# Patient Record
Sex: FEMALE | Race: BLACK OR AFRICAN AMERICAN | NOT HISPANIC OR LATINO | Employment: OTHER | URBAN - METROPOLITAN AREA
[De-identification: names, ages, dates, MRNs, and addresses within clinical notes are randomized per-mention and may not be internally consistent; named-entity substitution may affect disease eponyms.]

---

## 2019-05-30 ENCOUNTER — OFFICE VISIT (OUTPATIENT)
Dept: URGENT CARE | Facility: CLINIC | Age: 84
End: 2019-05-30
Payer: COMMERCIAL

## 2019-05-30 VITALS
WEIGHT: 145 LBS | DIASTOLIC BLOOD PRESSURE: 72 MMHG | BODY MASS INDEX: 28.47 KG/M2 | RESPIRATION RATE: 20 BRPM | SYSTOLIC BLOOD PRESSURE: 142 MMHG | TEMPERATURE: 98.4 F | HEART RATE: 99 BPM | HEIGHT: 60 IN | OXYGEN SATURATION: 96 %

## 2019-05-30 DIAGNOSIS — N39.0 URINARY TRACT INFECTION WITHOUT HEMATURIA, SITE UNSPECIFIED: Primary | ICD-10-CM

## 2019-05-30 LAB
SL AMB  POCT GLUCOSE, UA: NEGATIVE
SL AMB LEUKOCYTE ESTERASE,UA: ABNORMAL
SL AMB POCT BILIRUBIN,UA: NEGATIVE
SL AMB POCT BLOOD,UA: ABNORMAL
SL AMB POCT CLARITY,UA: ABNORMAL
SL AMB POCT COLOR,UA: YELLOW
SL AMB POCT KETONES,UA: ABNORMAL
SL AMB POCT NITRITE,UA: POSITIVE
SL AMB POCT PH,UA: 6.5
SL AMB POCT SPECIFIC GRAVITY,UA: 1.01
SL AMB POCT URINE PROTEIN: ABNORMAL
SL AMB POCT UROBILINOGEN: 0.2

## 2019-05-30 PROCEDURE — 87077 CULTURE AEROBIC IDENTIFY: CPT | Performed by: PHYSICIAN ASSISTANT

## 2019-05-30 PROCEDURE — 87086 URINE CULTURE/COLONY COUNT: CPT | Performed by: PHYSICIAN ASSISTANT

## 2019-05-30 PROCEDURE — 99213 OFFICE O/P EST LOW 20 MIN: CPT | Performed by: PHYSICIAN ASSISTANT

## 2019-05-30 PROCEDURE — 81002 URINALYSIS NONAUTO W/O SCOPE: CPT | Performed by: PHYSICIAN ASSISTANT

## 2019-05-30 PROCEDURE — G0463 HOSPITAL OUTPT CLINIC VISIT: HCPCS | Performed by: PHYSICIAN ASSISTANT

## 2019-05-30 PROCEDURE — 87186 SC STD MICRODIL/AGAR DIL: CPT | Performed by: PHYSICIAN ASSISTANT

## 2019-05-30 RX ORDER — MIRTAZAPINE 15 MG/1
15 TABLET, FILM COATED ORAL
COMMUNITY
End: 2019-09-12 | Stop reason: SDUPTHER

## 2019-05-30 RX ORDER — PRAMIPEXOLE DIHYDROCHLORIDE 1 MG/1
0.5 TABLET ORAL 2 TIMES DAILY
COMMUNITY
End: 2019-09-12 | Stop reason: SDUPTHER

## 2019-05-30 RX ORDER — NITROFURANTOIN 25; 75 MG/1; MG/1
100 CAPSULE ORAL 2 TIMES DAILY
Qty: 10 CAPSULE | Refills: 0 | Status: SHIPPED | OUTPATIENT
Start: 2019-05-30 | End: 2019-06-04

## 2019-06-01 LAB — BACTERIA UR CULT: ABNORMAL

## 2019-06-18 ENCOUNTER — APPOINTMENT (OUTPATIENT)
Dept: LAB | Facility: CLINIC | Age: 84
End: 2019-06-18
Payer: COMMERCIAL

## 2019-06-18 ENCOUNTER — OFFICE VISIT (OUTPATIENT)
Dept: FAMILY MEDICINE CLINIC | Facility: CLINIC | Age: 84
End: 2019-06-18
Payer: COMMERCIAL

## 2019-06-18 VITALS
TEMPERATURE: 97.5 F | DIASTOLIC BLOOD PRESSURE: 74 MMHG | HEIGHT: 60 IN | WEIGHT: 150 LBS | RESPIRATION RATE: 16 BRPM | OXYGEN SATURATION: 98 % | BODY MASS INDEX: 29.45 KG/M2 | HEART RATE: 92 BPM | SYSTOLIC BLOOD PRESSURE: 150 MMHG

## 2019-06-18 DIAGNOSIS — F01.50 VASCULAR DEMENTIA WITHOUT BEHAVIORAL DISTURBANCE (HCC): ICD-10-CM

## 2019-06-18 DIAGNOSIS — R41.0 CONFUSION AND DISORIENTATION: Primary | ICD-10-CM

## 2019-06-18 DIAGNOSIS — B35.4 TINEA CORPORIS: ICD-10-CM

## 2019-06-18 DIAGNOSIS — M51.36 DEGENERATION OF LUMBAR INTERVERTEBRAL DISC: ICD-10-CM

## 2019-06-18 DIAGNOSIS — R44.1 VISUAL HALLUCINATIONS: ICD-10-CM

## 2019-06-18 DIAGNOSIS — G25.81 RESTLESS LEGS SYNDROME: ICD-10-CM

## 2019-06-18 DIAGNOSIS — R41.0 CONFUSION AND DISORIENTATION: ICD-10-CM

## 2019-06-18 PROBLEM — M47.816 ARTHROPATHY OF LUMBAR FACET JOINT: Status: ACTIVE | Noted: 2019-06-18

## 2019-06-18 PROBLEM — M51.369 DEGENERATION OF LUMBAR INTERVERTEBRAL DISC: Status: ACTIVE | Noted: 2019-06-18

## 2019-06-18 PROBLEM — M54.50 LOW BACK PAIN: Status: RESOLVED | Noted: 2019-06-18 | Resolved: 2019-06-18

## 2019-06-18 PROBLEM — M54.50 LOW BACK PAIN: Status: ACTIVE | Noted: 2019-06-18

## 2019-06-18 PROBLEM — M54.16 LUMBAR RADICULOPATHY: Status: ACTIVE | Noted: 2019-06-18

## 2019-06-18 LAB
ALBUMIN SERPL BCP-MCNC: 3.7 G/DL (ref 3.5–5)
ALP SERPL-CCNC: 114 U/L (ref 46–116)
ALT SERPL W P-5'-P-CCNC: 13 U/L (ref 12–78)
ANION GAP SERPL CALCULATED.3IONS-SCNC: 3 MMOL/L (ref 4–13)
AST SERPL W P-5'-P-CCNC: 15 U/L (ref 5–45)
BASOPHILS # BLD AUTO: 0.03 THOUSANDS/ΜL (ref 0–0.1)
BASOPHILS NFR BLD AUTO: 1 % (ref 0–1)
BILIRUB SERPL-MCNC: 0.36 MG/DL (ref 0.2–1)
BUN SERPL-MCNC: 22 MG/DL (ref 5–25)
CALCIUM SERPL-MCNC: 9 MG/DL (ref 8.3–10.1)
CHLORIDE SERPL-SCNC: 104 MMOL/L (ref 100–108)
CO2 SERPL-SCNC: 30 MMOL/L (ref 21–32)
CREAT SERPL-MCNC: 1.07 MG/DL (ref 0.6–1.3)
EOSINOPHIL # BLD AUTO: 0.11 THOUSAND/ΜL (ref 0–0.61)
EOSINOPHIL NFR BLD AUTO: 2 % (ref 0–6)
ERYTHROCYTE [DISTWIDTH] IN BLOOD BY AUTOMATED COUNT: 13.9 % (ref 11.6–15.1)
GFR SERPL CREATININE-BSD FRML MDRD: 45 ML/MIN/1.73SQ M
GLUCOSE P FAST SERPL-MCNC: 171 MG/DL (ref 65–99)
HCT VFR BLD AUTO: 43.7 % (ref 34.8–46.1)
HGB BLD-MCNC: 13.4 G/DL (ref 11.5–15.4)
IMM GRANULOCYTES # BLD AUTO: 0.02 THOUSAND/UL (ref 0–0.2)
IMM GRANULOCYTES NFR BLD AUTO: 0 % (ref 0–2)
LYMPHOCYTES # BLD AUTO: 0.9 THOUSANDS/ΜL (ref 0.6–4.47)
LYMPHOCYTES NFR BLD AUTO: 14 % (ref 14–44)
MCH RBC QN AUTO: 30.2 PG (ref 26.8–34.3)
MCHC RBC AUTO-ENTMCNC: 30.7 G/DL (ref 31.4–37.4)
MCV RBC AUTO: 98 FL (ref 82–98)
MONOCYTES # BLD AUTO: 0.55 THOUSAND/ΜL (ref 0.17–1.22)
MONOCYTES NFR BLD AUTO: 8 % (ref 4–12)
NEUTROPHILS # BLD AUTO: 4.93 THOUSANDS/ΜL (ref 1.85–7.62)
NEUTS SEG NFR BLD AUTO: 75 % (ref 43–75)
NRBC BLD AUTO-RTO: 0 /100 WBCS
PLATELET # BLD AUTO: 262 THOUSANDS/UL (ref 149–390)
PMV BLD AUTO: 11.3 FL (ref 8.9–12.7)
POTASSIUM SERPL-SCNC: 4 MMOL/L (ref 3.5–5.3)
PROT SERPL-MCNC: 7.3 G/DL (ref 6.4–8.2)
RBC # BLD AUTO: 4.44 MILLION/UL (ref 3.81–5.12)
SL AMB  POCT GLUCOSE, UA: NORMAL
SL AMB LEUKOCYTE ESTERASE,UA: NORMAL
SL AMB POCT BILIRUBIN,UA: NORMAL
SL AMB POCT BLOOD,UA: NORMAL
SL AMB POCT KETONES,UA: NORMAL
SL AMB POCT NITRITE,UA: NORMAL
SL AMB POCT PH,UA: 5
SL AMB POCT SPECIFIC GRAVITY,UA: 1.03
SL AMB POCT URINE PROTEIN: NORMAL
SL AMB POCT UROBILINOGEN: 0.2
SODIUM SERPL-SCNC: 137 MMOL/L (ref 136–145)
TSH SERPL DL<=0.05 MIU/L-ACNC: 1.96 UIU/ML (ref 0.36–3.74)
WBC # BLD AUTO: 6.54 THOUSAND/UL (ref 4.31–10.16)

## 2019-06-18 PROCEDURE — 87086 URINE CULTURE/COLONY COUNT: CPT | Performed by: NURSE PRACTITIONER

## 2019-06-18 PROCEDURE — 80053 COMPREHEN METABOLIC PANEL: CPT

## 2019-06-18 PROCEDURE — 87147 CULTURE TYPE IMMUNOLOGIC: CPT | Performed by: NURSE PRACTITIONER

## 2019-06-18 PROCEDURE — 3725F SCREEN DEPRESSION PERFORMED: CPT | Performed by: NURSE PRACTITIONER

## 2019-06-18 PROCEDURE — 99204 OFFICE O/P NEW MOD 45 MIN: CPT | Performed by: NURSE PRACTITIONER

## 2019-06-18 PROCEDURE — 84443 ASSAY THYROID STIM HORMONE: CPT

## 2019-06-18 PROCEDURE — 36415 COLL VENOUS BLD VENIPUNCTURE: CPT

## 2019-06-18 PROCEDURE — 85025 COMPLETE CBC W/AUTO DIFF WBC: CPT

## 2019-06-18 PROCEDURE — 81003 URINALYSIS AUTO W/O SCOPE: CPT | Performed by: NURSE PRACTITIONER

## 2019-06-18 RX ORDER — CLOTRIMAZOLE AND BETAMETHASONE DIPROPIONATE 10; .64 MG/G; MG/G
CREAM TOPICAL 2 TIMES DAILY
Qty: 30 G | Refills: 0 | Status: SHIPPED | OUTPATIENT
Start: 2019-06-18 | End: 2020-06-18

## 2019-06-19 LAB
BACTERIA UR CULT: ABNORMAL
BACTERIA UR CULT: ABNORMAL

## 2019-06-20 DIAGNOSIS — N30.00 ACUTE CYSTITIS WITHOUT HEMATURIA: Primary | ICD-10-CM

## 2019-06-20 RX ORDER — NITROFURANTOIN 25; 75 MG/1; MG/1
100 CAPSULE ORAL 2 TIMES DAILY
Qty: 10 CAPSULE | Refills: 0 | Status: SHIPPED | OUTPATIENT
Start: 2019-06-20 | End: 2019-06-25

## 2019-07-02 ENCOUNTER — OFFICE VISIT (OUTPATIENT)
Dept: FAMILY MEDICINE CLINIC | Facility: CLINIC | Age: 84
End: 2019-07-02
Payer: COMMERCIAL

## 2019-07-02 VITALS
HEART RATE: 84 BPM | TEMPERATURE: 97.2 F | BODY MASS INDEX: 28.82 KG/M2 | RESPIRATION RATE: 18 BRPM | HEIGHT: 60 IN | WEIGHT: 146.8 LBS | DIASTOLIC BLOOD PRESSURE: 72 MMHG | OXYGEN SATURATION: 98 % | SYSTOLIC BLOOD PRESSURE: 136 MMHG

## 2019-07-02 DIAGNOSIS — R44.1 VISUAL HALLUCINATIONS: ICD-10-CM

## 2019-07-02 DIAGNOSIS — Z00.00 MEDICARE ANNUAL WELLNESS VISIT, SUBSEQUENT: ICD-10-CM

## 2019-07-02 DIAGNOSIS — R41.0 CONFUSION AND DISORIENTATION: ICD-10-CM

## 2019-07-02 DIAGNOSIS — G25.81 RESTLESS LEGS SYNDROME: ICD-10-CM

## 2019-07-02 DIAGNOSIS — M51.36 DEGENERATION OF LUMBAR INTERVERTEBRAL DISC: ICD-10-CM

## 2019-07-02 DIAGNOSIS — F01.50 VASCULAR DEMENTIA WITHOUT BEHAVIORAL DISTURBANCE (HCC): ICD-10-CM

## 2019-07-02 DIAGNOSIS — M54.16 LUMBAR RADICULOPATHY: Primary | ICD-10-CM

## 2019-07-02 DIAGNOSIS — B35.4 TINEA CORPORIS: ICD-10-CM

## 2019-07-02 DIAGNOSIS — M47.816 ARTHROPATHY OF LUMBAR FACET JOINT: ICD-10-CM

## 2019-07-02 PROCEDURE — G0439 PPPS, SUBSEQ VISIT: HCPCS | Performed by: NURSE PRACTITIONER

## 2019-07-02 PROCEDURE — 1160F RVW MEDS BY RX/DR IN RCRD: CPT | Performed by: NURSE PRACTITIONER

## 2019-07-02 PROCEDURE — 1036F TOBACCO NON-USER: CPT | Performed by: NURSE PRACTITIONER

## 2019-07-02 PROCEDURE — 1170F FXNL STATUS ASSESSED: CPT | Performed by: NURSE PRACTITIONER

## 2019-07-02 PROCEDURE — 1125F AMNT PAIN NOTED PAIN PRSNT: CPT | Performed by: NURSE PRACTITIONER

## 2019-07-02 PROCEDURE — 99214 OFFICE O/P EST MOD 30 MIN: CPT | Performed by: NURSE PRACTITIONER

## 2019-07-02 NOTE — PROGRESS NOTES
Assessment/Plan:    No problem-specific Assessment & Plan notes found for this encounter  Diagnoses and all orders for this visit:    Lumbar radiculopathy  Comments:  back pain is stable and unchanged    Vascular dementia without behavioral disturbance    Degeneration of lumbar intervertebral disc    Arthropathy of lumbar facet joint    Tinea corporis  Comments:  using lotrisone    Restless legs syndrome    Visual hallucinations    Confusion and disorientation  Comments:  subjectively is worsening  Has good care at home with daughter          Subjective:      Patient ID: Jonny May is a 80 y o  female  6/18/19  Patient here to establish care and reports that she has been diagnosed with dementia beginning stages about two years ago  Patient reports that she has a medical history of lung cancer about 40 years ago and had surgery to remove part of the lung  Patient also has history of appendix removal  Patient has low back pain history and walking with the cane  Patient reports that at times she is hearing voices talking and is seeing things and reports that she is feeling like she has a veil by her eye and having some paranoid and seeing people that are not there  Happening every few months and has been more frequent and able to calm down and redirect and has been living with her niece and her  for many years  Patient had a recent UTI  Patient unclear if has cleared up  7/2    Pt here for a check up  The pt has no acute complaints  Pt with hx of of lumbar radiculopathy  Pt is chronically incontient of urine at night  Has some occasional leg weakness  Pt uses a walker in the house  Pt has occasional falls  Fell on knees  Did not hit head  Pt wears life alert  Pt with recent tinea  Using lotrisone  Reports symptomatic improvement,    Pt with worsening dimentia  Has help with IADLS  Independent with ADLS still  Does not drive  States she gets confused often  Lives with daughter   Has constant care from [facility]  The following portions of the patient's history were reviewed and updated as appropriate:   She  has a past medical history of Cancer ([address] ), Dementia, Depression, and Kidney stone  She   Patient Active Problem List    Diagnosis Date Noted    Degeneration of lumbar intervertebral disc 06/18/2019    Lumbar radiculopathy 06/18/2019    Arthropathy of lumbar facet joint 06/18/2019    Vascular dementia without behavioral disturbance 06/18/2019    Visual hallucinations 06/18/2019    Confusion and disorientation 06/18/2019    Restless legs syndrome 06/18/2019    Tinea corporis 06/18/2019     She  has a past surgical history that includes Lung cancer surgery and Appendectomy  Her family history includes No Known Problems in her father and mother  She  reports that she has quit smoking  She has never used smokeless tobacco  She reports that she does not use drugs  Her alcohol history is not on file  Current Outpatient Medications   Medication Sig Dispense Refill    clotrimazole-betamethasone (LOTRISONE) 1-0 05 % cream Apply topically 2 (two) times a day 30 g 0    mirtazapine (REMERON) 15 mg tablet Take 15 mg by mouth daily at bedtime      pramipexole (MIRAPEX) 1 mg tablet Take 0 5 mg by mouth 2 (two) times a day       No current facility-administered medications for this visit  She is allergic to albuterol; aldactone [spironolactone]; aspirin; atenolol; bactrim [sulfamethoxazole-trimethoprim]; codeine; hydrocodone; ibuprofen; lisinopril; mevacor [lovastatin]; mirapex [pramipexole]; other; penicillins; ultram [tramadol]; and zoloft [sertraline]       Review of Systems   Constitutional: Negative for activity change, appetite change, chills, diaphoresis, fatigue, fever and unexpected weight change  HENT: Negative for congestion, ear pain, hearing loss, postnasal drip, sinus pressure, sinus pain, sneezing and sore throat      Eyes: Negative for pain, redness and visual disturbance  Respiratory: Negative for cough and shortness of breath  Cardiovascular: Negative for chest pain and leg swelling  Gastrointestinal: Negative for abdominal pain, diarrhea, nausea and vomiting  Endocrine: Negative  Genitourinary: Negative  Chronically incontient of urine at night   Musculoskeletal: Negative for arthralgias  Neurological: Negative for dizziness and light-headedness  Psychiatric/Behavioral: Negative for behavioral problems and dysphoric mood  Objective:      /72 (BP Location: Right arm, Patient Position: Sitting, Cuff Size: Standard)   Pulse 84   Temp (!) 97 2 °F (36 2 °C) (Tympanic)   Resp 18   Ht 4' 11 75" (1 518 m)   Wt 66 6 kg (146 lb 12 8 oz)   SpO2 98%   BMI 28 91 kg/m²          Physical Exam   Constitutional: She is oriented to person, place, and time  Vital signs are normal  She appears well-developed and well-nourished  No distress  HENT:   Head: Normocephalic and atraumatic  Right Ear: External ear normal    Left Ear: External ear normal    Eyes: Pupils are equal, round, and reactive to light  Neck: Normal range of motion  No JVD present  No thyromegaly present  Cardiovascular: Normal rate, regular rhythm and intact distal pulses  Murmur heard  Systolic murmur is present with a grade of 3/6  Pulmonary/Chest: Effort normal and breath sounds normal  No respiratory distress  She has no wheezes  Abdominal: Soft  Bowel sounds are normal  She exhibits no distension  Musculoskeletal: Normal range of motion  She exhibits no edema  Neurological: She is alert and oriented to person, place, and time  Skin: Skin is warm and dry  Psychiatric: She has a normal mood and affect

## 2019-07-02 NOTE — PROGRESS NOTES
Assessment and Plan:     Problem List Items Addressed This Visit     None         History of Present Illness:     Patient presents for Medicare Annual Wellness visit    Patient Care Team:  FARZANA Adame as PCP - General (Family Medicine)     Problem List:     Patient Active Problem List   Diagnosis    Degeneration of lumbar intervertebral disc    Lumbar radiculopathy    Arthropathy of lumbar facet joint    Vascular dementia without behavioral disturbance    Visual hallucinations    Confusion and disorientation    Restless legs syndrome    Tinea corporis      Past Medical and Surgical History:     Past Medical History:   Diagnosis Date    Cancer ([address] )     Dementia     Depression     Kidney stone      Past Surgical History:   Procedure Laterality Date    APPENDECTOMY      LUNG CANCER SURGERY        Family History:     Family History   Problem Relation Age of Onset    No Known Problems Mother     No Known Problems Father       Social History:     Social History     Tobacco Use   Smoking Status Former Smoker   Smokeless Tobacco Never Used     Social History     Substance and Sexual Activity   Alcohol Use Not on file     Social History     Substance and Sexual Activity   Drug Use Never      Medications and Allergies:     Current Outpatient Medications   Medication Sig Dispense Refill    clotrimazole-betamethasone (LOTRISONE) 1-0 05 % cream Apply topically 2 (two) times a day 30 g 0    mirtazapine (REMERON) 15 mg tablet Take 15 mg by mouth daily at bedtime      pramipexole (MIRAPEX) 1 mg tablet Take 0 5 mg by mouth 2 (two) times a day       No current facility-administered medications for this visit        Allergies   Allergen Reactions    Albuterol     Aldactone [Spironolactone]     Aspirin     Atenolol     Bactrim [Sulfamethoxazole-Trimethoprim]     Codeine     Hydrocodone     Ibuprofen     Lisinopril     Mevacor [Lovastatin]     Mirapex [Pramipexole]     Other      novacaine    Penicillins     Ultram [Tramadol]     Zoloft [Sertraline]       Immunizations:     Immunization History   Administered Date(s) Administered    INFLUENZA 10/23/2014, 11/22/2015, 02/14/2018, 11/06/2018      Medicare Screening Tests and Risk Assessments:     Ehsan Collazo is here for her Subsequent Wellness visit  Last Medicare Wellness visit information reviewed, patient interviewed and updates made to the record today  Health Risk Assessment:  Patient rates overall health as good  Patient feels that their physical health rating is Same  Eyesight was rated as Slightly worse  Hearing was rated as Same  Patient feels that their emotional and mental health rating is Same  Pain experienced by patient in the last 7 days has been None  Patient states that she has experienced weight loss or gain in last 6 months  Emotional/Mental Health:  Patient has not been feeling nervous/anxious  PHQ-9 Depression Screening:    Frequency of the following problems over the past two weeks:      1  Little interest or pleasure in doing things: 0 - not at all      2  Feeling down, depressed, or hopeless: 1 - several days  PHQ-2 Score: 1          Broken Bones/Falls: Fall Risk Assessment:    In the past year, patient has experienced: History of falling in past year    Number of falls: 1    Injured during fall: No      Patient feels unsteady when standing or walking     Patient is worried about falling     Bladder/Bowel:  Patient has leaked urine accidently in the last six months  Patient reports no loss of bowel control  (Additional Comments: incont at night)    Immunizations:  Patient has had a flu vaccination within the last year  Patient has received a pneumonia shot  Patient has not received a shingles shot  Patient has not received tetanus/diphtheria shot  Home Safety:  Patient does not have trouble with stairs inside or outside of their home     Patient currently reports that there are no safety hazards present in home, working smoke alarms, working carbon monoxide detectors  Preventative Screenings:   Breast cancer screening performed, colon cancer screen completed, cholesterol screen completed, glaucoma eye exam completed,     Nutrition:  Current diet: Regular with servings of the following:    Medications:  Patient is not currently taking any over-the-counter supplements  Patient is able to manage medications  Lifestyle Choices:  Patient reports no tobacco use  Patient has smoked or used tobacco in the past   Patient has stopped her tobacco use  Tobacco use quit date: 34 years ago  Patient reports no alcohol use  Patient does not drive a vehicle  Patient wears seat belt  Current level of exercise of physical activity described by patient as: walking  Activities of Daily Living:  Can get out of bed by his or her self, able to dress self, able to make own meals, able to do own shopping, able to bathe self, can do own laundry/housekeeping, unable to manage own money and other related tasksAdditional Comments: POA does bills    Previous Hospitalizations:  Hospitalization or ED visit in past 12 months  Number of hospitalizations within the last year: 1-2  Additional Comments: UTI     Advanced Directives:  Patient has decided on a power of   Patient has spoken to designated power of   Patient has completed advanced directive          Preventative Screening/Counseling:      Cardiovascular:      General: Risks and Benefits Discussed and Screening Current          Diabetes:      General: Risks and Benefits Discussed and Screening Current          Colorectal Cancer:      General: Risks and Benefits Discussed and Screening Not Indicated          Breast Cancer:      General: Risks and Benefits Discussed and Screening Not Indicated          Cervical Cancer:      General: Risks and Benefits Discussed and Screening Not Indicated          Osteoporosis:      General: Risks and Benefits Discussed and Screening Not Indicated          AAA:      General: Risks and Benefits Discussed and Screening Not Indicated          Glaucoma:      General: Risks and Benefits Discussed and Screening Current          HIV:      General: Risks and Benefits Discussed and Screening Not Indicated          Hepatitis C:      General: Risks and Benefits Discussed and Screening Not Indicated        Advanced Directives:   Patient has living will for healthcare, has durable POA for healthcare, patient has an advanced directive       Immunizations:  Patient reviewed and up to date

## 2019-09-06 ENCOUNTER — OFFICE VISIT (OUTPATIENT)
Dept: OBGYN CLINIC | Age: 84
End: 2019-09-06
Payer: COMMERCIAL

## 2019-09-06 VITALS
DIASTOLIC BLOOD PRESSURE: 64 MMHG | SYSTOLIC BLOOD PRESSURE: 138 MMHG | WEIGHT: 142 LBS | HEIGHT: 60 IN | BODY MASS INDEX: 27.88 KG/M2

## 2019-09-06 DIAGNOSIS — N76.0 ACUTE VAGINITIS: Primary | ICD-10-CM

## 2019-09-06 PROCEDURE — 87210 SMEAR WET MOUNT SALINE/INK: CPT | Performed by: NURSE PRACTITIONER

## 2019-09-06 PROCEDURE — 99203 OFFICE O/P NEW LOW 30 MIN: CPT | Performed by: NURSE PRACTITIONER

## 2019-09-06 RX ORDER — METRONIDAZOLE 500 MG/1
500 TABLET ORAL EVERY 12 HOURS SCHEDULED
Qty: 14 TABLET | Refills: 0 | Status: SHIPPED | OUTPATIENT
Start: 2019-09-06 | End: 2019-09-13

## 2019-09-06 NOTE — PROGRESS NOTES
Diagnoses and all orders for this visit:    Acute vaginitis  -     metroNIDAZOLE (FLAGYL) 500 mg tablet; Take 1 tablet (500 mg total) by mouth every 12 (twelve) hours for 7 days No alcohol during treatment        Call if no symptom improvement, all questions answered, return for annual         Pleasant 80 y o  here for vaginal complaints of odor x 6 months  She denies discharge or itching  She denies any treatments tried  + antibiotic use 3 months ago for a UTI  She was douching a lot but stopped 2 months ago  Denies fever  Not sexually active  Some mild lower pelvic discomfort as well      Past Medical History:   Diagnosis Date    [facility] [address] [address]     Dementia     Depression     Kidney stone      Past Surgical History:   Procedure Laterality Date    APPENDECTOMY      LUNG CANCER SURGERY       Social History     Tobacco Use    Smoking status: Former Smoker    Smokeless tobacco: Never Used   Substance Use Topics    Alcohol use: Never     Frequency: Never    Drug use: Never     Family History   Problem Relation Age of Onset    No Known Problems Mother     No Known Problems Father     Breast cancer Neg Hx     Colon cancer Neg Hx     Ovarian cancer Neg Hx     Uterine cancer Neg Hx     Cervical cancer Neg Hx        Current Outpatient Medications:     clotrimazole-betamethasone (LOTRISONE) 1-0 05 % cream, Apply topically 2 (two) times a day, Disp: 30 g, Rfl: 0    mirtazapine (REMERON) 15 mg tablet, Take 15 mg by mouth daily at bedtime, Disp: , Rfl:     pramipexole (MIRAPEX) 1 mg tablet, Take 0 5 mg by mouth 2 (two) times a day, Disp: , Rfl:     metroNIDAZOLE (FLAGYL) 500 mg tablet, Take 1 tablet (500 mg total) by mouth every 12 (twelve) hours for 7 days No alcohol during treatment, Disp: 14 tablet, Rfl: 0    Allergies   Allergen Reactions    Albuterol     Aldactone [Spironolactone]     Aspirin     Atenolol     Bactrim [Sulfamethoxazole-Trimethoprim]     Codeine     Hydrocodone     Ibuprofen  Lisinopril     Mevacor [Lovastatin]     Mirapex [Pramipexole]     Other      novacaine    Penicillins     Ultram [Tramadol]     Zoloft [Sertraline]      OB History    Para Term  AB Living   7 2 2 0 5 2   SAB TAB Ectopic Multiple Live Births   5 0 0 0 0      # Outcome Date GA Lbr Tahir/2nd Weight Sex Delivery Anes PTL Lv   7 SAB            6 SAB            5 SAB            4 SAB            3 SAB            2 Term            1 Term                Vitals:    19 0958   BP: 138/64   BP Location: Right arm   Patient Position: Sitting   Weight: 64 4 kg (142 lb)   Height: 5' (1 524 m)     Body mass index is 27 73 kg/m²  Review of Systems   Constitutional: Negative for chills, fatigue, fever and unexpected weight change  Respiratory: Negative for shortness of breath  Gastrointestinal: Negative for anal bleeding, blood in stool, constipation and diarrhea  Genitourinary: Negative for difficulty urinating, dysuria and hematuria  Physical Exam   Constitutional: She appears well-developed and well-nourished  No distress  Alert and oriented  HENT: atraumatic  Head: Normocephalic  Neck: Normal range of motion  Neck supple  Pulmonary: Effort normal   Abdominal: Soft  Pelvic exam was performed with patient supine  No labial fusion  There is no rash, tenderness, lesion or injury on the right labia  There is no rash, tenderness, lesion or injury on the left labia  Urethral meatus does not show any tenderness, inflammation or discharge  Palpation of midline bladder without pain or discomfort  Uterus is not deviated, not enlarged, not fixed and not tender  Cervix exhibits no motion tenderness, no discharge and no friability  Right adnexum displays no mass, no tenderness and no fullness  Left adnexum displays no mass, no tenderness and no fullness  No erythema or tenderness in the vagina  No foreign body in the vagina  No signs of injury around the vagina  Vaginal discharge found   Perineum and anus without areas of injury  No lesions noted or swelling  Lymphadenopathy:        Right: No inguinal adenopathy present  Left: No inguinal adenopathy present       Wet mount showed no hyphae, ph 5 5, no wbcs or trich, whiff neg

## 2019-09-06 NOTE — PATIENT INSTRUCTIONS
Bacterial Vaginosis   WHAT YOU NEED TO KNOW:   What is bacterial vaginosis? Bacterial vaginosis (BV) is an infection in the vagina  It may cause vaginitis, which is irritation and inflammation of the vagina  What causes bacterial vaginosis? The cause of BV is not known  With BV, there is an imbalance in bacteria normally found in the vagina  Your risk for BV increases if you are sexually active  Your risk for BV also increases if you douche or have an intrauterine device (IUD)  What are the signs and symptoms of bacterial vaginosis? Some women have no symptoms  You may have the following:  · White, gray, or yellow vaginal discharge    · Vaginal discharge that smells like fish    · Itching or burning around the outside of your vagina  How is bacterial vaginosis diagnosed? Your healthcare provider will examine you and ask if you have other health conditions  He may need to take a sample of fluid from your vagina  This will be tested for the bacteria that causes BV  How is bacterial vaginosis treated? Antibiotics are given to kill the bacteria that cause BV  They may be given as a pill or as a cream to put in your vagina  Take or use as directed  What are the risks of bacterial vaginosis? If untreated, BV may spread and lead to serious infections in your uterus and fallopian tubes  This can make it more difficult to get pregnant  BV increases your risk for other sexually transmitted infections (STIs), such as chlamydia, gonorrhea, or HIV  How can I prevent bacterial vaginosis? · Keep your vaginal area clean and dry:  Wear underwear and pantyhose with a cotton crotch  Wipe from front to back after you urinate or have a bowel movement  After bathing, rinse soap from your vaginal area to decrease your risk for irritation  · Do not use products that cause irritation:  Always use unscented tampons or sanitary pads   Do not use feminine sprays, powders, or scented tampons because they may cause irritation and increase your risk of BV  Detergents and fabric softeners may also cause irritation  · Do not douche: This can cause an imbalance in healthy vaginal bacteria  · Use latex condoms: This helps prevent another infection and keeps your partner from getting the infection  When should I contact my healthcare provider? · Your symptoms come back or do not improve with treatment  · You have vaginal bleeding that is not your monthly period  · You have questions or concerns about your condition or care  CARE AGREEMENT:   You have the right to help plan your care  Learn about your health condition and how it may be treated  Discuss treatment options with your caregivers to decide what care you want to receive  You always have the right to refuse treatment  The above information is an  only  It is not intended as medical advice for individual conditions or treatments  Talk to your doctor, nurse or pharmacist before following any medical regimen to see if it is safe and effective for you  © 2017 [employer] Information is for End User's use only and may not be sold, redistributed or otherwise used for commercial purposes  All illustrations and images included in CareNotes® are the copyrighted property of [employer]  or [employer] Mariscal

## 2019-09-12 DIAGNOSIS — F51.01 PRIMARY INSOMNIA: Primary | ICD-10-CM

## 2019-09-12 DIAGNOSIS — G25.81 RESTLESS LEGS SYNDROME: Primary | ICD-10-CM

## 2019-09-12 RX ORDER — MIRTAZAPINE 15 MG/1
15 TABLET, FILM COATED ORAL
Qty: 30 TABLET | Refills: 0 | Status: SHIPPED | OUTPATIENT
Start: 2019-09-12 | End: 2019-10-06 | Stop reason: SDUPTHER

## 2019-09-12 RX ORDER — PRAMIPEXOLE DIHYDROCHLORIDE 1 MG/1
0.5 TABLET ORAL 2 TIMES DAILY
Qty: 45 TABLET | Refills: 0 | Status: ON HOLD | OUTPATIENT
Start: 2019-09-12 | End: 2019-10-22 | Stop reason: SDUPTHER

## 2019-09-28 ENCOUNTER — HOSPITAL ENCOUNTER (EMERGENCY)
Facility: HOSPITAL | Age: 84
Discharge: HOME | End: 2019-09-28
Admitting: EMERGENCY MEDICINE
Payer: COMMERCIAL

## 2019-09-28 ENCOUNTER — APPOINTMENT (EMERGENCY)
Dept: CT IMAGING | Facility: HOSPITAL | Age: 84
End: 2019-09-28
Payer: COMMERCIAL

## 2019-09-28 VITALS
OXYGEN SATURATION: 99 % | RESPIRATION RATE: 16 BRPM | BODY MASS INDEX: 27.96 KG/M2 | HEIGHT: 60 IN | WEIGHT: 142.42 LBS | TEMPERATURE: 98 F | DIASTOLIC BLOOD PRESSURE: 67 MMHG | SYSTOLIC BLOOD PRESSURE: 181 MMHG | HEART RATE: 73 BPM

## 2019-09-28 DIAGNOSIS — R51.9 HEADACHE: Primary | ICD-10-CM

## 2019-09-28 LAB
ALBUMIN SERPL BCP-MCNC: 3.6 G/DL (ref 3.5–5)
ALP SERPL-CCNC: 100 U/L (ref 46–116)
ALT SERPL W P-5'-P-CCNC: 13 U/L (ref 12–78)
ANION GAP SERPL CALCULATED.3IONS-SCNC: 10 MMOL/L (ref 4–13)
AST SERPL W P-5'-P-CCNC: 17 U/L (ref 5–45)
BASOPHILS # BLD AUTO: 0.04 THOUSANDS/ΜL (ref 0–0.1)
BASOPHILS NFR BLD AUTO: 1 % (ref 0–1)
BILIRUB SERPL-MCNC: 0.4 MG/DL (ref 0.2–1)
BUN SERPL-MCNC: 15 MG/DL (ref 5–25)
CALCIUM SERPL-MCNC: 9.3 MG/DL (ref 8.3–10.1)
CHLORIDE SERPL-SCNC: 104 MMOL/L (ref 100–108)
CO2 SERPL-SCNC: 28 MMOL/L (ref 21–32)
CREAT SERPL-MCNC: 1.17 MG/DL (ref 0.6–1.3)
EOSINOPHIL # BLD AUTO: 0.11 THOUSAND/ΜL (ref 0–0.61)
EOSINOPHIL NFR BLD AUTO: 1 % (ref 0–6)
ERYTHROCYTE [DISTWIDTH] IN BLOOD BY AUTOMATED COUNT: 13.4 % (ref 11.6–15.1)
GFR SERPL CREATININE-BSD FRML MDRD: 40 ML/MIN/1.73SQ M
GLUCOSE SERPL-MCNC: 105 MG/DL (ref 65–140)
HCT VFR BLD AUTO: 45.6 % (ref 34.8–46.1)
HGB BLD-MCNC: 14.5 G/DL (ref 11.5–15.4)
IMM GRANULOCYTES # BLD AUTO: 0.03 THOUSAND/UL (ref 0–0.2)
IMM GRANULOCYTES NFR BLD AUTO: 0 % (ref 0–2)
LYMPHOCYTES # BLD AUTO: 1.58 THOUSANDS/ΜL (ref 0.6–4.47)
LYMPHOCYTES NFR BLD AUTO: 19 % (ref 14–44)
MCH RBC QN AUTO: 30.1 PG (ref 26.8–34.3)
MCHC RBC AUTO-ENTMCNC: 31.8 G/DL (ref 31.4–37.4)
MCV RBC AUTO: 95 FL (ref 82–98)
MONOCYTES # BLD AUTO: 0.82 THOUSAND/ΜL (ref 0.17–1.22)
MONOCYTES NFR BLD AUTO: 10 % (ref 4–12)
NEUTROPHILS # BLD AUTO: 5.62 THOUSANDS/ΜL (ref 1.85–7.62)
NEUTS SEG NFR BLD AUTO: 69 % (ref 43–75)
NRBC BLD AUTO-RTO: 0 /100 WBCS
PLATELET # BLD AUTO: 313 THOUSANDS/UL (ref 149–390)
PMV BLD AUTO: 10.6 FL (ref 8.9–12.7)
POTASSIUM SERPL-SCNC: 3.9 MMOL/L (ref 3.5–5.3)
PROT SERPL-MCNC: 7.6 G/DL (ref 6.4–8.2)
RBC # BLD AUTO: 4.81 MILLION/UL (ref 3.81–5.12)
SODIUM SERPL-SCNC: 142 MMOL/L (ref 136–145)
WBC # BLD AUTO: 8.2 THOUSAND/UL (ref 4.31–10.16)

## 2019-09-28 PROCEDURE — 99285 EMERGENCY DEPT VISIT HI MDM: CPT

## 2019-09-28 PROCEDURE — 70450 CT HEAD/BRAIN W/O DYE: CPT

## 2019-09-28 PROCEDURE — 80053 COMPREHEN METABOLIC PANEL: CPT | Performed by: PHYSICIAN ASSISTANT

## 2019-09-28 PROCEDURE — 85025 COMPLETE CBC W/AUTO DIFF WBC: CPT | Performed by: PHYSICIAN ASSISTANT

## 2019-09-28 PROCEDURE — 99284 EMERGENCY DEPT VISIT MOD MDM: CPT | Performed by: PHYSICIAN ASSISTANT

## 2019-09-28 PROCEDURE — 36415 COLL VENOUS BLD VENIPUNCTURE: CPT | Performed by: PHYSICIAN ASSISTANT

## 2019-09-28 RX ORDER — ACETAMINOPHEN 325 MG/1
650 TABLET ORAL ONCE
Status: COMPLETED | OUTPATIENT
Start: 2019-09-28 | End: 2019-09-28

## 2019-09-28 RX ADMIN — ACETAMINOPHEN 650 MG: 325 TABLET, FILM COATED ORAL at 05:27

## 2019-09-28 NOTE — ED PROVIDER NOTES
History  Chief Complaint   Patient presents with    Weakness - Generalized     pt brought in by EMS for generalized weakness  pt reports an "electrical shock went from her feet to her head" woke her up out of her sleep about one hour ago  pt reports a slight headache     Patient is a 66-year-old female presents emergency department with complaints of being woken up an electric shock going for feet all into her head  She states feeling passed very quickly  She denies any chest pain, shortness of breath, fever, chills, visual changes, dizziness, lightheadedness  Patient's only complaint at this time is a slight headache  Prior to Admission Medications   Prescriptions Last Dose Informant Patient Reported? Taking?    clotrimazole-betamethasone (LOTRISONE) 1-0 05 % cream   No No   Sig: Apply topically 2 (two) times a day   mirtazapine (REMERON) 15 mg tablet   No No   Sig: Take 1 tablet (15 mg total) by mouth daily at bedtime   pramipexole (MIRAPEX) 1 mg tablet   No No   Sig: Take 0 5 tablets (0 5 mg total) by mouth 2 (two) times a day      Facility-Administered Medications: None       Past Medical History:   Diagnosis Date    Ankle fracture     Arthritis     left hip    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HCC)     with left ureter    Bronchitis     Cancer ([address]     Carcinoma, lung ([address] )     Dementia     Dependent edema     Depression     [email or web address]     Hypercholesterolemia     Hypertension     Kidney stone     Oth abn and inconclusive findings on dx imaging of breast     Pes planus     Renal calculus     Restless leg syndrome     Rib pain     Sprain, neck     Varicose veins of left lower extremity        Past Surgical History:   Procedure Laterality Date    APPENDECTOMY      LUNG CANCER SURGERY         Family History   Problem Relation Age of Onset    No Known Problems Mother     No Known Problems Father     Breast cancer Neg Hx     Colon cancer Neg Hx     Ovarian cancer Neg Hx     Uterine cancer Neg Hx     Cervical cancer Neg Hx      I have reviewed and agree with the history as documented  Social History     Tobacco Use    Smoking status: Former Smoker    Smokeless tobacco: Never Used   Substance Use Topics    Alcohol use: Never     Frequency: Never    Drug use: Never        Review of Systems   Constitutional: Negative for fever  Respiratory: Negative for shortness of breath  Cardiovascular: Negative for chest pain  Neurological: Positive for headaches  All other systems reviewed and are negative  Physical Exam  Physical Exam   Constitutional: She is oriented to person, place, and time  She appears well-developed and well-nourished  HENT:   Head: Normocephalic and atraumatic  Right Ear: External ear normal    Left Ear: External ear normal    Nose: Nose normal    Mouth/Throat: Oropharynx is clear and moist    Eyes: Pupils are equal, round, and reactive to light  Conjunctivae and EOM are normal    Neck: Normal range of motion  Cardiovascular: Normal rate, regular rhythm and normal heart sounds  Pulmonary/Chest: Effort normal and breath sounds normal    Abdominal: Soft  Bowel sounds are normal    Neurological: She is alert and oriented to person, place, and time  No cranial nerve deficit  Coordination normal    Skin: Skin is warm  Psychiatric: She has a normal mood and affect  Her behavior is normal  Judgment and thought content normal    Vitals reviewed        Vital Signs  ED Triage Vitals [09/28/19 0430]   Temperature Pulse Respirations Blood Pressure SpO2   98 °F (36 7 °C) 73 16 (!) 181/67 99 %      Temp src Heart Rate Source Patient Position - Orthostatic VS BP Location FiO2 (%)   -- Monitor Lying Right arm --      Pain Score       5           Vitals:    09/28/19 0430   BP: (!) 181/67   Pulse: 73   Patient Position - Orthostatic VS: Lying         Visual Acuity  Visual Acuity      Most Recent Value   L Pupil Size (mm)  2   R Pupil Size (mm)  2          ED Medications  Medications   acetaminophen (TYLENOL) tablet 650 mg (650 mg Oral Given 9/28/19 0527)       Diagnostic Studies  Results Reviewed     Procedure Component Value Units Date/Time    Comprehensive metabolic panel [[id number]] Collected:  09/28/19 0435    Lab Status:  Final result Specimen:  Blood from Arm, Right Updated:  09/28/19 0459     Sodium 142 mmol/L      Potassium 3 9 mmol/L      Chloride 104 mmol/L      CO2 28 mmol/L      ANION GAP 10 mmol/L      BUN 15 mg/dL      Creatinine 1 17 mg/dL      Glucose 105 mg/dL      Calcium 9 3 mg/dL      AST 17 U/L      ALT 13 U/L      Alkaline Phosphatase 100 U/L      Total Protein 7 6 g/dL      Albumin 3 6 g/dL      Total Bilirubin 0 40 mg/dL      eGFR 40 ml/min/1 73sq m     Narrative:       National Kidney Disease Foundation guidelines for Chronic Kidney Disease (CKD):     Stage 1 with normal or high GFR (GFR > 90 mL/min/1 73 square meters)    Stage 2 Mild CKD (GFR = 60-89 mL/min/1 73 square meters)    Stage 3A Moderate CKD (GFR = 45-59 mL/min/1 73 square meters)    Stage 3B Moderate CKD (GFR = 30-44 mL/min/1 73 square meters)    Stage 4 Severe CKD (GFR = 15-29 mL/min/1 73 square meters)    Stage 5 End Stage CKD (GFR <15 mL/min/1 73 square meters)  Note: GFR calculation is accurate only with a steady state creatinine    CBC and differential [[id number]] Collected:  09/28/19 0435    Lab Status:  Final result Specimen:  Blood from Arm, Right Updated:  09/28/19 0440     WBC 8 20 Thousand/uL      RBC 4 81 Million/uL      Hemoglobin 14 5 g/dL      Hematocrit 45 6 %      MCV 95 fL      MCH 30 1 pg      MCHC 31 8 g/dL      RDW 13 4 %      MPV 10 6 fL      Platelets 963 Thousands/uL      nRBC 0 /100 WBCs      Neutrophils Relative 69 %      Immat GRANS % 0 %      Lymphocytes Relative 19 %      Monocytes Relative 10 %      Eosinophils Relative 1 %      Basophils Relative 1 %      Neutrophils Absolute 5 62 Thousands/µL      Immature Grans Absolute 0 03 Thousand/uL Lymphocytes Absolute 1 58 Thousands/µL      Monocytes Absolute 0 82 Thousand/µL      Eosinophils Absolute 0 11 Thousand/µL      Basophils Absolute 0 04 Thousands/µL                  CT head wo contrast   Final Result by Houston Castellon DO (09/28 2077)   Moderate cerebral atrophy with chronic small vessel ischemic change  No acute intracranial abnormality  Workstation performed: [id number]                    Procedures  Procedures       ED Course           Identification of Seniors at Risk      Most Recent Value   (ISAR) Identification of Seniors at Risk   Before the illness or injury that brought you to the Emergency, did you need someone to help you on a regular basis? 1 Filed at: 09/28/2019 0432   In the last 24 hours, have you needed more help than usual?  0 Filed at: 09/28/2019 0938   Have you been hospitalized for one or more nights during the past 6 months? 0 Filed at: 09/28/2019 0432   In general, do you see well?  0 Filed at: 09/28/2019 0432   In general, do you have serious problems with your memory? 0 Filed at: 09/28/2019 7258   Do you take more than three different medications every day? 1 Filed at: 09/28/2019 0432   ISAR Score  2 Filed at: 09/28/2019 5194                          MDM  Number of Diagnoses or Management Options  Headache:   Diagnosis management comments: Patient is a 58-year-old female presents emergency department with complaints of feeling electrical shock with her body  CT scan head was reviewed and does not show any acute changes  Lab evaluation does not show any remarkable changes  Patient given Tylenol for headache which resolved  Patient stable for discharge  Return parameters were discussed         Amount and/or Complexity of Data Reviewed  Clinical lab tests: ordered and reviewed  Tests in the radiology section of CPT®: ordered and reviewed    Risk of Complications, Morbidity, and/or Mortality  Presenting problems: moderate  Diagnostic procedures: moderate  Management options: low    Patient Progress  Patient progress: stable      Disposition  Final diagnoses:   Headache     Time reflects when diagnosis was documented in both MDM as applicable and the Disposition within this note     Time User Action Codes Description Comment    9/28/2019  5:20 AM Kristal Rausch Add [R51] Headache       ED Disposition     ED Disposition Condition Date/Time Comment    Discharge Good Sat Sep 28, 2019 Kay Austin discharge to home/self care  Follow-up Information     Follow up With Specialties Details Why Contact Info    Latrice Dubose, [address], Nurse Practitioner Schedule an appointment as soon as possible for a visit   [address] [place] [address],[address]  [facility]  [phone]            Discharge Medication List as of 9/28/2019  5:20 AM      CONTINUE these medications which have NOT CHANGED    Details   clotrimazole-betamethasone (LOTRISONE) 1-0 05 % cream Apply topically 2 (two) times a day, Starting Tue 6/18/2019, Normal      mirtazapine (REMERON) 15 mg tablet Take 1 tablet (15 mg total) by mouth daily at bedtime, Starting Thu 9/12/2019, Normal      pramipexole (MIRAPEX) 1 mg tablet Take 0 5 tablets (0 5 mg total) by mouth 2 (two) times a day, Starting Thu 9/12/2019, Normal           No discharge procedures on file      ED Provider  Electronically Signed by           Griselda Carl, PA-C  09/28/19 0164

## 2019-10-01 ENCOUNTER — TELEPHONE (OUTPATIENT)
Dept: FAMILY MEDICINE CLINIC | Facility: CLINIC | Age: 84
End: 2019-10-01

## 2019-10-01 DIAGNOSIS — G25.81 RESTLESS LEGS SYNDROME: ICD-10-CM

## 2019-10-01 DIAGNOSIS — R41.0 CONFUSION AND DISORIENTATION: ICD-10-CM

## 2019-10-01 DIAGNOSIS — F51.01 PRIMARY INSOMNIA: ICD-10-CM

## 2019-10-01 DIAGNOSIS — F01.50 VASCULAR DEMENTIA WITHOUT BEHAVIORAL DISTURBANCE (HCC): Primary | ICD-10-CM

## 2019-10-01 NOTE — TELEPHONE ENCOUNTER
Left a message that she needs a referral for geriatric physician for patient  Please call her with this information

## 2019-10-03 ENCOUNTER — TELEPHONE (OUTPATIENT)
Dept: GERIATRICS | Age: 84
End: 2019-10-03

## 2019-10-03 NOTE — TELEPHONE ENCOUNTER
[address]  [phone]  Telephone Intake     Referral Source: [facility] - [facility]       Patients PCP: unknown  PCP phone number: unknown    Caller (and relationship to patient): Carlos Soni     (relationship to patient):  Niece   Phone Number: [phone]   Is caller POA? yes- please bring copy of POA    Reason for referral: confusion, disoriented, delusions, auditory hallucinations    Others residing with patient: Other family    Has the patient ever been formally tested for memory/dementia? No    Has the patient ever been diagnosed with dementia? Yes     Has the patient been seen by a Neurologist? No    What is the goal of visit? Address hallucinations/delusions/confusion    First Visit: 10/17/19 @ 11AM    Conference Visit: 11/7/19 @ 10AM    Additional information taken via phone call with niece: patient's PCP noted (approx 2014) that PCP noted beginning stages of dementia

## 2019-10-06 DIAGNOSIS — F51.01 PRIMARY INSOMNIA: ICD-10-CM

## 2019-10-06 RX ORDER — MIRTAZAPINE 15 MG/1
TABLET, FILM COATED ORAL
Qty: 30 TABLET | Refills: 0 | Status: SHIPPED | OUTPATIENT
Start: 2019-10-06 | End: 2019-11-22 | Stop reason: SDUPTHER

## 2019-10-11 ENCOUNTER — OFFICE VISIT (OUTPATIENT)
Dept: FAMILY MEDICINE CLINIC | Facility: CLINIC | Age: 84
End: 2019-10-11
Payer: COMMERCIAL

## 2019-10-11 VITALS
HEIGHT: 60 IN | RESPIRATION RATE: 16 BRPM | SYSTOLIC BLOOD PRESSURE: 140 MMHG | TEMPERATURE: 98.1 F | OXYGEN SATURATION: 98 % | DIASTOLIC BLOOD PRESSURE: 78 MMHG | BODY MASS INDEX: 26.82 KG/M2 | HEART RATE: 83 BPM | WEIGHT: 136.6 LBS

## 2019-10-11 DIAGNOSIS — Z23 NEED FOR INFLUENZA VACCINATION: ICD-10-CM

## 2019-10-11 DIAGNOSIS — M54.16 LUMBAR RADICULOPATHY: Primary | ICD-10-CM

## 2019-10-11 DIAGNOSIS — R30.0 DYSURIA: ICD-10-CM

## 2019-10-11 PROCEDURE — 90471 IMMUNIZATION ADMIN: CPT | Performed by: FAMILY MEDICINE

## 2019-10-11 PROCEDURE — 90682 RIV4 VACC RECOMBINANT DNA IM: CPT | Performed by: FAMILY MEDICINE

## 2019-10-11 PROCEDURE — 99214 OFFICE O/P EST MOD 30 MIN: CPT | Performed by: FAMILY MEDICINE

## 2019-10-11 RX ORDER — PREDNISONE 10 MG/1
TABLET ORAL
Qty: 20 TABLET | Refills: 0 | Status: SHIPPED | OUTPATIENT
Start: 2019-10-11 | End: 2019-10-22 | Stop reason: HOSPADM

## 2019-10-11 RX ORDER — GABAPENTIN 100 MG/1
100 CAPSULE ORAL
Qty: 30 CAPSULE | Refills: 0 | Status: SHIPPED | OUTPATIENT
Start: 2019-10-11 | End: 2019-10-18

## 2019-10-11 RX ORDER — ACETAMINOPHEN 500 MG
500 TABLET ORAL 2 TIMES DAILY
COMMUNITY

## 2019-10-11 NOTE — PROGRESS NOTES
Assessment/Plan:       Problem List Items Addressed This Visit        Nervous and Auditory    Lumbar radiculopathy - Primary     Will order steroid taper and low dose gabapentin HS         Relevant Medications    predniSONE 10 mg tablet    gabapentin (NEURONTIN) 100 mg capsule       Other    Dysuria      Other Visit Diagnoses     Need for influenza vaccination        Relevant Orders    FLUBLOK: influenza vaccine, quadrivalent, recombinant, PF, 0 5 mL (Completed)            Subjective:      Patient ID: Mindy Rock is a 80 y o  female  Pt here today with three days of back pain radiating down the R leg  She denies injuries or falls  She states she has back pain in the past and it has resolved with a brace and heat and tylenol, however this did not help this time  She states there is some numbness and tingling on her R leg  She is able to walk with significant pain  She states the pain is worse with standing and better with sitting  She is in a wheelchair today but normally uses only a walker  She also shares she is having some dysuria, which has worsened over the past week  She was recently treated for a UTI  The following portions of the patient's history were reviewed and updated as appropriate:   She  has a past medical history of Ankle fracture, Arthritis, 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[address] ), Bronchitis, Cancer ([address] ), Carcinoma, lung ([address] ), Dementia ([address] ), Dependent edema, Depression, Diabetes mellitus ([address] ), Hypercholesterolemia, Hypertension, Kidney stone, Oth abn and inconclusive findings on dx imaging of breast, Pes planus, Renal calculus, Restless leg syndrome, Rib pain, Sprain, neck, and Varicose veins of left lower extremity    She   Patient Active Problem List    Diagnosis Date Noted    Dysuria 10/11/2019    Headache 09/28/2019    Acute vaginitis 09/06/2019    Medicare annual wellness visit, subsequent 07/02/2019    Degeneration of lumbar intervertebral disc 06/18/2019    Lumbar radiculopathy 06/18/2019    Arthropathy of lumbar facet joint 06/18/2019    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) 06/18/2019    Visual hallucinations 06/18/2019    Confusion and disorientation 06/18/2019    Restless legs syndrome 06/18/2019    Tinea corporis 06/18/2019     She  has a past surgical history that includes Lung cancer surgery and Appendectomy  Her family history includes No Known Problems in her father and mother  She  reports that she has quit smoking  She has never used smokeless tobacco  She reports that she does not drink alcohol or use drugs  Current Outpatient Medications   Medication Sig Dispense Refill    acetaminophen (TYLENOL) 500 mg tablet Take 500 mg by mouth 2 (two) times a day      clotrimazole-betamethasone (LOTRISONE) 1-0 05 % cream Apply topically 2 (two) times a day 30 g 0    mirtazapine (REMERON) 15 mg tablet TAKE 1 TABLET BY MOUTH DAILY AT BEDTIME 30 tablet 0    pramipexole (MIRAPEX) 1 mg tablet Take 0 5 tablets (0 5 mg total) by mouth 2 (two) times a day 45 tablet 0    gabapentin (NEURONTIN) 100 mg capsule Take 1 capsule (100 mg total) by mouth daily at bedtime 30 capsule 0    predniSONE 10 mg tablet Take 4 tabs x 2 days, 3 tabs x 2 days, then 2 tabs x 2 days, then 1 tab x 2 days 20 tablet 0     No current facility-administered medications for this visit  Current Outpatient Medications on File Prior to Visit   Medication Sig    acetaminophen (TYLENOL) 500 mg tablet Take 500 mg by mouth 2 (two) times a day    clotrimazole-betamethasone (LOTRISONE) 1-0 05 % cream Apply topically 2 (two) times a day    mirtazapine (REMERON) 15 mg tablet TAKE 1 TABLET BY MOUTH DAILY AT BEDTIME    pramipexole (MIRAPEX) 1 mg tablet Take 0 5 tablets (0 5 mg total) by mouth 2 (two) times a day     No current facility-administered medications on file prior to visit        She is allergic to albuterol; aldactone [spironolactone]; aspirin; atenolol; bactrim [sulfamethoxazole-trimethoprim]; codeine; hydrocodone; ibuprofen; lisinopril; mevacor [lovastatin]; mirapex [pramipexole]; other; penicillins; ultram [tramadol]; and zoloft [sertraline]       Review of Systems   Constitutional: Negative for activity change, appetite change, chills, fatigue, fever and unexpected weight change  HENT: Negative for congestion, ear discharge, ear pain, postnasal drip, sinus pressure and sore throat  Eyes: Negative for discharge and visual disturbance  Respiratory: Negative for cough, chest tightness, shortness of breath and wheezing  Cardiovascular: Negative for chest pain, palpitations and leg swelling  Gastrointestinal: Negative for abdominal pain, constipation, diarrhea, nausea and vomiting  Endocrine: Negative for cold intolerance, heat intolerance, polydipsia and polyuria  Genitourinary: Positive for dysuria  Negative for difficulty urinating and frequency  Musculoskeletal: Positive for back pain  Negative for arthralgias, joint swelling and myalgias  Skin: Negative for rash  Neurological: Negative for dizziness, weakness, light-headedness, numbness and headaches  Hematological: Negative for adenopathy  Psychiatric/Behavioral: Negative for behavioral problems, confusion, dysphoric mood, sleep disturbance and suicidal ideas  The patient is not nervous/anxious  Objective:      /78   Pulse 83   Temp 98 1 °F (36 7 °C)   Resp 16   Ht 5' (1 524 m)   Wt 62 kg (136 lb 9 6 oz)   SpO2 98%   BMI 26 68 kg/m²          Physical Exam   Constitutional: She is oriented to person, place, and time  She appears well-developed and well-nourished  No distress  HENT:   Head: Normocephalic and atraumatic  Eyes: Pupils are equal, round, and reactive to light  Conjunctivae are normal    Neck: Neck supple  Cardiovascular: Normal rate, regular rhythm and normal heart sounds  Exam reveals no gallop and no friction rub  No murmur heard    Pulmonary/Chest: Effort normal and breath sounds normal  No respiratory distress  She has no wheezes  She has no rales  She exhibits no tenderness  Musculoskeletal: She exhibits no edema  Lumbar back: She exhibits decreased range of motion, tenderness and pain  Positive straight leg raise    Neurological: She is alert and oriented to person, place, and time  She has normal strength  No sensory deficit  Skin: Skin is warm and dry  No rash noted  She is not diaphoretic  Psychiatric: She has a normal mood and affect  Her behavior is normal  Judgment and thought content normal    Nursing note and vitals reviewed

## 2019-10-18 ENCOUNTER — OFFICE VISIT (OUTPATIENT)
Dept: GERIATRICS | Age: 84
End: 2019-10-18
Payer: COMMERCIAL

## 2019-10-18 VITALS
TEMPERATURE: 97.9 F | HEART RATE: 80 BPM | DIASTOLIC BLOOD PRESSURE: 64 MMHG | SYSTOLIC BLOOD PRESSURE: 122 MMHG | OXYGEN SATURATION: 98 % | RESPIRATION RATE: 12 BRPM

## 2019-10-18 DIAGNOSIS — R44.1 VISUAL HALLUCINATIONS: ICD-10-CM

## 2019-10-18 DIAGNOSIS — M54.16 LUMBAR RADICULOPATHY: ICD-10-CM

## 2019-10-18 DIAGNOSIS — F01.50 VASCULAR DEMENTIA WITHOUT BEHAVIORAL DISTURBANCE (HCC): Primary | ICD-10-CM

## 2019-10-18 DIAGNOSIS — R41.0 CONFUSION AND DISORIENTATION: ICD-10-CM

## 2019-10-18 DIAGNOSIS — G25.81 RESTLESS LEGS SYNDROME: ICD-10-CM

## 2019-10-18 DIAGNOSIS — M51.36 DEGENERATION OF LUMBAR INTERVERTEBRAL DISC: ICD-10-CM

## 2019-10-18 PROCEDURE — 99204 OFFICE O/P NEW MOD 45 MIN: CPT | Performed by: INTERNAL MEDICINE

## 2019-10-18 RX ORDER — GABAPENTIN 100 MG/1
200 CAPSULE ORAL
Qty: 60 CAPSULE | Refills: 0 | Status: SHIPPED | OUTPATIENT
Start: 2019-10-18 | End: 2019-11-22 | Stop reason: SDUPTHER

## 2019-10-18 NOTE — PROGRESS NOTES
[address]  [phone]   Intake      SOCIAL HISTORY    Patient: Estella Mantilla  Date:10/18/2019    Current Living Situation: Lives in a residential home with her niece Johnathan Desir and [address] [place], [place] [address]         Source of referral: FARZANA Scales  Reason for referral: Family member concerns regarding behavior changes/concerns  When were behaviors/symptoms first noticed? Within the last several years, has noticed increased hallucinations, intermittent tearfulness, physical difficulties    Please provide examples: Believing a doctor told her she was actually a boy, forgetfulness, tearfulness    Patients main concern(s): Tearful throughout appointment, endorsing depressive symptoms, loneliness  Caregiver main concern(s): Keeping her healthy (she will go through times where she doesn't want to eat), maintaining routine and daily hygiene, socialization      Is respite needed for caregiver(s)? Not at this time- discussion of adult day services      Who is available to provide care in case of illness or crisis (please specify relation to patient and level of care able to provide)? Zach Ugarte        FAMILY BACKGROUND    Marital status:      Does patient have children? Yes How Many? 7    Where do the children live? Family members assisting patient at home: Niece and niece's     Employment History    Currently Employed:No     Type of employment: Nursing - LPN    Educational History    Highest Level of Education: Some College (No Degree)     Service    : No        Benefits Describe (if applicable): N/A      FINANCIAL    Total Monthly income: [address] of income: social security     Meet current needs? Yes - barely     Funds available for home care? No  Funds available for nursing home? No    Do you rent or own your home?  N/A    Relationships    Are any relationships causing problems right now: Yes - Aaliyah Ulrich reports Janice Lucia son does not come visit her and this causes strain      [employer] and Organizations: None reported    Major life events in past two years (ex: group home, death in family, major illness etc ): None reported    Does the patient currently drive: No  If not, date stopped drivin years ago      [facility] which have helped with shopping, meals, bathing, etc : None reported  Services that assessment team feels would be beneficial to patient/family: Adult day services, in-home care      LEGAL    Power of  for healthcare on file  Stephan Han  HOME SAFETY ASSESSMENT   ENVIRONMENTAL SAFETY      FIRE HAZARDS   Does the residence have smoke alarm? Yes     Does the residence have a fire escape? Yes   Are any of the following present in the residence? Frayed Wires       No   Clutter        Yes  - some hoarding concerns, paper plates towels napkins stuffed into corners of her room, newspapers, old bills from the    Incorrect use of open flame     No    FALL HAZARDS  Do any of the following exist in the residence? Poor lighting       No   Loose Rugs       No   Obstacles       No   Uneven floors       No   Slippery floors       No   Unsafe stairs       No  Does the patient use a fall alert? Yes    If yes, which one? Life Alert    HEALTH HAZARDS   Does the residence have any of the following? Adequate plumbing      Yes    Adequate heat       Yes    Adequate ventilation      Yes   Are there signs of neglect such as the following? Unkempt house      No   Old food in refrigerator     No   Infestation       No    EMERGENCY HEALTH PLAN   Is there a phone that is accessible to the patient or caregiver? Yes   Is the number to police, physician, and 911 easily accessible? Yes - slightly unsure  Could do a few months ago  Would the patient be able to call 911 in an emergency? Unsure - never alone in the home   Aaliyah Ulrich reports she would likely call her or Xi's     ENVIRONMENT APPROPRIATENESS  Please note if each is available and accessible to the patient:   Bedroom        Yes   Bathroom        Yes   Kitchen        Yes   Living Room        Yes     Is the patient able to climb stairs if necessary? Yes with assistance  Does the patient use any assistant devices for ambulation? Yes    If yes please list which device required   Dinora Marrero- interested in getting a wheelchair    Does the bathroom have any of the following? Handrails in tub or toilet     Yes - one in the tub and she has a shower chair   Raised toilet seat      No   Rubber tub mat      Yes    Hot water       Yes     Can the patient independently do the following? Shower       Shower independently, can't get into the shower independently   Dress them selves      Yes  with difficulty    Pick appropriate clothing     Yes    Eat        Yes    Drink        Yes              FUNCTIONAL ACTIVITIES QUESTIONNAIRE         Informant or Patient: Justyna Scott    Instructions:  Place a check hans under the column that best describes the patient's ability to perform the tasks listed below:    TASK Completely unable to perform task  (3 points) Requires  Assistance  (2 points) Has difficulty but accomplishes task, or has never done, but the informant feels could do task with difficulty  (1 point) Normal performance, or has never done task, but the informant feels the patient could do the task if necessary  (0 points)   1  Writing checks, paying bills, balancing checkbook 3      2  Assembling tax records, business affairs, or papers 3      3  Shopping alone for clothes, household necessities or groceries  3      4  Playing a game of skill, working on a hobby   1 with encouragement    5  Heating water, making a cup of coffee, turning off the stove   1 can't walk to microwave now    6  Preparing a balanced meal   3      7  Keeping track of current events    2     8   Paying attention to, understanding, discussing a TV show, book, or magazine 3      9  Remembering appointments, family occasions, holidays, medications 3      10   Traveling out of the neighborhood, driving, arranging to take buses 3 Xi[facility]  does meds for her        POINTS PER COLUMN                  TOTAL POINTS _______25________     Adapted and reprinted with permission from [address] [place] [address]  1982;37(3):323-102
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
## 2019-10-18 NOTE — PATIENT INSTRUCTIONS
Please discontinue the morning dose of Pramipexole  Continue the evening dose  Please increase the dose of Gabapentin to 200mg at bedtime  I have ordered lab tests for you to have done at your earliest convenience  Please have these done at least two days prior to your follow-up  I have completed the forms for you to have assistance at home  I retained one copy for your chart and have returned the original to you

## 2019-10-19 ENCOUNTER — APPOINTMENT (EMERGENCY)
Dept: RADIOLOGY | Facility: HOSPITAL | Age: 84
End: 2019-10-19
Payer: COMMERCIAL

## 2019-10-19 ENCOUNTER — HOSPITAL ENCOUNTER (INPATIENT)
Facility: HOSPITAL | Age: 84
LOS: 2 days | Discharge: NURSING, REHAB OR LONG-TERM CARE | DRG: 552 | End: 2019-10-22
Attending: SURGERY | Admitting: SURGERY
Payer: COMMERCIAL

## 2019-10-19 ENCOUNTER — APPOINTMENT (EMERGENCY)
Dept: CT IMAGING | Facility: HOSPITAL | Age: 84
End: 2019-10-19
Payer: COMMERCIAL

## 2019-10-19 ENCOUNTER — HOSPITAL ENCOUNTER (EMERGENCY)
Facility: HOSPITAL | Age: 84
End: 2019-10-19
Attending: EMERGENCY MEDICINE
Payer: COMMERCIAL

## 2019-10-19 ENCOUNTER — TELEPHONE (OUTPATIENT)
Dept: OTHER | Facility: OTHER | Age: 84
End: 2019-10-19

## 2019-10-19 ENCOUNTER — TELEPHONE (OUTPATIENT)
Dept: OTHER | Facility: HOSPITAL | Age: 84
End: 2019-10-19

## 2019-10-19 VITALS
HEART RATE: 86 BPM | OXYGEN SATURATION: 98 % | BODY MASS INDEX: 28 KG/M2 | SYSTOLIC BLOOD PRESSURE: 174 MMHG | TEMPERATURE: 98.1 F | RESPIRATION RATE: 20 BRPM | DIASTOLIC BLOOD PRESSURE: 74 MMHG | WEIGHT: 142.64 LBS | HEIGHT: 60 IN

## 2019-10-19 DIAGNOSIS — N39.0 UTI (URINARY TRACT INFECTION): ICD-10-CM

## 2019-10-19 DIAGNOSIS — S32.059A: ICD-10-CM

## 2019-10-19 DIAGNOSIS — G25.81 RESTLESS LEGS SYNDROME: ICD-10-CM

## 2019-10-19 DIAGNOSIS — M54.50 LOW BACK PAIN: Primary | ICD-10-CM

## 2019-10-19 DIAGNOSIS — F01.50 VASCULAR DEMENTIA WITHOUT BEHAVIORAL DISTURBANCE (HCC): Primary | ICD-10-CM

## 2019-10-19 DIAGNOSIS — S32.050A CLOSED COMPRESSION FRACTURE OF L5 LUMBAR VERTEBRA, INITIAL ENCOUNTER (HCC): ICD-10-CM

## 2019-10-19 DIAGNOSIS — M54.16 LUMBAR RADICULOPATHY: Primary | ICD-10-CM

## 2019-10-19 LAB
ALBUMIN SERPL BCP-MCNC: 3.4 G/DL (ref 3.5–5)
ALP SERPL-CCNC: 106 U/L (ref 46–116)
ALT SERPL W P-5'-P-CCNC: 13 U/L (ref 12–78)
ANION GAP SERPL CALCULATED.3IONS-SCNC: 7 MMOL/L (ref 4–13)
AST SERPL W P-5'-P-CCNC: 12 U/L (ref 5–45)
BACTERIA UR QL AUTO: ABNORMAL /HPF
BASOPHILS # BLD AUTO: 0.01 THOUSANDS/ΜL (ref 0–0.1)
BASOPHILS NFR BLD AUTO: 0 % (ref 0–1)
BILIRUB DIRECT SERPL-MCNC: 0.16 MG/DL (ref 0–0.2)
BILIRUB SERPL-MCNC: 0.5 MG/DL (ref 0.2–1)
BILIRUB UR QL STRIP: NEGATIVE
BUN SERPL-MCNC: 26 MG/DL (ref 5–25)
CALCIUM SERPL-MCNC: 9 MG/DL (ref 8.3–10.1)
CHLORIDE SERPL-SCNC: 101 MMOL/L (ref 100–108)
CLARITY UR: CLEAR
CO2 SERPL-SCNC: 30 MMOL/L (ref 21–32)
COLOR UR: ABNORMAL
CREAT SERPL-MCNC: 1.18 MG/DL (ref 0.6–1.3)
EOSINOPHIL # BLD AUTO: 0.01 THOUSAND/ΜL (ref 0–0.61)
EOSINOPHIL NFR BLD AUTO: 0 % (ref 0–6)
ERYTHROCYTE [DISTWIDTH] IN BLOOD BY AUTOMATED COUNT: 13.6 % (ref 11.6–15.1)
GFR SERPL CREATININE-BSD FRML MDRD: 40 ML/MIN/1.73SQ M
GLUCOSE SERPL-MCNC: 151 MG/DL (ref 65–140)
GLUCOSE UR STRIP-MCNC: NEGATIVE MG/DL
HCT VFR BLD AUTO: 42.6 % (ref 34.8–46.1)
HGB BLD-MCNC: 13.9 G/DL (ref 11.5–15.4)
HGB UR QL STRIP.AUTO: ABNORMAL
IMM GRANULOCYTES # BLD AUTO: 0.09 THOUSAND/UL (ref 0–0.2)
IMM GRANULOCYTES NFR BLD AUTO: 1 % (ref 0–2)
KETONES UR STRIP-MCNC: NEGATIVE MG/DL
LEUKOCYTE ESTERASE UR QL STRIP: ABNORMAL
LYMPHOCYTES # BLD AUTO: 0.67 THOUSANDS/ΜL (ref 0.6–4.47)
LYMPHOCYTES NFR BLD AUTO: 6 % (ref 14–44)
MCH RBC QN AUTO: 30.8 PG (ref 26.8–34.3)
MCHC RBC AUTO-ENTMCNC: 32.6 G/DL (ref 31.4–37.4)
MCV RBC AUTO: 95 FL (ref 82–98)
MONOCYTES # BLD AUTO: 0.39 THOUSAND/ΜL (ref 0.17–1.22)
MONOCYTES NFR BLD AUTO: 4 % (ref 4–12)
NEUTROPHILS # BLD AUTO: 9.57 THOUSANDS/ΜL (ref 1.85–7.62)
NEUTS SEG NFR BLD AUTO: 89 % (ref 43–75)
NITRITE UR QL STRIP: POSITIVE
NON-SQ EPI CELLS URNS QL MICRO: ABNORMAL /HPF
NRBC BLD AUTO-RTO: 0 /100 WBCS
PH UR STRIP.AUTO: 7.5 [PH]
PLATELET # BLD AUTO: 276 THOUSANDS/UL (ref 149–390)
PMV BLD AUTO: 10.5 FL (ref 8.9–12.7)
POTASSIUM SERPL-SCNC: 5.2 MMOL/L (ref 3.5–5.3)
PROT SERPL-MCNC: 7.2 G/DL (ref 6.4–8.2)
PROT UR STRIP-MCNC: NEGATIVE MG/DL
RBC # BLD AUTO: 4.51 MILLION/UL (ref 3.81–5.12)
RBC #/AREA URNS AUTO: ABNORMAL /HPF
SODIUM SERPL-SCNC: 138 MMOL/L (ref 136–145)
SP GR UR STRIP.AUTO: 1.01 (ref 1–1.03)
UROBILINOGEN UR QL STRIP.AUTO: 0.2 E.U./DL
WBC # BLD AUTO: 10.74 THOUSAND/UL (ref 4.31–10.16)
WBC #/AREA URNS AUTO: ABNORMAL /HPF

## 2019-10-19 PROCEDURE — 85025 COMPLETE CBC W/AUTO DIFF WBC: CPT | Performed by: EMERGENCY MEDICINE

## 2019-10-19 PROCEDURE — 80048 BASIC METABOLIC PNL TOTAL CA: CPT | Performed by: EMERGENCY MEDICINE

## 2019-10-19 PROCEDURE — 73502 X-RAY EXAM HIP UNI 2-3 VIEWS: CPT

## 2019-10-19 PROCEDURE — 96361 HYDRATE IV INFUSION ADD-ON: CPT

## 2019-10-19 PROCEDURE — 80076 HEPATIC FUNCTION PANEL: CPT | Performed by: EMERGENCY MEDICINE

## 2019-10-19 PROCEDURE — 96374 THER/PROPH/DIAG INJ IV PUSH: CPT

## 2019-10-19 PROCEDURE — 99285 EMERGENCY DEPT VISIT HI MDM: CPT

## 2019-10-19 PROCEDURE — 36415 COLL VENOUS BLD VENIPUNCTURE: CPT | Performed by: EMERGENCY MEDICINE

## 2019-10-19 PROCEDURE — 99285 EMERGENCY DEPT VISIT HI MDM: CPT | Performed by: EMERGENCY MEDICINE

## 2019-10-19 PROCEDURE — 72131 CT LUMBAR SPINE W/O DYE: CPT

## 2019-10-19 PROCEDURE — 81001 URINALYSIS AUTO W/SCOPE: CPT | Performed by: EMERGENCY MEDICINE

## 2019-10-19 PROCEDURE — 99219 PR INITIAL OBSERVATION CARE/DAY 50 MINUTES: CPT | Performed by: SURGERY

## 2019-10-19 RX ORDER — FENTANYL CITRATE 50 UG/ML
50 INJECTION, SOLUTION INTRAMUSCULAR; INTRAVENOUS ONCE
Status: COMPLETED | OUTPATIENT
Start: 2019-10-19 | End: 2019-10-19

## 2019-10-19 RX ORDER — CEPHALEXIN 250 MG/1
500 CAPSULE ORAL ONCE
Status: COMPLETED | OUTPATIENT
Start: 2019-10-19 | End: 2019-10-19

## 2019-10-19 RX ORDER — DIAZEPAM 2 MG/1
2 TABLET ORAL ONCE
Status: COMPLETED | OUTPATIENT
Start: 2019-10-19 | End: 2019-10-19

## 2019-10-19 RX ORDER — SODIUM CHLORIDE 9 MG/ML
125 INJECTION, SOLUTION INTRAVENOUS CONTINUOUS
Status: DISCONTINUED | OUTPATIENT
Start: 2019-10-19 | End: 2019-10-19 | Stop reason: HOSPADM

## 2019-10-19 RX ORDER — ACETAMINOPHEN 325 MG/1
650 TABLET ORAL ONCE
Status: COMPLETED | OUTPATIENT
Start: 2019-10-19 | End: 2019-10-19

## 2019-10-19 RX ORDER — PREDNISONE 20 MG/1
60 TABLET ORAL ONCE
Status: COMPLETED | OUTPATIENT
Start: 2019-10-19 | End: 2019-10-19

## 2019-10-19 RX ORDER — FENTANYL CITRATE 50 UG/ML
1 INJECTION, SOLUTION INTRAMUSCULAR; INTRAVENOUS ONCE
Status: COMPLETED | OUTPATIENT
Start: 2019-10-19 | End: 2019-10-19

## 2019-10-19 RX ORDER — ONDANSETRON 2 MG/ML
1 INJECTION INTRAMUSCULAR; INTRAVENOUS ONCE
Status: COMPLETED | OUTPATIENT
Start: 2019-10-19 | End: 2019-10-19

## 2019-10-19 RX ADMIN — ACETAMINOPHEN 650 MG: 325 TABLET, FILM COATED ORAL at 16:31

## 2019-10-19 RX ADMIN — FENTANYL CITRATE 50 MCG: 50 INJECTION INTRAMUSCULAR; INTRAVENOUS at 21:44

## 2019-10-19 RX ADMIN — CEPHALEXIN 500 MG: 250 CAPSULE ORAL at 20:45

## 2019-10-19 RX ADMIN — SODIUM CHLORIDE 125 ML/HR: 0.9 INJECTION, SOLUTION INTRAVENOUS at 19:39

## 2019-10-19 RX ADMIN — PREDNISONE 60 MG: 20 TABLET ORAL at 17:43

## 2019-10-19 RX ADMIN — DIAZEPAM 2 MG: 2 TABLET ORAL at 16:31

## 2019-10-19 NOTE — EMTALA/ACUTE CARE TRANSFER
[address] [facility] [address]  [address] [address] [facility] [address]  Dept: [phone]      ENEBCE TRANSFER CONSENT    NAME Ronit Casanova                                         11/3/1924                              MRN [id number]    I have been informed of my rights regarding examination, treatment, and transfer   by Dr Heather Carvajal DO    Benefits: Specialized equipment and/or services available at the receiving facility (Include comment)________________________(Trauma)    Risks: Increased discomfort during transfer      Consent for Transfer:  I acknowledge that my medical condition has been evaluated and explained to me by the emergency department physician or other qualified medical person and/or my attending physician, who has recommended that I be transferred to the service of  Accepting Physician: Dr Maximilian Rocha at [address] Name, [address] : [facility]  The above potential benefits of such transfer, the potential risks associated with such transfer, and the probable risks of not being transferred have been explained to me, and I fully understand them  The doctor has explained that, in my case, the benefits of transfer outweigh the risks  I agree to be transferred  I authorize the performance of emergency medical procedures and treatments upon me in both transit and upon arrival at the receiving facility  Additionally, I authorize the release of any and all medical records to the receiving facility and request they be transported with me, if possible  I understand that the safest mode of transportation during a medical emergency is an ambulance and that the Hospital advocates the use of this mode of transport  Risks of traveling to the receiving facility by car, including absence of medical control, life sustaining equipment, such as oxygen, and medical personnel has been explained to me and I fully understand them      (DAVID CORRECT BOX BELOW)  [  ]  I consent to the stated transfer and to be transported by ambulance/helicopter  [  ]  I consent to the stated transfer, but refuse transportation by ambulance and accept full responsibility for my transportation by car  I understand the risks of non-ambulance transfers and I exonerate the Hospital and its staff from any deterioration in my condition that results from this refusal     X___________________________________________    DATE  10/19/19  TIME________  Signature of patient or legally responsible individual signing on patient behalf           RELATIONSHIP TO PATIENT_________________________          Provider Certification    NAME Mindy Rock                                         11/3/1924                              MRN [id number]    A medical screening exam was performed on the above named patient  Based on the examination:    Condition Necessitating Transfer The primary encounter diagnosis was Low back pain  Diagnoses of Closed L5 vertebral fracture (HCC) and UTI (urinary tract infection) were also pertinent to this visit  Patient Condition: The patient has been stabilized such that within reasonable medical probability, no material deterioration of the patient condition or the condition of the unborn child(crescencio) is likely to result from the transfer    Reason for Transfer: Level of Care needed not available at this facility    Transfer Requirements: Facility [facility]   · Space available and qualified personnel available for treatment as acknowledged by PACS the  · Agreed to accept transfer and to provide appropriate medical treatment as acknowledged by       Dr Peter Jordan  · Appropriate medical records of the examination and treatment of the patient are provided at the time of transfer   [address] [facility], [address] _______  · Transfer will be performed by qualified personnel from    and appropriate transfer equipment as required, including the use of necessary and appropriate life support measures      Provider Certification: I have examined the patient and explained the following risks and benefits of being transferred/refusing transfer to the patient/family:  General risk, such as traffic hazards, adverse weather conditions, rough terrain or turbulence, possible failure of equipment (including vehicle or aircraft), or consequences of actions of persons outside the control of the transport personnel, Risk of worsening condition, The possibility of a transport vehicle being unavailable      Based on these reasonable risks and benefits to the patient and/or the unborn child(crescencio), and based upon the information available at the time of the patients examination, I certify that the medical benefits reasonably to be expected from the provision of appropriate medical treatments at another medical facility outweigh the increasing risks, if any, to the individuals medical condition, and in the case of labor to the unborn child, from effecting the transfer      X____________________________________________ DATE 10/19/19        TIME_______      ORIGINAL - SEND TO MEDICAL RECORDS   COPY - SEND WITH PATIENT DURING TRANSFER

## 2019-10-19 NOTE — ED PROVIDER NOTES
History  Chief Complaint   Patient presents with    Back Pain     Pt arrived via EMS; c/o b/l low back pain radiating down both legs, pt notes she fell approx 1 month ago     HPI  70-year-old [place] American female with a chief complaint of low back pain and pain down her right leg  Patient states she fell 2-3 weeks ago while in her living room onto [facility] floors after getting dizzy  Patient states that her nephew asked her to get a rag for him and she turned quickly and got dizzy and fell backwards  Since that time patient has had some pain but now her pain is getting worse and it is going down her right leg as well as over to her left hip  Patient is able to ambulate however the pain down her legs are getting worse  Patient is just 2 weeks shy of her 80th birthday and states that she lives with her niece  Patient states that she did not have any x-rays or treatment since she fell  Patient received 50 mcg of fentanyl on route and feels much better at this time  Patient states that she was just incontinent of urine and thinks she may have a urinary tract infection  Prior to Admission Medications   Prescriptions Last Dose Informant Patient Reported?  Taking?   acetaminophen (TYLENOL) 500 mg tablet   Yes No   Sig: Take 500 mg by mouth 2 (two) times a day   clotrimazole-betamethasone (LOTRISONE) 1-0 05 % cream   No No   Sig: Apply topically 2 (two) times a day   gabapentin (NEURONTIN) 100 mg capsule   No No   Sig: Take 2 capsules (200 mg total) by mouth daily at bedtime   mirtazapine (REMERON) 15 mg tablet   No No   Sig: TAKE 1 TABLET BY MOUTH DAILY AT BEDTIME   pramipexole (MIRAPEX) 1 mg tablet   No No   Sig: Take 0 5 tablets (0 5 mg total) by mouth 2 (two) times a day   predniSONE 10 mg tablet   No No   Sig: Take 4 tabs x 2 days, 3 tabs x 2 days, then 2 tabs x 2 days, then 1 tab x 2 days      Facility-Administered Medications: None       Past Medical History:   Diagnosis Date    Ankle fracture     Arthritis     left hip    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HCC)     with left ureter    Bronchitis     [facility] [facility]     [facility]     D[facility]     Dependent edema     Depression     [facility]     Hypercholesterolemia     Hypertension     Kidney stone     Oth abn and inconclusive findings on dx imaging of breast     Pes planus     Renal calculus     Restless leg syndrome     Rib pain     Sprain, neck     Varicose veins of left lower extremity        Past Surgical History:   Procedure Laterality Date    APPENDECTOMY      LUNG CANCER SURGERY         Family History   Problem Relation Age of Onset    No Known Problems Mother     No Known Problems Father     Breast cancer Neg Hx     Colon cancer Neg Hx     Ovarian cancer Neg Hx     Uterine cancer Neg Hx     Cervical cancer Neg Hx      I have reviewed and agree with the history as documented  Social History     Tobacco Use    Smoking status: Former Smoker    Smokeless tobacco: Never Used   Substance Use Topics    Alcohol use: Never     Frequency: Never    Drug use: Never        Review of Systems   Constitutional: Negative for diaphoresis, fatigue and fever  HENT: Negative for congestion, ear pain, nosebleeds and sore throat  Eyes: Negative for photophobia, pain, discharge and visual disturbance  Respiratory: Negative for cough, choking, chest tightness, shortness of breath and wheezing  Cardiovascular: Negative for chest pain and palpitations  Gastrointestinal: Negative for abdominal distention, abdominal pain, diarrhea and vomiting  Genitourinary: Negative for dysuria, flank pain and frequency  Musculoskeletal: Positive for arthralgias, back pain, gait problem and myalgias  Negative for joint swelling  Skin: Negative for color change and rash  Neurological: Negative for dizziness, syncope and headaches  Psychiatric/Behavioral: Negative for behavioral problems and confusion   The patient is not nervous/anxious  All other systems reviewed and are negative  Physical Exam  Physical Exam   Constitutional: She is oriented to person, place, and time  She appears well-developed and well-nourished  40-year-old  female lying on the stretcher in no acute distress  HENT:   Head: Normocephalic and atraumatic  Mouth/Throat: Oropharynx is clear and moist    Eyes: Pupils are equal, round, and reactive to light  EOM are normal    Neck: Normal range of motion  Neck supple  Cardiovascular: Normal rate, regular rhythm and normal heart sounds  Pulmonary/Chest: Effort normal and breath sounds normal  No stridor  No respiratory distress  She has no wheezes  She has no rales  Abdominal: Soft  Bowel sounds are normal  There is no tenderness  There is no rebound and no guarding  Musculoskeletal: She exhibits tenderness  Back:  There is tenderness to palpation of the lower lumbar spine  Patient has pain from going from a lying to a sitting position  Patient also has some tenderness to the right flank radiating down to her right hip and pelvic region  Patient is able to lift both of her extremities as well as flex her knees but complains of pain down the right lower extremity when she does so  Neurological: She is alert and oriented to person, place, and time  No cranial nerve deficit  She exhibits normal muscle tone  Coordination normal    Apm coma Score is 15   Skin: Skin is warm and dry  Psychiatric: She has a normal mood and affect  Nursing note and vitals reviewed        Vital Signs  ED Triage Vitals [10/19/19 1451]   Temperature Pulse Respirations Blood Pressure SpO2   98 1 °F (36 7 °C) 83 20 (!) 179/75 97 %      Temp Source Heart Rate Source Patient Position - Orthostatic VS BP Location FiO2 (%)   Oral -- -- Right arm --      Pain Score       No Pain           Vitals:    10/19/19 1451 10/19/19 1530 10/19/19 1700 10/19/19 1800   BP: (!) 179/75 140/65 129/62 (!) 178/79 Pulse: 83 75 69 64         Visual Acuity      ED Medications  Medications   cephalexin (KEFLEX) capsule 500 mg (has no administration in time range)   sodium chloride 0 9 % infusion (125 mL/hr Intravenous New Bag 10/19/19 1939)   fentanyl citrate (PF) 100 MCG/2ML 50 mcg (has no administration in time range)   fentanyl citrate (PF) (FOR EMS ONLY) 100 mcg/2 mL injection 100 mcg (0 mcg Does not apply Given to EMS 10/19/19 1513)   ondansetron (FOR EMS ONLY) (ZOFRAN) 4 mg/2 mL injection 4 mg (0 mg Does not apply Given to EMS 10/19/19 1514)   acetaminophen (TYLENOL) tablet 650 mg (650 mg Oral Given 10/19/19 1631)   diazepam (VALIUM) tablet 2 mg (2 mg Oral Given 10/19/19 1631)   predniSONE tablet 60 mg (60 mg Oral Given 10/19/19 1743)       Diagnostic Studies  Results Reviewed     Procedure Component Value Units Date/Time    CBC and differential [[id number]]  (Abnormal) Collected:  10/19/19 1938    Lab Status:  Final result Specimen:  Blood from Arm, Left Updated:  10/19/19 1946     WBC 10 74 Thousand/uL      RBC 4 51 Million/uL      Hemoglobin 13 9 g/dL      Hematocrit 42 6 %      MCV 95 fL      MCH 30 8 pg      MCHC 32 6 g/dL      RDW 13 6 %      MPV 10 5 fL      Platelets 651 Thousands/uL      nRBC 0 /100 WBCs      Neutrophils Relative 89 %      Immat GRANS % 1 %      Lymphocytes Relative 6 %      Monocytes Relative 4 %      Eosinophils Relative 0 %      Basophils Relative 0 %      Neutrophils Absolute 9 57 Thousands/µL      Immature Grans Absolute 0 09 Thousand/uL      Lymphocytes Absolute 0 67 Thousands/µL      Monocytes Absolute 0 39 Thousand/µL      Eosinophils Absolute 0 01 Thousand/µL      Basophils Absolute 0 01 Thousands/µL     Basic metabolic panel [[id number]] Collected:  10/19/19 1938    Lab Status: In process Specimen:  Blood from Arm, Left Updated:  10/19/19 1943    Hepatic function panel [[id number]] Collected:  10/19/19 1938    Lab Status:   In process Specimen:  Blood from Arm, Left Updated:  10/19/19 1943    Urine Microscopic [[id number]]  (Abnormal) Collected:  10/19/19 1823    Lab Status:  Final result Specimen:  Urine, Straight Cath Updated:  10/19/19 1834     RBC, UA 0-1 /hpf      WBC, UA 1-2 /hpf      Epithelial Cells Occasional /hpf      Bacteria, UA Moderate /hpf     UA w Reflex to Microscopic w Reflex to Culture [[id number]]  (Abnormal) Collected:  10/19/19 1823    Lab Status:  Final result Specimen:  Urine, Straight Cath Updated:  10/19/19 1827     Color, UA Light Yellow     Clarity, UA Clear     Specific Gravity, UA 1 010     pH, UA 7 5     Leukocytes, UA Small     Nitrite, UA Positive     Protein, UA Negative mg/dl      Glucose, UA Negative mg/dl      Ketones, UA Negative mg/dl      Urobilinogen, UA 0 2 E U /dl      Bilirubin, UA Negative     Blood, UA Trace-Intact                 CT spine lumbar without contrast   Final Result by Nikki Sanchez MD (10/19 1805)      L5 vertebral body fracture as described      The study was marked in EPIC for immediate notification  Workstation performed: [id number]         XR hip/pelv 2-3 vws right if performed    (Results Pending)              Procedures  Procedures       ED Course    airway:  Clear  Breathing:  Clear to auscultation  Circulation:  Pulses intact  Disability:  Low back pain  Exposure:  Done    6:30 PM:  Call to Trauma - SPOKE WITH DR GU - would like pt  Transferred down to [place]    7:15 PM:  Spoke with Glynn Call - will make arrangements for transfer      I explained to pt  That she had a fx and gave her a copy of her CT results; pt  States she had severe difficulty walking today at home and the pain was the worse that it has been in 2-3 weeks      7:55 PM:  Care signed out to Dr Annelise Lugo -              [facility]     Differential diagnosis includes:  1  Back pain  2  Low back pain  3  Musculoskeletal pain  4  Rule out compression fracture of the low back  5  Sciatica  6  Right hip pain  7  Left lower extremity pain  8   Right lower extremity pain  9  Radiculopathy of the right lower extremity     Disposition  Final diagnoses:   Low back pain   Closed L5 vertebral fracture (HCC)   UTI (urinary tract infection)     Time reflects when diagnosis was documented in both MDM as applicable and the Disposition within this note     Time User Action Codes Description Comment    10/19/2019  7:08 PM Lowe Aj Add [M54 5] Low back pain     10/19/2019  7:08 PM Lowe Aj Add [S32 059A] Closed L5 vertebral fracture ([address] )     10/19/2019  7:09 PM Lowe Aj Add [N39 0] UTI (urinary tract infection)       ED Disposition     ED Disposition Condition Date/Time Comment    Transfer to Another zhiwo  EYD Oct 19, 2019  7:08 PM Ciara Sousa should be transferred out to [facility]          MD Documentation      Most Recent Value   Patient Condition  The patient has been stabilized such that within reasonable medical probability, no material deterioration of the patient condition or the condition of the unborn child(crescencio) is likely to result from the transfer   Reason for Transfer  Level of Care needed not available at this facility   Benefits of Transfer  Specialized equipment and/or services available at the receiving facility (Include comment)________________________ [Trauma]   Risks of Transfer  Increased discomfort during transfer   Accepting Physician  Dr Lebron Form Name, [address]   [facility]    (Name & Tel number)  PACS the   Sending MD  Dr Kim De La Fuente   Provider Certification  General risk, such as traffic hazards, adverse weather conditions, rough terrain or turbulence, possible failure of equipment (including vehicle or aircraft), or consequences of actions of persons outside the control of the transport personnel, Risk of worsening condition, The possibility of a transport vehicle being unavailable      RN Documentation      [address] Name, [address]   [place]    (Name & Tel number)  PACS the      Follow-up Information    None         Patient's Medications   Discharge Prescriptions    No medications on file     No discharge procedures on file      ED Provider  Electronically Signed by           Phyllis Higgins,   10/19/19 1050 [address], DO  10/19/19 9246

## 2019-10-19 NOTE — TELEPHONE ENCOUNTER
On call received call from patients elisa, Todd Ritter who reported that patient is is significant amount of pain today in her right leg and back  She was seen by myself yesterday for comprehensive geriatric assessment and reported intermittent chronic leg pain related to sciatica which she was seen for by her PCP recently and started on Prednisone for radiculopathy as well as Gabapentin  On exam yesterday she was able to ambulate with assistance but reported chronic lower back and right leg pain which was not significantly changed from baseline  She had no tenderness to palpation or bruising noted  Strength was equal bilateral lower extremities  After speaking to Todd Ritter it seems that Sona's pain is acutely worse,  Eliza Worrell now reports a fall about 2-3 weeks ago which was unwitnessed  I recommend she be evaluated in the emergency department so that imaging can be performed of both her back and pelvis/hips as the pain seems acutely worse and compression fracture/traumatic fracture should be ruled out  I have called PACS to ask that she be put on the ED board

## 2019-10-19 NOTE — TELEPHONE ENCOUNTER
She has been crying in pain for about a hour  I am taking her to [facility]      [place] Text Jocelin Camargo  10/19/19 @ 1987

## 2019-10-20 ENCOUNTER — APPOINTMENT (OUTPATIENT)
Dept: RADIOLOGY | Facility: HOSPITAL | Age: 84
DRG: 552 | End: 2019-10-20
Payer: COMMERCIAL

## 2019-10-20 PROBLEM — N39.0 URINARY TRACT INFECTION WITHOUT HEMATURIA: Status: ACTIVE | Noted: 2019-10-20
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
## 2019-10-20 PROBLEM — S32.050A CLOSED COMPRESSION FRACTURE OF L5 LUMBAR VERTEBRA, INITIAL ENCOUNTER (HCC): Status: ACTIVE | Noted: 2019-10-20

## 2019-10-20 LAB
ANION GAP SERPL CALCULATED.3IONS-SCNC: 5 MMOL/L (ref 4–13)
BUN SERPL-MCNC: 25 MG/DL (ref 5–25)
CALCIUM SERPL-MCNC: 8.7 MG/DL (ref 8.3–10.1)
CHLORIDE SERPL-SCNC: 104 MMOL/L (ref 100–108)
CO2 SERPL-SCNC: 29 MMOL/L (ref 21–32)
CREAT SERPL-MCNC: 1.03 MG/DL (ref 0.6–1.3)
ERYTHROCYTE [DISTWIDTH] IN BLOOD BY AUTOMATED COUNT: 13.5 % (ref 11.6–15.1)
GFR SERPL CREATININE-BSD FRML MDRD: 47 ML/MIN/1.73SQ M
GLUCOSE SERPL-MCNC: 132 MG/DL (ref 65–140)
HCT VFR BLD AUTO: 40 % (ref 34.8–46.1)
HGB BLD-MCNC: 12.9 G/DL (ref 11.5–15.4)
MCH RBC QN AUTO: 30.6 PG (ref 26.8–34.3)
MCHC RBC AUTO-ENTMCNC: 32.3 G/DL (ref 31.4–37.4)
MCV RBC AUTO: 95 FL (ref 82–98)
PLATELET # BLD AUTO: 254 THOUSANDS/UL (ref 149–390)
PLATELET # BLD AUTO: 269 THOUSANDS/UL (ref 149–390)
PMV BLD AUTO: 10.7 FL (ref 8.9–12.7)
PMV BLD AUTO: 11.5 FL (ref 8.9–12.7)
POTASSIUM SERPL-SCNC: 4.8 MMOL/L (ref 3.5–5.3)
RBC # BLD AUTO: 4.22 MILLION/UL (ref 3.81–5.12)
SODIUM SERPL-SCNC: 138 MMOL/L (ref 136–145)
WBC # BLD AUTO: 9.97 THOUSAND/UL (ref 4.31–10.16)

## 2019-10-20 PROCEDURE — G8988 SELF CARE GOAL STATUS: HCPCS

## 2019-10-20 PROCEDURE — 97167 OT EVAL HIGH COMPLEX 60 MIN: CPT

## 2019-10-20 PROCEDURE — 99225 PR SBSQ OBSERVATION CARE/DAY 25 MINUTES: CPT | Performed by: SURGERY

## 2019-10-20 PROCEDURE — 80048 BASIC METABOLIC PNL TOTAL CA: CPT | Performed by: PHYSICIAN ASSISTANT

## 2019-10-20 PROCEDURE — 72100 X-RAY EXAM L-S SPINE 2/3 VWS: CPT

## 2019-10-20 PROCEDURE — 73030 X-RAY EXAM OF SHOULDER: CPT

## 2019-10-20 PROCEDURE — 97163 PT EVAL HIGH COMPLEX 45 MIN: CPT

## 2019-10-20 PROCEDURE — G8978 MOBILITY CURRENT STATUS: HCPCS

## 2019-10-20 PROCEDURE — G8979 MOBILITY GOAL STATUS: HCPCS

## 2019-10-20 PROCEDURE — 85027 COMPLETE CBC AUTOMATED: CPT | Performed by: PHYSICIAN ASSISTANT

## 2019-10-20 PROCEDURE — 85049 AUTOMATED PLATELET COUNT: CPT | Performed by: EMERGENCY MEDICINE

## 2019-10-20 PROCEDURE — 99222 1ST HOSP IP/OBS MODERATE 55: CPT | Performed by: PHYSICIAN ASSISTANT

## 2019-10-20 PROCEDURE — G8987 SELF CARE CURRENT STATUS: HCPCS

## 2019-10-20 RX ORDER — LABETALOL 20 MG/4 ML (5 MG/ML) INTRAVENOUS SYRINGE
10 EVERY 4 HOURS PRN
Status: DISCONTINUED | OUTPATIENT
Start: 2019-10-20 | End: 2019-10-22 | Stop reason: HOSPADM

## 2019-10-20 RX ORDER — SENNOSIDES 8.6 MG
2 TABLET ORAL DAILY
Status: DISCONTINUED | OUTPATIENT
Start: 2019-10-20 | End: 2019-10-22 | Stop reason: HOSPADM

## 2019-10-20 RX ORDER — POLYETHYLENE GLYCOL 3350 17 G/17G
17 POWDER, FOR SOLUTION ORAL DAILY
Status: DISCONTINUED | OUTPATIENT
Start: 2019-10-20 | End: 2019-10-22 | Stop reason: HOSPADM

## 2019-10-20 RX ORDER — ACETAMINOPHEN 325 MG/1
975 TABLET ORAL EVERY 6 HOURS SCHEDULED
Status: DISCONTINUED | OUTPATIENT
Start: 2019-10-20 | End: 2019-10-22 | Stop reason: HOSPADM

## 2019-10-20 RX ORDER — LABETALOL 20 MG/4 ML (5 MG/ML) INTRAVENOUS SYRINGE
10 EVERY 4 HOURS
Status: DISCONTINUED | OUTPATIENT
Start: 2019-10-20 | End: 2019-10-20

## 2019-10-20 RX ORDER — OXYCODONE HYDROCHLORIDE 5 MG/1
2.5 TABLET ORAL EVERY 4 HOURS PRN
Status: DISCONTINUED | OUTPATIENT
Start: 2019-10-20 | End: 2019-10-22 | Stop reason: HOSPADM

## 2019-10-20 RX ORDER — HYDROMORPHONE HCL/PF 1 MG/ML
0.1 SYRINGE (ML) INJECTION
Status: DISCONTINUED | OUTPATIENT
Start: 2019-10-20 | End: 2019-10-22 | Stop reason: HOSPADM

## 2019-10-20 RX ORDER — DOCUSATE SODIUM 100 MG/1
100 CAPSULE, LIQUID FILLED ORAL 2 TIMES DAILY
Status: DISCONTINUED | OUTPATIENT
Start: 2019-10-20 | End: 2019-10-22 | Stop reason: HOSPADM

## 2019-10-20 RX ORDER — METHOCARBAMOL 500 MG/1
500 TABLET, FILM COATED ORAL EVERY 6 HOURS SCHEDULED
Status: DISCONTINUED | OUTPATIENT
Start: 2019-10-20 | End: 2019-10-22 | Stop reason: HOSPADM

## 2019-10-20 RX ORDER — CEPHALEXIN 500 MG/1
500 CAPSULE ORAL EVERY 12 HOURS SCHEDULED
Qty: 12 CAPSULE | Refills: 0 | Status: CANCELLED
Start: 2019-10-20 | End: 2019-10-26

## 2019-10-20 RX ORDER — OXYCODONE HYDROCHLORIDE 5 MG/1
5 TABLET ORAL EVERY 4 HOURS PRN
Status: DISCONTINUED | OUTPATIENT
Start: 2019-10-20 | End: 2019-10-22 | Stop reason: HOSPADM

## 2019-10-20 RX ORDER — ONDANSETRON 2 MG/ML
4 INJECTION INTRAMUSCULAR; INTRAVENOUS EVERY 6 HOURS PRN
Status: DISCONTINUED | OUTPATIENT
Start: 2019-10-20 | End: 2019-10-22 | Stop reason: HOSPADM

## 2019-10-20 RX ORDER — LIDOCAINE 50 MG/G
1 PATCH TOPICAL ONCE
Status: COMPLETED | OUTPATIENT
Start: 2019-10-20 | End: 2019-10-20

## 2019-10-20 RX ORDER — GABAPENTIN 100 MG/1
200 CAPSULE ORAL
Status: DISCONTINUED | OUTPATIENT
Start: 2019-10-20 | End: 2019-10-22 | Stop reason: HOSPADM

## 2019-10-20 RX ORDER — CEPHALEXIN 500 MG/1
500 CAPSULE ORAL EVERY 12 HOURS SCHEDULED
Status: DISCONTINUED | OUTPATIENT
Start: 2019-10-20 | End: 2019-10-22 | Stop reason: HOSPADM

## 2019-10-20 RX ADMIN — CEPHALEXIN 500 MG: 500 CAPSULE ORAL at 09:21

## 2019-10-20 RX ADMIN — ACETAMINOPHEN 975 MG: 325 TABLET ORAL at 11:11

## 2019-10-20 RX ADMIN — DOCUSATE SODIUM 100 MG: 100 CAPSULE, LIQUID FILLED ORAL at 17:39

## 2019-10-20 RX ADMIN — POLYETHYLENE GLYCOL 3350 17 G: 17 POWDER, FOR SOLUTION ORAL at 09:20

## 2019-10-20 RX ADMIN — ACETAMINOPHEN 975 MG: 325 TABLET ORAL at 17:39

## 2019-10-20 RX ADMIN — METHOCARBAMOL TABLETS 500 MG: 500 TABLET, COATED ORAL at 11:11

## 2019-10-20 RX ADMIN — OXYCODONE HYDROCHLORIDE 5 MG: 5 TABLET ORAL at 22:06

## 2019-10-20 RX ADMIN — LIDOCAINE 1 PATCH: 50 PATCH CUTANEOUS at 00:54

## 2019-10-20 RX ADMIN — ENOXAPARIN SODIUM 30 MG: 30 INJECTION SUBCUTANEOUS at 09:20

## 2019-10-20 RX ADMIN — GABAPENTIN 200 MG: 100 CAPSULE ORAL at 22:00

## 2019-10-20 RX ADMIN — CEPHALEXIN 500 MG: 500 CAPSULE ORAL at 22:00

## 2019-10-20 RX ADMIN — METHOCARBAMOL TABLETS 500 MG: 500 TABLET, COATED ORAL at 23:42

## 2019-10-20 RX ADMIN — SENNOSIDES 17.2 MG: 8.6 TABLET, FILM COATED ORAL at 09:20

## 2019-10-20 RX ADMIN — ACETAMINOPHEN 975 MG: 325 TABLET ORAL at 23:42

## 2019-10-20 RX ADMIN — ACETAMINOPHEN 975 MG: 325 TABLET ORAL at 05:29

## 2019-10-20 RX ADMIN — METHOCARBAMOL TABLETS 500 MG: 500 TABLET, COATED ORAL at 00:54

## 2019-10-20 RX ADMIN — METHOCARBAMOL TABLETS 500 MG: 500 TABLET, COATED ORAL at 17:39

## 2019-10-20 RX ADMIN — METHOCARBAMOL TABLETS 500 MG: 500 TABLET, COATED ORAL at 05:29

## 2019-10-20 RX ADMIN — HYDROMORPHONE HYDROCHLORIDE 0.1 MG: 1 INJECTION, SOLUTION INTRAMUSCULAR; INTRAVENOUS; SUBCUTANEOUS at 23:40

## 2019-10-20 RX ADMIN — DOCUSATE SODIUM 100 MG: 100 CAPSULE, LIQUID FILLED ORAL at 09:21

## 2019-10-20 RX ADMIN — ACETAMINOPHEN 975 MG: 325 TABLET ORAL at 00:53

## 2019-10-20 NOTE — PHYSICAL THERAPY NOTE
PHYSICAL THERAPY EVALUATION  NAME:  Merline Hdz  DATE: 10/20/19    AGE:   80 y o  Mrn:   [id number]  ADMIT DX:  Vascular dementia without behavioral disturbance ([place] [address] ) [F01 50]  Closed compression fracture of L5 lumbar vertebra, initial encounter ([place] [address] ) [S32 050A]  Unspecified multiple injuries, initial encounter [T07  XXXA]    Past Medical History:   Diagnosis Date    Ankle fracture     Arthritis     left hip    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HCC)     with left ureter    Bronchitis     Cancer ([place] [address] )     Carcinoma, lung (HCC)     Dementia (HCC)     Dependent edema     Depression     Diabetes mellitus ([place] [address] )     Hypercholesterolemia     Hypertension     Kidney stone     Oth abn and inconclusive findings on dx imaging of breast     Pes planus     Renal calculus     Restless leg syndrome     Rib pain     Sprain, neck     Varicose veins of left lower extremity        Past Surgical History:   Procedure Laterality Date    APPENDECTOMY      LUNG CANCER SURGERY         Length Of Stay: 0    PHYSICAL THERAPY EVALUATION:        10/20/19 1000   Note Type   Note type Eval only   Pain Assessment   Pain Assessment 0-10   Pain Score 8   Pain Type Acute pain   Pain Location Back   Pain Orientation Lower   Pain Descriptors Aching   Pain Frequency Constant/continuous   Pain Onset Ongoing   Clinical Progression Gradually improving   Effect of Pain on Daily Activities increased pain with activity    Patient's Stated Pain Goal No pain   Hospital Pain Intervention(s) Ambulation/increased activity;Repositioned   Response to Interventions tolerated    Home Living   Type of [address] [place] [address] Two level;Bed/bath upstairs  (0 KAY )   Home Equipment Walker;Cane   Additional Comments Pt reports living with niece who is able to assist pt if needed, however pt + home alone during the day    Prior Function   Level of Neal Independent with ADLs and functional mobility   Lives With [employer] Help From Family; Neighbor ADL Assistance Independent   IADLs Needs assistance   Falls in the last 6 months 1 to 4   Comments Pt reports the use of a RW in the home and the use of a [facility] for ambulation in the community    Restrictions/Precautions   Weight Bearing Precautions Per Order No   Braces or Orthoses LSO   Other Precautions Cognitive; Chair Alarm; Bed Alarm; Fall Risk;Pain;Spinal precautions  (chair alarm on post session )   General   Additional Pertinent History Pt pending further imaging however Pt ok to be seen and mobilize with no additional restrictions per Melanie Pipe from trauma team    Family/Caregiver Present No   Cognition   Overall Cognitive Status Impaired   Arousal/Participation Alert   Attention Attends with cues to redirect   Orientation Level Oriented X4   Memory Decreased recall of precautions   Following Commands Follows one step commands without difficulty   RUE Assessment   RUE Assessment WFL   LUE Assessment   LUE Assessment WFL   RLE Assessment   RLE Assessment WFL   Strength RLE   RLE Overall Strength 4/5   LLE Assessment   LLE Assessment WFL   Strength LLE   LLE Overall Strength 4/5   Bed Mobility   Supine to Sit 4  Minimal assistance   Additional items Assist x 1; Increased time required;Verbal cues   Transfers   Sit to Stand 4  Minimal assistance   Additional items Assist x 1; Increased time required;Verbal cues   Stand to Sit 4  Minimal assistance   Additional items Assist x 1; Increased time required;Verbal cues   Toilet transfer 3  Moderate assistance   Additional items Assist x 1; Increased time required;Verbal cues  (from low toilet)   Additional Comments VC and TC needed for hand placement and safety    Ambulation/Elevation   Gait pattern Excessively slow; Short stride; Foward flexed; Inconsistent yosef   Gait Assistance 4  Minimal assist   Additional items Assist x 1   Assistive Device Rolling walker   Distance 15ft x 2   (limited by fatigue and pain )   Balance   Static Sitting Fair   Static Standing Fair - Ambulatory Poor +   Endurance Deficit   Endurance Deficit Yes   Endurance Deficit Description fatigue and pain    Activity Tolerance   Activity Tolerance Patient limited by fatigue;Patient limited by pain   Medical Staff Made Aware MITESH Worthy    Nurse Made Aware Pt appropriate to be seen and mobilize per nsg    Assessment   Prognosis Good   Problem List Decreased strength;Decreased range of motion;Decreased endurance; Impaired balance;Decreased mobility; Decreased cognition; Impaired judgement;Decreased safety awareness;Pain;Orthopedic restrictions   Assessment Pt is 80 y o  female seen for PT evaluation s/p admit to [facility] on 10/19/2019  Two pt identifiers were used to confirm  Pt presented s/p fall about a month ago with back pain  Pt presented to [facility] with continued back pain and was transferred to [facility] for NS eval and further medical management  Pt was admitted with a primary dx of: closed compression fx of L5, fall  PT now consulted for assessment of mobility and d/c needs  Pt with Activity as tolerated orders  Pts current co morbidities effecting treatment include: ankle fx, bladder cancer, carcinoma, lung, dementia, depression, DM, HTN, restless leg syndrome, and personal factors including  Steps to manage at home as well as being alone at times  Pts current clinical presentation is Unstable/ Unpredictable (high complexity) due to Ongoing medical management for primary dx, Increased reliance on more restrictive AD compared to baseline, Decreased activity tolerance compared to baseline, Fall risk, Increased assistance needed from caregiver at current time, Cog status, Spinal precautions at current time, Diagnostic imaging pending, Continuous pulse oximetry monitoring     Prior to admission, pt was I with ambulation without the use of an AD as per pt  Upon evaluation, pt currently is requiring min A for bed mobility; min A for transfers and min A for ambulation w/ RW    Pt denies any lightheadedness or dizziness with ambulation  Pt presents at PT eval functioning below baseline and currently w/ overall mobility deficits 2* to: BLE weakness, impaired balance, decreased endurance, gait deviations, pain, decreased activity tolerance compared to baseline, decreased safety awareness, impaired judgement, fall risk, orthopedic restrictions, decreased cognition  Pt currently at a fall risk 2* to impairments listed above  Based on the aforementioned PT evaluation, pt will continue to benefit from skilled Acute PT interventions to address stated impairments; to maximize functional mobility; for ongoing pt/ family training; and DME needs  At conclusion of PT session pt returned back in chair and chair alarm engaged with phone and call bell within reach  Pt denies any further questions at this time  PT is currently recommending rehab due to decreased functional mobility compared to baseline and increased A needed from caregiver at current time  Pt/ family agreeable to plan and goals as stated on evaluation  PT will continue to follow during hospital stay  Barriers to Discharge Decreased caregiver support; Inaccessible home environment   Goals   Patient Goals " to go to the bathroom"   STG Expiration Date 10/30/19   Short Term Goal #1 In 10 days pt will complete: 1) Bed mobility skills with S to increase safety and independence as well as decrease caregiver burden  2) Functional transfers with S to promote increased independence, safety, and QOL in the home environment  3) Ambulate 150' using least restrictive AD with S without LOB and stable vitals so that pt can negotiate home environment safely and promote independence with functional mobility and return to PLOF  4) Stair training up/ down 10 step/s using rail/s with S so that pt can enter/negotiate home environment safely and decrease fall risk  5) Improve balance grades to Good to increase safety with all mobility and decrease fall risk    6) Improve BLE strength by 1/2 grade to help increase overall functional mobility and decrease fall risk  7) PT for ongoing pt and family education; DME needs and D/C planning to promote highest level of function in least restrictive environment  Plan   Treatment/Interventions Functional transfer training;LE strengthening/ROM; Elevations; Therapeutic exercise; Endurance training;Patient/family training;Equipment eval/education; Bed mobility;Gait training;Spoke to nursing;OT;Spoke to advanced practitioner   PT Frequency Other (Comment)  (3-6x a week )   Recommendation   Recommendation Short-term skilled PT   Equipment Recommended Walker  (RW)   PT - OK to Discharge Yes  (to rehab when medically cleared )   Modified Christine Scale   Modified Christine Scale 4   Barthel Index   Feeding 10   Bathing 0   Grooming Score 5   Dressing Score 5   Bladder Score 10   Bowels Score 10   Toilet Use Score 5   Transfers (Bed/Chair) Score 10   Mobility (Level Surface) Score 0   Stairs Score 0   Barthel Index Score 55   Sharif Gagnon, PT

## 2019-10-20 NOTE — CONSULTS
Consultation - Neurosurgery   Allen Chowdhury 80 y o  female MRN: [id number]  Unit/Bed#: [facility] 604-01 Encounter: [id number]      Inpatient consult to Neurosurgery  Consult performed by: Jesus Mclaughlin PA-C  Consult ordered by: Celena Boeck, MD          Assessment/Plan               Assessment:  1  Closed compression fracture of L5 2/2 fall  2  History of ambulatory dysfunction  3  Diabetes mellitus with peripheral neuropathy      Plan:   · Exam:  A&OX3, EOMI bilateral conjugate, Dena, finger-to-nose normal and without drift bilaterally  Strength is 5/5, sensation to light touch is normal throughout  DTR 2+ without clonus and Babinski is negative symmetrically  Tenderness on L5 and region on palpation  ·  Imaging is reviewed personally and biting as follows  · CT of lumbar spine demonstrates L5 vertebral body fracture long anterior left aspect  · We recommend lumbar spine x-rays in LSO  · Pain control:  Per primary team  · DVT ppx:   · SCDs-bilateral legs  · Continue Lovenox  · Activity:  As tolerated  · PT/OT evaluation & treatment  · Brace:  Wear  LSO brace when upright, and at 45 degree HOB  · Medical Mx:  Per primary team  · Neurocheck:  Routine  · Patient neurological intact except tenderness on palpation at around L5 region  Baseline upright lumbar spine x-rays in LSO brace demonstrates grade 1 L4/ L5 anterolisthesis with stable compression L5 fracture  No neurosurgical procedure is indicated at this juncture  Continue with pain control, PT/OT, wearing LSO brace  Follow-up in 2 weeks with PA with upright lumbar spine x-rays brace  Sign off  Call for concern or problem  History of Present Illness     HPI: Allen Chowdhury is a 80 y o  female with PMH of a bladder dysfunction with gait instability admitted was fall down injury to her lumbar spine  Patient states she fell down after she lost balance    She denies seizure, loss of consciousness, numbness, weakness in the extremities, but she had history of bilateral feet neuropathy with paresthesia most probably related to her diabetes mellitus  She denies any new development of urinary or bowel dysfunction  Denies saddle anesthesia  Has history of restless leg syndrome ,hypertension, depression, dementia, diabetes mellitus, lung cancer and bladder cancer and arthritis  Denies history of fever, chills, rigors, cough or chest pain  Patient denies history of smoking cigarettes or drinking alcohol  CT of lumbar spine demonstrates mild compression fracture of L5   Upright lumbar spine x-rays ordered-results pending  Review of Systems   Constitutional: Negative for activity change, chills and fever  HENT: Negative for trouble swallowing and voice change  Eyes: Negative for photophobia and visual disturbance  Respiratory: Negative for cough, shortness of breath and wheezing  Cardiovascular: Negative for chest pain and leg swelling  Gastrointestinal: Negative for diarrhea, nausea and vomiting  Genitourinary: Negative for difficulty urinating  Musculoskeletal: Positive for back pain and gait problem  Negative for neck pain and neck stiffness  Neurological: Negative for tremors, seizures, syncope, facial asymmetry, speech difficulty, weakness, light-headedness, numbness and headaches  Hematological: Does not bruise/bleed easily  Psychiatric/Behavioral: Negative for confusion and decreased concentration         Historical Information   Past Medical History:   Diagnosis Date    Ankle fracture     Arthritis     left hip    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 [address] )     with left ureter    Bronchitis     Cancer ([facility] [address] [facility]     Carcinoma, lung (HCC)     Dementia ([facility] [address] )     Dependent edema     Depression     Diabetes mellitus ([facility] [address] [facility]     Hypercholesterolemia     Hypertension     Kidney stone     Oth abn and inconclusive findings on dx imaging of breast     Pes planus     Renal calculus     Restless leg syndrome     Rib pain     Sprain, neck     Varicose veins of left lower extremity      Past Surgical History:   Procedure Laterality Date    APPENDECTOMY      LUNG CANCER SURGERY       Social History     Substance and Sexual Activity   Alcohol Use Never    Frequency: Never     Social History     Substance and Sexual Activity   Drug Use Never     Social History     Tobacco Use   Smoking Status Former Smoker   Smokeless Tobacco Never Used     Family History   Problem Relation Age of Onset    No Known Problems Mother     No Known Problems Father     Breast cancer Neg Hx     Colon cancer Neg Hx     Ovarian cancer Neg Hx     Uterine cancer Neg Hx     Cervical cancer Neg Hx        Meds/Allergies   all current active meds have been reviewed, current meds:   Current Facility-Administered Medications   Medication Dose Route Frequency    acetaminophen (TYLENOL) tablet 975 mg  975 mg Oral Q6H [address]    cephalexin (KEFLEX) capsule 500 mg  500 mg Oral Q12H [address]    docusate sodium (COLACE) capsule 100 mg  100 mg Oral BID    enoxaparin (LOVENOX) subcutaneous injection 30 mg  30 mg Subcutaneous Q24H HESHAM    gabapentin (NEURONTIN) capsule 200 mg  200 mg Oral HS    Labetalol HCl (NORMODYNE) injection 10 mg  10 mg Intravenous Q4H PRN    lidocaine (LIDODERM) 5 % patch 1 patch  1 patch Topical Once    methocarbamol (ROBAXIN) tablet 500 mg  500 mg Oral Q6H [address]    ondansetron (ZOFRAN) injection 4 mg  4 mg Intravenous Q6H PRN    oxyCODONE (ROXICODONE) IR tablet 2 5 mg  2 5 mg Oral Q4H PRN    oxyCODONE (ROXICODONE) IR tablet 5 mg  5 mg Oral Q4H PRN    polyethylene glycol (MIRALAX) packet 17 g  17 g Oral Daily    senna (SENOKOT) tablet 17 2 mg  2 tablet Oral Daily    and PTA meds:   Prior to Admission Medications   Prescriptions Last Dose Informant Patient Reported?  Taking?   acetaminophen (TYLENOL) 500 mg tablet   Yes No   Sig: Take 500 mg by mouth 2 (two) times a day   clotrimazole-betamethasone (LOTRISONE) 1-0 05 % cream   No No   Sig: Apply topically 2 (two) times a day   gabapentin (NEURONTIN) 100 mg capsule   No No   Sig: Take 2 capsules (200 mg total) by mouth daily at bedtime   mirtazapine (REMERON) 15 mg tablet   No No   Sig: TAKE 1 TABLET BY MOUTH DAILY AT BEDTIME   pramipexole (MIRAPEX) 1 mg tablet   No No   Sig: Take 0 5 tablets (0 5 mg total) by mouth 2 (two) times a day   predniSONE 10 mg tablet   No No   Sig: Take 4 tabs x 2 days, 3 tabs x 2 days, then 2 tabs x 2 days, then 1 tab x 2 days      Facility-Administered Medications: None     Allergies   Allergen Reactions    Albuterol     Aldactone [Spironolactone]     Aspirin     Atenolol     Bactrim [Sulfamethoxazole-Trimethoprim]     Codeine     Hydrocodone     Ibuprofen     Lisinopril     Mevacor [Lovastatin]     Mirapex [Pramipexole]     Other      novacaine    Penicillins     Ultram [Tramadol]     Zoloft [Sertraline]      Night sweats       Objective   I/O       10/18 0701 - 10/19 0700 10/19 0701 - 10/20 0700 10/20 0701 - 10/21 0700    P  O   60     Total Intake(mL/kg)  60 (0 9)     Urine (mL/kg/hr)  173     Total Output  173     Net  -113                  Physical Exam   Constitutional: She is oriented to person, place, and time  She appears well-developed and well-nourished  HENT:   Head: Normocephalic and atraumatic  Eyes: EOM are normal    Neck: Normal range of motion  Cardiovascular: Normal rate  Pulmonary/Chest: Effort normal    Musculoskeletal: She exhibits tenderness  Tenderness at L5 region on palpation     Neurological: She is alert and oriented to person, place, and time  She has normal strength and normal reflexes  No cranial nerve deficit or sensory deficit  She has a normal Finger-Nose-Finger Test  GCS eye subscore is 4  GCS verbal subscore is 5  GCS motor subscore is 6  She displays no Babinski's sign on the right side  She displays no Babinski's sign on the left side  Reflex Scores:       Tricep reflexes are 2+ on the right side and 2+ on the left side         Bicep reflexes are 2+ on the right side and 2+ on the left side  Brachioradialis reflexes are 2+ on the right side and 2+ on the left side  Patellar reflexes are 2+ on the right side and 2+ on the left side  Achilles reflexes are 2+ on the right side and 2+ on the left side  Skin: Skin is warm  Psychiatric: Her speech is normal      Neurologic Exam     Mental Status   Oriented to person, place, and time  Speech: speech is normal   Level of consciousness: alert    Cranial Nerves     CN II   Visual fields full to confrontation  CN III, IV, VI   Extraocular motions are normal      CN V   Right facial sensation deficit: none  Left facial sensation deficit: none    CN VII   Right facial weakness: none  Left facial weakness: none    CN XI   CN XI normal      CN XII   CN XII normal      Motor Exam   Muscle bulk: normal  Overall muscle tone: normal  Right arm tone: normal  Left arm tone: normal  Right arm pronator drift: absent  Left arm pronator drift: absent  Right leg tone: normal  Left leg tone: normal    Strength   Strength 5/5 throughout  Sensory Exam   Light touch normal      Gait, Coordination, and Reflexes     Coordination   Finger to nose coordination: normal    Reflexes   Right brachioradialis: 2+  Left brachioradialis: 2+  Right biceps: 2+  Left biceps: 2+  Right triceps: 2+  Left triceps: 2+  Right patellar: 2+  Left patellar: 2+  Right achilles: 2+  Left achilles: 2+  Right : 2+  Left : 2+  Right plantar: normal  Left plantar: normal  Right [place]: absent  Left [place]: absent  Right ankle clonus: absent  Left ankle clonus: absent      Vitals:Blood pressure 133/70, pulse 67, temperature 98 6 °F (37 °C), resp  rate 17, height 5' (1 524 m), weight 64 7 kg (142 lb 10 2 oz), SpO2 99 %, not currently breastfeeding  ,Body mass index is 27 86 kg/m²       Lab Results:   Results from last 7 days   Lab Units 10/20/19  0439 10/19/19  1938   WBC Thousand/uL  --  10 74*   HEMOGLOBIN g/dL  -- 13 9   HEMATOCRIT %  --  42 6   PLATELETS Thousands/uL 254 276   NEUTROS PCT %  --  89*   MONOS PCT %  --  4     Results from last 7 days   Lab Units 10/19/19  1938   POTASSIUM mmol/L 5 2   CHLORIDE mmol/L 101   CO2 mmol/L 30   BUN mg/dL 26*   CREATININE mg/dL 1 18   CALCIUM mg/dL 9 0   ALK PHOS U/L 106   ALT U/L 13   AST U/L 12                 No results found for: TROPONINT  ABG:No results found for: PHART, KTD6EOO, PO2ART, ILR1BMO, A2PVXLPK, BEART, SOURCE    Imaging Studies: I have personally reviewed pertinent reports   , I have personally reviewed pertinent films in PACS and I have personally reviewed pertinent films in PACS with a Radiologist     EKG, Pathology, and Other Studies: I have personally reviewed pertinent reports   , I have personally reviewed pertinent films in PACS and I have personally reviewed pertinent films in PACS with a Radiologist     VTE Prophylaxis: Sequential compression device (Venodyne)     Code Status: Level 1 - Full Code  Advance Directive and Living Will:      Power of :    POLST:      Counseling / Coordination of Care  I spent 20 minutes with the patient

## 2019-10-20 NOTE — ASSESSMENT & PLAN NOTE
- Await Neurosurgery evaluation and recommendations  - Anticipate non operative intervention with possible LSO brace  - Analgesia as needed  - PT and OT evaluation and treatment as indicated    - Monitor neurologic exam

## 2019-10-20 NOTE — ASSESSMENT & PLAN NOTE
- UTI, present on admission   - Complete 7 day course of Keflex  - Outpatient follow-up with primary care provider

## 2019-10-20 NOTE — PLAN OF CARE
Problem: OCCUPATIONAL THERAPY ADULT  Goal: Performs self-care activities at highest level of function for planned discharge setting  See evaluation for individualized goals  Description  Treatment Interventions: ADL retraining, Functional transfer training, Endurance training, Cognitive reorientation, Patient/family training, Equipment evaluation/education, Energy conservation, Activityengagement          See flowsheet documentation for full assessment, interventions and recommendations  Note:   Limitation: Decreased ADL status, Decreased Safe judgement during ADL, Decreased cognition, Decreased endurance, Decreased self-care trans, Decreased high-level ADLs  Prognosis: Good  Assessment: 81 YO Female SEEN FOR INITIAL OCCUPATIONAL THERAPY EVALUATION FOLLOWING TXF FROM [facility] WITH WORSENING BACK PAIN S/P FALL ~1 MONTH AGO  PT FOUND TO HAVE L5 COMPRESSION FX  PT CURRENTLY HAS THE FOLLOWING RESTRICTIONS;SPINAL PRECAUTIONS and LSO  PT PENDING FURTHER IMAGING WITH TRAUMA HOWEVER APPROPRIATE TO WORK WITH THERAPY WITH NO CURRENT ADDITIONAL RESTRICTIONS PER DEEP TRAUMA  PROBLEMS LIST INCLUDES HLD, HTN, ARTHRITIS, BLADDER CA, CARCINOMA, DEPRESSION, DM , HLD AND BASELINE DEMENTIA  PT IS FROM HOME WITH NIECE WHERE SHE REPORTS BEING OVERALL INDEPENDENT WITH ADLS WITH OCCASIONAL ASSIST FOR "DRYING LOWER LEGS" AFTER BATHING AND HAS ASSIST FOR HEAVY IADLS/DRIVING PTA  PT CURRENTLY REQUIRES OVERALL MOD A WITH ADLS, MIN A WITH TRANSFERS / FUNCTIONAL MOBILITY WITH USE OF RW  PT IS LIMITED 2' PAIN, FATIGUE, IMPAIRED BALANCE, FALL RISK , SPINAL PRECAUTIONS, OVERALL WEAKNESS/DECONDITIONING , LIMITED FAMILY/FRIEND SUPPORT , INACCESSIBLE HOME ENVIRONMENT and OVERALL LIMITED ACTIVITY TOLERANCE   PT EDUCATED ON SPINAL PRECAUTIONS, DEEP BREATHING TECHNIQUES T/O ACTIVITY, SLOWING OF PACE, ENERGY CONSERVATION TECHNIQUES FOR CARRY OVER UPON D/C, INCREASED FAMILY SUPPORT and CONTINUE PARTICIPATION IN SELF-CARE/MOBILITY WITH STAFF WHILE Linda   FROM AN OCCUPATIONAL THERAPY PERSPECTIVE, PT WOULD BENEFIT FROM ADDITIONAL OT SERVICES IN AN INPT REHAB SETTING UPON D/C  WILL CONT TO FOLLOW TO ADDRESS THE BELOW DESCRIBED GOALS  OT Discharge Recommendation: Short Term Rehab  OT - OK to Discharge:  Yes

## 2019-10-20 NOTE — OCCUPATIONAL THERAPY NOTE
[address]  Evaluation     Patient Name: Kathy Umana  [id number] Date: 10/20/2019  Problem List  Principal Problem:    Closed compression fracture of L5 lumbar vertebra, initial encounter [facility])  Active Problems:    Left shoulder pain    Fall    Urinary tract infection without hematuria    Past Medical History  Past Medical History:   Diagnosis Date    Ankle fracture     Arthritis     left hip    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[facility] [address] )     with left ureter    Bronchitis     Cancer ([facility] [address] [facility]     Carcinoma, lung ([facility] [address] [facility]     Dementia ([facility] [address] [facility]     Dependent edema     Depression     Diabetes mellitus ([facility] [address] [facility]     Hypercholesterolemia     Hypertension     Kidney stone     Oth abn and inconclusive findings on dx imaging of breast     Pes planus     Renal calculus     Restless leg syndrome     Rib pain     Sprain, neck     Varicose veins of left lower extremity      Past Surgical History  Past Surgical History:   Procedure Laterality Date    APPENDECTOMY      LUNG CANCER SURGERY           10/20/19 1003   Note Type   Note type Eval/Treat   Restrictions/Precautions   Weight Bearing Precautions Per Order No   Braces or Orthoses LSO   Other Precautions Cognitive; Chair Alarm; Bed Alarm; Fall Risk;Pain;Spinal precautions   Pain Assessment   Pain Assessment 0-10   Pain Score 8   Pain Type Acute pain   Pain Location Back   Patient's Stated Pain Goal No pain   Hospital Pain Intervention(s) Repositioned; Ambulation/increased activity; Distraction; Emotional support   Response to Interventions TOLERATED    Home Living   Type of [address] Two level;Bed/bath upstairs  (0 KAY )   Bathroom Shower/Tub Tub/shower unit   Bathroom Toilet Standard   Bathroom Equipment Shower chair   Bathroom Accessibility Accessible   Home Equipment Walker;Cane;Life alert   Additional Comments PT REPORTS USE OF RW WITHIN HOME AND SPC FOR COMMUNITY USE  + USE OF SC  PT HAS A LIFE ALERT     Prior Function   Level of [place] Independent with ADLs and functional mobility; Needs assistance with IADLs   Lives With [facility] Help From Family; Neighbor   ADL Assistance Independent   IADLs Needs assistance   Falls in the last 6 months 1 to 4   Vocational Retired   [address] TO "DRY LOWER LEGS"  NIECE ASSIST WITH IADLS AT BASELINE INCLUDING MEDICATION MANAGMENT AND DRIVING    Reciprocal Relationships LIVES WITH SUPPORTIVE NIECE HOWEVER SHE WORKS DURING THE DAY  PT REPORTS A DOWNSTAIR NEIGHBOR "CHECKS IN"    Service to Others RETIRED   Intrinsic Gratification ENJOYS BEING INDEPENDENT    Psychosocial   Psychosocial (WDL) WDL   ADL   Eating Assistance 7  Independent   Grooming Assistance 5  Supervision/Setup   UB Bathing Assistance 4  Minimal Assistance   LB Bathing Assistance 3  Moderate Assistance   UB Dressing Assistance 4  Minimal Assistance   LB Dressing Assistance 3  Moderate Assistance   Toileting Assistance  3  Moderate Assistance   Functional Assistance 3  Moderate Assistance   Bed Mobility   Supine to Sit 4  Minimal assistance   Additional items Assist x 1; Increased time required;Verbal cues;LE management   Sit to Supine Unable to assess  (PT LEFT OOB WITH ALARM ON )   Transfers   Sit to Stand 4  Minimal assistance   Additional items Assist x 1; Increased time required;Verbal cues   Stand to Sit 4  Minimal assistance   Additional items Assist x 1; Increased time required;Verbal cues   Toilet transfer 3  Moderate assistance   Additional items Assist x 1; Increased time required;Verbal cues;Standard toilet  (LOW TOILET )   Functional Mobility   Functional Mobility 4  Minimal assistance   Additional items Rolling walker   Balance   Static Sitting Fair   Static Standing Fair -   Ambulatory Poor +   Activity Tolerance   Activity Tolerance Patient limited by fatigue;Patient limited by pain   Medical Staff Made Aware ADA-PT    Nurse Made Aware APPROPRIATE TO SEE PER RN   RUE Assessment   RUE Assessment WFL   LUE Assessment   LUE Assessment WFL   Hand Function   Gross Motor Coordination Functional   Fine Motor Coordination Functional   Sensation   Light Touch No apparent deficits   Cognition   Overall Cognitive Status Impaired   Arousal/Participation Alert; Cooperative   Attention Attends with cues to redirect   Orientation Level Oriented X4   Memory Decreased recall of precautions   Following Commands Follows multistep commands without difficulty   Comments PT IS OVERALL PLEASANT AND COOPERATIVE WITH BASELINE DEMENTIA  LIMITED INSIGHT INTO DEFICITS  PT WOULD BENEFIT FROM FURTHER REVIEW OF LEARNED PRECAUTIONS-VISUAL REMINDER ON WHITE BOARD  ALARM ON FOR SAFETY  Assessment   Limitation Decreased ADL status; Decreased Safe judgement during ADL;Decreased cognition;Decreased endurance;Decreased self-care trans;Decreased high-level ADLs   Prognosis Good   Assessment 81 YO Female SEEN FOR INITIAL OCCUPATIONAL THERAPY EVALUATION FOLLOWING [facility] WITH WORSENING BACK PAIN S/P FALL ~1 MONTH AGO  PT FOUND TO HAVE L5 COMPRESSION FX  PT CURRENTLY HAS THE FOLLOWING RESTRICTIONS;SPINAL PRECAUTIONS and LSO  PT PENDING FURTHER IMAGING WITH TRAUMA HOWEVER APPROPRIATE TO WORK WITH THERAPY WITH NO CURRENT ADDITIONAL RESTRICTIONS PER DEEP, TRAUMA  PROBLEMS LIST INCLUDES HLD, HTN, ARTHRITIS, BLADDER CA, CARCINOMA, DEPRESSION, DM , HLD AND BASELINE DEMENTIA  PT IS FROM HOME WITH NIECE WHERE SHE REPORTS BEING OVERALL INDEPENDENT WITH ADLS WITH OCCASIONAL ASSIST FOR "DRYING LOWER LEGS" AFTER BATHING AND HAS ASSIST FOR HEAVY IADLS/DRIVING PTA  PT CURRENTLY REQUIRES OVERALL MOD A WITH ADLS, MIN A WITH TRANSFERS / FUNCTIONAL MOBILITY WITH USE OF RW  PT IS LIMITED 2' PAIN, FATIGUE, IMPAIRED BALANCE, FALL RISK , SPINAL PRECAUTIONS, OVERALL WEAKNESS/DECONDITIONING , LIMITED FAMILY/FRIEND SUPPORT , INACCESSIBLE HOME ENVIRONMENT and OVERALL LIMITED ACTIVITY TOLERANCE   PT EDUCATED ON SPINAL PRECAUTIONS, DEEP BREATHING TECHNIQUES T/O ACTIVITY, SLOWING OF PACE, ENERGY CONSERVATION TECHNIQUES FOR CARRY OVER UPON D/C, INCREASED FAMILY SUPPORT and CONTINUE PARTICIPATION IN SELF-CARE/MOBILITY WITH STAFF [address]   FROM AN OCCUPATIONAL THERAPY PERSPECTIVE, PT WOULD BENEFIT FROM ADDITIONAL OT SERVICES IN AN INPT REHAB SETTING UPON D/C  WILL CONT TO FOLLOW TO ADDRESS THE BELOW DESCRIBED GOALS  Goals   Patient Goals TO GO TO THE BATHROOM    LTG Time Frame 10-14   Long Term Goal #1 SEE BELOW   Plan   Treatment Interventions ADL retraining;Functional transfer training; Endurance training;Cognitive reorientation;Patient/family training;Equipment evaluation/education; Energy conservation; Activityengagement   Goal Expiration Date 11/03/19   OT Frequency 3-5x/wk   Recommendation   OT Discharge Recommendation Short Term Rehab   OT - OK to Discharge Yes   Barthel Index   Feeding 10   Bathing 0   Grooming Score 5   Dressing Score 5   Bladder Score 10   Bowels Score 10   Toilet Use Score 5   Transfers (Bed/Chair) Score 10   Mobility (Level Surface) Score 0   Stairs Score 0   Barthel Index Score 55   Modified [place] Scale   Modified [place] Scale 4       OCCUPATIONAL THERAPY GOALS TO BE MET WITHIN 10-14 DAYS:    -Pt will increase bed mobility to MOD I to participate in functional activities with G tolerance and balance    -Pt will improve functional mobility and transfers to MOD I on/off all surfaces w/ G balance including toileting   -Pt will increase independence in all ADLS, including LSO management to MOD I with G balance sitting upright in chair   -Pt will improve activity tolerance to G for 30 min txment sessions w/ G carry over of learned energy conservation techniques   -Pt will demonstrate G carryover of learned spinal precautions, safety techniques and proper body mechanics in functional and leisure activities with use of DME   -Pt will complete additional cognitive assessment (ACLS) with 100% attention to task in order to assist with safe d/c plan         Documentation completed by Ephrinirali Yanez, MOT, OTR/L      Documentation completed by Aaron Yanez, LETTY, OTR/L

## 2019-10-20 NOTE — PROGRESS NOTES
Progress Note - Cadence Elizabeth 11/3/1924, 80 y o  female MRN: [id number]    Unit/Bed#: [facility] 604-01 Encounter: [id number]    Primary Care Provider: FARZANA Wahl   Date and time admitted to hospital: 10/19/2019 10:40 PM        Fall  Assessment & Plan  - Status post fall with the below noted injuries  - Fall precautions   - PT and OT evaluation and treatment as indicated  * Closed compression fracture of L5 lumbar vertebra, initial encounter [facility])  Assessment & Plan  - Await Neurosurgery evaluation and recommendations  - Anticipate non operative intervention with possible LSO brace  - Analgesia as needed  - PT and OT evaluation and treatment as indicated  - Monitor neurologic exam     Left shoulder pain  Assessment & Plan  - Left shoulder pain on exam without external signs of trauma  Likely secondary to contusion   - Activity as tolerated  - Obtain left shoulder x-ray to rule out underlying fracture  - Analgesia as needed  Urinary tract infection without hematuria  Assessment & Plan  - UTI, present on admission   - Complete 7 day course of Keflex  - Outpatient follow-up with primary care provider  Bedside nurse rounds completed with [facility]  Prophylaxis: Sequential compression device (Venodyne)  and Enoxaparin (Lovenox)    Disposition:  Awaiting evaluations by Neurosurgery as well as PT and OT to determine appropriate disposition plan  Case Management following for assistance with disposition  Code status:  Level 1 - Full Code    Consultants:     1  [address] [place] [address] Neurosurgery  2  Geriatric Medicine  Is the patient 72 years or older?: YES:    1  Before the illness or injury that brought you to the Emergency, did you need someone to help you on a regular basis? 1=Yes   2  Since the illness or injury that brought you to the Emergency, have you needed more help than usual to take care of yourself? 1=Yes   3   Have you been hospitalized for one or more nights during the past 6 months (excluding a stay in the Emergency Department)? 0=No   4  In general, do you see well? 0=Yes   5  In general, do you have serious problems with your memory? 1=Yes   6  Do you take more than three different medications everyday? 1=Yes   TOTAL   4     Did you order a geriatric consult if the score was 2 or greater?: yes    SUBJECTIVE:     Transfer from:  SANCTUARY AT THE [facility]  Outside Films Received: yes  Tertiary Exam Due on: 10/20/2019    Mechanism of Injury:Fall    Details related to Injury: +LOC:  no    Chief Complaint:  My low back is sore      HPI/Last 24 hour events:  Patient is feeling okay this morning  She states she slept well overnight  She does have some mild lower back pain that extends from the middle of her lower back out towards or hips  She also states that her feet feel numb this morning and attributes that to her socks  She denies any loss of bowel or bladder function  She denies any other numbness/weakness/tingling in her extremities  She is tolerating a diet without nausea or vomiting  She has no other new complaints this morning      Active medications:           Current Facility-Administered Medications:     acetaminophen (TYLENOL) tablet 975 mg, 975 mg, Oral, Q6H HESHAM, 975 mg at 10/20/19 0529    cephalexin (KEFLEX) capsule 500 mg, 500 mg, Oral, Q12H HESHAM    docusate sodium (COLACE) capsule 100 mg, 100 mg, Oral, BID    enoxaparin (LOVENOX) subcutaneous injection 30 mg, 30 mg, Subcutaneous, Q24H HESHAM    gabapentin (NEURONTIN) capsule 200 mg, 200 mg, Oral, HS    Labetalol HCl (NORMODYNE) injection 10 mg, 10 mg, Intravenous, Q4H PRN    lidocaine (LIDODERM) 5 % patch 1 patch, 1 patch, Topical, Once, 1 patch at 10/20/19 0054    methocarbamol (ROBAXIN) tablet 500 mg, 500 mg, Oral, Q6H [facility], 500 mg at 10/20/19 0529    ondansetron (ZOFRAN) injection 4 mg, 4 mg, Intravenous, Q6H PRN    oxyCODONE (ROXICODONE) IR tablet 2 5 mg, 2 5 mg, Oral, Q4H PRN    oxyCODONE (ROXICODONE) IR tablet 5 mg, 5 mg, Oral, Q4H PRN    polyethylene glycol (MIRALAX) packet 17 g, 17 g, Oral, Daily    senna (SENOKOT) tablet 17 2 mg, 2 tablet, Oral, Daily      OBJECTIVE:     Vitals:   Vitals:    10/20/19 0710   BP: 133/70   Pulse: 67   Resp: 17   Temp: 98 3 °F (36 8 °C)   SpO2: 99%       Physical Exam:   GENERAL APPEARANCE: Patient in no acute distress  HEENT: NCAT; PERRL, EOMs intact; Mucous membranes moist  NECK / BACK:  Mild tenderness over the midline lumbar spine without any paraspinal tenderness; no cervical or thoracic spine tenderness  No step-offs or deformities  CV: Regular rate and rhythm; + S1, S2; 2/6 systolic ejection murmur, no gallops/rubs appreciated  CHEST / LUNGS: Clear to auscultation; no wheezes/rales/rhonci; no chest wall tenderness, crepitus or deformities  ABD: NABS; soft; non-distended; non-tender  EXT: +2 pulses bilaterally upper & lower extremities; no clubbing/cyanosis/edema, mild pain in the left shoulder with range of motion and mild tenderness of the anterior left shoulder; normal range of motion without pain or tenderness in her right upper extremity and bilateral lower extremities  NEURO: GCS 15, and alert and oriented x3 this morning; no focal neurologic deficits, there is normal sensation and motor function in her bilateral lower extremities including her feet despite the subjective complaint of numbness in her feet; neurovascularly intact  SKIN: Warm, dry and well perfused; no rash; no jaundice  I/O:   I/O       10/18 0701 - 10/19 0700 10/19 0701 - 10/20 0700 10/20 0701 - 10/21 0700    P  O   60     Total Intake(mL/kg)  60 (0 9)     Urine (mL/kg/hr)  173     Total Output  173     Net  -113                  Invasive Devices:    Invasive Devices     Peripheral Intravenous Line            Peripheral IV 10/19/19 Right Forearm less than 1 day                  Imaging:   Ct Spine Lumbar Without Contrast    Result Date: 10/19/2019  Impression: L5 vertebral body fracture as described The study was marked in EPIC for immediate notification   Workstation performed: [id number]       Labs:   CBC:   Lab Results   Component Value Date    WBC 9 97 10/20/2019    HGB 12 9 10/20/2019    HCT 40 0 10/20/2019    MCV 95 10/20/2019     10/20/2019    MCH 30 6 10/20/2019    MCHC 32 3 10/20/2019    RDW 13 5 10/20/2019    MPV 10 7 10/20/2019    NRBC 0 10/19/2019     CMP:   Lab Results   Component Value Date     10/20/2019    CO2 29 10/20/2019    BUN 25 10/20/2019    CREATININE 1 03 10/20/2019    CALCIUM 8 7 10/20/2019    AST 12 10/19/2019    ALT 13 10/19/2019    ALKPHOS 106 10/19/2019    EGFR 47 10/20/2019     Coagulation: No results found for: PT, INR, APTT      Nikky Gambino PA-C  10/20/2019 07:28 AM

## 2019-10-20 NOTE — DISCHARGE INSTRUCTIONS
Neurosurgery discharge instructions following spine fracture:      LSO brace to be worn when out of bed of head of bed greater than 45 degrees  May place brace on while sitting on edge of bed  May be removed for showering   No bending, twisting or heavy lifting  No strenuous activities  No Driving  **Please notify MD immediately if you have increased back or leg pain  New numbness, tingling and/or weakness in your legs  **

## 2019-10-20 NOTE — H&P
H&P Exam - Trauma   Avni Oliveros 80 y o  female MRN: [id number]  Unit/Bed#: ED 05 Encounter: [id number]    Assessment/Plan   Trauma Alert: transfer  Model of Arrival: Ambulance  Trauma Team: Attending Kristian Staff, Resident Isma Skelton  Consultants: None    Trauma Active Problems: fall, L5 compression fracture    Trauma Plan: analgesia, neurosurgery consult, PT/OT    Chief Complaint: "My back pain went away after all that pain medication "    History of Present Illness   HPI:  Avni Oliveros is a 80 y o  female with pmhx HLD, HTN, dementia who presents as a transfer from [facility] for L5 compression fracture  Patient states that one month ago she fell in her living room after tripping on an object  She did not hit her head, no LOC  No anticoagulation or antiplatelet medications  She states that she felt well afterward, and her relative helped her up into a chair  States that her pain began a few weeks after the fall; she went to her doctor and was given prednisone taper  She does have a hx of lumbar radiculopathy, and states her symptoms felt similar to this, with shooting pain from her right buttock and down the back of her leg  She states her pain acutely worsened yesterday, and she went to the ED for an evaluation and was diagnosed with L5 compression fracture  Mechanism:Fall    Review of Systems   Constitutional: Negative  Negative for appetite change, chills and fever  HENT: Negative  Eyes: Negative  Negative for photophobia and visual disturbance  Respiratory: Negative  Negative for cough, chest tightness and shortness of breath  Cardiovascular: Negative  Negative for chest pain and leg swelling  Gastrointestinal: Negative  Negative for abdominal pain, blood in stool, constipation, diarrhea, nausea and vomiting  Endocrine: Negative  Genitourinary: Positive for frequency and urgency  Negative for difficulty urinating, dysuria and flank pain  Musculoskeletal: Positive for back pain  Negative for neck pain and neck stiffness  Skin: Negative  Allergic/Immunologic: Negative  Neurological: Negative  Negative for dizziness, weakness, light-headedness and headaches  Hematological: Negative  Psychiatric/Behavioral: Negative          Historical Information   History is obtained from patient, per chart review    Past Medical History:   Diagnosis Date    Ankle fracture     Arthritis     left hip    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[facility] )     with left ureter    Bronchitis     Cancer ([facility] )     Carcinoma, lung ([facility] )     Dementia ([place] [address] )     Dependent edema     Depression     Diabetes mellitus ([place] [address] )     Hypercholesterolemia     Hypertension     Kidney stone     Oth abn and inconclusive findings on dx imaging of breast     Pes planus     Renal calculus     Restless leg syndrome     Rib pain     Sprain, neck     Varicose veins of left lower extremity      Past Surgical History:   Procedure Laterality Date    APPENDECTOMY      LUNG CANCER SURGERY       Social History   Social History     Substance and Sexual Activity   Alcohol Use Never    Frequency: Never     Social History     Substance and Sexual Activity   Drug Use Never     Social History     Tobacco Use   Smoking Status Former Smoker   Smokeless Tobacco Never Used     Immunization History   Administered Date(s) Administered    INFLUENZA 10/23/2014, 11/22/2015, 02/14/2018, 11/06/2018    Influenza, recombinant, quadrivalent,injectable, preservative free 10/11/2019     Last Tetanus: uknown   Family History: Non-contributory      Meds/Allergies   all current active meds have been reviewed    Allergies   Allergen Reactions    Albuterol     Aldactone [Spironolactone]     Aspirin     Atenolol     Bactrim [Sulfamethoxazole-Trimethoprim]     Codeine     Hydrocodone     Ibuprofen     Lisinopril     Mevacor [Lovastatin]     Mirapex [Pramipexole]     Other      novacaine    Penicillins     Ultram [Tramadol]     Zoloft [Sertraline] Night sweats         PHYSICAL EXAM    Objective   Vitals:   First set: Temperature: 98 1 °F (36 7 °C) (10/19/19 2245)  Pulse: 82 (10/19/19 2245)  Respirations: 18 (10/19/19 2245)  Blood Pressure: (!) 187/82 (10/19/19 2245)    Primary Survey:   (A) Airway: intact   (B) Breathing: b/l breath sounds   (C) Circulation: Pulses:   normal  (D) Disabliity:  GCS Total:  15  (E) Expose:  Completed    Secondary Survey:   Physical Exam   Constitutional: She is oriented to person, place, and time  She appears well-developed and well-nourished  HENT:   Head: Normocephalic and atraumatic  Right Ear: Tympanic membrane normal  No hemotympanum  Left Ear: Tympanic membrane normal  No hemotympanum  Nose: Nose normal  No nasal septal hematoma  Mouth/Throat: Uvula is midline and oropharynx is clear and moist    Eyes: Pupils are equal, round, and reactive to light  Conjunctivae and EOM are normal    Neck: Phonation normal  No spinous process tenderness present  Cardiovascular: Normal rate, regular rhythm and intact distal pulses  Murmur heard  Systolic murmur is present with a grade of 3/6  Pulses:       Carotid pulses are 2+ on the right side, and 2+ on the left side  Femoral pulses are 2+ on the right side, and 2+ on the left side  Dorsalis pedis pulses are 2+ on the right side, and 2+ on the left side  Pulmonary/Chest: Effort normal and breath sounds normal  She exhibits no tenderness and no crepitus  Abdominal: Soft  Bowel sounds are normal  There is no tenderness  There is no rigidity  Musculoskeletal: Normal range of motion          Right shoulder: Normal         Left shoulder: Normal         Right elbow: Normal        Left elbow: Normal         Right wrist: Normal         Left wrist: Normal         Right hip: Normal         Left hip: Normal         Right knee: Normal         Left knee: Normal         Right ankle: Normal         Left ankle: Normal         Cervical back: Normal         Thoracic back: Normal         Lumbar back: She exhibits tenderness  She exhibits no deformity  Neurological: She is alert and oriented to person, place, and time  She has normal strength  No cranial nerve deficit or sensory deficit  GCS eye subscore is 4  GCS verbal subscore is 5  GCS motor subscore is 6  Reflex Scores:       Patellar reflexes are 2+ on the right side and 2+ on the left side  Achilles reflexes are 1+ on the right side and 1+ on the left side  Patient is alert and oriented to person, place, time, and situation  Speech is normal with no dysarthria, no aphasia  Cranial nerves II-XII intact  Sensation is intact bilaterally upper and lower extremities  Normal muscle tone, no clonus, no atrophy  5/5 muscle strength bilaterally upper extremities, 5/5 muscle strength left lower extremity, slightly weaker right LE  No pronator drift  Skin: Skin is warm, dry and intact  She is not diaphoretic  Psychiatric: She has a normal mood and affect  Her behavior is normal    Vitals reviewed  Invasive Devices     Peripheral Intravenous Line            Peripheral IV 10/19/19 Left Antecubital less than 1 day    Peripheral IV 10/19/19 Right Forearm less than 1 day                Lab Results: Results: I have personally reviewed pertinent reports  Imaging/EKG Studies: Results: I have personally reviewed pertinent reports      Other Studies:     Code Status: No Order  Advance Directive and Living Will:      Power of :    POLST:

## 2019-10-20 NOTE — PLAN OF CARE
Problem: Potential for Falls  Goal: Patient will remain free of falls  Description  INTERVENTIONS:  - Assess patient frequently for physical needs  -  Identify cognitive and physical deficits and behaviors that affect risk of falls  -  [place] fall precautions as indicated by assessment   - Educate patient/family on patient safety including physical limitations  - Instruct patient to call for assistance with activity based on assessment  - Modify environment to reduce risk of injury  - Consider OT/PT consult to assist with strengthening/mobility  Outcome: Progressing     Problem: Prexisting or High Potential for Compromised Skin Integrity  Goal: Skin integrity is maintained or improved  Description  INTERVENTIONS:  - Identify patients at risk for skin breakdown  - Assess and monitor skin integrity  - Assess and monitor nutrition and hydration status  - Monitor labs   - Assess for incontinence   - Turn and reposition patient  - Assist with mobility/ambulation  - Relieve pressure over bony prominences  - Avoid friction and shearing  - Provide appropriate hygiene as needed including keeping skin clean and dry  - Evaluate need for skin moisturizer/barrier cream  - Collaborate with interdisciplinary team   - Patient/family teaching  - Consider wound care consult   Outcome: Progressing     Problem: Nutrition/Hydration-ADULT  Goal: Nutrient/Hydration intake appropriate for improving, restoring or maintaining nutritional needs  Description  Monitor and assess patient's nutrition/hydration status for malnutrition  Collaborate with interdisciplinary team and initiate plan and interventions as ordered  Monitor patient's weight and dietary intake as ordered or per policy  Utilize nutrition screening tool and intervene as necessary  Determine patient's food preferences and provide high-protein, high-caloric foods as appropriate       INTERVENTIONS:  - Monitor oral intake, urinary output, labs, and treatment plans  - Assess nutrition and hydration status and recommend course of action  - Evaluate amount of meals eaten  - Assist patient with eating if necessary   - Allow adequate time for meals  - Recommend/ encourage appropriate diets, oral nutritional supplements, and vitamin/mineral supplements  - Order, calculate, and assess calorie counts as needed  - Recommend, monitor, and adjust tube feedings and TPN/PPN based on assessed needs  - Assess need for intravenous fluids  - Provide specific nutrition/hydration education as appropriate  - Include patient/family/caregiver in decisions related to nutrition  Outcome: Progressing     Problem: PAIN - ADULT  Goal: Verbalizes/displays adequate comfort level or baseline comfort level  Description  Interventions:  - Encourage patient to monitor pain and request assistance  - Assess pain using appropriate pain scale  - Administer analgesics based on type and severity of pain and evaluate response  - Implement non-pharmacological measures as appropriate and evaluate response  - Consider cultural and social influences on pain and pain management  - Notify physician/advanced practitioner if interventions unsuccessful or patient reports new pain  Outcome: Progressing     Problem: INFECTION - ADULT  Goal: Absence or prevention of progression during hospitalization  Description  INTERVENTIONS:  - Assess and monitor for signs and symptoms of infection  - Monitor lab/diagnostic results  - Monitor all insertion sites, i e  indwelling lines, tubes, and drains  - Monitor endotracheal if appropriate and nasal secretions for changes in amount and color  - [place] appropriate cooling/warming therapies per order  - Administer medications as ordered  - Instruct and encourage patient and family to use good hand hygiene technique  - Identify and instruct in appropriate isolation precautions for identified infection/condition  Outcome: Progressing     Problem: SAFETY ADULT  Goal: Patient will remain free of falls  Description  INTERVENTIONS:  - Assess patient frequently for physical needs  -  Identify cognitive and physical deficits and behaviors that affect risk of falls    -  [place] fall precautions as indicated by assessment   - Educate patient/family on patient safety including physical limitations  - Instruct patient to call for assistance with activity based on assessment  - Modify environment to reduce risk of injury  - Consider OT/PT consult to assist with strengthening/mobility  Outcome: Progressing  Goal: Maintain or return to baseline ADL function  Description  INTERVENTIONS:  -  Assess patient's ability to carry out ADLs; assess patient's baseline for ADL function and identify physical deficits which impact ability to perform ADLs (bathing, care of mouth/teeth, toileting, grooming, dressing, etc )  - Assess/evaluate cause of self-care deficits   - Assess range of motion  - Assess patient's mobility; develop plan if impaired  - Assess patient's need for assistive devices and provide as appropriate  - Encourage maximum independence but intervene and supervise when necessary  - Involve family in performance of ADLs  - Assess for home care needs following discharge   - Consider OT consult to assist with ADL evaluation and planning for discharge  - Provide patient education as appropriate  Outcome: Progressing  Goal: Maintain or return mobility status to optimal level  Description  INTERVENTIONS:  - Assess patient's baseline mobility status (ambulation, transfers, stairs, etc )    - Identify cognitive and physical deficits and behaviors that affect mobility  - Identify mobility aids required to assist with transfers and/or ambulation (gait belt, sit-to-stand, lift, walker, cane, etc )  - [place] fall precautions as indicated by assessment  - Record patient progress and toleration of activity level on Mobility SBAR; progress patient to next Phase/Stage  - Instruct patient to call for assistance with activity based on assessment  - Consider rehabilitation consult to assist with strengthening/weightbearing, etc   Outcome: Progressing     Problem: DISCHARGE PLANNING  Goal: Discharge to home or other facility with appropriate resources  Description  INTERVENTIONS:  - Identify barriers to discharge w/patient and caregiver  - Arrange for needed discharge resources and transportation as appropriate  - Identify discharge learning needs (meds, wound care, etc )  - Arrange for interpretive services to assist at discharge as needed  - Refer to Case Management Department for coordinating discharge planning if the patient needs post-hospital services based on physician/advanced practitioner order or complex needs related to functional status, cognitive ability, or social support system  Outcome: Progressing     Problem: Knowledge Deficit  Goal: Patient/family/caregiver demonstrates understanding of disease process, treatment plan, medications, and discharge instructions  Description  Complete learning assessment and assess knowledge base    Interventions:  - Provide teaching at level of understanding  - Provide teaching via preferred learning methods  Outcome: Progressing

## 2019-10-20 NOTE — PLAN OF CARE
Problem: PHYSICAL THERAPY ADULT  Goal: Performs mobility at highest level of function for planned discharge setting  See evaluation for individualized goals  Description  Treatment/Interventions: Functional transfer training, LE strengthening/ROM, Elevations, Therapeutic exercise, Endurance training, Patient/family training, Equipment eval/education, Bed mobility, Gait training, Spoke to nursing, OT, Spoke to advanced practitioner  Equipment Recommended: Mario Carrillo)       See flowsheet documentation for full assessment, interventions and recommendations  Note:   Prognosis: Good  Problem List: Decreased strength, Decreased range of motion, Decreased endurance, Impaired balance, Decreased mobility, Decreased cognition, Impaired judgement, Decreased safety awareness, Pain, Orthopedic restrictions  Assessment: Pt is 80 y o  female seen for PT evaluation s/p [facility] 10[facility]19/2019  Two pt identifiers were used to confirm  Pt presented s/p fall about a month ago with back pain  Pt presented to [facility] with continued back pain and was transferred to [facility] for NS eval and further medical management  Pt was admitted with a primary dx of: closed compression fx of L5, fall  PT now consulted for assessment of mobility and d/c needs  Pt with Activity as tolerated orders  Pts current co morbidities effecting treatment include: ankle fx, bladder cancer, carcinoma, lung, dementia, depression, DM, HTN, restless leg syndrome, and personal factors including  Steps to manage at home as well as being alone at times   Pts current clinical presentation is Unstable/ Unpredictable (high complexity) due to Ongoing medical management for primary dx, Increased reliance on more restrictive AD compared to baseline, Decreased activity tolerance compared to baseline, Fall risk, Increased assistance needed from caregiver at current time, Cog status, Spinal precautions at current time, Diagnostic imaging pending, Continuous pulse oximetry monitoring     Prior to admission, pt was I with ambulation without the use of an AD as per pt  Upon evaluation, pt currently is requiring min A for bed mobility; min A for transfers and min A for ambulation w/ RW   Pt denies any lightheadedness or dizziness with ambulation  Pt presents at PT eval functioning below baseline and currently w/ overall mobility deficits 2* to: BLE weakness, impaired balance, decreased endurance, gait deviations, pain, decreased activity tolerance compared to baseline, decreased safety awareness, impaired judgement, fall risk, orthopedic restrictions, decreased cognition  Pt currently at a fall risk 2* to impairments listed above  Based on the aforementioned PT evaluation, pt will continue to benefit from skilled Acute PT interventions to address stated impairments; to maximize functional mobility; for ongoing pt/ family training; and DME needs  At conclusion of PT session pt returned back in chair and chair alarm engaged with phone and call bell within reach  Pt denies any further questions at this time  PT is currently recommending rehab due to decreased functional mobility compared to baseline and increased A needed from caregiver at current time  Pt/ family agreeable to plan and goals as stated on evaluation  PT will continue to follow during hospital stay  Barriers to Discharge: Decreased caregiver support, Inaccessible home environment     Recommendation: Short-term skilled PT     PT - OK to Discharge: Yes(to rehab when medically cleared )    See flowsheet documentation for full assessment

## 2019-10-20 NOTE — SOCIAL WORK
CM outreached to nikorina Martin to introduce self review the CM role and discuss possible dc needs  Andressa Martin reports she is on her way to visit pt and will then have to go to work  CM confirmed that pt does live with elisa  CM to meet with Andressa Martin and pt at bedside upon arrival       CM met with the patient and niece Andressa Martin at bedside to review the CM role and discuss possible dc needs  At time of interview pt is AAOx4 lives in a 2 story home with her niece and with 0 KAY in [place], [place]  Pt was IPTA with ADL's, and niece helps with laundry, cooking, and trnasport  Pt has DME of walker and cane and ambulates with a walker  Pt has main bedroom/bathroom on 2nd floor with grab bars in the bathroom at shower tub  Pt denies any history of drug/etoh abuse, mental illness or inpatient psych admissions  Pt has a history of snf/ rehab but does not remember where as it was 20 years ago and has no history of VNA  Pt has a  POA/LW and a 2nd request was made for a copy  Preferred Pharmacy: [facility]  Contact: Andressa Martin (niece) [phone]  PCP: Tim YANES    A post acute care recommendation was made by your care team for STR  Discussed Freedom of Choice with both patient and caregiver  List of facilities given to both patient and caregiver via in person  both patient and caregiver aware the list is custom filtered for them by insurance and that [facility] post acute providers are designated  CM is awaiting choices  CM reviewed d/c planning process including the following: identifying help at home, patient preference for d/c planning needs, Discharge Lounge, [facility] Meds to Bed program, availability of treatment team to discuss questions or concerns patient and/or family may have regarding understanding medications and recognizing signs and symptoms once discharged  CM also encouraged patient to follow up with all recommended appointments after discharge   Patient advised of importance for patient and family to participate in managing patients medical well being

## 2019-10-20 NOTE — PLAN OF CARE
Problem: Potential for Falls  Goal: Patient will remain free of falls  Description  INTERVENTIONS:  - Assess patient frequently for physical needs  -  Identify cognitive and physical deficits and behaviors that affect risk of falls  -  [place] fall precautions as indicated by assessment   - Educate patient/family on patient safety including physical limitations  - Instruct patient to call for assistance with activity based on assessment  - Modify environment to reduce risk of injury  - Consider OT/PT consult to assist with strengthening/mobility  Outcome: Progressing     Problem: Prexisting or High Potential for Compromised Skin Integrity  Goal: Skin integrity is maintained or improved  Description  INTERVENTIONS:  - Identify patients at risk for skin breakdown  - Assess and monitor skin integrity  - Assess and monitor nutrition and hydration status  - Monitor labs   - Assess for incontinence   - Turn and reposition patient  - Assist with mobility/ambulation  - Relieve pressure over bony prominences  - Avoid friction and shearing  - Provide appropriate hygiene as needed including keeping skin clean and dry  - Evaluate need for skin moisturizer/barrier cream  - Collaborate with interdisciplinary team   - Patient/family teaching  - Consider wound care consult   Outcome: Progressing     Problem: Nutrition/Hydration-ADULT  Goal: Nutrient/Hydration intake appropriate for improving, restoring or maintaining nutritional needs  Description  Monitor and assess patient's nutrition/hydration status for malnutrition  Collaborate with interdisciplinary team and initiate plan and interventions as ordered  Monitor patient's weight and dietary intake as ordered or per policy  Utilize nutrition screening tool and intervene as necessary  Determine patient's food preferences and provide high-protein, high-caloric foods as appropriate       INTERVENTIONS:  - Monitor oral intake, urinary output, labs, and treatment plans  - Assess nutrition and hydration status and recommend course of action  - Evaluate amount of meals eaten  - Assist patient with eating if necessary   - Allow adequate time for meals  - Recommend/ encourage appropriate diets, oral nutritional supplements, and vitamin/mineral supplements  - Order, calculate, and assess calorie counts as needed  - Recommend, monitor, and adjust tube feedings and TPN/PPN based on assessed needs  - Assess need for intravenous fluids  - Provide specific nutrition/hydration education as appropriate  - Include patient/family/caregiver in decisions related to nutrition  Outcome: Progressing     Problem: PAIN - ADULT  Goal: Verbalizes/displays adequate comfort level or baseline comfort level  Description  Interventions:  - Encourage patient to monitor pain and request assistance  - Assess pain using appropriate pain scale  - Administer analgesics based on type and severity of pain and evaluate response  - Implement non-pharmacological measures as appropriate and evaluate response  - Consider cultural and social influences on pain and pain management  - Notify physician/advanced practitioner if interventions unsuccessful or patient reports new pain  Outcome: Progressing     Problem: INFECTION - ADULT  Goal: Absence or prevention of progression during hospitalization  Description  INTERVENTIONS:  - Assess and monitor for signs and symptoms of infection  - Monitor lab/diagnostic results  - Monitor all insertion sites, i e  indwelling lines, tubes, and drains  - Monitor endotracheal if appropriate and nasal secretions for changes in amount and color  - [place] appropriate cooling/warming therapies per order  - Administer medications as ordered  - Instruct and encourage patient and family to use good hand hygiene technique  - Identify and instruct in appropriate isolation precautions for identified infection/condition  Outcome: Progressing     Problem: SAFETY ADULT  Goal: Patient will remain free of falls  Description  INTERVENTIONS:  - Assess patient frequently for physical needs  -  Identify cognitive and physical deficits and behaviors that affect risk of falls    -  [place] fall precautions as indicated by assessment   - Educate patient/family on patient safety including physical limitations  - Instruct patient to call for assistance with activity based on assessment  - Modify environment to reduce risk of injury  - Consider OT/PT consult to assist with strengthening/mobility  Outcome: Progressing  Goal: Maintain or return to baseline ADL function  Description  INTERVENTIONS:  -  Assess patient's ability to carry out ADLs; assess patient's baseline for ADL function and identify physical deficits which impact ability to perform ADLs (bathing, care of mouth/teeth, toileting, grooming, dressing, etc )  - Assess/evaluate cause of self-care deficits   - Assess range of motion  - Assess patient's mobility; develop plan if impaired  - Assess patient's need for assistive devices and provide as appropriate  - Encourage maximum independence but intervene and supervise when necessary  - Involve family in performance of ADLs  - Assess for home care needs following discharge   - Consider OT consult to assist with ADL evaluation and planning for discharge  - Provide patient education as appropriate  Outcome: Progressing  Goal: Maintain or return mobility status to optimal level  Description  INTERVENTIONS:  - Assess patient's baseline mobility status (ambulation, transfers, stairs, etc )    - Identify cognitive and physical deficits and behaviors that affect mobility  - Identify mobility aids required to assist with transfers and/or ambulation (gait belt, sit-to-stand, lift, walker, cane, etc )  - [place] fall precautions as indicated by assessment  - Record patient progress and toleration of activity level on Mobility SBAR; progress patient to next Phase/Stage  - Instruct patient to call for assistance with activity based on assessment  - Consider rehabilitation consult to assist with strengthening/weightbearing, etc   Outcome: Progressing     Problem: DISCHARGE PLANNING  Goal: Discharge to home or other facility with appropriate resources  Description  INTERVENTIONS:  - Identify barriers to discharge w/patient and caregiver  - Arrange for needed discharge resources and transportation as appropriate  - Identify discharge learning needs (meds, wound care, etc )  - Arrange for interpretive services to assist at discharge as needed  - Refer to Case Management Department for coordinating discharge planning if the patient needs post-hospital services based on physician/advanced practitioner order or complex needs related to functional status, cognitive ability, or social support system  Outcome: Progressing     Problem: Knowledge Deficit  Goal: Patient/family/caregiver demonstrates understanding of disease process, treatment plan, medications, and discharge instructions  Description  Complete learning assessment and assess knowledge base    Interventions:  - Provide teaching at level of understanding  - Provide teaching via preferred learning methods  Outcome: Progressing

## 2019-10-20 NOTE — UTILIZATION REVIEW
Initial Clinical Review    Admission: Date/Time/Statement: OBS Mary Beth@[facility] UPGRADED TO INPT Bijan@[facility] D/T CLOSED COMPRESSION FRACTURE OF L5 LUMBAR VETERBRA WITH LSO BRACE AND SPINAL PRECAUTIONS AND NEED FOR REHAB PLACEMENT PER OT RECOMMENDATION  10/20/19 1150  Inpatient Admission Once     Transfer Service: Trauma       Question Answer Comment   Admitting Physician JORDON MOREJON    Level of Care Med Surg    Bed Type Trauma    Estimated length of stay More than 2 Midnights    Certification I certify that inpatient services are medically necessary for this patient for a duration of greater than two midnights  See H&P and MD Progress Notes for additional information about the patient's course of treatment  10/20/19 1149       ED Arrival Information     Expected Arrival Acuity Means of Arrival Escorted By Service Admission Type    10/19/2019  10/19/2019 22:40 [facility]) Trauma Urgent    Arrival Complaint    L5 fx        Chief Complaint   Patient presents with    Back Pain     Trauma transfer from [facility] for a lumbar frx - fall 1 month ago onto R side and has had worsening lower back pain since  Assessment/Plan: 81 yo female to ED by ems transfer from [facility] ED admitted observation upgraded to Inpatient S/p fall with L5 compression fractureand Gait instability  CT of lumbar spine demonstrates L5 vertebral body fracture long anterior left aspect  recommend lumbar spine  Analgesia  PT/OT  Need for Rehab placement per OT d/t spinal precautions and LSO brace  Neurosurgery and Case management consult  10/20 neurosurgery consult:Closed compression fracture of L5 2/2 fall  History of ambulatory dysfunction,Diabetes mellitus with peripheral neuropathy  CT of lumbar spine demonstrates L5 vertebral body fracture long anterior left aspect  recommend lumbar spine x-rays in LSO pain control, PT/OT, wearing LSO brace          ED Triage Vitals [10/19/19 2245]   Temperature Pulse Respirations Blood Pressure SpO2   98 1 °F (36 7 °C) 82 18 (!) 187/82 98 %      Temp Source Heart Rate Source Patient Position - Orthostatic VS BP Location FiO2 (%)   Oral Monitor Lying Right arm --      Pain Score       1        Wt Readings from Last 1 Encounters:   10/20/19 64 7 kg (142 lb 10 2 oz)     Additional Vital Signs:   10/20/19 07:10:01  98 3 °F (36 8 °C)  67  17  133/70  91  99 %  --  --   10/20/19 02:46:37  --  67  --  131/71  91  93 %  --  --   10/20/19 01:34:23  98 6 °F (37 °C)  72  20  159/81  107  97 %  --  --   10/20/19 0045  --  65  18  144/64  --  98 %  --  Lying   10/19/19 2345  --  73  18  143/69  --  98 %  --  Lying   10/19/19 2245  98 1 °F (36 7 °C)  82  18  187/82Abnormal   --  98 %  None (Room air)  Lying       Pertinent Labs/Diagnostic Test Results:   Results from last 7 days   Lab Units 10/20/19  0735 10/20/19  0439 10/19/19  1938   WBC Thousand/uL 9 97  --  10 74*   HEMOGLOBIN g/dL 12 9  --  13 9   HEMATOCRIT % 40 0  --  42 6   PLATELETS Thousands/uL 269 254 276   NEUTROS ABS Thousands/µL  --   --  9 57*         Results from last 7 days   Lab Units 10/20/19  0735 10/19/19  1938   SODIUM mmol/L 138 138   POTASSIUM mmol/L 4 8 5 2   CHLORIDE mmol/L 104 101   CO2 mmol/L 29 30   ANION GAP mmol/L 5 7   BUN mg/dL 25 26*   CREATININE mg/dL 1 03 1 18   EGFR ml/min/1 73sq m 47 40   CALCIUM mg/dL 8 7 9 0     Results from last 7 days   Lab Units 10/19/19  1938   AST U/L 12   ALT U/L 13   ALK PHOS U/L 106   TOTAL PROTEIN g/dL 7 2   ALBUMIN g/dL 3 4*   TOTAL BILIRUBIN mg/dL 0 50   BILIRUBIN DIRECT mg/dL 0 16         Results from last 7 days   Lab Units 10/20/19  0735 10/19/19  1938   GLUCOSE RANDOM mg/dL 132 151*       Results from last 7 days   Lab Units 10/19/19  1823   CLARITY UA  Clear   COLOR UA  Light Yellow   SPEC GRAV UA  1 010   PH UA  7 5   GLUCOSE UA mg/dl Negative   KETONES UA mg/dl Negative   BLOOD UA  Trace-Intact*   PROTEIN UA mg/dl Negative   NITRITE UA  Positive*   BILIRUBIN UA  Negative   UROBILINOGEN UA E U /dl 0 2   LEUKOCYTES UA  Small*   WBC UA /hpf 1-2*   RBC UA /hpf 0-1*   BACTERIA UA /hpf Moderate*   EPITHELIAL CELLS WET PREP /hpf Occasional     10/19 xray R hip/pelvis:pending    10/19 CT lumbar spine:L5 vertebral body fracture as described    10/20 xray L shoulder:pending    10/20 xray lumbar spine: pending      ED Treatment:   Medication Administration from 10/19/2019 2206 to 10/20/2019 0132       Date/Time Order Dose Route Action       10/20/2019 0054 methocarbamol (ROBAXIN) tablet 500 mg 500 mg Oral Given       10/20/2019 0054 lidocaine (LIDODERM) 5 % patch 1 patch 1 patch Topical Medication Applied       10/20/2019 0053 acetaminophen (TYLENOL) tablet 975 mg 975 mg Oral Given          Past Medical History:   Diagnosis Date    Ankle fracture     Arthritis     left hip    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[facility] )     with left ureter    Bronchitis     Cancer ([facility] )     [facility]     Dementia ([facility] )     Dependent edema     Depression     Diabetes mellitus ([facility] )     Hypercholesterolemia     Hypertension     Kidney stone     Oth abn and inconclusive findings on dx imaging of breast     Pes planus     Renal calculus     Restless leg syndrome     Rib pain     Sprain, neck     Varicose veins of left lower extremity      Present on Admission:   Closed compression fracture of L5 lumbar vertebra, initial encounter ([facility] )   Fall   Left shoulder pain   Urinary tract infection without hematuria      Admitting Diagnosis: Vascular dementia without behavioral disturbance ([facility] ) [F01 50]  Closed compression fracture of L5 lumbar vertebra, initial encounter ([place] [address] ) [S32 050A]  Unspecified multiple injuries, initial encounter [T07  XXXA]  Age/Sex: 80 y o  female  Admission Orders:    Medications:  acetaminophen 975 mg Oral Q6H [facility]   cephalexin 500 mg Oral Q12H [facility]   docusate sodium 100 mg Oral BID   enoxaparin 30 mg Subcutaneous Q24H [facility]   gabapentin 200 mg Oral HS   lidocaine 1 patch Topical Once   methocarbamol 500 mg Oral Q6H [facility]   polyethylene glycol 17 g Oral Daily   senna 2 tablet Oral Daily         Labetalol HCl 10 mg Intravenous Q4H PRN   ondansetron 4 mg Intravenous Q6H PRN   oxyCODONE 2 5 mg Oral Q4H PRN   oxyCODONE 5 mg Oral Q4H PRN     ACTIVITY AS RADHA  SPINE BRACE  REG DIET  I&O  NEURO CHECKS QSHIFT  SEQ COMP DEVICE  OOB  PT/OT  LUMBAR SPINE PRECAUTIONS  IP CONSULT TO CASE MANAGEMENT  IP CONSULT TO GERONTOLOGY  IP CONSULT TO NEUROSURGERY    Network Utilization Review Department  Phone: [phone]; Fax [phone]  [email or web address]  ATTENTION: Please call with any questions or concerns to [phone]  and carefully listen to the prompts so that you are directed to the right person  Send all requests for admission clinical reviews, approved or denied determinations and any other requests to fax [phone]   All voicemails are confidential

## 2019-10-20 NOTE — ORTHOTIC NOTE
Orthotic Note            Date: 10/20/2019      Patient Name: Joana Singh        Time: 9:30AM    Reason for Consult:  Patient Active Problem List   Diagnosis    Degeneration of lumbar intervertebral disc    Lumbar radiculopathy    Left shoulder pain    Arthropathy of lumbar facet joint    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)    Visual hallucinations    Confusion and disorientation    Restless legs syndrome    Tinea corporis    Medicare annual wellness visit, subsequent    Acute vaginitis    Headache    Dysuria    Closed compression fracture of L5 lumbar vertebra, initial encounter [facility])    Fall    Urinary tract infection without hematuria   [place]  LSO  Per Neurosurgical    I measured, fit and donned Medium [place] LSO while patient was sitting at edge of bed for PT/OT evaluation  Patient tolerated well and instructions/adjustments reviewed during this time  Patient stating that she does have assistance at home  My contact information and instructions at bedside  I will continue to follow up daily  RN aware  Patient is currently in bathroom and RN aware  Please see PT/OT notes also  Recommendations:  Please call Mobility Coordinator at ext  [phone] in regards to bracing instruction and/or adjustment  Chichi Lopez Mobility Coordinator LCFo, LCOF, ASOP R  O T, O B T

## 2019-10-20 NOTE — ASSESSMENT & PLAN NOTE
- Status post fall with the below noted injuries  - Fall precautions   - PT and OT evaluation and treatment as indicated

## 2019-10-20 NOTE — ASSESSMENT & PLAN NOTE
- Left shoulder pain on exam without external signs of trauma  Likely secondary to contusion   - Activity as tolerated  - Obtain left shoulder x-ray to rule out underlying fracture  - Analgesia as needed

## 2019-10-21 ENCOUNTER — TELEPHONE (OUTPATIENT)
Dept: NEUROSURGERY | Facility: CLINIC | Age: 84
End: 2019-10-21

## 2019-10-21 PROCEDURE — 99222 1ST HOSP IP/OBS MODERATE 55: CPT | Performed by: INTERNAL MEDICINE

## 2019-10-21 PROCEDURE — 97530 THERAPEUTIC ACTIVITIES: CPT

## 2019-10-21 PROCEDURE — 99232 SBSQ HOSP IP/OBS MODERATE 35: CPT | Performed by: SURGERY

## 2019-10-21 PROCEDURE — 97116 GAIT TRAINING THERAPY: CPT

## 2019-10-21 RX ORDER — ECHINACEA PURPUREA EXTRACT 125 MG
2 TABLET ORAL AS NEEDED
Status: DISCONTINUED | OUTPATIENT
Start: 2019-10-21 | End: 2019-10-22 | Stop reason: HOSPADM

## 2019-10-21 RX ADMIN — ENOXAPARIN SODIUM 30 MG: 30 INJECTION SUBCUTANEOUS at 08:15

## 2019-10-21 RX ADMIN — OXYCODONE HYDROCHLORIDE 5 MG: 5 TABLET ORAL at 09:44

## 2019-10-21 RX ADMIN — METHOCARBAMOL TABLETS 500 MG: 500 TABLET, COATED ORAL at 11:34

## 2019-10-21 RX ADMIN — METHOCARBAMOL TABLETS 500 MG: 500 TABLET, COATED ORAL at 17:36

## 2019-10-21 RX ADMIN — CEPHALEXIN 500 MG: 500 CAPSULE ORAL at 08:16

## 2019-10-21 RX ADMIN — METHOCARBAMOL TABLETS 500 MG: 500 TABLET, COATED ORAL at 05:17

## 2019-10-21 RX ADMIN — ACETAMINOPHEN 975 MG: 325 TABLET ORAL at 05:17

## 2019-10-21 RX ADMIN — CEPHALEXIN 500 MG: 500 CAPSULE ORAL at 21:18

## 2019-10-21 RX ADMIN — GABAPENTIN 200 MG: 100 CAPSULE ORAL at 21:18

## 2019-10-21 RX ADMIN — OXYCODONE HYDROCHLORIDE 5 MG: 5 TABLET ORAL at 15:27

## 2019-10-21 RX ADMIN — SENNOSIDES 17.2 MG: 8.6 TABLET, FILM COATED ORAL at 08:16

## 2019-10-21 RX ADMIN — DOCUSATE SODIUM 100 MG: 100 CAPSULE, LIQUID FILLED ORAL at 08:16

## 2019-10-21 RX ADMIN — POLYETHYLENE GLYCOL 3350 17 G: 17 POWDER, FOR SOLUTION ORAL at 08:15

## 2019-10-21 RX ADMIN — ACETAMINOPHEN 975 MG: 325 TABLET ORAL at 11:34

## 2019-10-21 RX ADMIN — DOCUSATE SODIUM 100 MG: 100 CAPSULE, LIQUID FILLED ORAL at 17:36

## 2019-10-21 NOTE — PHYSICAL THERAPY NOTE
PHYSICAL THERAPY TREATMENT NOTE          Patient Name: Robi Paz  LNMOR'P Date: 10/21/2019     10/21/19 6033   Pain Assessment   Pain Assessment 0-10   Pain Score 7   Pain Type Acute pain   Pain Location Abdomen   Pain Orientation Left   Hospital Pain Intervention(s) Repositioned; Ambulation/increased activity; Rest   Response to Interventions tolerated   Restrictions/Precautions   Weight Bearing Precautions Per Order No   Braces or Orthoses LSO   Other Precautions Cognitive; Bed Alarm; Fall Risk;Pain;Spinal precautions   General   Chart Reviewed Yes   Additional Pertinent History X-ray shoulder negative   Response to Previous Treatment Patient with no complaints from previous session  Family/Caregiver Present No   Cognition   Overall Cognitive Status Impaired   Arousal/Participation Alert   Attention Attends with cues to redirect   Orientation Level Oriented X4   Memory Decreased recall of precautions  (able to recall 2/3 spinal precautions)   Following Commands Follows multistep commands without difficulty   Comments Pt tearful at end of session talking about father and family, difficult to console  Subjective   Subjective "I have so many memories in my head that I can't talk about"   Bed Mobility   Supine to Sit 5  Supervision   Additional items Increased time required;HOB elevated   Sit to Supine 5  Supervision   Additional items Increased time required   Additional Comments Supine in bed upon PT arrival   Pt left supine in chair with all needs in reach and bed alarm intact at end of therapy session  Transfers   Sit to Stand 4  Minimal assistance   Additional items Assist x 1; Increased time required;Verbal cues   Stand to Sit 4  Minimal assistance   Additional items Assist x 1; Increased time required;Verbal cues   Additional Comments Transfers with [facility] for hand placement and safety     Ambulation/Elevation   Gait pattern Excessively slow; Short stride; Foward flexed;Decreased foot clearance   Gait Assistance 4  Minimal assist  (CGA)   Additional items Assist x 1;Verbal cues   Assistive Device Rolling walker   Distance 20 ft x 2   Stair Management Assistance Not tested   Balance   Static Sitting Fair   Dynamic Sitting Fair -   Static Standing Fair -   Dynamic Standing Poor +   Ambulatory Poor +   Endurance Deficit   Endurance Deficit Yes   Endurance Deficit Description fatigue, pain, cognition   Activity Tolerance   Activity Tolerance Patient limited by fatigue;Patient limited by pain; Other (Comment)  (cognition)   Nurse Made Aware RN cleared pt to be seen by PT   Assessment   Prognosis Good   Problem List Decreased strength;Decreased endurance; Impaired balance;Decreased mobility;Pain;Orthopedic restrictions;Decreased cognition   Assessment Pt seen for PT treatment session  Pt pleasant and agreeable to participate in therapy session  Pt demonstrated ability to perform supine<>sit with supervision  Pt required assist to don LSO once upright  Pt able to ambulate increased distance 20 ft x 2 with RW and CGA with gait deficits noted above  Upon return to room, pt tearful about memories of father and childhood; pt difficult to console  Pt left supine in bed with bed alarm intact and with all needs in reach  Pt will benefit from skilled therapy in order to address current impairments and functional limitations  PT to follow pt and recommending rehab once medically cleared  Barriers to Discharge Inaccessible home environment;Decreased caregiver support   Goals   Patient Goals to rest   STG Expiration Date 10/30/19   PT Treatment Day 1   Plan   Treatment/Interventions Functional transfer training;LE strengthening/ROM; Elevations; Therapeutic exercise; Endurance training;Patient/family training;Equipment eval/education; Bed mobility;Gait training;Spoke to nursing   Progress Progressing toward goals   PT Frequency Other (Comment)  (3-6x/wk) Recommendation   Recommendation Post acute IP rehab   Equipment Recommended Walker   PT - OK to Discharge Yes  (to rehab once medically cleared)   Demetris Gallo, PT, DPT

## 2019-10-21 NOTE — TELEPHONE ENCOUNTER
10/23/2019-CALLED [facility] AT PHONE#[phone] , SPOKE TO [phone] , CONFIRMED 11/04/2019 APT IN [facility] STATES SHE WILL MAKE SURE PT HAS XRAY COMPLETED AT A [facility]  10/22/2019-PT DISCHARGED TODAY    10/21/2019-PT STILL IN HOSPITAL  11/04/2019 AT 9:30am W/GENO AT [facility]      ----- Message from Carol Babb PA-C sent at 10/21/2019 11:00 AM EDT -----  Can you please schedule a 2 week hospital follow up with lumbar xrays? Thanks!

## 2019-10-21 NOTE — ASSESSMENT & PLAN NOTE
- Left shoulder pain on exam without external signs of trauma  Likely secondary to contusion   - Activity as tolerated  - Obtain left shoulder x-ray to rule out underlying fracture  - Analgesia as needed     - 10/21 no complaints of shoulder pain today, moving all extremities

## 2019-10-21 NOTE — PLAN OF CARE
Problem: PHYSICAL THERAPY ADULT  Goal: Performs mobility at highest level of function for planned discharge setting  See evaluation for individualized goals  Description  Treatment/Interventions: Functional transfer training, LE strengthening/ROM, Elevations, Therapeutic exercise, Endurance training, Patient/family training, Equipment eval/education, Bed mobility, Gait training, Spoke to nursing, OT, Spoke to advanced practitioner  Equipment Recommended: Atul Goodman)       See flowsheet documentation for full assessment, interventions and recommendations  Outcome: Progressing  Note:   Prognosis: Good  Problem List: Decreased strength, Decreased endurance, Impaired balance, Decreased mobility, Pain, Orthopedic restrictions, Decreased cognition  Assessment: Pt seen for PT treatment session  Pt pleasant and agreeable to participate in therapy session  Pt demonstrated ability to perform supine<>sit with supervision  Pt required assist to don LSO once upright  Pt able to ambulate increased distance 20 ft x 2 with RW and CGA with gait deficits noted above  Upon return to room, pt tearful about memories of father and childhood; pt difficult to console  Pt left supine in bed with bed alarm intact and with all needs in reach  Pt will benefit from skilled therapy in order to address current impairments and functional limitations  PT to follow pt and recommending rehab once medically cleared  Barriers to Discharge: Inaccessible home environment, Decreased caregiver support     Recommendation: Post acute IP rehab     PT - OK to Discharge: Yes(to rehab once medically cleared)    See flowsheet documentation for full assessment

## 2019-10-21 NOTE — ASSESSMENT & PLAN NOTE
- Await Neurosurgery evaluation and recommendations  - Anticipate non operative intervention with possible LSO brace  - Analgesia as needed  - PT and OT evaluation and treatment as indicated    - Monitor neurologic exam    - brace in room

## 2019-10-21 NOTE — PROGRESS NOTES
Progress Note - Malen Hockey 11/3/1924, 80 y o  female MRN: [id number]    Unit/Bed#: [facility] 604-01 Encounter: [id number]    Primary Care Provider: FARZANA Segal   Date and time admitted to hospital: 10/19/2019 10:40 PM        Urinary tract infection without hematuria  Assessment & Plan  - UTI, present on admission   - Complete 7 day course of Keflex  - Outpatient follow-up with primary care provider  Fall  Assessment & Plan  - Status post fall with the below noted injuries  - Fall precautions   - PT and OT evaluation and treatment as indicated  Left shoulder pain  Assessment & Plan  - Left shoulder pain on exam without external signs of trauma  Likely secondary to contusion   - Activity as tolerated  - Obtain left shoulder x-ray to rule out underlying fracture  - Analgesia as needed  - 10/21 no complaints of shoulder pain today, moving all extremities    * Closed compression fracture of L5 lumbar vertebra, initial encounter [facility])  Assessment & Plan  - Await Neurosurgery evaluation and recommendations  - Anticipate non operative intervention with possible LSO brace  - Analgesia as needed  - PT and OT evaluation and treatment as indicated  - Monitor neurologic exam    - brace in room          Bedside rounds completed with nurse Ana Maria Soriano       Disposition: home with niece vs rehab      SUBJECTIVE:  Chief Complaint: back pain    Subjective: I feel pretty good today      OBJECTIVE:     Meds/Allergies     Current Facility-Administered Medications:     acetaminophen (TYLENOL) tablet 975 mg, 975 mg, Oral, Q6H [address], Liz Bliss, DO, 975 mg at 10/21/19 0517    cephalexin (KEFLEX) capsule 500 mg, 500 mg, Oral, Q12H [address], Liz Bliss, DO, 500 mg at 10/20/19 2200    docusate sodium (COLACE) capsule 100 mg, 100 mg, Oral, BID, Severiano Cobb Barnes, DO, 100 mg at 10/20/19 1739    enoxaparin (LOVENOX) subcutaneous injection 30 mg, 30 mg, Subcutaneous, Q24H HESHAM, Liz Bliss, DO, 30 mg at 10/20/19 0920    gabapentin (NEURONTIN) capsule 200 mg, 200 mg, Oral, HS, Yung Dole Urbina, DO, 200 mg at 10/20/19 2200    HYDROmorphone (DILAUDID) injection 0 1 mg, 0 1 mg, Intravenous, Q3H PRN, Jenna Goss MD, 0 1 mg at 10/20/19 2340    Labetalol HCl (NORMODYNE) injection 10 mg, 10 mg, Intravenous, Q4H PRN, Meridee Blind, DO    methocarbamol (ROBAXIN) tablet 500 mg, 500 mg, Oral, Q6H HESHAM, Meridee Blind, DO, 500 mg at 10/21/19 0517    ondansetron (ZOFRAN) injection 4 mg, 4 mg, Intravenous, Q6H PRN, Meridee Blind, DO    oxyCODONE (ROXICODONE) IR tablet 2 5 mg, 2 5 mg, Oral, Q4H PRN, Meridee Blind, DO    oxyCODONE (ROXICODONE) IR tablet 5 mg, 5 mg, Oral, Q4H PRN, Meridee Blind, DO, 5 mg at 10/20/19 2206    polyethylene glycol (MIRALAX) packet 17 g, 17 g, Oral, Daily, Meridee Blind, DO, 17 g at 10/20/19 0920    senna (SENOKOT) tablet 17 2 mg, 2 tablet, Oral, Daily, Meridee Blind, DO, 17 2 mg at 10/20/19 0920     Vitals:   Vitals:    10/21/19 0728   BP: 167/77   Pulse:    Resp: 19   Temp: 97 7 °F (36 5 °C)   SpO2:        Intake/Output:  I/O       10/19 0701 - 10/20 0700 10/20 0701 - 10/21 0700 10/21 0701 - 10/22 0700    P  O  60 700     Total Intake(mL/kg) 60 (0 9) 700 (10 8)     Urine (mL/kg/hr) 173      Total Output 173      Net -113 +700            Unmeasured Urine Occurrence  1 x            Nutrition/GI Proph/Bowel Reg: regular    Physical Exam:   GENERAL APPEARANCE: calm and comfortable, seen by Geriatrics, did speak with them about her situation, well cared for by the family  NEURO: aware she is in the hospital, does have vascular dementia and hallucinations  HEENT: EOM's intact  CV: RRR, no complaint of chest pain  LUNGS: CTA bilaterally, no shortness of breath  GI: tolerating a diet  : voiding  MSK: moves all extremities, at baseline requires a little support but does ambulate  SKIN: warm and dry    Invasive Devices     Peripheral Intravenous Line            Peripheral IV 10/19/19 Right Forearm 1 day                 Lab Results: none  Imaging/EKG Studies: spine x-ray pending  Other Studies: shoulder xray pending  VTE Prophylaxis: Sequential compression device (Venodyne)  and Enoxaparin (Lovenox)

## 2019-10-21 NOTE — SOCIAL WORK
CM reviewed pt in care coordination rounds  Pt is not medically stable for discharge  Pt to be evaluated by PT/OT  Per Ortho, pt will not need Lovenox at discharge as they are on eliquis  CM to follow for final medical clearance and discharge plan

## 2019-10-21 NOTE — PLAN OF CARE
Problem: Potential for Falls  Goal: Patient will remain free of falls  Description  INTERVENTIONS:  - Assess patient frequently for physical needs  -  Identify cognitive and physical deficits and behaviors that affect risk of falls  -  [place] fall precautions as indicated by assessment   - Educate patient/family on patient safety including physical limitations  - Instruct patient to call for assistance with activity based on assessment  - Modify environment to reduce risk of injury  - Consider OT/PT consult to assist with strengthening/mobility  Outcome: Progressing     Problem: Prexisting or High Potential for Compromised Skin Integrity  Goal: Skin integrity is maintained or improved  Description  INTERVENTIONS:  - Identify patients at risk for skin breakdown  - Assess and monitor skin integrity  - Assess and monitor nutrition and hydration status  - Monitor labs   - Assess for incontinence   - Turn and reposition patient  - Assist with mobility/ambulation  - Relieve pressure over bony prominences  - Avoid friction and shearing  - Provide appropriate hygiene as needed including keeping skin clean and dry  - Evaluate need for skin moisturizer/barrier cream  - Collaborate with interdisciplinary team   - Patient/family teaching  - Consider wound care consult   Outcome: Progressing     Problem: Nutrition/Hydration-ADULT  Goal: Nutrient/Hydration intake appropriate for improving, restoring or maintaining nutritional needs  Description  Monitor and assess patient's nutrition/hydration status for malnutrition  Collaborate with interdisciplinary team and initiate plan and interventions as ordered  Monitor patient's weight and dietary intake as ordered or per policy  Utilize nutrition screening tool and intervene as necessary  Determine patient's food preferences and provide high-protein, high-caloric foods as appropriate       INTERVENTIONS:  - Monitor oral intake, urinary output, labs, and treatment plans  - Assess nutrition and hydration status and recommend course of action  - Evaluate amount of meals eaten  - Assist patient with eating if necessary   - Allow adequate time for meals  - Recommend/ encourage appropriate diets, oral nutritional supplements, and vitamin/mineral supplements  - Order, calculate, and assess calorie counts as needed  - Recommend, monitor, and adjust tube feedings and TPN/PPN based on assessed needs  - Assess need for intravenous fluids  - Provide specific nutrition/hydration education as appropriate  - Include patient/family/caregiver in decisions related to nutrition  Outcome: Progressing     Problem: PAIN - ADULT  Goal: Verbalizes/displays adequate comfort level or baseline comfort level  Description  Interventions:  - Encourage patient to monitor pain and request assistance  - Assess pain using appropriate pain scale  - Administer analgesics based on type and severity of pain and evaluate response  - Implement non-pharmacological measures as appropriate and evaluate response  - Consider cultural and social influences on pain and pain management  - Notify physician/advanced practitioner if interventions unsuccessful or patient reports new pain  Outcome: Progressing     Problem: INFECTION - ADULT  Goal: Absence or prevention of progression during hospitalization  Description  INTERVENTIONS:  - Assess and monitor for signs and symptoms of infection  - Monitor lab/diagnostic results  - Monitor all insertion sites, i e  indwelling lines, tubes, and drains  - Monitor endotracheal if appropriate and nasal secretions for changes in amount and color  - [place] appropriate cooling/warming therapies per order  - Administer medications as ordered  - Instruct and encourage patient and family to use good hand hygiene technique  - Identify and instruct in appropriate isolation precautions for identified infection/condition  Outcome: Progressing     Problem: SAFETY ADULT  Goal: Patient will remain free of falls  Description  INTERVENTIONS:  - Assess patient frequently for physical needs  -  Identify cognitive and physical deficits and behaviors that affect risk of falls    -  [place] fall precautions as indicated by assessment   - Educate patient/family on patient safety including physical limitations  - Instruct patient to call for assistance with activity based on assessment  - Modify environment to reduce risk of injury  - Consider OT/PT consult to assist with strengthening/mobility  Outcome: Progressing  Goal: Maintain or return to baseline ADL function  Description  INTERVENTIONS:  -  Assess patient's ability to carry out ADLs; assess patient's baseline for ADL function and identify physical deficits which impact ability to perform ADLs (bathing, care of mouth/teeth, toileting, grooming, dressing, etc )  - Assess/evaluate cause of self-care deficits   - Assess range of motion  - Assess patient's mobility; develop plan if impaired  - Assess patient's need for assistive devices and provide as appropriate  - Encourage maximum independence but intervene and supervise when necessary  - Involve family in performance of ADLs  - Assess for home care needs following discharge   - Consider OT consult to assist with ADL evaluation and planning for discharge  - Provide patient education as appropriate  Outcome: Progressing  Goal: Maintain or return mobility status to optimal level  Description  INTERVENTIONS:  - Assess patient's baseline mobility status (ambulation, transfers, stairs, etc )    - Identify cognitive and physical deficits and behaviors that affect mobility  - Identify mobility aids required to assist with transfers and/or ambulation (gait belt, sit-to-stand, lift, walker, cane, etc )  - [place] fall precautions as indicated by assessment  - Record patient progress and toleration of activity level on Mobility SBAR; progress patient to next Phase/Stage  - Instruct patient to call for assistance with activity based on assessment  - Consider rehabilitation consult to assist with strengthening/weightbearing, etc   Outcome: Progressing     Problem: DISCHARGE PLANNING  Goal: Discharge to home or other facility with appropriate resources  Description  INTERVENTIONS:  - Identify barriers to discharge w/patient and caregiver  - Arrange for needed discharge resources and transportation as appropriate  - Identify discharge learning needs (meds, wound care, etc )  - Arrange for interpretive services to assist at discharge as needed  - Refer to Case Management Department for coordinating discharge planning if the patient needs post-hospital services based on physician/advanced practitioner order or complex needs related to functional status, cognitive ability, or social support system  Outcome: Progressing     Problem: Knowledge Deficit  Goal: Patient/family/caregiver demonstrates understanding of disease process, treatment plan, medications, and discharge instructions  Description  Complete learning assessment and assess knowledge base    Interventions:  - Provide teaching at level of understanding  - Provide teaching via preferred learning methods  Outcome: Progressing

## 2019-10-21 NOTE — CONSULTS
Consultation - [address] 80 y o  female MRN: [id number]  Unit/Bed#: [facility] 604-01 Encounter: [id number]      Assessment/Plan     L5 compression fracture  -noted on CT spine 10/19/2019  -continue acute pain control  -will check vitamin-D level  -encourage at least 1000IU vitamin-D and 1200 mg calcium daily  -recommend d/c of prednisone which was recently started as o/p for pain as this may contribute to worsening hallucinations/confusion and has not provided significant relief  -Neurosurgery recommending LSO brace   -PT and OT following  -will likely require short-term rehab discharge    Acute pain due to trauma  -continue pain control per Geriatric pain protocol: recommend changing Tylenol 975mg from Q6H to Q8H scheduled, Roxicodone 2 5mg Q4H PRN, Roxicodone 5mg Q4H PRN, and dilaudid 0 1mg Q3H PRN  -continue adjuncts such as Gabapentin 200mg HS and lidocaine patch topically  -encourage addition of non-pharmacologic pain treatment including ice and frequent repositioning  -continue bowel regimen including senna, MiraLax, and docusate to prevent and treat constipation due to increased risk with acute pain and opiate pain medications    UTI  -incontinent of urine at baseline  -UA on admission revealed trace blood, positive nitrates and leukocytes as well as moderate bacteria  -currently on Keflex day #2/7 (ending 10/26/19)  -continue to monitor for fever and leukocytosis    Ambulatory dysfunction with recurrent falls  -due to deconditioning, debility, frailty, diabetic neuropathy, and L5 fracture  -requires use of a cane or walker at baseline  -continue fall precautions  -encourage assistance with all transfers and when out of bed  -continue bed alarm, chair alarm, and bed to lowest level  -encourage adequate lighting and appropriate footwear at all times    Vascular dementia with behavioral disturbances  -follows with Geriatrics as outpatient  -at baseline is awake and alert, oriented x2-3  -continue supportive care  -avoid medications that may contribute to delirium/encephalopathy including tramadol, benzodiazepine, and antihistamines  -continue treatment of acute pain  -continue to monitor for fecal and urinary retention   -for any change behavioral out metabolic derangement prior to determining that it due to dementia  -maintain normal sleep-wake cycle  -delirium precautions  -patient will follow up with Geriatrics as outpatient 11/22/2019    Impaired vision  -requires glasses, encourage use of appropriate times  -encourage assistance with all transfers and adequate lighting    Impaired mastication  -requires dentures, encourage use at all appropriate times    Delirium precautions  -Patient is high risk of delirium due to age, traumatic injury, hospitalization, acute pain, and underlying vascular dementia  -Initiate delirium precautions  -maintain normal sleep/wake cycle  -minimize overnight interruptions, group overnight vitals/labs/nursing checks as possible  -dim lights, close blinds and turn off tv to minimize stimulation and encourage sleep environment in evenings  -ensure that pain is well continue Tylenol 975mg Q8H scheduled if not already ordered   -monitor for fecal and urinary retention which may precipitate delirium  -encourage early mobilization and ambulation  -provide frequent reorientation and redirection  -encourage family and friends at the bedside to help help calm patient if anxious  -avoid medications which may precipitate or worsen delirium such as tramadol, benzodiazepine, anticholinergics, and benadryl  -encourage hydration and nutrition   -redirect unwanted behaviors as first line and avoid physical restraints    Deconditioning/debility/frailty  -multifactorial including age, chronic medical conditions, osteopenia and acute lumbar fracture  -albumin low at 3 4  -continue nutritional support  -PT/OT  -encourage early and frequent mobilization    Restless leg syndrome  -could consider iron studies but hemoglobin and MCV within normal limits making iron deficiency less likely contributing factor, impaired fasting glucose however may be contributing, continue to monitor glu closely  -previously on Mirapex 0 5 mg BID, recently decreased to 0 5mg daily as o/p due to concern for contribution to hallucinations  -recommend Gabapentin 200mg HS for RLS symptoms, consider addition of 100mg am dose if needed for discomfort    Sinus congestion  -recommend saline nasal spray  -avoid decongestants with Sudafed due to risk of confusion in elderly    History of Present Illness   Physician Requesting Consult: Odette Barrientos MD  Reason for Consult / Principal Problem:  Ambulatory dysfunction with recurrent falls  Hx and PE limited by: N/A  Additional history obtained from: Chart review, patient interview and Niece [place] Anabell)    HPI: Ronit Casanova is a 80y o  year old female with restless leg syndrome, deconditioning, debility, recurrent falls, depression, osteoarthritis of spine, and vascular dementia  She is admitted to Trauma with lower back pain and recent unwitnessed falls, she is found to have L5 compression fracture and UTI  She is being seen in consultation by Geriatrics for history of dementia and ambulatory dysfunction  Eriberto Vang resides with her niece Ney and her niece's  in their home, she uses a walker and cane at baseline and has history of falls  On admission Eleanor Hart reported that she had a recent fall but is unable to provide further details  She reports that she feels out of sorts today and is not certain why  She is able to provide location as [facility], year 2019, but reports month as November  She reports that she has no pain at this time  When asked if she slept well overnight she reports "I do not know", she reports good appetite and is about to order lunch for later in the day       She is well known to myself that she was recently seen in the office for dementia with behavioral disturbances and hallucinations which have been worsening over the past few months  Her niece Jane Farias reported that she has been having worsening paranoia and has become verbally abusive to family at times, but has never been physically abusive  She does have what seems to be fixed delusion that she smells and she has history of telling people that she was thrown out of place is due to smell which her niece had reported as untrue and she is not malodorous on exam  Nursing reports that since admission she has also been somewhat paranoid that people are talking about her in the halls but was easily redirected  Inpatient consult to Gerontology  Consult performed by: Clementina Acuna DO  Consult ordered by: Deny Riggs MD        Review of Systems   Constitutional: Negative for appetite change and fever  Activity change: Reports appetite is good today  HENT: Positive for dental problem ( poor dentition, requires use of dentures) and sinus pressure (Nasal congestion)  Negative for trouble swallowing and voice change  Eyes: Positive for visual disturbance ( uses glasses at baseline)  Respiratory: Negative for cough, shortness of breath and wheezing  Cardiovascular: Negative for chest pain, palpitations and leg swelling  Gastrointestinal: Negative for constipation, diarrhea, nausea and vomiting  Endocrine: Negative  Genitourinary: Negative for dysuria  Musculoskeletal: Negative for arthralgias and back pain  Patient denies pain today    Skin: Negative  Neurological: Negative for dizziness, weakness, light-headedness and headaches  Hematological: Does not bruise/bleed easily  Psychiatric/Behavioral: Positive for confusion ( fluctuates throughout the day)  Negative for sleep disturbance  The patient is not nervous/anxious  All other systems reviewed and are negative      Historical Information   Past Medical History:   Diagnosis Date    Ankle fracture     Arthritis     left hip    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HCC)     with left ureter    Bronchitis     Cancer [facility]     Carcinoma, lung ([facility]     D[facility]     Dependent edema     Depression     Diabetes mellitus [facility]     Hypercholesterolemia     Hypertension     Kidney stone     Oth abn and inconclusive findings on dx imaging of breast     Pes planus     Renal calculus     Restless leg syndrome     Rib pain     Sprain, neck     Varicose veins of left lower extremity      Past Surgical History:   Procedure Laterality Date    APPENDECTOMY      LUNG CANCER SURGERY       Social History   Social History     Substance and Sexual Activity   Alcohol Use Never    Frequency: Never     Social History     Substance and Sexual Activity   Drug Use Never     Social History     Tobacco Use   Smoking Status Former Smoker   Smokeless Tobacco Never Used     Family History:   Family History   Problem Relation Age of Onset    No Known Problems Mother     No Known Problems Father     Dementia Brother     Breast cancer Neg Hx     Colon cancer Neg Hx     Ovarian cancer Neg Hx     Uterine cancer Neg Hx     Cervical cancer Neg Hx      Meds/Allergies   all current active meds have been reviewed    Allergies   Allergen Reactions    Albuterol     Aldactone [Spironolactone]     Aspirin     Atenolol     Bactrim [Sulfamethoxazole-Trimethoprim]     Codeine     Hydrocodone     Ibuprofen     Lisinopril     Mevacor [Lovastatin]     Mirapex [Pramipexole]     Other      novacaine    Penicillins     Ultram [Tramadol]     Zoloft [Sertraline]      Night sweats     Objective     Intake/Output Summary (Last 24 hours) at 10/21/2019 0743  Last data filed at 10/20/2019 1900  Gross per 24 hour   Intake 700 ml   Output --   Net 700 ml     Invasive Devices     Peripheral Intravenous Line            Peripheral IV 10/19/19 Right Forearm 1 day              Physical Exam   Constitutional: She appears well-developed and well-nourished  Appears stated age, no acute distress, frail and elderly   HENT:   Head: Normocephalic and atraumatic  Right Ear: Hearing normal    Left Ear: Hearing normal    Mouth/Throat: Oropharynx is clear and moist and mucous membranes are normal  Abnormal dentition  No scleral icterus  Missing and broken teeth   Eyes: Conjunctivae are normal  No scleral icterus  Neck: Neck supple  No JVD present  No tracheal deviation present  Cardiovascular: Normal rate and intact distal pulses  No murmur heard  Pulmonary/Chest: Effort normal and breath sounds normal  No respiratory distress  She has no wheezes  Abdominal: Soft  Bowel sounds are normal  There is no tenderness  Musculoskeletal: She exhibits no edema or tenderness  Bulk and tone as expected for age activity level  Plantar/Dorsiflexion 5/5 bilaterally when in supine position, moving all four extremities without difficulty   Neurological: She exhibits normal muscle tone  Patient is awake and alert, oriented to person, place, year, reports month as November   Skin: Skin is warm and dry  Psychiatric: Her speech is normal  Her affect is labile  Thought content is paranoid  She exhibits abnormal recent memory  Tearful on exam patient cannot report as to why   Nursing note and vitals reviewed      Lab Results:   I have personally reviewed pertinent lab results including the following:  -sodium 138, potassium 4 8, chloride 104, CO2 29, BUN 25, creatinine 1 03, glucose 132  -hemoglobin 12 9, hematocrit 40, WBC 9 97, platelets 807  -albumin 3 4  -TSH 1 960    I have personally reviewed the following imaging study reports in PACS:  -CT head 09/28/2019 revealed moderate cerebral atrophy with chronic microvascular disease  -XR hip 10/19/19 revealed osteopenia with no fracture dislocation of hips  -CT lumbar spine 10/19/2019 revealed grade 1 anterolisthesis on L4-L5 with L5 vertebral body fracture  -L shoulder XR 10/20/19 revealed no acute abn  -XR spine lumbar 10/20/19 revealed L5 vertebral body height loss consistent with known fracture, grade 1 anterolisthesis L4 on L5, and mild superior endplate deformity of L1      Therapies:   PT:  Following  OT:  Following    VTE Prophylaxis: Enoxaparin (Lovenox)    Code Status: Level 1 - Full Code  Advance Directive and Living Will:      Power of :    POLST:      Family and Social Support:   Living Arrangements: Family members  Support Systems: Family members  Assistance Needed: ADLs  Type of Current Residence: Private residence  Current Home Care Services: Yes  Type of Current Home Care Services:  Other (Comment) ( visit)  Freedom of Choice: Yes    Goals of Care: Return home with family

## 2019-10-22 VITALS
DIASTOLIC BLOOD PRESSURE: 64 MMHG | RESPIRATION RATE: 16 BRPM | WEIGHT: 142.64 LBS | TEMPERATURE: 97.9 F | OXYGEN SATURATION: 99 % | HEIGHT: 60 IN | BODY MASS INDEX: 28 KG/M2 | SYSTOLIC BLOOD PRESSURE: 146 MMHG | HEART RATE: 67 BPM

## 2019-10-22 LAB — 25(OH)D3 SERPL-MCNC: 15.1 NG/ML (ref 30–100)

## 2019-10-22 PROCEDURE — 99238 HOSP IP/OBS DSCHRG MGMT 30/<: CPT | Performed by: NURSE PRACTITIONER

## 2019-10-22 PROCEDURE — NC001 PR NO CHARGE: Performed by: NURSE PRACTITIONER

## 2019-10-22 PROCEDURE — 97535 SELF CARE MNGMENT TRAINING: CPT

## 2019-10-22 PROCEDURE — 82306 VITAMIN D 25 HYDROXY: CPT | Performed by: INTERNAL MEDICINE

## 2019-10-22 PROCEDURE — 99232 SBSQ HOSP IP/OBS MODERATE 35: CPT | Performed by: INTERNAL MEDICINE

## 2019-10-22 RX ORDER — CEPHALEXIN 500 MG/1
500 CAPSULE ORAL EVERY 12 HOURS SCHEDULED
Qty: 9 CAPSULE | Refills: 0 | Status: SHIPPED | OUTPATIENT
Start: 2019-10-22 | End: 2019-10-27

## 2019-10-22 RX ORDER — PRAMIPEXOLE DIHYDROCHLORIDE 1 MG/1
0.5 TABLET ORAL DAILY
Qty: 45 TABLET | Refills: 0 | Status: SHIPPED | OUTPATIENT
Start: 2019-10-22 | End: 2020-01-14 | Stop reason: SDUPTHER

## 2019-10-22 RX ORDER — METHOCARBAMOL 500 MG/1
500 TABLET, FILM COATED ORAL EVERY 6 HOURS PRN
Refills: 0
Start: 2019-10-22 | End: 2019-11-22

## 2019-10-22 RX ADMIN — DOCUSATE SODIUM 100 MG: 100 CAPSULE, LIQUID FILLED ORAL at 08:36

## 2019-10-22 RX ADMIN — ACETAMINOPHEN 975 MG: 325 TABLET ORAL at 00:22

## 2019-10-22 RX ADMIN — SENNOSIDES 17.2 MG: 8.6 TABLET, FILM COATED ORAL at 08:36

## 2019-10-22 RX ADMIN — METHOCARBAMOL TABLETS 500 MG: 500 TABLET, COATED ORAL at 06:01

## 2019-10-22 RX ADMIN — CEPHALEXIN 500 MG: 500 CAPSULE ORAL at 08:36

## 2019-10-22 RX ADMIN — METHOCARBAMOL TABLETS 500 MG: 500 TABLET, COATED ORAL at 00:22

## 2019-10-22 RX ADMIN — POLYETHYLENE GLYCOL 3350 17 G: 17 POWDER, FOR SOLUTION ORAL at 08:36

## 2019-10-22 RX ADMIN — OXYCODONE HYDROCHLORIDE 5 MG: 5 TABLET ORAL at 03:53

## 2019-10-22 RX ADMIN — ACETAMINOPHEN 975 MG: 325 TABLET ORAL at 06:01

## 2019-10-22 RX ADMIN — ENOXAPARIN SODIUM 30 MG: 30 INJECTION SUBCUTANEOUS at 08:36

## 2019-10-22 NOTE — OCCUPATIONAL THERAPY NOTE
Occupational Therapy Treatment Note:       10/22/19 1038   Restrictions/Precautions   Braces or Orthoses LSO  (breg)   Other Precautions Bed Alarm; Chair Alarm; Fall Risk;Spinal precautions;Pain;Cognitive   Pain Assessment   Pain Assessment FLACC   Pain Rating: FLACC (Rest) - Face 0   Pain Rating: FLACC (Rest) - Legs 1   Pain Rating: FLACC (Rest) - Activity 1   Pain Rating: FLACC (Rest) - Cry 0   Pain Rating: FLACC (Rest) - Consolability 0   Score: FLACC (Rest) 2   Pain Rating: FLACC (Activity) - Face 1   Pain Rating: FLACC (Activity) - Legs 1   Pain Rating: FLACC (Activity) - Activity 0   Pain Rating: FLACC (Activity) - Cry 1  (front of r thigh c mobility)   Pain Rating: FLACC (Activity) - Consolability 0   Score: FLACC (Activity) 3   ADL   Where Assessed Edge of bed   Grooming Assistance 5  Supervision/Setup   UB Bathing Assistance 5  Supervision/Setup   LB Bathing Assistance 3  Moderate Assistance   UB Dressing Assistance 4  Minimal Assistance   LB Dressing Assistance 2  Maximal Assistance   LB Dressing Comments including lso brace   Toileting Assistance  1  Total Assistance   Toileting Comments wears deppends at home, incontinent of urine   Bed Mobility   Supine to Sit 3  Moderate assistance   Additional items   (log roll, hob nearly flat with use of 1 SIde Rail)   Transfers   Sit to Stand 3  Moderate assistance   Stand to Sit 4  Minimal assistance   Functional Mobility   Functional Mobility 4  Minimal assistance   Cognition   Arousal/Participation Alert   Attention Within functional limits   Memory Decreased recall of precautions   Following Commands Follows multistep commands with increased time or repetition   Comments min cues for newly instructed techniques ie log roll, spinal precautions and hand placement rw use       Activity Tolerance   Activity Tolerance Patient limited by pain   Assessment   Assessment pt participated in am ot session and was seen focusing on adls seated eob, bed mobility in fairly flat bed via log roll attempt  pt wants to shower as she bathes this way pta at times up to 2 times daily, per pt report  pt requires mod asst x 1 for b ed mobility and for sit to stand  pt was ab;e tp ivot to bedsode chair with min asst and use of rw with incrased time for guarded painful movement from pt  pt requries max asst lb dressing and mod asst bathing lb, and sba to min asst ub  pt requires much asst to thomas breg lso brace   Plan   Treatment Interventions ADL retraining;Functional transfer training; Activityengagement;Equipment evaluation/education;Patient/family training; Endurance training;Cognitive reorientation   Goal Expiration Date 11/03/19   OT Treatment Day 1   OT Frequency 3-5x/wk   Recommendation   OT Discharge Recommendation Short Term Rehab   Barthel Index   Feeding 10   Bathing 0   Grooming Score 0   Dressing Score 5   Bladder Score 5   Bowels Score 10   Toilet Use Score 5   Transfers (Bed/Chair) Score 10   Mobility (Level Surface) Score 0   Stairs Score 0   Barthel Index Score 45   Modified Colon Scale   Modified Christine Scale 4   April A ESMER Oro

## 2019-10-22 NOTE — DISCHARGE SUMMARY
Discharge Summary - Aniket Diehl 80 y o  female MRN: [id number]    [facility] Encounter: [id number]    Admission Date:   Admission Orders (From admission, onward)     Ordered        10/20/19 1149  Inpatient Admission  Once         10/20/19 0029  Place in Observation  Once                     Admitting Diagnosis: Vascular dementia without behavioral disturbance ([facility] ) [F01 50]  Closed compression fracture of L5 lumbar vertebra, initial encounter ([facility] ) [S32 050A]  Unspecified multiple injuries, initial encounter [T07  XXXA]    HPI: per resident, Precious Carlin;  "Aniket Diehl is a 80 y o  female with pmhx HLD, HTN, dementia who presents as a transfer from [facility] for L5 compression fracture  Patient states that one month ago she fell in her living room after tripping on an object  She did not hit her head, no LOC  No anticoagulation or antiplatelet medications  She states that she felt well afterward, and her relative helped her up into a chair  States that her pain began a few weeks after the fall; she went to her doctor and was given prednisone taper  She does have a hx of lumbar radiculopathy, and states her symptoms felt similar to this, with shooting pain from her right buttock and down the back of her leg  She states her pain acutely worsened yesterday, and she went to the ED for an evaluation and was diagnosed with L5 compression fracture  "    Procedures Performed: No orders of the defined types were placed in this encounter  Summary of Hospital Course: 81 y/o female admitted after a fall a few days ago, history of vascular dementia, back pain  Resides with her niece, and just started complaining of back pain  Doing well, brace per Neurosurgery, they will follow up with her  Follow up with PCP, and Geriatrics  No trauma follow up required at this time  Participating with therapy, will discharge to rehab with eventual plan for discharge home with niece      Significant Findings, Care, Treatment and Services Provided: Xr Spine Lumbar 2 Or 3 Views Injury    Result Date: 10/21/2019  Impression: Exam is somewhat suboptimal due to overlying bowel gas  In addition, there is diffuse bony demineralization which limits evaluation of fine bony detail  1   Mild vertebral body height loss noted at L5 secondary to superior endplate deformity  There is a a known underlying fracture line here better seen on recent CT examination  2   Mild superior endplate deformity at L1 is stable may be degenerative  3   Grade 1 anterolisthesis of L4 on L5  Workstation performed: [id number]     Xr Shoulder 2+ Vw Left    Result Date: 10/21/2019  Impression: No acute osseous abnormality  Workstation performed: [id number]     Xr Hip/pelv 2-3 Vws Right If Performed    Result Date: 10/20/2019  Impression: Osteopenia  No definite evidence for acute fracture or dislocation of the hips  Workstation performed: [id number]     Ct Spine Lumbar Without Contrast    Result Date: 10/19/2019  Impression: L5 vertebral body fracture as described The study was marked in EPIC for immediate notification  Workstation performed: [id number]       Complications: none    Discharge Diagnosis: S/PFall  L5 compression fracture  Left shoulder pain  UTI    Resolved Problems  Date Reviewed: 10/22/2019    None          Condition at Discharge: stable         Discharge instructions/Information to patient and family:   See after visit summary for information provided to patient and family  Provisions for Follow-Up Care:  See after visit summary for information related to follow-up care and any pertinent home health orders  PCP: FARZANA Roberts    Disposition: [facility]    Planned Readmission: No      Discharge Statement   I spent 25 minutes discharging the patient  This time was spent on the day of discharge  I had direct contact with the patient on the day of discharge   Additional documentation is required if more than 30 minutes were spent on discharge  Discharge Medications:  See after visit summary for reconciled discharge medications provided to patient and family

## 2019-10-22 NOTE — ASSESSMENT & PLAN NOTE
- Left shoulder pain on exam without external signs of trauma  Likely secondary to contusion   - Activity as tolerated  - Obtain left shoulder x-ray to rule out underlying fracture  - Analgesia as needed  - 10/21 no complaints of shoulder pain today, moving all extremities , over the day no further complaints     - 10/22, no complaints of pain this morning

## 2019-10-22 NOTE — PLAN OF CARE
Problem: OCCUPATIONAL THERAPY ADULT  Goal: Performs self-care activities at highest level of function for planned discharge setting  See evaluation for individualized goals  Description  Treatment Interventions: ADL retraining, Functional transfer training, Endurance training, Cognitive reorientation, Patient/family training, Equipment evaluation/education, Energy conservation, Activityengagement          See flowsheet documentation for full assessment, interventions and recommendations  Outcome: Progressing  Note:   Limitation: Decreased ADL status, Decreased Safe judgement during ADL, Decreased cognition, Decreased endurance, Decreased self-care trans, Decreased high-level ADLs  Prognosis: Good  Assessment: pt participated in am ot session and was seen focusing on adls seated eob, bed mobility in fairly flat bed via log roll attempt  pt wants to shower as she bathes this way pta at times up to 2 times daily, per pt report  pt requires mod asst x 1 for b ed mobility and for sit to stand  pt was ab;e tp ivot to bedsode chair with min asst and use of rw with incrased time for guarded painful movement from pt  pt requries max asst lb dressing and mod asst bathing lb, and sba to min asst ub  pt requires much asst to thomasadonay serrano lso brace     OT Discharge Recommendation: Short Term Rehab  OT - OK to Discharge:  Yes     ESMER Billingsley

## 2019-10-22 NOTE — SOCIAL WORK
Pt will d/c at [address] [facility] wheelchair ambulance  Pt's niece and nurse made aware of the d/c

## 2019-10-22 NOTE — ASSESSMENT & PLAN NOTE
- Status post fall with the below noted injuries    - Fall precautions   - PT and OT evaluation and treatment ongoing

## 2019-10-22 NOTE — PROGRESS NOTES
Progress Note - Lacey Sarkar 11/3/1924, 80 y o  female MRN: [id number]    Unit/Bed#: [facility] 604-01 Encounter: [id number]    Primary Care Provider: FARZANA Bazan   Date and time admitted to hospital: 10/19/2019 10:40 PM        Urinary tract infection without hematuria  Assessment & Plan  - UTI, present on admission   - Complete 7 day course of Keflex  - Outpatient follow-up with primary care provider  Fall  Assessment & Plan  - Status post fall with the below noted injuries  - Fall precautions   - PT and OT evaluation and treatment ongoing    Left shoulder pain  Assessment & Plan  - Left shoulder pain on exam without external signs of trauma  Likely secondary to contusion   - Activity as tolerated  - Obtain left shoulder x-ray to rule out underlying fracture  - Analgesia as needed  - 10/21 no complaints of shoulder pain today, moving all extremities , over the day no further complaints  - 10/22, no complaints of pain this morning    * Closed compression fracture of L5 lumbar vertebra, initial encounter [facility])  Assessment & Plan  - Await Neurosurgery evaluation and recommendations  - Anticipate non operative intervention with possible LSO brace  - Analgesia as needed  - PT and OT evaluation and treatment as indicated  - Monitor neurologic exam    - brace in room   - moving all extremities          Bedside rounds completed with nurse Terence Barreto       Disposition: rehab and then home with Niece      SUBJECTIVE:  Chief Complaint: wants to get moving    Subjective: I am ready to go      OBJECTIVE:     Meds/Allergies     Current Facility-Administered Medications:     acetaminophen (TYLENOL) tablet 975 mg, 975 mg, Oral, Q6H [address], Mac Colace, DO, 975 mg at 10/22/19 0601    cephalexin (KEFLEX) capsule 500 mg, 500 mg, Oral, Q12H [address], Genevieve FARZANA Camacho, 500 mg at 10/21/19 2118    docusate sodium (COLACE) capsule 100 mg, 100 mg, Oral, BID, Mac Colace, DO, 100 mg at 10/21/19 8756   enoxaparin (LOVENOX) subcutaneous injection 30 mg, 30 mg, Subcutaneous, Q24H HESHAM, Cecilia Roger, DO, 30 mg at 10/21/19 0815    gabapentin (NEURONTIN) capsule 200 mg, 200 mg, Oral, HS, Cecilia Acacia, DO, 200 mg at 10/21/19 2118    HYDROmorphone (DILAUDID) injection 0 1 mg, 0 1 mg, Intravenous, Q3H PRN, Cathleen Duarte MD, 0 1 mg at 10/20/19 2340    Labetalol HCl (NORMODYNE) injection 10 mg, 10 mg, Intravenous, Q4H PRN, Cecilia Roger, DO    methocarbamol (ROBAXIN) tablet 500 mg, 500 mg, Oral, Q6H HESHAM, Cecilia Roger, DO, 500 mg at 10/22/19 0601    ondansetron (ZOFRAN) injection 4 mg, 4 mg, Intravenous, Q6H PRN, Cecilia Acacia, DO    oxyCODONE (ROXICODONE) IR tablet 2 5 mg, 2 5 mg, Oral, Q4H PRN, Cecilia Roger, DO    oxyCODONE (ROXICODONE) IR tablet 5 mg, 5 mg, Oral, Q4H PRN, Cecilia Roger, DO, 5 mg at 10/22/19 0353    polyethylene glycol (MIRALAX) packet 17 g, 17 g, Oral, Daily, Cecilia Roger, DO, 17 g at 10/21/19 0815    senna (SENOKOT) tablet 17 2 mg, 2 tablet, Oral, Daily, Kierra Urbina, DO, 17 2 mg at 10/21/19 0816    sodium chloride (OCEAN) 0 65 % nasal spray 2 spray, 2 spray, Each Nare, PRN, Rell Nett, DO     Vitals:   Vitals:    10/22/19 0733   BP: 146/64   Pulse: 67   Resp: 16   Temp: 97 9 °F (36 6 °C)   SpO2: 99%       Intake/Output:  I/O       10/20 0701 - 10/21 0700 10/21 0701 - 10/22 0700 10/22 0701 - 10/23 0700    P  O  700 1328 60    Total Intake(mL/kg) 700 (10 8) 1328 (20 5) 60 (0 9)    Urine (mL/kg/hr)  885 (0 6)     Total Output  885     Net +700 +443 +60           Unmeasured Urine Occurrence 1 x 2 x            Nutrition/GI Proph/Bowel Reg: regular    Physical Exam:   GENERAL APPEARANCE: comfortable and talkative this morning  NEURO: intact  HEENT: EOM's intact  CV: RRR, no complaints of chest pain  LUNGS: CTA bilaterally, no shortness of breath  GI: tolerating a diet  : voiding, has a wick in place  MSK: moving all four extremities, working with therapy  SKIN: warm and dry    Invasive Devices     Peripheral Intravenous Line            Peripheral IV 10/19/19 Right Forearm 2 days                 Lab Results: none  Imaging/EKG Studies: none  Other Studies: none  VTE Prophylaxis: Sequential compression device (Venodyne)  and Enoxaparin (Lovenox)

## 2019-10-22 NOTE — PLAN OF CARE
Problem: Potential for Falls  Goal: Patient will remain free of falls  Description  INTERVENTIONS:  - Assess patient frequently for physical needs  -  Identify cognitive and physical deficits and behaviors that affect risk of falls  -  [place] fall precautions as indicated by assessment   - Educate patient/family on patient safety including physical limitations  - Instruct patient to call for assistance with activity based on assessment  - Modify environment to reduce risk of injury  - Consider OT/PT consult to assist with strengthening/mobility  Outcome: Progressing     Problem: Prexisting or High Potential for Compromised Skin Integrity  Goal: Skin integrity is maintained or improved  Description  INTERVENTIONS:  - Identify patients at risk for skin breakdown  - Assess and monitor skin integrity  - Assess and monitor nutrition and hydration status  - Monitor labs   - Assess for incontinence   - Turn and reposition patient  - Assist with mobility/ambulation  - Relieve pressure over bony prominences  - Avoid friction and shearing  - Provide appropriate hygiene as needed including keeping skin clean and dry  - Evaluate need for skin moisturizer/barrier cream  - Collaborate with interdisciplinary team   - Patient/family teaching  - Consider wound care consult   Outcome: Progressing     Problem: Nutrition/Hydration-ADULT  Goal: Nutrient/Hydration intake appropriate for improving, restoring or maintaining nutritional needs  Description  Monitor and assess patient's nutrition/hydration status for malnutrition  Collaborate with interdisciplinary team and initiate plan and interventions as ordered  Monitor patient's weight and dietary intake as ordered or per policy  Utilize nutrition screening tool and intervene as necessary  Determine patient's food preferences and provide high-protein, high-caloric foods as appropriate       INTERVENTIONS:  - Monitor oral intake, urinary output, labs, and treatment plans  - Assess nutrition and hydration status and recommend course of action  - Evaluate amount of meals eaten  - Assist patient with eating if necessary   - Allow adequate time for meals  - Recommend/ encourage appropriate diets, oral nutritional supplements, and vitamin/mineral supplements  - Order, calculate, and assess calorie counts as needed  - Recommend, monitor, and adjust tube feedings and TPN/PPN based on assessed needs  - Assess need for intravenous fluids  - Provide specific nutrition/hydration education as appropriate  - Include patient/family/caregiver in decisions related to nutrition  Outcome: Progressing     Problem: PAIN - ADULT  Goal: Verbalizes/displays adequate comfort level or baseline comfort level  Description  Interventions:  - Encourage patient to monitor pain and request assistance  - Assess pain using appropriate pain scale  - Administer analgesics based on type and severity of pain and evaluate response  - Implement non-pharmacological measures as appropriate and evaluate response  - Consider cultural and social influences on pain and pain management  - Notify physician/advanced practitioner if interventions unsuccessful or patient reports new pain  Outcome: Progressing     Problem: INFECTION - ADULT  Goal: Absence or prevention of progression during hospitalization  Description  INTERVENTIONS:  - Assess and monitor for signs and symptoms of infection  - Monitor lab/diagnostic results  - Monitor all insertion sites, i e  indwelling lines, tubes, and drains  - Monitor endotracheal if appropriate and nasal secretions for changes in amount and color  - [place] appropriate cooling/warming therapies per order  - Administer medications as ordered  - Instruct and encourage patient and family to use good hand hygiene technique  - Identify and instruct in appropriate isolation precautions for identified infection/condition  Outcome: Progressing     Problem: SAFETY ADULT  Goal: Patient will remain free of falls  Description  INTERVENTIONS:  - Assess patient frequently for physical needs  -  Identify cognitive and physical deficits and behaviors that affect risk of falls    -  [place] fall precautions as indicated by assessment   - Educate patient/family on patient safety including physical limitations  - Instruct patient to call for assistance with activity based on assessment  - Modify environment to reduce risk of injury  - Consider OT/PT consult to assist with strengthening/mobility  Outcome: Progressing  Goal: Maintain or return to baseline ADL function  Description  INTERVENTIONS:  -  Assess patient's ability to carry out ADLs; assess patient's baseline for ADL function and identify physical deficits which impact ability to perform ADLs (bathing, care of mouth/teeth, toileting, grooming, dressing, etc )  - Assess/evaluate cause of self-care deficits   - Assess range of motion  - Assess patient's mobility; develop plan if impaired  - Assess patient's need for assistive devices and provide as appropriate  - Encourage maximum independence but intervene and supervise when necessary  - Involve family in performance of ADLs  - Assess for home care needs following discharge   - Consider OT consult to assist with ADL evaluation and planning for discharge  - Provide patient education as appropriate  Outcome: Progressing  Goal: Maintain or return mobility status to optimal level  Description  INTERVENTIONS:  - Assess patient's baseline mobility status (ambulation, transfers, stairs, etc )    - Identify cognitive and physical deficits and behaviors that affect mobility  - Identify mobility aids required to assist with transfers and/or ambulation (gait belt, sit-to-stand, lift, walker, cane, etc )  - [place] fall precautions as indicated by assessment  - Record patient progress and toleration of activity level on Mobility SBAR; progress patient to next Phase/Stage  - Instruct patient to call for assistance with activity based on assessment  - Consider rehabilitation consult to assist with strengthening/weightbearing, etc   Outcome: Progressing     Problem: DISCHARGE PLANNING  Goal: Discharge to home or other facility with appropriate resources  Description  INTERVENTIONS:  - Identify barriers to discharge w/patient and caregiver  - Arrange for needed discharge resources and transportation as appropriate  - Identify discharge learning needs (meds, wound care, etc )  - Arrange for interpretive services to assist at discharge as needed  - Refer to Case Management Department for coordinating discharge planning if the patient needs post-hospital services based on physician/advanced practitioner order or complex needs related to functional status, cognitive ability, or social support system  Outcome: Progressing     Problem: Knowledge Deficit  Goal: Patient/family/caregiver demonstrates understanding of disease process, treatment plan, medications, and discharge instructions  Description  Complete learning assessment and assess knowledge base    Interventions:  - Provide teaching at level of understanding  - Provide teaching via preferred learning methods  Outcome: Progressing

## 2019-10-22 NOTE — ASSESSMENT & PLAN NOTE
- Await Neurosurgery evaluation and recommendations  - Anticipate non operative intervention with possible LSO brace  - Analgesia as needed  - PT and OT evaluation and treatment as indicated    - Monitor neurologic exam    - brace in room   - moving all extremities

## 2019-10-22 NOTE — SOCIAL WORK
CM spoke to Taylor Dickey from [address] [place] [address]  [id number]  Cm provided her with updated information         Auth obtained  [id number]  Next review is 10/28/19  Francheska Dominguez   [phone]  [id number]  [phone] fax

## 2019-10-23 ENCOUNTER — TRANSITIONAL CARE MANAGEMENT (OUTPATIENT)
Dept: FAMILY MEDICINE CLINIC | Facility: CLINIC | Age: 84
End: 2019-10-23

## 2019-10-23 NOTE — UTILIZATION REVIEW
Notification of Discharge  This is a Notification of Discharge from our facility [address]  Please be advised that this patient has been discharge from our facility  Below you will find the admission and discharge date and time including the patients disposition  PRESENTATION DATE: 10/19/2019 10:40 PM    IP ADMISSION DATE: 10/20/19 1149   DISCHARGE DATE: 10/22/2019  1:34 PM  DISPOSITION: Home/Self Care Home/Self Care   Admission Orders listed below:  Admission Orders (From admission, onward)     Ordered        10/20/19 1149  Inpatient Admission  Once         10/20/19 0029  Place in Observation  Once                   Please contact the UR Department if additional information is required to close this patient's authorization/case  [address]  Utilization Review Department  Phone: [phone]; Fax [phone]  [email or web address]  [email or web address]  ATTENTION: Please call with any questions or concerns to [phone]  and carefully listen to the prompts so that you are directed to the right person  Send all requests for admission clinical reviews, approved or denied determinations and any other requests to fax [phone]   All voicemails are confidential

## 2019-10-25 ENCOUNTER — TELEPHONE (OUTPATIENT)
Dept: GERIATRICS | Age: 84
End: 2019-10-25

## 2019-10-25 NOTE — UTILIZATION REVIEW
Provider Dispute/Re-review:    Date: 10/25/19                       Patient Class: Inpatient  Level of Care: Inpatient    HPI:89 y o  female with history of RLS, HTN, DM with peripheral neuropathy, recurrent falls, depression, and dementia admitted to Trauma after presenting to the [facility] ED with worsening lower back pain s/p recent fall at home  Found to have L5 compression fracture and UTI  Transferred to Trauma Service at [address] [place] [address],[address]  Initially admitted on as Observation on 10/20/19 for analgesia, Neurosurgery consult, and PT/OT evaluation  Changed to Inpatient due to closed compression fracture of L5 with gait instability, LSO brace and spinal precautions, requiring rehab placement  10/20 Neurosurgery evaluated the patient, noted tenderness on palpation around L5 region,  baseline upright lumbar spine x-rays in LSO brace demonstrated grade 1 L4/ L5 anterolisthesis with stable compression L5 fracture  No neurosurgical procedure indicated  Recommended continued pain control, PT/OT, LSO brace when upright and at 45 degree HOB  Physical Therapy  evaluated the patient and recommended rehab  Trauma noted patient c/o left shoulder pain, left shoulder x-ray negative  Patient placed on Keflex for UTI  (Patient received one dose of IV Dilaudid 0 1 mg for pain today, and one dose of oxycodone IR 5 mg PO ) Social Work provided list of facilities to patient and caregiver  On 10/21 Geriatric Medicine was consulted, recommended changing Tylenol 975mg from Q6H to [facility] scheduled, Roxicodone 2 5mg Q4H PRN, Roxicodone 5mg Q4H PRN, and dilaudid 0 1mg Q3H PRN  , continued adjuncts such as Gabapentin 200 mg HS and lidocaine patch  Trauma noted no c/o shoulder pain today, moving all extremities  (Patient received two doses of oxycodone IR 5 mg PO PRN for pain today ) Social Work noted patient accepted at [facility]      10/22 Geriatric Medicine notes patient reports her back pain is significantly impproved with the medication regimen  Trauma noted patient with no c/o pain this morning  Authorization obtained from [facility]  Discharged to [facility] for rehab         Pertinent Labs/Diagnostic Results:   Results from last 7 days   Lab Units 10/20/19  0735 10/20/19  0439 10/19/19  1938   WBC Thousand/uL 9 97  --  10 74   HEMOGLOBIN g/dL 12 9  --  13 9   HEMATOCRIT % 40 0  --  42 6   PLATELETS Thousands/uL 269 254 276   NEUTROS ABS Thousands/µL  --   --  9 57         Results from last 7 days   Lab Units 10/20/19  0735 10/19/19  1938   SODIUM mmol/L 138 138   POTASSIUM mmol/L 4 8 5 2   CHLORIDE mmol/L 104 101   CO2 mmol/L 29 30   ANION GAP mmol/L 5 7   BUN mg/dL 25 26   CREATININE mg/dL 1 03 1 18   EGFR ml/min/1 73sq m 47 40   CALCIUM mg/dL 8 7 9 0     Results from last 7 days   Lab Units 10/19/19  1938   AST U/L 12   ALT U/L 13   ALK PHOS U/L 106   TOTAL PROTEIN g/dL 7 2   ALBUMIN g/dL 3 4*   TOTAL BILIRUBIN mg/dL 0 50   BILIRUBIN DIRECT mg/dL 0 16         Results from last 7 days   Lab Units 10/20/19  0735 10/19/19  1938   GLUCOSE RANDOM mg/dL 132 151*         Results from last 7 days   Lab Units 10/19/19  1823   CLARITY UA  Clear   COLOR UA  Light Yellow   SPEC GRAV UA  1 010   PH UA  7 5   GLUCOSE UA mg/dl Negative   KETONES UA mg/dl Negative   BLOOD UA  Trace-Intact*   PROTEIN UA mg/dl Negative   NITRITE UA  Positive*   BILIRUBIN UA  Negative   UROBILINOGEN UA E U /dl 0 2   LEUKOCYTES UA  Small*   WBC UA /hpf 1-2*   RBC UA /hpf 0-1*   BACTERIA UA /hpf Moderate*   EPITHELIAL CELLS WET PREP /hpf Occasional       Vital Signs:     Date and Time Temp Pulse SpO2 Resp BP Pain Score   10/22/19 0737 -- -- -- -- -- No Pain   10/22/19 0733 97 9 °F (36 6 °C) 67 99 % 16 146/64 --   10/22/19 0601 -- -- -- -- -- No Pain   10/22/19 0353 -- -- -- -- -- Worst Possible Pain   10/21/19 2256 99 °F (37 2 °C) 80 98 % 16 131/54 --   10/21/19 1527 -- -- -- -- -- 8   10/21/19 1452 -- -- -- -- -- 7   10/21/19 1134 -- -- -- -- -- No Pain   10/21/19 7288 -- -- -- -- -- 8   10/21/19 0815 -- -- -- -- -- 2   10/21/19 0728 97 7 °F (36 5 °C) -- -- 19 167/77 --   10/20/19 2340 -- -- -- -- -- Worst Possible Pain   10/20/19 2206 -- -- -- -- -- 8         Medications:     Medications 10/20 10/21 10/22   acetaminophen (TYLENOL) tablet 975 mg   Dose: 975 mg  Freq: Every 6 hours scheduled Route: PO  Start: 10/20/19 0030 End: 10/22/19 1534    0053     0529     1111     1739     2342      0517     1134         0022     0601              cephalexin (KEFLEX) capsule 500 mg   Dose: 500 mg  Freq: Every 12 hours scheduled Route: PO  Start: 10/20/19 0900 End: 10/22/19 1534    Admin Instructions:   Food-drug interaction teaching & documentation must be done; administer at least 3H before products containing zinc  LOOK/SOUND ALIKE MED    Order specific questions:   Indication: urinary tract infection    0921 2200      0816     2118      0836           docusate sodium (COLACE) capsule 100 mg   Dose: 100 mg  Freq: 2 times daily Route: PO  Indications of Use: CONSTIPATION  Start: 10/20/19 0900 End: 10/22/19 1534    0921     1739      0816     1736      0836           enoxaparin (LOVENOX) subcutaneous injection 30 mg   Dose: 30 mg  Freq: Every 24 hours scheduled Route: SC  Start: 10/20/19 0900 End: 10/22/19 1534    Admin Instructions:   High Alert Medication, Check for allergies to pork or pork derivatives/dietary restrictions before administration  LOOK ALIKE S[employer] ALI[employer] MED    1286      [id number]      [id number]           gabapentin (NEURONTIN) capsule 200 mg   Dose: 200 mg  Freq: Daily at bedtime Route: PO  Start: 10/20/19 2200 End: 10/22/19 1534    Admin Instructions:   LOOK ALIKE [employer] MED    2200 2118      1534      lidocaine (LIDODERM) 5 % patch 1 patch   Dose: 1 patch  Freq:  Once Route: TP  Start: 10/20/19 0030 End: 10/20/19 1254    Admin Instructions:   Patch may remain in place for up to 12 hours in a 24hr period    Order specific questions:   What is the location for this topical application? low back    0054             methocarbamol (ROBAXIN) tablet 500 mg   Dose: 500 mg  Freq: Every 6 hours scheduled Route: PO  Start: 10/20/19 0030 End: 10/22/19 1534    0054     0529     1111     1739     2342      0517     1134     1736      0022     0601     1200           polyethylene glycol (MIRALAX) packet 17 g   Dose: 17 g  Freq: Daily Route: PO  Start: 10/20/19 0900 End: 10/22/19 1534    Admin Instructions:   Stir powder in 4-8 ounces of water, juice, soda, coffee or tea until dissolved and drink  LOOK ALIKE S[employer] ALIKE MED    9197      [id number]      [id number]           senna (SENOKOT) tablet 17 2 mg   Dose: 2 tablet  Freq: Daily Route: PO  Indications of Use: CONSTIPATION  Start: 10/20/19 0900 End: 10/22/19 1534    0920      0816      0836           Medications 10/20 10/21 10/22         PRN Meds Sorted by Name         Medications 10/20 10/21 10/22   HYDROmorphone (DILAUDID) injection 0 1 mg   Dose: 0 1 mg  Freq: Every 3 hours PRN Route: IV  PRN Reason: breakthrough pain  Start: 10/20/19 2330 End: 10/22/19 1534    Admin Instructions:   High alert medication  LOOK ALI[employer] MED    2340             Labetalol HCl (NORMODYNE) injection 10 mg   Dose: 10 mg  Freq: Every 4 hours PRN Route: IV  PRN Reason: high blood pressure  PRN Comment: for SBP >140  Indications Comment: for SBP >140  Start: 10/20/19 0245 End: 10/22/19 1534    Admin Instructions:   Administer slowly at rate of 10 mg per minute  Hold for heart rate less than 50 beats per minute  LOOK ALIKE SOUND ALIKE MED            ondansetron (ZOFRAN) injection 4 mg   Dose: 4 mg  Freq: Every 6 hours PRN Route: IV  PRN Reasons: nausea,vomiting  Start: 10/20/19 0021 End: 10/22/19 1534    Admin Instructions:   Push over 2 minutes  oxyCODONE (ROXICODONE) IR tablet 2 5 mg   Dose: 2 5 mg  Freq: Every 4 hours PRN Route: PO  PRN Reason: moderate pain  Start: 10/20/19 0026 End: 10/22/19 1534    Admin Instructions:   High alert medication   LOOK ALIKE SOUND ALIKE MED            oxyCODONE (ROXICODONE) IR tablet 5 mg   Dose: 5 mg  Freq: Every 4 hours PRN Route: PO  PRN Reason: severe pain  Start: 10/20/19 0027 End: 10/22/19 1534    Admin Instructions:   High alert medication  LOOK ALIKE S[employer] ALIKE MED    2206      4965     1527      0353           sodium chloride (OCEAN) 0 65 % nasal spray 2 spray   Dose: 2 spray  Freq: As needed Route: EACH NARE  PRN Reason: congestion  Start: 10/21/19 1030 End: 10/22/19 1534            Medications 10/20 10/21 10/22                 Network Utilization Review Department  [email or web address]  ATTENTION: Please call with any questions or concerns to [phone] and carefully listen to the prompts so that you are directed to the right person  All voicemails are confidential   Sienna Sullivan all requests for admission clinical reviews, approved or denied determinations and any other requests to dedicated fax number below belonging to the campus where the patient is receiving treatment    FACILITY NAME UR FAX NUMBER   ADMISSION DENIALS (Administrative/Medical Necessity) [address] [facility] (Maternity/NICU/Pediatrics) [phone]   [facility] [address] [facility] [phone]   [facility] [address] [facility] [phone]   [place] [address] [phone]

## 2019-11-02 DIAGNOSIS — M54.16 LUMBAR RADICULOPATHY: ICD-10-CM

## 2019-11-03 RX ORDER — GABAPENTIN 100 MG/1
CAPSULE ORAL
Qty: 30 CAPSULE | Refills: 0 | Status: SHIPPED | OUTPATIENT
Start: 2019-11-03 | End: 2019-11-22 | Stop reason: SDUPTHER

## 2019-11-04 ENCOUNTER — HOSPITAL ENCOUNTER (OUTPATIENT)
Dept: RADIOLOGY | Facility: HOSPITAL | Age: 84
Discharge: HOME | End: 2019-11-04
Payer: COMMERCIAL

## 2019-11-04 DIAGNOSIS — S32.050A CLOSED COMPRESSION FRACTURE OF L5 LUMBAR VERTEBRA, INITIAL ENCOUNTER (HCC): ICD-10-CM

## 2019-11-04 PROCEDURE — 72100 X-RAY EXAM L-S SPINE 2/3 VWS: CPT

## 2019-11-22 ENCOUNTER — OFFICE VISIT (OUTPATIENT)
Dept: FAMILY MEDICINE CLINIC | Facility: CLINIC | Age: 84
End: 2019-11-22
Payer: COMMERCIAL

## 2019-11-22 ENCOUNTER — OFFICE VISIT (OUTPATIENT)
Dept: GERIATRICS | Age: 84
End: 2019-11-22
Payer: COMMERCIAL

## 2019-11-22 VITALS
HEART RATE: 96 BPM | DIASTOLIC BLOOD PRESSURE: 60 MMHG | WEIGHT: 138 LBS | OXYGEN SATURATION: 95 % | BODY MASS INDEX: 27.09 KG/M2 | HEIGHT: 60 IN | SYSTOLIC BLOOD PRESSURE: 142 MMHG

## 2019-11-22 VITALS
HEIGHT: 61 IN | SYSTOLIC BLOOD PRESSURE: 110 MMHG | DIASTOLIC BLOOD PRESSURE: 60 MMHG | BODY MASS INDEX: 26.07 KG/M2 | HEART RATE: 92 BPM | TEMPERATURE: 99.1 F | OXYGEN SATURATION: 95 % | RESPIRATION RATE: 20 BRPM

## 2019-11-22 DIAGNOSIS — S32.050A CLOSED COMPRESSION FRACTURE OF L5 LUMBAR VERTEBRA, INITIAL ENCOUNTER (HCC): ICD-10-CM

## 2019-11-22 DIAGNOSIS — K59.00 CONSTIPATION, UNSPECIFIED CONSTIPATION TYPE: ICD-10-CM

## 2019-11-22 DIAGNOSIS — M54.16 LUMBAR RADICULOPATHY: ICD-10-CM

## 2019-11-22 DIAGNOSIS — R63.0 LOSS OF APPETITE: ICD-10-CM

## 2019-11-22 DIAGNOSIS — E55.9 VITAMIN D DEFICIENCY: ICD-10-CM

## 2019-11-22 DIAGNOSIS — G25.81 RESTLESS LEGS SYNDROME: ICD-10-CM
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
## 2019-11-22 DIAGNOSIS — F01.50 VASCULAR DEMENTIA WITHOUT BEHAVIORAL DISTURBANCE (HCC): Primary | ICD-10-CM

## 2019-11-22 DIAGNOSIS — F51.01 PRIMARY INSOMNIA: ICD-10-CM

## 2019-11-22 DIAGNOSIS — S32.050A CLOSED COMPRESSION FRACTURE OF L5 LUMBAR VERTEBRA, INITIAL ENCOUNTER (HCC): Primary | ICD-10-CM

## 2019-11-22 PROBLEM — N39.0 URINARY TRACT INFECTION WITHOUT HEMATURIA: Status: RESOLVED | Noted: 2019-10-20 | Resolved: 2019-11-22

## 2019-11-22 PROBLEM — R30.0 DYSURIA: Status: RESOLVED | Noted: 2019-10-11 | Resolved: 2019-11-22

## 2019-11-22 PROCEDURE — 99215 OFFICE O/P EST HI 40 MIN: CPT | Performed by: INTERNAL MEDICINE

## 2019-11-22 PROCEDURE — 99495 TRANSJ CARE MGMT MOD F2F 14D: CPT | Performed by: FAMILY MEDICINE

## 2019-11-22 RX ORDER — SENNOSIDES 8.6 MG
1 TABLET ORAL
Qty: 30 TABLET | Refills: 0 | Status: SHIPPED | OUTPATIENT
Start: 2019-11-22 | End: 2020-01-14 | Stop reason: ALTCHOICE

## 2019-11-22 RX ORDER — DOCUSATE SODIUM 100 MG/1
100 CAPSULE, LIQUID FILLED ORAL 2 TIMES DAILY
Qty: 10 CAPSULE | Refills: 0 | Status: SHIPPED | OUTPATIENT
Start: 2019-11-22 | End: 2020-01-14 | Stop reason: ALTCHOICE

## 2019-11-22 RX ORDER — GABAPENTIN 100 MG/1
300 CAPSULE ORAL 3 TIMES DAILY
Qty: 30 CAPSULE | Refills: 2 | Status: SHIPPED | OUTPATIENT
Start: 2019-11-22 | End: 2019-12-06 | Stop reason: SDUPTHER

## 2019-11-22 RX ORDER — ERGOCALCIFEROL 1.25 MG/1
50000 CAPSULE ORAL WEEKLY
Qty: 12 CAPSULE | Refills: 0 | Status: SHIPPED | OUTPATIENT
Start: 2019-11-22 | End: 2020-01-14 | Stop reason: ALTCHOICE

## 2019-11-22 RX ORDER — MIRTAZAPINE 30 MG/1
30 TABLET, FILM COATED ORAL
Qty: 30 TABLET | Refills: 3 | Status: SHIPPED | OUTPATIENT
Start: 2019-11-22 | End: 2020-01-14 | Stop reason: SDUPTHER

## 2019-11-22 RX ORDER — LIDOCAINE 50 MG/G
1 PATCH TOPICAL DAILY
Qty: 30 PATCH | Refills: 0 | Status: SHIPPED | OUTPATIENT
Start: 2019-11-22 | End: 2020-01-14 | Stop reason: ALTCHOICE

## 2019-11-22 NOTE — PROGRESS NOTES
Assessment/Plan:        Problem List Items Addressed This Visit        Musculoskeletal and Integument    Closed compression fracture of L5 lumbar vertebra, initial encounter ([facility] [address] ) - Primary    Relevant Medications    lidocaine (LIDODERM) 5 %    Other Relevant Orders    Lumbar Back Brace       Other    Fall    Primary insomnia    Loss of appetite    Constipation           Will order back brace  Suggested stopping Robaxin 2/2 beers criteria  Suggest increasing remeron to 30mg from 15  Miralax or dulcalax OTC  Pt will discuss these suggestions with the geriatrician whom she is seeing this afternoon  OTC lidocaine patches are appropriate as well  Subjective:     Patient ID: Hayden Zapien is a 80 y o  female  This is a 80 y o  Female here for TCM after hospitalization for L5 compression fracture  Patient had fallen one month prior to admission  She did not hit her head, no LOC  No anticoagulation or antiplatelet medications  She states that she felt well afterward and her pain began a few weeks after the fall; she was treated outpatinet however, her pain worsened and she eventually came to the ED for an evaluation and was diagnosed with L5 compression fracture  She was discharged to rehab  Follow up 11/22/2019    Pt here today for follow up on her back pain  She continues to have 9/10 back pain  She saw a geriatrician who changed some of her medications for her dementia and also shared that scheduled tylenol may help the pain  She has releif with a brace however she shares the brace she has is too small and falls off  She also is taking gabapentin 200mg TID and also robaxin as needed  She never ambulates by herself and always has assistance and uses assist devices  We discussed beers criteria for relative contraindication for muscle relaxers  Additionally, she has not had a bowel movement in 6 days  Has not used anything for constipation    She endorses loss of appetite and has not eaten much in the past week  She is on Remeron  There is only slight right sided abdominal pain  She denies vomiting  She has an appt with her geriatric physician this afternoon  Review of Systems   Constitutional: Positive for appetite change  Negative for activity change, chills, fatigue, fever and unexpected weight change  HENT: Negative for congestion, ear discharge, ear pain, postnasal drip, sinus pressure and sore throat  Eyes: Negative for discharge and visual disturbance  Respiratory: Negative for cough, shortness of breath and wheezing  Cardiovascular: Negative for chest pain, palpitations and leg swelling  Gastrointestinal: Positive for constipation  Negative for abdominal pain, diarrhea, nausea and vomiting  Endocrine: Negative for cold intolerance, heat intolerance, polydipsia and polyuria  Genitourinary: Negative for difficulty urinating and frequency  Musculoskeletal: Positive for back pain  Negative for arthralgias, joint swelling and myalgias  Skin: Negative for rash  Neurological: Negative for dizziness, weakness, light-headedness, numbness and headaches  Hematological: Negative for adenopathy  Psychiatric/Behavioral: Negative for behavioral problems, confusion, dysphoric mood, sleep disturbance and suicidal ideas  The patient is not nervous/anxious  Objective:     Physical Exam   Constitutional: She is oriented to person, place, and time  She appears well-developed and well-nourished  No distress  In wheelchair   HENT:   Head: Normocephalic and atraumatic  Eyes: Pupils are equal, round, and reactive to light  Conjunctivae are normal    Neck: Neck supple  Cardiovascular: Normal rate and regular rhythm  Exam reveals no gallop and no friction rub  Murmur heard  Pulmonary/Chest: Effort normal and breath sounds normal  No respiratory distress  She has no wheezes  She has no rales  She exhibits no tenderness  Abdominal: Soft  She exhibits no distension     Musculoskeletal: Normal range of motion  She exhibits no edema  Arms:  Neurological: She is alert and oriented to person, place, and time  Skin: Skin is warm and dry  No rash noted  She is not diaphoretic  Psychiatric: She has a normal mood and affect  Her behavior is normal  Judgment and thought content normal    Nursing note and vitals reviewed  Vitals:    11/22/19 1025   BP: 142/60   Pulse: 96   SpO2: 95%   Weight: 62 6 kg (138 lb)   Height: 5' (1 524 m)       Transitional Care Management Review:  Alyssa Thurman is a 80 y o  female here for TCM follow up  During the TCM phone call patient stated:    TCM Call (since 10/22/2019)     Date and time call was made  11/19/2019  2:31 PM    Hospital care reviewed  Records reviewed    Patient was hospitialized at  [facility]    Date of Admission  10/19/19    Date of discharge  10/22/19    Diagnosis  Closed compression fracture of L5 lumbar vertebra, initial encounter     Disposition  Home    Were the patients medications reviewed and updated  Yes    Current Symptoms  Back pain - right side    Back pain, right side, severity  Moderate    Back pain, right side, onset  Gradual      TCM Call (since 10/22/2019)     Post hospital issues  None    Scheduled for follow up?   Yes    Did you obtain your prescribed medications  Yes    Do you need help managing your prescriptions or medications  No    Is transportation to your appointment needed  No    I have advised the patient to call PCP with any new or worsening symptoms  [facility] members    Support System  Family    Have you fallen in the last 12 months  Yes    How many times  1    Interperter language line needed  No    Counseling  Family <img src='C:FILES ([facility])          Omkar Baird MD

## 2019-11-22 NOTE — PATIENT INSTRUCTIONS
Thank you for coming to your care conference, things we reviewed today include the following:  -You can take tylenol for the lower back pain as long as the total is not over 3g/24H, in the hospital we usually order is 975mg every eight hours as an example  -You can also use Lidocaine patches either prescription 5% or over the counter 4%  -You can also use Bengay or icy hot topical gel or cream to your lower back for pain  -I would like you to start Vit D, I sent a prescription to your pharmacy for high dose (50,000IU) take this once a week for all 12 weeks than you can take over the counter vitamin D 2000IU once a day after that is completed  -we increased your Mirtazepine to 30mg for mood and appetite  -I sent senna and docusate to the pharmacy to help you have a bowel movement,if they are not available by prescription it is ok to get them over the counter, you can use everyday for up to one week, after that use only as needed  -for the dry eyes you can use rewetting or dry eye drops which are usually over the counter

## 2019-11-24 NOTE — PROGRESS NOTES
[facility] Note  [address] , [address]  [phone]    NAME: Allen Chowdhury  AGE: 80 y o   SEX: female    DATE OF ENCOUNTER: 11/23/2019    Family Present:  Gen Case (niece)  Staff Present: Dr Jose Gibbons LCSW  Location of Conference: [address] [facility]    Assessment and Plan     Medical Problems-Current/Historical:  Lumbar radiculopathy, visual hallucinations, confusion, disorientation, leg syndrome, lumbar radiculopathy  Geriatric Syndromes/Age Related Syndromes:  Vascular dementia without behavioral disturbances, ambulatory dysfunction, urinary incontinence    FINDINGS:    Neuropsychological  Vascular dementia without behavioral disturbances   MOCHA: 14/30  Mindstream:  Deferred as would not add to diagnosis and would provide unnecessary stres to patient    Depression Screen:   Geriatric Depression Scale: 9/15  o Multifactorial including acute pain which has since improved significantly    Remain active physically, mentally and socially, offer reorientation as appropriate and redirection (depending on situation)  Pharmaceutical and non-pharmaceutical interventions discussed and are not indicated at this time  Maintain chronic conditions under control  Follow-up in 3-6 months    Diagnostic Studies  Lab Results   Component Value Date/Time    [id number] 1 960 06/18/2019 10:08 AM     Lab Results   Component Value Date    WBC 9 97 10/20/2019    HGB 12 9 10/20/2019    HCT 40 0 10/20/2019    MCV 95 10/20/2019     10/20/2019     Lab Results   Component Value Date    SODIUM 138 10/20/2019    K 4 8 10/20/2019     10/20/2019    CO2 29 10/20/2019    BUN 25 10/20/2019    CREATININE 1 03 10/20/2019    GLUC 132 10/20/2019    CALCIUM 8 7 10/20/2019     X-ray Lumbar Spine 2 Or 3 Views Result Date: 11/4/2019  Impression: Compression deformity of the L5 vertebral body with perhaps slight further loss of height of the anterior margin/superior endplate  No other interval change  Physical Finding Impacting Function:    Ambulatory Dysfunction/Fall risk    TUGT:  Deferred due to high fall risk   Fall Risk Assessment:  High   Activities of Daily Living: Dependent   Instrumental Activities of Daily Living: Dependent    Fall prevention measures including home safety discussed, home safety checklist provided  Please see full care plan for additional list of educational materials provided to patient and family     Medications  · Medications reviewed and are appropriate for current conditions   · Gabapentin increased to 300 mg TID at STR, tolerating well  · Tylenol scheduled 500 mg BID with improvement in pain  · Start vitamin-D 99868 units once weekly for 12 weeks then 2000 units daily  · Start lidocaine patch topically for lower back pain  · Consider topical BenGay for lower back pain  · Mirtazapine increased to 30 mg with significant benefit and no morning somnolence  · Patient advised to avoid over the counter medications that can affect cognition (e g , Benadryl, Tylenol PM, NSAIDs)  · Please follow-up with PCP before starting any over the counter medications and to discuss medication changes made or recommended by specialists to ensure they have your most updated list of medications which is important to keep current to reduce risk of unintended medication interactions    Other Findings: Body mass index is 26 07 kg/m²    Maintain well-balanced diet    Chronic hallucinations  · Monitor for UTI symptoms as recently treated for UTI  · Reviewed medications adverse effects with patient and family who wished to avoid antipsychotics due to increased risk of stroke  · Call office if hallucinations worsen or become unmanageable    Ambulatory dysfunction  · Multifactorial including chronic lumbar radiculopathy and lumbar spine arthritis  · Intermittent falls with no injuries reported  · Use cane or walker at appropriate times  · Continue gabapentin at increased dose, no longer requiring prednisone  · Continue Tylenol   · Family plans to move to single-story home for ease of access for patient    Restless leg syndrome  · No iron deficiency suspected at this time  · Currently on pramipexole, discussed taper as may contribute to hallucinations, given improvement of hallucinations since discharge from hospital maintain per Paxil at current dose and would consider taper as outpatient in future    Deconditioning/debility/frailty  · Due to ambulatory dysfunction, age, chronic medical conditions  · Continue to encourage good nutritional support    Impaired vision  · Encourage use of glasses at all appropriate times  · Encourage appropriate footwear and adequate lighting    Impaired mastication  · Requires use of dentures, continues at all appropriate times    Urinary incontinence  · Chronic and unchanged  · Continue to encourage timed use restroom to reduce risk of accidents and limit rushing to restroom which can increased fall risk  · Consider use of disposable undergarments    Health Maintenance:    Immunizations  · Influenza: Influenza vaccination is recommended yearly by the Centers for Disease Control (CDC)  Adults above the age of 72 are more at risk for complications for influenza  Adults above the age of 72 are recommend to get the high dose influenza vaccination, which will give you the best chance to make the appropriate antibodies to the virus  · Pneumococcal Disease (Pneumonia): There are currently two types of pneumococcal vaccines: pneumococcal conjugate vaccine (PCV13) and pneumococcal polysaccharide vaccine (PPSV23)  There are more than 90 types of pneumococcal bacteria  PCV13 protects against 13 types and PPSV23 protects against 23 types  Both vaccines provide protection against illnesses like meningitis (infection of the covering of the brain and spinal cord) and bacteremia (blood infection)   PCV13 also provides protection against pneumonia (lung infection)  · One dose of PCV13 is recommended for all adults 72 year or older who have not previously received the vaccine  A dose of PPSV23 should be given at least one year later  · For adults 72 years or older who have already received one or more doses of PPSV23, the dose of PCV13 should be given at least one year after receiving the most recent dose of PPSV23  · Shingles vaccine (Herpes Zoster): Shingles (herpes zoster) is a painful skin rash that develops on one side of the face or body  Shingles vaccine reduces the risk of developing shingles and the long-term pain that can follow  Your risk of shingles and long-term pain from the disease increases as you age  · Anyone 61 years or older should get the shingles vaccine (Zostavax) as a one-time vaccination, regardless of whether they recall having had chickenpox, which is caused by the same virus as shingles  Even if you have had shingles, you can still receive the shingles vaccine to help prevent future occurrences of the disease and reduce the pain that results from outbreak of the virus  Social/Safety Concerns  · Socialization  · Assistance/supervision  · Medication management     Long Term Care Issues  · Caregiver stress  · Consider caregiver support group (information provided)     Patient and family verbalized understanding of above Care Plan  History of Present Illness     Rosa Moon is a 80 y o  female who is in the office for Geriatric Care Plan  History obtained from patient and family members  Denies any new complaints, falls, hospitalization  She was recently discharged from short-term rehab where she was admitted following hospitalization for spinal compression fracture  During her hospitalization and rehab she feels that her strength has improved significantly, her pain is now well controlled with adjustment of medications including increasing of gabapentin scheduling Tylenol    She denies any acute complaints  Family reports that things are going well and they have made some changes to the home including installing grab bars on both sides of the stairs, obtaining a shower chair for the bathroom, improved hallway safety and lighting, and are working with waiver program to evaluate available since poor in-home professional caregivers in addition to the patient's grandson's girlfriend who is her current caregiver  Visual hallucinations significantly improved  Review of Systems     Review of Systems   Constitutional: Negative for chills and fever  HENT: Positive for dental problem (Wears dentures)  Negative for trouble swallowing and voice change  Eyes: Positive for visual disturbance ( wears glasses)  Respiratory: Negative for shortness of breath and wheezing  Postnasal drip     Cardiovascular: Negative for chest pain and palpitations  Gastrointestinal: Negative for constipation, diarrhea and vomiting  Genitourinary: Negative for difficulty urinating  Musculoskeletal: Positive for back pain ( improved overall but continues to have intermittent exacerbation over right SI joint which is deep and aching) and gait problem  Skin: Negative  Neurological: Positive for weakness ( generalized but improving with physical therapy)  Psychiatric/Behavioral: Positive for decreased concentration  Negative for sleep disturbance  The patient is not nervous/anxious  All other systems reviewed and are negative  History     Past Medical, Social, Surgical, Medications and Allergy history reviewed  Objective      Vitals:  Vitals:    11/22/19 1411   BP: 110/60   Pulse: 92   Resp: 20   Temp: 99 1 °F (37 3 °C)   SpO2: 95%       Physical Exam   Constitutional: She appears well-developed  No distress  HENT:   Head: Normocephalic and atraumatic  Mouth/Throat: Oropharynx is clear and moist  No oropharyngeal exudate  Wearing glasses   Eyes: Pupils are equal, round, and reactive to light  Conjunctivae are normal  No scleral icterus  Neck: Normal range of motion  Neck supple  No tracheal deviation present  Cardiovascular: Normal rate and regular rhythm  No murmur heard  Pulmonary/Chest: Effort normal and breath sounds normal  No respiratory distress  Abdominal: Bowel sounds are normal  She exhibits no distension  There is no tenderness  Musculoskeletal: She exhibits tenderness (Right SI joint)  She exhibits no edema  Neurological:   Awake and alert, oriented to person and place not time   Skin: Skin is warm and dry  She is not diaphoretic  Psychiatric: She has a normal mood and affect  Pleasantly confused   Nursing note and vitals reviewed

## 2019-11-25 ENCOUNTER — TELEPHONE (OUTPATIENT)
Dept: GERIATRICS | Age: 84
End: 2019-11-25

## 2019-11-26 ENCOUNTER — TELEPHONE (OUTPATIENT)
Dept: GERIATRICS | Age: 84
End: 2019-11-26

## 2019-11-26 NOTE — TELEPHONE ENCOUNTER
----- Message from [employer], [place] sent at 11/25/2019  3:31 PM EST -----  As we discussed  Thank you   ----- Message -----  From: Jorgito Jacobs DO  Sent: 11/23/2019   7:16 PM EST  To: Isael 9     Can someone please make sure the updated  AVS and printed prescriptions get mailed to Mrs Daisha Abraham  Thank you!

## 2019-11-27 ENCOUNTER — OFFICE VISIT (OUTPATIENT)
Dept: FAMILY MEDICINE CLINIC | Facility: CLINIC | Age: 84
End: 2019-11-27
Payer: COMMERCIAL

## 2019-11-27 VITALS
SYSTOLIC BLOOD PRESSURE: 136 MMHG | TEMPERATURE: 97.8 F | WEIGHT: 136 LBS | HEART RATE: 100 BPM | BODY MASS INDEX: 25.68 KG/M2 | DIASTOLIC BLOOD PRESSURE: 76 MMHG | OXYGEN SATURATION: 94 % | HEIGHT: 61 IN

## 2019-11-27 DIAGNOSIS — S31.819A WOUND OF RIGHT BUTTOCK, INITIAL ENCOUNTER: Primary | ICD-10-CM

## 2019-11-27 PROCEDURE — 99214 OFFICE O/P EST MOD 30 MIN: CPT | Performed by: FAMILY MEDICINE

## 2019-11-27 NOTE — PROGRESS NOTES
Rosa Moon 11/3/1924 female MRN: [id number]      ASSESSMENT/PLAN  Problem List Items Addressed This Visit        Other    Wound of right buttock - Primary    Relevant Medications    Wound Dressings (GRX HYDROGEL GAUZE 4X4) PADS        Wound is not in a typical area of pressure as it is inside the gluteal folds; seems more sheer/burn type lesion  Will start with Hydrogel and have pt seen by Wound Care for further evaluation (our staff to schedule and call pt's family with day/time)  Discussed precautions including fever, erythema, foul smelling drainage  Future Appointments   Date Time Provider Gideon Whitlock   1/14/2020  9:15 AM FARZANA Brown  [facility]   2/28/2020  1:00 PM DO PRAMOD Katz [facility]          SUBJECTIVE  CC: sore in butt crack      HPI:  Rosa Moon is a 80 y o  female who presents with her niece and granddaughter for an acute visit due to buttock wound  Pt states that she has had a wound on her buttocks since leaving rehab two weeks ago  She did not tell her family about it until 3 days ago  The area is painful  It is not pruritic  It is not draining as far as they know  They have not been doing anything for it  Pt reports no fevers and is otherwise feeling well  Review of Systems   Constitutional: Negative for fever  Skin: Positive for wound         Historical Information   The patient history was reviewed and updated as follows:    Past Medical History:   Diagnosis Date    Ankle fracture     Arthritis     left hip    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[address] )     with left ureter    Bronchitis     Cancer ([facility] [address] )     Carcinoma, lung ([address] )     Dementia ([address] )     Dependent edema     Depression     Diabetes mellitus ([facility] [address] )     Hypercholesterolemia     Hypertension     Kidney stone     Oth abn and inconclusive findings on dx imaging of breast     Pes planus     Renal calculus     Restless leg syndrome     Rib pain     Sprain, neck     Varicose veins of left lower extremity      Past Surgical History:   Procedure Laterality Date    APPENDECTOMY      LUNG CANCER SURGERY       Family History   Problem Relation Age of Onset    No Known Problems Mother     No Known Problems Father     Dementia Brother     Breast cancer Neg Hx     Colon cancer Neg Hx     Ovarian cancer Neg Hx     Uterine cancer Neg Hx     Cervical cancer Neg Hx       Social History   Social History     Substance and Sexual Activity   Alcohol Use Never    Frequency: Never     Social History     Substance and Sexual Activity   Drug Use Never     Social History     Tobacco Use   Smoking Status Former Smoker   Smokeless Tobacco Never Used       Medications:     Current Outpatient Medications:     acetaminophen (TYLENOL) 500 mg tablet, Take 500 mg by mouth 2 (two) times a day, Disp: , Rfl:     clotrimazole-betamethasone (LOTRISONE) 1-0 05 % cream, Apply topically 2 (two) times a day, Disp: 30 g, Rfl: 0    docusate sodium (COLACE) 100 mg capsule, Take 1 capsule (100 mg total) by mouth 2 (two) times a day, Disp: 10 capsule, Rfl: 0    ergocalciferol (VITAMIN D2) 50,000 units, Take 1 capsule (50,000 Units total) by mouth once a week for 12 doses, Disp: 12 capsule, Rfl: 0    gabapentin (NEURONTIN) 100 mg capsule, Take 3 capsules (300 mg total) by mouth 3 (three) times a day, Disp: 30 capsule, Rfl: 2    lidocaine (LIDODERM) 5 %, Apply 1 patch topically daily Remove & Discard patch within 12 hours or as directed by MD, Disp: 30 patch, Rfl: 0    mirtazapine (REMERON) 30 mg tablet, Take 1 tablet (30 mg total) by mouth daily at bedtime, Disp: 30 tablet, Rfl: 3    pramipexole (MIRAPEX) 1 mg tablet, Take 0 5 tablets (0 5 mg total) by mouth daily, Disp: 45 tablet, Rfl: 0    senna (SENOKOT) 8 6 mg, Take 1 tablet (8 6 mg total) by mouth daily at bedtime, Disp: 30 tablet, Rfl: 0    Wound Dressings (GRX HYDROGEL GAUZE 4X4) PADS, Apply 1 each topically daily as needed (sacral wound), Disp: 100 each, Rfl: 0  Allergies   Allergen Reactions    Albuterol     Aldactone [Spironolactone]     Aspirin     Atenolol     Bactrim [Sulfamethoxazole-Trimethoprim]     Codeine     Hydrocodone     Ibuprofen     Lisinopril     Mevacor [Lovastatin]     Mirapex [Pramipexole]     Other      novacaine    Penicillins     Ultram [Tramadol]     Zoloft [Sertraline]      Night sweats       OBJECTIVE    Vitals:   Vitals:    11/27/19 1140   BP: 136/76   Pulse: 100   Temp: 97 8 °F (36 6 °C)   TempSrc: Tympanic   SpO2: 94%   Weight: 61 7 kg (136 lb)   Height: 5' 1" (1 549 m)           Physical Exam   Constitutional: She appears well-developed and well-nourished  No distress  HENT:   Head: Normocephalic and atraumatic  Pulmonary/Chest: Effort normal    Neurological: She is alert  Skin:        Psychiatric: She has a normal mood and affect  Vitals reviewed                   DO [facility]   11/27/2019  12:47 PM

## 2019-12-06 ENCOUNTER — TELEPHONE (OUTPATIENT)
Dept: FAMILY MEDICINE CLINIC | Facility: CLINIC | Age: 84
End: 2019-12-06

## 2019-12-06 DIAGNOSIS — M54.16 LUMBAR RADICULOPATHY: ICD-10-CM

## 2019-12-06 DIAGNOSIS — F51.01 PRIMARY INSOMNIA: Primary | ICD-10-CM

## 2019-12-06 NOTE — TELEPHONE ENCOUNTER
The wound center did not call and schedule the patient so elisa asked if we can get her an appt next week  Call Emanuel Koo at [phone]

## 2019-12-09 RX ORDER — PRAMIPEXOLE DIHYDROCHLORIDE 0.5 MG/1
TABLET ORAL
Qty: 60 TABLET | Refills: 0 | Status: SHIPPED | OUTPATIENT
Start: 2019-12-09 | End: 2020-01-14 | Stop reason: ALTCHOICE

## 2019-12-09 RX ORDER — MIRTAZAPINE 15 MG/1
TABLET, FILM COATED ORAL
Qty: 30 TABLET | Refills: 0 | Status: SHIPPED | OUTPATIENT
Start: 2019-12-09 | End: 2020-01-14 | Stop reason: ALTCHOICE

## 2019-12-09 RX ORDER — GABAPENTIN 100 MG/1
CAPSULE ORAL
Qty: 180 CAPSULE | Refills: 0 | Status: SHIPPED | OUTPATIENT
Start: 2019-12-09 | End: 2020-01-09

## 2019-12-09 NOTE — TELEPHONE ENCOUNTER
12/9/19 lmomtcb - appt with [facility] in [facility] for 12/16/19 @ 12:45 - [phone]

## 2020-01-03 ENCOUNTER — TELEPHONE (OUTPATIENT)
Dept: GERIATRICS | Age: 85
End: 2020-01-03

## 2020-01-03 NOTE — TELEPHONE ENCOUNTER
- [facility] paperwork was faxed to office 12-26-19 to be completed for patient's niece, Archie Coni  - Patient's niece, Shawn Bean, called to inquire about status of paperwork  I relayed that physician will be in the office this afternoon  - Including  with this email for assistance with paperwork

## 2020-01-06 NOTE — TELEPHONE ENCOUNTER
Received completed/signed FMLA paperwork from      Faxed paperwork to [employer] (Employer for Janett Cornejo, niece of patient)  Scanned paperwork  Will call patient's niece, Janett Cornejo, to  completed FMLA paperwork per her request

## 2020-01-09 DIAGNOSIS — M54.16 LUMBAR RADICULOPATHY: ICD-10-CM

## 2020-01-09 RX ORDER — GABAPENTIN 100 MG/1
CAPSULE ORAL
Qty: 180 CAPSULE | Refills: 0 | Status: SHIPPED | OUTPATIENT
Start: 2020-01-09 | End: 2020-01-14 | Stop reason: SDUPTHER

## 2020-01-14 ENCOUNTER — APPOINTMENT (OUTPATIENT)
Dept: LAB | Facility: CLINIC | Age: 85
End: 2020-01-14
Payer: COMMERCIAL

## 2020-01-14 ENCOUNTER — OFFICE VISIT (OUTPATIENT)
Dept: FAMILY MEDICINE CLINIC | Facility: CLINIC | Age: 85
End: 2020-01-14
Payer: COMMERCIAL

## 2020-01-14 VITALS
OXYGEN SATURATION: 99 % | DIASTOLIC BLOOD PRESSURE: 76 MMHG | SYSTOLIC BLOOD PRESSURE: 144 MMHG | RESPIRATION RATE: 16 BRPM | HEART RATE: 52 BPM | TEMPERATURE: 97.1 F

## 2020-01-14 DIAGNOSIS — S31.819D WOUND OF RIGHT BUTTOCK, SUBSEQUENT ENCOUNTER: ICD-10-CM

## 2020-01-14 DIAGNOSIS — G25.81 RESTLESS LEGS SYNDROME: ICD-10-CM

## 2020-01-14 DIAGNOSIS — F51.01 PRIMARY INSOMNIA: ICD-10-CM

## 2020-01-14 DIAGNOSIS — M54.16 LUMBAR RADICULOPATHY: ICD-10-CM

## 2020-01-14 DIAGNOSIS — R44.1 VISUAL HALLUCINATIONS: ICD-10-CM

## 2020-01-14 DIAGNOSIS — F01.50 VASCULAR DEMENTIA WITHOUT BEHAVIORAL DISTURBANCE (HCC): ICD-10-CM

## 2020-01-14 DIAGNOSIS — E55.9 VITAMIN D DEFICIENCY: ICD-10-CM

## 2020-01-14 DIAGNOSIS — R30.0 DYSURIA: ICD-10-CM

## 2020-01-14 DIAGNOSIS — R30.0 DYSURIA: Primary | ICD-10-CM

## 2020-01-14 PROBLEM — R63.0 LOSS OF APPETITE: Status: RESOLVED | Noted: 2019-11-22 | Resolved: 2020-01-14

## 2020-01-14 PROBLEM — R41.0 CONFUSION AND DISORIENTATION: Status: RESOLVED | Noted: 2019-06-18 | Resolved: 2020-01-14

## 2020-01-14 PROBLEM — Z00.00 MEDICARE ANNUAL WELLNESS VISIT, SUBSEQUENT: Status: RESOLVED | Noted: 2019-07-02 | Resolved: 2020-01-14
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
## 2020-01-14 PROBLEM — N76.0 ACUTE VAGINITIS: Status: RESOLVED | Noted: 2019-09-06 | Resolved: 2020-01-14

## 2020-01-14 PROBLEM — K59.00 CONSTIPATION: Status: RESOLVED | Noted: 2019-11-22 | Resolved: 2020-01-14

## 2020-01-14 PROBLEM — E11.9 TYPE 2 DIABETES MELLITUS WITHOUT COMPLICATION (HCC): Status: ACTIVE | Noted: 2020-01-14

## 2020-01-14 PROBLEM — E11.9 TYPE 2 DIABETES MELLITUS WITHOUT COMPLICATION (HCC): Status: RESOLVED | Noted: 2020-01-14 | Resolved: 2020-01-14

## 2020-01-14 PROBLEM — R51.9 HEADACHE: Status: RESOLVED | Noted: 2019-09-28 | Resolved: 2020-01-14

## 2020-01-14 PROBLEM — B35.4 TINEA CORPORIS: Status: RESOLVED | Noted: 2019-06-18 | Resolved: 2020-01-14

## 2020-01-14 LAB
25(OH)D3 SERPL-MCNC: 48.6 NG/ML (ref 30–100)
ALBUMIN SERPL BCP-MCNC: 3.7 G/DL (ref 3.5–5)
ALP SERPL-CCNC: 95 U/L (ref 46–116)
ALT SERPL W P-5'-P-CCNC: 14 U/L (ref 12–78)
ANION GAP SERPL CALCULATED.3IONS-SCNC: 2 MMOL/L (ref 4–13)
AST SERPL W P-5'-P-CCNC: 13 U/L (ref 5–45)
BASOPHILS # BLD AUTO: 0.04 THOUSANDS/ΜL (ref 0–0.1)
BASOPHILS NFR BLD AUTO: 1 % (ref 0–1)
BILIRUB SERPL-MCNC: 0.45 MG/DL (ref 0.2–1)
BUN SERPL-MCNC: 17 MG/DL (ref 5–25)
CALCIUM SERPL-MCNC: 9.6 MG/DL (ref 8.3–10.1)
CHLORIDE SERPL-SCNC: 105 MMOL/L (ref 100–108)
CO2 SERPL-SCNC: 30 MMOL/L (ref 21–32)
CREAT SERPL-MCNC: 1.08 MG/DL (ref 0.6–1.3)
EOSINOPHIL # BLD AUTO: 0.19 THOUSAND/ΜL (ref 0–0.61)
EOSINOPHIL NFR BLD AUTO: 2 % (ref 0–6)
ERYTHROCYTE [DISTWIDTH] IN BLOOD BY AUTOMATED COUNT: 13.8 % (ref 11.6–15.1)
GFR SERPL CREATININE-BSD FRML MDRD: 50 ML/MIN/1.73SQ M
GLUCOSE SERPL-MCNC: 98 MG/DL (ref 65–140)
HCT VFR BLD AUTO: 47.2 % (ref 34.8–46.1)
HGB BLD-MCNC: 14.4 G/DL (ref 11.5–15.4)
IMM GRANULOCYTES # BLD AUTO: 0.02 THOUSAND/UL (ref 0–0.2)
IMM GRANULOCYTES NFR BLD AUTO: 0 % (ref 0–2)
LYMPHOCYTES # BLD AUTO: 1.09 THOUSANDS/ΜL (ref 0.6–4.47)
LYMPHOCYTES NFR BLD AUTO: 14 % (ref 14–44)
MCH RBC QN AUTO: 29.4 PG (ref 26.8–34.3)
MCHC RBC AUTO-ENTMCNC: 30.5 G/DL (ref 31.4–37.4)
MCV RBC AUTO: 97 FL (ref 82–98)
MONOCYTES # BLD AUTO: 0.56 THOUSAND/ΜL (ref 0.17–1.22)
MONOCYTES NFR BLD AUTO: 7 % (ref 4–12)
NEUTROPHILS # BLD AUTO: 6.15 THOUSANDS/ΜL (ref 1.85–7.62)
NEUTS SEG NFR BLD AUTO: 76 % (ref 43–75)
NRBC BLD AUTO-RTO: 0 /100 WBCS
PLATELET # BLD AUTO: 312 THOUSANDS/UL (ref 149–390)
PMV BLD AUTO: 11.3 FL (ref 8.9–12.7)
POTASSIUM SERPL-SCNC: 4.4 MMOL/L (ref 3.5–5.3)
PROT SERPL-MCNC: 7.7 G/DL (ref 6.4–8.2)
RBC # BLD AUTO: 4.89 MILLION/UL (ref 3.81–5.12)
SL AMB  POCT GLUCOSE, UA: ABNORMAL
SL AMB LEUKOCYTE ESTERASE,UA: ABNORMAL
SL AMB POCT BILIRUBIN,UA: ABNORMAL
SL AMB POCT BLOOD,UA: ABNORMAL
SL AMB POCT CLARITY,UA: ABNORMAL
SL AMB POCT COLOR,UA: YELLOW
SL AMB POCT KETONES,UA: ABNORMAL
SL AMB POCT NITRITE,UA: ABNORMAL
SL AMB POCT PH,UA: 7.5
SL AMB POCT SPECIFIC GRAVITY,UA: 1.02
SL AMB POCT URINE PROTEIN: 100
SL AMB POCT UROBILINOGEN: 0.2
SODIUM SERPL-SCNC: 137 MMOL/L (ref 136–145)
WBC # BLD AUTO: 8.05 THOUSAND/UL (ref 4.31–10.16)

## 2020-01-14 PROCEDURE — 87186 SC STD MICRODIL/AGAR DIL: CPT | Performed by: NURSE PRACTITIONER

## 2020-01-14 PROCEDURE — 80053 COMPREHEN METABOLIC PANEL: CPT

## 2020-01-14 PROCEDURE — 81003 URINALYSIS AUTO W/O SCOPE: CPT | Performed by: NURSE PRACTITIONER

## 2020-01-14 PROCEDURE — 1160F RVW MEDS BY RX/DR IN RCRD: CPT | Performed by: NURSE PRACTITIONER

## 2020-01-14 PROCEDURE — 82306 VITAMIN D 25 HYDROXY: CPT

## 2020-01-14 PROCEDURE — 36415 COLL VENOUS BLD VENIPUNCTURE: CPT

## 2020-01-14 PROCEDURE — 87077 CULTURE AEROBIC IDENTIFY: CPT | Performed by: NURSE PRACTITIONER

## 2020-01-14 PROCEDURE — 85025 COMPLETE CBC W/AUTO DIFF WBC: CPT

## 2020-01-14 PROCEDURE — 1036F TOBACCO NON-USER: CPT | Performed by: NURSE PRACTITIONER

## 2020-01-14 PROCEDURE — 87086 URINE CULTURE/COLONY COUNT: CPT | Performed by: NURSE PRACTITIONER

## 2020-01-14 PROCEDURE — 99214 OFFICE O/P EST MOD 30 MIN: CPT | Performed by: NURSE PRACTITIONER

## 2020-01-14 RX ORDER — GABAPENTIN 100 MG/1
200 CAPSULE ORAL 3 TIMES DAILY
Qty: 540 CAPSULE | Refills: 1 | Status: SHIPPED | OUTPATIENT
Start: 2020-01-14 | End: 2020-08-14

## 2020-01-14 RX ORDER — MIRTAZAPINE 30 MG/1
30 TABLET, FILM COATED ORAL
Qty: 90 TABLET | Refills: 3 | Status: SHIPPED | OUTPATIENT
Start: 2020-01-14 | End: 2020-10-19 | Stop reason: SDUPTHER

## 2020-01-14 RX ORDER — CEPHALEXIN 500 MG/1
500 CAPSULE ORAL EVERY 12 HOURS SCHEDULED
Qty: 14 CAPSULE | Refills: 0 | Status: SHIPPED | OUTPATIENT
Start: 2020-01-14 | End: 2020-01-21

## 2020-01-14 RX ORDER — PRAMIPEXOLE DIHYDROCHLORIDE 1 MG/1
1 TABLET ORAL DAILY
Qty: 90 TABLET | Refills: 1 | Status: SHIPPED | OUTPATIENT
Start: 2020-01-14 | End: 2020-10-28

## 2020-01-14 NOTE — PROGRESS NOTES
Assessment/Plan:    Lumbar radiculopathy  Patient is currently participating in PT in the home     Vascular dementia without behavioral disturbance  Per family and patient more confusion and concerns for recurrent UTI will check urine    Primary insomnia  Patient doing well on the Remeron 30 mg nightly     Visual hallucinations  Symptoms are controlled     Wound of right buttock  Patient wound has healed following with wound center     Dysuria  Positive urine dip in office will treat with Cephalexin 500 bid x 7 days and send out for urine culture       Diagnoses and all orders for this visit:    Dysuria  -     Comprehensive metabolic panel; Future  -     CBC and differential; Future  -     Urine culture; Future  -     cephalexin (KEFLEX) 500 mg capsule; Take 1 capsule (500 mg total) by mouth every 12 (twelve) hours for 7 days    Wound of right buttock, subsequent encounter    Visual hallucinations  -     Comprehensive metabolic panel; Future  -     CBC and differential; Future  -     POCT urine dip auto non-scope    Restless legs syndrome  -     pramipexole (MIRAPEX) 1 mg tablet; Take 1 tablet (1 mg total) by mouth daily  -     Comprehensive metabolic panel; Future  -     CBC and differential; Future    Primary insomnia  -     mirtazapine (REMERON) 30 mg tablet; Take 1 tablet (30 mg total) by mouth daily at bedtime    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)    Lumbar radiculopathy  -     gabapentin (NEURONTIN) 100 mg capsule; Take 2 capsules (200 mg total) by mouth 3 (three) times a day    Vitamin D deficiency  -     Vitamin D 25 hydroxy; Future          Subjective:      Patient ID: [id number] Vasquez is a 80 y o  female  Patient here today for follow up with her daughter and granddaughter  Patient family reports that she is having less of appetite and seemingly more confusion and concerns about having a recurrent UTI  Patient reports that she is having burning with urination       Patient also has a sore on her bottom and using a topical dressing and is healing well  Patient has pain in her lower back  Patient also having pain in the right leg and starts in back and travels down her leg  The following portions of the patient's history were reviewed and updated as appropriate:   She  has a past medical history of Ankle fracture, Arthritis, 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[facility] ), Bronchitis, Cancer ([facility] ), [facility] ), Dependent edema, Depression, Diabetes mellitus ([facility] ), Hypercholesterolemia, Hypertension, Kidney stone, Oth abn and inconclusive findings on dx imaging of breast, Pes planus, Renal calculus, Restless leg syndrome, Rib pain, Sprain, neck, and Varicose veins of left lower extremity  She   Patient Active Problem List    Diagnosis Date Noted    Vitamin D deficiency 01/14/2020    Wound of right buttock 11/27/2019    Primary insomnia 11/22/2019    Closed compression fracture of L5 lumbar vertebra, initial encounter ([facility] ) 10/20/2019    Dysuria 10/11/2019    Degeneration of lumbar intervertebral disc 06/18/2019    Lumbar radiculopathy 06/18/2019    Arthropathy of lumbar facet joint 06/18/2019    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) 06/18/2019    Visual hallucinations 06/18/2019    Restless legs syndrome 06/18/2019     She  has a past surgical history that includes Lung cancer surgery and Appendectomy  Her family history includes Dementia in her brother; No Known Problems in her father and mother  She  reports that she has quit smoking  She has never used smokeless tobacco  She reports that she does not drink alcohol or use drugs  She is allergic to albuterol; aldactone [spironolactone]; aspirin; atenolol; bactrim [sulfamethoxazole-trimethoprim]; codeine; hydrocodone; ibuprofen; lisinopril; mevacor [lovastatin]; mirapex [pramipexole]; other; penicillins; ultram [tramadol]; and zoloft [sertraline]       Review of Systems   Constitutional: Negative  HENT: Negative  Eyes: Negative  Respiratory: Negative  Cardiovascular: Negative  Gastrointestinal: Negative  Endocrine: Negative  Genitourinary: Positive for dysuria  Musculoskeletal: Positive for back pain  Skin: Negative  Allergic/Immunologic: Negative  Neurological: Negative  Hematological: Negative  Psychiatric/Behavioral: Negative  Objective:      /76 (BP Location: Left arm, Patient Position: Sitting, Cuff Size: Standard)   Pulse (!) 52   Temp (!) 97 1 °F (36 2 °C) (Tympanic)   Resp 16   SpO2 99%          Physical Exam   Constitutional: She is oriented to person, place, and time  Vital signs are normal  She appears well-developed and well-nourished  No distress  HENT:   Head: Normocephalic and atraumatic  Eyes: Pupils are equal, round, and reactive to light  Neck: Normal range of motion  No thyromegaly present  Cardiovascular: Normal rate, regular rhythm, normal heart sounds and intact distal pulses  No murmur heard  Pulmonary/Chest: Effort normal and breath sounds normal  No respiratory distress  She has no wheezes  Abdominal: Soft  Bowel sounds are normal    Musculoskeletal: Normal range of motion  Lumbar back: She exhibits pain and spasm  Seated in wheelchair    Neurological: She is alert and oriented to person, place, and time  Skin: Skin is warm and dry  Psychiatric: She has a normal mood and affect  Nursing note and vitals reviewed

## 2020-01-14 NOTE — ASSESSMENT & PLAN NOTE
Positive urine dip in office will treat with Cephalexin 500 bid x 7 days and send out for urine culture

## 2020-01-16 LAB
BACTERIA UR CULT: ABNORMAL
BACTERIA UR CULT: ABNORMAL

## 2020-02-20 ENCOUNTER — TELEPHONE (OUTPATIENT)
Dept: FAMILY MEDICINE CLINIC | Facility: CLINIC | Age: 85
End: 2020-02-20

## 2020-02-20 NOTE — TELEPHONE ENCOUNTER
Alex Thompson called because Lien Linn has been falling asleep while at the table eating breakfast, she is unsure if her medications should be adjusted

## 2020-06-12 ENCOUNTER — TELEPHONE (OUTPATIENT)
Dept: FAMILY MEDICINE CLINIC | Facility: CLINIC | Age: 85
End: 2020-06-12

## 2020-06-12 DIAGNOSIS — Z74.09 IMMOBILITY: Primary | ICD-10-CM

## 2020-06-18 ENCOUNTER — OFFICE VISIT (OUTPATIENT)
Dept: FAMILY MEDICINE CLINIC | Facility: CLINIC | Age: 85
End: 2020-06-18
Payer: COMMERCIAL

## 2020-06-18 VITALS
BODY MASS INDEX: 25.86 KG/M2 | OXYGEN SATURATION: 98 % | HEART RATE: 76 BPM | TEMPERATURE: 96.9 F | HEIGHT: 61 IN | WEIGHT: 137 LBS | DIASTOLIC BLOOD PRESSURE: 72 MMHG | SYSTOLIC BLOOD PRESSURE: 142 MMHG

## 2020-06-18 DIAGNOSIS — S32.050A CLOSED COMPRESSION FRACTURE OF L5 LUMBAR VERTEBRA, INITIAL ENCOUNTER (HCC): ICD-10-CM

## 2020-06-18 DIAGNOSIS — M54.16 LUMBAR RADICULOPATHY: Primary | ICD-10-CM

## 2020-06-18 DIAGNOSIS — R26.9 ABNORMALITY OF GAIT: ICD-10-CM

## 2020-06-18 PROBLEM — R30.0 DYSURIA: Status: RESOLVED | Noted: 2019-10-11 | Resolved: 2020-06-18

## 2020-06-18 PROCEDURE — 99214 OFFICE O/P EST MOD 30 MIN: CPT | Performed by: FAMILY MEDICINE

## 2020-06-18 PROCEDURE — 1036F TOBACCO NON-USER: CPT | Performed by: FAMILY MEDICINE

## 2020-06-18 PROCEDURE — 3008F BODY MASS INDEX DOCD: CPT | Performed by: FAMILY MEDICINE

## 2020-06-18 PROCEDURE — 1160F RVW MEDS BY RX/DR IN RCRD: CPT | Performed by: FAMILY MEDICINE

## 2020-08-14 DIAGNOSIS — M54.16 LUMBAR RADICULOPATHY: ICD-10-CM

## 2020-08-14 RX ORDER — GABAPENTIN 100 MG/1
200 CAPSULE ORAL 3 TIMES DAILY
Qty: 540 CAPSULE | Refills: 1 | Status: SHIPPED | OUTPATIENT
Start: 2020-08-14 | End: 2021-01-25

## 2020-09-24 NOTE — PROGRESS NOTES
Darolyn Canavan 11/3/1924 female MRN: [id number]      ASSESSMENT/PLAN  Problem List Items Addressed This Visit        Digestive    Rectal bleeding - Primary    Relevant Orders    Occult Bloood,Fecal Immunochemical    CBC and differential       Other    Medicare annual wellness visit, subsequent      Other Visit Diagnoses     Need for influenza vaccination        Relevant Orders    influenza vaccine, high-dose, PF 0 7 mL (FLUZONE HIGH-DOSE) (Completed)        Rectal bleeding  Single episode in setting of constipation likely related to hemorrhoid vs anal fissure  No symptoms of anemia reported  Update CBC  Check FIT -- if (+) consider referral to GI for colonoscopy if pt interested in further workup  No future appointments  SUBJECTIVE  CC: Rectal Bleeding (Patient seen in office today for a c/o having blood in her stool - she stated that she had a BM and when she got off the toilet there was blood in the stool) and Medicare Wellness Visit      HPI:  Darolyn Canavan is a 80 y o  female who presents with her niece due to blood in stool  Blood in stool 5 days ago -- mixed in stool and in water, unsure if it was on the toilet paper   Does have constipation, does not have a BM every day   Doesn't feel like she was straining to have BM   Denies any further episodes or bleeding elsewhere   No associated shortness of breath, dizziness   Not on blood thinners     Review of Systems   Respiratory: Negative for shortness of breath  Gastrointestinal: Positive for blood in stool and constipation  Negative for abdominal pain and diarrhea  Genitourinary: Negative for hematuria  Neurological: Negative for dizziness and light-headedness         Historical Information   The patient history was reviewed and updated as follows:    Past Medical History:   Diagnosis Date    Ankle fracture     Arthritis     left hip    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 )     with left ureter    Bronchitis     [facility]     [facility]     Dementia [facility] )     Dependent edema     Depression     Diabetes mellitus ([facility] )     Hypercholesterolemia     Hypertension     Kidney stone     Oth abn and inconclusive findings on dx imaging of breast     Pes planus     Renal calculus     Restless leg syndrome     Rib pain     Sprain, neck     Varicose veins of left lower extremity      Past Surgical History:   Procedure Laterality Date    APPENDECTOMY      LUNG CANCER SURGERY       Family History   Problem Relation Age of Onset    No Known Problems Mother     No Known Problems Father     Dementia Brother     Breast cancer Neg Hx     Colon cancer Neg Hx     Ovarian cancer Neg Hx     Uterine cancer Neg Hx     Cervical cancer Neg Hx       Social History   Social History     Substance and Sexual Activity   Alcohol Use Never    Frequency: Never     Social History     Substance and Sexual Activity   Drug Use Never     Social History     Tobacco Use   Smoking Status Former Smoker   Smokeless Tobacco Never Used       Medications:     Current Outpatient Medications:     acetaminophen (TYLENOL) 500 mg tablet, Take 500 mg by mouth 2 (two) times a day, Disp: , Rfl:     gabapentin (NEURONTIN) 100 mg capsule, TAKE 2 CAPSULES (200 MG TOTAL) BY MOUTH 3 (THREE) TIMES A DAY, Disp: 540 capsule, Rfl: 1    mirtazapine (REMERON) 30 mg tablet, Take 1 tablet (30 mg total) by mouth daily at bedtime, Disp: 90 tablet, Rfl: 3    pramipexole (MIRAPEX) 1 mg tablet, Take 1 tablet (1 mg total) by mouth daily, Disp: 90 tablet, Rfl: 1  Allergies   Allergen Reactions    Albuterol     Aldactone [Spironolactone]     Aspirin     Atenolol     Bactrim [Sulfamethoxazole-Trimethoprim]     Codeine     Hydrocodone     Ibuprofen     Lisinopril     Mevacor [Lovastatin]     Mirapex [Pramipexole]     Morphine And Related     Other      novacaine    Penicillins     Procaine     Salicylates     Ultram [Tramadol]     Zoloft [Sertraline]      Night sweats OBJECTIVE    Vitals:   Vitals:    09/25/20 0755   BP: 140/60   Pulse: 77   Temp: (!) 96 5 °F (35 8 °C)   SpO2: 97%           Physical Exam  Vitals signs and nursing note reviewed  Constitutional:       General: She is not in acute distress  Appearance: Normal appearance  HENT:      Head: Normocephalic and atraumatic  Cardiovascular:      Rate and Rhythm: Normal rate and regular rhythm  Pulmonary:      Effort: Pulmonary effort is normal  No respiratory distress  Breath sounds: Normal breath sounds  Abdominal:      General: Bowel sounds are normal  There is no distension  Palpations: Abdomen is soft  Tenderness: There is no abdominal tenderness  Musculoskeletal:      Comments: In wheelchair on exam   Skin:     General: Skin is warm and dry  Neurological:      General: No focal deficit present  Mental Status: She is alert     Psychiatric:         Mood and Affect: Mood normal                     Jackie Posey DO  [facility]   9/25/2020  8:11 AM

## 2020-09-25 ENCOUNTER — OFFICE VISIT (OUTPATIENT)
Dept: FAMILY MEDICINE CLINIC | Facility: CLINIC | Age: 85
End: 2020-09-25
Payer: COMMERCIAL

## 2020-09-25 ENCOUNTER — APPOINTMENT (OUTPATIENT)
Dept: LAB | Facility: CLINIC | Age: 85
End: 2020-09-25
Payer: COMMERCIAL

## 2020-09-25 VITALS
DIASTOLIC BLOOD PRESSURE: 60 MMHG | HEART RATE: 77 BPM | SYSTOLIC BLOOD PRESSURE: 140 MMHG | OXYGEN SATURATION: 97 % | TEMPERATURE: 96.5 F

## 2020-09-25 DIAGNOSIS — K62.5 RECTAL BLEEDING: ICD-10-CM

## 2020-09-25 DIAGNOSIS — K62.5 RECTAL BLEEDING: Primary | ICD-10-CM

## 2020-09-25 DIAGNOSIS — Z23 NEED FOR INFLUENZA VACCINATION: ICD-10-CM

## 2020-09-25 DIAGNOSIS — Z00.00 MEDICARE ANNUAL WELLNESS VISIT, SUBSEQUENT: ICD-10-CM

## 2020-09-25 LAB
BASOPHILS # BLD AUTO: 0.04 THOUSANDS/ΜL (ref 0–0.1)
BASOPHILS NFR BLD AUTO: 1 % (ref 0–1)
EOSINOPHIL # BLD AUTO: 0.07 THOUSAND/ΜL (ref 0–0.61)
EOSINOPHIL NFR BLD AUTO: 1 % (ref 0–6)
ERYTHROCYTE [DISTWIDTH] IN BLOOD BY AUTOMATED COUNT: 14.3 % (ref 11.6–15.1)
HCT VFR BLD AUTO: 43.8 % (ref 34.8–46.1)
HGB BLD-MCNC: 13.4 G/DL (ref 11.5–15.4)
IMM GRANULOCYTES # BLD AUTO: 0.02 THOUSAND/UL (ref 0–0.2)
IMM GRANULOCYTES NFR BLD AUTO: 0 % (ref 0–2)
LYMPHOCYTES # BLD AUTO: 0.97 THOUSANDS/ΜL (ref 0.6–4.47)
LYMPHOCYTES NFR BLD AUTO: 14 % (ref 14–44)
MCH RBC QN AUTO: 29.1 PG (ref 26.8–34.3)
MCHC RBC AUTO-ENTMCNC: 30.6 G/DL (ref 31.4–37.4)
MCV RBC AUTO: 95 FL (ref 82–98)
MONOCYTES # BLD AUTO: 0.6 THOUSAND/ΜL (ref 0.17–1.22)
MONOCYTES NFR BLD AUTO: 9 % (ref 4–12)
NEUTROPHILS # BLD AUTO: 5.17 THOUSANDS/ΜL (ref 1.85–7.62)
NEUTS SEG NFR BLD AUTO: 75 % (ref 43–75)
NRBC BLD AUTO-RTO: 0 /100 WBCS
PLATELET # BLD AUTO: 248 THOUSANDS/UL (ref 149–390)
PMV BLD AUTO: 11.8 FL (ref 8.9–12.7)
RBC # BLD AUTO: 4.6 MILLION/UL (ref 3.81–5.12)
WBC # BLD AUTO: 6.87 THOUSAND/UL (ref 4.31–10.16)

## 2020-09-25 PROCEDURE — 1160F RVW MEDS BY RX/DR IN RCRD: CPT | Performed by: FAMILY MEDICINE

## 2020-09-25 PROCEDURE — 90662 IIV NO PRSV INCREASED AG IM: CPT | Performed by: FAMILY MEDICINE

## 2020-09-25 PROCEDURE — G0008 ADMIN INFLUENZA VIRUS VAC: HCPCS | Performed by: FAMILY MEDICINE

## 2020-09-25 PROCEDURE — 1170F FXNL STATUS ASSESSED: CPT | Performed by: FAMILY MEDICINE

## 2020-09-25 PROCEDURE — 1036F TOBACCO NON-USER: CPT | Performed by: FAMILY MEDICINE

## 2020-09-25 PROCEDURE — 1125F AMNT PAIN NOTED PAIN PRSNT: CPT | Performed by: FAMILY MEDICINE

## 2020-09-25 PROCEDURE — G0439 PPPS, SUBSEQ VISIT: HCPCS | Performed by: FAMILY MEDICINE

## 2020-09-25 PROCEDURE — 85025 COMPLETE CBC W/AUTO DIFF WBC: CPT

## 2020-09-25 PROCEDURE — 99214 OFFICE O/P EST MOD 30 MIN: CPT | Performed by: FAMILY MEDICINE

## 2020-09-25 PROCEDURE — 36415 COLL VENOUS BLD VENIPUNCTURE: CPT

## 2020-09-25 NOTE — PROGRESS NOTES
Assessment and Plan:     Problem List Items Addressed This Visit        Digestive    Rectal bleeding - Primary    Relevant Orders    Occult Bloood,Fecal Immunochemical    CBC and differential       Other    Medicare annual wellness visit, subsequent      Other Visit Diagnoses     Need for influenza vaccination        Relevant Orders    influenza vaccine, high-dose, PF 0 7 mL (FLUZONE HIGH-DOSE) (Completed)           Preventive health issues were discussed with patient, and age appropriate screening tests were ordered as noted in patient's After Visit Summary  Personalized health advice and appropriate referrals for health education or preventive services given if needed, as noted in patient's After Visit Summary       History of Present Illness:     Patient presents for Medicare Annual Wellness visit    Patient Care Team:  FARZANA Talbot as PCP - General (Family Medicine)     Problem List:     Patient Active Problem List   Diagnosis    Degeneration of lumbar intervertebral disc    Lumbar radiculopathy    Arthropathy of lumbar facet joint    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)    Visual hallucinations    Restless legs syndrome    Medicare annual wellness visit, subsequent    Closed compression fracture [facility] L5 lumbar vertebra[facility] ([facility] )    Primary insomnia    Wound of right buttock    Vitamin D deficiency    Palpitations    Rectal bleeding      Past Medical and Surgical History:     Past Medical History:   Diagnosis Date    Ankle fracture     Arthritis     left hip    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[facility] )     with left ureter    Bronchitis     Cancer ([facility] )     Carcinoma, lung ([facility] )     Dementia ([facility] )     Dependent edema     Depression     Diabetes mellitus ([facility] )     Hypercholesterolemia     Hypertension     Kidney stone     Oth abn and inconclusive findings on dx imaging of breast     Pes planus     Renal calculus     Restless leg syndrome     Rib pain     Sprain, neck     Varicose veins of left lower extremity      Past Surgical History:   Procedure Laterality Date    APPENDECTOMY      LUNG CANCER SURGERY        Family History:     Family History   Problem Relation Age of Onset    No Known Problems Mother     No Known Problems Father     Dementia Brother     Breast cancer Neg Hx     Colon cancer Neg Hx     Ovarian cancer Neg Hx     Uterine cancer Neg Hx     Cervical cancer Neg Hx       Social History:        Social History     Socioeconomic History    Marital status:       Spouse name: None    Number of children: None    Years of education: None    Highest education level: None   Occupational History    None   Social Needs    Financial resource strain: None    Food insecurity     Worry: None     Inability: None    Transportation needs     Medical: None     Non-medical: None   Tobacco Use    Smoking status: Former Smoker    Smokeless tobacco: Never Used   Substance and Sexual Activity    Alcohol use: Never     Frequency: Never    Drug use: Never    Sexual activity: Not Currently     Birth control/protection: Post-menopausal   Lifestyle    Physical activity     Days per week: None     Minutes per session: None    Stress: None   Relationships    Social connections     Talks on phone: None     Gets together: None     Attends Faith service: None     Active member of club or organization: None     Attends meetings of clubs or organizations: None     Relationship status: None    Intimate partner violence     Fear of current or ex partner: None     Emotionally abused: None     Physically abused: None     Forced sexual activity: None   Other Topics Concern    None   Social History Narrative    Lives at home with niece      Medications and Allergies:     Current Outpatient Medications   Medication Sig Dispense Refill    acetaminophen (TYLENOL) 500 mg tablet Take 500 mg by mouth 2 (two) times a day      gabapentin (NEURONTIN) 100 mg capsule TAKE 2 CAPSULES (200 MG TOTAL) BY MOUTH 3 (THREE) TIMES A  capsule 1    mirtazapine (REMERON) 30 mg tablet Take 1 tablet (30 mg total) by mouth daily at bedtime 90 tablet 3    pramipexole (MIRAPEX) 1 mg tablet Take 1 tablet (1 mg total) by mouth daily 90 tablet 1     No current facility-administered medications for this visit  Allergies   Allergen Reactions    Albuterol     Aldactone [Spironolactone]     Aspirin     Atenolol     Bactrim [Sulfamethoxazole-Trimethoprim]     Codeine     Hydrocodone     Ibuprofen     Lisinopril     Mevacor [Lovastatin]     Mirapex [Pramipexole]     Morphine And Related     Other      novacaine    Penicillins     Procaine     Salicylates     Ultram [Tramadol]     Zoloft [Sertraline]      Night sweats      Immunizations:     Immunization History   Administered Date(s) Administered    INFLUENZA 10/23/2014, 11/22/2015, 02/14/2018, 11/06/2018    Influenza, high dose seasonal 0 7 mL 09/25/2020    Influenza, recombinant, quadrivalent,injectable, preservative free 10/11/2019      Health Maintenance: There are no preventive care reminders to display for this patient  Topic Date Due    Influenza Vaccine  07/01/2020      Medicare Health Risk Assessment:     /60   Pulse 77   Temp (!) 96 5 °F (35 8 °C)   SpO2 97%      Marcel Sweeney is here for her Subsequent Wellness visit  Last Medicare Wellness visit information reviewed, patient interviewed and updates made to the record today  Health Risk Assessment:   Patient rates overall health as fair  Patient feels that their physical health rating is Same  Eyesight was rated as Slightly worse  Hearing was rated as Same  Patient feels that their emotional and mental health rating is Same  Pain experienced in the last 7 days has been None  Patient states that she has experienced weight loss or gain in last 6 months  Fall Risk Screening:    In the past year, patient has experienced: no history of falling in past year      Urinary Incontinence Screening:   Patient has leaked urine accidently in the last six months  incont at night    Home Safety:  Patient does not have trouble with stairs inside or outside of their home  Patient has working smoke alarms and has no working carbon monoxide detector  Home safety hazards include: none  Nutrition:   Current diet is Regular  Medications:   Patient is not currently taking any over-the-counter supplements  Patient is able to manage medications  Activities of Daily Living (ADLs)/Instrumental Activities of Daily Living (IADLs):   Walk and transfer into and out of bed and chair?: Yes  Dress and groom yourself?: Yes    Bathe or shower yourself?: Yes    Feed yourself? Yes  Do your laundry/housekeeping?: Yes  Manage your money, pay your bills and track your expenses?: No  Make your own meals?: Yes    Do your own shopping?: Yes    ADL comments: POA does bills    Durable Medical Equipment Suppliers  N/A    Previous Hospitalizations:   Any hospitalizations or ED visits within the last 12 months?: Yes    How many hospitalizations have you had in the last year?: 1-2    Hospitalization Comments: UTI     Advance Care Planning:   Living will: Yes    Durable POA for healthcare:  Yes    Advanced directive: Yes      Cognitive Screening:   Provider or family/friend/caregiver concerned regarding cognition?: No    PREVENTIVE SCREENINGS      Cardiovascular Screening:    General: Screening Not Indicated      Diabetes Screening:     General: Screening Current      Colorectal Cancer Screening:     General: Screening Not Indicated      Breast Cancer Screening:     General: Screening Not Indicated      Cervical Cancer Screening:    General: Screening Not Indicated      Osteoporosis Screening:    General: Screening Not Indicated      Abdominal Aortic Aneurysm (AAA) Screening:        General: Screening Not Indicated      Lung Cancer Screening:     General: Screening Not Indicated and History Lung Cancer      Hepatitis C Screening:    General: Risks and Benefits Discussed and Screening Not Indicated      Kaylene Bryans, DO

## 2020-10-19 DIAGNOSIS — F51.01 PRIMARY INSOMNIA: ICD-10-CM

## 2020-10-19 RX ORDER — MIRTAZAPINE 30 MG/1
30 TABLET, FILM COATED ORAL
Qty: 90 TABLET | Refills: 3 | Status: SHIPPED | OUTPATIENT
Start: 2020-10-19 | End: 2021-11-15

## 2020-10-28 DIAGNOSIS — G25.81 RESTLESS LEGS SYNDROME: ICD-10-CM

## 2020-10-28 RX ORDER — PRAMIPEXOLE DIHYDROCHLORIDE 1 MG/1
TABLET ORAL
Qty: 90 TABLET | Refills: 1 | Status: SHIPPED | OUTPATIENT
Start: 2020-10-28 | End: 2021-07-08

## 2021-01-24 DIAGNOSIS — M54.16 LUMBAR RADICULOPATHY: ICD-10-CM

## 2021-01-25 RX ORDER — GABAPENTIN 100 MG/1
200 CAPSULE ORAL 3 TIMES DAILY
Qty: 540 CAPSULE | Refills: 1 | Status: SHIPPED | OUTPATIENT
Start: 2021-01-25 | End: 2021-08-23

## 2021-03-12 ENCOUNTER — RA CDI HCC (OUTPATIENT)
Dept: OTHER | Facility: HOSPITAL | Age: 86
End: 2021-03-12

## 2021-03-12 NOTE — PROGRESS NOTES
[facility]  coding oppertunities             Chart reviewed, (number of) suggestions sent to provider: 1     Problem listed updated   Provider Accepted, (number of) suggestions accepted: 1              [place] [id number]  coding oppertunities             Chart reviewed, (number of) suggestions sent to provider: 1      DX F01 50 Vascular dementia without behavioral disturbance

## 2021-03-18 ENCOUNTER — OFFICE VISIT (OUTPATIENT)
Dept: FAMILY MEDICINE CLINIC | Facility: CLINIC | Age: 86
End: 2021-03-18
Payer: COMMERCIAL

## 2021-03-18 ENCOUNTER — APPOINTMENT (OUTPATIENT)
Dept: LAB | Facility: CLINIC | Age: 86
End: 2021-03-18
Payer: COMMERCIAL

## 2021-03-18 ENCOUNTER — IMMUNIZATIONS (OUTPATIENT)
Dept: FAMILY MEDICINE CLINIC | Facility: HOSPITAL | Age: 86
End: 2021-03-18
Payer: COMMERCIAL

## 2021-03-18 VITALS
RESPIRATION RATE: 20 BRPM | BODY MASS INDEX: 25.36 KG/M2 | WEIGHT: 134.3 LBS | OXYGEN SATURATION: 95 % | DIASTOLIC BLOOD PRESSURE: 78 MMHG | TEMPERATURE: 96.6 F | SYSTOLIC BLOOD PRESSURE: 138 MMHG | HEART RATE: 82 BPM | HEIGHT: 61 IN

## 2021-03-18 DIAGNOSIS — Z23 ENCOUNTER FOR IMMUNIZATION: Primary | ICD-10-CM

## 2021-03-18 DIAGNOSIS — R01.1 HEART MURMUR: ICD-10-CM

## 2021-03-18 DIAGNOSIS — R42 DIZZINESS: Primary | ICD-10-CM

## 2021-03-18 DIAGNOSIS — R42 DIZZINESS: ICD-10-CM

## 2021-03-18 LAB
ALBUMIN SERPL BCP-MCNC: 3.6 G/DL (ref 3.5–5)
ALP SERPL-CCNC: 110 U/L (ref 46–116)
ALT SERPL W P-5'-P-CCNC: 14 U/L (ref 12–78)
ANION GAP SERPL CALCULATED.3IONS-SCNC: 2 MMOL/L (ref 4–13)
AST SERPL W P-5'-P-CCNC: 17 U/L (ref 5–45)
BASOPHILS # BLD AUTO: 0.04 THOUSANDS/ΜL (ref 0–0.1)
BASOPHILS NFR BLD AUTO: 1 % (ref 0–1)
BILIRUB SERPL-MCNC: 0.44 MG/DL (ref 0.2–1)
BUN SERPL-MCNC: 18 MG/DL (ref 5–25)
CALCIUM SERPL-MCNC: 8.6 MG/DL (ref 8.3–10.1)
CHLORIDE SERPL-SCNC: 107 MMOL/L (ref 100–108)
CO2 SERPL-SCNC: 30 MMOL/L (ref 21–32)
CREAT SERPL-MCNC: 1.24 MG/DL (ref 0.6–1.3)
EOSINOPHIL # BLD AUTO: 0.03 THOUSAND/ΜL (ref 0–0.61)
EOSINOPHIL NFR BLD AUTO: 0 % (ref 0–6)
ERYTHROCYTE [DISTWIDTH] IN BLOOD BY AUTOMATED COUNT: 13.6 % (ref 11.6–15.1)
GFR SERPL CREATININE-BSD FRML MDRD: 42 ML/MIN/1.73SQ M
GLUCOSE SERPL-MCNC: 139 MG/DL (ref 65–140)
HCT VFR BLD AUTO: 44.1 % (ref 34.8–46.1)
HGB BLD-MCNC: 13.7 G/DL (ref 11.5–15.4)
IMM GRANULOCYTES # BLD AUTO: 0.03 THOUSAND/UL (ref 0–0.2)
IMM GRANULOCYTES NFR BLD AUTO: 0 % (ref 0–2)
LYMPHOCYTES # BLD AUTO: 0.88 THOUSANDS/ΜL (ref 0.6–4.47)
LYMPHOCYTES NFR BLD AUTO: 12 % (ref 14–44)
MCH RBC QN AUTO: 29.6 PG (ref 26.8–34.3)
MCHC RBC AUTO-ENTMCNC: 31.1 G/DL (ref 31.4–37.4)
MCV RBC AUTO: 95 FL (ref 82–98)
MONOCYTES # BLD AUTO: 0.52 THOUSAND/ΜL (ref 0.17–1.22)
MONOCYTES NFR BLD AUTO: 7 % (ref 4–12)
NEUTROPHILS # BLD AUTO: 5.92 THOUSANDS/ΜL (ref 1.85–7.62)
NEUTS SEG NFR BLD AUTO: 80 % (ref 43–75)
NRBC BLD AUTO-RTO: 0 /100 WBCS
PLATELET # BLD AUTO: 260 THOUSANDS/UL (ref 149–390)
PMV BLD AUTO: 11.4 FL (ref 8.9–12.7)
POTASSIUM SERPL-SCNC: 4.6 MMOL/L (ref 3.5–5.3)
PROT SERPL-MCNC: 7.3 G/DL (ref 6.4–8.2)
RBC # BLD AUTO: 4.63 MILLION/UL (ref 3.81–5.12)
SODIUM SERPL-SCNC: 139 MMOL/L (ref 136–145)
TSH SERPL DL<=0.05 MIU/L-ACNC: 1.77 UIU/ML (ref 0.36–3.74)
VIT B12 SERPL-MCNC: 313 PG/ML (ref 100–900)
WBC # BLD AUTO: 7.42 THOUSAND/UL (ref 4.31–10.16)

## 2021-03-18 PROCEDURE — 1160F RVW MEDS BY RX/DR IN RCRD: CPT | Performed by: FAMILY MEDICINE

## 2021-03-18 PROCEDURE — 91300 SARS-COV-2 / COVID-19 MRNA VACCINE (PFIZER-BIONTECH) 30 MCG: CPT

## 2021-03-18 PROCEDURE — 82607 VITAMIN B-12: CPT

## 2021-03-18 PROCEDURE — 80053 COMPREHEN METABOLIC PANEL: CPT

## 2021-03-18 PROCEDURE — 99214 OFFICE O/P EST MOD 30 MIN: CPT | Performed by: FAMILY MEDICINE

## 2021-03-18 PROCEDURE — 0001A SARS-COV-2 / COVID-19 MRNA VACCINE (PFIZER-BIONTECH) 30 MCG: CPT

## 2021-03-18 PROCEDURE — 85025 COMPLETE CBC W/AUTO DIFF WBC: CPT

## 2021-03-18 PROCEDURE — 84443 ASSAY THYROID STIM HORMONE: CPT

## 2021-03-18 PROCEDURE — 36415 COLL VENOUS BLD VENIPUNCTURE: CPT

## 2021-03-18 RX ORDER — MECLIZINE HYDROCHLORIDE 25 MG/1
25 TABLET ORAL DAILY PRN
Qty: 30 TABLET | Refills: 1 | Status: SHIPPED | OUTPATIENT
Start: 2021-03-18 | End: 2021-08-23

## 2021-03-18 NOTE — PROGRESS NOTES
Assessment/Plan:    No problem-specific Assessment & Plan notes found for this encounter  Diagnoses and all orders for this visit:    Dizziness  -     Comprehensive metabolic panel; Future  -     CBC and differential; Future  -     TSH, 3rd generation with Free T4 reflex; Future  -     Vitamin B12; Future  -     meclizine (ANTIVERT) 25 mg tablet; Take 1 tablet (25 mg total) by mouth daily as needed for dizziness    Heart murmur  recommended a echo however patient is not interested  Follow up as needed        Subjective:      Patient ID: Von Vasquez is a 80 y o  female  Von Vasquez is a 80 y o  female who I am asked to see in consultation for evaluation of dizziness  The dizziness has been present for a few days  The patient describes the symptoms as disequalibirum  Symptoms are exacerbated by rapid head movements, rising from supine position and rising from squatting or sitting position The patient also complains of none  She deneis any palpitations or SOB  The following portions of the patient's history were reviewed and updated as appropriate: She  has a past medical history of Ankle fracture, Arthritis, 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[address] ), Bronchitis, Cancer ([address] ), Carcinoma, lung ([address] ), Dementia ([address] ), Dependent edema, Depression, Diabetes mellitus ([address] ), Hypercholesterolemia, Hypertension, Kidney stone, Oth abn and inconclusive findings on dx imaging of breast, Pes planus, Renal calculus, Restless leg syndrome, Rib pain, Sprain, neck, and Varicose veins of left lower extremity    She   Patient Active Problem List    Diagnosis Date Noted    Dizziness 03/18/2021    Heart murmur 03/18/2021    Rectal bleeding 09/25/2020    Vitamin D deficiency 01/14/2020    Wound of right buttock 11/27/2019    Primary insomnia 11/22/2019    Closed compression [facility] of L5 lumbar vertebra[facility] ) 10/20/2019    Medicare annual wellness visit, subsequent 07/02/2019    Degeneration of lumbar intervertebral disc 06/18/2019    Lumbar radiculopathy 06/18/2019    Arthropathy of lumbar facet joint 06/18/2019    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) 06/18/2019    Visual hallucinations 06/18/2019    Restless legs syndrome 06/18/2019    Palpitations 10/26/2009     She  has a past surgical history that includes Lung cancer surgery and Appendectomy  Her family history includes Dementia in her brother; No Known Problems in her father and mother  She  reports that she has quit smoking  She has never used smokeless tobacco  She reports that she does not drink alcohol or use drugs  Current Outpatient Medications   Medication Sig Dispense Refill    acetaminophen (TYLENOL) 500 mg tablet Take 500 mg by mouth 2 (two) times a day      gabapentin (NEURONTIN) 100 mg capsule TAKE 2 CAPSULES (200 MG TOTAL) BY MOUTH 3 (THREE) TIMES A  capsule 1    mirtazapine (REMERON) 30 mg tablet Take 1 tablet (30 mg total) by mouth daily at bedtime 90 tablet 3    pramipexole (MIRAPEX) 1 mg tablet TAKE 1 TABLET BY MOUTH EVERY DAY 90 tablet 1    meclizine (ANTIVERT) 25 mg tablet Take 1 tablet (25 mg total) by mouth daily as needed for dizziness 30 tablet 1     No current facility-administered medications for this visit  Current Outpatient Medications on File Prior to Visit   Medication Sig    acetaminophen (TYLENOL) 500 mg tablet Take 500 mg by mouth 2 (two) times a day    gabapentin (NEURONTIN) 100 mg capsule TAKE 2 CAPSULES (200 MG TOTAL) BY MOUTH 3 (THREE) TIMES A DAY    mirtazapine (REMERON) 30 mg tablet Take 1 tablet (30 mg total) by mouth daily at bedtime    pramipexole (MIRAPEX) 1 mg tablet TAKE 1 TABLET BY MOUTH EVERY DAY     No current facility-administered medications on file prior to visit        She is allergic to albuterol; aldactone [spironolactone]; aspirin; atenolol; bactrim [sulfamethoxazole-trimethoprim]; codeine; hydrocodone; ibuprofen; lisinopril; mevacor [lovastatin]; mirapex [pramipexole]; morphine and related; other; penicillins; procaine; salicylates; ultram [tramadol]; and zoloft [sertraline]       Review of Systems   Constitutional: Negative for activity change, appetite change, fatigue and fever  HENT: Negative for congestion and ear discharge  Respiratory: Negative for cough and shortness of breath  Cardiovascular: Negative for chest pain and palpitations  Gastrointestinal: Negative for diarrhea and nausea  Musculoskeletal: Negative for arthralgias and back pain  Skin: Negative for color change and rash  Neurological: Positive for dizziness  Negative for headaches  Psychiatric/Behavioral: Negative for agitation and behavioral problems  Objective:      /78 (BP Location: Left arm, Patient Position: Sitting, Cuff Size: Standard)   Pulse 82   Temp (!) 96 6 °F (35 9 °C)   Resp 20   Ht 5' 1" (1 549 m)   Wt 60 9 kg (134 lb 4 8 oz)   SpO2 95%   BMI 25 38 kg/m²          Physical Exam  Constitutional:       General: She is not in acute distress  Appearance: She is well-developed  She is not diaphoretic  HENT:      Head: Normocephalic and atraumatic  Nose: Nose normal    Eyes:      Conjunctiva/sclera: Conjunctivae normal       Pupils: Pupils are equal, round, and reactive to light  Cardiovascular:      Rate and Rhythm: Normal rate and regular rhythm  Heart sounds: Murmur present  Pulmonary:      Effort: Pulmonary effort is normal  No respiratory distress  Breath sounds: Normal breath sounds  No wheezing  Abdominal:      General: Bowel sounds are normal  There is no distension  Palpations: Abdomen is soft  Tenderness: There is no abdominal tenderness  Skin:     General: Skin is warm and dry  Findings: No erythema or rash  Neurological:      Mental Status: She is alert and oriented to person, place, and time

## 2021-04-08 ENCOUNTER — IMMUNIZATIONS (OUTPATIENT)
Dept: FAMILY MEDICINE CLINIC | Facility: HOSPITAL | Age: 86
End: 2021-04-08

## 2021-04-08 DIAGNOSIS — Z23 ENCOUNTER FOR IMMUNIZATION: Primary | ICD-10-CM

## 2021-04-08 PROCEDURE — 0002A SARS-COV-2 / COVID-19 MRNA VACCINE (PFIZER-BIONTECH) 30 MCG: CPT

## 2021-04-08 PROCEDURE — 91300 SARS-COV-2 / COVID-19 MRNA VACCINE (PFIZER-BIONTECH) 30 MCG: CPT

## 2021-07-08 DIAGNOSIS — G25.81 RESTLESS LEGS SYNDROME: ICD-10-CM

## 2021-07-08 RX ORDER — PRAMIPEXOLE DIHYDROCHLORIDE 1 MG/1
TABLET ORAL
Qty: 90 TABLET | Refills: 1 | Status: SHIPPED | OUTPATIENT
Start: 2021-07-08 | End: 2021-09-02 | Stop reason: SDUPTHER

## 2021-07-29 PROBLEM — R42 DIZZINESS: Status: RESOLVED | Noted: 2021-03-18 | Resolved: 2021-07-29

## 2021-07-29 PROBLEM — K62.5 RECTAL BLEEDING: Status: RESOLVED | Noted: 2020-09-25 | Resolved: 2021-07-29

## 2021-07-29 PROBLEM — S31.819A WOUND OF RIGHT BUTTOCK: Status: RESOLVED | Noted: 2019-11-27 | Resolved: 2021-07-29

## 2021-07-30 ENCOUNTER — OFFICE VISIT (OUTPATIENT)
Dept: FAMILY MEDICINE CLINIC | Facility: CLINIC | Age: 86
End: 2021-07-30
Payer: COMMERCIAL

## 2021-07-30 VITALS
OXYGEN SATURATION: 95 % | TEMPERATURE: 97.8 F | SYSTOLIC BLOOD PRESSURE: 134 MMHG | WEIGHT: 133.4 LBS | HEIGHT: 61 IN | HEART RATE: 76 BPM | DIASTOLIC BLOOD PRESSURE: 76 MMHG | BODY MASS INDEX: 25.19 KG/M2

## 2021-07-30 DIAGNOSIS — F51.01 PRIMARY INSOMNIA: ICD-10-CM

## 2021-07-30 DIAGNOSIS — F01.50 VASCULAR DEMENTIA WITHOUT BEHAVIORAL DISTURBANCE (HCC): ICD-10-CM

## 2021-07-30 DIAGNOSIS — J30.1 SEASONAL ALLERGIC RHINITIS DUE TO POLLEN: Primary | ICD-10-CM

## 2021-07-30 DIAGNOSIS — F01.51 VASCULAR DEMENTIA WITH BEHAVIORAL DISTURBANCE (HCC): ICD-10-CM

## 2021-07-30 PROBLEM — F01.518 VASCULAR DEMENTIA WITH BEHAVIORAL DISTURBANCE: Status: ACTIVE | Noted: 2021-07-30

## 2021-07-30 PROBLEM — Z00.00 MEDICARE ANNUAL WELLNESS VISIT, SUBSEQUENT: Status: RESOLVED | Noted: 2019-07-02 | Resolved: 2021-07-30

## 2021-07-30 PROCEDURE — 1036F TOBACCO NON-USER: CPT | Performed by: NURSE PRACTITIONER

## 2021-07-30 PROCEDURE — 99213 OFFICE O/P EST LOW 20 MIN: CPT | Performed by: NURSE PRACTITIONER

## 2021-07-30 PROCEDURE — 1160F RVW MEDS BY RX/DR IN RCRD: CPT | Performed by: NURSE PRACTITIONER

## 2021-07-30 PROCEDURE — 3725F SCREEN DEPRESSION PERFORMED: CPT | Performed by: NURSE PRACTITIONER

## 2021-07-30 NOTE — ASSESSMENT & PLAN NOTE
Patient has noticed she had lower eye lid [place] and has since resolved

## 2021-07-30 NOTE — PROGRESS NOTES
Assessment/Plan:           Problem List Items Addressed This Visit        Respiratory    Seasonal allergic rhinitis due to pollen - Primary     Patient has noticed she had lower eye lid [place] and has since resolved             Nervous and Auditory    Vascular dementia without behavioral disturbance ([address] )    Relevant Orders    Comprehensive metabolic panel    CBC and differential    Vascular dementia with behavioral disturbance ([address] )       Other    Primary insomnia     Patient doing well with her sleep on the mirtazapine                  Subjective:      Patient ID: Abhishek Chandler is a 80 y o  female  Patient here and reports that her caregiver noticed that a few days ago she had lower eye lid swelling in the lower eye/cheek area more on the side side was not itchy and not painful and has not happened before and today not having the swelling and had been outside 4/4 days [place]  The following portions of the patient's history were reviewed and updated as appropriate: BMI Counseling: Body mass index is 25 21 kg/m²  The BMI is above normal  Nutrition recommendations include decreasing portion sizes, encouraging healthy choices of fruits and vegetables, decreasing fast food intake, consuming healthier snacks, limiting drinks that contain sugar, moderation in carbohydrate intake, increasing intake of lean protein, reducing intake of saturated and trans fat and reducing intake of cholesterol  Exercise recommendations include moderate physical activity 150 minutes/week  No pharmacotherapy was ordered  Patient referred to PCP due to patient being overweight           She  has a past medical history of Ankle fracture, Arthritis, 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[address] ), Bronchitis, Cancer ([address] ), Carcinoma, lung ([address] ), Dementia ([address] ), Dependent edema, Depression, Diabetes mellitus ([address] ), Hypercholesterolemia, Hypertension, Kidney stone, Oth abn and inconclusive findings on dx imaging of breast, Pes planus, Renal calculus, Restless leg syndrome, Rib pain, Sprain, neck, and Varicose veins of left lower extremity  She   Patient Active Problem List    Diagnosis Date Noted    Seasonal allergic rhinitis due to pollen 07/30/2021    Vascular dementia with behavioral disturbance ([facility] ) 07/30/2021    Heart murmur 03/18/2021    Vitamin D deficiency 01/14/2020    Primary insomnia 11/22/2019    Closed compression fracture of L5 lumbar vertebra, initial encounter ([facility] ) 10/20/2019    Degeneration of lumbar intervertebral disc 06/18/2019    Lumbar radiculopathy 06/18/2019    Arthropathy of lumbar facet joint 06/18/2019    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) 06/18/2019    Visual hallucinations 06/18/2019    Restless legs syndrome 06/18/2019     She  has a past surgical history that includes Lung cancer surgery and Appendectomy  Her family history includes Dementia in her brother; No Known Problems in her father and mother  She  reports that she has quit smoking  She has a 10 00 pack-year smoking history  She has never used smokeless tobacco  She reports that she does not drink alcohol and does not use drugs  Current Outpatient Medications   Medication Sig Dispense Refill    acetaminophen (TYLENOL) 500 mg tablet Take 500 mg by mouth 2 (two) times a day      gabapentin (NEURONTIN) 100 mg capsule TAKE 2 CAPSULES (200 MG TOTAL) BY MOUTH 3 (THREE) TIMES A  capsule 1    meclizine (ANTIVERT) 25 mg tablet Take 1 tablet (25 mg total) by mouth daily as needed for dizziness 30 tablet 1    mirtazapine (REMERON) 30 mg tablet Take 1 tablet (30 mg total) by mouth daily at bedtime 90 tablet 3    pramipexole (MIRAPEX) 1 mg tablet TAKE 1 TABLET BY MOUTH EVERY DAY 90 tablet 1     No current facility-administered medications for this visit       She is allergic to albuterol, aldactone [spironolactone], aspirin, atenolol, bactrim [sulfamethoxazole-trimethoprim], codeine, hydrocodone, ibuprofen, lisinopril, mevacor [lovastatin], mirapex [pramipexole], morphine and related, other, penicillins, procaine, salicylates, ultram [tramadol], and zoloft [sertraline]       Review of Systems   Constitutional: Negative  HENT: Negative  Eyes: Negative  Respiratory: Negative  Cardiovascular: Negative  Gastrointestinal: Negative  Endocrine: Negative  Genitourinary: Negative  Musculoskeletal: Negative  Skin: Negative  Allergic/Immunologic: Negative  Neurological: Negative  Hematological: Negative  Psychiatric/Behavioral: Negative  Objective:      /76   Pulse 76   Temp 97 8 °F (36 6 °C) (Temporal)   Ht 5' 1" (1 549 m)   Wt 60 5 kg (133 lb 6 4 oz)   SpO2 95%   BMI 25 21 kg/m²          Physical Exam  Vitals and nursing note reviewed  Constitutional:       General: She is not in acute distress  Appearance: She is well-developed  HENT:      Head: Normocephalic and atraumatic  Eyes:      Pupils: Pupils are equal, round, and reactive to light  Neck:      Thyroid: No thyromegaly  Cardiovascular:      Rate and Rhythm: Normal rate and regular rhythm  Heart sounds: Murmur heard  Systolic murmur is present with a grade of 2/6  Pulmonary:      Effort: Pulmonary effort is normal  No respiratory distress  Breath sounds: Normal breath sounds  No wheezing  Abdominal:      General: Bowel sounds are normal       Palpations: Abdomen is soft  Musculoskeletal:         General: Normal range of motion  Cervical back: Normal range of motion  Skin:     General: Skin is warm and dry  Neurological:      Mental Status: She is alert and oriented to person, place, and time

## 2021-08-23 DIAGNOSIS — R42 DIZZINESS: ICD-10-CM

## 2021-08-23 DIAGNOSIS — M54.16 LUMBAR RADICULOPATHY: ICD-10-CM

## 2021-08-23 RX ORDER — GABAPENTIN 100 MG/1
200 CAPSULE ORAL 3 TIMES DAILY
Qty: 540 CAPSULE | Refills: 1 | Status: SHIPPED | OUTPATIENT
Start: 2021-08-23 | End: 2021-12-13

## 2021-08-23 RX ORDER — MECLIZINE HYDROCHLORIDE 25 MG/1
TABLET ORAL
Qty: 30 TABLET | Refills: 1 | Status: SHIPPED | OUTPATIENT
Start: 2021-08-23 | End: 2021-10-07 | Stop reason: ALTCHOICE

## 2021-09-02 DIAGNOSIS — G25.81 RESTLESS LEGS SYNDROME: ICD-10-CM

## 2021-09-02 RX ORDER — PRAMIPEXOLE DIHYDROCHLORIDE 1 MG/1
1 TABLET ORAL DAILY
Qty: 90 TABLET | Refills: 1 | Status: SHIPPED | OUTPATIENT
Start: 2021-09-02 | End: 2021-12-21 | Stop reason: SDUPTHER

## 2021-09-02 NOTE — TELEPHONE ENCOUNTER
Pt needs a refill on pramipexole sent to [facility] in [place]   Thank you

## 2021-10-05 ENCOUNTER — TELEPHONE (OUTPATIENT)
Dept: FAMILY MEDICINE CLINIC | Facility: CLINIC | Age: 86
End: 2021-10-05

## 2021-10-06 ENCOUNTER — TELEPHONE (OUTPATIENT)
Dept: GERIATRICS | Age: 86
End: 2021-10-06

## 2021-10-07 ENCOUNTER — OFFICE VISIT (OUTPATIENT)
Dept: FAMILY MEDICINE CLINIC | Facility: CLINIC | Age: 86
End: 2021-10-07
Payer: COMMERCIAL

## 2021-10-07 VITALS
HEART RATE: 82 BPM | DIASTOLIC BLOOD PRESSURE: 84 MMHG | TEMPERATURE: 99.4 F | SYSTOLIC BLOOD PRESSURE: 144 MMHG | OXYGEN SATURATION: 97 %

## 2021-10-07 DIAGNOSIS — R19.7 DIARRHEA, UNSPECIFIED TYPE: Primary | ICD-10-CM
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
## 2021-10-07 DIAGNOSIS — Z00.00 MEDICARE ANNUAL WELLNESS VISIT, SUBSEQUENT: ICD-10-CM

## 2021-10-07 DIAGNOSIS — Z23 NEED FOR INFLUENZA VACCINATION: ICD-10-CM

## 2021-10-07 PROCEDURE — 1170F FXNL STATUS ASSESSED: CPT | Performed by: NURSE PRACTITIONER

## 2021-10-07 PROCEDURE — 90662 IIV NO PRSV INCREASED AG IM: CPT

## 2021-10-07 PROCEDURE — 1125F AMNT PAIN NOTED PAIN PRSNT: CPT | Performed by: NURSE PRACTITIONER

## 2021-10-07 PROCEDURE — 3288F FALL RISK ASSESSMENT DOCD: CPT | Performed by: NURSE PRACTITIONER

## 2021-10-07 PROCEDURE — 99214 OFFICE O/P EST MOD 30 MIN: CPT | Performed by: NURSE PRACTITIONER

## 2021-10-07 PROCEDURE — G0439 PPPS, SUBSEQ VISIT: HCPCS | Performed by: NURSE PRACTITIONER

## 2021-10-07 PROCEDURE — G0008 ADMIN INFLUENZA VIRUS VAC: HCPCS

## 2021-10-07 RX ORDER — CIPROFLOXACIN 250 MG/1
250 TABLET, FILM COATED ORAL EVERY 12 HOURS SCHEDULED
Qty: 10 TABLET | Refills: 0 | Status: SHIPPED | OUTPATIENT
Start: 2021-10-07 | End: 2021-10-12

## 2021-10-07 RX ORDER — METRONIDAZOLE 500 MG/1
500 TABLET ORAL EVERY 8 HOURS SCHEDULED
Qty: 15 TABLET | Refills: 0 | Status: SHIPPED | OUTPATIENT
Start: 2021-10-07 | End: 2021-10-12

## 2021-10-08 ENCOUNTER — APPOINTMENT (OUTPATIENT)
Dept: LAB | Facility: CLINIC | Age: 86
End: 2021-10-08
Payer: COMMERCIAL

## 2021-10-08 DIAGNOSIS — F01.50 VASCULAR DEMENTIA WITHOUT BEHAVIORAL DISTURBANCE (HCC): ICD-10-CM

## 2021-10-08 LAB
ALBUMIN SERPL BCP-MCNC: 2.9 G/DL (ref 3.5–5)
ALP SERPL-CCNC: 91 U/L (ref 46–116)
ALT SERPL W P-5'-P-CCNC: 18 U/L (ref 12–78)
ANION GAP SERPL CALCULATED.3IONS-SCNC: 5 MMOL/L (ref 4–13)
AST SERPL W P-5'-P-CCNC: 20 U/L (ref 5–45)
BASOPHILS # BLD AUTO: 0.06 THOUSANDS/ΜL (ref 0–0.1)
BASOPHILS NFR BLD AUTO: 0 % (ref 0–1)
BILIRUB SERPL-MCNC: 0.46 MG/DL (ref 0.2–1)
BUN SERPL-MCNC: 18 MG/DL (ref 5–25)
CALCIUM ALBUM COR SERPL-MCNC: 9.7 MG/DL (ref 8.3–10.1)
CALCIUM SERPL-MCNC: 8.8 MG/DL (ref 8.3–10.1)
CHLORIDE SERPL-SCNC: 107 MMOL/L (ref 100–108)
CO2 SERPL-SCNC: 26 MMOL/L (ref 21–32)
CREAT SERPL-MCNC: 1.24 MG/DL (ref 0.6–1.3)
EOSINOPHIL # BLD AUTO: 0.04 THOUSAND/ΜL (ref 0–0.61)
EOSINOPHIL NFR BLD AUTO: 0 % (ref 0–6)
ERYTHROCYTE [DISTWIDTH] IN BLOOD BY AUTOMATED COUNT: 13.2 % (ref 11.6–15.1)
GFR SERPL CREATININE-BSD FRML MDRD: 42 ML/MIN/1.73SQ M
GLUCOSE SERPL-MCNC: 116 MG/DL (ref 65–140)
HCT VFR BLD AUTO: 40.7 % (ref 34.8–46.1)
HGB BLD-MCNC: 12.8 G/DL (ref 11.5–15.4)
IMM GRANULOCYTES # BLD AUTO: 0.16 THOUSAND/UL (ref 0–0.2)
IMM GRANULOCYTES NFR BLD AUTO: 1 % (ref 0–2)
LYMPHOCYTES # BLD AUTO: 0.89 THOUSANDS/ΜL (ref 0.6–4.47)
LYMPHOCYTES NFR BLD AUTO: 5 % (ref 14–44)
MCH RBC QN AUTO: 29.4 PG (ref 26.8–34.3)
MCHC RBC AUTO-ENTMCNC: 31.4 G/DL (ref 31.4–37.4)
MCV RBC AUTO: 94 FL (ref 82–98)
MONOCYTES # BLD AUTO: 1.4 THOUSAND/ΜL (ref 0.17–1.22)
MONOCYTES NFR BLD AUTO: 8 % (ref 4–12)
NEUTROPHILS # BLD AUTO: 14.79 THOUSANDS/ΜL (ref 1.85–7.62)
NEUTS SEG NFR BLD AUTO: 86 % (ref 43–75)
NRBC BLD AUTO-RTO: 0 /100 WBCS
PLATELET # BLD AUTO: 327 THOUSANDS/UL (ref 149–390)
PMV BLD AUTO: 11.1 FL (ref 8.9–12.7)
POTASSIUM SERPL-SCNC: 3.6 MMOL/L (ref 3.5–5.3)
PROT SERPL-MCNC: 6.9 G/DL (ref 6.4–8.2)
RBC # BLD AUTO: 4.35 MILLION/UL (ref 3.81–5.12)
SODIUM SERPL-SCNC: 138 MMOL/L (ref 136–145)
WBC # BLD AUTO: 17.34 THOUSAND/UL (ref 4.31–10.16)

## 2021-10-08 PROCEDURE — 36415 COLL VENOUS BLD VENIPUNCTURE: CPT

## 2021-10-08 PROCEDURE — 85025 COMPLETE CBC W/AUTO DIFF WBC: CPT

## 2021-10-08 PROCEDURE — 80053 COMPREHEN METABOLIC PANEL: CPT

## 2021-10-14 ENCOUNTER — TELEPHONE (OUTPATIENT)
Dept: FAMILY MEDICINE CLINIC | Facility: CLINIC | Age: 86
End: 2021-10-14

## 2021-10-14 ENCOUNTER — OFFICE VISIT (OUTPATIENT)
Dept: FAMILY MEDICINE CLINIC | Facility: CLINIC | Age: 86
End: 2021-10-14
Payer: COMMERCIAL

## 2021-10-14 ENCOUNTER — APPOINTMENT (OUTPATIENT)
Dept: LAB | Facility: CLINIC | Age: 86
End: 2021-10-14
Payer: COMMERCIAL

## 2021-10-14 ENCOUNTER — APPOINTMENT (OUTPATIENT)
Dept: RADIOLOGY | Facility: CLINIC | Age: 86
End: 2021-10-14
Payer: COMMERCIAL

## 2021-10-14 VITALS
DIASTOLIC BLOOD PRESSURE: 88 MMHG | OXYGEN SATURATION: 96 % | HEART RATE: 84 BPM | HEIGHT: 61 IN | WEIGHT: 139.6 LBS | BODY MASS INDEX: 26.36 KG/M2 | TEMPERATURE: 97.3 F | SYSTOLIC BLOOD PRESSURE: 142 MMHG
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
## 2021-10-14 DIAGNOSIS — R19.7 DIARRHEA, UNSPECIFIED TYPE: Primary | ICD-10-CM

## 2021-10-14 DIAGNOSIS — R19.7 DIARRHEA, UNSPECIFIED TYPE: ICD-10-CM

## 2021-10-14 LAB
ALBUMIN SERPL BCP-MCNC: 3 G/DL (ref 3.5–5)
ALP SERPL-CCNC: 94 U/L (ref 46–116)
ALT SERPL W P-5'-P-CCNC: 18 U/L (ref 12–78)
ANION GAP SERPL CALCULATED.3IONS-SCNC: 2 MMOL/L (ref 4–13)
AST SERPL W P-5'-P-CCNC: 20 U/L (ref 5–45)
BASOPHILS # BLD AUTO: 0.06 THOUSANDS/ΜL (ref 0–0.1)
BASOPHILS NFR BLD AUTO: 1 % (ref 0–1)
BILIRUB SERPL-MCNC: 0.42 MG/DL (ref 0.2–1)
BUN SERPL-MCNC: 13 MG/DL (ref 5–25)
CALCIUM ALBUM COR SERPL-MCNC: 9.8 MG/DL (ref 8.3–10.1)
CALCIUM SERPL-MCNC: 9 MG/DL (ref 8.3–10.1)
CHLORIDE SERPL-SCNC: 106 MMOL/L (ref 100–108)
CO2 SERPL-SCNC: 30 MMOL/L (ref 21–32)
CREAT SERPL-MCNC: 1.16 MG/DL (ref 0.6–1.3)
EOSINOPHIL # BLD AUTO: 0.08 THOUSAND/ΜL (ref 0–0.61)
EOSINOPHIL NFR BLD AUTO: 1 % (ref 0–6)
ERYTHROCYTE [DISTWIDTH] IN BLOOD BY AUTOMATED COUNT: 13.5 % (ref 11.6–15.1)
GFR SERPL CREATININE-BSD FRML MDRD: 46 ML/MIN/1.73SQ M
GLUCOSE SERPL-MCNC: 158 MG/DL (ref 65–140)
HCT VFR BLD AUTO: 44 % (ref 34.8–46.1)
HGB BLD-MCNC: 13.7 G/DL (ref 11.5–15.4)
IMM GRANULOCYTES # BLD AUTO: 0.11 THOUSAND/UL (ref 0–0.2)
IMM GRANULOCYTES NFR BLD AUTO: 1 % (ref 0–2)
LYMPHOCYTES # BLD AUTO: 1.11 THOUSANDS/ΜL (ref 0.6–4.47)
LYMPHOCYTES NFR BLD AUTO: 9 % (ref 14–44)
MCH RBC QN AUTO: 29.1 PG (ref 26.8–34.3)
MCHC RBC AUTO-ENTMCNC: 31.1 G/DL (ref 31.4–37.4)
MCV RBC AUTO: 94 FL (ref 82–98)
MONOCYTES # BLD AUTO: 0.95 THOUSAND/ΜL (ref 0.17–1.22)
MONOCYTES NFR BLD AUTO: 8 % (ref 4–12)
NEUTROPHILS # BLD AUTO: 10.02 THOUSANDS/ΜL (ref 1.85–7.62)
NEUTS SEG NFR BLD AUTO: 80 % (ref 43–75)
NRBC BLD AUTO-RTO: 0 /100 WBCS
PLATELET # BLD AUTO: 359 THOUSANDS/UL (ref 149–390)
PMV BLD AUTO: 11 FL (ref 8.9–12.7)
POTASSIUM SERPL-SCNC: 4 MMOL/L (ref 3.5–5.3)
PROT SERPL-MCNC: 7.2 G/DL (ref 6.4–8.2)
RBC # BLD AUTO: 4.7 MILLION/UL (ref 3.81–5.12)
SODIUM SERPL-SCNC: 138 MMOL/L (ref 136–145)
WBC # BLD AUTO: 12.33 THOUSAND/UL (ref 4.31–10.16)

## 2021-10-14 PROCEDURE — 99213 OFFICE O/P EST LOW 20 MIN: CPT | Performed by: NURSE PRACTITIONER

## 2021-10-14 PROCEDURE — 1036F TOBACCO NON-USER: CPT | Performed by: NURSE PRACTITIONER

## 2021-10-14 PROCEDURE — 85025 COMPLETE CBC W/AUTO DIFF WBC: CPT

## 2021-10-14 PROCEDURE — 71045 X-RAY EXAM CHEST 1 VIEW: CPT

## 2021-10-14 PROCEDURE — 1160F RVW MEDS BY RX/DR IN RCRD: CPT | Performed by: NURSE PRACTITIONER

## 2021-10-14 PROCEDURE — 36415 COLL VENOUS BLD VENIPUNCTURE: CPT

## 2021-10-14 PROCEDURE — 80053 COMPREHEN METABOLIC PANEL: CPT

## 2021-10-20 ENCOUNTER — TELEPHONE (OUTPATIENT)
Dept: FAMILY MEDICINE CLINIC | Facility: CLINIC | Age: 86
End: 2021-10-20

## 2021-10-21 ENCOUNTER — TELEPHONE (OUTPATIENT)
Dept: FAMILY MEDICINE CLINIC | Facility: CLINIC | Age: 86
End: 2021-10-21

## 2021-11-08 ENCOUNTER — TELEPHONE (OUTPATIENT)
Dept: FAMILY MEDICINE CLINIC | Facility: CLINIC | Age: 86
End: 2021-11-08

## 2021-11-13 DIAGNOSIS — F51.01 PRIMARY INSOMNIA: ICD-10-CM

## 2021-11-15 RX ORDER — MIRTAZAPINE 30 MG/1
30 TABLET, FILM COATED ORAL
Qty: 90 TABLET | Refills: 3 | Status: SHIPPED | OUTPATIENT
Start: 2021-11-15 | End: 2022-05-14

## 2021-12-13 ENCOUNTER — CONSULT (OUTPATIENT)
Dept: GERIATRICS | Age: 86
End: 2021-12-13
Payer: COMMERCIAL

## 2021-12-13 VITALS
BODY MASS INDEX: 26.21 KG/M2 | HEIGHT: 59 IN | TEMPERATURE: 99.8 F | WEIGHT: 130 LBS | DIASTOLIC BLOOD PRESSURE: 60 MMHG | OXYGEN SATURATION: 97 % | HEART RATE: 79 BPM | SYSTOLIC BLOOD PRESSURE: 136 MMHG | RESPIRATION RATE: 16 BRPM

## 2021-12-13 DIAGNOSIS — G25.81 RESTLESS LEGS SYNDROME: ICD-10-CM

## 2021-12-13 DIAGNOSIS — R26.2 AMBULATORY DYSFUNCTION: ICD-10-CM

## 2021-12-13 DIAGNOSIS — R54 FRAILTY: ICD-10-CM

## 2021-12-13 DIAGNOSIS — F01.50 VASCULAR DEMENTIA WITHOUT BEHAVIORAL DISTURBANCE (HCC): Primary | ICD-10-CM

## 2021-12-13 DIAGNOSIS — M54.16 LUMBAR RADICULOPATHY: ICD-10-CM

## 2021-12-13 DIAGNOSIS — F41.9 ANXIETY: ICD-10-CM

## 2021-12-13 PROCEDURE — 1036F TOBACCO NON-USER: CPT | Performed by: INTERNAL MEDICINE

## 2021-12-13 PROCEDURE — 99215 OFFICE O/P EST HI 40 MIN: CPT | Performed by: INTERNAL MEDICINE

## 2021-12-13 RX ORDER — GABAPENTIN 100 MG/1
100 CAPSULE ORAL 3 TIMES DAILY
Qty: 270 CAPSULE | Refills: 1 | Status: SHIPPED | OUTPATIENT
Start: 2021-12-13 | End: 2022-06-11

## 2021-12-21 DIAGNOSIS — G25.81 RESTLESS LEGS SYNDROME: ICD-10-CM

## 2021-12-21 RX ORDER — PRAMIPEXOLE DIHYDROCHLORIDE 1 MG/1
1 TABLET ORAL DAILY
Qty: 90 TABLET | Refills: 1 | Status: SHIPPED | OUTPATIENT
Start: 2021-12-21 | End: 2022-07-08

## 2021-12-27 ENCOUNTER — TELEPHONE (OUTPATIENT)
Dept: GERIATRICS | Age: 86
End: 2021-12-27

## 2021-12-27 ENCOUNTER — TELEPHONE (OUTPATIENT)
Dept: FAMILY MEDICINE CLINIC | Facility: CLINIC | Age: 86
End: 2021-12-27

## 2021-12-27 DIAGNOSIS — R41.0 CONFUSION: Primary | ICD-10-CM

## 2021-12-29 ENCOUNTER — APPOINTMENT (OUTPATIENT)
Dept: RADIOLOGY | Facility: CLINIC | Age: 86
End: 2021-12-29
Payer: COMMERCIAL

## 2021-12-29 ENCOUNTER — APPOINTMENT (OUTPATIENT)
Dept: LAB | Facility: CLINIC | Age: 86
End: 2021-12-29
Payer: COMMERCIAL

## 2021-12-29 DIAGNOSIS — R41.0 CONFUSION: ICD-10-CM

## 2021-12-29 LAB
ALBUMIN SERPL BCP-MCNC: 3.9 G/DL (ref 3.5–5)
ALP SERPL-CCNC: 115 U/L (ref 46–116)
ALT SERPL W P-5'-P-CCNC: 16 U/L (ref 12–78)
ANION GAP SERPL CALCULATED.3IONS-SCNC: 4 MMOL/L (ref 4–13)
AST SERPL W P-5'-P-CCNC: 17 U/L (ref 5–45)
BASOPHILS # BLD AUTO: 0.03 THOUSANDS/ΜL (ref 0–0.1)
BASOPHILS NFR BLD AUTO: 1 % (ref 0–1)
BILIRUB SERPL-MCNC: 0.58 MG/DL (ref 0.2–1)
BUN SERPL-MCNC: 15 MG/DL (ref 5–25)
CALCIUM SERPL-MCNC: 9 MG/DL (ref 8.3–10.1)
CHLORIDE SERPL-SCNC: 105 MMOL/L (ref 100–108)
CO2 SERPL-SCNC: 29 MMOL/L (ref 21–32)
CREAT SERPL-MCNC: 1.08 MG/DL (ref 0.6–1.3)
EOSINOPHIL # BLD AUTO: 0.06 THOUSAND/ΜL (ref 0–0.61)
EOSINOPHIL NFR BLD AUTO: 1 % (ref 0–6)
ERYTHROCYTE [DISTWIDTH] IN BLOOD BY AUTOMATED COUNT: 15.1 % (ref 11.6–15.1)
GFR SERPL CREATININE-BSD FRML MDRD: 43 ML/MIN/1.73SQ M
GLUCOSE P FAST SERPL-MCNC: 94 MG/DL (ref 65–99)
HCT VFR BLD AUTO: 43.5 % (ref 34.8–46.1)
HGB BLD-MCNC: 13.7 G/DL (ref 11.5–15.4)
IMM GRANULOCYTES # BLD AUTO: 0.02 THOUSAND/UL (ref 0–0.2)
IMM GRANULOCYTES NFR BLD AUTO: 0 % (ref 0–2)
LYMPHOCYTES # BLD AUTO: 1.06 THOUSANDS/ΜL (ref 0.6–4.47)
LYMPHOCYTES NFR BLD AUTO: 19 % (ref 14–44)
MCH RBC QN AUTO: 29.1 PG (ref 26.8–34.3)
MCHC RBC AUTO-ENTMCNC: 31.5 G/DL (ref 31.4–37.4)
MCV RBC AUTO: 93 FL (ref 82–98)
MONOCYTES # BLD AUTO: 0.49 THOUSAND/ΜL (ref 0.17–1.22)
MONOCYTES NFR BLD AUTO: 9 % (ref 4–12)
NEUTROPHILS # BLD AUTO: 3.79 THOUSANDS/ΜL (ref 1.85–7.62)
NEUTS SEG NFR BLD AUTO: 70 % (ref 43–75)
NRBC BLD AUTO-RTO: 0 /100 WBCS
PLATELET # BLD AUTO: 239 THOUSANDS/UL (ref 149–390)
PMV BLD AUTO: 11.7 FL (ref 8.9–12.7)
POTASSIUM SERPL-SCNC: 4.1 MMOL/L (ref 3.5–5.3)
PROT SERPL-MCNC: 7.2 G/DL (ref 6.4–8.2)
RBC # BLD AUTO: 4.7 MILLION/UL (ref 3.81–5.12)
SODIUM SERPL-SCNC: 138 MMOL/L (ref 136–145)
WBC # BLD AUTO: 5.45 THOUSAND/UL (ref 4.31–10.16)

## 2021-12-29 PROCEDURE — 36415 COLL VENOUS BLD VENIPUNCTURE: CPT

## 2021-12-29 PROCEDURE — 85025 COMPLETE CBC W/AUTO DIFF WBC: CPT

## 2021-12-29 PROCEDURE — 80053 COMPREHEN METABOLIC PANEL: CPT

## 2021-12-29 PROCEDURE — 71045 X-RAY EXAM CHEST 1 VIEW: CPT

## 2022-01-05 ENCOUNTER — APPOINTMENT (OUTPATIENT)
Dept: LAB | Facility: CLINIC | Age: 87
End: 2022-01-05
Payer: COMMERCIAL

## 2022-01-05 LAB
BACTERIA UR QL AUTO: ABNORMAL /HPF
BILIRUB UR QL STRIP: NEGATIVE
CLARITY UR: CLEAR
COLOR UR: YELLOW
GLUCOSE UR STRIP-MCNC: NEGATIVE MG/DL
HGB UR QL STRIP.AUTO: NEGATIVE
HYALINE CASTS #/AREA URNS LPF: ABNORMAL /LPF
KETONES UR STRIP-MCNC: NEGATIVE MG/DL
LEUKOCYTE ESTERASE UR QL STRIP: ABNORMAL
NITRITE UR QL STRIP: NEGATIVE
NON-SQ EPI CELLS URNS QL MICRO: ABNORMAL /HPF
PH UR STRIP.AUTO: 6.5 [PH]
PROT UR STRIP-MCNC: NEGATIVE MG/DL
RBC #/AREA URNS AUTO: ABNORMAL /HPF
SP GR UR STRIP.AUTO: 1.02 (ref 1–1.03)
UROBILINOGEN UR QL STRIP.AUTO: 0.2 E.U./DL
WBC #/AREA URNS AUTO: ABNORMAL /HPF

## 2022-01-05 PROCEDURE — 81001 URINALYSIS AUTO W/SCOPE: CPT | Performed by: NURSE PRACTITIONER

## 2022-01-31 ENCOUNTER — TELEPHONE (OUTPATIENT)
Dept: FAMILY MEDICINE CLINIC | Facility: CLINIC | Age: 87
End: 2022-01-31

## 2022-01-31 NOTE — TELEPHONE ENCOUNTER
Arielle Minaya called and stated the patient is having muscle spasms in her left leg  She is bothered mostly when laying down  She would like to know what we can do to help her     Apollo Barrett [phone]

## 2022-01-31 NOTE — TELEPHONE ENCOUNTER
Can try some over the counter products such as icy hot or bengay rub to the area or can try lidocaine patches to help

## 2022-02-09 ENCOUNTER — TELEPHONE (OUTPATIENT)
Dept: GERIATRICS | Age: 87
End: 2022-02-09

## 2022-02-09 NOTE — TELEPHONE ENCOUNTER
Patient last seen in December  Past 1-2 weeks patient is crying and very depressed  "ready to die"  Very sad  Can you prescribe something for her depression? Pharmacy [facility] is correct

## 2022-02-10 NOTE — TELEPHONE ENCOUNTER
Called family to check on how patient is doing  Still crying a lot, feeling depressed  Family says she's not suicidal and has no plan  family wants to try therapy    Appt given for therapists and patient is put on cancellation list

## 2022-02-14 NOTE — TELEPHONE ENCOUNTER
Spoke with elisa Childs, who stated that Ms Hakeem Hernandez is currently not crying  Grandson visited recently which made [facility] feel good  Planning a trip to [place] soon  Will monitor for now and asked Nori Ramirez to call if she any questions or symptoms get worse

## 2022-02-22 NOTE — TELEPHONE ENCOUNTER
Following discussion with Evelin Patricio LCSW (integrated psychotherapist) and review of chart, it was determined that based on the severity of Ms  Nicolas's dementia diagnosis she may not be able to meaningfully participate in talk therapy  Music therapy may be a more appropriate option  Contacted [facility] at [phone] and spoke with Loc eckert  She notes that she could bring on a new patient within 1-2 weeks and they offer both virtual and in-person sessions  They have two therapists that may be able to travel to [place] location  [facility]

## 2022-03-01 NOTE — TELEPHONE ENCOUNTER
LCSW called Airelle Minaya to review information  Arielle Minaya expressed understanding that Rob People would likely not benefit from sessions with our therapist  She is open to a music therapy referral and asks that I contact [facility] to start this process on her behalf  She also notes that [place] for [place] to visit family on March 8th and will be gone for two weeks  LCSW will plan to cancel session with therapist Anne Noble and contact [facility] to relay referral  Arielle Minaya also mentioned that she was able to get Medicaid for Rob People  LCSW also mailed [facility] to Arielle Minaya at home address

## 2022-03-01 NOTE — TELEPHONE ENCOUNTER
Contacted [facility] and spoke with Franco Wei  Provided dementia diagnosis with depressive symptoms, niece name and home address, phone number for scheduling purposes and noted that niece and Ronen Graff live together  Provided pt age as requested  MTA will contact family to begin scheduling process

## 2022-03-22 DIAGNOSIS — S32.050A CLOSED COMPRESSION FRACTURE OF L5 LUMBAR VERTEBRA, INITIAL ENCOUNTER (HCC): ICD-10-CM

## 2022-03-22 DIAGNOSIS — F01.51 VASCULAR DEMENTIA WITH BEHAVIORAL DISTURBANCE (HCC): Primary | ICD-10-CM

## 2022-03-22 DIAGNOSIS — F01.51 VASCULAR DEMENTIA WITH BEHAVIORAL DISTURBANCE (HCC): ICD-10-CM

## 2022-03-22 DIAGNOSIS — Z74.09 IMMOBILITY: ICD-10-CM

## 2022-05-02 ENCOUNTER — CLINICAL SUPPORT (OUTPATIENT)
Dept: FAMILY MEDICINE CLINIC | Facility: CLINIC | Age: 87
End: 2022-05-02
Payer: COMMERCIAL

## 2022-05-02 DIAGNOSIS — N30.00 ACUTE CYSTITIS WITHOUT HEMATURIA: Primary | ICD-10-CM

## 2022-05-02 LAB
SL AMB  POCT GLUCOSE, UA: ABNORMAL
SL AMB LEUKOCYTE ESTERASE,UA: ABNORMAL
SL AMB POCT BILIRUBIN,UA: ABNORMAL
SL AMB POCT BLOOD,UA: ABNORMAL
SL AMB POCT KETONES,UA: ABNORMAL
SL AMB POCT NITRITE,UA: POSITIVE
SL AMB POCT PH,UA: 5.5
SL AMB POCT SPECIFIC GRAVITY,UA: 1.02
SL AMB POCT URINE PROTEIN: ABNORMAL
SL AMB POCT UROBILINOGEN: 0.2

## 2022-05-02 PROCEDURE — 87086 URINE CULTURE/COLONY COUNT: CPT | Performed by: NURSE PRACTITIONER

## 2022-05-02 PROCEDURE — 87186 SC STD MICRODIL/AGAR DIL: CPT | Performed by: NURSE PRACTITIONER

## 2022-05-02 PROCEDURE — 81003 URINALYSIS AUTO W/O SCOPE: CPT | Performed by: FAMILY MEDICINE

## 2022-05-02 PROCEDURE — 87077 CULTURE AEROBIC IDENTIFY: CPT | Performed by: NURSE PRACTITIONER

## 2022-05-02 RX ORDER — CEPHALEXIN 500 MG/1
500 CAPSULE ORAL EVERY 12 HOURS SCHEDULED
Qty: 10 CAPSULE | Refills: 0 | Status: SHIPPED | OUTPATIENT
Start: 2022-05-02 | End: 2022-05-05 | Stop reason: ALTCHOICE

## 2022-05-05 DIAGNOSIS — N30.00 ACUTE CYSTITIS WITHOUT HEMATURIA: Primary | ICD-10-CM

## 2022-05-05 LAB
BACTERIA UR CULT: ABNORMAL

## 2022-05-05 RX ORDER — CIPROFLOXACIN 250 MG/1
250 TABLET, FILM COATED ORAL EVERY 12 HOURS SCHEDULED
Qty: 10 TABLET | Refills: 0 | Status: SHIPPED | OUTPATIENT
Start: 2022-05-05 | End: 2022-05-10

## 2022-05-17 ENCOUNTER — TELEPHONE (OUTPATIENT)
Dept: FAMILY MEDICINE CLINIC | Facility: CLINIC | Age: 87
End: 2022-05-17

## 2022-05-17 NOTE — TELEPHONE ENCOUNTER
Todd Ritter called that a nurse from [facility]) was there for a physical and found patient's BP to be elevated    1st reading 188/66 and the second reading was 176/77

## 2022-05-17 NOTE — TELEPHONE ENCOUNTER
Nikolai watts and patient is not having any symptoms she will check her BP tomorrow and contact office with readings

## 2022-05-19 ENCOUNTER — TELEPHONE (OUTPATIENT)
Dept: FAMILY MEDICINE CLINIC | Facility: CLINIC | Age: 87
End: 2022-05-19

## 2022-07-08 ENCOUNTER — TELEPHONE (OUTPATIENT)
Dept: GERIATRICS | Age: 87
End: 2022-07-08

## 2022-07-08 DIAGNOSIS — G25.81 RESTLESS LEGS SYNDROME: ICD-10-CM

## 2022-07-08 RX ORDER — PRAMIPEXOLE DIHYDROCHLORIDE 1 MG/1
TABLET ORAL
Qty: 90 TABLET | Refills: 1 | Status: SHIPPED | OUTPATIENT
Start: 2022-07-08

## 2022-08-12 ENCOUNTER — TELEPHONE (OUTPATIENT)
Dept: FAMILY MEDICINE CLINIC | Facility: CLINIC | Age: 87
End: 2022-08-12

## 2022-08-12 NOTE — TELEPHONE ENCOUNTER
Ingrid Jaimes calls stating pt has been having increased difficulty sleeping at night  States pt is up and down all night  She would like to know about possible medications  Please advise, thank you!

## 2022-08-22 ENCOUNTER — RA CDI HCC (OUTPATIENT)
Dept: OTHER | Facility: HOSPITAL | Age: 87
End: 2022-08-22

## 2022-08-22 NOTE — PROGRESS NOTES
[facility]  coding opportunities       Chart reviewed, no opportunity found:   [address]        Patients Insurance     Medicare Insurance: [facility]

## 2022-08-23 ENCOUNTER — OFFICE VISIT (OUTPATIENT)
Dept: FAMILY MEDICINE CLINIC | Facility: CLINIC | Age: 87
End: 2022-08-23
Payer: COMMERCIAL

## 2022-08-23 VITALS
HEART RATE: 71 BPM | BODY MASS INDEX: 25.2 KG/M2 | DIASTOLIC BLOOD PRESSURE: 60 MMHG | SYSTOLIC BLOOD PRESSURE: 122 MMHG | WEIGHT: 125 LBS | OXYGEN SATURATION: 100 % | HEIGHT: 59 IN

## 2022-08-23 DIAGNOSIS — F01.518 VASCULAR DEMENTIA WITH BEHAVIORAL DISTURBANCE: ICD-10-CM

## 2022-08-23 DIAGNOSIS — R63.4 WEIGHT LOSS, NON-INTENTIONAL: Primary | ICD-10-CM

## 2022-08-23 DIAGNOSIS — F33.0 MILD EPISODE OF RECURRENT MAJOR DEPRESSIVE DISORDER (HCC): ICD-10-CM

## 2022-08-23 DIAGNOSIS — N18.31 STAGE 3A CHRONIC KIDNEY DISEASE (HCC): ICD-10-CM

## 2022-08-23 DIAGNOSIS — R26.89 BALANCE PROBLEM: ICD-10-CM

## 2022-08-23 DIAGNOSIS — R26.2 AMBULATORY DYSFUNCTION: ICD-10-CM

## 2022-08-23 DIAGNOSIS — F51.01 PRIMARY INSOMNIA: ICD-10-CM

## 2022-08-23 DIAGNOSIS — E55.9 VITAMIN D DEFICIENCY: ICD-10-CM

## 2022-08-23 PROBLEM — Z00.00 MEDICARE ANNUAL WELLNESS VISIT, SUBSEQUENT: Status: RESOLVED | Noted: 2021-10-07 | Resolved: 2022-08-23

## 2022-08-23 PROBLEM — Z23 NEED FOR INFLUENZA VACCINATION: Status: RESOLVED | Noted: 2021-10-07 | Resolved: 2022-08-23
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
## 2022-08-23 PROBLEM — R19.7 DIARRHEA: Status: RESOLVED | Noted: 2021-10-07 | Resolved: 2022-08-23

## 2022-08-23 LAB
DME PARACHUTE DELIVERY DATE REQUESTED: NORMAL
DME PARACHUTE ITEM DESCRIPTION: NORMAL
DME PARACHUTE ITEM DESCRIPTION: NORMAL
DME PARACHUTE ORDER STATUS: NORMAL
DME PARACHUTE SUPPLIER NAME: NORMAL
DME PARACHUTE SUPPLIER PHONE: NORMAL

## 2022-08-23 PROCEDURE — 99214 OFFICE O/P EST MOD 30 MIN: CPT | Performed by: NURSE PRACTITIONER

## 2022-08-23 PROCEDURE — 3725F SCREEN DEPRESSION PERFORMED: CPT | Performed by: NURSE PRACTITIONER

## 2022-08-23 PROCEDURE — 1160F RVW MEDS BY RX/DR IN RCRD: CPT | Performed by: NURSE PRACTITIONER

## 2022-08-23 RX ORDER — ESCITALOPRAM OXALATE 5 MG/1
5 TABLET ORAL DAILY
Qty: 30 TABLET | Refills: 2 | Status: SHIPPED | OUTPATIENT
Start: 2022-08-23 | End: 2022-09-19

## 2022-08-23 NOTE — PROGRESS NOTES
Assessment/Plan:           Problem List Items Addressed This Visit        Nervous and Auditory    Vascular dementia with behavioral disturbance ([facility] )     Patient is appearing A&Ox3 here with niece and her son seated in wheelchair pleasant and conversive         Relevant Medications    escitalopram (LEXAPRO) 5 mg tablet       Genitourinary    Stage 3a chronic kidney disease ([facility] ) (Chronic)     Lab Results   Component Value Date    EGFR 43 12/29/2021    EGFR 46 10/14/2021    EGFR 42 10/08/2021    CREATININE 1 08 12/29/2021    CREATININE 1 16 10/14/2021    CREATININE 1 24 10/08/2021   Labs ordered to update             Other    Primary insomnia     Sleeping better off the remeron 30 mg         Vitamin D deficiency    Relevant Orders    Vitamin D 25 hydroxy    Ambulatory dysfunction     Rolling walker ordered patient is unable to ambulate distances without something to lean on          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[facility] )     Discussed options with Lisette Kaufman and will start Lexapro 5 mg daily low dose and will follow up in 4 weeks for a recheck on her mood and weight          Relevant Medications    escitalopram (LEXAPRO) 5 mg tablet      Other Visit Diagnoses     Weight loss, non-intentional    -  Primary    Relevant Orders    Comprehensive metabolic panel    CBC and differential    Balance problem                Subjective:      Patient ID: Lon Ely is a 80 y o  female  Patient here with her family and reports that's he was having troubles with sleeping and was on the Remeron and was taking for sleep and appetite and stopped about two weeks ago  Patient has noticed that she can sleep better  Patient has lost 14 pounds since October 2021 without trying   Patient does admit to having some depression and sadness and has been ongoing and she is thinking it is possibly related to her weight loss       The following portions of the patient's history were reviewed and updated as appropriate: BMI Counseling: Body mass index is 25 25 kg/m²  Follow-up plan was not completed due to elderly patient (72 years old) where weight reduction/weight gain would complicate underlying health condition such as: illness or physical disability  She  has a past medical history of Ankle fracture, Arthritis, 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[facility] ), Bronchitis, Cancer ([place] [address] ), Carcinoma, lung ([place] [address] ), Dementia ([place] [address] ), Dependent edema, Depression, Diabetes mellitus ([place] [id number] ), Hypercholesterolemia, Hypertension, Kidney stone, Oth abn and inconclusive findings on dx imaging of breast, Pes planus, Renal calculus, Restless leg syndrome, Rib pain, Sprain, neck, and Varicose veins of left lower extremity  She   Patient Active Problem List    Diagnosis Date Noted    Stage 3a chronic kidney disease ([place] [address] ) 08/23/2022    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[place] [address] ) 08/23/2022    Frailty 12/13/2021    Ambulatory dysfunction 12/13/2021    Anxiety 12/13/2021    Seasonal allergic rhinitis due to pollen 07/30/2021    Vascular dementia with behavioral disturbance ([place] [address] ) 07/30/2021    Heart murmur 03/18/2021    Vitamin D deficiency 01/14/2020    Primary insomnia 11/22/2019    Closed compression fracture of L5 lumbar vertebra, initial encounter ([place] [address] ) 10/20/2019    Degeneration of lumbar intervertebral disc 06/18/2019    Lumbar radiculopathy 06/18/2019    Arthropathy of lumbar facet joint 06/18/2019    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) 06/18/2019    Visual hallucinations 06/18/2019    Restless legs syndrome 06/18/2019     She  has a past surgical history that includes Lung cancer surgery and Appendectomy  Her family history includes Dementia in her brother; No Known Problems in her father and mother  She  reports that she has quit smoking  She has a 10 00 pack-year smoking history  She has never used smokeless tobacco  She reports that she does not drink alcohol and does not use drugs    Current Outpatient Medications Medication Sig Dispense Refill    acetaminophen (TYLENOL) 500 mg tablet Take 500 mg by mouth 2 (two) times a day      escitalopram (LEXAPRO) 5 mg tablet Take 1 tablet (5 mg total) by mouth daily 30 tablet 2    gabapentin (NEURONTIN) 100 mg capsule Take 1 capsule (100 mg total) by mouth 3 (three) times a day 270 capsule 1    Misc  Devices (Mattress Cover) MISC Use daily 1 each 0    pramipexole (MIRAPEX) 1 mg tablet TAKE 1 TABLET BY MOUTH EVERY DAY 90 tablet 1     No current facility-administered medications for this visit  She is allergic to albuterol, aldactone [spironolactone], aspirin, atenolol, bactrim [sulfamethoxazole-trimethoprim], codeine, hydrocodone, ibuprofen, lisinopril, mevacor [lovastatin], mirapex [pramipexole], morphine and related, other, penicillins, procaine, salicylates, ultram [tramadol], and zoloft [sertraline]       Review of Systems   Constitutional: Positive for appetite change  Negative for activity change, chills, diaphoresis, fatigue, fever and unexpected weight change  HENT: Negative for congestion, ear pain, hearing loss, postnasal drip, sinus pressure, sinus pain, sneezing and sore throat  Eyes: Negative for pain, redness and visual disturbance  Wearing glasses    Respiratory: Negative for cough and shortness of breath  Cardiovascular: Negative for chest pain, palpitations and leg swelling  Gastrointestinal: Negative for abdominal distention, abdominal pain, anal bleeding, blood in stool, constipation, diarrhea, nausea and vomiting  Endocrine: Negative  Genitourinary: Negative  Musculoskeletal: Negative for arthralgias  Has a walker at home and needs to have one for home uses a following walker and a wheel chair    Skin: Negative  Allergic/Immunologic: Negative  Neurological: Negative for dizziness, tremors, seizures, syncope, facial asymmetry, speech difficulty, weakness, light-headedness, numbness and headaches     Hematological: Negative  Psychiatric/Behavioral: Positive for dysphoric mood  Negative for behavioral problems  The patient is not nervous/anxious  Objective:      /60   Pulse 71   Ht 4' 11" (1 499 m)   Wt 56 7 kg (125 lb)   SpO2 100%   BMI 25 25 kg/m²          Physical Exam  Vitals and nursing note reviewed  Constitutional:       General: She is not in acute distress  Appearance: She is well-developed  HENT:      Head: Normocephalic and atraumatic  Right Ear: Tympanic membrane normal       Left Ear: Tympanic membrane normal       Nose: Nose normal       Mouth/Throat:      Mouth: Mucous membranes are moist    Eyes:      Pupils: Pupils are equal, round, and reactive to light  Neck:      Thyroid: No thyromegaly  Cardiovascular:      Rate and Rhythm: Normal rate and regular rhythm  Pulses: Normal pulses  Heart sounds: Murmur heard  Systolic murmur is present with a grade of 3/6  Pulmonary:      Effort: Pulmonary effort is normal  No respiratory distress  Breath sounds: Normal breath sounds  No wheezing  Abdominal:      General: Bowel sounds are normal       Palpations: Abdomen is soft  Tenderness: There is no abdominal tenderness  Musculoskeletal:         General: Normal range of motion  Cervical back: Normal range of motion  Right lower le+ Pitting Edema present  Left lower le+ Pitting Edema present  Comments: Patient has trouble with her balance and needs a walker for ambulation seated in wheelchair    Skin:     General: Skin is warm and dry  Capillary Refill: Capillary refill takes less than 2 seconds  Neurological:      General: No focal deficit present  Mental Status: She is alert and oriented to person, place, and time  GCS: GCS eye subscore is 4  GCS verbal subscore is 5  GCS motor subscore is 6  Cranial Nerves: Cranial nerves are intact  Sensory: Sensation is intact     Psychiatric:         Attention and Perception: Attention normal          Mood and Affect: Mood normal          Speech: Speech normal          Behavior: Behavior normal          Thought Content: Thought content normal          Cognition and Memory: Cognition normal          Judgment: Judgment normal          PHQ-2/9 Depression Screening    Little interest or pleasure in doing things: 2 - more than half the days  Feeling down, depressed, or hopeless: 1 - several days  Trouble falling or staying asleep, or sleeping too much: 1 - several days  Feeling tired or having little energy: 1 - several days  Poor appetite or overeatin - more than half the days  Feeling bad about yourself - or that you are a failure or have let yourself or your family down: 0 - not at all  Trouble concentrating on things, such as reading the newspaper or watching television: 1 - several days  Moving or speaking so slowly that other people could have noticed   Or the opposite - being so fidgety or restless that you have been moving around a lot more than usual: 0 - not at all  Thoughts that you would be better off dead, or of hurting yourself in some way: 0 - not at all  PHQ-9 Score: 8   PHQ-9 Interpretation: Mild depression

## 2022-08-23 NOTE — ASSESSMENT & PLAN NOTE
Lab Results   Component Value Date    EGFR 43 12/29/2021    EGFR 46 10/14/2021    EGFR 42 10/08/2021    CREATININE 1 08 12/29/2021    CREATININE 1 16 10/14/2021    CREATININE 1 24 10/08/2021   Labs ordered to update

## 2022-08-23 NOTE — ASSESSMENT & PLAN NOTE
Discussed options with Nahun Marion and will start Lexapro 5 mg daily low dose and will follow up in 4 weeks for a recheck on her mood and weight

## 2022-08-30 ENCOUNTER — APPOINTMENT (OUTPATIENT)
Dept: LAB | Facility: CLINIC | Age: 87
End: 2022-08-30
Payer: COMMERCIAL

## 2022-08-30 DIAGNOSIS — E55.9 VITAMIN D DEFICIENCY: ICD-10-CM

## 2022-08-30 DIAGNOSIS — R73.9 ELEVATED BLOOD SUGAR: ICD-10-CM

## 2022-08-30 DIAGNOSIS — R63.4 WEIGHT LOSS, NON-INTENTIONAL: ICD-10-CM

## 2022-08-30 LAB
25(OH)D3 SERPL-MCNC: 14.8 NG/ML (ref 30–100)
ALBUMIN SERPL BCP-MCNC: 3.6 G/DL (ref 3.5–5)
ALP SERPL-CCNC: 103 U/L (ref 46–116)
ALT SERPL W P-5'-P-CCNC: 15 U/L (ref 12–78)
ANION GAP SERPL CALCULATED.3IONS-SCNC: 3 MMOL/L (ref 4–13)
AST SERPL W P-5'-P-CCNC: 16 U/L (ref 5–45)
BASOPHILS # BLD AUTO: 0.04 THOUSANDS/ΜL (ref 0–0.1)
BASOPHILS NFR BLD AUTO: 1 % (ref 0–1)
BILIRUB SERPL-MCNC: 0.55 MG/DL (ref 0.2–1)
BUN SERPL-MCNC: 18 MG/DL (ref 5–25)
CALCIUM SERPL-MCNC: 8.9 MG/DL (ref 8.3–10.1)
CHLORIDE SERPL-SCNC: 106 MMOL/L (ref 96–108)
CO2 SERPL-SCNC: 29 MMOL/L (ref 21–32)
CREAT SERPL-MCNC: 1.33 MG/DL (ref 0.6–1.3)
EOSINOPHIL # BLD AUTO: 0.03 THOUSAND/ΜL (ref 0–0.61)
EOSINOPHIL NFR BLD AUTO: 1 % (ref 0–6)
ERYTHROCYTE [DISTWIDTH] IN BLOOD BY AUTOMATED COUNT: 14.7 % (ref 11.6–15.1)
GFR SERPL CREATININE-BSD FRML MDRD: 33 ML/MIN/1.73SQ M
GLUCOSE SERPL-MCNC: 202 MG/DL (ref 65–140)
HCT VFR BLD AUTO: 44.3 % (ref 34.8–46.1)
HGB BLD-MCNC: 13.7 G/DL (ref 11.5–15.4)
IMM GRANULOCYTES # BLD AUTO: 0.01 THOUSAND/UL (ref 0–0.2)
IMM GRANULOCYTES NFR BLD AUTO: 0 % (ref 0–2)
LYMPHOCYTES # BLD AUTO: 0.83 THOUSANDS/ΜL (ref 0.6–4.47)
LYMPHOCYTES NFR BLD AUTO: 14 % (ref 14–44)
MCH RBC QN AUTO: 28.6 PG (ref 26.8–34.3)
MCHC RBC AUTO-ENTMCNC: 30.9 G/DL (ref 31.4–37.4)
MCV RBC AUTO: 93 FL (ref 82–98)
MONOCYTES # BLD AUTO: 0.44 THOUSAND/ΜL (ref 0.17–1.22)
MONOCYTES NFR BLD AUTO: 7 % (ref 4–12)
NEUTROPHILS # BLD AUTO: 4.78 THOUSANDS/ΜL (ref 1.85–7.62)
NEUTS SEG NFR BLD AUTO: 77 % (ref 43–75)
NRBC BLD AUTO-RTO: 0 /100 WBCS
PLATELET # BLD AUTO: 242 THOUSANDS/UL (ref 149–390)
PMV BLD AUTO: 11.5 FL (ref 8.9–12.7)
POTASSIUM SERPL-SCNC: 4.1 MMOL/L (ref 3.5–5.3)
PROT SERPL-MCNC: 7.4 G/DL (ref 6.4–8.4)
RBC # BLD AUTO: 4.79 MILLION/UL (ref 3.81–5.12)
SODIUM SERPL-SCNC: 138 MMOL/L (ref 135–147)
WBC # BLD AUTO: 6.13 THOUSAND/UL (ref 4.31–10.16)

## 2022-08-30 PROCEDURE — 83036 HEMOGLOBIN GLYCOSYLATED A1C: CPT

## 2022-08-30 PROCEDURE — 36415 COLL VENOUS BLD VENIPUNCTURE: CPT

## 2022-08-30 PROCEDURE — 85025 COMPLETE CBC W/AUTO DIFF WBC: CPT

## 2022-08-30 PROCEDURE — 82306 VITAMIN D 25 HYDROXY: CPT

## 2022-08-30 PROCEDURE — 80053 COMPREHEN METABOLIC PANEL: CPT

## 2022-08-31 DIAGNOSIS — R73.9 ELEVATED BLOOD SUGAR: ICD-10-CM

## 2022-08-31 DIAGNOSIS — E55.9 VITAMIN D DEFICIENCY: Primary | ICD-10-CM

## 2022-08-31 DIAGNOSIS — N18.31 STAGE 3A CHRONIC KIDNEY DISEASE (HCC): ICD-10-CM

## 2022-08-31 LAB
EST. AVERAGE GLUCOSE BLD GHB EST-MCNC: 126 MG/DL
HBA1C MFR BLD: 6 %

## 2022-08-31 RX ORDER — ERGOCALCIFEROL 1.25 MG/1
50000 CAPSULE ORAL WEEKLY
Qty: 12 CAPSULE | Refills: 1 | Status: SHIPPED | OUTPATIENT
Start: 2022-08-31 | End: 2023-02-27

## 2022-09-01 LAB
DME PARACHUTE DELIVERY DATE ACTUAL: NORMAL
DME PARACHUTE DELIVERY DATE REQUESTED: NORMAL
DME PARACHUTE ITEM DESCRIPTION: NORMAL
DME PARACHUTE ITEM DESCRIPTION: NORMAL
DME PARACHUTE ORDER STATUS: NORMAL
DME PARACHUTE SUPPLIER NAME: NORMAL
DME PARACHUTE SUPPLIER PHONE: NORMAL

## 2022-09-06 NOTE — PROGRESS NOTES
Assessment & Plan:   Valentino Bravo was seen today for follow-up  Diagnoses and all orders for this visit:    Vascular dementia with behavioral disturbance (HCC)    Lumbar radiculopathy    Stage 3a chronic kidney disease (HCC)    Ambulatory dysfunction    Anxiety    Frailty    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HCC)    Primary insomnia    Visual hallucinations    Poor appetite      Lumbar radiculopathy  · Comfortable at present  · Cont tylenol prn and gabapentin  · If any increased dizziness or sedation notify provider  · Consider topical analgesic if needed  · Cont nonpharm methods of pain control    Vascular dementia with behavioral disturbance ([facility] )  · Last MoCA 4/30, not repeated today  · Per niece and patient, function stable, memory loss stable, pleasant, cooperative  Engage in regular social, physical, cognitive activity  Optimize acute and chronic conditions  Frequent reminders and close monitoring for safety  Monitor for changes in mood/behavior and notify provider for eval if noted  Avoid deliriogenic medications - check with pharmacist/provider before starting new medications      Stage 3a chronic kidney disease [facility])  Lab Results   Component Value Date    EGFR 33 08/30/2022    EGFR 43 12/29/2021    EGFR 46 10/14/2021    CREATININE 1 33 (H) 08/30/2022    CREATININE 1 08 12/29/2021    CREATININE 1 16 10/14/2021   · Most recent GFR 33  · Avoid nephrotoxins, NSAIds  Keep hydrated  · Monitor labs and blood pressure periodically   · Cont dietary and lifestyle interventions    Ambulatory dysfunction  · Uses walker at home, is independent with supervision, no recent falls  · Cont fall precautions  · Cont to provide safe, well lit environment    Anxiety  · Stable at present  · Recently started on lexapro  · Cont emotional support, relaxation techniques  Frailty  · Pt is independent with adls/ dependent with iadls    Lives with niece  · Frailty score:  6  Optimize diet, hydration, mobility  Monitor ss infection, dehydration, dvt, skin breakdown  No need for pt/ot today  Did message SW:  Life alert was discontinued (through company), need for shower chair  Pt waiting for waiver - applied for HHA bid  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HCC)  · Stable at  Present  · Stopped remeron, started on lexapro  · Cont emotional support, monitor for any changes in mood    Poor appetite  · Doing ok, weight stable  · Cont sm frequent meals and snacks that are nutritiously dense and adequate calories  · If pt not consuming at least half meals, consider supplementing with nutritional shakes, consider starting carnation instant breakfast daily in whole milk as tolerated    Primary insomnia  · Was started on lexapro for this by pcp  · Doing better  Encourage good sleep hygiene techniques:  decreased noise distractions at night, quiet/calm environment, limit day time sleep  Discourage hs use of caffeine  Visual hallucinations  · None recently  · Recommend f/u with opthamologist to ensure no vision disease contributing  · Cont to provide adequate lighting, safe environment         HPI:  We had the pleasure of evaluating Soy Mckeon who is a 80 y o  female   in Geriatric follow up today  comobidities include dementia, anxiety/depression, ckd3  She lives with niece and niece's   She is a retired nurse  Ms Maira Shultz is in the office with her niece  Previous MOCA 4/30  Repeat not done today  Pt ambulate with two assist to door and back, steady  Appetite down - stopped remeron  Was having trouble sleeping  Lexapro helped  Patient denies concerns  She is doing well with family support  Niece has applied for HHA twice a day, is waiting to hear back  She is still working  ADL/IADL:  ald's independent, dependent of ialds  No driving  Continues helping with washing and cleaning  Walker/falls:  Walker, no falls   Appetite/swallow:  Down, weight is stable    No swallow difficulty  Elimination/incont:  No constipation, no incont  Uses pad   Anxiety/depression:  No mood change - enjoys color, paint  Sleep:  Better w/lexapro  Pain:  Back, tylenol and gabapentin help  Memory: poor short term- on rare occasion visual hallucination  Ears/eyes/teeth:  Readers, no [place], missing few bottom, front     Chronic conditions  none (htn and dm have been stable  She has problems operating household appliance such as TV remote, kitchen appliances, computer  She has difficulty finding the right word while speaking: Yes  Patient requires repeat information or ask the same question repeatedly: No  Do you drive: No       Do you handle your own financial affairs such as balancing your checkbook, paying bills, investments: No  Have you or your family noted any change in your mood or personality:Yes  Are you currently or have you been treated in the past for depression or anxiety: Yes  Have you noticed any gait or balance disorder: No  Uses :Walker: occ forgets   Any hallucination or delusion: Yes - rare, better  Fluctuation in alertness: No  Sleep Issues: Yes  Urinary/Stool Incontinence: Yes - urinary incont  Hearing and vision issue: Yes  Do you have POA:Yes - niece Seretha Yoselin you have a Living will Yes  Past Medical, surgical, social, medication and allergy history and patients previous records reviewed  Family Review of Behavior [facility]:    pacing  No    agressive/combative behavior  No    agitated  No   wandering  No - not left alone  resistance to care  No   hoarding/hiding objects  No    suspicious  No  withdrawn Yes - sometimes has bad days - does art  rummaging/pillaging  Yes    misplacing/losing objects Yes- less than usual  personal hygiene problems  Yes  forgetfulness of actions Yes   temper outbursts  No     throwing items No      Family member with dementia and what type?no  Have you had any head trauma No  Does patient have history of alcohol abuse No      ROS: Review of Systems   Constitutional: Negative for activity change, appetite change, chills and fatigue (occasional )  HENT: Negative for congestion, hearing loss and trouble swallowing  Eyes: Negative for visual disturbance (reading glasses)  Respiratory: Negative for cough and shortness of breath  Cardiovascular: Negative for chest pain  Gastrointestinal: Negative for abdominal pain, constipation, diarrhea, nausea and vomiting  Genitourinary: Negative for difficulty urinating  Musculoskeletal: Positive for arthralgias, back pain and gait problem  Neurological: Positive for headaches (occasional - 2 tylenol hlep)  Negative for dizziness and light-headedness  Psychiatric/Behavioral: Positive for decreased concentration (forgetful), dysphoric mood (occasional, likes to color/paint), hallucinations (occasional, but improved) and sleep disturbance (better with lexapro)  Negative for confusion  The patient is not nervous/anxious  Allergies: Allergies   Allergen Reactions    Albuterol     Aldactone [Spironolactone]     Aspirin     Atenolol     Bactrim [Sulfamethoxazole-Trimethoprim]     Codeine     Hydrocodone     Ibuprofen     Lisinopril     Mevacor [Lovastatin]     Mirapex [Pramipexole]     Morphine And Related     Other      novacaine    Penicillins     Procaine     Salicylates     Ultram [Tramadol]     Zoloft [Sertraline]      Night sweats       Medications:      Current Outpatient Medications:     acetaminophen (TYLENOL) 500 mg tablet, Take 500 mg by mouth 2 (two) times a day, Disp: , Rfl:     ergocalciferol (VITAMIN D2) 50,000 units, Take 1 capsule (50,000 Units total) by mouth once a week, Disp: 12 capsule, Rfl: 1    escitalopram (LEXAPRO) 5 mg tablet, Take 1 tablet (5 mg total) by mouth daily, Disp: 30 tablet, Rfl: 2    gabapentin (NEURONTIN) 100 mg capsule, Take 1 capsule (100 mg total) by mouth 3 (three) times a day, Disp: 270 capsule, Rfl: 1    Misc   Devices (Mattress Cover) MISC, Use daily, Disp: 1 each, Rfl: 0    pramipexole (MIRAPEX) 1 mg tablet, TAKE 1 TABLET BY MOUTH EVERY DAY, Disp: 90 tablet, Rfl: 1    Vitals:  Vitals:    09/07/22 0928   BP: 128/60   Pulse: 68   Resp: 18   Temp: (!) 97 1 °F (36 2 °C)   SpO2: 98%       History:  Past Medical History:   Diagnosis Date    Ankle fracture     Arthritis     left hip    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HCC)     with left ureter    Bronchitis     Cancer ([facility] )     Carcinoma, lung ([facility] )     Dementia ([place] [address] )     Dependent edema     Depression     Diabetes mellitus ([place] [address] )     Hypercholesterolemia     Hypertension     Kidney stone     Oth abn and inconclusive findings on dx imaging of breast     Pes planus     Renal calculus     Restless leg syndrome     Rib pain     Sprain, neck     Varicose veins of left lower extremity      Past Surgical History:   Procedure Laterality Date    APPENDECTOMY      LUNG CANCER SURGERY       Family History   Problem Relation Age of Onset    No Known Problems Mother     No Known Problems Father     Dementia Brother     Breast cancer Neg Hx     Colon cancer Neg Hx     Ovarian cancer Neg Hx     Uterine cancer Neg Hx     Cervical cancer Neg Hx      Social History     Socioeconomic History    Marital status:       Spouse name: Not on file    Number of children: Not on file    Years of education: Not on file    Highest education level: Not on file   Occupational History    Not on file   Tobacco Use    Smoking status: Former Smoker     Packs/day: 0 50     Years: 20 00     Pack years: 10 00    Smokeless tobacco: Never Used   Substance and Sexual Activity    Alcohol use: Never    Drug use: Never    Sexual activity: Not Currently     Birth control/protection: Post-menopausal   Other Topics Concern    Not on file   Social History Narrative    Lives at home with niece     Social Determinants of Health     Financial Resource Strain: Not on file   Food Insecurity: Not on file Transportation Needs: Not on file   Physical Activity: Not on file   Stress: Not on file   Social Connections: Not on file   Intimate Partner Violence: Not on file   Housing Stability: Not on file     Past Surgical History:   Procedure Laterality Date    APPENDECTOMY      LUNG CANCER SURGERY           Physical Exam:   Physical Exam  Vitals and nursing note reviewed  Constitutional:       General: She is not in acute distress  Appearance: Normal appearance  She is well-developed  She is not diaphoretic  HENT:      Head: Normocephalic  Cardiovascular:      Rate and Rhythm: Normal rate  Heart sounds: No murmur heard  No friction rub  No gallop  Pulmonary:      Effort: Pulmonary effort is normal  No respiratory distress  Breath sounds: Normal breath sounds  No wheezing or rales  Abdominal:      General: Bowel sounds are normal  There is no distension  Palpations: Abdomen is soft  Tenderness: There is no abdominal tenderness  There is no rebound  Musculoskeletal:         General: Normal range of motion  Skin:     General: Skin is warm and dry  Neurological:      General: No focal deficit present  Mental Status: She is alert  Mental status is at baseline        Comments: Oriented to person, partial tps   Psychiatric:         Mood and Affect: Mood normal          Behavior: Behavior normal

## 2022-09-07 ENCOUNTER — OFFICE VISIT (OUTPATIENT)
Dept: GERIATRICS | Age: 87
End: 2022-09-07
Payer: COMMERCIAL

## 2022-09-07 VITALS
RESPIRATION RATE: 18 BRPM | HEART RATE: 68 BPM | DIASTOLIC BLOOD PRESSURE: 60 MMHG | BODY MASS INDEX: 25.29 KG/M2 | OXYGEN SATURATION: 98 % | WEIGHT: 125.2 LBS | SYSTOLIC BLOOD PRESSURE: 128 MMHG | TEMPERATURE: 97.1 F

## 2022-09-07 DIAGNOSIS — R63.0 POOR APPETITE: ICD-10-CM

## 2022-09-07 DIAGNOSIS — F51.01 PRIMARY INSOMNIA: ICD-10-CM

## 2022-09-07 DIAGNOSIS — M54.16 LUMBAR RADICULOPATHY: ICD-10-CM

## 2022-09-07 DIAGNOSIS — R44.1 VISUAL HALLUCINATIONS: ICD-10-CM

## 2022-09-07 DIAGNOSIS — F41.9 ANXIETY: ICD-10-CM

## 2022-09-07 DIAGNOSIS — N18.31 STAGE 3A CHRONIC KIDNEY DISEASE (HCC): Chronic | ICD-10-CM

## 2022-09-07 DIAGNOSIS — R54 FRAILTY: ICD-10-CM

## 2022-09-07 DIAGNOSIS — R26.2 AMBULATORY DYSFUNCTION: ICD-10-CM

## 2022-09-07 DIAGNOSIS — F33.0 MILD EPISODE OF RECURRENT MAJOR DEPRESSIVE DISORDER (HCC): ICD-10-CM

## 2022-09-07 DIAGNOSIS — F01.518 VASCULAR DEMENTIA WITH BEHAVIORAL DISTURBANCE: Primary | ICD-10-CM

## 2022-09-07 PROCEDURE — 1160F RVW MEDS BY RX/DR IN RCRD: CPT | Performed by: NURSE PRACTITIONER

## 2022-09-07 PROCEDURE — 99214 OFFICE O/P EST MOD 30 MIN: CPT | Performed by: NURSE PRACTITIONER

## 2022-09-07 NOTE — ASSESSMENT & PLAN NOTE
· Doing ok, weight stable  · Cont sm frequent meals and snacks that are nutritiously dense and adequate calories  · If pt not consuming at least half meals, consider supplementing with nutritional shakes, consider starting carnation instant breakfast daily in whole milk as tolerated

## 2022-09-07 NOTE — ASSESSMENT & PLAN NOTE
· Was started on lexapro for this by pcp  · Doing better  Encourage good sleep hygiene techniques:  decreased noise distractions at night, quiet/calm environment, limit day time sleep  Discourage hs use of caffeine

## 2022-09-07 NOTE — ASSESSMENT & PLAN NOTE
· Stable at present  · Recently started on lexapro  · Cont emotional support, relaxation techniques

## 2022-09-07 NOTE — ASSESSMENT & PLAN NOTE
· Stable at  Present  · Stopped remeron, started on lexapro  · Cont emotional support, monitor for any changes in mood

## 2022-09-07 NOTE — ASSESSMENT & PLAN NOTE
· Comfortable at present  · Cont tylenol prn and gabapentin  · If any increased dizziness or sedation notify provider  · Consider topical analgesic if needed  · Cont nonpharm methods of pain control

## 2022-09-07 NOTE — ASSESSMENT & PLAN NOTE
· Uses walker at home, is independent with supervision, no recent falls  · Cont fall precautions  · Cont to provide safe, well lit environment

## 2022-09-07 NOTE — ASSESSMENT & PLAN NOTE
· Last MoCA 4/30, not repeated today  · Per niece and patient, function stable, memory loss stable, pleasant, cooperative  Engage in regular social, physical, cognitive activity    Optimize acute and chronic conditions  Frequent reminders and close monitoring for safety  Monitor for changes in mood/behavior and notify provider for eval if noted  Avoid deliriogenic medications - check with pharmacist/provider before starting new medications

## 2022-09-07 NOTE — ASSESSMENT & PLAN NOTE
· Pt is independent with adls/ dependent with iadls  Lives with niece  · Frailty score:  6  Optimize diet, hydration, mobility  Monitor ss infection, dehydration, dvt, skin breakdown  No need for pt/ot today  Did message SW:  Life alert was discontinued (through company), need for shower chair  Pt waiting for waiver - applied for HHA bid

## 2022-09-07 NOTE — ASSESSMENT & PLAN NOTE
Lab Results   Component Value Date    EGFR 33 08/30/2022    EGFR 43 12/29/2021    EGFR 46 10/14/2021    CREATININE 1 33 (H) 08/30/2022    CREATININE 1 08 12/29/2021    CREATININE 1 16 10/14/2021   · Most recent GFR 33  · Avoid nephrotoxins, NSAIds  Keep hydrated      · Monitor labs and blood pressure periodically   · Cont dietary and lifestyle interventions

## 2022-09-08 ENCOUNTER — TELEPHONE (OUTPATIENT)
Dept: GERIATRICS | Age: 87
End: 2022-09-08

## 2022-09-08 NOTE — TELEPHONE ENCOUNTER
FARZANA Hearn  Patient was seen today with her niece  Bijal Xie would like a phone call when  you have a chance- would like new shower chair and life alert (old one was discontinued)   Thank you  [facility] left message for nikorina Mabry with [facility] [place] [facility] direct number for call back  LSW will provide information on [employer], and see which shower chair niece is requesting for the home

## 2022-09-17 DIAGNOSIS — F33.0 MILD EPISODE OF RECURRENT MAJOR DEPRESSIVE DISORDER (HCC): ICD-10-CM

## 2022-09-19 ENCOUNTER — TELEPHONE (OUTPATIENT)
Dept: GERIATRICS | Age: 87
End: 2022-09-19

## 2022-09-19 RX ORDER — ESCITALOPRAM OXALATE 5 MG/1
5 TABLET ORAL DAILY
Qty: 90 TABLET | Refills: 1 | Status: SHIPPED | OUTPATIENT
Start: 2022-09-19 | End: 2022-12-18

## 2022-09-19 NOTE — TELEPHONE ENCOUNTER
Patients Niece, Tavia De La Torre ([phone])  Left Message on voice mail advising that she would like a call from the

## 2022-09-20 ENCOUNTER — TELEPHONE (OUTPATIENT)
Dept: GERIATRICS | Age: 87
End: 2022-09-20

## 2022-09-20 NOTE — TELEPHONE ENCOUNTER
ANGELICA returned call to elisa Scott asked if a shower chair could be ordered and new mattress for patients current hospital bed  ANGELICA stated that we can try to order the shower chair, ANGELICA stated that it may not be covered by insurance  ANGELICA stated that elisa would need to contact the [employer] company that the hospital bed is from to have the mattress replaced  Elisa also asked for information be mailed to address on file about [employer] system  ANGELICA placed in the mail today

## 2022-09-20 NOTE — TELEPHONE ENCOUNTER
----- Message from Pedro Pandey sent at 9/20/2022  3:20 PM EDT -----  Regarding: Shower Chair  Can someone [facility]

## 2022-09-21 LAB
DME PARACHUTE DELIVERY DATE REQUESTED: NORMAL
DME PARACHUTE ITEM DESCRIPTION: NORMAL
DME PARACHUTE ORDER STATUS: NORMAL
DME PARACHUTE SUPPLIER NAME: NORMAL
DME PARACHUTE SUPPLIER PHONE: NORMAL

## 2022-09-29 ENCOUNTER — OFFICE VISIT (OUTPATIENT)
Dept: FAMILY MEDICINE CLINIC | Facility: CLINIC | Age: 87
End: 2022-09-29
Payer: COMMERCIAL

## 2022-09-29 VITALS
TEMPERATURE: 97.8 F | WEIGHT: 131.4 LBS | DIASTOLIC BLOOD PRESSURE: 82 MMHG | HEART RATE: 72 BPM | HEIGHT: 59 IN | BODY MASS INDEX: 26.49 KG/M2 | OXYGEN SATURATION: 99 % | SYSTOLIC BLOOD PRESSURE: 130 MMHG

## 2022-09-29 DIAGNOSIS — Z23 FLU VACCINE NEED: Primary | ICD-10-CM

## 2022-09-29 DIAGNOSIS — F01.518 VASCULAR DEMENTIA WITH BEHAVIORAL DISTURBANCE: ICD-10-CM

## 2022-09-29 PROCEDURE — G0008 ADMIN INFLUENZA VIRUS VAC: HCPCS | Performed by: NURSE PRACTITIONER

## 2022-09-29 PROCEDURE — 90662 IIV NO PRSV INCREASED AG IM: CPT | Performed by: NURSE PRACTITIONER

## 2022-09-29 PROCEDURE — 99213 OFFICE O/P EST LOW 20 MIN: CPT | Performed by: NURSE PRACTITIONER

## 2022-09-29 RX ORDER — MIRTAZAPINE 15 MG/1
15 TABLET, FILM COATED ORAL
Qty: 30 TABLET | Refills: 5 | Status: SHIPPED | OUTPATIENT
Start: 2022-09-29 | End: 2022-09-30 | Stop reason: SDUPTHER

## 2022-09-29 NOTE — PROGRESS NOTES
Assessment/Plan:           Problem List Items Addressed This Visit        Nervous and Auditory    Vascular dementia with behavioral disturbance [facility])     Patient is doing well on the Lexapro 5 mg but has not slept well without her remeron willl restart at a lower dose 15 mg          Relevant Medications    mirtazapine (REMERON) 15 mg tablet      Other Visit Diagnoses     Flu vaccine need    -  Primary    Relevant Orders    FLUZONE HIGH-DOSE: influenza vaccine, high-dose, preservative-free 0 7 mL (Completed)            Subjective:      Patient ID: Erickson Sandhu is a 80 y o  female  Patient here with her daughter Teo Hardin for a recheck and reports that she has gained about 6 pounds since last month and is on the Lexapro 5 mg and reports that she is not sleeping at night Remeron was stopped feeling that it was not working and has been awake at night and her night and day is mixed up and she would like to get back on the Remeron  The following portions of the patient's history were reviewed and updated as appropriate: BMI Counseling: Body mass index is 26 54 kg/m²  Follow-up plan was not completed due to elderly patient (72 years old) where weight reduction/weight gain would complicate underlying health condition such as: illness or physical disability  She  has a past medical history of Ankle fracture, Arthritis, 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[address] ), Bronchitis, Cancer ([address] ), Carcinoma, lung ([address] ), Dementia ([address] ), Dependent edema, Depression, Diabetes mellitus ([address] ), Hypercholesterolemia, Hypertension, Kidney stone, Oth abn and inconclusive findings on dx imaging of breast, Pes planus, Renal calculus, Restless leg syndrome, Rib pain, Sprain, neck, and Varicose veins of left lower extremity    She   Patient Active Problem List    Diagnosis Date Noted    Stage 3a chronic kidney disease ([address] ) 08/23/2022    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[address] ) 08/23/2022    Frailty 12/13/2021    Ambulatory dysfunction 12/13/2021    Anxiety 12/13/2021    Seasonal allergic rhinitis due to pollen 07/30/2021    V[facility] with [facility] disturbance [facility] ) 07/30/2021    Heart murmur 03/18/2021    Vitamin D deficiency 01/14/2020    Primary insomnia 11/22/2019    Poor appetite 11/22/2019    Closed compression fracture of L5 lumbar vertebra, initial encounter ([facility] ) 10/20/2019    Degeneration of lumbar intervertebral disc 06/18/2019    Lumbar radiculopathy 06/18/2019    Arthropathy of lumbar facet joint 06/18/2019    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) 06/18/2019    Visual hallucinations 06/18/2019    Restless legs syndrome 06/18/2019     She  has a past surgical history that includes Lung cancer surgery and Appendectomy  Her family history includes Dementia in her brother; No Known Problems in her father and mother  She  reports that she has quit smoking  She has a 10 00 pack-year smoking history  She has never used smokeless tobacco  She reports that she does not drink alcohol and does not use drugs  Current Outpatient Medications   Medication Sig Dispense Refill    acetaminophen (TYLENOL) 500 mg tablet Take 500 mg by mouth 2 (two) times a day      ergocalciferol (VITAMIN D2) 50,000 units Take 1 capsule (50,000 Units total) by mouth once a week 12 capsule 1    escitalopram (LEXAPRO) 5 mg tablet TAKE 1 TABLET (5 MG TOTAL) BY MOUTH DAILY  90 tablet 1    gabapentin (NEURONTIN) 100 mg capsule Take 1 capsule (100 mg total) by mouth 3 (three) times a day 270 capsule 1    mirtazapine (REMERON) 15 mg tablet Take 1 tablet (15 mg total) by mouth daily at bedtime 30 tablet 5    Misc  Devices (Mattress Cover) MISC Use daily 1 each 0    pramipexole (MIRAPEX) 1 mg tablet TAKE 1 TABLET BY MOUTH EVERY DAY 90 tablet 1     No current facility-administered medications for this visit       She is allergic to albuterol, aldactone [spironolactone], aspirin, atenolol, bactrim [sulfamethoxazole-trimethoprim], codeine, hydrocodone, ibuprofen, lisinopril, mevacor [lovastatin], mirapex [pramipexole], morphine and related, other, penicillins, procaine, salicylates, ultram [tramadol], and zoloft [sertraline]       Review of Systems   Constitutional: Negative for activity change, appetite change, chills, diaphoresis, fatigue, fever and unexpected weight change  HENT: Negative for congestion, ear pain, hearing loss, postnasal drip, sinus pressure, sinus pain, sneezing, sore throat and trouble swallowing  Eyes: Negative for pain, redness and visual disturbance  Respiratory: Negative for cough and shortness of breath  Cardiovascular: Negative for chest pain, palpitations and leg swelling  Gastrointestinal: Negative for abdominal pain, diarrhea, nausea and vomiting  Endocrine: Negative  Genitourinary: Negative  Musculoskeletal: Negative for arthralgias  Wheelchair distances and walker for short distances    Neurological: Negative for dizziness and light-headedness  Psychiatric/Behavioral: Positive for hallucinations and sleep disturbance  Negative for behavioral problems and dysphoric mood  Objective:      /82 (BP Location: Left arm, Patient Position: Sitting)   Pulse 72   Temp 97 8 °F (36 6 °C) (Temporal)   Ht 4' 11" (1 499 m)   Wt 59 6 kg (131 lb 6 4 oz)   SpO2 99%   BMI 26 54 kg/m²          Physical Exam  Constitutional:       General: She is not in acute distress  Appearance: She is well-developed  HENT:      Head: Normocephalic and atraumatic  Eyes:      Pupils: Pupils are equal, round, and reactive to light  Neck:      Thyroid: No thyromegaly  Cardiovascular:      Rate and Rhythm: Normal rate and regular rhythm  Extrasystoles are present  Heart sounds: Normal heart sounds  No murmur heard  Pulmonary:      Effort: Pulmonary effort is normal  No respiratory distress  Breath sounds: Normal breath sounds  No wheezing     Abdominal:      General: Bowel sounds are normal       Palpations: Abdomen is soft  Musculoskeletal:         General: Normal range of motion  Cervical back: Normal range of motion  Comments: Seated in wheelchair    Skin:     General: Skin is warm and dry  Neurological:      Mental Status: She is alert and oriented to person, place, and time     Psychiatric:      Comments: Pleasant cooperative here with her niece Teo Hardin

## 2022-09-30 ENCOUNTER — TELEPHONE (OUTPATIENT)
Dept: FAMILY MEDICINE CLINIC | Facility: CLINIC | Age: 87
End: 2022-09-30

## 2022-09-30 DIAGNOSIS — F01.518 VASCULAR DEMENTIA WITH BEHAVIORAL DISTURBANCE: ICD-10-CM

## 2022-09-30 RX ORDER — MIRTAZAPINE 15 MG/1
7.5 TABLET, FILM COATED ORAL
Qty: 30 TABLET | Refills: 5
Start: 2022-09-30

## 2022-09-30 NOTE — TELEPHONE ENCOUNTER
Pt saw you yesterday here at the office  They gave Magdi Aggarwal the meds and they had a very hard time waking her up this morning  Arielle Marimar is asking for you to please call her regarding her meds     Call [phone]    thanks

## 2022-10-05 ENCOUNTER — TELEPHONE (OUTPATIENT)
Dept: FAMILY MEDICINE CLINIC | Facility: CLINIC | Age: 87
End: 2022-10-05

## 2022-10-05 LAB
DME PARACHUTE DELIVERY DATE ACTUAL: NORMAL
DME PARACHUTE DELIVERY DATE EXPECTED: NORMAL
DME PARACHUTE DELIVERY DATE REQUESTED: NORMAL
DME PARACHUTE ITEM DESCRIPTION: NORMAL
DME PARACHUTE ORDER STATUS: NORMAL
DME PARACHUTE SUPPLIER NAME: NORMAL
DME PARACHUTE SUPPLIER PHONE: NORMAL

## 2022-10-11 ENCOUNTER — OFFICE VISIT (OUTPATIENT)
Dept: FAMILY MEDICINE CLINIC | Facility: CLINIC | Age: 87
End: 2022-10-11
Payer: COMMERCIAL

## 2022-10-11 VITALS
SYSTOLIC BLOOD PRESSURE: 122 MMHG | OXYGEN SATURATION: 99 % | HEART RATE: 97 BPM | HEIGHT: 59 IN | DIASTOLIC BLOOD PRESSURE: 76 MMHG | BODY MASS INDEX: 27.01 KG/M2 | WEIGHT: 134 LBS

## 2022-10-11 DIAGNOSIS — R00.0 TACHYCARDIA: ICD-10-CM
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
## 2022-10-11 DIAGNOSIS — Z00.00 MEDICARE ANNUAL WELLNESS VISIT, SUBSEQUENT: ICD-10-CM

## 2022-10-11 DIAGNOSIS — I48.20 CHRONIC ATRIAL FIBRILLATION (HCC): ICD-10-CM

## 2022-10-11 DIAGNOSIS — R07.81 RIB PAIN ON RIGHT SIDE: ICD-10-CM

## 2022-10-11 PROCEDURE — G0439 PPPS, SUBSEQ VISIT: HCPCS | Performed by: NURSE PRACTITIONER

## 2022-10-11 PROCEDURE — 93000 ELECTROCARDIOGRAM COMPLETE: CPT | Performed by: NURSE PRACTITIONER

## 2022-10-11 RX ORDER — METOPROLOL SUCCINATE 25 MG/1
12.5 TABLET, EXTENDED RELEASE ORAL DAILY
Qty: 45 TABLET | Refills: 0 | Status: SHIPPED | OUTPATIENT
Start: 2022-10-11 | End: 2022-10-17 | Stop reason: ALTCHOICE

## 2022-10-11 NOTE — ASSESSMENT & PLAN NOTE
Atrial fibrillation on her EKG HR is 97 will start a low dose of Metoprolol 12 5 mg and referral for cardiology not a candidate for anticoagulation with age and falling history referral placed

## 2022-10-11 NOTE — PROGRESS NOTES
Assessment and Plan:     Problem List Items Addressed This Visit        Cardiovascular and Mediastinum    Chronic atrial fibrillation (HCC)     Atrial fibrillation on her EKG HR is 97 will start a low dose of Metoprolol 12 5 mg and referral for cardiology not a candidate for anticoagulation with age and falling history referral placed          Relevant Medications    metoprolol succinate (TOPROL-XL) 25 mg 24 hr tablet    Other Relevant Orders    Ambulatory Referral to Cardiology       Other    Medicare annual wellness visit, subsequent    Tachycardia    Relevant Medications    metoprolol succinate (TOPROL-XL) 25 mg 24 hr tablet    Other Relevant Orders    POCT ECG (Completed)    Fall - Primary    Relevant Orders    XR ribs right w pa chest min 3 views    Rib pain on right side    Relevant Orders    XR ribs right w pa chest min 3 views        BMI Counseling: Body mass index is 27 06 kg/m²  Follow-up plan was not completed due to elderly patient (72 years old) where weight reduction/weight gain would complicate underlying health condition such as: illness or physical disability and mental illness, dementia, or confusion  Falls Plan of Care: balance, strength, and gait training instructions were provided  Preventive health issues were discussed with patient, and age appropriate screening tests were ordered as noted in patient's After Visit Summary  Personalized health advice and appropriate referrals for health education or preventive services given if needed, as noted in patient's After Visit Summary  History of Present Illness:     Patient presents for a Medicare Wellness Visit    Patient here today with her niece Manisha Wang and reports that she was in the kitchen and then she fell down to the floor and has had a prior episode of her legs gave out on her she reports that she is not having current pain  Patient is thinking that she has her night and day mixed up        Patient Care Team:  Kirk Hogue, FARZANA as PCP - General (Family Medicine)     Review of Systems:     Review of Systems   Constitutional: Negative for activity change, appetite change, chills, diaphoresis, fatigue, fever and unexpected weight change  HENT: Negative for congestion, ear pain, hearing loss, postnasal drip, sinus pressure, sinus pain, sneezing, sore throat and trouble swallowing  Eyes: Negative for pain, redness and visual disturbance  Respiratory: Negative for cough and shortness of breath  Cardiovascular: Negative for chest pain, palpitations and leg swelling  Gastrointestinal: Negative for abdominal pain, diarrhea, nausea and vomiting  Endocrine: Negative  Genitourinary: Negative  Musculoskeletal: Negative for arthralgias and back pain  Skin: Negative  Allergic/Immunologic: Negative  Neurological: Negative for dizziness and light-headedness  Hematological: Negative  Psychiatric/Behavioral: Negative for behavioral problems and dysphoric mood          Problem List:     Patient Active Problem List   Diagnosis   • Degeneration of lumbar intervertebral disc   • Lumbar radiculopathy   • Arthropathy of lumbar facet joint   • Vascular dementia without behavioral disturbance ([facility])   • Visual hallucinations   • Restless legs syndrome   • Closed compression fracture of L5 lumbar vertebra, initial encounter ([facility])   • Primary insomnia   • Poor appetite   • Vitamin D deficiency   • Heart murmur   • Seasonal allergic rhinitis due to pollen   • Vascular dementia with behavioral disturbance   • Frailty   • Ambulatory dysfunction   • Anxiety   • Stage 3a chronic kidney disease (HCC)   • 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[facility] )   • Medicare annual wellness visit, subsequent   • Tachycardia   • Fall   • Rib pain on right side   • Chronic atrial fibrillation [facility])      Past Medical and Surgical History:     Past Medical History:   Diagnosis Date   • Ankle fracture    • Arthritis     left hip   • Bladder cancer [facility])     with left ureter   • Bronchitis    • Cancer [facility] )    • Carcinoma, lung [facility] )    • Dementia ([facility] )    • Dependent edema    • Depression    • Diabetes mellitus ([facility] )    • Hypercholesterolemia    • Hypertension    • Kidney stone    • Oth abn and inconclusive findings on dx imaging of breast    • Pes planus    • Renal calculus    • Restless leg syndrome    • Rib pain    • Sprain, neck    • Varicose veins of left lower extremity      Past Surgical History:   Procedure Laterality Date   • APPENDECTOMY     • LUNG CANCER SURGERY        Family History:     Family History   Problem Relation Age of Onset   • No Known Problems Mother    • No Known Problems Father    • Dementia Brother    • Breast cancer Neg Hx    • Colon cancer Neg Hx    • Ovarian cancer Neg Hx    • Uterine cancer Neg Hx    • Cervical cancer Neg Hx       Social History:     Social History     Socioeconomic History   • Marital status:      Spouse name: Not on file   • Number of children: Not on file   • Years of education: Not on file   • Highest education level: Not on file   Occupational History   • Not on file   Tobacco Use   • Smoking status: Former Smoker     Packs/day: 0 50     Years: 20 00     Pack years: 10 00   • Smokeless tobacco: Never Used   Substance and Sexual Activity   • Alcohol use: Never   • Drug use: Never   • Sexual activity: Not Currently     Birth control/protection: Post-menopausal   Other Topics Concern   • Not on file   Social History Narrative    Lives at home with niece     Social Determinants of Health     Financial Resource Strain: Low Risk    • Difficulty of Paying Living Expenses: Not very hard   Food Insecurity: Not on file   Transportation Needs: No Transportation Needs   • Lack of Transportation (Medical): No   • Lack of Transportation (Non-Medical):  No   Physical Activity: Not on file   Stress: Not on file   Social Connections: Not on file   Intimate Partner Violence: Not on file   Housing Stability: Not on file      Medications and Allergies:     Current Outpatient Medications   Medication Sig Dispense Refill   • metoprolol succinate (TOPROL-XL) 25 mg 24 hr tablet Take 0 5 tablets (12 5 mg total) by mouth daily 45 tablet 0   • acetaminophen (TYLENOL) 500 mg tablet Take 500 mg by mouth 2 (two) times a day     • ergocalciferol (VITAMIN D2) 50,000 units Take 1 capsule (50,000 Units total) by mouth once a week 12 capsule 1   • escitalopram (LEXAPRO) 5 mg tablet TAKE 1 TABLET (5 MG TOTAL) BY MOUTH DAILY  90 tablet 1   • gabapentin (NEURONTIN) 100 mg capsule Take 1 capsule (100 mg total) by mouth 3 (three) times a day 270 capsule 1   • mirtazapine (REMERON) 15 mg tablet Take 0 5 tablets (7 5 mg total) by mouth daily at bedtime 30 tablet 5   • Misc  Devices (Mattress Cover) MISC Use daily 1 each 0   • pramipexole (MIRAPEX) 1 mg tablet TAKE 1 TABLET BY MOUTH EVERY DAY 90 tablet 1     No current facility-administered medications for this visit  Allergies   Allergen Reactions   • Albuterol    • Aldactone [Spironolactone]    • Aspirin    • Atenolol    • Bactrim [Sulfamethoxazole-Trimethoprim]    • Codeine    • Hydrocodone    • Ibuprofen    • Lisinopril    • Mevacor [Lovastatin]    • Mirapex [Pramipexole]    • Morphine And Related    • Other      novacaine   • Penicillins    • Procaine    • Salicylates    • Ultram [Tramadol]    • Zoloft [Sertraline]      Night sweats      Immunizations:     Immunization History   Administered Date(s) Administered   • COVID-19 PFIZER VACCINE 0 3 ML IM 03/18/2021, 04/08/2021   • INFLUENZA 10/23/2014, 11/22/2015, 02/14/2018, 11/06/2018   • Influenza, high dose seasonal 0 7 mL 09/25/2020, 10/07/2021, 09/29/2022   • Influenza, recombinant, quadrivalent,injectable, preservative free 10/11/2019      Health Maintenance: There are no preventive care reminders to display for this patient        Topic Date Due   • Pneumococcal Vaccine: 65+ Years (1 - PCV) Never done   • COVID-19 Vaccine (3 - Booster for Reece Gonzales series) 09/08/2021      Medicare Screening Tests and Risk Assessments:     Hoang Joshua is here for her Subsequent Wellness visit  Last Medicare Wellness visit information reviewed, patient interviewed and updates made to the record today  Health Risk Assessment:   Patient rates overall health as good  Patient feels that their physical health rating is Same  Patient is very satisfied with their life  Eyesight was rated as Slightly worse  Hearing was rated as Same  Patient feels that their emotional and mental health rating is Same  Patients states they are never, rarely angry  Patient states they are never, rarely unusually tired/fatigued  Pain experienced in the last 7 days has been a lot  Patient states that she has experienced weight loss or gain in last 6 months  Recent fall    Fall Risk Screening: In the past year, patient has experienced: history of falling in past year    Number of falls: 1  Injured during fall?: Yes    Feels unsteady when standing or walking?: Yes    Worried about falling?: Yes      Urinary Incontinence Screening:   Patient has leaked urine accidently in the last six months  incont at night    Home Safety:  Patient does not have trouble with stairs inside or outside of their home  Patient has working smoke alarms and has no working carbon monoxide detector  Home safety hazards include: none  Nutrition:   Current diet is Regular  Medications:   Patient is not currently taking any over-the-counter supplements  Patient is able to manage medications  Activities of Daily Living (ADLs)/Instrumental Activities of Daily Living (IADLs):   Walk and transfer into and out of bed and chair?: Yes  Dress and groom yourself?: Yes    Bathe or shower yourself?: Yes    Feed yourself?  Yes  Do your laundry/housekeeping?: Yes  Manage your money, pay your bills and track your expenses?: No  Make your own meals?: Yes    Do your own shopping?: Yes    ADL comments: POA does bills    Durable Medical Equipment Suppliers  N/A    Previous Hospitalizations:   Any hospitalizations or ED visits within the last 12 months?: No      Hospitalization Comments: UTI     Advance Care Planning:   Living will: Yes    Durable POA for healthcare: Yes    Advanced directive: Yes      Cognitive Screening:   Provider or family/friend/caregiver concerned regarding cognition?: No    PREVENTIVE SCREENINGS      Cardiovascular Screening:    General: Screening Not Indicated and Risks and Benefits Discussed      Diabetes Screening:     General: Screening Current and Risks and Benefits Discussed      Colorectal Cancer Screening:     General: Screening Not Indicated and Risks and Benefits Discussed      Breast Cancer Screening:     General: Screening Not Indicated      Cervical Cancer Screening:    General: Screening Not Indicated      Osteoporosis Screening:    General: Screening Not Indicated      Abdominal Aortic Aneurysm (AAA) Screening:        General: Screening Not Indicated      Lung Cancer Screening:     General: Screening Not Indicated and History Lung Cancer      Hepatitis C Screening:    General: Risks and Benefits Discussed and Screening Not Indicated    Screening, Brief Intervention, and Referral to Treatment (SBIRT)    Screening  Typical number of drinks in a day: 0  Typical number of drinks in a week: 0  Interpretation: Low risk drinking behavior  Single Item Drug Screening:  How often have you used an illegal drug (including marijuana) or a prescription medication for non-medical reasons in the past year? never    Single Item Drug Screen Score: 0  Interpretation: Negative screen for possible drug use disorder    No exam data present     Physical Exam:     /76   Pulse 97   Ht 4' 11" (1 499 m)   Wt 60 8 kg (134 lb)   SpO2 99%   BMI 27 06 kg/m²     Physical Exam  Vitals and nursing note reviewed  Constitutional:       General: She is not in acute distress       Appearance: Normal appearance  She is normal weight  HENT:      Head: Normocephalic and atraumatic  Right Ear: Hearing and tympanic membrane normal       Left Ear: Hearing and tympanic membrane normal       Nose: Nose normal       Mouth/Throat:      Lips: Pink  Mouth: Mucous membranes are moist    Eyes:      General: Lids are normal       Pupils: Pupils are equal, round, and reactive to light  Cardiovascular:      Rate and Rhythm: Normal rate  Frequent extrasystoles are present  Heart sounds: S1 normal and S2 normal  Murmur heard  Systolic murmur is present  Pulmonary:      Effort: Pulmonary effort is normal       Breath sounds: Normal breath sounds  Musculoskeletal:      Cervical back: Normal range of motion  Right lower le+ Pitting Edema present  Left lower le+ Pitting Edema present  Comments: Right chest wall pain    Neurological:      General: No focal deficit present  Mental Status: She is alert  Psychiatric:         Attention and Perception: Attention normal          Mood and Affect: Mood normal          Speech: Speech normal          Behavior: Behavior normal  Behavior is cooperative  Thought Content:  Thought content is delusional           FARZANA Daniel

## 2022-10-11 NOTE — PATIENT INSTRUCTIONS
Medicare Preventive Visit Patient Instructions  Thank you for completing your Welcome to Medicare Visit or Medicare Annual Wellness Visit today  Your next wellness visit will be due in one year (10/12/2023)  The screening/preventive services that you may require over the next 5-10 years are detailed below  Some tests may not apply to you based off risk factors and/or age  Screening tests ordered at today's visit but not completed yet may show as past due  Also, please note that scanned in results may not display below  Preventive Screenings:  Service Recommendations Previous Testing/Comments   Colorectal Cancer Screening  * Colonoscopy    * Fecal Occult Blood Test (FOBT)/Fecal Immunochemical Test (FIT)  * Fecal DNA/Cologuard Test  * Flexible Sigmoidoscopy Age: 39-70 years old   Colonoscopy: every 10 years (may be performed more frequently if at higher risk)  OR  FOBT/FIT: every 1 year  OR  Cologuard: every 3 years  OR  Sigmoidoscopy: every 5 years  Screening may be recommended earlier than age 39 if at higher risk for colorectal cancer  Also, an individualized decision between you and your healthcare provider will decide whether screening between the ages of 74-80 would be appropriate  Colonoscopy: Not on file  FOBT/FIT: Not on file  Cologuard: Not on file  Sigmoidoscopy: Not on file          Breast Cancer Screening Age: 36 years old  Frequency: every 1-2 years  Not required if history of left and right mastectomy Mammogram: Not on file        Cervical Cancer Screening Between the ages of 21-29, pap smear recommended once every 3 years  Between the ages of 33-67, can perform pap smear with HPV co-testing every 5 years     Recommendations may differ for women with a history of total hysterectomy, cervical cancer, or abnormal pap smears in past  Pap Smear: Not on file        Hepatitis C Screening Once for adults born between [facility]  More frequently in patients at high risk for Hepatitis C Hep C Antibody: Not on file        Diabetes Screening 1-2 times per year if you're at risk for diabetes or have pre-diabetes Fasting glucose: 94 mg/dL (12/29/2021)  A1C: 6 0 % (8/30/2022)      Cholesterol Screening Once every 5 years if you don't have a lipid disorder  May order more often based on risk factors  Lipid panel: Not on file          Other Preventive Screenings Covered by Medicare:  1  Abdominal Aortic Aneurysm (AAA) Screening: covered once if your at risk  You're considered to be at risk if you have a family history of AAA  2  Lung Cancer Screening: covers low dose CT scan once per year if you meet all of the following conditions: (1) Age 50-69; (2) No signs or symptoms of lung cancer; (3) Current smoker or have quit smoking within the last 15 years; (4) You have a tobacco smoking history of at least 20 pack years (packs per day multiplied by number of years you smoked); (5) You get a written order from a healthcare provider  3  Glaucoma Screening: covered annually if you're considered high risk: (1) You have diabetes OR (2) Family history of glaucoma OR (3)  aged 48 and older OR (3)  American aged 72 and older  3  Osteoporosis Screening: covered every 2 years if you meet one of the following conditions: (1) You're estrogen deficient and at risk for osteoporosis based off medical history and other findings; (2) Have a vertebral abnormality; (3) On glucocorticoid therapy for more than 3 months; (4) Have primary hyperparathyroidism; (5) On osteoporosis medications and need to assess response to drug therapy  · Last bone density test (DXA Scan): Not on file  5  HIV Screening: covered annually if you're between the age of 12-76  Also covered annually if you are younger than 13 and older than 72 with risk factors for HIV infection  For pregnant patients, it is covered up to 3 times per pregnancy      Immunizations:  Immunization Recommendations   Influenza Vaccine Annual influenza vaccination during flu season is recommended for all persons aged >= 6 months who do not have contraindications   Pneumococcal Vaccine   * Pneumococcal conjugate vaccine = PCV13 (Prevnar 13), PCV15 (Vaxneuvance), PCV20 (Prevnar 20)  * Pneumococcal polysaccharide vaccine = PPSV23 (Pneumovax) Adults 25-60 years old: 1-3 doses may be recommended based on certain risk factors  Adults 72 years old: 1-2 doses may be recommended based off what pneumonia vaccine you previously received   Hepatitis B Vaccine 3 dose series if at intermediate or high risk (ex: diabetes, end stage renal disease, liver disease)   Tetanus (Td) Vaccine - COST NOT COVERED BY MEDICARE PART B Following completion of primary series, a booster dose should be given every 10 years to maintain immunity against tetanus  Td may also be given as tetanus wound prophylaxis  Tdap Vaccine - COST NOT COVERED BY MEDICARE PART B Recommended at least once for all adults  For pregnant patients, recommended with each pregnancy  Shingles Vaccine (Shingrix) - COST NOT COVERED BY MEDICARE PART B  2 shot series recommended in those aged 48 and above     Health Maintenance Due:  There are no preventive care reminders to display for this patient  Immunizations Due:      Topic Date Due   • Pneumococcal Vaccine: 65+ Years (1 - PCV) Never done   • COVID-19 Vaccine (3 - Booster for Pfizer series) 09/08/2021     Advance Directives   What are advance directives? Advance directives are legal documents that state your wishes and plans for medical care  These plans are made ahead of time in case you lose your ability to make decisions for yourself  Advance directives can apply to any medical decision, such as the treatments you want, and if you want to donate organs  What are the types of advance directives? There are many types of advance directives, and each state has rules about how to use them  You may choose a combination of any of the following:  · Living will:   This is a written record of the treatment you want  You can also choose which treatments you do not want, which to limit, and which to stop at a certain time  This includes surgery, medicine, IV fluid, and tube feedings  · Durable power of  for healthcare [facility]): This is a written record that states who you want to make healthcare choices for you when you are unable to make them for yourself  This person, called a proxy, is usually a family member or a friend  You may choose more than 1 proxy  · Do not resuscitate (DNR) order:  A DNR order is used in case your heart stops beating or you stop breathing  It is a request not to have certain forms of treatment, such as CPR  A DNR order may be included in other types of advance directives  · Medical directive: This covers the care that you want if you are in a coma, near death, or unable to make decisions for yourself  You can list the treatments you want for each condition  Treatment may include pain medicine, surgery, blood transfusions, dialysis, IV or tube feedings, and a ventilator (breathing machine)  · Values history: This document has questions about your views, beliefs, and how you feel and think about life  This information can help others choose the care that you would choose  Why are advance directives important? An advance directive helps you control your care  Although spoken wishes may be used, it is better to have your wishes written down  Spoken wishes can be misunderstood, or not followed  Treatments may be given even if you do not want them  An advance directive may make it easier for your family to make difficult choices about your care  Urinary Incontinence   Urinary incontinence (UI)  is when you lose control of your bladder  UI develops because your bladder cannot store or empty urine properly  The 3 most common types of UI are stress incontinence, urge incontinence, or both    Medicines:   · May be given to help strengthen your bladder control  Report any side effects of medication to your healthcare provider  Do pelvic muscle exercises often:  Your pelvic muscles help you stop urinating  Squeeze these muscles tight for 5 seconds, then relax for 5 seconds  Gradually work up to squeezing for 10 seconds  Do 3 sets of 15 repetitions a day, or as directed  This will help strengthen your pelvic muscles and improve bladder control  Train your bladder:  Go to the bathroom at set times, such as every 2 hours, even if you do not feel the urge to go  You can also try to hold your urine when you feel the urge to go  For example, hold your urine for 5 minutes when you feel the urge to go  As that becomes easier, hold your urine for 10 minutes  Self-care:   · Keep a UI record  Write down how often you leak urine and how much you leak  Make a note of what you were doing when you leaked urine  · Drink liquids as directed  You may need to limit the amount of liquid you drink to help control your urine leakage  Do not drink any liquid right before you go to bed  Limit or do not have drinks that contain caffeine or alcohol  · Prevent constipation  Eat a variety of high-fiber foods  Good examples are high-fiber cereals, beans, vegetables, and whole-grain breads  Walking is the best way to trigger your intestines to have a bowel movement  · Exercise regularly and maintain a healthy weight  Weight loss and exercise will decrease pressure on your bladder and help you control your leakage  · Use a catheter as directed  to help empty your bladder  A catheter is a tiny, plastic tube that is put into your bladder to drain your urine  · Go to behavior therapy as directed  Behavior therapy may be used to help you learn to control your urge to urinate      Weight Management   Why it is important to manage your weight:  Being overweight increases your risk of health conditions such as heart disease, high blood pressure, type 2 diabetes, and certain types of cancer  It can also increase your risk for osteoarthritis, sleep apnea, and other respiratory problems  Aim for a slow, steady weight loss  Even a small amount of weight loss can lower your risk of health problems  How to lose weight safely:  A safe and healthy way to lose weight is to eat fewer calories and get regular exercise  You can lose up about 1 pound a week by decreasing the number of calories you eat by 500 calories each day  Healthy meal plan for weight management:  A healthy meal plan includes a variety of foods, contains fewer calories, and helps you stay healthy  A healthy meal plan includes the following:  · Eat whole-grain foods more often  A healthy meal plan should contain fiber  Fiber is the part of grains, fruits, and vegetables that is not broken down by your body  Whole-grain foods are healthy and provide extra fiber in your diet  Some examples of whole-grain foods are whole-wheat breads and pastas, oatmeal, brown rice, and bulgur  · Eat a variety of vegetables every day  Include dark, leafy greens such as spinach, kale, michael greens, and mustard greens  Eat yellow and orange vegetables such as carrots, sweet potatoes, and winter squash  · Eat a variety of fruits every day  Choose fresh or canned fruit (canned in its own juice or light syrup) instead of juice  Fruit juice has very little or no fiber  · Eat low-fat dairy foods  Drink fat-free (skim) milk or 1% milk  Eat fat-free yogurt and low-fat cottage cheese  Try low-fat cheeses such as mozzarella and other reduced-fat cheeses  · Choose meat and other protein foods that are low in fat  Choose beans or other legumes such as split peas or lentils  Choose fish, skinless poultry (chicken or turkey), or lean cuts of red meat (beef or pork)  Before you cook meat or poultry, cut off any visible fat  · Use less fat and oil  Try baking foods instead of frying them   Add less fat, such as margarine, sour cream, regular salad dressing and mayonnaise to foods  Eat fewer high-fat foods  Some examples of high-fat foods include french fries, doughnuts, ice cream, and cakes  · Eat fewer sweets  Limit foods and drinks that are high in sugar  This includes candy, cookies, regular soda, and sweetened drinks  Exercise:  Exercise at least 30 minutes per day on most days of the week  Some examples of exercise include walking, biking, dancing, and swimming  You can also fit in more physical activity by taking the stairs instead of the elevator or parking farther away from stores  Ask your healthcare provider about the best exercise plan for you  © Copyright [employer] 2018 Information is for End User's use only and may not be sold, redistributed or otherwise used for commercial purposes   All illustrations and images included in CareNotes® are the copyrighted property of A D A M , Inc  or [address] [place] [address] [employer]

## 2022-10-12 ENCOUNTER — HOSPITAL ENCOUNTER (OUTPATIENT)
Dept: RADIOLOGY | Facility: HOSPITAL | Age: 87
Discharge: HOME | End: 2022-10-12
Payer: COMMERCIAL

## 2022-10-12 DIAGNOSIS — R07.81 RIB PAIN ON RIGHT SIDE: ICD-10-CM
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
## 2022-10-12 PROCEDURE — 71101 X-RAY EXAM UNILAT RIBS/CHEST: CPT
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
## 2022-10-17 DIAGNOSIS — M54.9 ACUTE RIGHT-SIDED BACK PAIN, UNSPECIFIED BACK LOCATION: ICD-10-CM

## 2022-10-19 ENCOUNTER — CONSULT (OUTPATIENT)
Dept: CARDIOLOGY CLINIC | Facility: CLINIC | Age: 87
End: 2022-10-19
Payer: COMMERCIAL

## 2022-10-19 VITALS
DIASTOLIC BLOOD PRESSURE: 80 MMHG | BODY MASS INDEX: 27.42 KG/M2 | HEIGHT: 59 IN | WEIGHT: 136 LBS | OXYGEN SATURATION: 99 % | RESPIRATION RATE: 16 BRPM | SYSTOLIC BLOOD PRESSURE: 124 MMHG | HEART RATE: 95 BPM

## 2022-10-19 DIAGNOSIS — I48.20 CHRONIC ATRIAL FIBRILLATION (HCC): Primary | ICD-10-CM

## 2022-10-19 DIAGNOSIS — R60.9 EDEMA, UNSPECIFIED TYPE: ICD-10-CM

## 2022-10-19 PROCEDURE — 99204 OFFICE O/P NEW MOD 45 MIN: CPT | Performed by: INTERNAL MEDICINE

## 2022-10-19 NOTE — PROGRESS NOTES
Cardiology Consultation     Santo President  [phone]  11/3/1924  [address]  [facility] [address] [place] [address] Rebeca LINDSEY [id number]    HPI:  14-year-old lady who lives with her niece  The history is difficult in that she is disoriented to time and to the president and thus has moderate dementia  She has recently been discovered to have atrial fibrillation with controlled to slightly rapid ventricular response  She denies chest pain and shortness of breath  According to her niece she has had a marked accumulation of fluid in the legs very recently  She sleeps on 1 pillow at night, however, and does not awaken with shortness of breath  1  Chronic [facility] fibrillation ([facility] )  -     Ambulatory Referral to Cardiology  -     Echo complete w/ contrast if indicated; Future; Expected date: 10/19/2022  -     NT-BNP PRO;  Future      Patient Active Problem List   Diagnosis   • Degeneration of lumbar intervertebral disc   • Lumbar radiculopathy   • Arthropathy of lumbar facet joint   • Vascular dementia without behavioral disturbance ([facility])   • Visual hallucinations   • Restless legs syndrome   • Closed compression fracture of L5 lumbar vertebra, initial encounter ([facility])   • Primary insomnia   • Poor appetite   • Vitamin D deficiency   • Heart murmur   • Seasonal allergic rhinitis due to pollen   • Vascular dementia with behavioral disturbance   • Frailty   • Ambulatory dysfunction   • Anxiety   • Stage 3a chronic kidney disease ([facility])   • 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[facility] )   • Medicare annual wellness visit, subsequent   • Tachycardia   • Fall   • Rib pain on right side   • Chronic atrial fibrillation ([facility] )     Past Medical History:   Diagnosis Date   • Ankle fracture    • Arthritis     left hip   • Bladder cancer [facility])     with left ureter   • Bronchitis    • Cancer ([facility] )    • Carcinoma, lung ([facility])    • Dementia [facility])    • Dependent edema    • Depression    • Diabetes mellitus ([facility] )    • Hypercholesterolemia    • Hypertension    • Kidney stone    • Oth abn and inconclusive findings on dx imaging of breast    • Pes planus    • Renal calculus    • Restless leg syndrome    • Rib pain    • Sprain, neck    • Varicose veins of left lower extremity      Social History     Socioeconomic History   • Marital status:      Spouse name: Not on file   • Number of children: Not on file   • Years of education: Not on file   • Highest education level: Not on file   Occupational History   • Not on file   Tobacco Use   • Smoking status: Former Smoker     Packs/day: 0 50     Years: 20 00     Pack years: 10 00   • Smokeless tobacco: Never Used   Substance and Sexual Activity   • Alcohol use: Never   • Drug use: Never   • Sexual activity: Not Currently     Birth control/protection: Post-menopausal   Other Topics Concern   • Not on file   Social History Narrative    Lives at home with niece     Social Determinants of Health     Financial Resource Strain: Low Risk    • Difficulty of Paying Living Expenses: Not very hard   Food Insecurity: Not on file   Transportation Needs: No Transportation Needs   • Lack of Transportation (Medical): No   • Lack of Transportation (Non-Medical):  No   Physical Activity: Not on file   Stress: Not on file   Social Connections: Not on file   Intimate Partner Violence: Not on file   Housing Stability: Not on file      Family History   Problem Relation Age of Onset   • No Known Problems Mother    • No Known Problems Father    • Dementia Brother    • Breast cancer Neg Hx    • Colon cancer Neg Hx    • Ovarian cancer Neg Hx    • Uterine cancer Neg Hx    • Cervical cancer Neg Hx      Past Surgical History:   Procedure Laterality Date   • APPENDECTOMY     • LUNG CANCER SURGERY         Current Outpatient Medications:   •  acetaminophen (TYLENOL) 500 mg tablet, Take 500 mg by mouth 2 (two) times a day, Disp: , Rfl:   • ergocalciferol (VITAMIN D2) 50,000 units, Take 1 capsule (50,000 Units total) by mouth once a week, Disp: 12 capsule, Rfl: 1  •  escitalopram (LEXAPRO) 5 mg tablet, TAKE 1 TABLET (5 MG TOTAL) BY MOUTH DAILY  , Disp: 90 tablet, Rfl: 1  •  gabapentin (NEURONTIN) 100 mg capsule, Take 1 capsule (100 mg total) by mouth 3 (three) times a day, Disp: 270 capsule, Rfl: 1  •  mirtazapine (REMERON) 15 mg tablet, Take 0 5 tablets (7 5 mg total) by mouth daily at bedtime, Disp: 30 tablet, Rfl: 5  •  Misc   Devices (Mattress Cover) MISC, Use daily, Disp: 1 each, Rfl: 0  •  pramipexole (MIRAPEX) 1 mg tablet, TAKE 1 TABLET BY MOUTH EVERY DAY, Disp: 90 tablet, Rfl: 1  Allergies   Allergen Reactions   • Albuterol    • Aldactone [Spironolactone]    • Aspirin    • Atenolol    • Bactrim [Sulfamethoxazole-Trimethoprim]    • Codeine    • Hydrocodone    • Ibuprofen    • Lisinopril    • Mevacor [Lovastatin]    • Mirapex [Pramipexole]    • Morphine And Related    • Other      novacaine   • Penicillins    • Procaine    • Salicylates    • Ultram [Tramadol]    • Zoloft [Sertraline]      Night sweats     Vitals:    10/19/22 1510   BP: 124/80   BP Location: Left arm   Patient Position: Sitting   Cuff Size: Standard   Pulse: 95   Resp: 16   SpO2: 99%   Weight: 61 7 kg (136 lb)   Height: 4' 11" (1 499 m)       Labs:  Office Visit on 09/29/2022   Component Date Value   • Supplier Name 09/29/2022 [employer]    • Supplier Phone Number 09/29/2022 [phone]    • Order Status 09/29/2022 Delivery Successful    • Delivery Request Date 09/29/2022 09/29/2022    • Date Delivered  09/29/2022 10/05/2022    • Supplier Name 09/29/2022 10/05/2022    • Item Description 09/29/2022 [facility] Bed, Gel Overlay    • Item Description 09/29/2022 Standard Mattress    • Item Description 09/29/2022 Gel Overlay    • Item Description 09/29/2022 Half Bed Rails    Appointment on 08/30/2022   Component Date Value   • Sodium 08/30/2022 138 • Potassium 08/30/2022 4 1    • Chloride 08/30/2022 106    • CO2 08/30/2022 29    • ANION GAP 08/30/2022 3 (A)   • BUN 08/30/2022 18    • Creatinine 08/30/2022 1 33 (A)   • Glucose 08/30/2022 202 (A)   • Calcium 08/30/2022 8 9    • AST 08/30/2022 16    • ALT 08/30/2022 15    • Alkaline Phosphatase 08/30/2022 103    • Total Protein 08/30/2022 7 4    • Albumin 08/30/2022 3 6    • Total Bilirubin 08/30/2022 0 55    • eGFR 08/30/2022 33    • WBC 08/30/2022 6 13    • RBC 08/30/2022 4 79    • Hemoglobin 08/30/2022 13 7    • Hematocrit 08/30/2022 44 3    • MCV 08/30/2022 93    • MCH 08/30/2022 28 6    • MCHC 08/30/2022 30 9 (A)   • RDW 08/30/2022 14 7    • MPV 08/30/2022 11 5    • Platelets 03/63/8512 242    • nRBC 08/30/2022 0    • Neutrophils Relative 08/30/2022 77 (A)   • Immat GRANS % 08/30/2022 0    • Lymphocytes Relative 08/30/2022 14    • Monocytes Relative 08/30/2022 7    • Eosinophils Relative 08/30/2022 1    • Basophils Relative 08/30/2022 1    • Neutrophils Absolute 08/30/2022 4 78    • Immature Grans Absolute 08/30/2022 0 01    • Lymphocytes Absolute 08/30/2022 0 83    • Monocytes Absolute 08/30/2022 0 44    • Eosinophils Absolute 08/30/2022 0 03    • Basophils Absolute 08/30/2022 0 04    • Vit D, 25-Hydroxy 08/30/2022 14 8 (A)   • Hemoglobin A1C 08/30/2022 6 0 (A)   • EAG 08/30/2022 126    Office Visit on 08/23/2022   Component Date Value   • Supplier Name 08/23/2022 [facility]    • Supplier Phone Number 08/23/2022 [phone]    • Order Status 08/23/2022 Completed    • Delivery Request Date 08/23/2022 08/23/2022    • Date Delivered  08/23/2022 09/01/2022    • Item Description 08/23/2022 Rollator Not Specific Color    • Item Description 08/23/2022 Seat Attachment      Imaging: XR ribs right w pa chest min 3 views    Result Date: 10/15/2022  Narrative: RIGHT RIBS AND CHEST - DUAL ENERGY INDICATION:   W19  XXXA: Unspecified fall, initial encounter R07 81: Pleurodynia    COMPARISON:  Chest x-ray 12/29/2021 EXAM PERFORMED/VIEWS:  XR RIBS RIGHT W PA CHEST MIN 3 VIEWS  The frontal view was performed utilizing dual energy radiographic technique  Images: 7 FINDINGS: Cardiomediastinal silhouette appears mildly enlarged  Mild left pleural effusion with left basilar scarring  Stable postsurgical changes of partial left pneumonectomy  Postthoracotomy changes of left posterior chest  Right lung is clear without pleural effusion  There is no pneumothorax  No rib fractures are identified  Impression: No evidence of rib fractures  Stable postoperative changes of partial left pneumonectomy  Workstation performed: [id number]       Review of Systems:  Review of Systems    Physical Exam:  No apparent distress  Heart rate 90  and regular irregular  Skin warm dry  Pupils equal   Carotids 2+ without bruits  Venous pressure appears to be elevated with pressure is on the abdomen  Scattered rales  Grade 2 of 6 systolic ejection murmur at the base which is relatively short  Some right upper quadrant tenderness  3+ edema up to the hips  Moves all extremities  Discussion/Summary:    1  Chronic atrial fibrillation with controlled ventricular response  2  Edema-most likely venous insufficiency but consider CHF  3  Dementia    Recommendations:    1  In view of recent falls and in view of her dementia I had a discussion with her niece and recommended not putting her on anticoagulation at this time  2  Check BNP, echocardiogram  3  Elevate legs  4  Start Lasix 40 mg daily  5  Return 1 month   6   Feliz Wilcox MD

## 2022-10-19 NOTE — PATIENT INSTRUCTIONS

## 2022-10-20 ENCOUNTER — TELEPHONE (OUTPATIENT)
Dept: FAMILY MEDICINE CLINIC | Facility: CLINIC | Age: 87
End: 2022-10-20

## 2022-10-20 DIAGNOSIS — R60.9 EDEMA, UNSPECIFIED TYPE: Primary | ICD-10-CM

## 2022-10-20 RX ORDER — FUROSEMIDE 40 MG/1
40 TABLET ORAL DAILY
COMMUNITY
End: 2022-10-20 | Stop reason: SDUPTHER

## 2022-10-20 RX ORDER — FUROSEMIDE 40 MG/1
40 TABLET ORAL DAILY
Qty: 90 TABLET | Refills: 3 | Status: SHIPPED | OUTPATIENT
Start: 2022-10-20

## 2022-10-20 NOTE — TELEPHONE ENCOUNTER
Furosemide 40 mg once daily  was prescribed by Dr Chyna Thomas  Refill sent to pt's pharmacy of choice

## 2022-10-20 NOTE — TELEPHONE ENCOUNTER
IVAN Castano - Pt saw cardiologist yesterday    Dr Darlene Christian    he ordered an echo    and prescribed lasix, also put her on no salt diet  Naresh Villareal ankles & legs are swelling up to thighs  She's gained about 9 lbs over past past 5 - 6 wk's     Will go back to cardiology in about a month

## 2022-11-23 ENCOUNTER — OFFICE VISIT (OUTPATIENT)
Dept: CARDIOLOGY CLINIC | Facility: CLINIC | Age: 87
End: 2022-11-23

## 2022-11-23 VITALS
RESPIRATION RATE: 16 BRPM | SYSTOLIC BLOOD PRESSURE: 124 MMHG | WEIGHT: 131 LBS | BODY MASS INDEX: 26.41 KG/M2 | OXYGEN SATURATION: 97 % | DIASTOLIC BLOOD PRESSURE: 60 MMHG | HEART RATE: 85 BPM | HEIGHT: 59 IN

## 2022-11-23 DIAGNOSIS — I50.9 CHRONIC CONGESTIVE HEART FAILURE, UNSPECIFIED HEART FAILURE TYPE (HCC): Primary | ICD-10-CM

## 2022-11-23 DIAGNOSIS — I48.20 CHRONIC ATRIAL FIBRILLATION (HCC): ICD-10-CM

## 2022-11-23 NOTE — PROGRESS NOTES
Cardiology Follow Up    Zach Kumar  11/3/1924  [id number]  [address] [facility] Diana Estrella Streeter [place] [address] [phone]    1  Chronic congestive heart failure, unspecified heart failure type ([facility] [place] [facility] )    2  Chronic atrial fibrillation ([facility])          Interval History:  59-year-old lady who presented with atrial fibrillation and congestive heart failure  She has responded well to Lasix by mouth with much less edema and her breathing is good  She has some dementia  An echocardiogram was ordered but it was not done  She is having no problems with shortness of breath or chest discomfort      Patient Active Problem List   Diagnosis   • Degeneration of lumbar intervertebral disc   • Lumbar radiculopathy   • Arthropathy of lumbar facet joint   • Vascular dementia without behavioral disturbance ([facility])   • Visual hallucinations   • Restless legs syndrome   • Closed compression fracture of L5 lumbar vertebra, initial encounter ([facility])   • Primary insomnia   • Poor appetite   • Vitamin D deficiency   • Heart murmur   • Seasonal allergic rhinitis due to pollen   • Vascular dementia with behavioral disturbance   • Frailty   • Ambulatory dysfunction   • Anxiety   • Stage 3a chronic kidney disease ([facility])   • 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[facility] [place] [facility] )   • Medicare annual wellness visit, subsequent   • Tachycardia   • Fall   • Rib pain on right side   • Chronic atrial fibrillation ([facility])   • Chronic congestive heart failure ([facility] [place] [facility] )     Past Medical History:   Diagnosis Date   • Ankle fracture    • Arthritis     left hip   • 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[facility])     with left ureter   • Bronchitis    • Cancer ([facility] [place] [facility] )    • Carcinoma, lung ([facility])    • Dementia ([facility] [place] [facility] )    • Dependent edema    • Depression    • Diabetes mellitus ([facility] [place] [facility] )    • Hypercholesterolemia    • Hypertension    • Kidney stone    • Oth abn and inconclusive findings on dx imaging of breast    • Pes planus    • Renal calculus    • Restless leg syndrome    • Rib pain    • Sprain, neck    • Varicose veins of left lower extremity      Social History     Socioeconomic History   • Marital status:      Spouse name: Not on file   • Number of children: Not on file   • Years of education: Not on file   • Highest education level: Not on file   Occupational History   • Not on file   Tobacco Use   • Smoking status: Former     Packs/day: 0 50     Years: 20 00     Pack years: 10 00     Types: Cigarettes   • Smokeless tobacco: Never   Substance and Sexual Activity   • Alcohol use: Never   • Drug use: Never   • Sexual activity: Not Currently     Birth control/protection: Post-menopausal   Other Topics Concern   • Not on file   Social History Narrative    Lives at home with niece     Social Determinants of Health     Financial Resource Strain: Low Risk    • Difficulty of Paying Living Expenses: Not very hard   Food Insecurity: Not on file   Transportation Needs: No Transportation Needs   • Lack of Transportation (Medical): No   • Lack of Transportation (Non-Medical):  No   Physical Activity: Not on file   Stress: Not on file   Social Connections: Not on file   Intimate Partner Violence: Not on file   Housing Stability: Not on file      Family History   Problem Relation Age of Onset   • No Known Problems Mother    • No Known Problems Father    • Dementia Brother    • Breast cancer Neg Hx    • Colon cancer Neg Hx    • Ovarian cancer Neg Hx    • Uterine cancer Neg Hx    • Cervical cancer Neg Hx      Past Surgical History:   Procedure Laterality Date   • APPENDECTOMY     • LUNG CANCER SURGERY         Current Outpatient Medications:   •  acetaminophen (TYLENOL) 500 mg tablet, Take 500 mg by mouth 2 (two) times a day, Disp: , Rfl:   •  ergocalciferol (VITAMIN D2) 50,000 units, Take 1 capsule (50,000 Units total) by mouth once a week, Disp: 12 capsule, Rfl: 1  •  escitalopram (LEXAPRO) 5 mg tablet, TAKE 1 TABLET (5 MG TOTAL) BY MOUTH DAILY  , Disp: 90 tablet, Rfl: 1  •  furosemide (LASIX) 40 mg tablet, Take 1 tablet (40 mg total) by mouth in the morning, Disp: 90 tablet, Rfl: 3  •  gabapentin (NEURONTIN) 100 mg capsule, Take 1 capsule (100 mg total) by mouth 3 (three) times a day, Disp: 270 capsule, Rfl: 1  •  mirtazapine (REMERON) 15 mg tablet, Take 0 5 tablets (7 5 mg total) by mouth daily at bedtime, Disp: 30 tablet, Rfl: 5  •  Misc  Devices (Mattress Cover) MISC, Use daily, Disp: 1 each, Rfl: 0  •  pramipexole (MIRAPEX) 1 mg tablet, TAKE 1 TABLET BY MOUTH EVERY DAY, Disp: 90 tablet, Rfl: 1  Allergies   Allergen Reactions   • Albuterol    • Aldactone [Spironolactone]    • Aspirin    • Atenolol    • Bactrim [Sulfamethoxazole-Trimethoprim]    • Codeine    • Hydrocodone    • Ibuprofen    • Lisinopril    • Mevacor [Lovastatin]    • Mirapex [Pramipexole]    • Morphine And Related    • Other      novacaine   • Penicillins    • Procaine    • Salicylates    • Ultram [Tramadol]    • Zoloft [Sertraline]      Night sweats       Labs:  Office Visit on 09/29/2022   Component Date Value   • Supplier Name 09/29/2022 [employer]    • Supplier Phone Number 09/29/2022 [phone]    • Order Status 09/29/2022 Delivery Successful    • Delivery Request Date 09/29/2022 09/29/2022    • Date Delivered  09/29/2022 10/05/2022    • Supplier Name 09/29/2022 10/05/2022    • Item Description 09/29/2022 [facility] Bed, Gel Overlay    • Item Description 09/29/2022 Standard Mattress    • Item Description 09/29/2022 Gel Overlay    • Item Description 09/29/2022 Half Bed Rails      Imaging: No results found  Review of Systems:  Review of Systems    Physical Exam:  She is in a wheelchair  Blood pressure 124/60  Heart rate 85 and irregularly irregular  Lungs are clear  Neck veins not distended  There is a grade 3 relatively short systolic ejection murmur at the base  Rhythm is irregular    There is trace peripheral edema  Discussion/Summary:    1  Chronic atrial fibrillation-not on anticoagulation because of dementia and fall risk  2  Congestive failure most likely on the basis of diastolic dysfunction-controlled at present  3  [address]    Recommendations:    1  BMP is scheduled to check for hypokalemia  2   Follow-up with primary physician        Brina Whatley MD

## 2022-12-07 ENCOUNTER — HOSPITAL ENCOUNTER (OUTPATIENT)
Dept: NON INVASIVE DIAGNOSTICS | Facility: CLINIC | Age: 87
Discharge: HOME | End: 2022-12-07

## 2022-12-07 VITALS
BODY MASS INDEX: 26.41 KG/M2 | SYSTOLIC BLOOD PRESSURE: 124 MMHG | DIASTOLIC BLOOD PRESSURE: 60 MMHG | HEART RATE: 111 BPM | WEIGHT: 131 LBS | HEIGHT: 59 IN

## 2022-12-07 DIAGNOSIS — I48.20 CHRONIC ATRIAL FIBRILLATION (HCC): ICD-10-CM

## 2022-12-07 LAB
AORTIC ROOT: 2.8 CM
APICAL FOUR CHAMBER EJECTION FRACTION: 61 %
AV AREA PEAK VELOCITY: 1.2 CM2
AV LVOT MEAN GRADIENT: 1 MMHG
AV LVOT PEAK GRADIENT: 2 MMHG
AV PEAK GRADIENT: 9 MMHG
DOP CALC LVOT AREA: 2.27 CM2
DOP CALC LVOT DIAMETER: 1.7 CM
DOP CALC LVOT PEAK VEL VTI: 15.23 CM
DOP CALC LVOT PEAK VEL: 0.75 M/S
DOP CALC LVOT STROKE INDEX: 22.1 ML/M2
DOP CALC LVOT STROKE VOLUME: 34.55 CM3
FRACTIONAL SHORTENING: 25 % (ref 28–44)
INTERVENTRICULAR SEPTUM IN DIASTOLE (PARASTERNAL SHORT AXIS VIEW): 1.3 CM
INTERVENTRICULAR SEPTUM: 1.3 CM (ref 0.6–1.1)
LAAS-AP2: 16.4 CM2
LAAS-AP4: 18.2 CM2
LEFT ATRIUM AREA SYSTOLE SINGLE PLANE A4C: 21 CM2
LEFT ATRIUM SIZE: 3.8 CM
LEFT INTERNAL DIMENSION IN SYSTOLE: 2.4 CM (ref 2.1–4)
LEFT VENTRICULAR INTERNAL DIMENSION IN DIASTOLE: 3.2 CM (ref 3.5–6)
LEFT VENTRICULAR POSTERIOR WALL IN END DIASTOLE: 1.1 CM
LEFT VENTRICULAR STROKE VOLUME: 23 ML
LVSV (TEICH): 23 ML
MITRAL REGURGITATION PEAK VELOCITY: 5.62 M/S
MITRAL VALVE MEAN INFLOW VELOCITY: 4.47 M/S
MITRAL VALVE REGURGITANT PEAK GRADIENT: 126 MMHG
MV STENOSIS PRESSURE HALF TIME: 48 MS
MV VALVE AREA P 1/2 METHOD: 4.58 CM2
RIGHT ATRIUM AREA SYSTOLE A4C: 26.5 CM2
RIGHT VENTRICLE ID DIMENSION: 3.9 CM
SL CV DOP CALC MV VTI RETROGRADE: 181.5 CM
SL CV LEFT ATRIUM LENGTH A2C: 5 CM
SL CV LV EF: 61
SL CV MV MEAN GRADIENT RETROGRADE: 87 MMHG
SL CV PED ECHO LEFT VENTRICLE DIASTOLIC VOLUME (MOD BIPLANE) 2D: 42 ML
SL CV PED ECHO LEFT VENTRICLE SYSTOLIC VOLUME (MOD BIPLANE) 2D: 19 ML
TR MAX PG: 48 MMHG
TR PEAK VELOCITY: 3.5 M/S
TRICUSPID VALVE PEAK REGURGITATION VELOCITY: 3.47 M/S

## 2022-12-10 PROBLEM — Z00.00 MEDICARE ANNUAL WELLNESS VISIT, SUBSEQUENT: Status: RESOLVED | Noted: 2022-10-11 | Resolved: 2022-12-10

## 2022-12-16 ENCOUNTER — TELEPHONE (OUTPATIENT)
Dept: FAMILY MEDICINE CLINIC | Facility: CLINIC | Age: 87
End: 2022-12-16

## 2023-01-06 DIAGNOSIS — G25.81 RESTLESS LEGS SYNDROME: ICD-10-CM

## 2023-01-06 RX ORDER — PRAMIPEXOLE DIHYDROCHLORIDE 1 MG/1
TABLET ORAL
Qty: 90 TABLET | Refills: 1 | Status: SHIPPED | OUTPATIENT
Start: 2023-01-06

## 2023-01-11 ENCOUNTER — TELEPHONE (OUTPATIENT)
Dept: FAMILY MEDICINE CLINIC | Facility: CLINIC | Age: 88
End: 2023-01-11

## 2023-01-11 DIAGNOSIS — F01.518 VASCULAR DEMENTIA WITH BEHAVIORAL DISTURBANCE: Primary | ICD-10-CM

## 2023-01-11 DIAGNOSIS — Z74.09 IMMOBILITY: ICD-10-CM

## 2023-01-11 DIAGNOSIS — R26.2 AMBULATORY DYSFUNCTION: ICD-10-CM

## 2023-01-11 NOTE — TELEPHONE ENCOUNTER
Sonia called office asking if you would be able to order home health - PT/OT her ambulatory dysfunction seems to be getting worse

## 2023-01-12 ENCOUNTER — TELEPHONE (OUTPATIENT)
Dept: FAMILY MEDICINE CLINIC | Facility: CLINIC | Age: 88
End: 2023-01-12

## 2023-01-12 DIAGNOSIS — Z74.09 IMMOBILITY: ICD-10-CM

## 2023-01-12 DIAGNOSIS — R26.2 AMBULATORY DYSFUNCTION: ICD-10-CM

## 2023-01-12 DIAGNOSIS — F01.518 VASCULAR DEMENTIA WITH BEHAVIORAL DISTURBANCE: Primary | ICD-10-CM

## 2023-01-12 NOTE — TELEPHONE ENCOUNTER
Arina Tolbert works with the Home care that you referred Gary Pan to  They need more specific info faxed to them  Please detail the specific skills you are ordering and they also need the most recent face to face faxed to [phone]  If you have any questions call Barbra at  [phone] ask for Delfina Fontana      Thanks, Jose E Pratt

## 2023-01-17 ENCOUNTER — TELEPHONE (OUTPATIENT)
Dept: FAMILY MEDICINE CLINIC | Facility: CLINIC | Age: 88
End: 2023-01-17

## 2023-01-17 NOTE — TELEPHONE ENCOUNTER
Alba George called to get the last Face to face evaluation and its been over 90 days - we would need to see them in office or a virtual

## 2023-01-18 ENCOUNTER — RA CDI HCC (OUTPATIENT)
Dept: OTHER | Facility: HOSPITAL | Age: 88
End: 2023-01-18

## 2023-01-18 NOTE — PROGRESS NOTES
[facility]  coding opportunities       Chart reviewed, no opportunity found:   [address]        Patients Insurance     Medicare Insurance: [facility]

## 2023-01-24 ENCOUNTER — OFFICE VISIT (OUTPATIENT)
Dept: FAMILY MEDICINE CLINIC | Facility: CLINIC | Age: 88
End: 2023-01-24

## 2023-01-24 ENCOUNTER — APPOINTMENT (OUTPATIENT)
Dept: LAB | Facility: CLINIC | Age: 88
End: 2023-01-24

## 2023-01-24 ENCOUNTER — APPOINTMENT (OUTPATIENT)
Dept: RADIOLOGY | Facility: CLINIC | Age: 88
End: 2023-01-24

## 2023-01-24 VITALS
SYSTOLIC BLOOD PRESSURE: 122 MMHG | BODY MASS INDEX: 26.21 KG/M2 | HEIGHT: 59 IN | WEIGHT: 130 LBS | DIASTOLIC BLOOD PRESSURE: 68 MMHG | OXYGEN SATURATION: 100 % | HEART RATE: 84 BPM

## 2023-01-24 DIAGNOSIS — I48.20 CHRONIC ATRIAL FIBRILLATION (HCC): ICD-10-CM

## 2023-01-24 DIAGNOSIS — R26.2 AMBULATORY DYSFUNCTION: ICD-10-CM

## 2023-01-24 DIAGNOSIS — F33.0 MILD EPISODE OF RECURRENT MAJOR DEPRESSIVE DISORDER (HCC): ICD-10-CM

## 2023-01-24 DIAGNOSIS — N18.31 STAGE 3A CHRONIC KIDNEY DISEASE (HCC): ICD-10-CM

## 2023-01-24 DIAGNOSIS — F01.50 VASCULAR DEMENTIA WITHOUT BEHAVIORAL DISTURBANCE (HCC): ICD-10-CM

## 2023-01-24 DIAGNOSIS — R26.2 AMBULATORY DYSFUNCTION: Primary | ICD-10-CM

## 2023-01-24 DIAGNOSIS — R44.1 VISUAL HALLUCINATIONS: ICD-10-CM

## 2023-01-24 DIAGNOSIS — M54.16 LUMBAR RADICULOPATHY: ICD-10-CM

## 2023-01-24 DIAGNOSIS — R29.898 WEAKNESS OF LEFT LEG: ICD-10-CM

## 2023-01-24 DIAGNOSIS — Z99.89 DOES MOBILIZE USING WALKER: ICD-10-CM

## 2023-01-24 DIAGNOSIS — I50.9 CHRONIC CONGESTIVE HEART FAILURE, UNSPECIFIED HEART FAILURE TYPE (HCC): ICD-10-CM

## 2023-01-24 PROBLEM — S32.050A CLOSED COMPRESSION FRACTURE OF L5 LUMBAR VERTEBRA, INITIAL ENCOUNTER (HCC): Status: RESOLVED | Noted: 2019-10-20 | Resolved: 2023-01-24

## 2023-01-24 PROBLEM — R54 FRAILTY: Status: RESOLVED | Noted: 2021-12-13 | Resolved: 2023-01-24

## 2023-01-24 PROBLEM — R07.81 RIB PAIN ON RIGHT SIDE: Status: RESOLVED | Noted: 2022-10-11 | Resolved: 2023-01-24

## 2023-01-24 PROBLEM — F01.518 VASCULAR DEMENTIA WITH BEHAVIORAL DISTURBANCE (HCC): Status: RESOLVED | Noted: 2021-07-30 | Resolved: 2023-01-24

## 2023-01-24 PROBLEM — R01.1 HEART MURMUR: Status: RESOLVED | Noted: 2021-03-18 | Resolved: 2023-01-24
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
## 2023-01-24 PROBLEM — J30.1 SEASONAL ALLERGIC RHINITIS DUE TO POLLEN: Status: RESOLVED | Noted: 2021-07-30 | Resolved: 2023-01-24

## 2023-01-24 PROBLEM — R63.0 POOR APPETITE: Status: RESOLVED | Noted: 2019-11-22 | Resolved: 2023-01-24

## 2023-01-24 PROBLEM — R00.0 TACHYCARDIA: Status: RESOLVED | Noted: 2022-10-11 | Resolved: 2023-01-24

## 2023-01-24 LAB
ALBUMIN SERPL BCP-MCNC: 3.7 G/DL (ref 3.5–5)
ALP SERPL-CCNC: 121 U/L (ref 46–116)
ALT SERPL W P-5'-P-CCNC: 22 U/L (ref 12–78)
ANION GAP SERPL CALCULATED.3IONS-SCNC: 6 MMOL/L (ref 4–13)
AST SERPL W P-5'-P-CCNC: 23 U/L (ref 5–45)
BASOPHILS # BLD AUTO: 0.04 THOUSANDS/ÂΜL (ref 0–0.1)
BASOPHILS NFR BLD AUTO: 1 % (ref 0–1)
BILIRUB SERPL-MCNC: 0.43 MG/DL (ref 0.2–1)
BUN SERPL-MCNC: 26 MG/DL (ref 5–25)
CALCIUM SERPL-MCNC: 8.9 MG/DL (ref 8.3–10.1)
CHLORIDE SERPL-SCNC: 105 MMOL/L (ref 96–108)
CO2 SERPL-SCNC: 29 MMOL/L (ref 21–32)
CREAT SERPL-MCNC: 1.36 MG/DL (ref 0.6–1.3)
EOSINOPHIL # BLD AUTO: 0.08 THOUSAND/ÂΜL (ref 0–0.61)
EOSINOPHIL NFR BLD AUTO: 1 % (ref 0–6)
ERYTHROCYTE [DISTWIDTH] IN BLOOD BY AUTOMATED COUNT: 15.6 % (ref 11.6–15.1)
GFR SERPL CREATININE-BSD FRML MDRD: 32 ML/MIN/1.73SQ M
GLUCOSE P FAST SERPL-MCNC: 94 MG/DL (ref 65–99)
HCT VFR BLD AUTO: 41.2 % (ref 34.8–46.1)
HGB BLD-MCNC: 12.5 G/DL (ref 11.5–15.4)
IMM GRANULOCYTES # BLD AUTO: 0.01 THOUSAND/UL (ref 0–0.2)
IMM GRANULOCYTES NFR BLD AUTO: 0 % (ref 0–2)
LYMPHOCYTES # BLD AUTO: 1.33 THOUSANDS/ÂΜL (ref 0.6–4.47)
LYMPHOCYTES NFR BLD AUTO: 22 % (ref 14–44)
MCH RBC QN AUTO: 27.5 PG (ref 26.8–34.3)
MCHC RBC AUTO-ENTMCNC: 30.3 G/DL (ref 31.4–37.4)
MCV RBC AUTO: 91 FL (ref 82–98)
MONOCYTES # BLD AUTO: 0.83 THOUSAND/ÂΜL (ref 0.17–1.22)
MONOCYTES NFR BLD AUTO: 13 % (ref 4–12)
NEUTROPHILS # BLD AUTO: 3.91 THOUSANDS/ÂΜL (ref 1.85–7.62)
NEUTS SEG NFR BLD AUTO: 63 % (ref 43–75)
NRBC BLD AUTO-RTO: 0 /100 WBCS
NT-PROBNP SERPL-MCNC: 1398 PG/ML
PLATELET # BLD AUTO: 222 THOUSANDS/UL (ref 149–390)
PMV BLD AUTO: 11.8 FL (ref 8.9–12.7)
POTASSIUM SERPL-SCNC: 3.9 MMOL/L (ref 3.5–5.3)
PROT SERPL-MCNC: 7.2 G/DL (ref 6.4–8.4)
RBC # BLD AUTO: 4.54 MILLION/UL (ref 3.81–5.12)
SODIUM SERPL-SCNC: 140 MMOL/L (ref 135–147)
TSH SERPL DL<=0.05 MIU/L-ACNC: 2.67 UIU/ML (ref 0.45–4.5)
VIT B12 SERPL-MCNC: 407 PG/ML (ref 100–900)
WBC # BLD AUTO: 6.2 THOUSAND/UL (ref 4.31–10.16)

## 2023-01-24 NOTE — ASSESSMENT & PLAN NOTE
Patient is ambulating with walker and is having issues with her left leg weakness upper leg and making ambulating distances a challenge will refer for home Physical therapy for evaluation and reccomendations

## 2023-01-24 NOTE — ASSESSMENT & PLAN NOTE
Wt Readings from Last 3 Encounters:   01/24/23 59 kg (130 lb)   12/07/22 59 4 kg (131 lb)   11/23/22 59 4 kg (131 lb)     Taking the lasix 40 mg daily

## 2023-01-24 NOTE — PROGRESS NOTES
Assessment/Plan:         Problem List Items Addressed This Visit        Cardiovascular and Mediastinum    Chronic atrial fibrillation (HCC)     RRR today int he office          Chronic congestive heart failure ([facility] )     Wt Readings from Last 3 Encounters:   01/24/23 59 kg (130 lb)   12/07/22 59 4 kg (131 lb)   11/23/22 59 4 kg (131 lb)     Taking the lasix 40 mg daily                 Nervous and Auditory    Lumbar radiculopathy     Will update x-ray of lumbar spine         Relevant Orders    XR spine lumbar minimum 4 views non injury    Vascular dementia without behavioral disturbance ([facility] )     Has her night and day mixed up at times          Relevant Orders    Comprehensive metabolic panel    CBC and differential    Vitamin D 25 hydroxy    Vitamin B12    TSH, 3rd generation with Free T4 reflex    Weakness of left leg     Patient having weakness in her left leg likely from her lower back no other areas of weakness          Relevant Orders    XR spine lumbar minimum 4 views non injury       Genitourinary    Stage 3a chronic kidney disease ([facility] ) (Chronic)    Relevant Orders    Comprehensive metabolic panel       Other    Visual hallucinations     Occasional seeing passed relatives          Ambulatory dysfunction - Primary     Patient is ambulating with walker and is having issues with her left leg weakness upper leg and making ambulating distances a challenge will refer for home Physical therapy for evaluation and reccomendations          Relevant Orders    XR spine lumbar minimum 4 views non injury    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[facility] )    Does mobilize using walker     Ambulating short distances with rolling walker               Subjective:      Patient ID: Isaias Durham is a 80 y o  female  Patient here today for her check up with her niece Kimberley Summers   Patient does ambulates with her walker around her home and at times has a problem with her left leg and has troubles with raising her left leg and has been giving her a hard time with ambulation  She is able to shower and uses a chair for her shower  She has notices that she is having pain in the left leg that travels down her leg       The following portions of the patient's history were reviewed and updated as appropriate:   She  has a past medical history of Ankle fracture, Arthritis, 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[facility] ), Bronchitis, Cancer ([facility] ), Carcinoma, lung ([facility] ), Dementia ([place] [address] ), Dependent edema, Depression, Diabetes mellitus ([facility] ), Hypercholesterolemia, Hypertension, Kidney stone, Oth abn and inconclusive findings on dx imaging of breast, Pes planus, Renal calculus, Restless leg syndrome, Rib pain, Sprain, neck, and Varicose veins of left lower extremity  She   Patient Active Problem List    Diagnosis Date Noted   • Weakness of left leg 01/24/2023   • Does mobilize using walker 01/24/2023   • Chronic congestive heart failure ([place] [address] ) 11/23/2022   • Chronic atrial fibrillation ([place] [address] ) 10/11/2022   • Stage 3a chronic kidney disease ([place] [address] ) 08/23/2022   • 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[place] [address] ) 08/23/2022   • Ambulatory dysfunction 12/13/2021   • Anxiety 12/13/2021   • Vitamin D deficiency 01/14/2020   • Primary insomnia 11/22/2019   • Degeneration of lumbar intervertebral disc 06/18/2019   • Lumbar radiculopathy 06/18/2019   • Arthropathy of lumbar facet joint 06/18/2019   • Vascular dementia without behavioral disturbance ([place] [address] ) 06/18/2019   • Visual hallucinations 06/18/2019   • Restless legs syndrome 06/18/2019     She  has a past surgical history that includes Lung cancer surgery and Appendectomy  Her family history includes Dementia in her brother; No Known Problems in her father and mother  She  reports that she has quit smoking  She has a 10 00 pack-year smoking history  She has never used smokeless tobacco  She reports that she does not drink alcohol and does not use drugs    Current Outpatient Medications   Medication Sig Dispense Refill   • acetaminophen (TYLENOL) 500 mg tablet Take 500 mg by mouth 2 (two) times a day     • ergocalciferol (VITAMIN D2) 50,000 units Take 1 capsule (50,000 Units total) by mouth once a week 12 capsule 1   • escitalopram (LEXAPRO) 5 mg tablet TAKE 1 TABLET (5 MG TOTAL) BY MOUTH DAILY  90 tablet 1   • furosemide (LASIX) 40 mg tablet Take 1 tablet (40 mg total) by mouth in the morning 90 tablet 3   • gabapentin (NEURONTIN) 100 mg capsule Take 1 capsule (100 mg total) by mouth 3 (three) times a day 270 capsule 1   • mirtazapine (REMERON) 15 mg tablet Take 0 5 tablets (7 5 mg total) by mouth daily at bedtime 30 tablet 5   • Misc  Devices (Mattress Cover) MISC Use daily 1 each 0   • pramipexole (MIRAPEX) 1 mg tablet TAKE 1 TABLET BY MOUTH EVERY DAY 90 tablet 1     No current facility-administered medications for this visit  She is allergic to albuterol, aldactone [spironolactone], aspirin, atenolol, bactrim [sulfamethoxazole-trimethoprim], codeine, hydrocodone, ibuprofen, lisinopril, mevacor [lovastatin], mirapex [pramipexole], morphine and related, other, penicillins, procaine, salicylates, ultram [tramadol], and zoloft [sertraline]       Review of Systems   Constitutional: Negative for activity change, appetite change, chills, diaphoresis, fatigue, fever and unexpected weight change  HENT: Negative for congestion, ear pain, hearing loss, postnasal drip, sinus pressure, sinus pain, sneezing, sore throat and trouble swallowing  Eyes: Negative for pain, discharge, redness and visual disturbance  Respiratory: Negative for cough and shortness of breath  Cardiovascular: Negative for chest pain and leg swelling  Gastrointestinal: Negative for abdominal distention, abdominal pain, anal bleeding, blood in stool, constipation, diarrhea, nausea and vomiting  Endocrine: Negative  Genitourinary: Negative  Musculoskeletal: Positive for back pain and gait problem  Negative for arthralgias  Skin: Negative  Allergic/Immunologic: Negative  Neurological: Negative for dizziness, tremors, seizures, syncope, speech difficulty, weakness, light-headedness, numbness and headaches  Hematological: Negative for adenopathy  Does not bruise/bleed easily  Psychiatric/Behavioral: Negative for agitation, behavioral problems, confusion, decreased concentration, dysphoric mood, self-injury and sleep disturbance  Objective:      /68   Pulse 84   Ht 4' 11" (1 499 m)   Wt 59 kg (130 lb)   SpO2 100%   BMI 26 26 kg/m²   BMI Counseling: Body mass index is 26 26 kg/m²  Follow-up plan was not completed due to elderly patient (72 years old) where weight reduction/weight gain would complicate underlying health condition such as: mental illness, dementia, or confusion  Falls Plan of Care: balance, strength, and gait training instructions were provided and referral to physical therapy  Physical Exam  Vitals and nursing note reviewed  Constitutional:       General: She is not in acute distress  Appearance: She is well-developed  HENT:      Head: Normocephalic and atraumatic  Right Ear: Tympanic membrane normal       Left Ear: Tympanic membrane normal       Nose: Nose normal       Mouth/Throat:      Mouth: Mucous membranes are moist    Eyes:      Pupils: Pupils are equal, round, and reactive to light  Neck:      Thyroid: No thyromegaly  Cardiovascular:      Rate and Rhythm: Normal rate and regular rhythm  Extrasystoles are present  Heart sounds: Murmur heard  Systolic murmur is present with a grade of 2/6  Pulmonary:      Effort: Pulmonary effort is normal  No respiratory distress  Breath sounds: Normal breath sounds  No wheezing  Abdominal:      General: Bowel sounds are normal       Palpations: Abdomen is soft  Musculoskeletal:      Cervical back: Normal range of motion  Lumbar back: Normal       Left hip: Normal range of motion  Decreased strength        Right lower leg: Normal  No edema  Left lower leg: Normal  No edema  Skin:     General: Skin is warm and dry  Capillary Refill: Capillary refill takes less than 2 seconds  Neurological:      General: No focal deficit present  Mental Status: She is alert and oriented to person, place, and time  Psychiatric:         Mood and Affect: Mood normal          Behavior: Behavior normal          Thought Content:  Thought content normal          Judgment: Judgment normal

## 2023-01-25 DIAGNOSIS — E55.9 VITAMIN D DEFICIENCY: ICD-10-CM

## 2023-01-25 LAB — 25(OH)D3 SERPL-MCNC: 24.5 NG/ML (ref 30–100)

## 2023-01-25 RX ORDER — ERGOCALCIFEROL 1.25 MG/1
50000 CAPSULE ORAL WEEKLY
Qty: 12 CAPSULE | Refills: 1 | Status: SHIPPED | OUTPATIENT
Start: 2023-01-25 | End: 2023-07-24

## 2023-02-13 ENCOUNTER — TELEPHONE (OUTPATIENT)
Dept: FAMILY MEDICINE CLINIC | Facility: CLINIC | Age: 88
End: 2023-02-13

## 2023-02-13 NOTE — TELEPHONE ENCOUNTER
Physical therapist called from [facility] just to report that patients HR today is between 103-106  BP is fine @ 112/60 O2 level @ 96 %   Patient is asymptomatic - they go back to patient again on Thursday

## 2023-03-02 ENCOUNTER — OFFICE VISIT (OUTPATIENT)
Dept: FAMILY MEDICINE CLINIC | Facility: CLINIC | Age: 88
End: 2023-03-02

## 2023-03-02 VITALS
TEMPERATURE: 98.2 F | SYSTOLIC BLOOD PRESSURE: 124 MMHG | HEART RATE: 88 BPM | WEIGHT: 131.4 LBS | HEIGHT: 59 IN | OXYGEN SATURATION: 98 % | BODY MASS INDEX: 26.49 KG/M2 | DIASTOLIC BLOOD PRESSURE: 72 MMHG

## 2023-03-02 DIAGNOSIS — R05.1 ACUTE COUGH: Primary | ICD-10-CM

## 2023-03-02 RX ORDER — DEXTROMETHORPHAN HYDROBROMIDE AND PROMETHAZINE HYDROCHLORIDE 15; 6.25 MG/5ML; MG/5ML
5 SOLUTION ORAL 4 TIMES DAILY PRN
Qty: 240 ML | Refills: 0 | Status: SHIPPED | OUTPATIENT
Start: 2023-03-02 | End: 2023-03-07 | Stop reason: SDUPTHER

## 2023-03-03 ENCOUNTER — TELEPHONE (OUTPATIENT)
Dept: FAMILY MEDICINE CLINIC | Facility: CLINIC | Age: 88
End: 2023-03-03

## 2023-03-07 ENCOUNTER — TELEPHONE (OUTPATIENT)
Dept: FAMILY MEDICINE CLINIC | Facility: CLINIC | Age: 88
End: 2023-03-07

## 2023-03-07 DIAGNOSIS — J40 BRONCHITIS: Primary | ICD-10-CM

## 2023-03-07 DIAGNOSIS — R05.1 ACUTE COUGH: ICD-10-CM

## 2023-03-07 RX ORDER — CEPHALEXIN 500 MG/1
500 CAPSULE ORAL EVERY 8 HOURS SCHEDULED
Qty: 21 CAPSULE | Refills: 0 | Status: SHIPPED | OUTPATIENT
Start: 2023-03-07 | End: 2023-03-14

## 2023-03-07 RX ORDER — DEXTROMETHORPHAN HYDROBROMIDE AND PROMETHAZINE HYDROCHLORIDE 15; 6.25 MG/5ML; MG/5ML
5 SOLUTION ORAL 4 TIMES DAILY PRN
Qty: 240 ML | Refills: 0 | Status: SHIPPED | OUTPATIENT
Start: 2023-03-07 | End: 2023-03-17

## 2023-03-07 NOTE — TELEPHONE ENCOUNTER
Kamilla Rocio is continuing to cough and used all the Promethazine  Caregiver asked for additional cough meds and to see if there was something else she could take

## 2023-03-30 ENCOUNTER — TELEPHONE (OUTPATIENT)
Dept: FAMILY MEDICINE CLINIC | Facility: CLINIC | Age: 88
End: 2023-03-30

## 2023-03-30 NOTE — TELEPHONE ENCOUNTER
FYI: Niece called to let you know that Sandra Castanon had a fall in the [employer] parking lot  She has a fracture and was Instructed to follow with the plastic surgeon but they declined  She is 80, has dementia and she is feeling fine  She has showered since she is home and just wanted to let you know   (I updated the info in chart as she went to [facility])

## 2023-03-31 ENCOUNTER — OFFICE VISIT (OUTPATIENT)
Age: 88
End: 2023-03-31

## 2023-03-31 VITALS
OXYGEN SATURATION: 99 % | HEART RATE: 91 BPM | DIASTOLIC BLOOD PRESSURE: 72 MMHG | HEIGHT: 59 IN | SYSTOLIC BLOOD PRESSURE: 130 MMHG | TEMPERATURE: 97.1 F | WEIGHT: 134.4 LBS | BODY MASS INDEX: 27.09 KG/M2

## 2023-03-31 DIAGNOSIS — R44.1 VISUAL HALLUCINATIONS: ICD-10-CM

## 2023-03-31 DIAGNOSIS — F51.01 PRIMARY INSOMNIA: ICD-10-CM

## 2023-03-31 DIAGNOSIS — F01.50 VASCULAR DEMENTIA WITHOUT BEHAVIORAL DISTURBANCE (HCC): Primary | ICD-10-CM

## 2023-03-31 DIAGNOSIS — F33.0 MILD EPISODE OF RECURRENT MAJOR DEPRESSIVE DISORDER (HCC): ICD-10-CM

## 2023-03-31 DIAGNOSIS — R26.2 AMBULATORY DYSFUNCTION: ICD-10-CM

## 2023-03-31 NOTE — PROGRESS NOTES
[facility]isco Mon[facility]  [address], [address] [facility], [address] [place] [address]  [phone]    Social Work Follow-Up    LSW met with Xi Nugent and her niece Mark Gaines for follow up visit  LSW provided [employer] brochure and list of personal care facilities as resources for respite care and Caregiver Support Group flyer to Mark Gaines at her request      LSW to remain available as needed

## 2023-03-31 NOTE — PROGRESS NOTES
Assessment & Plan:   1  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)  Assessment & Plan:  • MOCA: Previous score < 10, not completed at this visit  ID 2/3, Recall 0/3  o Pt's current cognitive level is consistent with moderate dementia  o Pt is supported adl/dependent iadls  Lives with niece and her family  Has caregiver hours  o Mobility: Seen in wheelchair, ambulates at home with assist   Often forgets walker  Fall risk  • Continue to stay active  Current activity:  Social enjoys family, Cognitive coloring, Physical walking up and down all  • Monitor for changes in mood, sleep, pain control  Notify provider if any concerns  • Medication review: Appropriate for conditions  Mirapex can cause hallucinations  Monitor and consider GDR if tolerated  o Check with pharmacist/provider before starting any new OTC/prescription medications for potential cognitive side effects  • Optimize all acute and chronic conditions  Continue to follow-up with PCP and specialist as directed for management  • Safety concerns:  None at present  Pt needs 24/7 cares for assist with adl and safety  Patient is with family who is taking care of her 24/7  • Caregiver stress:  none  • Ensure adequate hydration, good nutrition  • Goal: Patient enjoys living with family  2  Ambulatory dysfunction  Assessment & Plan:  · Had a recent fall where she tripped over her foot  · Has [facility] next week  Was seen in [address] [place] [address] ED for fall  · May benefit from PT eval  · Fall precautions      3  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[facility] )  Assessment & Plan:  · Mood fairly stable  Patient denies any depression  · Continue with Lexapro  · Continue emotional support, relaxation techniques      4  Primary insomnia  Assessment & Plan:  · Sleep stable at this time per niece and patient  · Takes Remeron at night  (which continues)  · Continue good sleep hygiene techniques      5   Visual hallucinations  Assessment & Plan:  · Not currently a complaint  Continue supportive care  Continue proper lighting  · Please notify if hallucinations become troublesome      HPI:  We had the pleasure of evaluating Sue Bateman who is a 80 y o  female in Geriatric follow up today  Previous MOCA:  <10  She lives with her niece   Ms Corie Zhao is in the office with her niece    Update per patient and niece:    Had a fall in [employer] - tripped and lost balance when getting out of car (3/29)  Appt on Tuesday with plastic surgeon to review (Dr Lori Dumont)  Has facial bruising under left eye  No change in vision  Has some pain under eye, giving tylenol  Just finished up with PT  Has problem with left foot which is pb what caused fall  Did not have walker during that time  Forgets walker often  No recent falls at home  Memory  Ok per pt, decline per niece  Occasional gets days and nights mixed up, does own self care, but sometimes gets confused  Sometimes wakes and thinks she needs to go somewhere  Pretty easy to redirect, no hallucinations  Appetite is good, weight is good  Eats smaller meals, eats snacks  No trouble with swallow  Has ongoing urinary incont  No recent UTI  Sleeps pretty good at night  About once a week days/nights get mixed up, was worse before  Cognitive:  Color, word searches (attention decreased)  Physical:  Walks up and down wolf, grandson helps up and down steps  Social:  Goes out with niece, sometimes (out to eat, has visitors)  Mood fairly stable  Pain controlled  Niece doing ok with taking care  25 hours per week (grandson is caretaker)  She has difficulty finding the right word while speaking: Yes- increasing  Patient requires repeat information or ask the same question repeatedly: Yes - stable     Do you drive: No       Do you handle your own financial affairs such as balancing your checkbook, paying bills, investments: No  Have you or your family noted any change in your mood or personality:No  Are you currently or have you been treated in the past for depression or anxiety: Yes  Have you noticed any gait or balance disorder: Yes  Uses :Walker: when she remembers  Any hallucination or delusion: No  Sleep Issues: No  Urinary/Stool Incontinence: Yes  Hearing and vision issue: No  ADL/IADL:  Supported/dependent  Appetite/swallow:  Good/good  Pain:  Soreness from fall    Past Medical, surgical, social, medication and allergy history and patients previous records reviewed  Family Review of Behavior [facility]:    pacing  No    agressive/combative behavior  No    agitated  Yes - on occasion - gets overwhelmed when grandkids   wandering  Yes - has  and grandson who help prevent  resistance to care  No   hoarding/hiding objects  no  suspicious  No  withdrawn No- enjoys company  misplacing/losing objects No  personal hygiene problems  No  forgetfulness of actions Yes    ROS: Review of Systems   Constitutional: Negative for activity change, appetite change, chills and fatigue  HENT: Negative for congestion, hearing loss and trouble swallowing  Eyes: Negative for visual disturbance (no vision change since fall)  Respiratory: Negative for cough and shortness of breath  Cardiovascular: Negative for chest pain and leg swelling  Gastrointestinal: Positive for constipation (occ, relieved with fruit)  Negative for abdominal pain, diarrhea, nausea and vomiting  Genitourinary: Negative for difficulty urinating  Musculoskeletal: Positive for gait problem  Negative for arthralgias and back pain  Neurological: Negative for dizziness, light-headedness and headaches (no worse since fall)  Psychiatric/Behavioral: Positive for decreased concentration (forgetful)  Negative for confusion (no worse since fall), dysphoric mood and hallucinations  The patient is not nervous/anxious  Allergies:    Allergies   Allergen Reactions   • Albuterol    • Aldactone [Spironolactone]    • Aspirin    • Atenolol    • Bactrim [Sulfamethoxazole-Trimethoprim]    • Codeine    • Hydrocodone    • Ibuprofen    • Lisinopril    • Mevacor [Lovastatin]    • Mirapex [Pramipexole]    • Morphine And Related    • Other      novacaine   • Penicillins    • Procaine    • Salicylates    • Ultram [Tramadol]    • Zoloft [Sertraline]      Night sweats       Medications:      Current Outpatient Medications:   •  acetaminophen (TYLENOL) 500 mg tablet, Take 500 mg by mouth 2 (two) times a day, Disp: , Rfl:   •  ergocalciferol (VITAMIN D2) 50,000 units, Take 1 capsule (50,000 Units total) by mouth once a week, Disp: 12 capsule, Rfl: 1  •  escitalopram (LEXAPRO) 5 mg tablet, TAKE 1 TABLET (5 MG TOTAL) BY MOUTH DAILY  , Disp: 90 tablet, Rfl: 1  •  gabapentin (NEURONTIN) 100 mg capsule, Take 1 capsule (100 mg total) by mouth 3 (three) times a day, Disp: 270 capsule, Rfl: 1  •  mirtazapine (REMERON) 15 mg tablet, Take 0 5 tablets (7 5 mg total) by mouth daily at bedtime, Disp: 30 tablet, Rfl: 5  •  Misc   Devices (Mattress Cover) MISC, Use daily, Disp: 1 each, Rfl: 0  •  pramipexole (MIRAPEX) 1 mg tablet, TAKE 1 TABLET BY MOUTH EVERY DAY, Disp: 90 tablet, Rfl: 1  •  furosemide (LASIX) 40 mg tablet, Take 1 tablet (40 mg total) by mouth in the morning (Patient not taking: Reported on 3/31/2023), Disp: 90 tablet, Rfl: 3    Vitals:  Vitals:    03/31/23 1135   BP: 130/72   Pulse: 91   Temp: (!) 97 1 °F (36 2 °C)   SpO2: 99%       History:  Past Medical History:   Diagnosis Date   • Ankle fracture    • Arthritis     left hip   • 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HCC)     with left ureter   • Bronchitis    • Cancer (HCC)    • Carcinoma, lung (HCC)    • Dementia (HCC)    • Dependent edema    • Depression    • Diabetes mellitus ([facility] )    • Hypercholesterolemia    • Hypertension    • Kidney stone    • Oth abn and inconclusive findings on dx imaging of breast    • Pes planus    • Renal calculus    • Restless leg syndrome    • Rib pain    • Sprain, neck    • Varicose veins of left lower extremity      Past Surgical History:   Procedure Laterality Date   • APPENDECTOMY     • LUNG CANCER SURGERY       Family History   Problem Relation Age of Onset   • No Known Problems Mother    • No Known Problems Father    • Dementia Brother    • Breast cancer Neg Hx    • Colon cancer Neg Hx    • Ovarian cancer Neg Hx    • Uterine cancer Neg Hx    • Cervical cancer Neg Hx      Social History     Socioeconomic History   • Marital status:      Spouse name: Not on file   • Number of children: Not on file   • Years of education: Not on file   • Highest education level: Not on file   Occupational History   • Not on file   Tobacco Use   • Smoking status: Former     Packs/day: 0 50     Years: 20 00     Pack years: 10 00     Types: Cigarettes   • Smokeless tobacco: Never   Substance and Sexual Activity   • Alcohol use: Never   • Drug use: Never   • Sexual activity: Not Currently     Birth control/protection: Post-menopausal   Other Topics Concern   • Not on file   Social History Narrative    Lives at home with niece     Social Determinants of Health     Financial Resource Strain: Low Risk    • Difficulty of Paying Living Expenses: Not very hard   Food Insecurity: Not on file   Transportation Needs: No Transportation Needs   • Lack of Transportation (Medical): No   • Lack of Transportation (Non-Medical): No   Physical Activity: Not on file   Stress: Not on file   Social Connections: Not on file   Intimate Partner Violence: Not on file   Housing Stability: Not on file     Past Surgical History:   Procedure Laterality Date   • APPENDECTOMY     • LUNG CANCER SURGERY           Physical Exam:  Observed ambulation:  Seen in wheel chair   Physical Exam  Vitals reviewed  Constitutional:       General: She is not in acute distress  Appearance: She is well-developed  She is not diaphoretic  HENT:      Head: Normocephalic  Cardiovascular:      Rate and Rhythm: Normal rate        Heart sounds: No murmur heard  No friction rub  No gallop  Pulmonary:      Effort: Pulmonary effort is normal  No respiratory distress  Breath sounds: Normal breath sounds  No wheezing or rales  Abdominal:      General: Bowel sounds are normal  There is no distension  Palpations: Abdomen is soft  Tenderness: There is no abdominal tenderness  There is no rebound  Musculoskeletal:         General: Normal range of motion  Skin:     General: Skin is warm and dry  Comments: Ecchymosis under left eye   Neurological:      General: No focal deficit present  Mental Status: She is alert  Mental status is at baseline  Comments: ID/recall:  2/3 ID, 0/3 recall  Pleasant, cooperative, forgetful  Engages in conversation    Has STM loss   Psychiatric:         Mood and Affect: Mood normal          Behavior: Behavior normal

## 2023-04-03 NOTE — ASSESSMENT & PLAN NOTE
· Had a recent fall where she tripped over her foot  · Has follow-up at plastics next week    Was seen in [facility] ED for fall  · May benefit from PT eval  · Fall precautions

## 2023-04-03 NOTE — ASSESSMENT & PLAN NOTE
· Sleep stable at this time per niece and patient  · Takes Remeron at night  (which continues)  · Continue good sleep hygiene techniques

## 2023-04-03 NOTE — ASSESSMENT & PLAN NOTE
· Mood fairly stable  Patient denies any depression  · Continue with Lexapro      · Continue emotional support, relaxation techniques

## 2023-04-03 NOTE — ASSESSMENT & PLAN NOTE
• MOCA: Previous score < 10, not completed at this visit  ID 2/3, Recall 0/3  o Pt's current cognitive level is consistent with moderate dementia  o Pt is supported adl/dependent iadls  Lives with niece and her family  Has caregiver hours  o Mobility: Seen in wheelchair, ambulates at home with assist   Often forgets walker  Fall risk  • Continue to stay active  Current activity:  Social enjoys family, Cognitive coloring, Physical walking up and down all  • Monitor for changes in mood, sleep, pain control  Notify provider if any concerns  • Medication review: Appropriate for conditions  Mirapex can cause hallucinations  Monitor and consider GDR if tolerated  o Check with pharmacist/provider before starting any new OTC/prescription medications for potential cognitive side effects  • Optimize all acute and chronic conditions  Continue to follow-up with PCP and specialist as directed for management  • Safety concerns:  None at present  Pt needs 24/7 cares for assist with adl and safety  Patient is with family who is taking care of her 24/7  • Caregiver stress:  none  • Ensure adequate hydration, good nutrition  • Goal: Patient enjoys living with family

## 2023-04-03 NOTE — ASSESSMENT & PLAN NOTE
· Not currently a complaint  Continue supportive care  Continue proper lighting    · Please notify if hallucinations become troublesome

## 2023-04-27 DIAGNOSIS — E55.9 VITAMIN D DEFICIENCY: ICD-10-CM

## 2023-04-27 RX ORDER — ERGOCALCIFEROL 1.25 MG/1
50000 CAPSULE ORAL WEEKLY
Qty: 12 CAPSULE | Refills: 1 | Status: SHIPPED | OUTPATIENT
Start: 2023-04-27 | End: 2023-10-24

## 2023-05-01 PROBLEM — R05.1 ACUTE COUGH: Status: RESOLVED | Noted: 2023-03-02 | Resolved: 2023-05-01

## 2023-05-10 ENCOUNTER — TELEPHONE (OUTPATIENT)
Dept: FAMILY MEDICINE CLINIC | Facility: CLINIC | Age: 88
End: 2023-05-10

## 2023-05-10 DIAGNOSIS — F01.518 VASCULAR DEMENTIA WITH BEHAVIORAL DISTURBANCE (HCC): ICD-10-CM

## 2023-05-10 RX ORDER — MIRTAZAPINE 15 MG/1
15 TABLET, FILM COATED ORAL
Qty: 45 TABLET | Refills: 1
Start: 2023-05-10 | End: 2023-05-11 | Stop reason: SDUPTHER

## 2023-05-10 NOTE — TELEPHONE ENCOUNTER
Hi, this call is for Marianne Uriostegui  I'm calling in regards to [employer]  Her YOB: 1929 and I am Pace Rides  I am her primary care  My phone number is [phone]  I'm calling because she is not sleeping  She is up all hours of the night and she's gotten very irritable and I just need some help here  I don't know what you can do but she is up at 2345 o'clock alright  Wandering the house OK  So no one is sleeping  We have to watch her  If you can give me a call back I would appreciate it  I left my number  Today is Wednesday  Thank you

## 2023-05-11 ENCOUNTER — TELEPHONE (OUTPATIENT)
Dept: FAMILY MEDICINE CLINIC | Facility: CLINIC | Age: 88
End: 2023-05-11

## 2023-05-11 DIAGNOSIS — I50.9 CHRONIC CONGESTIVE HEART FAILURE, UNSPECIFIED HEART FAILURE TYPE (HCC): Primary | ICD-10-CM

## 2023-05-11 DIAGNOSIS — F33.0 MILD EPISODE OF RECURRENT MAJOR DEPRESSIVE DISORDER (HCC): ICD-10-CM

## 2023-05-11 DIAGNOSIS — F01.518 VASCULAR DEMENTIA WITH BEHAVIORAL DISTURBANCE (HCC): ICD-10-CM

## 2023-05-11 RX ORDER — ESCITALOPRAM OXALATE 5 MG/1
5 TABLET ORAL DAILY
Qty: 90 TABLET | Refills: 1 | Status: SHIPPED | OUTPATIENT
Start: 2023-05-11 | End: 2023-08-09

## 2023-05-11 RX ORDER — MIRTAZAPINE 15 MG/1
15 TABLET, FILM COATED ORAL
Qty: 45 TABLET | Refills: 1 | Status: SHIPPED | OUTPATIENT
Start: 2023-05-11

## 2023-05-11 RX ORDER — FUROSEMIDE 40 MG/1
40 TABLET ORAL DAILY
Qty: 30 TABLET | Refills: 5
Start: 2023-05-11

## 2023-05-11 NOTE — TELEPHONE ENCOUNTER
Nam Stevenson, pt's niece returned your call  Please call her she will have her phone next to her   [phone]

## 2023-05-12 NOTE — TELEPHONE ENCOUNTER
Bety Solis gave Sheree Phillip the medication at 9:30pm and she did not go to be until 1am  She has been getting out of bed in the middle of the night and sleeping on the floor  Bety Solis wants to know if she should give it to her earlier?     Swelling in legs and ankles resolving slightly

## 2023-05-22 ENCOUNTER — TELEPHONE (OUTPATIENT)
Dept: FAMILY MEDICINE CLINIC | Facility: CLINIC | Age: 88
End: 2023-05-22

## 2023-05-22 DIAGNOSIS — F01.518 VASCULAR DEMENTIA WITH BEHAVIORAL DISTURBANCE (HCC): ICD-10-CM

## 2023-05-22 RX ORDER — QUETIAPINE FUMARATE 25 MG/1
25 TABLET, FILM COATED ORAL
Qty: 30 TABLET | Refills: 0 | Status: SHIPPED | OUTPATIENT
Start: 2023-05-22 | End: 2023-06-21

## 2023-05-22 RX ORDER — MIRTAZAPINE 7.5 MG/1
7.5 TABLET, FILM COATED ORAL
Qty: 30 TABLET | Refills: 0
Start: 2023-05-22 | End: 2023-06-21

## 2023-05-22 NOTE — TELEPHONE ENCOUNTER
Melita Morales had 3 falls in the last 4 days, no injury  Remeron really not having much of an effect  Takes it at 7 and she's up past 1 am  She's wandering around 
Admitted

## 2023-05-30 ENCOUNTER — TELEPHONE (OUTPATIENT)
Dept: FAMILY MEDICINE CLINIC | Facility: CLINIC | Age: 88
End: 2023-05-30

## 2023-05-30 NOTE — TELEPHONE ENCOUNTER
Hi, this is Dolores Saunders calling  I'm [employer] power of   Her YOB: 1924  I'm calling a [employer] because I need a new bed for her and they are telling me that I need to get an order from the Doctor Who is Lizet Terrazas  Today is Tuesday  If you can give me a call back as soon as possible I would appreciate it  That number is [phone]  Again, I'm calling to get an order for a new bed  Thank you very much

## 2023-05-31 LAB
DME PARACHUTE DELIVERY DATE EXPECTED: NORMAL
DME PARACHUTE DELIVERY DATE REQUESTED: NORMAL
DME PARACHUTE ITEM DESCRIPTION: NORMAL
DME PARACHUTE ORDER STATUS: NORMAL
DME PARACHUTE SUPPLIER NAME: NORMAL
DME PARACHUTE SUPPLIER PHONE: NORMAL

## 2023-06-01 NOTE — TELEPHONE ENCOUNTER
Hi, this is Diana Overall calling in regards to [employer]  Her YOB: 1924  If you can give me a call back in regards to a bed that I need to have ordered, the number here is [phone]  I have [employer] here and they're saying that she could she would better benefit from a bed that has a double the long rails on each side of the bed, so another bed would have to be ordered  If you can call me back, I would appreciate it  Today is Thursday, probably about 1:00  O'clock  Thank you very much  Doctor Ferny Snow is her doctor

## 2023-06-06 ENCOUNTER — OFFICE VISIT (OUTPATIENT)
Dept: FAMILY MEDICINE CLINIC | Facility: CLINIC | Age: 88
End: 2023-06-06
Payer: COMMERCIAL

## 2023-06-06 VITALS
DIASTOLIC BLOOD PRESSURE: 86 MMHG | BODY MASS INDEX: 28.83 KG/M2 | WEIGHT: 143 LBS | HEART RATE: 110 BPM | HEIGHT: 59 IN | SYSTOLIC BLOOD PRESSURE: 144 MMHG | OXYGEN SATURATION: 97 % | TEMPERATURE: 96.1 F

## 2023-06-06 DIAGNOSIS — G47.01 INSOMNIA DUE TO MEDICAL CONDITION: ICD-10-CM

## 2023-06-06 DIAGNOSIS — F33.0 MILD EPISODE OF RECURRENT MAJOR DEPRESSIVE DISORDER (HCC): ICD-10-CM

## 2023-06-06 DIAGNOSIS — I48.20 CHRONIC ATRIAL FIBRILLATION (HCC): ICD-10-CM

## 2023-06-06 DIAGNOSIS — F01.50 VASCULAR DEMENTIA WITHOUT BEHAVIORAL DISTURBANCE (HCC): Primary | ICD-10-CM

## 2023-06-06 PROCEDURE — 99214 OFFICE O/P EST MOD 30 MIN: CPT | Performed by: PHYSICIAN ASSISTANT

## 2023-06-06 NOTE — PROGRESS NOTES
Name: Vicky Barker      : 11/3/1924      MRN: [id number]  Encounter Provider: Vivek Zuñiga PA-C  Encounter Date: 2023   Encounter department: [address] [place] [address]     1  V[facility] without [facility] disturbance ([facility] )    2  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[facility] )    3  Insomnia due to medical condition    4  Chronic atrial fibrillation (HCC)    recommend stopping remeron and increasing seroquel to 25mg HS  Continue lexapro 5mg HS  Avoid daytime naps  Of note, pt with chronic afib, not on AC due to fall risk  Rate a bit elevated today between 100-120  Occasionally has palpitations  Due for cardiology follow up, will defer any rate control to her primary cardiologist Dr Reyes Hwang  Follow up prn       Subjective     Pt with hx of baseline vascular dementia, MDD presents with daughter with concerns of insomnia  She spends most of the night awake  She is not sleeping during the day  Not endorsing behavioral disturbance  She does talk to herself at baseline  She is taking 7 5mg of Remeron and 12 5mg of Seroquel via her PCP  Her mood is stable    She is due for cardiology follow up for hx of chronic afib and CHF  Review of Systems   Constitutional: Negative for chills, fatigue and fever  HENT: Negative for congestion, ear pain, hearing loss, nosebleeds, postnasal drip, rhinorrhea, sinus pressure, sinus pain, sneezing and sore throat  Eyes: Negative for pain, discharge, itching and visual disturbance  Respiratory: Negative for cough, chest tightness, shortness of breath and wheezing  Cardiovascular: Negative for chest pain, palpitations and leg swelling  Gastrointestinal: Negative for abdominal pain, blood in stool, constipation, diarrhea, nausea and vomiting  Genitourinary: Negative for frequency and urgency  Neurological: Negative for dizziness, light-headedness and numbness     Psychiatric/Behavioral: Positive for dysphoric mood and sleep disturbance  Negative for agitation and behavioral problems  Past Medical History:   Diagnosis Date   • Ankle fracture    • Arthritis     left hip   • 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HCC)     with left ureter   • Bronchitis    • [facility]    • Carcinoma, lung (HCC)    • Dementia ([facility] )    • Dependent edema    • Depression    • Diabetes mellitus ([facility] )    • Hypercholesterolemia    • Hypertension    • Kidney stone    • Oth abn and inconclusive findings on dx imaging of breast    • Pes planus    • Renal calculus    • Restless leg syndrome    • Rib pain    • Sprain, neck    • Varicose veins of left lower extremity      Past Surgical History:   Procedure Laterality Date   • APPENDECTOMY     • LUNG CANCER SURGERY       Family History   Problem Relation Age of Onset   • No Known Problems Mother    • No Known Problems Father    • Dementia Brother    • Breast cancer Neg Hx    • Colon cancer Neg Hx    • Ovarian cancer Neg Hx    • Uterine cancer Neg Hx    • Cervical cancer Neg Hx      Social History     Socioeconomic History   • Marital status:       Spouse name: None   • Number of children: None   • Years of education: None   • Highest education level: None   Occupational History   • None   Tobacco Use   • Smoking status: Former     Packs/day: 0 50     Years: 20 00     Total pack years: 10 00     Types: Cigarettes   • Smokeless tobacco: Never   Substance and Sexual Activity   • Alcohol use: Never   • Drug use: Never   • Sexual activity: Not Currently     Birth control/protection: Post-menopausal   Other Topics Concern   • None   Social History Narrative    Lives at home with niece     Social Determinants of Health     Financial Resource Strain: Low Risk  (10/11/2022)    Overall Financial Resource Strain (CARDIA)    • Difficulty of Paying Living Expenses: Not very hard   Food Insecurity: Not on file   Transportation Needs: No Transportation Needs (10/11/2022)    PRAPARE - Transportation    • Lack of Transportation (Medical): No    • Lack of Transportation (Non-Medical): No   Physical Activity: Not on file   Stress: Not on file   Social Connections: Not on file   Intimate Partner Violence: Not on file   Housing Stability: Not on file     Current Outpatient Medications on File Prior to Visit   Medication Sig   • acetaminophen (TYLENOL) 500 mg tablet Take 500 mg by mouth 2 (two) times a day   • ergocalciferol (VITAMIN D2) 50,000 units Take 1 capsule (50,000 Units total) by mouth once a week   • escitalopram (LEXAPRO) 5 mg tablet TAKE 1 TABLET (5 MG TOTAL) BY MOUTH DAILY  • furosemide (LASIX) 40 mg tablet Take 1 tablet (40 mg total) by mouth daily   • gabapentin (NEURONTIN) 100 mg capsule Take 1 capsule (100 mg total) by mouth 3 (three) times a day   • Misc   Devices (Mattress Cover) MISC Use daily   • pramipexole (MIRAPEX) 1 mg tablet TAKE 1 TABLET BY MOUTH EVERY DAY   • QUEtiapine (SEROquel) 25 mg tablet Take 1 tablet (25 mg total) by mouth daily at bedtime   • [DISCONTINUED] mirtazapine (REMERON) 7 5 MG tablet Take 1 tablet (7 5 mg total) by mouth daily at bedtime     Allergies   Allergen Reactions   • Albuterol    • Aldactone [Spironolactone]    • Aspirin    • Atenolol    • Bactrim [Sulfamethoxazole-Trimethoprim]    • Codeine    • Hydrocodone    • Ibuprofen    • Lisinopril    • Mevacor [Lovastatin]    • Mirapex [Pramipexole]    • Morphine And Related    • Other      novacaine   • Penicillins    • Procaine    • Salicylates    • Ultram [Tramadol]    • Zoloft [Sertraline]      Night sweats     Immunization History   Administered Date(s) Administered   • COVID-19 PFIZER VACCINE 0 3 ML IM 03/18/2021, 04/08/2021   • INFLUENZA 10/23/2014, 11/22/2015, 02/14/2018, 11/06/2018   • Influenza, high dose seasonal 0 7 mL 09/25/2020, 10/07/2021, 09/29/2022   • Influenza, recombinant, quadrivalent,injectable, preservative free 10/11/2019       Objective     /86   Pulse (!) 110 Comment: variable 100-120  Temp (!) 96 1 °F (35 6 °C) "Ht 4' 11\" (1 499 m)   Wt 64 9 kg (143 lb)   SpO2 97%   BMI 28 88 kg/m²     Physical Exam  Vitals and nursing note reviewed  Constitutional:       General: She is not in acute distress  Appearance: Normal appearance  HENT:      Head: Normocephalic and atraumatic  Nose: Nose normal    Eyes:      Pupils: Pupils are equal, round, and reactive to light  Cardiovascular:      Rate and Rhythm: Tachycardia present  Rhythm irregularly irregular  Pulses: Normal pulses  Heart sounds: Normal heart sounds  No murmur heard  Pulmonary:      Effort: Pulmonary effort is normal  No respiratory distress  Breath sounds: Normal breath sounds  No wheezing, rhonchi or rales  Musculoskeletal:         General: Normal range of motion  Cervical back: Normal range of motion and neck supple  Skin:     General: Skin is warm and dry  Neurological:      Mental Status: She is alert  Mental status is at baseline     Psychiatric:         Mood and Affect: Mood and affect normal          Behavior: Behavior normal        Jayda Brady PA-C  "

## 2023-06-12 ENCOUNTER — TELEPHONE (OUTPATIENT)
Dept: FAMILY MEDICINE CLINIC | Facility: CLINIC | Age: 88
End: 2023-06-12

## 2023-06-12 ENCOUNTER — TELEPHONE (OUTPATIENT)
Dept: CARDIOLOGY CLINIC | Facility: CLINIC | Age: 88
End: 2023-06-12

## 2023-06-12 DIAGNOSIS — I50.9 CHRONIC CONGESTIVE HEART FAILURE, UNSPECIFIED HEART FAILURE TYPE (HCC): ICD-10-CM

## 2023-06-12 DIAGNOSIS — R05.1 ACUTE COUGH: Primary | ICD-10-CM

## 2023-06-12 NOTE — TELEPHONE ENCOUNTER
Hi, this is Janet Trevino calling and I'm calling in regards to [employer]  Her YOB: 1924  She has for the past week or so she has been coughing  I mean when she's laying in bed she's coughing, during the day not so much  I actually had to get her up out of bed and have her sit in the chair so she could sleep because it's breaking her sleep  If you can give me a call back  I already put a call into her cardiologist and they suggested I also call you  My number is [phone]  Thank you

## 2023-06-12 NOTE — TELEPHONE ENCOUNTER
Kali Ellison patient's niece called stated that patient is having difficulty sleeping due to patient coughing at night  As soon as pt lays down in bed she begins to cough non-stop were she immediately has to have her sit up         Kali Ellison would like a call back to discuss [phone]

## 2023-06-13 ENCOUNTER — APPOINTMENT (OUTPATIENT)
Dept: RADIOLOGY | Facility: CLINIC | Age: 88
End: 2023-06-13
Payer: COMMERCIAL

## 2023-06-13 ENCOUNTER — APPOINTMENT (OUTPATIENT)
Dept: LAB | Facility: CLINIC | Age: 88
End: 2023-06-13
Payer: COMMERCIAL

## 2023-06-13 DIAGNOSIS — I50.9 CHRONIC CONGESTIVE HEART FAILURE, UNSPECIFIED HEART FAILURE TYPE (HCC): ICD-10-CM

## 2023-06-13 DIAGNOSIS — R05.1 ACUTE COUGH: ICD-10-CM

## 2023-06-13 LAB
ANION GAP SERPL CALCULATED.3IONS-SCNC: 6 MMOL/L (ref 4–13)
BASOPHILS # BLD AUTO: 0.04 THOUSANDS/ÂΜL (ref 0–0.1)
BASOPHILS NFR BLD AUTO: 1 % (ref 0–1)
BNP SERPL-MCNC: 788 PG/ML (ref 0–100)
BUN SERPL-MCNC: 25 MG/DL (ref 5–25)
CALCIUM SERPL-MCNC: 8.5 MG/DL (ref 8.3–10.1)
CHLORIDE SERPL-SCNC: 105 MMOL/L (ref 96–108)
CO2 SERPL-SCNC: 26 MMOL/L (ref 21–32)
CREAT SERPL-MCNC: 1.13 MG/DL (ref 0.6–1.3)
EOSINOPHIL # BLD AUTO: 0.16 THOUSAND/ÂΜL (ref 0–0.61)
EOSINOPHIL NFR BLD AUTO: 3 % (ref 0–6)
ERYTHROCYTE [DISTWIDTH] IN BLOOD BY AUTOMATED COUNT: 14.9 % (ref 11.6–15.1)
GFR SERPL CREATININE-BSD FRML MDRD: 40 ML/MIN/1.73SQ M
GLUCOSE SERPL-MCNC: 118 MG/DL (ref 65–140)
HCT VFR BLD AUTO: 38.2 % (ref 34.8–46.1)
HGB BLD-MCNC: 11.6 G/DL (ref 11.5–15.4)
IMM GRANULOCYTES # BLD AUTO: 0.01 THOUSAND/UL (ref 0–0.2)
IMM GRANULOCYTES NFR BLD AUTO: 0 % (ref 0–2)
LYMPHOCYTES # BLD AUTO: 0.93 THOUSANDS/ÂΜL (ref 0.6–4.47)
LYMPHOCYTES NFR BLD AUTO: 15 % (ref 14–44)
MCH RBC QN AUTO: 28 PG (ref 26.8–34.3)
MCHC RBC AUTO-ENTMCNC: 30.4 G/DL (ref 31.4–37.4)
MCV RBC AUTO: 92 FL (ref 82–98)
MONOCYTES # BLD AUTO: 0.84 THOUSAND/ÂΜL (ref 0.17–1.22)
MONOCYTES NFR BLD AUTO: 13 % (ref 4–12)
NEUTROPHILS # BLD AUTO: 4.4 THOUSANDS/ÂΜL (ref 1.85–7.62)
NEUTS SEG NFR BLD AUTO: 68 % (ref 43–75)
NRBC BLD AUTO-RTO: 0 /100 WBCS
PLATELET # BLD AUTO: 283 THOUSANDS/UL (ref 149–390)
PMV BLD AUTO: 10.7 FL (ref 8.9–12.7)
POTASSIUM SERPL-SCNC: 4.1 MMOL/L (ref 3.5–5.3)
RBC # BLD AUTO: 4.15 MILLION/UL (ref 3.81–5.12)
SODIUM SERPL-SCNC: 137 MMOL/L (ref 135–147)
WBC # BLD AUTO: 6.38 THOUSAND/UL (ref 4.31–10.16)

## 2023-06-13 PROCEDURE — 71046 X-RAY EXAM CHEST 2 VIEWS: CPT

## 2023-06-13 PROCEDURE — 36415 COLL VENOUS BLD VENIPUNCTURE: CPT

## 2023-06-13 PROCEDURE — 83880 ASSAY OF NATRIURETIC PEPTIDE: CPT

## 2023-06-13 PROCEDURE — 80048 BASIC METABOLIC PNL TOTAL CA: CPT

## 2023-06-13 PROCEDURE — 85025 COMPLETE CBC W/AUTO DIFF WBC: CPT

## 2023-06-16 DIAGNOSIS — F01.518 VASCULAR DEMENTIA WITH BEHAVIORAL DISTURBANCE (HCC): ICD-10-CM

## 2023-06-16 RX ORDER — QUETIAPINE FUMARATE 25 MG/1
TABLET, FILM COATED ORAL
Qty: 90 TABLET | Refills: 1 | Status: SHIPPED | OUTPATIENT
Start: 2023-06-16

## 2023-06-20 ENCOUNTER — OFFICE VISIT (OUTPATIENT)
Dept: FAMILY MEDICINE CLINIC | Facility: CLINIC | Age: 88
End: 2023-06-20
Payer: COMMERCIAL

## 2023-06-20 ENCOUNTER — TELEPHONE (OUTPATIENT)
Dept: FAMILY MEDICINE CLINIC | Facility: CLINIC | Age: 88
End: 2023-06-20

## 2023-06-20 VITALS
OXYGEN SATURATION: 92 % | HEIGHT: 59 IN | WEIGHT: 146 LBS | SYSTOLIC BLOOD PRESSURE: 110 MMHG | TEMPERATURE: 97.1 F | BODY MASS INDEX: 29.43 KG/M2 | HEART RATE: 64 BPM | DIASTOLIC BLOOD PRESSURE: 68 MMHG

## 2023-06-20 DIAGNOSIS — J45.901 BRONCHITIS, ALLERGIC, UNSPECIFIED ASTHMA SEVERITY, WITH ACUTE EXACERBATION: ICD-10-CM

## 2023-06-20 DIAGNOSIS — R05.2 SUBACUTE COUGH: Primary | ICD-10-CM

## 2023-06-20 PROBLEM — J45.909 ALLERGIC BRONCHITIS: Status: ACTIVE | Noted: 2023-06-20

## 2023-06-20 PROCEDURE — 99213 OFFICE O/P EST LOW 20 MIN: CPT | Performed by: NURSE PRACTITIONER

## 2023-06-20 RX ORDER — METHYLPREDNISOLONE 4 MG/1
TABLET ORAL
Qty: 21 EACH | Refills: 0 | Status: ON HOLD | OUTPATIENT
Start: 2023-06-20

## 2023-06-20 NOTE — PROGRESS NOTES
Name: Nichelle Hopper      : 11/3/1924      MRN: [id number]  Encounter Provider: FARZANA Caceres  Encounter Date: 2023   Encounter department: [address] [place] [address]  Subacute cough  Assessment & Plan:  Suspect related to her allergies will order medrol dose pack and take the claritan      2  Bronchitis, allergic, unspecified asthma severity, with acute exacerbation  -     methylPREDNISolone 4 MG tablet therapy pack; Use as directed on package      BMI Counseling: Body mass index is 29 49 kg/m²  The BMI is above normal  Nutrition recommendations include decreasing portion sizes  Exercise recommendations include moderate physical activity 150 minutes/week  Rationale for BMI follow-up plan is due to patient being overweight or obese  BMI Counseling: Body mass index is 29 49 kg/m²  Follow-up plan was not completed due to elderly patient (72 years old) where weight reduction/weight gain would complicate underlying health condition such as: illness or physical disability  Subjective      Patient here with her caretaker Elissa Montana her niece  Patient here with ongoing coughing for the past month and mainly when she is laying down making difficult to sleep and has to sleep in a chair at times related to having the coughing  No one else in the house with coughing not coughing up anything  No troubles with swallowing foods and taking tea and water and sitting in a chair  And she is able to fall back to sleep when the cough calms down  Nothing other than the coughing     Review of Systems   Constitutional: Negative for activity change, appetite change, chills, diaphoresis, fatigue, fever and unexpected weight change  HENT: Positive for rhinorrhea  Negative for congestion, dental problem, drooling, ear discharge, ear pain, hearing loss, postnasal drip, sinus pressure, sinus pain, sneezing, sore throat, trouble swallowing and voice change      Eyes: Negative for pain, "redness and visual disturbance  Respiratory: Positive for cough, chest tightness and wheezing  Negative for shortness of breath  Has episodes of getting wheezing and some out of breath with coughing    Cardiovascular: Negative for chest pain, palpitations and leg swelling  Gastrointestinal: Negative for abdominal pain, blood in stool, constipation, diarrhea, nausea and vomiting  Endocrine: Negative  Genitourinary: Negative  Musculoskeletal: Negative for arthralgias, back pain and gait problem  Using rolling walker    Skin: Negative  Allergic/Immunologic: Negative  Neurological: Negative for dizziness and light-headedness  Hematological: Negative  Psychiatric/Behavioral: Negative for behavioral problems and dysphoric mood  Current Outpatient Medications on File Prior to Visit   Medication Sig   • QUEtiapine (SEROquel) 25 mg tablet TAKE 1 TABLET BY MOUTH DAILY AT BEDTIME   • acetaminophen (TYLENOL) 500 mg tablet Take 500 mg by mouth 2 (two) times a day   • ergocalciferol (VITAMIN D2) 50,000 units Take 1 capsule (50,000 Units total) by mouth once a week   • escitalopram (LEXAPRO) 5 mg tablet TAKE 1 TABLET (5 MG TOTAL) BY MOUTH DAILY  • furosemide (LASIX) 40 mg tablet Take 1 tablet (40 mg total) by mouth daily   • gabapentin (NEURONTIN) 100 mg capsule Take 1 capsule (100 mg total) by mouth 3 (three) times a day   • Misc  Devices (Mattress Cover) MISC Use daily   • pramipexole (MIRAPEX) 1 mg tablet TAKE 1 TABLET BY MOUTH EVERY DAY       Objective     /68   Pulse 64   Temp (!) 97 1 °F (36 2 °C)   Ht 4' 11\" (1 499 m)   Wt 66 2 kg (146 lb)   SpO2 92%   BMI 29 49 kg/m²     Physical Exam  Vitals and nursing note reviewed  Constitutional:       General: She is not in acute distress  Appearance: She is well-developed  HENT:      Head: Normocephalic and atraumatic        Right Ear: Tympanic membrane normal       Left Ear: Tympanic membrane normal       Nose: Nose " normal       Mouth/Throat:      Mouth: Mucous membranes are moist    Eyes:      Pupils: Pupils are equal, round, and reactive to light  Neck:      Thyroid: No thyromegaly  Cardiovascular:      Rate and Rhythm: Normal rate  Rhythm regularly irregular  Heart sounds: Normal heart sounds  No murmur heard  Pulmonary:      Effort: Pulmonary effort is normal  No respiratory distress  Breath sounds: Normal breath sounds  No wheezing  Abdominal:      General: Bowel sounds are normal       Palpations: Abdomen is soft  Musculoskeletal:         General: Normal range of motion  Cervical back: Normal range of motion  Right lower le+ Pitting Edema present  Left lower le+ Pitting Edema present  Skin:     General: Skin is warm and dry  Neurological:      General: No focal deficit present  Mental Status: She is alert and oriented to person, place, and time     Psychiatric:         Mood and Affect: Mood normal        FARZANA Wood

## 2023-06-20 NOTE — TELEPHONE ENCOUNTER
Pt has been having a cough for some time which is worse now then it was    She starts coughing when she lays down and niece has to get her to sit up  India Infante She was told to make an appt    She wants to see Cinf\dy  Her 1 st available appt is in about 2 weeks    but needs this addressed sooner    please advise  India Infante

## 2023-06-28 ENCOUNTER — TELEPHONE (OUTPATIENT)
Dept: FAMILY MEDICINE CLINIC | Facility: CLINIC | Age: 88
End: 2023-06-28

## 2023-06-28 NOTE — TELEPHONE ENCOUNTER
Beronica Mercedes called to report that patient is still coughing at night time and only has one more pill of her prednisone   Please advise

## 2023-07-01 ENCOUNTER — HOSPITAL ENCOUNTER (INPATIENT)
Facility: HOSPITAL | Age: 88
LOS: 5 days | Discharge: NURSING, REHAB OR LONG-TERM CARE | DRG: 291 | End: 2023-07-06
Attending: EMERGENCY MEDICINE | Admitting: FAMILY MEDICINE
Payer: COMMERCIAL

## 2023-07-01 ENCOUNTER — APPOINTMENT (EMERGENCY)
Dept: RADIOLOGY | Facility: HOSPITAL | Age: 88
DRG: 291 | End: 2023-07-01
Payer: COMMERCIAL

## 2023-07-01 DIAGNOSIS — I50.9 ACUTE EXACERBATION OF CHF (CONGESTIVE HEART FAILURE) (HCC): ICD-10-CM

## 2023-07-01 DIAGNOSIS — I50.9 CHRONIC CONGESTIVE HEART FAILURE, UNSPECIFIED HEART FAILURE TYPE (HCC): ICD-10-CM

## 2023-07-01 DIAGNOSIS — R05.2 SUBACUTE COUGH: Primary | ICD-10-CM

## 2023-07-01 PROBLEM — F03.90 DEMENTIA (HCC): Status: ACTIVE | Noted: 2023-07-01

## 2023-07-01 LAB
2HR DELTA HS TROPONIN: 2 NG/L
4HR DELTA HS TROPONIN: 1 NG/L
ALBUMIN SERPL BCP-MCNC: 3.3 G/DL (ref 3.5–5)
ALP SERPL-CCNC: 115 U/L (ref 34–104)
ALT SERPL W P-5'-P-CCNC: 18 U/L (ref 7–52)
ANION GAP SERPL CALCULATED.3IONS-SCNC: 8 MMOL/L
APTT PPP: 34 SECONDS (ref 23–37)
AST SERPL W P-5'-P-CCNC: 21 U/L (ref 13–39)
ATRIAL RATE: 133 BPM
ATRIAL RATE: 416 BPM
BASE EX.OXY STD BLDV CALC-SCNC: 77.8 % (ref 60–80)
BASE EXCESS BLDV CALC-SCNC: 5.1 MMOL/L
BASOPHILS # BLD AUTO: 0.04 THOUSANDS/ÂΜL (ref 0–0.1)
BASOPHILS # BLD AUTO: 0.04 THOUSANDS/ÂΜL (ref 0–0.1)
BASOPHILS NFR BLD AUTO: 0 % (ref 0–1)
BASOPHILS NFR BLD AUTO: 0 % (ref 0–1)
BILIRUB SERPL-MCNC: 0.79 MG/DL (ref 0.2–1)
BNP SERPL-MCNC: 677 PG/ML (ref 0–100)
BUN SERPL-MCNC: 23 MG/DL (ref 5–25)
CALCIUM ALBUM COR SERPL-MCNC: 8.7 MG/DL (ref 8.3–10.1)
CALCIUM SERPL-MCNC: 8.1 MG/DL (ref 8.4–10.2)
CARDIAC TROPONIN I PNL SERPL HS: 17 NG/L
CARDIAC TROPONIN I PNL SERPL HS: 18 NG/L
CARDIAC TROPONIN I PNL SERPL HS: 19 NG/L
CHLORIDE SERPL-SCNC: 102 MMOL/L (ref 96–108)
CO2 SERPL-SCNC: 27 MMOL/L (ref 21–32)
CREAT SERPL-MCNC: 1.11 MG/DL (ref 0.6–1.3)
EOSINOPHIL # BLD AUTO: 0.39 THOUSAND/ÂΜL (ref 0–0.61)
EOSINOPHIL # BLD AUTO: 0.46 THOUSAND/ÂΜL (ref 0–0.61)
EOSINOPHIL NFR BLD AUTO: 4 % (ref 0–6)
EOSINOPHIL NFR BLD AUTO: 4 % (ref 0–6)
ERYTHROCYTE [DISTWIDTH] IN BLOOD BY AUTOMATED COUNT: 14.6 % (ref 11.6–15.1)
ERYTHROCYTE [DISTWIDTH] IN BLOOD BY AUTOMATED COUNT: 14.7 % (ref 11.6–15.1)
FLUAV RNA RESP QL NAA+PROBE: NEGATIVE
FLUBV RNA RESP QL NAA+PROBE: NEGATIVE
GFR SERPL CREATININE-BSD FRML MDRD: 41 ML/MIN/1.73SQ M
GLUCOSE SERPL-MCNC: 127 MG/DL (ref 65–140)
HCO3 BLDV-SCNC: 30.4 MMOL/L (ref 24–30)
HCT VFR BLD AUTO: 35.4 % (ref 34.8–46.1)
HCT VFR BLD AUTO: 38.4 % (ref 34.8–46.1)
HGB BLD-MCNC: 11.2 G/DL (ref 11.5–15.4)
HGB BLD-MCNC: 12 G/DL (ref 11.5–15.4)
IMM GRANULOCYTES # BLD AUTO: 0.04 THOUSAND/UL (ref 0–0.2)
IMM GRANULOCYTES # BLD AUTO: 0.05 THOUSAND/UL (ref 0–0.2)
IMM GRANULOCYTES NFR BLD AUTO: 0 % (ref 0–2)
IMM GRANULOCYTES NFR BLD AUTO: 1 % (ref 0–2)
INR PPP: 1.22 (ref 0.84–1.19)
LACTATE SERPL-SCNC: 1.3 MMOL/L (ref 0.5–2)
LYMPHOCYTES # BLD AUTO: 0.8 THOUSANDS/ÂΜL (ref 0.6–4.47)
LYMPHOCYTES # BLD AUTO: 1.14 THOUSANDS/ÂΜL (ref 0.6–4.47)
LYMPHOCYTES NFR BLD AUTO: 11 % (ref 14–44)
LYMPHOCYTES NFR BLD AUTO: 8 % (ref 14–44)
MCH RBC QN AUTO: 26.9 PG (ref 26.8–34.3)
MCH RBC QN AUTO: 27.2 PG (ref 26.8–34.3)
MCHC RBC AUTO-ENTMCNC: 31.3 G/DL (ref 31.4–37.4)
MCHC RBC AUTO-ENTMCNC: 31.6 G/DL (ref 31.4–37.4)
MCV RBC AUTO: 86 FL (ref 82–98)
MCV RBC AUTO: 86 FL (ref 82–98)
MONOCYTES # BLD AUTO: 1.14 THOUSAND/ÂΜL (ref 0.17–1.22)
MONOCYTES # BLD AUTO: 1.2 THOUSAND/ÂΜL (ref 0.17–1.22)
MONOCYTES NFR BLD AUTO: 11 % (ref 4–12)
MONOCYTES NFR BLD AUTO: 11 % (ref 4–12)
NEUTROPHILS # BLD AUTO: 7.52 THOUSANDS/ÂΜL (ref 1.85–7.62)
NEUTROPHILS # BLD AUTO: 8.09 THOUSANDS/ÂΜL (ref 1.85–7.62)
NEUTS SEG NFR BLD AUTO: 73 % (ref 43–75)
NEUTS SEG NFR BLD AUTO: 77 % (ref 43–75)
NRBC BLD AUTO-RTO: 0 /100 WBCS
NRBC BLD AUTO-RTO: 0 /100 WBCS
O2 CT BLDV-SCNC: 13.5 ML/DL
PCO2 BLDV: 47.6 MM HG (ref 42–50)
PH BLDV: 7.42 [PH] (ref 7.3–7.4)
PLATELET # BLD AUTO: 286 THOUSANDS/UL (ref 149–390)
PLATELET # BLD AUTO: 288 THOUSANDS/UL (ref 149–390)
PMV BLD AUTO: 9.3 FL (ref 8.9–12.7)
PMV BLD AUTO: 9.9 FL (ref 8.9–12.7)
PO2 BLDV: 42.5 MM HG (ref 35–45)
POTASSIUM SERPL-SCNC: 4 MMOL/L (ref 3.5–5.3)
PROCALCITONIN SERPL-MCNC: 0.08 NG/ML
PROT SERPL-MCNC: 6.6 G/DL (ref 6.4–8.4)
PROTHROMBIN TIME: 15.2 SECONDS (ref 11.6–14.5)
QRS AXIS: 52 DEGREES
QRS AXIS: 66 DEGREES
QRSD INTERVAL: 102 MS
QRSD INTERVAL: 104 MS
QT INTERVAL: 360 MS
QT INTERVAL: 376 MS
QTC INTERVAL: 473 MS
QTC INTERVAL: 482 MS
RBC # BLD AUTO: 4.12 MILLION/UL (ref 3.81–5.12)
RBC # BLD AUTO: 4.46 MILLION/UL (ref 3.81–5.12)
RSV RNA RESP QL NAA+PROBE: NEGATIVE
SARS-COV-2 RNA RESP QL NAA+PROBE: NEGATIVE
SODIUM SERPL-SCNC: 137 MMOL/L (ref 135–147)
T WAVE AXIS: 54 DEGREES
T WAVE AXIS: 86 DEGREES
VENTRICULAR RATE: 104 BPM
VENTRICULAR RATE: 99 BPM
WBC # BLD AUTO: 10.28 THOUSAND/UL (ref 4.31–10.16)
WBC # BLD AUTO: 10.63 THOUSAND/UL (ref 4.31–10.16)

## 2023-07-01 PROCEDURE — 85610 PROTHROMBIN TIME: CPT | Performed by: PHYSICIAN ASSISTANT

## 2023-07-01 PROCEDURE — 93005 ELECTROCARDIOGRAM TRACING: CPT

## 2023-07-01 PROCEDURE — 83605 ASSAY OF LACTIC ACID: CPT | Performed by: PHYSICIAN ASSISTANT

## 2023-07-01 PROCEDURE — 84145 PROCALCITONIN (PCT): CPT | Performed by: PHYSICIAN ASSISTANT

## 2023-07-01 PROCEDURE — 87040 BLOOD CULTURE FOR BACTERIA: CPT | Performed by: PHYSICIAN ASSISTANT

## 2023-07-01 PROCEDURE — 84484 ASSAY OF TROPONIN QUANT: CPT | Performed by: PHYSICIAN ASSISTANT

## 2023-07-01 PROCEDURE — 85025 COMPLETE CBC W/AUTO DIFF WBC: CPT

## 2023-07-01 PROCEDURE — 0241U HB NFCT DS VIR RESP RNA 4 TRGT: CPT | Performed by: PHYSICIAN ASSISTANT

## 2023-07-01 PROCEDURE — 94664 DEMO&/EVAL PT USE INHALER: CPT

## 2023-07-01 PROCEDURE — 83880 ASSAY OF NATRIURETIC PEPTIDE: CPT | Performed by: PHYSICIAN ASSISTANT

## 2023-07-01 PROCEDURE — 99223 1ST HOSP IP/OBS HIGH 75: CPT | Performed by: STUDENT IN AN ORGANIZED HEALTH CARE EDUCATION/TRAINING PROGRAM

## 2023-07-01 PROCEDURE — 99285 EMERGENCY DEPT VISIT HI MDM: CPT

## 2023-07-01 PROCEDURE — 93010 ELECTROCARDIOGRAM REPORT: CPT | Performed by: INTERNAL MEDICINE

## 2023-07-01 PROCEDURE — 85730 THROMBOPLASTIN TIME PARTIAL: CPT | Performed by: PHYSICIAN ASSISTANT

## 2023-07-01 PROCEDURE — 80053 COMPREHEN METABOLIC PANEL: CPT | Performed by: PHYSICIAN ASSISTANT

## 2023-07-01 PROCEDURE — 71045 X-RAY EXAM CHEST 1 VIEW: CPT

## 2023-07-01 PROCEDURE — 82805 BLOOD GASES W/O2 SATURATION: CPT | Performed by: PHYSICIAN ASSISTANT

## 2023-07-01 PROCEDURE — 99285 EMERGENCY DEPT VISIT HI MDM: CPT | Performed by: PHYSICIAN ASSISTANT

## 2023-07-01 PROCEDURE — 85025 COMPLETE CBC W/AUTO DIFF WBC: CPT | Performed by: PHYSICIAN ASSISTANT

## 2023-07-01 PROCEDURE — 36415 COLL VENOUS BLD VENIPUNCTURE: CPT | Performed by: PHYSICIAN ASSISTANT

## 2023-07-01 PROCEDURE — 97163 PT EVAL HIGH COMPLEX 45 MIN: CPT

## 2023-07-01 PROCEDURE — 94760 N-INVAS EAR/PLS OXIMETRY 1: CPT

## 2023-07-01 RX ORDER — QUETIAPINE FUMARATE 25 MG/1
25 TABLET, FILM COATED ORAL
Status: DISCONTINUED | OUTPATIENT
Start: 2023-07-01 | End: 2023-07-06 | Stop reason: HOSPADM

## 2023-07-01 RX ORDER — GABAPENTIN 100 MG/1
100 CAPSULE ORAL 3 TIMES DAILY
Status: DISCONTINUED | OUTPATIENT
Start: 2023-07-01 | End: 2023-07-06 | Stop reason: HOSPADM

## 2023-07-01 RX ORDER — GUAIFENESIN/DEXTROMETHORPHAN 100-10MG/5
10 SYRUP ORAL EVERY 4 HOURS PRN
Status: DISCONTINUED | OUTPATIENT
Start: 2023-07-01 | End: 2023-07-06 | Stop reason: HOSPADM

## 2023-07-01 RX ORDER — GUAIFENESIN 600 MG/1
600 TABLET, EXTENDED RELEASE ORAL EVERY 12 HOURS SCHEDULED
Status: DISCONTINUED | OUTPATIENT
Start: 2023-07-01 | End: 2023-07-01

## 2023-07-01 RX ORDER — CEFTRIAXONE 1 G/50ML
1000 INJECTION, SOLUTION INTRAVENOUS ONCE
Status: COMPLETED | OUTPATIENT
Start: 2023-07-01 | End: 2023-07-01

## 2023-07-01 RX ORDER — HEPARIN SODIUM 5000 [USP'U]/ML
5000 INJECTION, SOLUTION INTRAVENOUS; SUBCUTANEOUS EVERY 8 HOURS SCHEDULED
Status: DISCONTINUED | OUTPATIENT
Start: 2023-07-01 | End: 2023-07-06 | Stop reason: HOSPADM

## 2023-07-01 RX ORDER — ALBUTEROL SULFATE 2.5 MG/3ML
2.5 SOLUTION RESPIRATORY (INHALATION) EVERY 6 HOURS PRN
Status: DISCONTINUED | OUTPATIENT
Start: 2023-07-01 | End: 2023-07-01

## 2023-07-01 RX ORDER — FUROSEMIDE 10 MG/ML
40 INJECTION INTRAMUSCULAR; INTRAVENOUS
Status: DISCONTINUED | OUTPATIENT
Start: 2023-07-01 | End: 2023-07-02

## 2023-07-01 RX ORDER — GUAIFENESIN 600 MG/1
600 TABLET, EXTENDED RELEASE ORAL EVERY 12 HOURS SCHEDULED
Status: DISCONTINUED | OUTPATIENT
Start: 2023-07-01 | End: 2023-07-06 | Stop reason: HOSPADM

## 2023-07-01 RX ORDER — ESCITALOPRAM OXALATE 10 MG/1
5 TABLET ORAL DAILY
Status: DISCONTINUED | OUTPATIENT
Start: 2023-07-01 | End: 2023-07-06 | Stop reason: HOSPADM

## 2023-07-01 RX ADMIN — GUAIFENESIN 600 MG: 600 TABLET ORAL at 17:27

## 2023-07-01 RX ADMIN — FUROSEMIDE 40 MG: 10 INJECTION, SOLUTION INTRAMUSCULAR; INTRAVENOUS at 10:36

## 2023-07-01 RX ADMIN — GABAPENTIN 100 MG: 100 CAPSULE ORAL at 15:49

## 2023-07-01 RX ADMIN — FUROSEMIDE 40 MG: 10 INJECTION, SOLUTION INTRAMUSCULAR; INTRAVENOUS at 15:50

## 2023-07-01 RX ADMIN — CEFTRIAXONE 1000 MG: 1 INJECTION, SOLUTION INTRAVENOUS at 03:28

## 2023-07-01 RX ADMIN — HEPARIN SODIUM 5000 UNITS: 5000 INJECTION INTRAVENOUS; SUBCUTANEOUS at 15:51

## 2023-07-01 RX ADMIN — HEPARIN SODIUM 5000 UNITS: 5000 INJECTION INTRAVENOUS; SUBCUTANEOUS at 05:21

## 2023-07-01 RX ADMIN — ESCITALOPRAM OXALATE 5 MG: 10 TABLET ORAL at 10:36

## 2023-07-01 RX ADMIN — GABAPENTIN 100 MG: 100 CAPSULE ORAL at 10:36

## 2023-07-01 RX ADMIN — FUROSEMIDE 40 MG: 10 INJECTION, SOLUTION INTRAMUSCULAR; INTRAVENOUS at 05:21

## 2023-07-01 NOTE — RESPIRATORY THERAPY NOTE
RT Protocol Note  Natasha Washburn 80 y.o. female MRN: [id number]  Unit/Bed#: -01 Encounter: [id number]    Assessment    Principal Problem:    Acute CHF (congestive heart failure) ([facility])  Active Problems:    Stage 3a chronic kidney disease ([facility])    Chronic atrial fibrillation (HCC)    Dementia ([facility])      Home Pulmonary Medications:  none       Past Medical History:   Diagnosis Date    Ankle fracture     Arthritis     left hip    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[facility])     with left ureter    Bronchitis     Cancer ([facility])     Carcinoma, lung ([facility])     Dementia ([facility])     Dependent edema     Depression     Diabetes mellitus ([facility])     Hypercholesterolemia     Hypertension     Kidney stone     Oth abn and inconclusive findings on dx imaging of breast     Pes planus     Renal calculus     Restless leg syndrome     Rib pain     Sprain, neck     Varicose veins of left lower extremity      Social History     Socioeconomic History    Marital status:      Spouse name: Not on file    Number of children: Not on file    Years of education: Not on file    Highest education level: Not on file   Occupational History    Not on file   Tobacco Use    Smoking status: Former     Packs/day: 0.50     Years: 20.00     Total pack years: 10.00     Types: Cigarettes    Smokeless tobacco: Never   Substance and Sexual Activity    Alcohol use: Never    Drug use: Never    Sexual activity: Not Currently     Birth control/protection: Post-menopausal   Other Topics Concern    Not on file   Social History Narrative    Lives at home with niece     Social Determinants of Health     Financial Resource Strain: Low Risk  (10/11/2022)    Overall Financial Resource Strain (CARDIA)     Difficulty of Paying Living Expenses: Not very hard   Food Insecurity: Not on file   Transportation Needs: No Transportation Needs (10/11/2022)    PRAPARE - Transportation     Lack of Transportation (Medical): No     Lack of Transportation (Non-Medical):  No   Physical Activity: Not on file   Stress: Not on file   Social Connections: Not on file   Intimate Partner Violence: Not on file   Housing Stability: Not on file       Subjective         Objective    Physical Exam:   Assessment Type: Assess only  General Appearance: Awake  Respiratory Pattern: Normal  Chest Assessment: Chest expansion symmetrical  Bilateral Breath Sounds: Diminished, Scattered, Crackles  Cough: Non-productive  O2 Device: (P) RA    Vitals:  Blood pressure 158/78, pulse 64, temperature 100.3 °F (37.9 °C), resp. rate (P) 18, SpO2 (P) 95 %, not currently breastfeeding. Imaging and other studies: I have personally reviewed pertinent reports. O2 Device: (P) RA     Plan    Respiratory Plan: No distress/Pulmonary history        Resp Comments: Protocol completed. Pt w/ PMH of CHF, dementia, and CKD. Admitted w/ CHF exac. Will make nebs PRN. States in chart that she is allergic to Albuterol.

## 2023-07-01 NOTE — ASSESSMENT & PLAN NOTE
Lab Results   Component Value Date    EGFR 41 07/01/2023    EGFR 40 06/13/2023    EGFR 32 01/24/2023    CREATININE 1.11 07/01/2023    CREATININE 1.13 06/13/2023    CREATININE 1.36 (H) 01/24/2023     · Creatinine appears around baseline   · Avoid nephrotoxic agents   · AM BMP

## 2023-07-01 NOTE — ASSESSMENT & PLAN NOTE
· HR borderline tachycardic while in ED   · EKG showing Afib   · Not currently on outpatient rate control or AC   · Acute CHF possibly contributing; to monitor HR while volume overload is reduced for now  · Tele monitoring ordered   · PRN rate control agents as appropriate

## 2023-07-01 NOTE — PHYSICAL THERAPY NOTE
Physical Therapy Evaluation     Patient's Name: Mimi Jansen    Admitting Diagnosis  Cough [R05.9]  Acute exacerbation of CHF (congestive heart failure) ([address]) [I50.9]  Subacute cough [R05.2]    Problem List  Patient Active Problem List   Diagnosis    Degeneration of lumbar intervertebral disc    Lumbar radiculopathy    Arthropathy of lumbar facet joint    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)    Visual hallucinations    Restless legs syndrome    Primary insomnia    Vitamin D deficiency    Ambulatory dysfunction    Anxiety    Stage 3a chronic kidney disease (HCC)    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HCC)    Chronic atrial fibrillation (HCC)    Chronic congestive heart failure (HCC)    Weakness of left leg    Does mobilize using walker    Subacute cough    Allergic bronchitis    Acute CHF (congestive heart failure) ([address])    Dementia ([address])       Past Medical History  Past Medical History:   Diagnosis Date    Ankle fracture     Arthritis     left hip    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[address])     with left ureter    Bronchitis     Cancer ([address])     Carcinoma, lung ([address])     Dementia ([address])     Dependent edema     Depression     Diabetes mellitus ([address])     Hypercholesterolemia     Hypertension     Kidney stone     Oth abn and inconclusive findings on dx imaging of breast     Pes planus     Renal calculus     Restless leg syndrome     Rib pain     Sprain, neck     Varicose veins of left lower extremity        Past Surgical History  Past Surgical History:   Procedure Laterality Date    APPENDECTOMY      LUNG CANCER SURGERY            07/01/23 0907   PT Last Visit   PT Visit Date 07/01/23   Note Type   Note type Evaluation   Pain Assessment   Pain Assessment Tool 0-10   Pain Score No Pain   Restrictions/Precautions   Weight Bearing Precautions Per Order No   Other Precautions Cognitive; Chair Alarm; Bed Alarm;Telemetry; Fall Risk;Impulsive   Home Living   Type of Home House   Home Layout Two level;Performs ADLs on one level;Bed/bath upstairs   Home Equipment Wilfredo Meredith  (pt reports she ambulates with a walker at baseline "all the time")   Prior Function   Level of [place] Independent with ADLs; Independent with functional mobility; Needs assistance with IADLS  (per pt)   Lives With Family  (niece, pt reports she works and is home alone during the day)   Receives Help From Family   IADLs Family/Friend/Other provides transportation; Family/Friend/Other provides meals; Family/Friend/Other provides medication management   Falls in the last 6 months 0  (per pt)   Vocational Retired  (nurse)   General   Family/Caregiver Present No   Cognition   Overall Cognitive Status Impaired   Arousal/Participation Alert   Orientation Level Oriented to person;Disoriented to place; Disoriented to time;Disoriented to situation   Memory Decreased short term memory;Decreased recall of recent events;Decreased recall of precautions   Following Commands Follows one step commands with increased time or repetition   Comments pt agreeable to PT evaluation   RLE Assessment   RLE Assessment   (grossly 3+/5)   LLE Assessment   LLE Assessment   (grossly 3+/5)   Coordination   Movements are Fluid and Coordinated 1   Sensation WFL   Bed Mobility   Supine to Sit 4  Minimal assistance   Additional items Assist x 1;HOB elevated; Increased time required;Verbal cues; Bedrails; Impulsive   Transfers   Sit to Stand 4  Minimal assistance   Additional items Assist x 1; Armrests; Increased time required;Verbal cues   Stand to Sit 4  Minimal assistance   Additional items Assist x 1; Armrests; Increased time required;Verbal cues   Ambulation/Elevation   Gait pattern Narrow PORFIRIO; Decreased foot clearance;Scissoring; Short stride; Step to;Excessively slow;Knees flexed  (degraded posture)   Gait Assistance 3  Moderate assist   Additional items Assist x 1; Tactile cues; Verbal cues  (for RW management/ sequencing)   Assistive Device Rolling walker   Distance 5'   Balance   Static Sitting Fair +   Dynamic Sitting Fair   Static Standing Poor +   Dynamic Standing Poor   Ambulatory Poor   Endurance Deficit   Endurance Deficit Yes   Activity Tolerance   Activity Tolerance Patient limited by fatigue   Nurse Made Aware KAREEM Stewart   Assessment   Prognosis Good   Problem List Decreased strength;Decreased endurance; Impaired balance;Decreased mobility; Decreased cognition;Decreased safety awareness   Assessment Pt is 80 y.o. female seen for PT evaluation on 7/1/2023 s/p admit to [facility] on 7/1/2023 w/ Acute CHF (congestive heart failure) ([facility]). PT was consulted to assess pt's functional mobility and d/c needs. Order placed for PT eval and tx. PTA, pt resides with niece in [facility], [address] [place] [address] to 2nd floor bedroom/bathroom, ambulates with walker, I with ADLs; pt questionable historian, LIANE to follow up. At time of eval, pt requiring min A for bed mobility and transfers, mod A For short gait trial from EOB to recliner with use of RW. Upon evaluation, pt presenting with impaired functional mobility d/t decreased strength, decreased endurance, impaired balance, decreased mobility, decreased cognition, decreased safety awareness and activity intolerance. Pertinent PMHx and current co-morbidities affecting pt's physical performance at time of assessment include: acute CHF, stage 3a CKD, A fib, dementia, lumbar radiculopathy, vascular dementia, RLS, ambulatory dysfunction, anxiety, allergic bronchitis. Personal factors affecting pt at time of eval include: lives in 2 story house, ambulating w/ assistive device, inability to navigate community distances and impulsivity. The following objective measures performed on IE also reveal limitations: Barthel Index: 30/100, Modified [place]: 4 (moderate/severe disability) and AM-PAC 6-Clicks: 27/30.  Pt's clinical presentation is currently unstable/unpredictable seen in pt's presentation of advanced age, abnormal lab value(s), need for input for task focus and mobility technique, ongoing medical assessment and on telemetry monitoring. Overall, pt's rehab potential and prognosis to return to PLOF is good as impacted by objective findings, warranting pt to receive further skilled PT interventions to address identified impairments, activity limitation(s), and participation restriction(s). Pt to benefit from continued PT tx to address deficits as defined above and maximize level of functional independent mobility. From PT/mobility standpoint, recommendation at time of d/c would be post acute rehabilitation services pending progress in order to facilitate return to PLOF. Barriers to Discharge Inaccessible home environment;Decreased caregiver support   Goals   Patient Goals none expressed   STG Expiration Date 07/11/23   Short Term Goal #1 In 7-10 days: Increase bilateral LE strength 1/2 grade to facilitate independent mobility, Perform all bed mobility tasks with distant S to decrease caregiver burden, Perform all transfers with close S to improve independence, Ambulate > 50 ft. with least restrictive assistive device with close S w/o LOB and w/ normalized gait pattern 100% of the time, Increase all balance 1/2 grade to decrease risk for falls and PT to see and establish goals for elevations/stairs when appropriate   PT Treatment Day 0   Plan   Treatment/Interventions Functional transfer training; Therapeutic exercise; Endurance training;Patient/family training;Equipment eval/education;Gait training;Bed mobility;Spoke to nursing;LE strengthening/ROM   PT Frequency 3-5x/wk   Recommendation   PT Discharge Recommendation Post acute rehabilitation services  (vs HHPT)   AM-PAC Basic Mobility Inpatient   Turning in Flat Bed Without Bedrails 3   Lying on Back to Sitting on Edge of Flat Bed Without Bedrails 3   Moving Bed to Chair 2   Standing Up From Chair Using Arms 3   Walk in Room 2   Climb 3-5 Stairs With Railing 1   Basic Mobility Inpatient Raw Score 14   Basic Mobility Standardized Score 35.55   Highest Level Of Mobility   [facility] Goal 4: Move to chair/commode   [facility] Achieved 4: Move to chair/commode   Modified [place] Scale   Modified [place] Scale 4   Barthel Index   Feeding 5   Bathing 0   Grooming Score 0   Dressing Score 5   Bladder Score 0   Bowels Score 5   Toilet Use Score 5   Transfers (Bed/Chair) Score 10   Mobility (Level Surface) Score 0   Stairs Score 0   Barthel Index Score 30         Leti Morales, PT, DPT

## 2023-07-01 NOTE — ED PROVIDER NOTES
History  Chief Complaint   Patient presents with   • Cough     Pt arrives via EMS from home c/o cough as per family that persists at night; was started on Prednisone by PCP; as per EMS pt was 89-90% on RA & en route pt received neb tx     Patient is a 75-year-old female with a past medical history significant for bladder/ureteral cancer, lung cancer, dementia, diabetes, hypertension, hypercholesterolemia, dependent edema presenting to the emergency department for evaluation of cough. History limited secondary to patient's baseline dementia. Patient states that she started coughing earlier today, however upon chart review and previous office visits when she was with her caregiver it appears that her cough has been ongoing for over 1 month. She saw her primary care provider last week who started her on a Medrol Dosepak. She presents today via EMS because her cough has been persistent and worse at night. Patient denying any chest pain, shortness of breath, abdominal pain, nausea, vomiting, diarrhea, fevers, chills. She does endorse leg swelling. No other complaints at this time. Prior to Admission Medications   Prescriptions Last Dose Informant Patient Reported? Taking? Misc. Devices (Mattress Cover) MISC  Self No No   Sig: Use daily   QUEtiapine (SEROquel) 25 mg tablet   No No   Sig: TAKE 1 TABLET BY MOUTH DAILY AT BEDTIME   acetaminophen (TYLENOL) 500 mg tablet  Self Yes No   Sig: Take 500 mg by mouth 2 (two) times a day   ergocalciferol (VITAMIN D2) 50,000 units   No No   Sig: Take 1 capsule (50,000 Units total) by mouth once a week   escitalopram (LEXAPRO) 5 mg tablet   No No   Sig: TAKE 1 TABLET (5 MG TOTAL) BY MOUTH DAILY.    furosemide (LASIX) 40 mg tablet   No No   Sig: Take 1 tablet (40 mg total) by mouth daily   gabapentin (NEURONTIN) 100 mg capsule  Self No No   Sig: Take 1 capsule (100 mg total) by mouth 3 (three) times a day   methylPREDNISolone 4 MG tablet therapy pack   No No   Sig: Use as directed on package   pramipexole (MIRAPEX) 1 mg tablet   No No   Sig: TAKE 1 TABLET BY MOUTH EVERY DAY      Facility-Administered Medications: None       Past Medical History:   Diagnosis Date   • Ankle fracture    • Arthritis     left hip   • 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HCC)     with left ureter   • Bronchitis    • Cancer (HCC)    • Carcinoma, lung (HCC)    • Dementia (HCC)    • Dependent edema    • Depression    • Diabetes mellitus ([address])    • Hypercholesterolemia    • Hypertension    • Kidney stone    • Oth abn and inconclusive findings on dx imaging of breast    • Pes planus    • Renal calculus    • Restless leg syndrome    • Rib pain    • Sprain, neck    • Varicose veins of left lower extremity        Past Surgical History:   Procedure Laterality Date   • APPENDECTOMY     • LUNG CANCER SURGERY         Family History   Problem Relation Age of Onset   • No Known Problems Mother    • No Known Problems Father    • Dementia Brother    • Breast cancer Neg Hx    • Colon cancer Neg Hx    • Ovarian cancer Neg Hx    • Uterine cancer Neg Hx    • Cervical cancer Neg Hx      I have reviewed and agree with the history as documented. E-Cigarette/Vaping     E-Cigarette/Vaping Substances     Social History     Tobacco Use   • Smoking status: Former     Packs/day: 0.50     Years: 20.00     Total pack years: 10.00     Types: Cigarettes   • Smokeless tobacco: Never   Substance Use Topics   • Alcohol use: Never   • Drug use: Never       Review of Systems   Unable to perform ROS: Dementia   Respiratory: Positive for cough. Cardiovascular: Positive for leg swelling. Physical Exam  Physical Exam  Vitals reviewed. Constitutional:       General: She is not in acute distress. Appearance: Normal appearance. She is normal weight. She is ill-appearing. She is not toxic-appearing or diaphoretic. HENT:      Head: Normocephalic and atraumatic.       Right Ear: External ear normal.      Left Ear: External ear normal.   Eyes: General: No scleral icterus. Right eye: No discharge. Left eye: No discharge. Extraocular Movements: Extraocular movements intact. Conjunctiva/sclera: Conjunctivae normal.   Cardiovascular:      Rate and Rhythm: Tachycardia present. Rhythm irregular. Heart sounds: Normal heart sounds. No murmur heard. No friction rub. No gallop. Pulmonary:      Effort: Pulmonary effort is normal. No respiratory distress. Breath sounds: No stridor. Rales (scattered) present. No wheezing or rhonchi. Abdominal:      General: Abdomen is flat. Palpations: Abdomen is soft. Tenderness: There is no abdominal tenderness. There is no guarding or rebound. Musculoskeletal:      Cervical back: Normal range of motion and neck supple. Right lower leg: Edema (2+ pitting) present. Left lower leg: Edema (2+ pitting) present. Skin:     General: Skin is warm and dry. Capillary Refill: Capillary refill takes less than 2 seconds. Neurological:      Mental Status: She is alert and oriented to person, place, and time.    Psychiatric:         Mood and Affect: Mood normal.         Behavior: Behavior normal.         Vital Signs  ED Triage Vitals   Temperature Pulse Respirations Blood Pressure SpO2   07/01/23 0042 07/01/23 0042 07/01/23 0042 07/01/23 0042 07/01/23 0042   99.3 °F (37.4 °C) 99 20 168/92 94 %      Temp Source Heart Rate Source Patient Position - Orthostatic VS BP Location FiO2 (%)   07/01/23 0042 07/01/23 0230 07/05/23 0649 07/05/23 0649 --   Oral Monitor Lying Left arm       Pain Score       07/01/23 0500       No Pain           Vitals:    07/05/23 0649 07/05/23 1536 07/05/23 2210 07/06/23 0654   BP: 126/64 129/67 111/75 117/70   Pulse: (!) 107 102 91 100   Patient Position - Orthostatic VS: Lying Lying           Visual Acuity      ED Medications  Medications   gabapentin (NEURONTIN) capsule 100 mg (100 mg Oral Given 7/6/23 0806)   QUEtiapine (SEROquel) tablet 25 mg (25 mg Oral Given 7/5/23 2245)   heparin (porcine) subcutaneous injection 5,000 Units (5,000 Units Subcutaneous Given 7/6/23 0608)   escitalopram (LEXAPRO) tablet 5 mg (5 mg Oral Given 7/6/23 0806)   guaiFENesin (MUCINEX) 12 hr tablet 600 mg (600 mg Oral Given 7/6/23 0806)   ipratropium (ATROVENT) 0.02 % inhalation solution 0.5 mg (has no administration in time range)   dextromethorphan-guaiFENesin (ROBITUSSIN DM) oral syrup 10 mL (10 mL Oral Given 7/5/23 2244)   furosemide (LASIX) tablet 40 mg (40 mg Oral Given 7/6/23 0806)   cefTRIAXone (ROCEPHIN) IVPB (premix in dextrose) 1,000 mg 50 mL (1,000 mg Intravenous New Bag 7/1/23 0328)   iohexol (OMNIPAQUE) 350 MG/ML injection (MULTI-DOSE) 85 mL (85 mL Intravenous Given 7/3/23 1717)       Diagnostic Studies  Results Reviewed     Procedure Component Value Units Date/Time    Blood culture #1 [[id number]] Collected: 07/01/23 0328    Lab Status: Final result Specimen: Blood from Arm, Left Updated: 07/06/23 1201     Blood Culture No Growth After 5 Days. Blood culture #2 [[id number]] Collected: 07/01/23 0206    Lab Status: Final result Specimen: Blood from Arm, Left Updated: 07/06/23 0921     Blood Culture No Growth After 5 Days.     Basic metabolic panel [[id number]]  (Abnormal) Collected: 07/02/23 0518    Lab Status: Final result Specimen: Blood from Arm, Right Updated: 07/02/23 5191     Sodium 137 mmol/L      Potassium 3.7 mmol/L      Chloride 100 mmol/L      CO2 31 mmol/L      ANION GAP 6 mmol/L      BUN 27 mg/dL      Creatinine 1.27 mg/dL      Glucose 97 mg/dL      Calcium 8.0 mg/dL      eGFR 35 ml/min/1.73sq m     Narrative:      [place] guidelines for Chronic Kidney Disease (CKD):   •  Stage 1 with normal or high GFR (GFR > 90 mL/min/1.73 square meters)  •  Stage 2 Mild CKD (GFR = 60-89 mL/min/1.73 square meters)  •  Stage 3A Moderate CKD (GFR = 45-59 mL/min/1.73 square meters)  •  Stage 3B Moderate CKD (GFR = 30-44 mL/min/1.73 square meters)  • Stage 4 Severe CKD (GFR = 15-29 mL/min/1.73 square meters)  •  Stage 5 End Stage CKD (GFR <15 mL/min/1.73 square meters)  Note: GFR calculation is accurate only with a steady state creatinine    HS Troponin I 4hr [[id number]]  (Normal) Collected: 07/01/23 0810    Lab Status: Final result Specimen: Blood from Arm, Left Updated: 07/01/23 0843     hs TnI 4hr 18 ng/L      Delta 4hr hsTnI 1 ng/L     CBC and differential [[id number]]  (Abnormal) Collected: 07/01/23 0810    Lab Status: Final result Specimen: Blood from Arm, Left Updated: 07/01/23 0818     WBC 10.28 Thousand/uL      RBC 4.46 Million/uL      Hemoglobin 12.0 g/dL      Hematocrit 38.4 %      MCV 86 fL      MCH 26.9 pg      MCHC 31.3 g/dL      RDW 14.7 %      MPV 9.3 fL      Platelets 922 Thousands/uL      nRBC 0 /100 WBCs      Neutrophils Relative 73 %      Immat GRANS % 1 %      Lymphocytes Relative 11 %      Monocytes Relative 11 %      Eosinophils Relative 4 %      Basophils Relative 0 %      Neutrophils Absolute 7.52 Thousands/µL      Immature Grans Absolute 0.05 Thousand/uL      Lymphocytes Absolute 1.14 Thousands/µL      Monocytes Absolute 1.14 Thousand/µL      Eosinophils Absolute 0.39 Thousand/µL      Basophils Absolute 0.04 Thousands/µL     HS Troponin I 2hr [[id number]]  (Normal) Collected: 07/01/23 0619    Lab Status: Final result Specimen: Blood from Arm, Right Updated: 07/01/23 0705     hs TnI 2hr 19 ng/L      Delta 2hr hsTnI 2 ng/L     Procalcitonin [[id number]]  (Normal) Collected: 07/01/23 0206    Lab Status: Final result Specimen: Blood from Arm, Left Updated: 07/01/23 0307     Procalcitonin 0.08 ng/ml     FLU/RSV/COVID - if FLU/RSV clinically relevant [[id number]]  (Normal) Collected: 07/01/23 0158    Lab Status: Final result Specimen: Nares from Nose Updated: 07/01/23 0258     SARS-CoV-2 Negative     INFLUENZA A PCR Negative     INFLUENZA B PCR Negative     RSV PCR Negative    Narrative:      FOR PEDIATRIC PATIENTS - copy/paste COVID Guidelines URL to browser: [email or web address]    SARS-CoV-2 assay is a Nucleic Acid Amplification assay intended for the  qualitative detection of nucleic acid from SARS-CoV-2 in nasopharyngeal  swabs. Results are for the presumptive identification of SARS-CoV-2 RNA. Positive results are indicative of infection with SARS-CoV-2, the virus  causing COVID-19, but do not rule out bacterial infection or co-infection  with other viruses. Laboratories within the [facility] and its  territories are required to report all positive results to the appropriate  public health authorities. Negative results do not preclude SARS-CoV-2  infection and should not be used as the sole basis for treatment or other  patient management decisions. Negative results must be combined with  clinical observations, patient history, and epidemiological information. This test has not been FDA cleared or approved. This test has been authorized by FDA under an Emergency Use Authorization  (EUA). This test is only authorized for the duration of time the  declaration that circumstances exist justifying the authorization of the  emergency use of an in vitro diagnostic tests for detection of SARS-CoV-2  virus and/or diagnosis of COVID-19 infection under section 564(b)(1) of  the Act, 21 U. S.C. 119ADT-2(L)(6), unless the authorization is terminated  or revoked sooner. The test has been validated but independent review by FDA  and CLIA is pending. Test performed using [employer]: This RT-PCR assay targets N2,  a region unique to SARS-CoV-2. A conserved region in the E-gene was chosen  for pan-Sarbecovirus detection which includes SARS-CoV-2. According to CMS-2020-01-R, this platform meets the definition of high-throughput technology.     APTT [[id number]]  (Normal) Collected: 07/01/23 0206    Lab Status: Final result Specimen: Blood from Arm, Left Updated: 07/01/23 0249     PTT 34 seconds Protime-INR [[id number]]  (Abnormal) Collected: 07/01/23 0206    Lab Status: Final result Specimen: Blood from Arm, Left Updated: 07/01/23 0249     Protime 15.2 seconds      INR 1.22    HS Troponin 0hr (reflex protocol) [[id number]]  (Normal) Collected: 07/01/23 0206    Lab Status: Final result Specimen: Blood from Arm, Left Updated: 07/01/23 0247     hs TnI 0hr 17 ng/L     B-Type Natriuretic Peptide(BNP) [[id number]]  (Abnormal) Collected: 07/01/23 0206    Lab Status: Final result Specimen: Blood from Arm, Left Updated: 07/01/23 0246      pg/mL     Comprehensive metabolic panel [[id number]]  (Abnormal) Collected: 07/01/23 0206    Lab Status: Final result Specimen: Blood from Arm, Left Updated: 07/01/23 0242     Sodium 137 mmol/L      Potassium 4.0 mmol/L      Chloride 102 mmol/L      CO2 27 mmol/L      ANION GAP 8 mmol/L      BUN 23 mg/dL      Creatinine 1.11 mg/dL      Glucose 127 mg/dL      Calcium 8.1 mg/dL      Corrected Calcium 8.7 mg/dL      AST 21 U/L      ALT 18 U/L      Alkaline Phosphatase 115 U/L      Total Protein 6.6 g/dL      Albumin 3.3 g/dL      Total Bilirubin 0.79 mg/dL      eGFR 41 ml/min/1.73sq m     Narrative:      [facility] guidelines for Chronic Kidney Disease (CKD):   •  Stage 1 with normal or high GFR (GFR > 90 mL/min/1.73 square meters)  •  Stage 2 Mild CKD (GFR = 60-89 mL/min/1.73 square meters)  •  Stage 3A Moderate CKD (GFR = 45-59 mL/min/1.73 square meters)  •  Stage 3B Moderate CKD (GFR = 30-44 mL/min/1.73 square meters)  •  Stage 4 Severe CKD (GFR = 15-29 mL/min/1.73 square meters)  •  Stage 5 End Stage CKD (GFR <15 mL/min/1.73 square meters)  Note: GFR calculation is accurate only with a steady state creatinine    Lactic acid [[id number]]  (Normal) Collected: 07/01/23 0206    Lab Status: Final result Specimen: Blood from Arm, Left Updated: 07/01/23 0242     LACTIC ACID 1.3 mmol/L     Narrative:      Result may be elevated if tourniquet was used during collection. Blood gas, Venous [[id number]]  (Abnormal) Collected: 07/01/23 0206    Lab Status: Final result Specimen: Blood from Arm, Left Updated: 07/01/23 0223     pH, Arvin 7.423     pCO2, Arvin 47.6 mm Hg      pO2, Arvin 42.5 mm Hg      HCO3, Arvin 30.4 mmol/L      Base Excess, Arvin 5.1 mmol/L      O2 Content, Arvin 13.5 ml/dL      O2 HGB, VENOUS 77.8 %     CBC and differential [[id number]]  (Abnormal) Collected: 07/01/23 0206    Lab Status: Final result Specimen: Blood from Arm, Left Updated: 07/01/23 0222     WBC 10.63 Thousand/uL      RBC 4.12 Million/uL      Hemoglobin 11.2 g/dL      Hematocrit 35.4 %      MCV 86 fL      MCH 27.2 pg      MCHC 31.6 g/dL      RDW 14.6 %      MPV 9.9 fL      Platelets 304 Thousands/uL      nRBC 0 /100 WBCs      Neutrophils Relative 77 %      Immat GRANS % 0 %      Lymphocytes Relative 8 %      Monocytes Relative 11 %      Eosinophils Relative 4 %      Basophils Relative 0 %      Neutrophils Absolute 8.09 Thousands/µL      Immature Grans Absolute 0.04 Thousand/uL      Lymphocytes Absolute 0.80 Thousands/µL      Monocytes Absolute 1.20 Thousand/µL      Eosinophils Absolute 0.46 Thousand/µL      Basophils Absolute 0.04 Thousands/µL                  CTA chest pe study   Final Result by Vani Cash MD (39/42 1855)   1. No pulmonary artery emboli. Prominent main pulmonary artery suggesting underlying pulmonary artery hypertension. 2. Cardiomegaly with dilated right ventricle, right atrium and left atrium. 3. Prominent subpleural interstitial lung densities with scattered ground glass opacities may represent an acute on chronic inflammatory/infectious process. Short-term CT follow-up may be considered if clinically warranted. 4. Shotty left supraclavicular nodes, nonspecific, possibly just reactive. Attention on follow-up.                      Workstation performed: [id number]         XR chest portable   Final Result by Yvette Souza MD (07/01 1557)      Mild pulmonary edema with trace left effusion. Workstation performed: [id number]                    Procedures  Procedures         ED Course                               SBIRT 22yo+    Flowsheet Row Most Recent Value   Initial Alcohol Screen: US AUDIT-C     1. How often do you have a drink containing alcohol? 0 Filed at: 07/01/2023 0044   2. How many drinks containing alcohol do you have on a typical day you are drinking? 0 Filed at: 07/01/2023 0044   3a. Male UNDER 65: How often do you have five or more drinks on one occasion? 0 Filed at: 07/01/2023 0044   3b. FEMALE Any Age, or MALE 65+: How often do you have 4 or more drinks on one occassion? 0 Filed at: 07/01/2023 0044   Audit-C Score 0 Filed at: 07/01/2023 0353   ARNULFO: How many times in the past year have you. .. Used an illegal drug or used a prescription medication for non-medical reasons? Never Filed at: 07/01/2023 0044                    Medical Decision Making  Patient presenting for evaluation of persistent cough over the last month. Upon arrival patient is chronically ill-appearing. She does not appear to be in any acute distress. Vital signs are unremarkable. On examination patient has 2+ pitting edema to her bilateral lower extremities and scattered rales. She is tachycardic with an irregular rhythm. Extensive laboratory evaluation reveals elevated BNP, mild leukocytosis. Troponin normal.  Patient was in and out of atrial fibrillation with RVR throughout her emergency department stay. PE study was obtained with findings consistent with possible pulmonary hypertension, cardiomegaly, interstitial lung densities with scattered groundglass appearance which may represent inflammatory/infectious process. Patient was given 1 dose of Rocephin in the emergency department. She was also given 1 dose of intravenous Lasix for pulmonary edema/lower extremity edema. She was admitted to medicine for further evaluation and management.   Currently in stable condition. Acute exacerbation of CHF (congestive heart failure) ([address]): acute illness or injury  Amount and/or Complexity of Data Reviewed  Labs: ordered. Radiology: ordered. Risk  Prescription drug management. Decision regarding hospitalization. Disposition  Final diagnoses:   Subacute cough   Acute exacerbation of CHF (congestive heart failure) ([address])     Time reflects when diagnosis was documented in both MDM as applicable and the Disposition within this note     Time User Action Codes Description Comment    7/1/2023  3:08 AM Elesa Pedroza Add [R05.2] Subacute cough     7/1/2023  3:08 AM Elesa Pedroza Add [I50.9] Acute exacerbation of CHF (congestive heart failure) ([address])     7/6/2023  9:25 AM Shobha Garcia Add [I50.9] Chronic congestive heart failure, unspecified heart failure type [facility])       ED Disposition     ED Disposition   Admit    Condition   Stable    Date/Time   Sat Jul 1, 2023  3:08 AM    Comment   Case was discussed with TONY and the patient's admission status was agreed to be Admission Status: inpatient status to the service of Dr. Courtney Everett .            MD Documentation    [address] Name, [address] [address] [address], [address]      RN Documentation    Flowsheet Row Most [address] Name, [address] [address] [address], [address]   Transfer Date 07/06/23   Transfer Time 0230      Follow-up Information     Follow up With Specialties Details Why Contact Info    [facility], Nurse Practitioner Follow up  [address]   [address] [place] [address] Road  [address] [place] [address]  [phone]            Discharge Medication List as of 7/6/2023  1:34 PM      CONTINUE these medications which have CHANGED    Details   furosemide (LASIX) 40 mg tablet Take 40 mg in the morning and 20 mg in the afternoon, Normal CONTINUE these medications which have NOT CHANGED    Details   QUEtiapine (SEROquel) 25 mg tablet TAKE 1 TABLET BY MOUTH DAILY AT BEDTIME, Normal      acetaminophen (TYLENOL) 500 mg tablet Take 500 mg by mouth 2 (two) times a day, Historical Med      ergocalciferol (VITAMIN D2) 50,000 units Take 1 capsule (50,000 Units total) by mouth once a week, Starting Thu 4/27/2023, Until Tue 10/24/2023, Normal      escitalopram (LEXAPRO) 5 mg tablet TAKE 1 TABLET (5 MG TOTAL) BY MOUTH DAILY. , Starting Thu 5/11/2023, Until Wed 8/9/2023, Normal      gabapentin (NEURONTIN) 100 mg capsule Take 1 capsule (100 mg total) by mouth 3 (three) times a day, Starting Mon 12/13/2021, Until Fri 3/31/2023, Normal      Misc. Devices (Mattress Cover) MISC Use daily, Starting Tue 3/22/2022, Print      pramipexole (MIRAPEX) 1 mg tablet TAKE 1 TABLET BY MOUTH EVERY DAY, Normal         STOP taking these medications       methylPREDNISolone 4 MG tablet therapy pack Comments:   Reason for Stopping:               Outpatient Discharge Orders   ANGIE Pathway:  Medications to avoid: phosphate or magnesium based laxatives, NSAIDs     ANGIE Pathway: Maintain SBP between 120-140 mm/Hg     ANGIE Pathway: Call Nursing Home MD for decreased oral intake     ANGIE Pathway: Daily Weights     ANGIE Pathway: Strict I&O     ANGIE Pathway:  Follow up bloodwork     Activity:  As Tolerated       PDMP Review     None          ED Provider  Electronically Signed by           Concepcion Goldman PA-C  07/06/23 9449

## 2023-07-01 NOTE — ASSESSMENT & PLAN NOTE
Wt Readings from Last 3 Encounters:   06/20/23 66.2 kg (146 lb)   06/06/23 64.9 kg (143 lb)   03/31/23 61 kg (134 lb 6.4 oz)     Patient reports worsening cough and leg swelling over the past several days, prompting ED visit. Due to patient's dementia history ROS is limited, however per chart review it appears cough has been an ongoing issue for around 1 month. Currently patient denies any other symptoms/complaints.     /59   Pulse (!) 106   Temp 99.3 °F (37.4 °C) (Oral)   Resp 20   SpO2 96%     · Reporting cough over the past several days  · CXR wet read suggestive of vascular congestion  · Has +2 BL LE pitting edema   ·   · Trop 17  · 12/2022 ECHO EF 61%   · Not currently meeting SEPSIS criteria; received IV rocephin in ED, will hold further dose for now pending remainder of infectious lab workup   · Lasix 40mg IV BID ordered  · Low sodium/fluid restricted diet   · Daily standing scale weight and I/O monitoring

## 2023-07-01 NOTE — H&P
[address]  H&P  Name: Sandhya Ghotra 80 y.o. female I MRN: [id number]  Unit/Bed#: ED 21 I Date of Admission: 7/1/2023   Date of Service: 7/1/2023 I Hospital Day: 0      Assessment/Plan   * Acute CHF (congestive heart failure) (HCC)  Assessment & Plan  Wt Readings from Last 3 Encounters:   06/20/23 66.2 kg (146 lb)   06/06/23 64.9 kg (143 lb)   03/31/23 61 kg (134 lb 6.4 oz)     Patient reports worsening cough and leg swelling over the past several days, prompting ED visit. Due to patient's dementia history ROS is limited, however per chart review it appears cough has been an ongoing issue for around 1 month. She was previously seen by her PCP who felt symptoms were due to bronchitis and prescribed a medrol dose pack. Currently patient denies any other symptoms/complaints.     /59   Pulse (!) 106   Temp 99.3 °F (37.4 °C) (Oral)   Resp 20   SpO2 96%     · Reporting cough over the past several days  · CXR wet read suggestive of vascular congestion  · Has +2 BL LE pitting edema   ·   · Trop 17  · 12/2022 ECHO EF 61%   · Not currently meeting SEPSIS criteria; received IV rocephin in ED, will hold further dose for now pending remainder of infectious lab workup   · Lasix 40mg IV BID ordered  · Low sodium/fluid restricted diet   · Daily standing scale weight and I/O monitoring     Chronic atrial fibrillation (HCC)  Assessment & Plan  · HR borderline tachycardic while in ED   · EKG showing Afib   · Not currently on outpatient rate control or AC   · Acute CHF possibly contributing; to monitor HR while volume overload is reduced for now  · Tele monitoring ordered   · PRN rate control agents as appropriate     Dementia (HCC)  Assessment & Plan  · Pleasant and compliant   · Monitor mentation  · Delirium precautions     Stage 3a chronic kidney disease [facility])  Assessment & Plan  Lab Results   Component Value Date    EGFR 41 07/01/2023    EGFR 40 06/13/2023    EGFR 32 01/24/2023 CREATININE 1.11 07/01/2023    CREATININE 1.13 06/13/2023    CREATININE 1.36 (H) 01/24/2023     · Creatinine appears around baseline   · Avoid nephrotoxic agents   · AM BMP    VTE Pharmacologic Prophylaxis: VTE Score: 5 High Risk (Score >/= 5) - Pharmacological DVT Prophylaxis Ordered: heparin. Sequential Compression Devices Ordered. Code Status: Level 1 - Full Code   Discussion with family: update in AM.     Anticipated Length of Stay: Patient will be admitted on an inpatient basis with an anticipated length of stay of greater than 2 midnights secondary to acute CHF . Total Time Spent on Date of Encounter in care of patient: 65 minutes This time was spent on one or more of the following: performing physical exam; counseling and coordination of care; obtaining or reviewing history; documenting in the medical record; reviewing/ordering tests, medications or procedures; communicating with other healthcare professionals and discussing with patient's family/caregivers. Chief Complaint: cough    History of Present Illness:  Chelsea Gallegos is a 80 y.o. female with a PMH of Afib, CHF, CKD, and dementia who presents with cough. Patient reports worsening cough and leg swelling over the past several days, prompting ED visit. Due to patient's dementia history ROS is limited, however per chart review it appears cough has been an ongoing issue for around 1 month. She was previously seen by her PCP who felt symptoms were due to bronchitis and prescribed a medrol dose pack. Currently patient denies any other symptoms/complaints. All questions answered at the bedside to the best of my ability. Review of Systems:  Review of Systems   Unable to perform ROS: Dementia   Respiratory: Positive for cough. Cardiovascular: Positive for leg swelling.        Past Medical and Surgical History:   Past Medical History:   Diagnosis Date   • Ankle fracture    • Arthritis     left hip   • Bladder cancer [facility])     with left ureter   • Bronchitis    • Cancer ([address])    • Carcinoma, lung ([address])    • Dementia ([address])    • Dependent edema    • Depression    • Diabetes mellitus ([address])    • Hypercholesterolemia    • Hypertension    • Kidney stone    • Oth abn and inconclusive findings on dx imaging of breast    • Pes planus    • Renal calculus    • Restless leg syndrome    • Rib pain    • Sprain, neck    • Varicose veins of left lower extremity        Past Surgical History:   Procedure Laterality Date   • APPENDECTOMY     • LUNG CANCER SURGERY         Meds/Allergies:  Prior to Admission medications    Medication Sig Start Date End Date Taking? Authorizing Provider   QUEtiapine (SEROquel) 25 mg tablet TAKE 1 TABLET BY MOUTH DAILY AT BEDTIME 6/16/23   Brit SensingFARZANA   acetaminophen (TYLENOL) 500 mg tablet Take 500 mg by mouth 2 (two) times a day    Historical Provider, MD   ergocalciferol (VITAMIN D2) 50,000 units Take 1 capsule (50,000 Units total) by mouth once a week 4/27/23 10/24/23  Brit SensingFARZANA   escitalopram (LEXAPRO) 5 mg tablet TAKE 1 TABLET (5 MG TOTAL) BY MOUTH DAILY. 5/11/23 8/9/23  Brit SensingFARZANA   furosemide (LASIX) 40 mg tablet Take 1 tablet (40 mg total) by mouth daily 5/11/23   Brit Sensing, FARZANA   gabapentin (NEURONTIN) 100 mg capsule Take 1 capsule (100 mg total) by mouth 3 (three) times a day 12/13/21 3/31/23  Michell Mo MD   methylPREDNISolone 4 MG tablet therapy pack Use as directed on package 6/20/23   Brit Sensing, [address] [place]. Devices (Mattress Cover) MISC Use daily 3/22/22   Brit SensingFARZANA   pramipexole (MIRAPEX) 1 mg tablet TAKE 1 TABLET BY MOUTH EVERY DAY 1/6/23   Brit SensingFARZANA     I have reviewed home medications using recent Epic encounter. Allergies:    Allergies   Allergen Reactions   • Albuterol    • Aldactone [Spironolactone]    • Aspirin    • Atenolol    • Bactrim [Sulfamethoxazole-Trimethoprim]    • Codeine    • Hydrocodone    • Ibuprofen    • Lisinopril    • Mevacor [Lovastatin]    • Mirapex [Pramipexole]    • Morphine And Related    • Other      novacaine   • Penicillins    • Procaine    • Salicylates    • Ultram [Tramadol]    • Zoloft [Sertraline]      Night sweats       Social History:  Marital Status:    Occupation: N/A  Patient Pre-hospital Living Situation: Home  Patient Pre-hospital Level of Mobility: unable to be assessed at time of evaluation  Patient Pre-hospital Diet Restrictions: None  Substance Use History:   Social History     Substance and Sexual Activity   Alcohol Use Never     Social History     Tobacco Use   Smoking Status Former   • Packs/day: 0.50   • Years: 20.00   • Total pack years: 10.00   • Types: Cigarettes   Smokeless Tobacco Never     Social History     Substance and Sexual Activity   Drug Use Never       Family History:  Family History   Problem Relation Age of Onset   • No Known Problems Mother    • No Known Problems Father    • Dementia Brother    • Breast cancer Neg Hx    • Colon cancer Neg Hx    • Ovarian cancer Neg Hx    • Uterine cancer Neg Hx    • Cervical cancer Neg Hx        Physical Exam:     Vitals:   Blood Pressure: 111/59 (07/01/23 0230)  Pulse: (!) 106 (07/01/23 0230)  Temperature: 99.3 °F (37.4 °C) (07/01/23 0042)  Temp Source: Oral (07/01/23 0042)  Respirations: 20 (07/01/23 0230)  SpO2: 96 % (07/01/23 0230)    Physical Exam  Vitals and nursing note reviewed. Constitutional:       General: She is not in acute distress. Appearance: Normal appearance. HENT:      Head: Normocephalic and atraumatic. Right Ear: External ear normal.      Left Ear: External ear normal.      Nose: Nose normal.      Mouth/Throat:      Mouth: Mucous membranes are moist.   Eyes:      Pupils: Pupils are equal, round, and reactive to light. Cardiovascular:      Rate and Rhythm: Normal rate and regular rhythm. Pulses: Normal pulses. Heart sounds: Normal heart sounds. No murmur heard.   Pulmonary:      Effort: Pulmonary effort is normal. No respiratory distress. Breath sounds: Normal breath sounds. No wheezing or rales. Chest:      Chest wall: No tenderness. Abdominal:      General: Bowel sounds are normal. There is no distension. Palpations: Abdomen is soft. There is no mass. Tenderness: There is no abdominal tenderness. There is no guarding. Musculoskeletal:         General: No swelling or tenderness. Cervical back: Normal range of motion and neck supple. No rigidity or tenderness. Right lower leg: Edema (+2 pitting ) present. Left lower leg: Edema (+2 pitting ) present. Skin:     General: Skin is warm and dry. Capillary Refill: Capillary refill takes less than 2 seconds. Findings: No lesion or rash. Neurological:      General: No focal deficit present. Mental Status: She is alert.    Psychiatric:         Mood and Affect: Mood normal.          Additional Data:     Lab Results:  Results from last 7 days   Lab Units 07/01/23  0206   WBC Thousand/uL 10.63*   HEMOGLOBIN g/dL 11.2*   HEMATOCRIT % 35.4   PLATELETS Thousands/uL 286   NEUTROS PCT % 77*   LYMPHS PCT % 8*   MONOS PCT % 11   EOS PCT % 4     Results from last 7 days   Lab Units 07/01/23  0206   SODIUM mmol/L 137   POTASSIUM mmol/L 4.0   CHLORIDE mmol/L 102   CO2 mmol/L 27   BUN mg/dL 23   CREATININE mg/dL 1.11   ANION GAP mmol/L 8   CALCIUM mg/dL 8.1*   ALBUMIN g/dL 3.3*   TOTAL BILIRUBIN mg/dL 0.79   ALK PHOS U/L 115*   ALT U/L 18   AST U/L 21   GLUCOSE RANDOM mg/dL 127     Results from last 7 days   Lab Units 07/01/23  0206   INR  1.22*             Results from last 7 days   Lab Units 07/01/23  0206   LACTIC ACID mmol/L 1.3   PROCALCITONIN ng/ml 0.08       Lines/Drains:  Invasive Devices     Peripheral Intravenous Line  Duration           Peripheral IV 07/01/23 Left Antecubital <1 day                    Imaging: Personally reviewed the following imaging: chest xray  XR chest portable    (Results Pending)       EKG and Other Studies Reviewed on Admission:   · EKG: EKG reviewed . ** Please Note: This note has been constructed using a voice recognition system.  **

## 2023-07-01 NOTE — ASSESSMENT & PLAN NOTE
Lab Results   Component Value Date    EGFR 40 06/13/2023    EGFR 32 01/24/2023    EGFR 33 08/30/2022    CREATININE 1.13 06/13/2023    CREATININE 1.36 (H) 01/24/2023    CREATININE 1.33 (H) 08/30/2022     · Creatinine appears around baseline   · Avoid nephrotoxic agents   · AM BMP

## 2023-07-01 NOTE — ASSESSMENT & PLAN NOTE
Wt Readings from Last 3 Encounters:   06/20/23 66.2 kg (146 lb)   06/06/23 64.9 kg (143 lb)   03/31/23 61 kg (134 lb 6.4 oz)     · Appears euvolemic on admission  · Continue home lasix   · Monitor for s/s of volume retention

## 2023-07-01 NOTE — ASSESSMENT & PLAN NOTE
Wt Readings from Last 3 Encounters:   06/20/23 66.2 kg (146 lb)   06/06/23 64.9 kg (143 lb)   03/31/23 61 kg (134 lb 6.4 oz)     Patient reports worsening cough and leg swelling over the past several days, prompting ED visit. Due to patient's dementia history ROS is limited, however per chart review it appears cough has been an ongoing issue for around 1 month. She was previously seen by her PCP who felt symptoms were due to bronchitis and prescribed a medrol dose pack. Currently patient denies any other symptoms/complaints. improved  · Reporting cough over the past several days  · CXR wet read suggestive of vascular congestion  ·   · Trop 17  · 12/2022 ECHO EF 61%   · Not currently meeting SEPSIS criteria; received IV rocephin in ED, will hold further dose for now pending remainder of infectious lab workup   · Transition to po lasix 40 mg bid  · Low sodium/fluid restricted diet   · Daily standing scale weight and I/O monitoring

## 2023-07-01 NOTE — PLAN OF CARE
Problem: Potential for Falls  Goal: Patient will remain free of falls  Description: INTERVENTIONS:  - Educate patient/family on patient safety including physical limitations  - Instruct patient to call for assistance with activity   - Consult OT/PT to assist with strengthening/mobility   - Keep Call bell within reach  - Keep bed low and locked with side rails adjusted as appropriate  - Keep care items and personal belongings within reach  - Initiate and maintain comfort rounds  - Make Fall Risk Sign visible to staff  - Offer Toileting every  Hours, in advance of need  - Initiate/Maintain alarm  - Obtain necessary fall risk management equipment:   - Apply yellow socks and bracelet for high fall risk patients  - Consider moving patient to room near nurses station  Outcome: Progressing     Problem: MOBILITY - ADULT  Goal: Maintain or return to baseline ADL function  Description: INTERVENTIONS:  -  Assess patient's ability to carry out ADLs; assess patient's baseline for ADL function and identify physical deficits which impact ability to perform ADLs (bathing, care of mouth/teeth, toileting, grooming, dressing, etc.)  - Assess/evaluate cause of self-care deficits   - Assess range of motion  - Assess patient's mobility; develop plan if impaired  - Assess patient's need for assistive devices and provide as appropriate  - Encourage maximum independence but intervene and supervise when necessary  - Involve family in performance of ADLs  - Assess for home care needs following discharge   - Consider OT consult to assist with ADL evaluation and planning for discharge  - Provide patient education as appropriate  Outcome: Progressing  Goal: Maintains/Returns to pre admission functional level  Description: INTERVENTIONS:  - Perform BMAT or MOVE assessment daily.   - Set and communicate daily mobility goal to care team and patient/family/caregiver.    - Collaborate with rehabilitation services on mobility goals if consulted  - Perform Range of Motion  times a day. - Reposition patient every  hours.   - Dangle patient  times a day  - Stand patient  times a day  - Ambulate patient  times a day  - Out of bed to chair  times a day   - Out of bed for meal times a day  - Out of bed for toileting  - Record patient progress and toleration of activity level   Outcome: Progressing

## 2023-07-02 LAB
ANION GAP SERPL CALCULATED.3IONS-SCNC: 6 MMOL/L
BUN SERPL-MCNC: 27 MG/DL (ref 5–25)
CALCIUM SERPL-MCNC: 8 MG/DL (ref 8.4–10.2)
CHLORIDE SERPL-SCNC: 100 MMOL/L (ref 96–108)
CO2 SERPL-SCNC: 31 MMOL/L (ref 21–32)
CREAT SERPL-MCNC: 1.27 MG/DL (ref 0.6–1.3)
ERYTHROCYTE [DISTWIDTH] IN BLOOD BY AUTOMATED COUNT: 14.6 % (ref 11.6–15.1)
GFR SERPL CREATININE-BSD FRML MDRD: 35 ML/MIN/1.73SQ M
GLUCOSE SERPL-MCNC: 97 MG/DL (ref 65–140)
HCT VFR BLD AUTO: 36 % (ref 34.8–46.1)
HGB BLD-MCNC: 11.4 G/DL (ref 11.5–15.4)
MAGNESIUM SERPL-MCNC: 2.1 MG/DL (ref 1.9–2.7)
MCH RBC QN AUTO: 26.5 PG (ref 26.8–34.3)
MCHC RBC AUTO-ENTMCNC: 31.7 G/DL (ref 31.4–37.4)
MCV RBC AUTO: 84 FL (ref 82–98)
PLATELET # BLD AUTO: 282 THOUSANDS/UL (ref 149–390)
PMV BLD AUTO: 10 FL (ref 8.9–12.7)
POTASSIUM SERPL-SCNC: 3.7 MMOL/L (ref 3.5–5.3)
RBC # BLD AUTO: 4.3 MILLION/UL (ref 3.81–5.12)
SODIUM SERPL-SCNC: 137 MMOL/L (ref 135–147)
WBC # BLD AUTO: 10.34 THOUSAND/UL (ref 4.31–10.16)

## 2023-07-02 PROCEDURE — 99233 SBSQ HOSP IP/OBS HIGH 50: CPT | Performed by: STUDENT IN AN ORGANIZED HEALTH CARE EDUCATION/TRAINING PROGRAM

## 2023-07-02 PROCEDURE — 94760 N-INVAS EAR/PLS OXIMETRY 1: CPT

## 2023-07-02 PROCEDURE — 94664 DEMO&/EVAL PT USE INHALER: CPT

## 2023-07-02 PROCEDURE — 83735 ASSAY OF MAGNESIUM: CPT | Performed by: STUDENT IN AN ORGANIZED HEALTH CARE EDUCATION/TRAINING PROGRAM

## 2023-07-02 PROCEDURE — 85027 COMPLETE CBC AUTOMATED: CPT | Performed by: STUDENT IN AN ORGANIZED HEALTH CARE EDUCATION/TRAINING PROGRAM

## 2023-07-02 PROCEDURE — 80048 BASIC METABOLIC PNL TOTAL CA: CPT

## 2023-07-02 RX ORDER — FUROSEMIDE 40 MG/1
40 TABLET ORAL
Status: DISCONTINUED | OUTPATIENT
Start: 2023-07-03 | End: 2023-07-06 | Stop reason: HOSPADM

## 2023-07-02 RX ADMIN — GUAIFENESIN 600 MG: 600 TABLET ORAL at 22:57

## 2023-07-02 RX ADMIN — ESCITALOPRAM OXALATE 5 MG: 10 TABLET ORAL at 08:45

## 2023-07-02 RX ADMIN — GABAPENTIN 100 MG: 100 CAPSULE ORAL at 22:57

## 2023-07-02 RX ADMIN — GABAPENTIN 100 MG: 100 CAPSULE ORAL at 08:46

## 2023-07-02 RX ADMIN — FUROSEMIDE 40 MG: 10 INJECTION, SOLUTION INTRAMUSCULAR; INTRAVENOUS at 08:46

## 2023-07-02 RX ADMIN — HEPARIN SODIUM 5000 UNITS: 5000 INJECTION INTRAVENOUS; SUBCUTANEOUS at 05:33

## 2023-07-02 RX ADMIN — HEPARIN SODIUM 5000 UNITS: 5000 INJECTION INTRAVENOUS; SUBCUTANEOUS at 14:02

## 2023-07-02 RX ADMIN — QUETIAPINE FUMARATE 25 MG: 25 TABLET ORAL at 22:57

## 2023-07-02 RX ADMIN — GUAIFENESIN 600 MG: 600 TABLET ORAL at 08:46

## 2023-07-02 RX ADMIN — GABAPENTIN 100 MG: 100 CAPSULE ORAL at 16:25

## 2023-07-02 RX ADMIN — HEPARIN SODIUM 5000 UNITS: 5000 INJECTION INTRAVENOUS; SUBCUTANEOUS at 22:57

## 2023-07-02 NOTE — PROGRESS NOTES
[address]  Progress Note  Name: Marques Mann  MRN: [id number]  Unit/Bed#: -01 I Date of Admission: 7/1/2023   Date of Service: 7/2/2023 I Hospital Day: 1    Assessment/Plan   Dementia [facility])  Assessment & Plan  · Pleasant and compliant   · Monitor mentation  · Delirium precautions     Chronic atrial fibrillation (HCC)  Assessment & Plan  · HR borderline tachycardic while in ED   · EKG showing Afib   · Not currently on outpatient rate control or AC   · Acute CHF possibly contributing; to monitor HR while volume overload is reduced for now  · Tele monitoring ordered   · PRN rate control agents as appropriate     Stage 3a chronic kidney disease [facility])  Assessment & Plan  Lab Results   Component Value Date    EGFR 41 07/01/2023    EGFR 40 06/13/2023    EGFR 32 01/24/2023    CREATININE 1.11 07/01/2023    CREATININE 1.13 06/13/2023    CREATININE 1.36 (H) 01/24/2023     · Creatinine appears around baseline   · Avoid nephrotoxic agents   · AM BMP    * Acute CHF (congestive heart failure) ([address])  Assessment & Plan  Wt Readings from Last 3 Encounters:   06/20/23 66.2 kg (146 lb)   06/06/23 64.9 kg (143 lb)   03/31/23 61 kg (134 lb 6.4 oz)     Patient reports worsening cough and leg swelling over the past several days, prompting ED visit. Due to patient's dementia history ROS is limited, however per chart review it appears cough has been an ongoing issue for around 1 month. She was previously seen by her PCP who felt symptoms were due to bronchitis and prescribed a medrol dose pack. Currently patient denies any other symptoms/complaints. improved  · Reporting cough over the past several days  · CXR wet read suggestive of vascular congestion  ·   · Trop 17  · 12/2022 ECHO EF 61%   · Not currently meeting SEPSIS criteria; received IV rocephin in ED, will hold further dose for now pending remainder of infectious lab workup   · Transition to po lasix 40 mg bid  · Low sodium/fluid restricted diet   · Daily standing scale weight and I/O monitoring                VTE Pharmacologic Prophylaxis: VTE Score: 5 Moderate Risk (Score 3-4) - Pharmacological DVT Prophylaxis Ordered: heparin. Patient Centered Rounds: I performed bedside rounds with nursing staff today. Discussions with Specialists or Other Care Team Provider:     Education and Discussions with Family / Patient: Attempted to update  (niece) via phone. Unable to contact. Total Time Spent on Date of Encounter in care of patient: 45 minutes This time was spent on one or more of the following: performing physical exam; counseling and coordination of care; obtaining or reviewing history; documenting in the medical record; reviewing/ordering tests, medications or procedures; communicating with other healthcare professionals and discussing with patient's family/caregivers. Current Length of Stay: 1 day(s)  Current Patient Status: Inpatient   Certification Statement: The patient will continue to require additional inpatient hospital stay due to IV lasix  Discharge Plan: Anticipate discharge tomorrow to discharge location to be determined pending rehab evaluations. Code Status: Level 1 - Full Code    Subjective:   Pt seen and examined at bedside. No acute complaints. Objective:     Vitals:   Temp (24hrs), Av °F (37.2 °C), Min:97.9 °F (36.6 °C), Max:100.3 °F (37.9 °C)    Temp:  [97.9 °F (36.6 °C)-100.3 °F (37.9 °C)] 97.9 °F (36.6 °C)  HR:  [] 89  Resp:  [16-18] 16  BP: (119-158)/(69-78) 130/69  SpO2:  [85 %-99 %] 99 %  Body mass index is 26.98 kg/m². Input and Output Summary (last 24 hours): Intake/Output Summary (Last 24 hours) at 2023 1418  Last data filed at 2023 1435  Gross per 24 hour   Intake --   Output 247 ml   Net -247 ml       Physical Exam:   Physical Exam  Constitutional:       General: She is not in acute distress. Appearance: She is well-developed. She is not diaphoretic.    HENT: Head: Normocephalic and atraumatic. Nose: Nose normal.      Mouth/Throat:      Pharynx: No oropharyngeal exudate. Eyes:      General: No scleral icterus. Right eye: No discharge. Left eye: No discharge. Conjunctiva/sclera: Conjunctivae normal.   Cardiovascular:      Rate and Rhythm: Normal rate and regular rhythm. Heart sounds: Normal heart sounds. No murmur heard. No friction rub. No gallop. Pulmonary:      Effort: Pulmonary effort is normal. No respiratory distress. Breath sounds: Normal breath sounds. No wheezing or rales. Abdominal:      General: Bowel sounds are normal. There is no distension. Palpations: Abdomen is soft. Tenderness: There is no abdominal tenderness. There is no guarding. Musculoskeletal:      Cervical back: Normal range of motion and neck supple. Comments: Improved LE edema   Skin:     General: Skin is warm. Findings: No erythema. Neurological:      Mental Status: She is alert. Mental status is at baseline.    Psychiatric:         Behavior: Behavior normal.          Additional Data:     Labs:  Results from last 7 days   Lab Units 07/02/23  0518 07/01/23  0810   WBC Thousand/uL 10.34* 10.28*   HEMOGLOBIN g/dL 11.4* 12.0   HEMATOCRIT % 36.0 38.4   PLATELETS Thousands/uL 282 288   NEUTROS PCT %  --  73   LYMPHS PCT %  --  11*   MONOS PCT %  --  11   EOS PCT %  --  4     Results from last 7 days   Lab Units 07/02/23  0518 07/01/23  0206   SODIUM mmol/L 137 137   POTASSIUM mmol/L 3.7 4.0   CHLORIDE mmol/L 100 102   CO2 mmol/L 31 27   BUN mg/dL 27* 23   CREATININE mg/dL 1.27 1.11   ANION GAP mmol/L 6 8   CALCIUM mg/dL 8.0* 8.1*   ALBUMIN g/dL  --  3.3*   TOTAL BILIRUBIN mg/dL  --  0.79   ALK PHOS U/L  --  115*   ALT U/L  --  18   AST U/L  --  21   GLUCOSE RANDOM mg/dL 97 127     Results from last 7 days   Lab Units 07/01/23  0206   INR  1.22*             Results from last 7 days   Lab Units 07/01/23  0206   LACTIC ACID mmol/L 1.3 PROCALCITONIN ng/ml 0.08       Lines/Drains:  Invasive Devices     Peripheral Intravenous Line  Duration           Peripheral IV Left Antecubital -- days                      Imaging: No pertinent imaging reviewed. Recent Cultures (last 7 days):   Results from last 7 days   Lab Units 07/01/23  0328 07/01/23  0206   BLOOD CULTURE  No Growth at 24 hrs. No Growth at 24 hrs. Last 24 Hours Medication List:   Current Facility-Administered Medications   Medication Dose Route Frequency Provider Last Rate   • dextromethorphan-guaiFENesin  10 mL Oral Q4H PRN Chanelharoldo Hamm DO     • escitalopram  5 mg Oral Daily Aurelio Miranda PA-C     • [START ON 7/3/2023] furosemide  40 mg Oral BID (diuretic) Chanelharoldo Hamm, DO     • gabapentin  100 mg Oral TID Aurelio Miranda PA-C     • guaiFENesin  600 mg Oral Q12H [address] Chanel Kelli Araujoya, DO     • heparin (porcine)  5,000 Units Subcutaneous [place], [place]     • ipratropium  0.5 mg Nebulization Q6H PRN Chanel Kelli Hamm, DO     • QUEtiapine  25 mg Oral HS Aurelio Miranda PA-C          Today, Patient Was Seen By: Salena Bosworth, DO    **Please Note: This note may have been constructed using a voice recognition system. **

## 2023-07-02 NOTE — PLAN OF CARE
Problem: MOBILITY - ADULT  Goal: Maintain or return to baseline ADL function  Description: INTERVENTIONS:  -  Assess patient's ability to carry out ADLs; assess patient's baseline for ADL function and identify physical deficits which impact ability to perform ADLs (bathing, care of mouth/teeth, toileting, grooming, dressing, etc.)  - Assess/evaluate cause of self-care deficits   - Assess range of motion  - Assess patient's mobility; develop plan if impaired  - Assess patient's need for assistive devices and provide as appropriate  - Encourage maximum independence but intervene and supervise when necessary  - Involve family in performance of ADLs  - Assess for home care needs following discharge   - Consider OT consult to assist with ADL evaluation and planning for discharge  - Provide patient education as appropriate  Outcome: Progressing  Goal: Maintains/Returns to pre admission functional level  Description: INTERVENTIONS:  - Perform BMAT or MOVE assessment daily.   - Set and communicate daily mobility goal to care team and patient/family/caregiver. - Collaborate with rehabilitation services on mobility goals if consulted  - Perform Range of Motion 2 times a day. - Reposition patient every 2 hours.   - Dangle patient 2 times a day  - Stand patient 2 times a day  - Ambulate patient 2 times a day  - Out of bed to chair 3 times a day   - Out of bed for meals 3 times a day  - Out of bed for toileting  - Record patient progress and toleration of activity level   Outcome: Progressing

## 2023-07-02 NOTE — RESPIRATORY THERAPY NOTE
RT Protocol Note  Natasha Washburn 80 y.o. female MRN: [id number]  Unit/Bed#: -01 Encounter: [id number]    Assessment    Principal Problem:    Acute CHF (congestive heart failure) ([address] )  Active Problems:    Stage 3a chronic kidney disease ([address])    Chronic atrial fibrillation (HCC)    Dementia ([facility])      Home Pulmonary Medications:         Past Medical History:   Diagnosis Date   • Ankle fracture    • Arthritis     left hip   • 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[facility])     with left ureter   • Bronchitis    • Cancer ([address])    • Carcinoma, lung (HCC)    • Dementia ([address])    • Dependent edema    • Depression    • Diabetes mellitus ([facility] [address] [facility])    • Hypercholesterolemia    • Hypertension    • Kidney stone    • Oth abn and inconclusive findings on dx imaging of breast    • Pes planus    • Renal calculus    • Restless leg syndrome    • Rib pain    • Sprain, neck    • Varicose veins of left lower extremity      Social History     Socioeconomic History   • Marital status:      Spouse name: Not on file   • Number of children: Not on file   • Years of education: Not on file   • Highest education level: Not on file   Occupational History   • Not on file   Tobacco Use   • Smoking status: Former     Packs/day: 0.50     Years: 20.00     Total pack years: 10.00     Types: Cigarettes   • Smokeless tobacco: Never   Substance and Sexual Activity   • Alcohol use: Never   • Drug use: Never   • Sexual activity: Not Currently     Birth control/protection: Post-menopausal   Other Topics Concern   • Not on file   Social History Narrative    Lives at home with niece     Social Determinants of Health     Financial Resource Strain: Low Risk  (10/11/2022)    Overall Financial Resource Strain (CARDIA)    • Difficulty of Paying Living Expenses: Not very hard   Food Insecurity: Not on file   Transportation Needs: No Transportation Needs (10/11/2022)    PRAPARE - Transportation    • Lack of Transportation (Medical): No    • Lack of Transportation (Non-Medical):  No Physical Activity: Not on file   Stress: Not on file   Social Connections: Not on file   Intimate Partner Violence: Not on file   Housing Stability: Not on file       Subjective         Objective    Physical Exam:        Vitals:  Blood pressure 111/54, pulse 95, temperature 98.9 °F (37.2 °C), resp. rate 16, weight 60.6 kg (133 lb 9.6 oz), SpO2 92 %, not currently breastfeeding. Imaging and other studies: I have personally reviewed pertinent reports.       O2 Device: RA     Plan    Respiratory Plan: No distress/Pulmonary history        Resp Comments: will continue with prn nebs

## 2023-07-02 NOTE — PLAN OF CARE
Problem: Potential for Falls  Goal: Patient will remain free of falls  Description: INTERVENTIONS:  - Educate patient/family on patient safety including physical limitations  - Instruct patient to call for assistance with activity   - Consult OT/PT to assist with strengthening/mobility   - Keep Call bell within reach  - Keep bed low and locked with side rails adjusted as appropriate  - Keep care items and personal belongings within reach  - Initiate and maintain comfort rounds  - Make Fall Risk Sign visible to staff  - Offer Toileting every 2 Hours, in advance of need  - Initiate/Maintain bed alarm  - Obtain necessary fall risk management equipment:   - Apply yellow socks and bracelet for high fall risk patients  - Consider moving patient to room near nurses station  Outcome: Progressing     Problem: Nutrition/Hydration-ADULT  Goal: Nutrient/Hydration intake appropriate for improving, restoring or maintaining nutritional needs  Description: Monitor and assess patient's nutrition/hydration status for malnutrition. Collaborate with interdisciplinary team and initiate plan and interventions as ordered. Monitor patient's weight and dietary intake as ordered or per policy. Utilize nutrition screening tool and intervene as necessary. Determine patient's food preferences and provide high-protein, high-caloric foods as appropriate.      INTERVENTIONS:  - Monitor oral intake, urinary output, labs, and treatment plans  - Assess nutrition and hydration status and recommend course of action  - Evaluate amount of meals eaten  - Assist patient with eating if necessary   - Allow adequate time for meals  - Recommend/ encourage appropriate diets, oral nutritional supplements, and vitamin/mineral supplements  - Order, calculate, and assess calorie counts as needed  - Recommend, monitor, and adjust tube feedings and TPN/PPN based on assessed needs  - Assess need for intravenous fluids  - Provide specific nutrition/hydration education as appropriate  - Include patient/family/caregiver in decisions related to nutrition  Outcome: Progressing

## 2023-07-02 NOTE — UTILIZATION REVIEW
Initial Clinical Review    Admission: Date/Time/Statement:   Admission Orders (From admission, onward)     Ordered        07/01/23 0309  INPATIENT ADMISSION  Once                      Orders Placed This Encounter   Procedures   • INPATIENT ADMISSION     Standing Status:   Standing     Number of Occurrences:   1     Order Specific Question:   Level of Care     Answer:   Med Surg [16]     Order Specific Question:   Estimated length of stay     Answer:   More than 2 Midnights     Order Specific Question:   Certification     Answer:   I certify that inpatient services are medically necessary for this patient for a duration of greater than two midnights. See H&P and MD Progress Notes for additional information about the patient's course of treatment. ED Arrival Information     Expected   -    Arrival   7/1/2023 00:33    Acuity   Urgent            Means of arrival   Ambulance    Escorted by   [address]   Hospitalist    Admission type   Emergency            Arrival complaint   Cough           Chief Complaint   Patient presents with   • Cough     Pt arrives via EMS from home c/o cough as per family that persists at night; was started on Prednisone by PCP; as per EMS pt was 89-90% on RA & en route pt received neb tx       Initial Presentation: 80 y.o. female  To ER from home via EMS:   [facility]  A-fib, CHF, CKD3  (crt at baseline)   and dementia who presents with cough that was worsening over the last several days along with lower extremity edema. seen by her PCP on 6/20   who felt symptoms were due to bronchitis and prescribed a medrol dose pack. Examines at  Mental status baseline  alert and oriented x1. X-ray concerning for vascular congestion. Lungs with crackles at bilateral bases.    Bilateral lower extremity edema, pitting pretibial            bnp [phone]       7/01   Early AM:    Admit IP status,   MS Level of care for  Acute on chronic CHF:  Not currently meeting SEPSIS criteria - no further abx at this time. Will cont  Telemetry,  IV Lasix 40 mg BID  w/strict monitoring of  Volume status  (daily standing  wgt,  I/O q shift,  Lo Na/ fluid restricted diet )   Obtain ECHO. lisa prn     7/01 @ 2350   Placed on 2L NC Oxygen due to ongoing, intermittent  desats  On rm air     Date: 7/02     Day 2:   Pt confused/agitated this am - pulled out IV. Breath Sounds: Diminished, Scattered, Crackles  Cough: Non-productive      Date: 7/03   Day 3: Has surpassed a 2nd midnight with active treatments and services, which include IV Lasix 40 mg given this am at 0846,  Then dc'ed .    Continues to require supplemental oxygen @  2L NC  (initiated last evening)           ED Triage Vitals   Temperature Pulse Respirations Blood Pressure SpO2   07/01/23 0042 07/01/23 0042 07/01/23 0042 07/01/23 0042 07/01/23 0042   99.3 °F (37.4 °C) 99 20 168/92 94 %      Temp Source Heart Rate Source Patient Position - Orthostatic VS BP Location FiO2 (%)   07/01/23 0042 07/01/23 0230 -- -- --   Oral Monitor         Pain Score       07/01/23 0500       No Pain          Wt Readings from Last 1 Encounters:   07/02/23 60.6 kg (133 lb 9.6 oz)     Body mass index is 29.49 kg/m²    Additional Vital Signs:   07/02/23 06:58:29 97.9 °F (36.6 °C) 89 16 130/69 89 99 % -- -- --   07/02/23 0300 -- -- -- -- -- -- 28 2 L/min --   07/01/23 2352 -- -- -- -- -- 96 % 28 2 L/min Nasal cannula     O2 Device: patient placed on 2 lpm due to chronic desaturation on room air at 07/01/23 2352 07/01/23 2351 -- -- -- -- -- 96 % -- -- --   07/01/23 22:49:51 98.5 °F (36.9 °C) 107  17 119/69 86 85 %  -- -- --   07/01/23 18:59:55 99.1 °F (37.3 °C) -- -- -- -- -- -- -- --   07/01/23 1658 -- 64 18 -- -- 95 % -- -- None (Room air)   07/01/23 15:39:32 100.3 °F (37.9 °C) -- 17 158/78 105 -- -- -- --   07/01/23 04:44:16 -- -- -- 128/70 89 -- -- -- --   07/01/23 0300 -- 95 18 104/52 75 -- -- -- None (Room air)   07/01/23 0230 -- 106  20 111/59 82 96 % -- -- None (Room air) Pertinent Labs/Diagnostic Test Results:     12/2022 ECHO EF 61%    Serial EKG's          XR chest portable   Final Result by Colin Burks MD (07/01 1557)   Mild pulmonary edema with trace left effusion.             Results from last 7 days   Lab Units 07/01/23  0158   SARS-COV-2  Negative     Results from last 7 days   Lab Units 07/02/23  0518 07/01/23  0810 07/01/23  0206   WBC Thousand/uL 10.34* 10.28* 10.63*   HEMOGLOBIN g/dL 11.4* 12.0 11.2*   HEMATOCRIT % 36.0 38.4 35.4   PLATELETS Thousands/uL 282 288 286   NEUTROS ABS Thousands/µL  --  7.52 8.09*         Results from last 7 days   Lab Units 07/02/23  0518 07/01/23  0206   SODIUM mmol/L 137 137   POTASSIUM mmol/L 3.7 4.0   CHLORIDE mmol/L 100 102   CO2 mmol/L 31 27   ANION GAP mmol/L 6 8   BUN mg/dL 27* 23   CREATININE mg/dL 1.27 1.11   EGFR ml/min/1.73sq m 35 41   CALCIUM mg/dL 8.0* 8.1*   MAGNESIUM mg/dL 2.1  --      Results from last 7 days   Lab Units 07/01/23  0206   AST U/L 21   ALT U/L 18   ALK PHOS U/L 115*   TOTAL PROTEIN g/dL 6.6   ALBUMIN g/dL 3.3*   TOTAL BILIRUBIN mg/dL 0.79         Results from last 7 days   Lab Units 07/02/23  0518 07/01/23  0206   GLUCOSE RANDOM mg/dL 97 127     Results from last 7 days   Lab Units 07/01/23  0206   PH NAKUL  7.423*   PCO2 NAKUL mm Hg 47.6   PO2 NAKUL mm Hg 42.5   HCO3 NAKUL mmol/L 30.4*   BASE EXC NAKUL mmol/L 5.1   O2 CONTENT NAKUL ml/dL 13.5   O2 HGB, VENOUS % 77.8             Results from last 7 days   Lab Units 07/01/23  0810 07/01/23  0619 07/01/23  0206   HS TNI 0HR ng/L  --   --  17   HS TNI 2HR ng/L  --  19  --    HSTNI D2 ng/L  --  2  --    HS TNI 4HR ng/L 18  --   --    HSTNI D4 ng/L 1  --   --          Results from last 7 days   Lab Units 07/01/23  0206   PROTIME seconds 15.2*   INR  1.22*   PTT seconds 34         Results from last 7 days   Lab Units 07/01/23  0206   PROCALCITONIN ng/ml 0.08     Results from last 7 days   Lab Units 07/01/23  0206   LACTIC ACID mmol/L 1.3             Results from last 7 days   Lab Units 07/01/23  0206   BNP pg/mL 677*     Results from last 7 days   Lab Units 07/01/23  0158   INFLUENZA A PCR  Negative   INFLUENZA B PCR  Negative   RSV PCR  Negative     Results from last 7 days   Lab Units 07/01/23  0328 07/01/23  0206   BLOOD CULTURE  No Growth at 24 hrs. No Growth at 24 hrs. ED Treatment:   Medication Administration from 07/01/2023 0033 to 07/01/2023 0440       Date/Time Order Dose Route Action     07/01/2023 0328 EDT cefTRIAXone (ROCEPHIN) IVPB (premix in dextrose) 1,000 mg 50 mL 1,000 mg Intravenous New Bag        Past Medical History:   Diagnosis Date   • Ankle fracture    • Arthritis     left hip   • 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HCC)     with left ureter   • Bronchitis    • Cancer (HCC)    • Carcinoma, lung (HCC)    • Dementia (HCC)    • Dependent edema    • Depression    • Diabetes mellitus ([address])    • Hypercholesterolemia    • Hypertension    • Kidney stone    • Oth abn and inconclusive findings on dx imaging of breast    • Pes planus    • Renal calculus    • Restless leg syndrome    • Rib pain    • Sprain, neck    • Varicose veins of left lower extremity      Present on Admission:  • Chronic atrial fibrillation (HCC)  • Stage 3a chronic kidney disease (HCC)      Admitting Diagnosis: Cough [R05.9]  Acute exacerbation of CHF (congestive heart failure) (HCC) [I50.9]  Subacute cough [R05.2]  Age/Sex: 80 y.o. female  Admission Orders:    See above note. Serial ekg's w/2nd and 3rd troponin. PT/OT eval and treat;   DIET w/fluid restriction 1800 ml.       Scheduled Medications:  escitalopram, 5 mg, Oral, Daily  gabapentin, 100 mg, Oral, TID  guaiFENesin, 600 mg, Oral, Q12H HESHAM  heparin (porcine), 5,000 Units, Subcutaneous, Q8H HESHAM  QUEtiapine, 25 mg, Oral, HS      Continuous IV Infusions:     PRN Meds:  dextromethorphan-guaiFENesin, 10 mL, Oral, Q4H PRN  ipratropium, 0.5 mg, Nebulization, Q6H PRN        IP CONSULT TO NUTRITION SERVICES  (hi bmi/obesity)     Network Utilization Review Department  ATTENTION: Please call with any questions or concerns to [phone] and carefully listen to the prompts so that you are directed to the right person. All voicemails are confidential.  Cristian Seth all requests for admission clinical reviews, approved or denied determinations and any other requests to dedicated fax number below belonging to the campus where the patient is receiving treatment.  List of dedicated fax numbers for the Facilities:  [place] DENIALS (Administrative/Medical Necessity) [phone] [address] [facility] (Maternity/NICU/Pediatrics) [phone]   [address] [place] [address] [phone]   [facility] [address] [facility] [phone]   [address] [place] [address] [address] [facility] [address] [phone]   [address] [facility] [address] [facility] [id number] [place] [address] [place] [phone]

## 2023-07-03 ENCOUNTER — APPOINTMENT (INPATIENT)
Dept: CT IMAGING | Facility: HOSPITAL | Age: 88
DRG: 291 | End: 2023-07-03
Payer: COMMERCIAL

## 2023-07-03 ENCOUNTER — APPOINTMENT (INPATIENT)
Dept: NON INVASIVE DIAGNOSTICS | Facility: HOSPITAL | Age: 88
DRG: 291 | End: 2023-07-03
Payer: COMMERCIAL

## 2023-07-03 LAB
AORTIC ROOT: 2.6 CM
APICAL FOUR CHAMBER EJECTION FRACTION: 63 %
ASCENDING AORTA: 3.1 CM
AV LVOT MEAN GRADIENT: 1 MMHG
AV LVOT PEAK GRADIENT: 2 MMHG
AV PEAK GRADIENT: 14 MMHG
DOP CALC LVOT PEAK VEL VTI: 13.7 CM
DOP CALC LVOT PEAK VEL: 0.7 M/S
DOP CALC MV VTI: 27.5 CM
E WAVE DECELERATION TIME: 180 MS
FRACTIONAL SHORTENING: 31 % (ref 28–44)
INTERVENTRICULAR SEPTUM IN DIASTOLE (PARASTERNAL SHORT AXIS VIEW): 0.9 CM
INTERVENTRICULAR SEPTUM: 0.9 CM (ref 0.6–1.1)
LA/AORTA RATIO 2D: 1.73
LAAS-AP2: 24 CM2
LAAS-AP4: 18.5 CM2
LEFT ATRIUM SIZE: 4.5 CM
LEFT ATRIUM VOLUME (MOD BIPLANE): 62 ML
LEFT INTERNAL DIMENSION IN SYSTOLE: 2.4 CM (ref 2.1–4)
LEFT VENTRICULAR INTERNAL DIMENSION IN DIASTOLE: 3.5 CM (ref 3.5–6)
LEFT VENTRICULAR POSTERIOR WALL IN END DIASTOLE: 0.9 CM
LEFT VENTRICULAR STROKE VOLUME: 31 ML
LVSV (TEICH): 31 ML
MV MEAN GRADIENT: 3 MMHG
MV PEAK E VEL: 114 CM/S
MV PEAK GRADIENT: 7 MMHG
MV STENOSIS PRESSURE HALF TIME: 65 MS
MV VALVE AREA P 1/2 METHOD: 3.38 CM2
PA SYSTOLIC PRESSURE: 80 MMHG
SL CV LV EF: 55
SL CV PED ECHO LEFT VENTRICLE DIASTOLIC VOLUME (MOD BIPLANE) 2D: 51 ML
SL CV PED ECHO LEFT VENTRICLE SYSTOLIC VOLUME (MOD BIPLANE) 2D: 20 ML
TR MAX PG: 44 MMHG
TR PEAK VELOCITY: 3.3 M/S
TRICUSPID ANNULAR PLANE SYSTOLIC EXCURSION: 1.5 CM
TRICUSPID VALVE PEAK REGURGITATION VELOCITY: 3.33 M/S

## 2023-07-03 PROCEDURE — 71275 CT ANGIOGRAPHY CHEST: CPT

## 2023-07-03 PROCEDURE — 93306 TTE W/DOPPLER COMPLETE: CPT

## 2023-07-03 PROCEDURE — 94664 DEMO&/EVAL PT USE INHALER: CPT

## 2023-07-03 PROCEDURE — G1004 CDSM NDSC: HCPCS

## 2023-07-03 PROCEDURE — 99233 SBSQ HOSP IP/OBS HIGH 50: CPT | Performed by: STUDENT IN AN ORGANIZED HEALTH CARE EDUCATION/TRAINING PROGRAM

## 2023-07-03 PROCEDURE — 93306 TTE W/DOPPLER COMPLETE: CPT | Performed by: INTERNAL MEDICINE

## 2023-07-03 PROCEDURE — 97167 OT EVAL HIGH COMPLEX 60 MIN: CPT

## 2023-07-03 RX ADMIN — GABAPENTIN 100 MG: 100 CAPSULE ORAL at 08:48

## 2023-07-03 RX ADMIN — HEPARIN SODIUM 5000 UNITS: 5000 INJECTION INTRAVENOUS; SUBCUTANEOUS at 06:24

## 2023-07-03 RX ADMIN — GUAIFENESIN 600 MG: 600 TABLET ORAL at 08:48

## 2023-07-03 RX ADMIN — FUROSEMIDE 40 MG: 40 TABLET ORAL at 08:48

## 2023-07-03 RX ADMIN — IOHEXOL 85 ML: 350 INJECTION, SOLUTION INTRAVENOUS at 17:17

## 2023-07-03 RX ADMIN — FUROSEMIDE 40 MG: 40 TABLET ORAL at 16:35

## 2023-07-03 RX ADMIN — HEPARIN SODIUM 5000 UNITS: 5000 INJECTION INTRAVENOUS; SUBCUTANEOUS at 21:25

## 2023-07-03 RX ADMIN — QUETIAPINE FUMARATE 25 MG: 25 TABLET ORAL at 21:25

## 2023-07-03 RX ADMIN — HEPARIN SODIUM 5000 UNITS: 5000 INJECTION INTRAVENOUS; SUBCUTANEOUS at 16:38

## 2023-07-03 RX ADMIN — GABAPENTIN 100 MG: 100 CAPSULE ORAL at 20:32

## 2023-07-03 RX ADMIN — GUAIFENESIN 600 MG: 600 TABLET ORAL at 20:32

## 2023-07-03 RX ADMIN — GABAPENTIN 100 MG: 100 CAPSULE ORAL at 16:35

## 2023-07-03 RX ADMIN — ESCITALOPRAM OXALATE 5 MG: 10 TABLET ORAL at 08:48

## 2023-07-03 NOTE — ASSESSMENT & PLAN NOTE
HR borderline tachycardic while in ED   · EKG showing Afib   · Not currently on outpatient rate control or AC   · Acute CHF possibly contributing; to monitor HR while volume overload is reduced for now  · Tele monitoring ordered   · PRN rate control agents as appropriate

## 2023-07-03 NOTE — OCCUPATIONAL THERAPY NOTE
Occupational Therapy Evaluation      Gila Andrews    7/3/2023    Principal Problem:    Acute CHF (congestive heart failure) ([address])  Active Problems:    Stage 3a chronic kidney disease ([address])    Chronic atrial fibrillation (HCC)    Dementia (HCC)      Past Medical History:   Diagnosis Date    Ankle fracture     Arthritis     left hip    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[address])     with left ureter    Bronchitis     Cancer ([address])     Carcinoma, lung ([address])     Dementia ([address])     Dependent edema     Depression     Diabetes mellitus ([address])     Hypercholesterolemia     Hypertension     Kidney stone     Oth abn and inconclusive findings on dx imaging of breast     Pes planus     Renal calculus     Restless leg syndrome     Rib pain     Sprain, neck     Varicose veins of left lower extremity        Past Surgical History:   Procedure Laterality Date    APPENDECTOMY      LUNG CANCER SURGERY         07/03/23 1040   OT Last Visit   OT Visit Date 07/03/23   Note Type   Note type Evaluation   Pain Assessment   Pain Assessment Tool 0-10   Pain Score No Pain   Restrictions/Precautions   Weight Bearing Precautions Per Order No   Other Precautions Cognitive; Chair Alarm; Bed Alarm;Telemetry; Fall Risk;Impulsive;Hard of hearing   Home Living   Type of [employer] ADLs on one level; Two level; Able to live on main level with bedroom/bathroom   Bathroom Shower/Tub Tub/shower unit   Bathroom Toilet Standard   Bathroom Equipment Shower chair;Grab bars in shower   [address] Walker;Cane  (CR indicates she has and uses a RW; however pt denied RW use at home. Then later she stated she has a RW and a cane and uses both.)   Additional Comments Pt is a poor historian Reports she lives alone then later stated her neice lives with her. Prior Function   Level of [place] Independent with ADLs; Independent with functional mobility; Needs assistance with IADLS  (per pt)   Lives With Family  (niece, pt reports she works and is home alone during the day)   [address] Help From Family   IADLs Family/Friend/Other provides transportation; Family/Friend/Other provides meals; Family/Friend/Other provides medication management  (cleaning and laundry with A)   Falls in the last 6 months 0  (per pt)   Vocational Retired  (nurse)   Lifestyle   Autonomy Pt reports PLOF was Ind with ADLs, A with IADLs, and (-) driving. Pt is a questionable historian. Reciprocal Relationships family   ADL   Eating Assistance 5  Supervision/Setup   Eating Deficit Increased time to complete;Supervision/safety;Setup   Grooming Assistance 5  Supervision/Setup   Grooming Deficit Increased time to complete;Setup;Supervision/safety   UB Bathing Assistance 3  Moderate Assistance   UB Bathing Deficit Increased time to complete;Supervision/safety;Verbal cueing;Steadying;Setup   LB Bathing Assistance 2  Maximal Assistance   LB Bathing Deficit Increased time to complete;Supervision/safety;Verbal cueing;Steadying;Setup   UB Dressing Assistance 3  Moderate Assistance   UB Dressing Deficit Increased time to complete;Supervision/safety;Verbal cueing;Steadying;Setup   LB Dressing Assistance 2  Maximal Assistance   LB Dressing Deficit Increased time to complete;Supervision/safety;Verbal cueing;Steadying;Setup   Toileting Assistance  3  Moderate Assistance   Toileting Deficit Increased time to complete;Supervison/safety;Verbal cueing;Steadying;Setup   Functional Assistance 2  Maximal Assistance   Functional Deficit Increased time to complete;Supervision/safety;Verbal cueing;Steadying;Setup   Bed Mobility   Supine to Sit 4  Minimal assistance   Additional items Assist x 1;HOB elevated; Increased time required;Verbal cues; Bedrails; Impulsive   Transfers   Sit to Stand 4  Minimal assistance   Additional items Assist x 1;HOB elevated; Increased time required;Verbal cues; Bedrails; Impulsive   Stand to Sit 4  Minimal assistance   Additional items Assist x 1;HOB elevated; Increased time required;Verbal cues; Bedrails; Impulsive   Balance   Static Sitting Fair +   Dynamic Sitting Fair   Static Standing Poor +   Dynamic Standing Poor   Ambulatory Poor   Activity Tolerance   Activity Tolerance Patient limited by fatigue   Nurse Made Aware Yes, spoke with pt's RN who stated ptw as appropriate for OT and made awaree of outcomes   RUE Assessment   RUE Assessment   (ROM WFL; MMT +3/5)   LUE Assessment   LUE Assessment   (ROM WFL; MMT +3/5)   Hand Function   Gross Motor Coordination Functional   Fine Motor Coordination Functional   Sensation   Light Touch No apparent deficits   Sharp/Dull No apparent deficits   Stereognosis No apparent deficits   Psychosocial   Psychosocial (WDL) X   Patient Behaviors/Mood Calm; Cooperative   Cognition   Overall Cognitive Status Impaired   Arousal/Participation Alert; Cooperative   Attention Attends with cues to redirect   Orientation Level Oriented to person;Disoriented to place; Disoriented to time;Disoriented to situation   Memory Decreased short term memory;Decreased recall of recent events;Decreased recall of precautions   Following Commands Follows one step commands with increased time or repetition   Comments Pt was agreeable OT eval.   Assessment   Limitation Decreased ADL status; Decreased UE strength;Decreased Safe judgement during ADL;Decreased endurance;Decreased self-care trans;Decreased high-level ADLs   Prognosis Good   Assessment Pt is a 80 y.o. female seen for OT evaluation s/p admit to [id number] [facility] on 7/1/2023 w/ Acute CHF (congestive heart failure) ([address] Comorbidities affecting pt's functional performance at time of assessment include:anxiety, CHF, CKD, depression, dementia and weakness of L leg . Orders placed for OT evaluation and treatment. Performed at least two patient identifiers during session including name and wristband.  Personal factors affecting pt at time of IE include:steps to enter environment, difficulty performing ADLS, difficulty performing IADLS , limited insight into deficits, decreased initiation and engagement , financial barriers, health management  and environment. Prior to admission, pt is a questionable historian and reports Ind with ADLs, A with IADLs, and (-) driving. Upon evaluation: Pt requires mod A with UB ADLs, max A with LB ADLs, min A with xfers and min A with functional mobility 2* the following deficits impacting occupational performance: weakness, decreased strength, decreased dynamic sit/ stand balance, decreased activity tolerance, decreased standing tolerance time for self care and functional mobility, impaired initiation, impaired memory, impaired sequencing, impaired problem solving, decreased safety awareness, impaired interpersonal skills, environmental deficits, decreased coping skills, decreased mobilty and requiring external assistance to complete transitional movements. Pt to benefit from continued skilled OT tx while in the hospital to address deficits as defined above and maximize level of functional independence w ADL's and functional mobility. Occupational Performance areas to address include: bathing/shower, toilet hygiene, dressing, medication management, socialization, health maintenance, functional mobility, community mobility, clothing management and household maintenance. From OT standpoint, recommendation at time of d/c would be post acute rehab. Plan   Treatment Interventions ADL retraining;Functional transfer training; Activityengagement; Energy conservation;Cardiac education;Continued evaluation; Compensatory technique education;Equipment evaluation/education;Patient/family training; Endurance training;Cognitive reorientation;UE strengthening/ROM   Goal Expiration Date 07/16/23   OT Frequency 3-5x/wk   Recommendation   OT Discharge Recommendation Post acute rehabilitation services   AM-PAC Daily Activity Inpatient   Lower Body Dressing 2   Bathing 2   Toileting 2   Upper Body Dressing 2   Grooming 3   Eating 3   Daily Activity Raw Score 14   Daily Activity Standardized Score (Calc for Raw Score >=11) 33.39   AM-PAC Applied Cognition Inpatient   Following a Speech/Presentation 2   Understanding Ordinary Conversation 3   Taking Medications 1   Remembering Where Things Are Placed or Put Away 1   Remembering List of 4-5 Errands 1   Taking Care of Complicated Tasks 1   Applied Cognition Raw Score 9   Applied Cognition Standardized Score 22.48   Barthel Index   Grooming Score 0   Dressing Score 5   Toilet Use Score 5   Transfers (Bed/Chair) Score 10   Mobility (Level Surface) Score 0      07/03/23 1040   OT Last Visit   OT Visit Date 07/03/23   Note Type   Note type Evaluation   Pain Assessment   Pain Assessment Tool 0-10   Pain Score No Pain   Restrictions/Precautions   Weight Bearing Precautions Per Order No   Other Precautions Cognitive; Chair Alarm; Bed Alarm;Telemetry; Fall Risk;Impulsive;Hard of hearing   Home Living   Type of [address] ADLs on one level; Two level; Able to live on main level with bedroom/bathroom   Bathroom Shower/Tub Tub/shower unit   Bathroom Toilet Standard   Bathroom Equipment Shower chair;Grab bars in shower   [address] Walker;Cane  (CR indicates she has and uses a RW; however pt denied RW use at home. Then later she stated she has a RW and a cane and uses both.)   Additional Comments Pt is a poor historian Reports she lives alone then later stated her neice lives with her. Prior Function   Level of [place] Independent with ADLs; Independent with functional mobility; Needs assistance with IADLS  (per pt)   Lives With Family  (niece, pt reports she works and is home alone during the day)   Receives Help From Family   IADLs Family/Friend/Other provides transportation; Family/Friend/Other provides meals; Family/Friend/Other provides medication management  (cleaning and laundry with A)   Falls in the last 6 months 0  (per pt) Vocational Retired  (nurse)   Lifestyle   Autonomy Pt reports PLOF was Ind with ADLs, A with IADLs, and (-) driving. Pt is a questionable historian. Reciprocal Relationships family   ADL   Eating Assistance 5  Supervision/Setup   Eating Deficit Increased time to complete;Supervision/safety;Setup   Grooming Assistance 5  Supervision/Setup   Grooming Deficit Increased time to complete;Setup;Supervision/safety   UB Bathing Assistance 3  Moderate Assistance   UB Bathing Deficit Increased time to complete;Supervision/safety;Verbal cueing;Steadying;Setup   LB Bathing Assistance 2  Maximal Assistance   LB Bathing Deficit Increased time to complete;Supervision/safety;Verbal cueing;Steadying;Setup   UB Dressing Assistance 3  Moderate Assistance   UB Dressing Deficit Increased time to complete;Supervision/safety;Verbal cueing;Steadying;Setup   LB Dressing Assistance 2  Maximal Assistance   LB Dressing Deficit Increased time to complete;Supervision/safety;Verbal cueing;Steadying;Setup   Toileting Assistance  3  Moderate Assistance   Toileting Deficit Increased time to complete;Supervison/safety;Verbal cueing;Steadying;Setup   Functional Assistance 2  Maximal Assistance   Functional Deficit Increased time to complete;Supervision/safety;Verbal cueing;Steadying;Setup   Bed Mobility   Supine to Sit 4  Minimal assistance   Additional items Assist x 1;HOB elevated; Increased time required;Verbal cues; Bedrails; Impulsive   Transfers   Sit to Stand 4  Minimal assistance   Additional items Assist x 1;HOB elevated; Increased time required;Verbal cues; Bedrails; Impulsive   Stand to Sit 4  Minimal assistance   Additional items Assist x 1;HOB elevated; Increased time required;Verbal cues; Bedrails; Impulsive   Balance   Static Sitting Fair +   Dynamic Sitting Fair   Static Standing Poor +   Dynamic Standing Poor   Ambulatory Poor   Activity Tolerance   Activity Tolerance Patient limited by fatigue   Nurse Made Aware Yes, spoke with pt's RN who stated ptw as appropriate for OT and made awaree of outcomes   RUE Assessment   RUE Assessment   (ROM WFL; MMT +3/5)   LUE Assessment   LUE Assessment   (ROM WFL; MMT +3/5)   Hand Function   Gross Motor Coordination Functional   Fine Motor Coordination Functional   Sensation   Light Touch No apparent deficits   Sharp/Dull No apparent deficits   Stereognosis No apparent deficits   Psychosocial   Psychosocial (WDL) X   Patient Behaviors/Mood Calm; Cooperative   Cognition   Overall Cognitive Status Impaired   Arousal/Participation Alert; Cooperative   Attention Attends with cues to redirect   Orientation Level Oriented to person;Disoriented to place; Disoriented to time;Disoriented to situation   Memory Decreased short term memory;Decreased recall of recent events;Decreased recall of precautions   Following Commands Follows one step commands with increased time or repetition   Comments Pt was agreeable OT eval.   Assessment   Limitation Decreased ADL status; Decreased UE strength;Decreased Safe judgement during ADL;Decreased endurance;Decreased self-care trans;Decreased high-level ADLs   Prognosis Good   Assessment Pt is a 80 y.o. female seen for OT evaluation s/p admit to [address] [facility] on 7/1/2023 w/ Acute CHF (congestive heart failure) ([address] Comorbidities affecting pt's functional performance at time of assessment include:anxiety, CHF, CKD, depression, dementia and weakness of L leg . Orders placed for OT evaluation and treatment. Performed at least two patient identifiers during session including name and wristband. Personal factors affecting pt at time of IE include:steps to enter environment, difficulty performing ADLS, difficulty performing IADLS , limited insight into deficits, decreased initiation and engagement , financial barriers, health management  and environment. Prior to admission, pt is a questionable historian and reports Ind with ADLs, A with IADLs, and (-) driving.   Upon evaluation: Pt requires mod A with UB ADLs, max A with LB ADLs, min A with xfers and min A with functional mobility 2* the following deficits impacting occupational performance: weakness, decreased strength, decreased dynamic sit/ stand balance, decreased activity tolerance, decreased standing tolerance time for self care and functional mobility, impaired initiation, impaired memory, impaired sequencing, impaired problem solving, decreased safety awareness, impaired interpersonal skills, environmental deficits, decreased coping skills, decreased mobilty and requiring external assistance to complete transitional movements. Pt to benefit from continued skilled OT tx while in the hospital to address deficits as defined above and maximize level of functional independence w ADL's and functional mobility. Occupational Performance areas to address include: bathing/shower, toilet hygiene, dressing, medication management, socialization, health maintenance, functional mobility, community mobility, clothing management and household maintenance. From OT standpoint, recommendation at time of d/c would be post acute rehab. Plan   Treatment Interventions ADL retraining;Functional transfer training; Activityengagement; Energy conservation;Cardiac education;Continued evaluation; Compensatory technique education;Equipment evaluation/education;Patient/family training; Endurance training;Cognitive reorientation;UE strengthening/ROM   Goal Expiration Date 07/16/23   OT Frequency 3-5x/wk   Recommendation   OT Discharge Recommendation Post acute rehabilitation services   AM-PAC Daily Activity Inpatient   Lower Body Dressing 2   Bathing 2   Toileting 2   Upper Body Dressing 2   Grooming 3   Eating 3   Daily Activity Raw Score 14   Daily Activity Standardized Score (Calc for Raw Score >=11) 33.39   AM-PAC Applied Cognition Inpatient   Following a Speech/Presentation 2   Understanding Ordinary Conversation 3   Taking Medications 1   Remembering Where Things Are Placed or Put Away 1   Remembering List of 4-5 Errands 1   Taking Care of Complicated Tasks 1   Applied Cognition Raw Score 9   Applied Cognition Standardized Score 22.48   Barthel Index   Grooming Score 0   Dressing Score 5   Toilet Use Score 5   Transfers (Bed/Chair) Score 10   Mobility (Level Surface) Score 0     Occupational Therapy goals: In 7-14 days:    Patient will verbalize and demonstrate use of energy conservation/ deep breathing technique and work simplification skills during functional activity with no verbal cues. Patient will verbalize and demonstrate good body mechanics and joint protection techniques during  ADLs/ IADLs with no verbal cues. Patient will increase OOB/ sitting tolerance to 2-4 hours per day for increased participation in self care and leisure tasks with no s/s of exertion. Patient will increase standing tolerance time to 5  minutes with unilateral UE support to complete sink level ADLs@ mod I level. Patient will increase sitting tolerance at edge of bed to 20 minutes to complete UB ADLs @ set up assist level. Patient will transfer bed to Chair / toilet at Set up assist level with AD as indicated. Patient will complete UB ADLs with min assist.    Patient will complete LB ADLs with min assist with the use of adaptive equipment. Patient will complete toileting hygiene with set up assist/ supervision for thoroughness.     Patient/ Family  will demonstrate competency with UE Home Exercise Program.     Lindia Galeazzi, MS OTR/L

## 2023-07-03 NOTE — ASSESSMENT & PLAN NOTE
Wt Readings from Last 3 Encounters:   07/03/23 60.3 kg (133 lb)   06/20/23 66.2 kg (146 lb)   06/06/23 64.9 kg (143 lb)     Patient reports worsening cough and leg swelling over the past several days, prompting ED visit. Due to patient's dementia history ROS is limited, however per chart review it appears cough has been an ongoing issue for around 1 month. She was previously seen by her PCP who felt symptoms were due to bronchitis and prescribed a medrol dose pack. Currently patient denies any other symptoms/complaints. improved  · Reporting cough over the past several days  · CXR wet read suggestive of vascular congestion  ·   · Trop 17  · 12/2022 ECHO EF 61%   · Not currently meeting SEPSIS criteria; received IV rocephin in ED, will hold further dose for now pending remainder of infectious lab workup   · Transition to po lasix 40 mg bid, stable  · Echo concerning for PE, order CTA PE study   · Low sodium/fluid restricted diet   · Daily standing scale weight and I/O monitoring

## 2023-07-03 NOTE — PLAN OF CARE
Problem: Potential for Falls  Goal: Patient will remain free of falls  Description: INTERVENTIONS:  - Educate patient/family on patient safety including physical limitations  - Instruct patient to call for assistance with activity   - Consult OT/PT to assist with strengthening/mobility   - Keep Call bell within reach  - Keep bed low and locked with side rails adjusted as appropriate  - Keep care items and personal belongings within reach  - Initiate and maintain comfort rounds  - Make Fall Risk Sign visible to staff  - Offer Toileting every 2 Hours, in advance of need  - Initiate/Maintain bed alarm  - Obtain necessary fall risk management equipment:   Problem: Nutrition/Hydration-ADULT  Goal: Nutrient/Hydration intake appropriate for improving, restoring or maintaining nutritional needs  Description: Monitor and assess patient's nutrition/hydration status for malnutrition. Collaborate with interdisciplinary team and initiate plan and interventions as ordered. Monitor patient's weight and dietary intake as ordered or per policy. Utilize nutrition screening tool and intervene as necessary. Determine patient's food preferences and provide high-protein, high-caloric foods as appropriate.      INTERVENTIONS:  - Monitor oral intake, urinary output, labs, and treatment plans  - Assess nutrition and hydration status and recommend course of action  - Evaluate amount of meals eaten  - Assist patient with eating if necessary   - Allow adequate time for meals  - Recommend/ encourage appropriate diets, oral nutritional supplements, and vitamin/mineral supplements  - Order, calculate, and assess calorie counts as needed  - Recommend, monitor, and adjust tube feedings and TPN/PPN based on assessed needs  - Assess need for intravenous fluids  - Provide specific nutrition/hydration education as appropriate  - Include patient/family/caregiver in decisions related to nutrition  Outcome: Progressing     - Apply yellow socks and bracelet for high fall risk patients  - Consider moving patient to room near nurses station  Outcome: Progressing

## 2023-07-03 NOTE — PROGRESS NOTES
[address]  Progress Note  Name: Merry Grewal  MRN: [id number]  Unit/Bed#: -01 I Date of Admission: 7/1/2023   Date of Service: 7/3/2023 I Hospital Day: 2    Assessment/Plan   Dementia [facility])  Assessment & Plan  · Pleasant and compliant   · Monitor mentation  · Delirium precautions     Chronic atrial fibrillation (HCC)  Assessment & Plan  HR borderline tachycardic while in ED   · EKG showing Afib   · Not currently on outpatient rate control or AC   · Acute CHF possibly contributing; to monitor HR while volume overload is reduced for now  · Tele monitoring ordered   · PRN rate control agents as appropriate     Stage 3a chronic kidney disease [facility])  Assessment & Plan  Lab Results   Component Value Date    EGFR 35 07/02/2023    EGFR 41 07/01/2023    EGFR 40 06/13/2023    CREATININE 1.27 07/02/2023    CREATININE 1.11 07/01/2023    CREATININE 1.13 06/13/2023     · Creatinine appears around baseline   · Avoid nephrotoxic agents   · AM BMP    * Acute CHF (congestive heart failure) ([address])  Assessment & Plan  Wt Readings from Last 3 Encounters:   07/03/23 60.3 kg (133 lb)   06/20/23 66.2 kg (146 lb)   06/06/23 64.9 kg (143 lb)     Patient reports worsening cough and leg swelling over the past several days, prompting ED visit. Due to patient's dementia history ROS is limited, however per chart review it appears cough has been an ongoing issue for around 1 month. She was previously seen by her PCP who felt symptoms were due to bronchitis and prescribed a medrol dose pack. Currently patient denies any other symptoms/complaints. improved  · Reporting cough over the past several days  · CXR wet read suggestive of vascular congestion  ·   · Trop 17  · 12/2022 ECHO EF 61%   · Not currently meeting SEPSIS criteria; received IV rocephin in ED, will hold further dose for now pending remainder of infectious lab workup   · Transition to po lasix 40 mg bid, stable  · Echo concerning for PE, order CTA PE study   · Low sodium/fluid restricted diet   · Daily standing scale weight and I/O monitoring              VTE Pharmacologic Prophylaxis: VTE Score: 5 Moderate Risk (Score 3-4) - Pharmacological DVT Prophylaxis Ordered: heparin. Patient Centered Rounds: I performed bedside rounds with nursing staff today. Discussions with Specialists or Other Care Team Provider: LIANE    Education and Discussions with Family / Patient: Attempted to update  (daughter) via phone. Left voicemail. Total Time Spent on Date of Encounter in care of patient: 45 minutes This time was spent on one or more of the following: performing physical exam; counseling and coordination of care; obtaining or reviewing history; documenting in the medical record; reviewing/ordering tests, medications or procedures; communicating with other healthcare professionals and discussing with patient's family/caregivers. Current Length of Stay: 2 day(s)  Current Patient Status: Inpatient   Certification Statement: The patient will continue to require additional inpatient hospital stay due to CTA PE pending and rehab   Discharge Plan: Anticipate discharge tomorrow to rehab facility. Code Status: Level 1 - Full Code    Subjective:   Pt seen and examined at bedside. Denies sob or LE edema     Objective:     Vitals:   Temp (24hrs), Av.2 °F (36.8 °C), Min:97.9 °F (36.6 °C), Max:98.5 °F (36.9 °C)    Temp:  [97.9 °F (36.6 °C)-98.5 °F (36.9 °C)] 97.9 °F (36.6 °C)  HR:  [] 112  Resp:  [16] 16  BP: (100-125)/(60-89) 114/60  SpO2:  [88 %-93 %] 93 %  Body mass index is 26.86 kg/m². Input and Output Summary (last 24 hours): Intake/Output Summary (Last 24 hours) at 7/3/2023 191  Last data filed at 7/3/2023 0850  Gross per 24 hour   Intake 120 ml   Output --   Net 120 ml       Physical Exam:   Physical Exam  Vitals reviewed. Constitutional:       General: She is not in acute distress. Appearance: She is well-developed.  She is not diaphoretic. HENT:      Head: Normocephalic and atraumatic. Nose: Nose normal.      Mouth/Throat:      Pharynx: No oropharyngeal exudate. Eyes:      General: No scleral icterus. Right eye: No discharge. Left eye: No discharge. Conjunctiva/sclera: Conjunctivae normal.   Cardiovascular:      Rate and Rhythm: Normal rate and regular rhythm. Heart sounds: Normal heart sounds. No murmur heard. No friction rub. No gallop. Pulmonary:      Effort: Pulmonary effort is normal. No respiratory distress. Breath sounds: Normal breath sounds. No wheezing or rales. Abdominal:      General: Bowel sounds are normal. There is no distension. Palpations: Abdomen is soft. Tenderness: There is no abdominal tenderness. There is no guarding. Musculoskeletal:         General: Normal range of motion. Cervical back: Normal range of motion and neck supple. Skin:     General: Skin is warm and dry. Findings: No erythema. Neurological:      Mental Status: Mental status is at baseline.    Psychiatric:         Behavior: Behavior normal.         Additional Data:     Labs:  Results from last 7 days   Lab Units 07/02/23  0518 07/01/23  0810   WBC Thousand/uL 10.34* 10.28*   HEMOGLOBIN g/dL 11.4* 12.0   HEMATOCRIT % 36.0 38.4   PLATELETS Thousands/uL 282 288   NEUTROS PCT %  --  73   LYMPHS PCT %  --  11*   MONOS PCT %  --  11   EOS PCT %  --  4     Results from last 7 days   Lab Units 07/02/23  0518 07/01/23  0206   SODIUM mmol/L 137 137   POTASSIUM mmol/L 3.7 4.0   CHLORIDE mmol/L 100 102   CO2 mmol/L 31 27   BUN mg/dL 27* 23   CREATININE mg/dL 1.27 1.11   ANION GAP mmol/L 6 8   CALCIUM mg/dL 8.0* 8.1*   ALBUMIN g/dL  --  3.3*   TOTAL BILIRUBIN mg/dL  --  0.79   ALK PHOS U/L  --  115*   ALT U/L  --  18   AST U/L  --  21   GLUCOSE RANDOM mg/dL 97 127     Results from last 7 days   Lab Units 07/01/23  0206   INR  1.22*             Results from last 7 days   Lab Units 07/01/23  0206   LACTIC ACID mmol/L 1.3   PROCALCITONIN ng/ml 0.08       Lines/Drains:  Invasive Devices     Peripheral Intravenous Line  Duration           Peripheral IV 07/03/23 Right Antecubital <1 day                      Imaging: No pertinent imaging reviewed. Recent Cultures (last 7 days):   Results from last 7 days   Lab Units 07/01/23  0328 07/01/23  0206   BLOOD CULTURE  No Growth at 48 hrs. No Growth at 48 hrs. Last 24 Hours Medication List:   Current Facility-Administered Medications   Medication Dose Route Frequency Provider Last Rate   • dextromethorphan-guaiFENesin  10 mL Oral Q4H PRN Chanel Kelli Saraiya, DO     • escitalopram  5 mg Oral Daily Iram Williamson PA-C     • furosemide  40 mg Oral BID (diuretic) Chanel Kelli Saraiya, DO     • gabapentin  100 mg Oral TID Iram Williamson PA-C     • guaiFENesin  600 mg Oral Q12H [address] Chanel Kelli Saraiya, DO     • heparin (porcine)  5,000 Units Subcutaneous [place], [place]     • ipratropium  0.5 mg Nebulization Q6H PRN Chanel Kelli Saraiya, DO     • QUEtiapine  25 mg Oral HS Iram Williamson PA-C          Today, Patient Was Seen By: Dulce Pugh DO    **Please Note: This note may have been constructed using a voice recognition system. **

## 2023-07-03 NOTE — ASSESSMENT & PLAN NOTE
Lab Results   Component Value Date    EGFR 35 07/02/2023    EGFR 41 07/01/2023    EGFR 40 06/13/2023    CREATININE 1.27 07/02/2023    CREATININE 1.11 07/01/2023    CREATININE 1.13 06/13/2023     · Creatinine appears around baseline   · Avoid nephrotoxic agents   · AM BMP

## 2023-07-03 NOTE — PLAN OF CARE
Problem: MOBILITY - ADULT  Goal: Maintain or return to baseline ADL function  Description: INTERVENTIONS:  -  Assess patient's ability to carry out ADLs; assess patient's baseline for ADL function and identify physical deficits which impact ability to perform ADLs (bathing, care of mouth/teeth, toileting, grooming, dressing, etc.)  - Assess/evaluate cause of self-care deficits   - Assess range of motion  - Assess patient's mobility; develop plan if impaired  - Assess patient's need for assistive devices and provide as appropriate  - Encourage maximum independence but intervene and supervise when necessary  - Involve family in performance of ADLs  - Assess for home care needs following discharge   - Consider OT consult to assist with ADL evaluation and planning for discharge  - Provide patient education as appropriate  Outcome: Progressing     Problem: Nutrition/Hydration-ADULT  Goal: Nutrient/Hydration intake appropriate for improving, restoring or maintaining nutritional needs  Description: Monitor and assess patient's nutrition/hydration status for malnutrition. Collaborate with interdisciplinary team and initiate plan and interventions as ordered. Monitor patient's weight and dietary intake as ordered or per policy. Utilize nutrition screening tool and intervene as necessary. Determine patient's food preferences and provide high-protein, high-caloric foods as appropriate.      INTERVENTIONS:  - Monitor oral intake, urinary output, labs, and treatment plans  - Assess nutrition and hydration status and recommend course of action  - Evaluate amount of meals eaten  - Assist patient with eating if necessary   - Allow adequate time for meals  - Recommend/ encourage appropriate diets, oral nutritional supplements, and vitamin/mineral supplements  - Order, calculate, and assess calorie counts as needed  - Recommend, monitor, and adjust tube feedings and TPN/PPN based on assessed needs  - Assess need for intravenous fluids  - Provide specific nutrition/hydration education as appropriate  - Include patient/family/caregiver in decisions related to nutrition  Outcome: Progressing

## 2023-07-03 NOTE — RESPIRATORY THERAPY NOTE
RT Protocol Note  Roland Zhao 80 y.o. female MRN: [id number]  Unit/Bed#: -01 Encounter: [id number]    Assessment    Principal Problem:    Acute CHF (congestive heart failure) ([address])  Active Problems:    Stage 3a chronic kidney disease ([address])    Chronic atrial fibrillation ([address])    Dementia ([address])      Home Pulmonary Medications:     07/03/23 0900   Respiratory Protocol   Protocol Initiated? No   Protocol Selection Respiratory   Language Barrier? No   Medical & Social History Reviewed? Yes   Diagnostic Studies Reviewed? Yes   Physical Assessment Performed? Yes   Respiratory Plan Discontinue Protocol   Respiratory Assessment   Assessment Type Assess only   Respiratory Pattern Normal   Bilateral Breath Sounds Diminished   Resp Comments patienty has not needed PRN nebs in 48 hours, resp protocol discontinued   Additional Assessments   Pulse 103   SpO2 93 %            Past Medical History:   Diagnosis Date    Ankle fracture     Arthritis     left hip    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HCC)     with left ureter    Bronchitis     Cancer (HCC)     Carcinoma, lung (HCC)     Dementia (HCC)     Dependent edema     Depression     Diabetes mellitus ([address])     Hypercholesterolemia     Hypertension     Kidney stone     Oth abn and inconclusive findings on dx imaging of breast     Pes planus     Renal calculus     Restless leg syndrome     Rib pain     Sprain, neck     Varicose veins of left lower extremity      Social History     Socioeconomic History    Marital status:       Spouse name: Not on file    Number of children: Not on file    Years of education: Not on file    Highest education level: Not on file   Occupational History    Not on file   Tobacco Use    Smoking status: Former     Packs/day: 0.50     Years: 20.00     Total pack years: 10.00     Types: Cigarettes    Smokeless tobacco: Never   Substance and Sexual Activity    Alcohol use: Never    Drug use: Never    Sexual activity: Not Currently     Birth control/protection: Post-menopausal   Other Topics Concern    Not on file   Social History Narrative    Lives at home with niece     Social Determinants of Health     Financial Resource Strain: Low Risk  (10/11/2022)    Overall Financial Resource Strain (CARDIA)     Difficulty of Paying Living Expenses: Not very hard   Food Insecurity: Not on file   Transportation Needs: No Transportation Needs (10/11/2022)    PRAPARE - Transportation     Lack of Transportation (Medical): No     Lack of Transportation (Non-Medical): No   Physical Activity: Not on file   Stress: Not on file   Social Connections: Not on file   Intimate Partner Violence: Not on file   Housing Stability: Not on file       Subjective         Objective    Physical Exam:   Assessment Type: Assess only  Respiratory Pattern: Normal  Bilateral Breath Sounds: Diminished    Vitals:  Blood pressure 100/60, pulse 103, temperature 98.5 °F (36.9 °C), resp. rate 16, height 4' 11" (1.499 m), weight 60.3 kg (133 lb), SpO2 93 %, not currently breastfeeding. Imaging and other studies: I have personally reviewed pertinent reports.       O2 Device: RA     Plan    Respiratory Plan: Discontinue Protocol        Resp Comments: patienty has not needed PRN nebs in 48 hours, resp protocol discontinued

## 2023-07-03 NOTE — UTILIZATION REVIEW
NOTIFICATION OF INPATIENT ADMISSION   AUTHORIZATION REQUEST   SERVICING FACILITY:   [address] [place] [address] Re[address]  Tax ID: [id number]  NPI: [id number] ATTENDING PROVIDER:  Attending Name and NPI#: Kerry Swapnil [[id number]]  Address: Zehra Cardona[address]  Phone: [phone]     ADMISSION INFORMATION:  Place of Service: [address] [place] [address]  Place of Service Code: 21  Inpatient Admission Date/Time: 7/1/23  3:09 AM  Discharge Date/Time: No discharge date for patient encounter. Admitting Diagnosis Code/Description:  Cough [R05.9]  Acute exacerbation of CHF (congestive heart failure) ([address]) [I50.9]  Subacute cough [R05.2]     UTILIZATION REVIEW CONTACT:  Vasquez Morales Utilization   Network Utilization Review Department  Phone: [phone]  Fax [phone]  Email: [email or web address]  Contact for approvals/pending authorizations, clinical reviews, and discharge. PHYSICIAN ADVISORY SERVICES:  Medical Necessity Denial & Euhv-xd-Dswn Review  Phone: [phone]  Fax: [phone]  Email: [email or web address]

## 2023-07-03 NOTE — PLAN OF CARE
Problem: OCCUPATIONAL THERAPY ADULT  Goal: Performs self-care activities at highest level of function for planned discharge setting. See evaluation for individualized goals. Description: Treatment Interventions: ADL retraining, Functional transfer training, Activityengagement, Energy conservation, Cardiac education, Continued evaluation, Compensatory technique education, Equipment evaluation/education, Patient/family training, Endurance training, Cognitive reorientation, UE strengthening/ROM          See flowsheet documentation for full assessment, interventions and recommendations. Note: Limitation: Decreased ADL status, Decreased UE strength, Decreased Safe judgement during ADL, Decreased endurance, Decreased self-care trans, Decreased high-level ADLs  Prognosis: Good  Assessment: Pt is a 80 y.o. female seen for OT evaluation s/p admit to [facility] on 7/1/2023 w/ Acute CHF (congestive heart failure) ([address] Comorbidities affecting pt's functional performance at time of assessment include:anxiety, CHF, CKD, depression, dementia and weakness of L leg . Orders placed for OT evaluation and treatment. Performed at least two patient identifiers during session including name and wristband. Personal factors affecting pt at time of IE include:steps to enter environment, difficulty performing ADLS, difficulty performing IADLS , limited insight into deficits, decreased initiation and engagement , financial barriers, health management  and environment. Prior to admission, pt is a questionable historian and reports Ind with ADLs, A with IADLs, and (-) driving.   Upon evaluation: Pt requires mod A with UB ADLs, max A with LB ADLs, min A with xfers and min A with functional mobility 2* the following deficits impacting occupational performance: weakness, decreased strength, decreased dynamic sit/ stand balance, decreased activity tolerance, decreased standing tolerance time for self care and functional mobility, impaired initiation, impaired memory, impaired sequencing, impaired problem solving, decreased safety awareness, impaired interpersonal skills, environmental deficits, decreased coping skills, decreased mobilty and requiring external assistance to complete transitional movements. Pt to benefit from continued skilled OT tx while in the hospital to address deficits as defined above and maximize level of functional independence w ADL's and functional mobility. Occupational Performance areas to address include: bathing/shower, toilet hygiene, dressing, medication management, socialization, health maintenance, functional mobility, community mobility, clothing management and household maintenance. From OT standpoint, recommendation at time of d/c would be post acute rehab.      OT Discharge Recommendation: Post acute rehabilitation services

## 2023-07-03 NOTE — CASE MANAGEMENT
Case Management Assessment & Discharge Planning Note    Patient name Chelsea Gallegos  Location [address] [facility] [place] 420/-73 MRN [id number]  : 11/3/1924 Date 7/3/2023       Current Admission Date: 2023  Current Admission Diagnosis:Acute CHF (congestive heart failure) [facility])   Patient Active Problem List    Diagnosis Date Noted   • Acute CHF (congestive heart failure) ([address]) 2023   • Dementia ([address]) 2023   • Subacute cough 2023   • Allergic bronchitis 2023   • Weakness of left leg 2023   • Does mobilize using walker 2023   • Chronic congestive heart failure ([address]) 2022   • Chronic atrial fibrillation ([address]) 10/11/2022   • Stage 3a chronic kidney disease ([address]) 2022   • 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[address]) 2022   • Ambulatory dysfunction 2021   • Anxiety 2021   • Vitamin D deficiency 2020   • Primary insomnia 2019   • Degeneration of lumbar intervertebral disc 2019   • Lumbar radiculopathy 2019   • Arthropathy of lumbar facet joint 2019   • Vascular dementia without behavioral disturbance ([address]) 2019   • Visual hallucinations 2019   • Restless legs syndrome 2019      LOS (days): 2  Geometric Mean LOS (GMLOS) (days):   Days to GMLOS:     OBJECTIVE:    Risk of Unplanned Readmission Score: 17.51         Current admission status: Inpatient       [id number], [address] [facility]  [address] [place] [address]  Phone: [phone] Fax: [phone]    Primary Care Provider: FARZANA Fonseca    Primary Insurance: [facility] [employer]  Secondary Insurance: [address]  [address]:  [place] Proxies    There are no active Health Care Proxies on file.        Advance Directives  Does patient have a Health Care POA?: Yes  Does patient have Advance Directives?: Yes  Advance Directives: Living will, Power of  for health care  Primary Contact: niece Kennyth Severance         Readmission Root Cause  30 Day Readmission: No    Patient Information  Admitted from[de-identified] Home  Mental Status: Confused  During Assessment patient was accompanied by: Other-Comment (niece Kennyth Severance)  Primary Caregiver: Family  Caregiver's Name[de-identified] Jose L Oviedo Relationship to Patient[de-identified] Family Member  Caregiver's Telephone Number[de-identified] [phone]  Support Systems: Family members (niece Kennyth Severance)  [facility]: [address] [place] [address] do you live in?: [address] [facility] entry access options.  Select all that apply.: No steps to enter home  Type of Current Residence: 2 story home  Upon entering residence, is there a bedroom on the main floor (no further steps)?: No  A bedroom is located on the following floor levels of residence (select all that apply):: 2nd Floor  Upon entering residence, is there a bathroom on the main floor (no further steps)?: No  Indicate which floors of current residence have a bathroom (select all the apply):: 2nd Floor  Number of steps to 2nd floor from main floor: One Flight  In the last 12 months, was there a time when you were not able to pay the mortgage or rent on time?: No  In the last 12 months, how many places have you lived?: 1  In the last 12 months, was there a time when you did not have a steady place to sleep or slept in a shelter (including now)?: No  Homeless/housing insecurity resource given?: No  Living Arrangements: Lives w/ Extended Family (lives with niece Kennyth Severance)  Is patient a ?: No    Activities of Daily Living Prior to Admission  Functional Status: Assistance  Completes ADLs independently?: No  Level of ADL dependence: Assistance  Ambulates independently?: No  Level of ambulatory dependence: Assistance  Does patient use assisted devices?: Yes  Assisted Devices (DME) used: Scarlett Charlton  Does patient currently own DME?: Yes  What DME does the patient currently own?: Tiarajeb Charlton  Does patient have a history of Outpatient Therapy (PT/OT)?: No  Does the patient have a history of Short-Term Rehab?: No  Does patient have a history of HHC?: Yes  Does patient currently have [facility]?: No         Patient Information Continued  Income Source: Unemployed  Does patient have prescription coverage?: Yes  Within the past 12 months, you worried that your food would run out before you got the money to buy more.: Never true  Within the past 12 months, the food you bought just didn't last and you didn't have money to get more.: Never true  Food insecurity resource given?: No  Does patient receive dialysis treatments?: No  Does patient have a history of substance abuse?: No  Does patient have a history of Mental Health Diagnosis?: No    PHQ 2/9 Screening   Reviewed PHQ 2/9 Depression Screening Score?: No    Means of Transportation  Means of Transport to Appts[de-identified] Family transport  In the past 12 months, has lack of transportation kept you from medical appointments or from getting medications?: No  In the past 12 months, has lack of transportation kept you from meetings, work, or from getting things needed for daily living?: No  Was application for public transport provided?: No        DISCHARGE DETAILS:    Discharge planning discussed with[de-identified] pt and elisa Violette Pel of Choice: Yes  Comments - Freedom of Choice: CM discussed PT's recommendation of STR, but jskorina Watts states that if pt can walk as she was able to prior to admission, she is willing to take her home with VNA services. Nursing will see if pt can walk with elisa in the room. If it is safer for pt to go to a STR, Alfred Watts is hoping for [facility]. Referral will be placed to [facility] to check on bed availability and also to VNA services.   CM contacted family/caregiver?: Yes  Were Treatment Team discharge recommendations reviewed with patient/caregiver?: Yes  Did patient/caregiver verbalize understanding of patient care needs?: Yes  Were patient/caregiver advised of the risks associated with not following Treatment Team discharge recommendations?: Yes    Contacts  Patient Contacts: Jose C Beck  Relationship to Patient[de-identified] Family  Contact Method: In Person  Reason/Outcome: Continuity of Care, Discharge [facility]         Is the patient interested in [facility] at discharge?: Yes  [facility] requested[de-identified] [address], Nursing, Physical Therapy, [place] Provider[de-identified] PCP  Home Health Services Needed[de-identified] Evaluate Functional Status and Safety, Gait/ADL Training, Heart Failure Management, Strengthening/Theraputic Exercises to Improve Function  Homebound Criteria Met[de-identified] Requires the Assistance of Another Person for Safe Ambulation or to Leave the Home, Uses an Assist Device (i.e. cane, walker, etc)  Supporting Clincal Findings[de-identified] Limited Endurance    DME Referral Provided  Referral made for DME?: No    Other Referral/Resources/Interventions Provided:  Interventions: [facility], Short Term Rehab  Referral Comments: If pt can walk as she was able to prior to this admission,  elisa Beck wants to take her home with VNA services. If not, she is agreeable to STR at [facility]. Nursing will walk pt in the room in the presence of elisa. Referrals placed to both VNA services and [place] for STR.     Would you like to participate in our [facility] service program?  : No - Declined    Treatment Team Recommendation: Short Term Rehab

## 2023-07-04 LAB
ANION GAP SERPL CALCULATED.3IONS-SCNC: 7 MMOL/L
BUN SERPL-MCNC: 31 MG/DL (ref 5–25)
CALCIUM SERPL-MCNC: 8 MG/DL (ref 8.4–10.2)
CHLORIDE SERPL-SCNC: 102 MMOL/L (ref 96–108)
CO2 SERPL-SCNC: 29 MMOL/L (ref 21–32)
CREAT SERPL-MCNC: 1.19 MG/DL (ref 0.6–1.3)
GFR SERPL CREATININE-BSD FRML MDRD: 38 ML/MIN/1.73SQ M
GLUCOSE SERPL-MCNC: 96 MG/DL (ref 65–140)
POTASSIUM SERPL-SCNC: 3.6 MMOL/L (ref 3.5–5.3)
SODIUM SERPL-SCNC: 138 MMOL/L (ref 135–147)

## 2023-07-04 PROCEDURE — 99232 SBSQ HOSP IP/OBS MODERATE 35: CPT | Performed by: INTERNAL MEDICINE

## 2023-07-04 PROCEDURE — 80048 BASIC METABOLIC PNL TOTAL CA: CPT | Performed by: STUDENT IN AN ORGANIZED HEALTH CARE EDUCATION/TRAINING PROGRAM

## 2023-07-04 RX ADMIN — QUETIAPINE FUMARATE 25 MG: 25 TABLET ORAL at 21:30

## 2023-07-04 RX ADMIN — FUROSEMIDE 40 MG: 40 TABLET ORAL at 09:32

## 2023-07-04 RX ADMIN — HEPARIN SODIUM 5000 UNITS: 5000 INJECTION INTRAVENOUS; SUBCUTANEOUS at 05:53

## 2023-07-04 RX ADMIN — GUAIFENESIN 600 MG: 600 TABLET ORAL at 09:32

## 2023-07-04 RX ADMIN — HEPARIN SODIUM 5000 UNITS: 5000 INJECTION INTRAVENOUS; SUBCUTANEOUS at 13:53

## 2023-07-04 RX ADMIN — GUAIFENESIN 600 MG: 600 TABLET ORAL at 21:30

## 2023-07-04 RX ADMIN — FUROSEMIDE 40 MG: 40 TABLET ORAL at 17:09

## 2023-07-04 RX ADMIN — GABAPENTIN 100 MG: 100 CAPSULE ORAL at 09:32

## 2023-07-04 RX ADMIN — ESCITALOPRAM OXALATE 5 MG: 10 TABLET ORAL at 09:32

## 2023-07-04 RX ADMIN — GABAPENTIN 100 MG: 100 CAPSULE ORAL at 21:30

## 2023-07-04 RX ADMIN — GABAPENTIN 100 MG: 100 CAPSULE ORAL at 17:09

## 2023-07-04 RX ADMIN — HEPARIN SODIUM 5000 UNITS: 5000 INJECTION INTRAVENOUS; SUBCUTANEOUS at 21:30

## 2023-07-04 NOTE — ASSESSMENT & PLAN NOTE
Wt Readings from Last 3 Encounters:   07/04/23 57.5 kg (126 lb 12.2 oz)   06/20/23 66.2 kg (146 lb)   06/06/23 64.9 kg (143 lb)     Patient reports worsening cough and leg swelling over the past several days, prompting ED visit. Due to patient's dementia history ROS is limited, however per chart review it appears cough has been an ongoing issue for around 1 month. She was previously seen by her PCP who felt symptoms were due to bronchitis and prescribed a medrol dose pack. Currently patient denies any other symptoms/complaints. improved  · Reporting cough over the past several days  · CXR wet read suggestive of vascular congestion  ·   · Trop 17  · 12/2022 ECHO EF 61%   · Transition to po lasix 40 mg bid, stable  · CTA PE study-no pulmonary artery emboli noted. Cardiomegaly with dilated right ventricle, right atrium and left atrium.   · Low sodium/fluid restricted diet   · Daily standing scale weight and I/O monitoring

## 2023-07-04 NOTE — PLAN OF CARE
Problem: Nutrition/Hydration-ADULT  Goal: Nutrient/Hydration intake appropriate for improving, restoring or maintaining nutritional needs  Description: Monitor and assess patient's nutrition/hydration status for malnutrition. Collaborate with interdisciplinary team and initiate plan and interventions as ordered. Monitor patient's weight and dietary intake as ordered or per policy. Utilize nutrition screening tool and intervene as necessary. Determine patient's food preferences and provide high-protein, high-caloric foods as appropriate.      INTERVENTIONS:  - Monitor oral intake, urinary output, labs, and treatment plans  - Assess nutrition and hydration status and recommend course of action  - Evaluate amount of meals eaten  - Assist patient with eating if necessary   - Allow adequate time for meals  - Recommend/ encourage appropriate diets, oral nutritional supplements, and vitamin/mineral supplements  - Order, calculate, and assess calorie counts as needed  - Recommend, monitor, and adjust tube feedings and TPN/PPN based on assessed needs  - Assess need for intravenous fluids  - Provide specific nutrition/hydration education as appropriate  - Include patient/family/caregiver in decisions related to nutrition  Outcome: Progressing     Problem: Potential for Falls  Goal: Patient will remain free of falls  Description: INTERVENTIONS:  - Educate patient/family on patient safety including physical limitations  - Instruct patient to call for assistance with activity   - Consult OT/PT to assist with strengthening/mobility   - Keep Call bell within reach  - Keep bed low and locked with side rails adjusted as appropriate  - Keep care items and personal belongings within reach  - Initiate and maintain comfort rounds  - Make Fall Risk Sign visible to staff  - Offer Toileting every 2 Hours, in advance of need  - Initiate/Maintain bed/chair alarm  - Obtain necessary fall risk management equipment: walker  - Apply yellow socks and bracelet for high fall risk patients  - Consider moving patient to room near nurses station  Outcome: Progressing

## 2023-07-04 NOTE — CASE MANAGEMENT
Case Management Discharge Planning Note    Patient name Colletta Morning  Location [address] [facility] [place] 420/-77 MRN [id number]  : 11/3/1924 Date 2023       Current Admission Date: 2023  Current Admission Diagnosis:Acute CHF (congestive heart failure) [facility])   Patient Active Problem List    Diagnosis Date Noted   • Acute CHF (congestive heart failure) ([address]) 2023   • Dementia ([address]) 2023   • Subacute cough 2023   • Allergic bronchitis 2023   • Weakness of left leg 2023   • Does mobilize using walker 2023   • Chronic congestive heart failure ([address]) 2022   • Chronic atrial fibrillation ([address]) 10/11/2022   • Stage 3a chronic kidney disease ([address]) 2022   • 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[address]) 2022   • Ambulatory dysfunction 2021   • Anxiety 2021   • Vitamin D deficiency 2020   • Primary insomnia 2019   • Degeneration of lumbar intervertebral disc 2019   • Lumbar radiculopathy 2019   • Arthropathy of lumbar facet joint 2019   • Vascular dementia without behavioral disturbance ([address]) 2019   • Visual hallucinations 2019   • Restless legs syndrome 2019      LOS (days): 3  Geometric Mean LOS (GMLOS) (days): 3.00  Days to GMLOS:-0.4     OBJECTIVE:  Risk of Unplanned Readmission Score: 16.08         Current admission status: Inpatient   [id number] [address] [place] [address]  Phone: [phone] Fax: [phone]    Primary Care Provider: FARZANA Huff    Primary Insurance: [facility] [employer]  Secondary Insurance: [id number]  DETAILS:  CM met with pt and her niece Nisha Hager at beside to discuss [employer]. Pt and niece would like pt to go to [facility] for STR. Amara was requested to dc CM support. CM made richard aware via [employer]. Pt is medically cleared pending auth.  LIANE continues to follow.

## 2023-07-04 NOTE — ASSESSMENT & PLAN NOTE
Lab Results   Component Value Date    EGFR 38 07/04/2023    EGFR 35 07/02/2023    EGFR 41 07/01/2023    CREATININE 1.19 07/04/2023    CREATININE 1.27 07/02/2023    CREATININE 1.11 07/01/2023     · Creatinine appears around baseline   · Avoid nephrotoxic agents   · AM BMP

## 2023-07-04 NOTE — PLAN OF CARE
Problem: Potential for Falls  Goal: Patient will remain free of falls  Description: INTERVENTIONS:  - Educate patient/family on patient safety including physical limitations  - Instruct patient to call for assistance with activity   - Consult OT/PT to assist with strengthening/mobility   - Keep Call bell within reach  - Keep bed low and locked with side rails adjusted as appropriate  - Keep care items and personal belongings within reach  - Initiate and maintain comfort rounds  - Make Fall Risk Sign visible to staff  - Offer Toileting every 2 Hours, in advance of need  - Initiate/Maintain alarm  - Obtain necessary fall risk management equipment  - Apply yellow socks and bracelet for high fall risk patients  - Consider moving patient to room near nurses station  Outcome: Progressing     Problem: MOBILITY - ADULT  Goal: Maintain or return to baseline ADL function  Description: INTERVENTIONS:  -  Assess patient's ability to carry out ADLs; assess patient's baseline for ADL function and identify physical deficits which impact ability to perform ADLs (bathing, care of mouth/teeth, toileting, grooming, dressing, etc.)  - Assess/evaluate cause of self-care deficits   - Assess range of motion  - Assess patient's mobility; develop plan if impaired  - Assess patient's need for assistive devices and provide as appropriate  - Encourage maximum independence but intervene and supervise when necessary  - Involve family in performance of ADLs  - Assess for home care needs following discharge   - Consider OT consult to assist with ADL evaluation and planning for discharge  - Provide patient education as appropriate  Outcome: Progressing  Goal: Maintains/Returns to pre admission functional level  Description: INTERVENTIONS:  - Perform BMAT or MOVE assessment daily.   - Set and communicate daily mobility goal to care team and patient/family/caregiver.    - Collaborate with rehabilitation services on mobility goals if consulted  - Perform Range of Motion 3 times a day. - Reposition patient every 2 hours. - Dangle patient 3 times a day  - Stand patient 3 times a day  - Ambulate patient 3 times a day  - Out of bed to chair 3 times a day   - Out of bed for meals 3 times a day  - Out of bed for toileting  - Record patient progress and toleration of activity level   Outcome: Progressing     Problem: Prexisting or High Potential for Compromised Skin Integrity  Goal: Skin integrity is maintained or improved  Description: INTERVENTIONS:  - Identify patients at risk for skin breakdown  - Assess and monitor skin integrity  - Assess and monitor nutrition and hydration status  - Monitor labs   - Assess for incontinence   - Turn and reposition patient  - Assist with mobility/ambulation  - Relieve pressure over bony prominences  - Avoid friction and shearing  - Provide appropriate hygiene as needed including keeping skin clean and dry  - Evaluate need for skin moisturizer/barrier cream  - Collaborate with interdisciplinary team   - Patient/family teaching  - Consider wound care consult   Outcome: Progressing     Problem: Nutrition/Hydration-ADULT  Goal: Nutrient/Hydration intake appropriate for improving, restoring or maintaining nutritional needs  Description: Monitor and assess patient's nutrition/hydration status for malnutrition. Collaborate with interdisciplinary team and initiate plan and interventions as ordered. Monitor patient's weight and dietary intake as ordered or per policy. Utilize nutrition screening tool and intervene as necessary. Determine patient's food preferences and provide high-protein, high-caloric foods as appropriate.      INTERVENTIONS:  - Monitor oral intake, urinary output, labs, and treatment plans  - Assess nutrition and hydration status and recommend course of action  - Evaluate amount of meals eaten  - Assist patient with eating if necessary   - Allow adequate time for meals  - Recommend/ encourage appropriate diets, oral nutritional supplements, and vitamin/mineral supplements  - Order, calculate, and assess calorie counts as needed  - Recommend, monitor, and adjust tube feedings and TPN/PPN based on assessed needs  - Assess need for intravenous fluids  - Provide specific nutrition/hydration education as appropriate  - Include patient/family/caregiver in decisions related to nutrition  Outcome: Progressing

## 2023-07-04 NOTE — PROGRESS NOTES
[address]  Progress Note  Name: Donita Mckeon  MRN: [id number]  Unit/Bed#: -01 I Date of Admission: 7/1/2023   Date of Service: 7/4/2023 I Hospital Day: 3    Assessment/Plan   * Acute CHF (congestive heart failure) ([address])  Assessment & Plan  Wt Readings from Last 3 Encounters:   07/04/23 57.5 kg (126 lb 12.2 oz)   06/20/23 66.2 kg (146 lb)   06/06/23 64.9 kg (143 lb)     Patient reports worsening cough and leg swelling over the past several days, prompting ED visit. Due to patient's dementia history ROS is limited, however per chart review it appears cough has been an ongoing issue for around 1 month. She was previously seen by her PCP who felt symptoms were due to bronchitis and prescribed a medrol dose pack. Currently patient denies any other symptoms/complaints. improved  · Reporting cough over the past several days  · CXR wet read suggestive of vascular congestion  ·   · Trop 17  · 12/2022 ECHO EF 61%   · Transition to po lasix 40 mg bid, stable  · CTA PE study-no pulmonary artery emboli noted. Cardiomegaly with dilated right ventricle, right atrium and left atrium.   · Low sodium/fluid restricted diet   · Daily standing scale weight and I/O monitoring     Dementia (HCC)  Assessment & Plan  · Pleasant and compliant   · Monitor mentation  · Delirium precautions     Chronic atrial fibrillation (HCC)  Assessment & Plan  HR borderline tachycardic while in ED   · EKG showing Afib   · Not currently on outpatient rate control or AC   · Acute CHF possibly contributing; to monitor HR while volume overload is reduced for now  · Tele monitoring ordered   · PRN rate control agents as appropriate     Stage 3a chronic kidney disease [facility])  Assessment & Plan  Lab Results   Component Value Date    EGFR 38 07/04/2023    EGFR 35 07/02/2023    EGFR 41 07/01/2023    CREATININE 1.19 07/04/2023    CREATININE 1.27 07/02/2023    CREATININE 1.11 07/01/2023     · Creatinine appears around baseline · Avoid nephrotoxic agents   · AM BMP             VTE Pharmacologic Prophylaxis: VTE Score: 5 Moderate Risk (Score 3-4) - Pharmacological DVT Prophylaxis Ordered: heparin. Patient Centered Rounds: I performed bedside rounds with nursing staff today. Discussions with Specialists or Other Care Team Provider: Case management    Education and Discussions with Family / Patient: Attempted to update  (niece) via phone. Left voicemail. Total Time Spent on Date of Encounter in care of patient: 40 minutes This time was spent on one or more of the following: performing physical exam; counseling and coordination of care; obtaining or reviewing history; documenting in the medical record; reviewing/ordering tests, medications or procedures; communicating with other healthcare professionals and discussing with patient's family/caregivers. Current Length of Stay: 3 day(s)  Current Patient Status: Inpatient   Certification Statement: Patient medically stable awaiting rehab placement  Discharge Plan: Anticipate discharge in 24-48 hrs to rehab facility. Code Status: Level 1 - Full Code    Subjective:   Patient resting comfortably on examination. Patient without any overnight events or complaints on exam.    Objective:     Vitals:   Temp (24hrs), Av.2 °F (36.8 °C), Min:97.9 °F (36.6 °C), Max:98.5 °F (36.9 °C)    Temp:  [97.9 °F (36.6 °C)-98.5 °F (36.9 °C)] 98.2 °F (36.8 °C)  HR:  [] 94  Resp:  [16] 16  BP: (110-120)/(60-70) 120/70  SpO2:  [90 %-98 %] 98 %  Body mass index is 25.6 kg/m². Input and Output Summary (last 24 hours): Intake/Output Summary (Last 24 hours) at 2023 1354  Last data filed at 2023 0900  Gross per 24 hour   Intake 120 ml   Output --   Net 120 ml       Physical Exam:   Physical Exam  Vitals and nursing note reviewed. Constitutional:       General: She is not in acute distress. Appearance: She is well-developed.       Comments: Chronically ill-appearing HENT:      Head: Normocephalic and atraumatic. Eyes:      General: No scleral icterus. Conjunctiva/sclera: Conjunctivae normal.   Cardiovascular:      Rate and Rhythm: Normal rate and regular rhythm. Heart sounds: Normal heart sounds. No murmur heard. No friction rub. No gallop. Pulmonary:      Effort: Pulmonary effort is normal. No respiratory distress. Breath sounds: Normal breath sounds. No wheezing or rales. Abdominal:      General: Bowel sounds are normal. There is no distension. Palpations: Abdomen is soft. Tenderness: There is no abdominal tenderness. Musculoskeletal:         General: Normal range of motion. Skin:     General: Skin is warm. Findings: No rash. Neurological:      Mental Status: She is alert. Mental status is at baseline. Additional Data:     Labs:  Results from last 7 days   Lab Units 07/02/23  0518 07/01/23  0810   WBC Thousand/uL 10.34* 10.28*   HEMOGLOBIN g/dL 11.4* 12.0   HEMATOCRIT % 36.0 38.4   PLATELETS Thousands/uL 282 288   NEUTROS PCT %  --  73   LYMPHS PCT %  --  11*   MONOS PCT %  --  11   EOS PCT %  --  4     Results from last 7 days   Lab Units 07/04/23  0448 07/02/23  0518 07/01/23  0206   SODIUM mmol/L 138   < > 137   POTASSIUM mmol/L 3.6   < > 4.0   CHLORIDE mmol/L 102   < > 102   CO2 mmol/L 29   < > 27   BUN mg/dL 31*   < > 23   CREATININE mg/dL 1.19   < > 1.11   ANION GAP mmol/L 7   < > 8   CALCIUM mg/dL 8.0*   < > 8.1*   ALBUMIN g/dL  --   --  3.3*   TOTAL BILIRUBIN mg/dL  --   --  0.79   ALK PHOS U/L  --   --  115*   ALT U/L  --   --  18   AST U/L  --   --  21   GLUCOSE RANDOM mg/dL 96   < > 127    < > = values in this interval not displayed.      Results from last 7 days   Lab Units 07/01/23  0206   INR  1.22*             Results from last 7 days   Lab Units 07/01/23  0206   LACTIC ACID mmol/L 1.3   PROCALCITONIN ng/ml 0.08       Lines/Drains:  Invasive Devices     Peripheral Intravenous Line  Duration Peripheral IV 07/03/23 Right Antecubital <1 day                      Imaging: No pertinent imaging reviewed. Recent Cultures (last 7 days):   Results from last 7 days   Lab Units 07/01/23  0328 07/01/23  0206   BLOOD CULTURE  No Growth at 72 hrs. No Growth at 72 hrs. Last 24 Hours Medication List:   Current Facility-Administered Medications   Medication Dose Route Frequency Provider Last Rate   • dextromethorphan-guaiFENesin  10 mL Oral Q4H PRN Chanelharoldo Hamm DO     • escitalopram  5 mg Oral Daily Cachorro Leonard PA-C     • furosemide  40 mg Oral BID (diuretic) Chanelharoldo Hamm DO     • gabapentin  100 mg Oral TID Cachorro Leonard PA-C     • guaiFENesin  600 mg Oral Q12H [facility]     • heparin (porcine)  5,000 Units Subcutaneous [place], [place]     • ipratropium  0.5 mg Nebulization Q6H PRN Chanelharoldo Hamm DO     • QUEtiapine  25 mg Oral HS Cachorro Leonard PA-C          Today, Patient Was Seen By: Lahoma Ormond, DO    **Please Note: This note may have been constructed using a voice recognition system. **

## 2023-07-05 LAB
FLUAV RNA RESP QL NAA+PROBE: NEGATIVE
FLUBV RNA RESP QL NAA+PROBE: NEGATIVE
RSV RNA RESP QL NAA+PROBE: NEGATIVE
SARS-COV-2 RNA RESP QL NAA+PROBE: NEGATIVE

## 2023-07-05 PROCEDURE — 0241U HB NFCT DS VIR RESP RNA 4 TRGT: CPT | Performed by: INTERNAL MEDICINE

## 2023-07-05 PROCEDURE — 99232 SBSQ HOSP IP/OBS MODERATE 35: CPT | Performed by: INTERNAL MEDICINE

## 2023-07-05 RX ADMIN — FUROSEMIDE 40 MG: 40 TABLET ORAL at 16:09

## 2023-07-05 RX ADMIN — HEPARIN SODIUM 5000 UNITS: 5000 INJECTION INTRAVENOUS; SUBCUTANEOUS at 14:09

## 2023-07-05 RX ADMIN — GABAPENTIN 100 MG: 100 CAPSULE ORAL at 16:09

## 2023-07-05 RX ADMIN — GABAPENTIN 100 MG: 100 CAPSULE ORAL at 22:43

## 2023-07-05 RX ADMIN — ESCITALOPRAM OXALATE 5 MG: 10 TABLET ORAL at 09:19

## 2023-07-05 RX ADMIN — FUROSEMIDE 40 MG: 40 TABLET ORAL at 09:19

## 2023-07-05 RX ADMIN — GUAIFENESIN AND DEXTROMETHORPHAN 10 ML: 100; 10 SYRUP ORAL at 22:44

## 2023-07-05 RX ADMIN — GABAPENTIN 100 MG: 100 CAPSULE ORAL at 09:19

## 2023-07-05 RX ADMIN — GUAIFENESIN 600 MG: 600 TABLET ORAL at 22:50

## 2023-07-05 RX ADMIN — GUAIFENESIN 600 MG: 600 TABLET ORAL at 09:19

## 2023-07-05 RX ADMIN — QUETIAPINE FUMARATE 25 MG: 25 TABLET ORAL at 22:45

## 2023-07-05 RX ADMIN — HEPARIN SODIUM 5000 UNITS: 5000 INJECTION INTRAVENOUS; SUBCUTANEOUS at 22:45

## 2023-07-05 RX ADMIN — HEPARIN SODIUM 5000 UNITS: 5000 INJECTION INTRAVENOUS; SUBCUTANEOUS at 05:21

## 2023-07-05 NOTE — PLAN OF CARE
Problem: Potential for Falls  Goal: Patient will remain free of falls  Description: INTERVENTIONS:  - Educate patient/family on patient safety including physical limitations  - Instruct patient to call for assistance with activity   - Consult OT/PT to assist with strengthening/mobility   - Keep Call bell within reach  - Keep bed low and locked with side rails adjusted as appropriate  - Keep care items and personal belongings within reach  - Initiate and maintain comfort rounds  - Make Fall Risk Sign visible to staff  - Offer Toileting every 2 Hours, in advance of need  - Initiate/Maintain bed alarm  - Obtain necessary fall risk management equipment  - Apply yellow socks and bracelet for high fall risk patients  - Consider moving patient to room near nurses station  Outcome: Progressing     Problem: MOBILITY - ADULT  Goal: Maintain or return to baseline ADL function  Description: INTERVENTIONS:  -  Assess patient's ability to carry out ADLs; assess patient's baseline for ADL function and identify physical deficits which impact ability to perform ADLs (bathing, care of mouth/teeth, toileting, grooming, dressing, etc.)  - Assess/evaluate cause of self-care deficits   - Assess range of motion  - Assess patient's mobility; develop plan if impaired  - Assess patient's need for assistive devices and provide as appropriate  - Encourage maximum independence but intervene and supervise when necessary  - Involve family in performance of ADLs  - Assess for home care needs following discharge   - Consider OT consult to assist with ADL evaluation and planning for discharge  - Provide patient education as appropriate  Outcome: Progressing  Goal: Maintains/Returns to pre admission functional level  Description: INTERVENTIONS:  - Perform BMAT or MOVE assessment daily.   - Set and communicate daily mobility goal to care team and patient/family/caregiver.    - Collaborate with rehabilitation services on mobility goals if consulted  - Perform Range of Motion 2 times a day. - Reposition patient every 2 hours. - Dangle patient 2 times a day  - Stand patient 2 times a day  - Ambulate patient 2 times a day  - Out of bed to chair 2 times a day   - Out of bed for meals 2 times a day  - Out of bed for toileting  - Record patient progress and toleration of activity level   Outcome: Progressing     Problem: Prexisting or High Potential for Compromised Skin Integrity  Goal: Skin integrity is maintained or improved  Description: INTERVENTIONS:  - Identify patients at risk for skin breakdown  - Assess and monitor skin integrity  - Assess and monitor nutrition and hydration status  - Monitor labs   - Assess for incontinence   - Turn and reposition patient  - Assist with mobility/ambulation  - Relieve pressure over bony prominences  - Avoid friction and shearing  - Provide appropriate hygiene as needed including keeping skin clean and dry  - Evaluate need for skin moisturizer/barrier cream  - Collaborate with interdisciplinary team   - Patient/family teaching  - Consider wound care consult   Outcome: Progressing     Problem: Nutrition/Hydration-ADULT  Goal: Nutrient/Hydration intake appropriate for improving, restoring or maintaining nutritional needs  Description: Monitor and assess patient's nutrition/hydration status for malnutrition. Collaborate with interdisciplinary team and initiate plan and interventions as ordered. Monitor patient's weight and dietary intake as ordered or per policy. Utilize nutrition screening tool and intervene as necessary. Determine patient's food preferences and provide high-protein, high-caloric foods as appropriate.      INTERVENTIONS:  - Monitor oral intake, urinary output, labs, and treatment plans  - Assess nutrition and hydration status and recommend course of action  - Evaluate amount of meals eaten  - Assist patient with eating if necessary   - Allow adequate time for meals  - Recommend/ encourage appropriate diets, oral nutritional supplements, and vitamin/mineral supplements  - Order, calculate, and assess calorie counts as needed  - Recommend, monitor, and adjust tube feedings and TPN/PPN based on assessed needs  - Assess need for intravenous fluids  - Provide specific nutrition/hydration education as appropriate  - Include patient/family/caregiver in decisions related to nutrition  Outcome: Progressing

## 2023-07-05 NOTE — ASSESSMENT & PLAN NOTE
HR borderline tachycardic while in ED   · EKG showing Afib   · Not currently on outpatient rate control or AC

## 2023-07-05 NOTE — CASE MANAGEMENT
Case Management Discharge Planning Note    Patient name Natasha Washburn  Location [address] [facility] [place] 420/-76 MRN [id number]  : 11/3/1924 Date 2023       Current Admission Date: 2023  Current Admission Diagnosis:Acute CHF (congestive heart failure) [facility])   Patient Active Problem List    Diagnosis Date Noted   • Acute CHF (congestive heart failure) ([address]) 2023   • Dementia ([address]) 2023   • Subacute cough 2023   • Allergic bronchitis 2023   • Weakness of left leg 2023   • Does mobilize using walker 2023   • Chronic congestive heart failure ([address]) 2022   • Chronic atrial fibrillation ([address]) 10/11/2022   • Stage 3a chronic kidney disease ([address]) 2022   • 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[address]) 2022   • Ambulatory dysfunction 2021   • Anxiety 2021   • Vitamin D deficiency 2020   • Primary insomnia 2019   • Degeneration of lumbar intervertebral disc 2019   • Lumbar radiculopathy 2019   • Arthropathy of lumbar facet joint 2019   • Vascular dementia without behavioral disturbance ([address]) 2019   • Visual hallucinations 2019   • Restless legs syndrome 2019      LOS (days): 4  Geometric Mean LOS (GMLOS) (days): 3.00  Days to GMLOS:-1.5     OBJECTIVE:  Risk of Unplanned Readmission Score: 14.73         Current admission status: Inpatient   Preferred Pharmacy:   [id number], [address] [address]  Phone: [phone] Fax: [phone]    Primary Care Provider: FARZANA Herrera    Primary Insurance: [facility] [employer]  Secondary Insurance: [address] Number: [id number]

## 2023-07-05 NOTE — PLAN OF CARE
Problem: Potential for Falls  Goal: Patient will remain free of falls  Description: INTERVENTIONS:  - Educate patient/family on patient safety including physical limitations  - Instruct patient to call for assistance with activity   - Consult OT/PT to assist with strengthening/mobility   - Keep Call bell within reach  - Keep bed low and locked with side rails adjusted as appropriate  - Keep care items and personal belongings within reach  - Initiate and maintain comfort rounds  - Make Fall Risk Sign visible to staff  - Offer Toileting every 2 Hours, in advance of need  - Initiate/Maintain bedalarm  - Obtain necessary fall risk management equipment:   - Apply yellow socks and bracelet for high fall risk patients  - Consider moving patient to room near nurses station  Outcome: Progressing     Problem: MOBILITY - ADULT  Goal: Maintain or return to baseline ADL function  Description: INTERVENTIONS:  -  Assess patient's ability to carry out ADLs; assess patient's baseline for ADL function and identify physical deficits which impact ability to perform ADLs (bathing, care of mouth/teeth, toileting, grooming, dressing, etc.)  - Assess/evaluate cause of self-care deficits   - Assess range of motion  - Assess patient's mobility; develop plan if impaired  - Assess patient's need for assistive devices and provide as appropriate  - Encourage maximum independence but intervene and supervise when necessary  - Involve family in performance of ADLs  - Assess for home care needs following discharge   - Consider OT consult to assist with ADL evaluation and planning for discharge  - Provide patient education as appropriate  Outcome: Progressing  Goal: Maintains/Returns to pre admission functional level  Description: INTERVENTIONS:  - Perform BMAT or MOVE assessment daily.   - Set and communicate daily mobility goal to care team and patient/family/caregiver.    - Collaborate with rehabilitation services on mobility goals if consulted  - Perform Range of Motion 3 times a day. - Reposition patient every 2 hours. - Dangle patient 3 times a day  - Stand patient 3 times a day  - Ambulate patient 3 times a day  - Out of bed to chair 3 times a day   - Out of bed for meals 3 times a day  - Out of bed for toileting  - Record patient progress and toleration of activity level   Outcome: Progressing     Problem: Prexisting or High Potential for Compromised Skin Integrity  Goal: Skin integrity is maintained or improved  Description: INTERVENTIONS:  - Identify patients at risk for skin breakdown  - Assess and monitor skin integrity  - Assess and monitor nutrition and hydration status  - Monitor labs   - Assess for incontinence   - Turn and reposition patient  - Assist with mobility/ambulation  - Relieve pressure over bony prominences  - Avoid friction and shearing  - Provide appropriate hygiene as needed including keeping skin clean and dry  - Evaluate need for skin moisturizer/barrier cream  - Collaborate with interdisciplinary team   - Patient/family teaching  - Consider wound care consult   Outcome: Progressing     Problem: Nutrition/Hydration-ADULT  Goal: Nutrient/Hydration intake appropriate for improving, restoring or maintaining nutritional needs  Description: Monitor and assess patient's nutrition/hydration status for malnutrition. Collaborate with interdisciplinary team and initiate plan and interventions as ordered. Monitor patient's weight and dietary intake as ordered or per policy. Utilize nutrition screening tool and intervene as necessary. Determine patient's food preferences and provide high-protein, high-caloric foods as appropriate.      INTERVENTIONS:  - Monitor oral intake, urinary output, labs, and treatment plans  - Assess nutrition and hydration status and recommend course of action  - Evaluate amount of meals eaten  - Assist patient with eating if necessary   - Allow adequate time for meals  - Recommend/ encourage appropriate diets, oral nutritional supplements, and vitamin/mineral supplements  - Order, calculate, and assess calorie counts as needed  - Recommend, monitor, and adjust tube feedings and TPN/PPN based on assessed needs  - Assess need for intravenous fluids  - Provide specific nutrition/hydration education as appropriate  - Include patient/family/caregiver in decisions related to nutrition  Outcome: Progressing

## 2023-07-05 NOTE — PROGRESS NOTES
[address]  Progress Note  Name: Darell Berger  MRN: [id number]  Unit/Bed#: -01 I Date of Admission: 7/1/2023   Date of Service: 7/5/2023 I Hospital Day: 4    Assessment/Plan   * Acute CHF (congestive heart failure) ([address])  Assessment & Plan  Wt Readings from Last 3 Encounters:   07/05/23 58.2 kg (128 lb 4.9 oz)   06/20/23 66.2 kg (146 lb)   06/06/23 64.9 kg (143 lb)     Patient reports worsening cough and leg swelling over the past several days, prompting ED visit. Due to patient's dementia history ROS is limited, however per chart review it appears cough has been an ongoing issue for around 1 month. She was previously seen by her PCP who felt symptoms were due to bronchitis and prescribed a medrol dose pack. Currently patient denies any other symptoms/complaints. improved  · Reporting cough over the past several days  · CXR wet read suggestive of vascular congestion  ·   · Trop 17  · 12/2022 ECHO EF 61%   · Transition to po lasix 40 mg bid, stable  · CTA PE study-no pulmonary artery emboli noted. Cardiomegaly with dilated right ventricle, right atrium and left atrium.   · Low sodium/fluid restricted diet   · Daily standing scale weight and I/O monitoring     Dementia (HCC)  Assessment & Plan  · Pleasant and compliant   · Monitor mentation  · Delirium precautions     Chronic atrial fibrillation (HCC)  Assessment & Plan  HR borderline tachycardic while in ED   · EKG showing Afib   · Not currently on outpatient rate control or AC   · Acute CHF possibly contributing; to monitor HR while volume overload is reduced for now  · Tele monitoring ordered   · PRN rate control agents as appropriate     Stage 3a chronic kidney disease [facility])  Assessment & Plan  Lab Results   Component Value Date    EGFR 38 07/04/2023    EGFR 35 07/02/2023    EGFR 41 07/01/2023    CREATININE 1.19 07/04/2023    CREATININE 1.27 07/02/2023    CREATININE 1.11 07/01/2023     · Creatinine appears around baseline · Avoid nephrotoxic agents   · AM BMP             VTE Pharmacologic Prophylaxis: VTE Score: 5 Moderate Risk (Score 3-4) - Pharmacological DVT Prophylaxis Ordered: heparin. Patient Centered Rounds: I performed bedside rounds with nursing staff today. Discussions with Specialists or Other Care Team Provider: Case management    Education and Discussions with Family / Patient: Attempted to update  (niece) via phone. Left voicemail. Total Time Spent on Date of Encounter in care of patient: 32 minutes This time was spent on one or more of the following: performing physical exam; counseling and coordination of care; obtaining or reviewing history; documenting in the medical record; reviewing/ordering tests, medications or procedures; communicating with other healthcare professionals and discussing with patient's family/caregivers. Current Length of Stay: 4 day(s)  Current Patient Status: Inpatient   Certification Statement: Patient medically stable awaiting rehab  Discharge Plan: Patient medically stable awaiting rehab    Code Status: Level 1 - Full Code    Subjective:   Patient resting comfortably on examination. Patient had no overnight events or complaints on exam.    Objective:     Vitals:   Temp (24hrs), Av °F (37.2 °C), Min:98.5 °F (36.9 °C), Max:99.6 °F (37.6 °C)    Temp:  [98.5 °F (36.9 °C)-99.6 °F (37.6 °C)] 98.5 °F (36.9 °C)  HR:  [103-107] 107  Resp:  [16-19] 16  BP: (114-127)/(61-75) 126/64  SpO2:  [90 %-94 %] 92 %  Body mass index is 25.91 kg/m². Input and Output Summary (last 24 hours): Intake/Output Summary (Last 24 hours) at 2023 1412  Last data filed at 2023 1251  Gross per 24 hour   Intake 380 ml   Output --   Net 380 ml       Physical Exam:   Physical Exam  Vitals and nursing note reviewed. Constitutional:       General: She is not in acute distress. Appearance: She is well-developed.       Comments: Chronically ill-appearing   HENT:      Head: Normocephalic and atraumatic. Eyes:      General: No scleral icterus. Conjunctiva/sclera: Conjunctivae normal.   Cardiovascular:      Rate and Rhythm: Normal rate and regular rhythm. Heart sounds: Normal heart sounds. No murmur heard. No friction rub. No gallop. Pulmonary:      Effort: Pulmonary effort is normal. No respiratory distress. Breath sounds: Normal breath sounds. No wheezing or rales. Abdominal:      General: Bowel sounds are normal. There is no distension. Palpations: Abdomen is soft. Tenderness: There is no abdominal tenderness. Musculoskeletal:         General: Normal range of motion. Skin:     General: Skin is warm. Findings: No rash. Neurological:      Mental Status: She is alert. Mental status is at baseline. Additional Data:     Labs:  Results from last 7 days   Lab Units 07/02/23  0518 07/01/23  0810   WBC Thousand/uL 10.34* 10.28*   HEMOGLOBIN g/dL 11.4* 12.0   HEMATOCRIT % 36.0 38.4   PLATELETS Thousands/uL 282 288   NEUTROS PCT %  --  73   LYMPHS PCT %  --  11*   MONOS PCT %  --  11   EOS PCT %  --  4     Results from last 7 days   Lab Units 07/04/23  0448 07/02/23  0518 07/01/23  0206   SODIUM mmol/L 138   < > 137   POTASSIUM mmol/L 3.6   < > 4.0   CHLORIDE mmol/L 102   < > 102   CO2 mmol/L 29   < > 27   BUN mg/dL 31*   < > 23   CREATININE mg/dL 1.19   < > 1.11   ANION GAP mmol/L 7   < > 8   CALCIUM mg/dL 8.0*   < > 8.1*   ALBUMIN g/dL  --   --  3.3*   TOTAL BILIRUBIN mg/dL  --   --  0.79   ALK PHOS U/L  --   --  115*   ALT U/L  --   --  18   AST U/L  --   --  21   GLUCOSE RANDOM mg/dL 96   < > 127    < > = values in this interval not displayed.      Results from last 7 days   Lab Units 07/01/23  0206   INR  1.22*             Results from last 7 days   Lab Units 07/01/23  0206   LACTIC ACID mmol/L 1.3   PROCALCITONIN ng/ml 0.08       Lines/Drains:  Invasive Devices     Peripheral Intravenous Line  Duration           Peripheral IV 07/03/23 Right Antecubital 1 day                      Imaging: No pertinent imaging reviewed. Recent Cultures (last 7 days):   Results from last 7 days   Lab Units 07/01/23  0328 07/01/23  0206   BLOOD CULTURE  No Growth After 4 Days. No Growth After 4 Days. Last 24 Hours Medication List:   Current Facility-Administered Medications   Medication Dose Route Frequency Provider Last Rate   • dextromethorphan-guaiFENesin  10 mL Oral Q4H PRN Chanel Kelli Araujoya, DO     • escitalopram  5 mg Oral Daily Isatu Lozano PA-C     • furosemide  40 mg Oral BID (diuretic) Chanelharoldo Araujoya, DO     • gabapentin  100 mg Oral TID Isatu Loazno PA-C     • guaiFENesin  600 mg Oral Q12H [address] Chanelharoldo Hamm, DO     • heparin (porcine)  5,000 Units Subcutaneous [place], [place]     • ipratropium  0.5 mg Nebulization Q6H PRN Chanelharoldo Hamm, DO     • QUEtiapine  25 mg Oral HS Isatu Lozano PA-C          Today, Patient Was Seen By: King Yovany DO    **Please Note: This note may have been constructed using a voice recognition system. **

## 2023-07-05 NOTE — ASSESSMENT & PLAN NOTE
Wt Readings from Last 3 Encounters:   07/05/23 58.2 kg (128 lb 4.9 oz)   06/20/23 66.2 kg (146 lb)   06/06/23 64.9 kg (143 lb)     Patient reports worsening cough and leg swelling over the past several days, prompting ED visit. Due to patient's dementia history ROS is limited, however per chart review it appears cough has been an ongoing issue for around 1 month. She was previously seen by her PCP who felt symptoms were due to bronchitis and prescribed a medrol dose pack. Currently patient denies any other symptoms/complaints. improved  · Reporting cough over the past several days  · CXR wet read suggestive of vascular congestion  ·   · Trop 17  · 12/2022 ECHO EF 61%   · Lasix 40 mg p.o. in the morning and 20 mg p.o. in the afternoon  · CTA PE study-no pulmonary artery emboli noted. Cardiomegaly with dilated right ventricle, right atrium and left atrium.   · Low sodium/fluid restricted diet

## 2023-07-05 NOTE — CASE MANAGEMENT
[facility] has received approved authorization from insurance:   Ayan Calvillo in by Rep: [employer]#  [phone] [id number]  Authorization received for: SNF  [facility]ility: [facility] #: [id number]  Start of Care: 7/5  Next Review Date: 7/11  Continued Stay Care Coordinator: Laureen ROLLINS#: [phone] [id number]  Submit next review to: [phone]   Care Manager notified: Barbara Poe

## 2023-07-05 NOTE — CASE MANAGEMENT
Case Management Discharge Planning Note    Patient name Eyad Diaz  Location [address] [facility] [place] 420/-93 MRN [id number]  : 11/3/1924 Date 2023       Current Admission Date: 2023  Current Admission Diagnosis:Acute CHF (congestive heart failure) [facility])   Patient Active Problem List    Diagnosis Date Noted   • Acute CHF (congestive heart failure) ([address]) 2023   • Dementia ([address]) 2023   • Subacute cough 2023   • Allergic bronchitis 2023   • Weakness of left leg 2023   • Does mobilize using walker 2023   • Chronic congestive heart failure ([address]) 2022   • Chronic atrial fibrillation ([address]) 10/11/2022   • Stage 3a chronic kidney disease ([address]) 2022   • 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[address]) 2022   • Ambulatory dysfunction 2021   • Anxiety 2021   • Vitamin D deficiency 2020   • Primary insomnia 2019   • Degeneration of lumbar intervertebral disc 2019   • Lumbar radiculopathy 2019   • Arthropathy of lumbar facet joint 2019   • Vascular dementia without behavioral disturbance ([address]) 2019   • Visual hallucinations 2019   • Restless legs syndrome 2019      LOS (days): 4  Geometric Mean LOS (GMLOS) (days): 3.00  Days to GMLOS:-1.4     OBJECTIVE:  Risk of Unplanned Readmission Score: 14.73         Current admission status: Inpatient   [facility] Pharmacy[facility]   [id number], [address] [place] [address] Road [facility]  Phone: [phone] Fax: [phone]    Primary Care Provider: Georga Hodgkin, CRNP    Primary Insurance: [facility] [employer] [employer]  Secondary Insurance: [place]    DISCHARGE DETAILS:    Discharge planning discussed with[de-identified] Pt and her niece Alex Klaudia of Choice: Yes  Comments - Freedom of Choice: As per SLIM, pt is medically cleared for dc once auth is received. Pt will be going to [facility].  CM contiues to follow. CM contacted family/caregiver?: Yes  Were Treatment Team discharge recommendations reviewed with patient/caregiver?: Yes  Did patient/caregiver verbalize understanding of patient care needs?: Yes  Were patient/caregiver advised of the risks associated with not following Treatment Team discharge recommendations?: Yes    Contacts  Patient Contacts: James Nixon  Contact Method: In Person  Reason/Outcome: Continuity of Care, Discharge Planning              Treatment Team Recommendation: Short Term Rehab  Discharge Destination Plan[de-identified] Short Term Rehab  Transport at Discharge : BLS Ambulance                   IMM Given (Date):: 07/05/23  IMM Given to[de-identified] Patient  Family notified[de-identified] Pt at bedside  Additional Comments: CM reviewed IMM with pt at bedside. Pt expressed full and complete understanding. Copy provided and original placced in MR for scanning.

## 2023-07-05 NOTE — CASE MANAGEMENT
[facility] received request for authorization from Care Manager. Authorization request for: [facility]  Facility Name: [address] [place] [address]. FFE:[id number].   Facility MD: Dr. David Duarte NPI: [id number]   Authorization initiated by contacting insurance: [employer] Via: [employer]  Pending Reference #: [id number]   Clinicals submitted via: Adalid Lucia

## 2023-07-05 NOTE — ASSESSMENT & PLAN NOTE
Lab Results   Component Value Date    EGFR 38 07/04/2023    EGFR 35 07/02/2023    EGFR 41 07/01/2023    CREATININE 1.19 07/04/2023    CREATININE 1.27 07/02/2023    CREATININE 1.11 07/01/2023     · Creatinine appears around baseline

## 2023-07-06 ENCOUNTER — TRANSITIONAL CARE MANAGEMENT (OUTPATIENT)
Dept: FAMILY MEDICINE CLINIC | Facility: CLINIC | Age: 88
End: 2023-07-06

## 2023-07-06 VITALS
DIASTOLIC BLOOD PRESSURE: 70 MMHG | WEIGHT: 127.43 LBS | HEIGHT: 59 IN | OXYGEN SATURATION: 93 % | BODY MASS INDEX: 25.69 KG/M2 | RESPIRATION RATE: 18 BRPM | TEMPERATURE: 98.4 F | HEART RATE: 100 BPM | SYSTOLIC BLOOD PRESSURE: 117 MMHG

## 2023-07-06 LAB
ATRIAL RATE: 120 BPM
BACTERIA BLD CULT: NORMAL
BACTERIA BLD CULT: NORMAL
QRS AXIS: 32 DEGREES
QRSD INTERVAL: 104 MS
QT INTERVAL: 356 MS
QTC INTERVAL: 477 MS
T WAVE AXIS: 102 DEGREES
VENTRICULAR RATE: 108 BPM

## 2023-07-06 PROCEDURE — 99239 HOSP IP/OBS DSCHRG MGMT >30: CPT | Performed by: INTERNAL MEDICINE

## 2023-07-06 PROCEDURE — 93010 ELECTROCARDIOGRAM REPORT: CPT | Performed by: INTERNAL MEDICINE

## 2023-07-06 RX ORDER — FUROSEMIDE 40 MG/1
TABLET ORAL
Qty: 60 TABLET | Refills: 0 | Status: SHIPPED | OUTPATIENT
Start: 2023-07-06

## 2023-07-06 RX ADMIN — FUROSEMIDE 40 MG: 40 TABLET ORAL at 08:06

## 2023-07-06 RX ADMIN — HEPARIN SODIUM 5000 UNITS: 5000 INJECTION INTRAVENOUS; SUBCUTANEOUS at 06:08

## 2023-07-06 RX ADMIN — ESCITALOPRAM OXALATE 5 MG: 10 TABLET ORAL at 08:06

## 2023-07-06 RX ADMIN — GABAPENTIN 100 MG: 100 CAPSULE ORAL at 08:06

## 2023-07-06 RX ADMIN — GUAIFENESIN 600 MG: 600 TABLET ORAL at 08:06

## 2023-07-06 NOTE — NURSING NOTE
Patient discharged at this time, called [facility] three times to give report on patient without answer. Discharge paperwork was faxed and given to transport.

## 2023-07-06 NOTE — NURSING NOTE
Patient discharged at this time, via wheelchair with transport. IV and Masimo removed, no further needs at this time.

## 2023-07-06 NOTE — PLAN OF CARE
Problem: Potential for Falls  Goal: Patient will remain free of falls  Description: INTERVENTIONS:  - Educate patient/family on patient safety including physical limitations  - Instruct patient to call for assistance with activity   - Consult OT/PT to assist with strengthening/mobility   - Keep Call bell within reach  - Keep bed low and locked with side rails adjusted as appropriate  - Keep care items and personal belongings within reach  - Initiate and maintain comfort rounds  - Make Fall Risk Sign visible to staff  - Offer Toileting every 2 Hours, in advance of need  - Initiate/Maintain bed alarm  - Obtain necessary fall risk management equipment  - Apply yellow socks and bracelet for high fall risk patients  - Consider moving patient to room near nurses station  7/6/2023 1428 by Sussy Gan  Outcome: Adequate for Discharge  7/6/2023 9366 by Sussy Gan  Outcome: Progressing     Problem: MOBILITY - ADULT  Goal: Maintain or return to baseline ADL function  Description: INTERVENTIONS:  -  Assess patient's ability to carry out ADLs; assess patient's baseline for ADL function and identify physical deficits which impact ability to perform ADLs (bathing, care of mouth/teeth, toileting, grooming, dressing, etc.)  - Assess/evaluate cause of self-care deficits   - Assess range of motion  - Assess patient's mobility; develop plan if impaired  - Assess patient's need for assistive devices and provide as appropriate  - Encourage maximum independence but intervene and supervise when necessary  - Involve family in performance of ADLs  - Assess for home care needs following discharge   - Consider OT consult to assist with ADL evaluation and planning for discharge  - Provide patient education as appropriate  7/6/2023 1428 by Sussy Gan  Outcome: Adequate for Discharge  7/6/2023 9506 by Sussy Gan  Outcome: Progressing  Goal: Maintains/Returns to pre admission functional level  Description: INTERVENTIONS:  - Perform BMAT or MOVE assessment daily.   - Set and communicate daily mobility goal to care team and patient/family/caregiver. - Collaborate with rehabilitation services on mobility goals if consulted  - Perform Range of Motion 2 times a day. - Reposition patient every 2 hours. - Dangle patient 2 times a day  - Stand patient 2 times a day  - Ambulate patient 2 times a day  - Out of bed to chair 2 times a day   - Out of bed for meals 2 times a day  - Out of bed for toileting  - Record patient progress and toleration of activity level   7/6/2023 1428 by Harriet Auburn  Outcome: Adequate for Discharge  7/6/2023 0938 by Harriet Auburn  Outcome: Progressing     Problem: Prexisting or High Potential for Compromised Skin Integrity  Goal: Skin integrity is maintained or improved  Description: INTERVENTIONS:  - Identify patients at risk for skin breakdown  - Assess and monitor skin integrity  - Assess and monitor nutrition and hydration status  - Monitor labs   - Assess for incontinence   - Turn and reposition patient  - Assist with mobility/ambulation  - Relieve pressure over bony prominences  - Avoid friction and shearing  - Provide appropriate hygiene as needed including keeping skin clean and dry  - Evaluate need for skin moisturizer/barrier cream  - Collaborate with interdisciplinary team   - Patient/family teaching  - Consider wound care consult   7/6/2023 1428 by Harriet Auburn  Outcome: Adequate for Discharge  7/6/2023 0749 by Harriet Auburn  Outcome: Progressing     Problem: Nutrition/Hydration-ADULT  Goal: Nutrient/Hydration intake appropriate for improving, restoring or maintaining nutritional needs  Description: Monitor and assess patient's nutrition/hydration status for malnutrition. Collaborate with interdisciplinary team and initiate plan and interventions as ordered. Monitor patient's weight and dietary intake as ordered or per policy. Utilize nutrition screening tool and intervene as necessary. Determine patient's food preferences and provide high-protein, high-caloric foods as appropriate.      INTERVENTIONS:  - Monitor oral intake, urinary output, labs, and treatment plans  - Assess nutrition and hydration status and recommend course of action  - Evaluate amount of meals eaten  - Assist patient with eating if necessary   - Allow adequate time for meals  - Recommend/ encourage appropriate diets, oral nutritional supplements, and vitamin/mineral supplements  - Order, calculate, and assess calorie counts as needed  - Recommend, monitor, and adjust tube feedings and TPN/PPN based on assessed needs  - Assess need for intravenous fluids  - Provide specific nutrition/hydration education as appropriate  - Include patient/family/caregiver in decisions related to nutrition  7/6/2023 1428 by Sera Garsia  Outcome: Adequate for Discharge  7/6/2023 0451 by Sera Garsia  Outcome: Progressing

## 2023-07-06 NOTE — CASE MANAGEMENT
Case Management Discharge Planning Note    Patient name Lissa Charlton  Location [address] [facility] [place] 420/-22 MRN [id number]  : 11/3/1924 Date 2023       Current Admission Date: 2023  Current Admission Diagnosis:Acute CHF (congestive heart failure) [facility])   Patient Active Problem List    Diagnosis Date Noted   • Acute CHF (congestive heart failure) ([address]) 2023   • Dementia ([address]) 2023   • Subacute cough 2023   • Allergic bronchitis 2023   • Weakness of left leg 2023   • Does mobilize using walker 2023   • Chronic congestive heart failure ([address]) 2022   • Chronic atrial fibrillation ([address]) 10/11/2022   • Stage 3a chronic kidney disease ([address]) 2022   • 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[address]) 2022   • Ambulatory dysfunction 2021   • Anxiety 2021   • Vitamin D deficiency 2020   • Primary insomnia 2019   • Degeneration of lumbar intervertebral disc 2019   • Lumbar radiculopathy 2019   • Arthropathy of lumbar facet joint 2019   • Vascular dementia without behavioral disturbance ([address]) 2019   • Visual hallucinations 2019   • Restless legs syndrome 2019      LOS (days): 5  Geometric Mean LOS (GMLOS) (days): 3.00  Days to GMLOS:-2.2     OBJECTIVE:  Risk of Unplanned Readmission Score: 14.92         Current admission status: Inpatient   [facility]erred Pharmacy:   [id number] [place], [address] [address]  Phone: [phone] Fax: [phone]    Primary Care Provider: FARZANA Guido    Primary Insurance: [facility] [employer] [employer]  Secondary Insurance: [id number] DETAILS:  Pt is being discharge to [address] [place] [address] today. Pt has a 2:30pm  with [employer] [facility] [employer] and her niece was made aware. SLIM and bedside RN notified. CM continues to follow.                   Accepting Facility Name, [facility] : [address] [place] [address] [facility], [facility]  Receiving Facility/Agency Phone Number: Phone: [phone]  Facility/Agency Fax Number: Fax: [phone]

## 2023-07-06 NOTE — DISCHARGE SUMMARY
[address]  Discharge- Sandhya Ghotra 11/3/1924, 80 y.o. female MRN: [id number]  Unit/Bed#: -01 Encounter: [id number]  Primary Care Provider: FARZANA Valles   Date and time admitted to hospital: 7/1/2023 12:36 AM    * Acute diastolic CHF (congestive heart failure) [facility])  Assessment & Plan  Wt Readings from Last 3 Encounters:   07/05/23 58.2 kg (128 lb 4.9 oz)   06/20/23 66.2 kg (146 lb)   06/06/23 64.9 kg (143 lb)     Patient reports worsening cough and leg swelling over the past several days, prompting ED visit. Due to patient's dementia history ROS is limited, however per chart review it appears cough has been an ongoing issue for around 1 month. She was previously seen by her PCP who felt symptoms were due to bronchitis and prescribed a medrol dose pack. Currently patient denies any other symptoms/complaints. improved  · Reporting cough over the past several days  · CXR wet read suggestive of vascular congestion  ·   · Trop 17  · 12/2022 ECHO EF 61%   · Lasix 40 mg p.o. in the morning and 20 mg p.o. in the afternoon  · CTA PE study-no pulmonary artery emboli noted. Cardiomegaly with dilated right ventricle, right atrium and left atrium.   · Low sodium/fluid restricted diet     Dementia (HCC)  Assessment & Plan  · Pleasant and compliant   · Monitor mentation  · Delirium precautions     Chronic atrial fibrillation (HCC)  Assessment & Plan  HR borderline tachycardic while in ED   · EKG showing Afib   · Not currently on outpatient rate control or AC     Stage 3a chronic kidney disease [facility])  Assessment & Plan  Lab Results   Component Value Date    EGFR 38 07/04/2023    EGFR 35 07/02/2023    EGFR 41 07/01/2023    CREATININE 1.19 07/04/2023    CREATININE 1.27 07/02/2023    CREATININE 1.11 07/01/2023     · Creatinine appears around baseline       Medical Problems     Resolved Problems  Date Reviewed: 7/3/2023   None       Discharging Physician / Practitioner: Marie Manzano Brayan Boo DO  PCP: Cory Red, [facility]  Admission Date:   Admission Orders (From admission, onward)     Ordered        07/01/23 0309  INPATIENT ADMISSION  Once                      Discharge Date: 07/06/23    Consultations During Hospital Stay:  · none    Reason for Admission: Cough    Hospital Course:   Robin Delgadillo is a 80 y.o. female patient who originally presented to the hospital on 7/1/2023 due to cough. Patient noted to have acute CHF exacerbation and was treated with IV Lasix with improvement in shortness of breath. Patient back to baseline at this time and denies any complaints. Patient is stable for discharge to rehab facility. Patient should follow-up with primary care provider on discharge. Please see above list of diagnoses and related plan for additional information. Condition at Discharge: stable    Discharge Day Visit / Exam:   Subjective: Patient resting comfortably on examination. Patient had no overnight events or complaints on exam.  Vitals: Blood Pressure: 117/70 (07/06/23 0654)  Pulse: 100 (07/06/23 0654)  Temperature: 98.4 °F (36.9 °C) (07/06/23 0654)  Temp Source: Oral (07/05/23 1536)  Respirations: 18 (07/06/23 0654)  Height: 4' 11" (149.9 cm) (07/03/23 0850)  Weight - Scale: 57.8 kg (127 lb 6.8 oz) (07/06/23 0600)  SpO2: 93 % (07/06/23 0654)  Exam:   Physical Exam  Vitals and nursing note reviewed. Constitutional:       General: She is not in acute distress. Appearance: She is well-developed. Comments: Chronically ill appearing    HENT:      Head: Normocephalic and atraumatic. Eyes:      General: No scleral icterus. Conjunctiva/sclera: Conjunctivae normal.   Cardiovascular:      Rate and Rhythm: Normal rate and regular rhythm. Heart sounds: Normal heart sounds. No murmur heard. No friction rub. No gallop. Pulmonary:      Effort: Pulmonary effort is normal. No respiratory distress. Breath sounds: Normal breath sounds. No wheezing or rales. Abdominal:      General: Bowel sounds are normal. There is no distension. Palpations: Abdomen is soft. Tenderness: There is no abdominal tenderness. Musculoskeletal:         General: Normal range of motion. Skin:     General: Skin is warm. Findings: No rash. Neurological:      Mental Status: She is alert. Mental status is at baseline. Discussion with Family: Attempted to update  (niece) via phone. Left voicemail. Discharge instructions/Information to patient and family:   See after visit summary for information provided to patient and family. Provisions for Follow-Up Care:  See after visit summary for information related to follow-up care and any pertinent home health orders. Disposition:   Acute Rehab at rehab    Planned Readmission: none     Discharge Statement:  I spent 33 minutes discharging the patient. This time was spent on the day of discharge. I had direct contact with the patient on the day of discharge. Greater than 50% of the total time was spent examining patient, answering all patient questions, arranging and discussing plan of care with patient as well as directly providing post-discharge instructions. Additional time then spent on discharge activities. Discharge Medications:  See after visit summary for reconciled discharge medications provided to patient and/or family.       **Please Note: This note may have been constructed using a voice recognition system**

## 2023-07-12 ENCOUNTER — TELEPHONE (OUTPATIENT)
Dept: FAMILY MEDICINE CLINIC | Facility: CLINIC | Age: 88
End: 2023-07-12

## 2023-07-12 NOTE — TELEPHONE ENCOUNTER
John Mohan from [employer] called to ask if Joy Ewing had been instructed to stop her Seroquel prior to her most recent admission. The family stated yes but no dates of discontinuation of this medication.   They did note that it was ordered while in the hospital.      John Mohan [phone] Ext [phone]

## 2023-07-26 ENCOUNTER — TELEPHONE (OUTPATIENT)
Dept: FAMILY MEDICINE CLINIC | Facility: CLINIC | Age: 88
End: 2023-07-26

## 2023-07-26 NOTE — TELEPHONE ENCOUNTER
Hi, this is Yarelis. I am a nurse with [address] [place] [address]. I'm calling about a mutual patient, Malena Fagan, date of birth [birth year]. I just was out doing the assessment this morning and she has a medical, a medication warning against two of the medication she's on The first one is the escitalopram and the second one is the quarantine QUETIAPINA. There's a medical interaction between the two. If you have any questions, [phone]. Thank you. Have a good day.

## 2023-08-03 ENCOUNTER — APPOINTMENT (OUTPATIENT)
Dept: LAB | Facility: CLINIC | Age: 88
End: 2023-08-03
Payer: COMMERCIAL

## 2023-08-03 ENCOUNTER — OFFICE VISIT (OUTPATIENT)
Dept: FAMILY MEDICINE CLINIC | Facility: CLINIC | Age: 88
End: 2023-08-03
Payer: COMMERCIAL

## 2023-08-03 VITALS
HEIGHT: 59 IN | TEMPERATURE: 97.7 F | WEIGHT: 126.2 LBS | BODY MASS INDEX: 25.44 KG/M2 | HEART RATE: 78 BPM | OXYGEN SATURATION: 93 % | SYSTOLIC BLOOD PRESSURE: 120 MMHG | DIASTOLIC BLOOD PRESSURE: 74 MMHG

## 2023-08-03 DIAGNOSIS — M54.16 LUMBAR RADICULOPATHY: ICD-10-CM

## 2023-08-03 DIAGNOSIS — F01.50 VASCULAR DEMENTIA WITHOUT BEHAVIORAL DISTURBANCE (HCC): ICD-10-CM

## 2023-08-03 DIAGNOSIS — F03.90 DEMENTIA, UNSPECIFIED DEMENTIA SEVERITY, UNSPECIFIED DEMENTIA TYPE, UNSPECIFIED WHETHER BEHAVIORAL, PSYCHOTIC, OR MOOD DISTURBANCE OR ANXIETY (HCC): ICD-10-CM

## 2023-08-03 DIAGNOSIS — F01.518 VASCULAR DEMENTIA WITH BEHAVIORAL DISTURBANCE (HCC): ICD-10-CM

## 2023-08-03 DIAGNOSIS — I50.9 ACUTE CONGESTIVE HEART FAILURE, UNSPECIFIED HEART FAILURE TYPE (HCC): Primary | ICD-10-CM

## 2023-08-03 DIAGNOSIS — I50.9 CHRONIC CONGESTIVE HEART FAILURE, UNSPECIFIED HEART FAILURE TYPE (HCC): ICD-10-CM

## 2023-08-03 DIAGNOSIS — E55.9 VITAMIN D DEFICIENCY: ICD-10-CM

## 2023-08-03 PROBLEM — R05.2 SUBACUTE COUGH: Status: RESOLVED | Noted: 2023-06-20 | Resolved: 2023-08-03

## 2023-08-03 LAB
ANION GAP SERPL CALCULATED.3IONS-SCNC: 7 MMOL/L
BUN SERPL-MCNC: 29 MG/DL (ref 5–25)
CALCIUM SERPL-MCNC: 8.9 MG/DL (ref 8.3–10.1)
CHLORIDE SERPL-SCNC: 106 MMOL/L (ref 96–108)
CO2 SERPL-SCNC: 28 MMOL/L (ref 21–32)
CREAT SERPL-MCNC: 1.32 MG/DL (ref 0.6–1.3)
GFR SERPL CREATININE-BSD FRML MDRD: 33 ML/MIN/1.73SQ M
GLUCOSE SERPL-MCNC: 98 MG/DL (ref 65–140)
POTASSIUM SERPL-SCNC: 3.5 MMOL/L (ref 3.5–5.3)
SODIUM SERPL-SCNC: 141 MMOL/L (ref 135–147)

## 2023-08-03 PROCEDURE — 36415 COLL VENOUS BLD VENIPUNCTURE: CPT

## 2023-08-03 PROCEDURE — 99495 TRANSJ CARE MGMT MOD F2F 14D: CPT | Performed by: NURSE PRACTITIONER

## 2023-08-03 PROCEDURE — 80048 BASIC METABOLIC PNL TOTAL CA: CPT

## 2023-08-03 RX ORDER — FUROSEMIDE 40 MG/1
TABLET ORAL
Qty: 60 TABLET | Refills: 0 | Status: SHIPPED | OUTPATIENT
Start: 2023-08-03

## 2023-08-03 RX ORDER — ERGOCALCIFEROL 1.25 MG/1
50000 CAPSULE ORAL WEEKLY
Qty: 12 CAPSULE | Refills: 1 | Status: SHIPPED | OUTPATIENT
Start: 2023-08-03 | End: 2024-01-30

## 2023-08-03 RX ORDER — GABAPENTIN 100 MG/1
100 CAPSULE ORAL 3 TIMES DAILY
Qty: 270 CAPSULE | Refills: 1 | Status: SHIPPED | OUTPATIENT
Start: 2023-08-03 | End: 2024-01-30

## 2023-08-03 RX ORDER — QUETIAPINE FUMARATE 25 MG/1
25 TABLET, FILM COATED ORAL
Qty: 90 TABLET | Refills: 3 | Status: SHIPPED | OUTPATIENT
Start: 2023-08-03

## 2023-08-03 RX ORDER — POTASSIUM CHLORIDE 750 MG/1
20 CAPSULE, EXTENDED RELEASE ORAL DAILY
Qty: 180 CAPSULE | Refills: 1 | Status: SHIPPED | OUTPATIENT
Start: 2023-08-03 | End: 2024-01-30

## 2023-08-03 NOTE — ASSESSMENT & PLAN NOTE
Wt Readings from Last 3 Encounters:   08/03/23 57.2 kg (126 lb 3.2 oz)   07/06/23 57.8 kg (127 lb 6.8 oz)   06/20/23 66.2 kg (146 lb)     Patient reports worsening cough and leg swelling over the past several days, prompting ED visit. Due to patient's dementia history ROS is limited, however per chart review it appears cough has been an ongoing issue for around 1 month. She was previously seen by her PCP who felt symptoms were due to bronchitis and prescribed a medrol dose pack. Currently patient denies any other symptoms/complaints. improved  • Reporting cough over the past several days  • CXR wet read suggestive of vascular congestion  •   • Trop 17  • 12/2022 ECHO EF 61%   • Lasix 40 mg p.o. in the morning and 20 mg p.o. in the afternoon  • CTA PE study-no pulmonary artery emboli noted. Cardiomegaly with dilated right ventricle, right atrium and left atrium.   • Low sodium/fluid restricted diet

## 2023-08-03 NOTE — PROGRESS NOTES
Assessment & Plan     1. Acute congestive heart failure, unspecified heart failure type [facility])  Assessment & Plan:  Wt Readings from Last 3 Encounters:   08/03/23 57.2 kg (126 lb 3.2 oz)   07/06/23 57.8 kg (127 lb 6.8 oz)   06/20/23 66.2 kg (146 lb)     Patient reports worsening cough and leg swelling over the past several days, prompting ED visit. Due to patient's dementia history ROS is limited, however per chart review it appears cough has been an ongoing issue for around 1 month. She was previously seen by her PCP who felt symptoms were due to bronchitis and prescribed a medrol dose pack. Currently patient denies any other symptoms/complaints. improved  Reporting cough over the past several days  CXR wet read suggestive of vascular congestion    Trop 17  12/2022 ECHO EF 61%   Lasix 40 mg p.o. in the morning and 20 mg p.o. in the afternoon  CTA PE study-no pulmonary artery emboli noted. Cardiomegaly with dilated right ventricle, right atrium and left atrium. Low sodium/fluid restricted diet         2. Dementia, unspecified dementia severity, unspecified dementia type, unspecified whether behavioral, psychotic, or mood disturbance or anxiety ([address])  Assessment & Plan:  Pleasant and compliant   Monitor mentation  Delirium precautions          3. Vascular dementia with behavioral disturbance  -     QUEtiapine (SEROquel) 25 mg tablet; Take 1 tablet (25 mg total) by mouth daily at bedtime    4. 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)  -     gabapentin (NEURONTIN) 100 mg capsule; Take 1 capsule (100 mg total) by mouth 3 (three) times a day    5. Lumbar radiculopathy  -     gabapentin (NEURONTIN) 100 mg capsule; Take 1 capsule (100 mg total) by mouth 3 (three) times a day    6. Chronic congestive heart failure, unspecified heart failure type (HCC)  -     furosemide (LASIX) 40 mg tablet; Take 40 mg in the morning and 20 mg in the afternoon  -     Basic metabolic panel;  Future  -     potassium chloride (MICRO-K) 10 MEQ CR capsule; Take 2 capsules (20 mEq total) by mouth daily    7. Vitamin D deficiency  -     ergocalciferol (VITAMIN D2) 50,000 units; Take 1 capsule (50,000 Units total) by mouth once a week         Subjective     Transitional Care Management Review:   Sandhya Ghotra is a 80 y.o. female here for TCM follow up. During the TCM phone call patient stated:  TCM Call     Date and time call was made  11/19/2019  2:31 PM    Hospital care reviewed  Records reviewed    Patient was hospitialized at  [facility]    Date of Admission  10/19/19    Date of discharge  10/22/19    Diagnosis  Closed compression fracture of L5 lumbar vertebra, initial encounter     Disposition  Home    Were the patients medications reviewed and updated  Yes    Current Symptoms  Back pain - right side    Back pain, right side, severity  Moderate    Back pain, right side, onset  Gradual      TCM Call     Post hospital issues  None    Scheduled for follow up? Yes    Did you obtain your prescribed medications  Yes    Do you need help managing your prescriptions or medications  No    Is transportation to your appointment needed  No    I have advised the patient to call PCP with any new or worsening symptoms  [address] [place], [place] [address]    Support System  Family    Have you fallen in the last 12 months  Yes    How many times  1    Interperter language line needed  No    Counseling  Family  Dinoonelmarnie Sagastume        7/6/2023  Sandhya Ghotra is a 80 y.o. female patient who originally presented to the hospital on 7/1/2023 due to cough. Patient noted to have acute CHF exacerbation and was treated with IV Lasix with improvement in shortness of breath. Patient back to baseline at this time and denies any complaints. Patient is stable for discharge to rehab facility.   Patient should follow-up with primary care provider on discharge.     8/3/2023  Patient here today for follow up from her recent hospital stay and has PT coming for home and visiting nurse coming to the home for home care. She is having some trouble with stairs and asking for a chair lift for their home to be able to get up and down the stairs. Review of Systems   Constitutional: Negative for activity change, appetite change, chills, diaphoresis, fatigue, fever and unexpected weight change. HENT: Negative for congestion, ear pain, hearing loss, postnasal drip, sinus pressure, sinus pain, sneezing, sore throat and trouble swallowing. Eyes: Negative for pain, redness and visual disturbance. Respiratory: Negative for cough and shortness of breath. Cardiovascular: Negative for chest pain and leg swelling. Gastrointestinal: Negative for abdominal pain, diarrhea, nausea and vomiting. Endocrine: Negative. Genitourinary: Negative. Musculoskeletal: Negative for arthralgias. Seated in wheelchair had two falls last week one on bottom    Skin: Negative. Allergic/Immunologic: Negative. Neurological: Negative for dizziness and light-headedness. Hematological: Negative. Psychiatric/Behavioral: Negative for behavioral problems and dysphoric mood. Objective     /74 (BP Location: Left arm, Patient Position: Sitting)   Pulse 78   Temp 97.7 °F (36.5 °C) (Temporal)   Ht 4' 11" (1.499 m)   Wt 57.2 kg (126 lb 3.2 oz)   SpO2 93%   BMI 25.49 kg/m²      Physical Exam  Vitals and nursing note reviewed. Constitutional:       General: She is not in acute distress. Appearance: She is well-developed. HENT:      Head: Normocephalic and atraumatic. Right Ear: Tympanic membrane normal.      Left Ear: Tympanic membrane normal.      Nose: Nose normal.      Mouth/Throat:      Mouth: Mucous membranes are moist.   Eyes:      Conjunctiva/sclera: Conjunctivae normal.   Cardiovascular:      Rate and Rhythm: Normal rate and regular rhythm. Heart sounds: No murmur heard.   Pulmonary:      Effort: Pulmonary effort is normal. No respiratory distress. Breath sounds: Normal breath sounds. Abdominal:      Palpations: Abdomen is soft. Tenderness: There is no abdominal tenderness. Musculoskeletal:         General: No swelling. Cervical back: Neck supple. Right lower leg: No edema. Left lower leg: No edema. Comments: Seated in wheelchair    Skin:     General: Skin is warm and dry. Capillary Refill: Capillary refill takes less than 2 seconds. Neurological:      General: No focal deficit present. Mental Status: She is alert and oriented to person, place, and time.    Psychiatric:         Mood and Affect: Mood normal.       Medications have been reviewed by provider in current encounter    Wanda Vanegas, [address] [place] [address]

## 2023-08-07 ENCOUNTER — TELEPHONE (OUTPATIENT)
Dept: FAMILY MEDICINE CLINIC | Facility: CLINIC | Age: 88
End: 2023-08-07

## 2023-08-07 NOTE — TELEPHONE ENCOUNTER
Spoke with Gayatri from [address] [place] [address] 2). We need to send a letter stating the diagnosis code(s) for Giulia. We need to include a little background as to what she can and can't do. A separate prescription for the Chair Lift with diagnosis code is also needed. Fax to [phone].

## 2023-08-09 ENCOUNTER — TELEPHONE (OUTPATIENT)
Dept: FAMILY MEDICINE CLINIC | Facility: CLINIC | Age: 88
End: 2023-08-09

## 2023-08-09 NOTE — TELEPHONE ENCOUNTER
Hi,This is Jose C Beck calling. I had called earlier in regards to [employer]. Her YOB: 1924 and she had she was complaining about some pain on her left, lower, lower left side. And she says now that she's feeling better, OK And we think it might be from the exercise that she got yesterday. However, she said she is feeling better. Thank you for your concern and thank you for calling back. The day is Wednesday and my phone number is [phone]. Thank you. Do you still want to see her?  Please advise

## 2023-08-09 NOTE — TELEPHONE ENCOUNTER
I spoke with Leonora Billingsley, it is difficult for Ofe Beachk to describe this pain. It did just start but it is a constant pain in the lower left side. She did move her bowels this morning and she also mention that she had PT yesterday.  No other symptoms

## 2023-08-09 NOTE — TELEPHONE ENCOUNTER
Please triage - what type of pain, where is the pain, etc?   any other symptoms  Will likely need evaluation

## 2023-08-09 NOTE — TELEPHONE ENCOUNTER
Hi, this is Ani Appl calling and the power of  in needs of [facility]. Her YOB: 1924 and she is complaining about some pain in her left side. I took, I took her blood pressure. It was 120 / 61. She doesn't appear to be having a fever. OK, if you can call me back and give me some type of direction I would appreciate it. My number is [phone].

## 2023-08-10 ENCOUNTER — OFFICE VISIT (OUTPATIENT)
Dept: FAMILY MEDICINE CLINIC | Facility: CLINIC | Age: 88
End: 2023-08-10
Payer: COMMERCIAL

## 2023-08-10 VITALS
SYSTOLIC BLOOD PRESSURE: 126 MMHG | OXYGEN SATURATION: 94 % | HEIGHT: 59 IN | TEMPERATURE: 97.8 F | DIASTOLIC BLOOD PRESSURE: 78 MMHG | WEIGHT: 126 LBS | BODY MASS INDEX: 25.4 KG/M2

## 2023-08-10 DIAGNOSIS — R10.32 LLQ ABDOMINAL PAIN: Primary | ICD-10-CM

## 2023-08-10 PROCEDURE — 99213 OFFICE O/P EST LOW 20 MIN: CPT | Performed by: FAMILY MEDICINE

## 2023-08-10 RX ORDER — MIRTAZAPINE 15 MG/1
15 TABLET, FILM COATED ORAL
COMMUNITY
Start: 2023-07-29

## 2023-08-10 NOTE — TELEPHONE ENCOUNTER
I will be happy to see her but it sounds like they dont need the appt anymore.  Please verify and cancel

## 2023-08-10 NOTE — PROGRESS NOTES
Assessment/Plan:         Problem List Items Addressed This Visit        Other    LLQ abdominal pain - Primary     patient reports no symptoms at this time. Advised to call back if any changes or if symptoms recur. Subjective:      Patient ID: Harinder Shepherd is a 80 y.o. female. 80 yr old here with her niece. yesterday was complaining of LLQ pain - difficult to obtain history. Her niece states she is now feeling better and symptoms have improved. Patient denies any symptoms. No constipation or diarrhea, N/V. Appetite is normal. Denies urinary symptoms. Niece is wondering if PT is cause the symptoms - she is doing home PT for gait/steps/etc.       The following portions of the patient's history were reviewed and updated as appropriate:   Past Medical History:  She has a past medical history of Ankle fracture, Arthritis, 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[address]), Bronchitis, Cancer ([address]), Carcinoma, lung ([address]), Dementia ([address]), Dependent edema, Depression, Diabetes mellitus ([address]), Hypercholesterolemia, Hypertension, Kidney stone, Oth abn and inconclusive findings on dx imaging of breast, Pes planus, Renal calculus, Restless leg syndrome, Rib pain, Sprain, neck, and Varicose veins of left lower extremity. ,  _______________________________________________________________________  Medical Problems:  does not have any pertinent problems on file.,  _______________________________________________________________________  Past Surgical History:   has a past surgical history that includes Lung cancer surgery and Appendectomy. ,  _______________________________________________________________________  Family History:  family history includes Dementia in her brother; No Known Problems in her father and mother.,  _______________________________________________________________________  Social History:   reports that she has quit smoking. Her smoking use included cigarettes. She has a 10.00 pack-year smoking history.  She has never used smokeless tobacco. She reports that she does not drink alcohol and does not use drugs. ,  _______________________________________________________________________  Allergies:  is allergic to albuterol, aldactone [spironolactone], aspirin, atenolol, bactrim [sulfamethoxazole-trimethoprim], codeine, hydrocodone, ibuprofen, lisinopril, mevacor [lovastatin], mirapex [pramipexole], morphine and related, other, penicillins, procaine, salicylates, ultram [tramadol], and zoloft [sertraline]. .  _______________________________________________________________________  Current Outpatient Medications   Medication Sig Dispense Refill   • ergocalciferol (VITAMIN D2) 50,000 units Take 1 capsule (50,000 Units total) by mouth once a week 12 capsule 1   • furosemide (LASIX) 40 mg tablet Take 40 mg in the morning and 20 mg in the afternoon 60 tablet 0   • gabapentin (NEURONTIN) 100 mg capsule Take 1 capsule (100 mg total) by mouth 3 (three) times a day 270 capsule 1   • mirtazapine (REMERON) 15 mg tablet Take 15 mg by mouth daily at bedtime     • Misc. Devices (Mattress Cover) MISC Use daily 1 each 0   • potassium chloride (MICRO-K) 10 MEQ CR capsule Take 2 capsules (20 mEq total) by mouth daily 180 capsule 1   • QUEtiapine (SEROquel) 25 mg tablet Take 1 tablet (25 mg total) by mouth daily at bedtime 90 tablet 3     No current facility-administered medications for this visit.     _______________________________________________________________________  Review of Systems   Gastrointestinal: Positive for abdominal pain. Negative for constipation, diarrhea, nausea and vomiting. Genitourinary: Negative for dysuria, frequency, hematuria and urgency. Musculoskeletal: Positive for back pain and gait problem.          Objective:  Vitals:    08/10/23 1137   BP: 126/78   BP Location: Left arm   Patient Position: Sitting   Cuff Size: Large   Temp: 97.8 °F (36.6 °C)   SpO2: 94%   Weight: 57.2 kg (126 lb)   Height: 4' 11" (1.499 m)     Body mass index is 25.45 kg/m². Physical Exam  Vitals and nursing note reviewed. Constitutional:       General: She is not in acute distress. Appearance: Normal appearance. She is not ill-appearing, toxic-appearing or diaphoretic. Comments: Elderly woman in wheelchair, no acute distress   HENT:      Head: Normocephalic and atraumatic. Right Ear: External ear normal.      Left Ear: External ear normal.      Mouth/Throat:      Mouth: Mucous membranes are moist.   Cardiovascular:      Rate and Rhythm: Normal rate. Rhythm irregularly irregular. Heart sounds: Normal heart sounds. No murmur heard. No friction rub. Pulmonary:      Effort: Pulmonary effort is normal. No respiratory distress. Breath sounds: Normal breath sounds. No stridor. No wheezing, rhonchi or rales. Abdominal:      General: Abdomen is flat. Palpations: Abdomen is soft. Tenderness: There is no abdominal tenderness. Musculoskeletal:         General: No swelling. Left hip: No tenderness. Right lower leg: No edema. Left lower leg: No edema. Comments: Limited hip exam in wheelchair but no pain with movement   Neurological:      General: No focal deficit present. Mental Status: She is alert. Mental status is at baseline. Psychiatric:         Attention and Perception: Attention normal.         Mood and Affect: Mood normal.         Speech: Speech normal.         Behavior: Behavior normal.         Thought Content:  Thought content normal.         Judgment: Judgment normal.

## 2023-08-11 DIAGNOSIS — M54.16 LUMBAR RADICULOPATHY: Primary | ICD-10-CM

## 2023-08-11 DIAGNOSIS — I50.9 ACUTE CONGESTIVE HEART FAILURE, UNSPECIFIED HEART FAILURE TYPE (HCC): ICD-10-CM

## 2023-08-11 DIAGNOSIS — F01.50 VASCULAR DEMENTIA, UNCOMPLICATED (HCC): ICD-10-CM

## 2023-08-11 DIAGNOSIS — I48.20 CHRONIC ATRIAL FIBRILLATION (HCC): ICD-10-CM

## 2023-08-11 DIAGNOSIS — R26.2 AMBULATORY DYSFUNCTION: ICD-10-CM

## 2023-08-14 ENCOUNTER — TELEPHONE (OUTPATIENT)
Dept: FAMILY MEDICINE CLINIC | Facility: CLINIC | Age: 88
End: 2023-08-14

## 2023-08-14 NOTE — TELEPHONE ENCOUNTER
[facility] called and she has met all of her goals with nursing so they will start with this week reducing her. Will be checking in on her one visit every other week however PT will continue weekly.

## 2023-08-21 ENCOUNTER — TELEPHONE (OUTPATIENT)
Dept: FAMILY MEDICINE CLINIC | Facility: CLINIC | Age: 88
End: 2023-08-21

## 2023-08-21 NOTE — TELEPHONE ENCOUNTER
Speech therapy doesn't do cognition and memory. Not sure what they are looking for. Is she having leg swelling?  Not sure what the weight gain is from

## 2023-08-21 NOTE — TELEPHONE ENCOUNTER
Spoke to MALINI from [facility] in [facility], she wanted to report that the pt's weight increased from 125lb on 8/18 to 130lb on 8/21. She also requested an order for speech therapy be sent over to them for the pt to assess cognition and memory.

## 2023-08-22 NOTE — TELEPHONE ENCOUNTER
Number linked is not for Vaishali Rivas, it is for Filemon's daughter.  I did leave a message since I have no way to contact Vaishali Rivas from [employer]

## 2023-08-23 NOTE — TELEPHONE ENCOUNTER
Filemon's daughter called back and stated that she did not take notice to any leg swelling - yesterday she was 128 and today was 129.  Told her to have nurse call us back when she is there, and keep track of her weight

## 2023-08-29 ENCOUNTER — OFFICE VISIT (OUTPATIENT)
Dept: FAMILY MEDICINE CLINIC | Facility: CLINIC | Age: 88
End: 2023-08-29
Payer: COMMERCIAL

## 2023-08-29 VITALS
TEMPERATURE: 95.4 F | HEIGHT: 59 IN | BODY MASS INDEX: 25.8 KG/M2 | SYSTOLIC BLOOD PRESSURE: 128 MMHG | OXYGEN SATURATION: 93 % | WEIGHT: 128 LBS | DIASTOLIC BLOOD PRESSURE: 78 MMHG | HEART RATE: 90 BPM

## 2023-08-29 DIAGNOSIS — I50.9 CHRONIC CONGESTIVE HEART FAILURE, UNSPECIFIED HEART FAILURE TYPE (HCC): ICD-10-CM

## 2023-08-29 DIAGNOSIS — F01.518 VASCULAR DEMENTIA WITH BEHAVIORAL DISTURBANCE (HCC): ICD-10-CM

## 2023-08-29 DIAGNOSIS — I50.9 ACUTE CONGESTIVE HEART FAILURE, UNSPECIFIED HEART FAILURE TYPE (HCC): Primary | ICD-10-CM

## 2023-08-29 PROCEDURE — 99213 OFFICE O/P EST LOW 20 MIN: CPT | Performed by: NURSE PRACTITIONER

## 2023-08-29 RX ORDER — POTASSIUM CHLORIDE 750 MG/1
20 CAPSULE, EXTENDED RELEASE ORAL DAILY
Qty: 180 CAPSULE | Refills: 1 | Status: SHIPPED | OUTPATIENT
Start: 2023-08-29 | End: 2024-02-25

## 2023-08-29 RX ORDER — FUROSEMIDE 40 MG/1
TABLET ORAL
Qty: 60 TABLET | Refills: 3 | Status: SHIPPED | OUTPATIENT
Start: 2023-08-29

## 2023-08-29 NOTE — ASSESSMENT & PLAN NOTE
Wt Readings from Last 3 Encounters:   08/29/23 58.1 kg (128 lb)   08/10/23 57.2 kg (126 lb)   08/03/23 57.2 kg (126 lb 3.2 oz)   Patient appearing well without complaints and weight has increased 2 pounds since last visit will update labs and chest x-ray and do hear crackles in the bases taking the Lasix 40 mg daily in AM and 20 mg in the afternoon

## 2023-08-29 NOTE — PROGRESS NOTES
Name: Kristyn Johnson      : 11/3/1924      MRN: [id number]  Encounter Provider: FARZANA Grey  Encounter Date: 2023   Encounter department: [address] [place] [address]     1. Acute congestive heart failure, unspecified heart failure type [facility])  Assessment & Plan:  Wt Readings from Last 3 Encounters:   23 58.1 kg (128 lb)   08/10/23 57.2 kg (126 lb)   23 57.2 kg (126 lb 3.2 oz)   Patient appearing well without complaints and weight has increased 2 pounds since last visit will update labs and chest x-ray and do hear crackles in the bases taking the Lasix 40 mg daily in AM and 20 mg in the afternoon         Orders:  -     Comprehensive metabolic panel; Future  -     CBC and differential; Future  -     B-Type Natriuretic Peptide(BNP); Future  -     XR chest pa & lateral; Future; Expected date: 2023    2. Chronic congestive heart failure, unspecified heart failure type (HCC)  -     furosemide (LASIX) 40 mg tablet; Take 40 mg in the morning and 20 mg in the afternoon  -     potassium chloride (MICRO-K) 10 MEQ CR capsule; Take 2 capsules (20 mEq total) by mouth daily    3. Vascular dementia with behavioral disturbance           Subjective      Patient here today with Alessandra Harp her caregiver and reports that she is here for follow up from her home health and nursing was reporting that Sona legs were swelling and concerns for weight gain. Patient weight in office is at 128 lbs up 2 pounds from earlier in the month. Alessandra Harp reports that her home scale is stable at 130 lbs and she is getting weight every day. No complaints of shortness of breath or coughing. No abdominal pains     Review of Systems   Constitutional: Negative for activity change, appetite change, chills, diaphoresis, fatigue, fever and unexpected weight change.         Weight at home is 130 lbs    HENT: Negative for congestion, ear pain, hearing loss, postnasal drip, sinus pressure, sinus pain, sneezing and sore throat. Eyes: Negative for pain, redness and visual disturbance. Respiratory: Negative for cough and shortness of breath. Cardiovascular: Negative for chest pain and leg swelling. Gastrointestinal: Positive for constipation. Negative for abdominal pain, diarrhea, nausea and vomiting. Endocrine: Negative. Genitourinary: Negative. Musculoskeletal: Positive for gait problem. Negative for arthralgias. Allergic/Immunologic: Negative. Neurological: Negative for dizziness and light-headedness. Hematological: Negative. Psychiatric/Behavioral: Negative for behavioral problems and dysphoric mood. Current Outpatient Medications on File Prior to Visit   Medication Sig   • ergocalciferol (VITAMIN D2) 50,000 units Take 1 capsule (50,000 Units total) by mouth once a week   • gabapentin (NEURONTIN) 100 mg capsule Take 1 capsule (100 mg total) by mouth 3 (three) times a day   • mirtazapine (REMERON) 15 mg tablet Take 15 mg by mouth daily at bedtime   • Misc. Devices (Mattress Cover) MISC Use daily   • QUEtiapine (SEROquel) 25 mg tablet Take 1 tablet (25 mg total) by mouth daily at bedtime   • [DISCONTINUED] furosemide (LASIX) 40 mg tablet Take 40 mg in the morning and 20 mg in the afternoon   • [DISCONTINUED] potassium chloride (MICRO-K) 10 MEQ CR capsule Take 2 capsules (20 mEq total) by mouth daily       Objective     /78 (BP Location: Left arm, Patient Position: Sitting, Cuff Size: Adult)   Pulse 90   Temp (!) 95.4 °F (35.2 °C)   Ht 4' 11" (1.499 m)   Wt 58.1 kg (128 lb)   SpO2 93%   BMI 25.85 kg/m²     Physical Exam  Vitals and nursing note reviewed. Constitutional:       General: She is awake. She is not in acute distress. Appearance: Normal appearance. She is well-developed. HENT:      Head: Normocephalic and atraumatic. Eyes:      General: Lids are normal.      Pupils: Pupils are equal, round, and reactive to light. Neck:      Thyroid: No thyromegaly. Cardiovascular:      Rate and Rhythm: Normal rate and regular rhythm. Extrasystoles are present. Heart sounds: Normal heart sounds. No murmur heard. Pulmonary:      Effort: Pulmonary effort is normal. No respiratory distress. Breath sounds: Examination of the right-lower field reveals rales. Examination of the left-lower field reveals rales. Rales present. No wheezing. Abdominal:      General: Bowel sounds are normal.      Palpations: Abdomen is soft. Musculoskeletal:         General: Normal range of motion. Cervical back: Normal range of motion. Right lower le+ Pitting Edema present. Left lower le+ Pitting Edema present. Skin:     General: Skin is warm and dry. Neurological:      Mental Status: She is alert and oriented to person, place, and time. Psychiatric:         Behavior: Behavior is cooperative.        FARZANA Bernabe

## 2023-08-30 ENCOUNTER — TELEPHONE (OUTPATIENT)
Dept: FAMILY MEDICINE CLINIC | Facility: CLINIC | Age: 88
End: 2023-08-30

## 2023-08-30 NOTE — TELEPHONE ENCOUNTER
Yes, hi, good afternoon. My name is Skye Pittman, physical therapist from [place]. Well, I just want to send a message to nurse practitioner Darby Dunaway about a patient. Last name is Dian Pearce, first name Fahad Hilario date of birth 11/3/1924. I saw the patient earlier today for physical therapy and assisted her on a scale and she weighed £128.00. The nurse who saw her at admission to [facility] did set parameters for her weight and every time she goes over 125 pounds we need to let her doctor know. Patient asymptomatic. No new concerns. All vitals are within normal limits. Her last weight when I was there last week was 129, so that was from Friday 8/25. She was 1/29 and today, August 29, she was £128.00. If nurse practitioner Philipp Pederson would want us to change the parameters, she's been more than £125.00 for the last two weeks Again, this was a physical therapist from [place]. If you can, please call us back. Our number is [phone]. Again [facility], [phone]. Thank you. This is Dahiana sarmiento, physical therapist for [employer]. Thanks.  Bye.

## 2023-09-01 ENCOUNTER — APPOINTMENT (OUTPATIENT)
Dept: RADIOLOGY | Facility: CLINIC | Age: 88
End: 2023-09-01
Payer: COMMERCIAL

## 2023-09-01 ENCOUNTER — APPOINTMENT (OUTPATIENT)
Dept: LAB | Facility: CLINIC | Age: 88
End: 2023-09-01
Payer: COMMERCIAL

## 2023-09-01 DIAGNOSIS — I50.9 ACUTE CONGESTIVE HEART FAILURE, UNSPECIFIED HEART FAILURE TYPE (HCC): ICD-10-CM

## 2023-09-01 LAB
ALBUMIN SERPL BCP-MCNC: 3.6 G/DL (ref 3.5–5)
ALP SERPL-CCNC: 117 U/L (ref 34–104)
ALT SERPL W P-5'-P-CCNC: 11 U/L (ref 7–52)
ANION GAP SERPL CALCULATED.3IONS-SCNC: 7 MMOL/L
AST SERPL W P-5'-P-CCNC: 19 U/L (ref 13–39)
BASOPHILS # BLD AUTO: 0.03 THOUSANDS/ÂΜL (ref 0–0.1)
BASOPHILS NFR BLD AUTO: 1 % (ref 0–1)
BILIRUB SERPL-MCNC: 0.55 MG/DL (ref 0.2–1)
BNP SERPL-MCNC: 577 PG/ML (ref 0–100)
BUN SERPL-MCNC: 27 MG/DL (ref 5–25)
CALCIUM SERPL-MCNC: 8.7 MG/DL (ref 8.4–10.2)
CHLORIDE SERPL-SCNC: 103 MMOL/L (ref 96–108)
CO2 SERPL-SCNC: 29 MMOL/L (ref 21–32)
CREAT SERPL-MCNC: 1.08 MG/DL (ref 0.6–1.3)
EOSINOPHIL # BLD AUTO: 0.11 THOUSAND/ÂΜL (ref 0–0.61)
EOSINOPHIL NFR BLD AUTO: 2 % (ref 0–6)
ERYTHROCYTE [DISTWIDTH] IN BLOOD BY AUTOMATED COUNT: 17.2 % (ref 11.6–15.1)
GFR SERPL CREATININE-BSD FRML MDRD: 42 ML/MIN/1.73SQ M
GLUCOSE SERPL-MCNC: 89 MG/DL (ref 65–140)
HCT VFR BLD AUTO: 40.3 % (ref 34.8–46.1)
HGB BLD-MCNC: 12.2 G/DL (ref 11.5–15.4)
IMM GRANULOCYTES # BLD AUTO: 0.02 THOUSAND/UL (ref 0–0.2)
IMM GRANULOCYTES NFR BLD AUTO: 0 % (ref 0–2)
LYMPHOCYTES # BLD AUTO: 0.97 THOUSANDS/ÂΜL (ref 0.6–4.47)
LYMPHOCYTES NFR BLD AUTO: 16 % (ref 14–44)
MCH RBC QN AUTO: 26.1 PG (ref 26.8–34.3)
MCHC RBC AUTO-ENTMCNC: 30.3 G/DL (ref 31.4–37.4)
MCV RBC AUTO: 86 FL (ref 82–98)
MONOCYTES # BLD AUTO: 0.77 THOUSAND/ÂΜL (ref 0.17–1.22)
MONOCYTES NFR BLD AUTO: 13 % (ref 4–12)
NEUTROPHILS # BLD AUTO: 4.22 THOUSANDS/ÂΜL (ref 1.85–7.62)
NEUTS SEG NFR BLD AUTO: 68 % (ref 43–75)
NRBC BLD AUTO-RTO: 0 /100 WBCS
PLATELET # BLD AUTO: 241 THOUSANDS/UL (ref 149–390)
PMV BLD AUTO: 11.3 FL (ref 8.9–12.7)
POTASSIUM SERPL-SCNC: 4 MMOL/L (ref 3.5–5.3)
PROT SERPL-MCNC: 6.7 G/DL (ref 6.4–8.4)
RBC # BLD AUTO: 4.67 MILLION/UL (ref 3.81–5.12)
SODIUM SERPL-SCNC: 139 MMOL/L (ref 135–147)
WBC # BLD AUTO: 6.12 THOUSAND/UL (ref 4.31–10.16)

## 2023-09-01 PROCEDURE — 36415 COLL VENOUS BLD VENIPUNCTURE: CPT

## 2023-09-01 PROCEDURE — 80053 COMPREHEN METABOLIC PANEL: CPT

## 2023-09-01 PROCEDURE — 83880 ASSAY OF NATRIURETIC PEPTIDE: CPT

## 2023-09-01 PROCEDURE — 85025 COMPLETE CBC W/AUTO DIFF WBC: CPT

## 2023-09-01 PROCEDURE — 71046 X-RAY EXAM CHEST 2 VIEWS: CPT

## 2023-09-08 ENCOUNTER — TELEPHONE (OUTPATIENT)
Dept: FAMILY MEDICINE CLINIC | Facility: CLINIC | Age: 88
End: 2023-09-08

## 2023-09-13 ENCOUNTER — TELEPHONE (OUTPATIENT)
Dept: FAMILY MEDICINE CLINIC | Facility: CLINIC | Age: 88
End: 2023-09-13

## 2023-09-13 DIAGNOSIS — R44.1 VISUAL HALLUCINATIONS: Primary | ICD-10-CM

## 2023-09-13 RX ORDER — MIRTAZAPINE 15 MG/1
15 TABLET, FILM COATED ORAL
Qty: 45 TABLET | Refills: 1 | Status: SHIPPED | OUTPATIENT
Start: 2023-09-13

## 2023-09-13 NOTE — TELEPHONE ENCOUNTER
physical therapist from [address] [place] [address] Rebeca. Well, I just want to send a message to nurse practitioner Vielka Wilks about a patient's last name is Abby Alva, first name Shyam Peguero date of birth 11/3/1924 assisted. I assisted the patient on a weighing scale today and she weighed 123 pounds. Based on the note that we have from her from last week, she was 133 pounds. Again. Today was 123 pounds. September 8th when I think all occupational therapist was here, she was 133 pounds. No symptoms, no dizziness, Lightheadedness. All vitals are within the normal range. Just want to the office to know. This is Joanne again, Physical therapist from [place] for [employer]. Date of birth 11/3/1924. If you have any recommendations or advice, you can give us a call at our office at [phone]. Again, that's [facility] [phone]. Thank you.  kalina Thomas.

## 2023-09-14 ENCOUNTER — TELEPHONE (OUTPATIENT)
Dept: FAMILY MEDICINE CLINIC | Facility: CLINIC | Age: 88
End: 2023-09-14

## 2023-09-14 DIAGNOSIS — E87.6 HYPOKALEMIA: Primary | ICD-10-CM

## 2023-09-14 RX ORDER — POTASSIUM CHLORIDE 20 MEQ/1
20 TABLET, EXTENDED RELEASE ORAL DAILY
Qty: 30 TABLET | Refills: 5 | Status: SHIPPED | OUTPATIENT
Start: 2023-09-14

## 2023-09-14 NOTE — TELEPHONE ENCOUNTER
Pt's niece called to let you know that Potassium Capsules are too large for pt to swallow. Are we able to resend the tabs?     [facility]

## 2023-09-14 NOTE — TELEPHONE ENCOUNTER
9/14/2023 spoke with Patel Whipple and Obed Russell is not having shortness of breath so she will weight her next week.

## 2023-09-14 NOTE — TELEPHONE ENCOUNTER
[address] called and stated that patients weight today was 130.4 lbs , yesterday weight was 123. Currently on Lasix 40mg BID    Trace edema RLLE - VSS HR 50 other wise everything is fine.     Please advise

## 2023-09-15 NOTE — TELEPHONE ENCOUNTER
That is fine. That's the same as yesterday. If not shortness of breath no changes.  Do not need to weigh every day -can check once a week unless symptoms develop like shortness of breath

## 2023-09-18 NOTE — TELEPHONE ENCOUNTER
February 5, 2019    Karyn Strong Diana  [address] [facility] [address]    Dear Ms.Schroder Ortiz,  This letter is regarding your recent Pap smear (cervical cancer screening) and Human Papillomavirus (HPV) test.  We are happy to inform you that your Pap smear result is normal. Cervical cancer is closely linked with certain types of HPV. Your results showed no evidence of high-risk HPV.  Therefore we recommend you return in 5 years for your next pap smear and HPV test.  You will still need to return to the clinic every year for an annual exam and other preventive tests.  If you have additional questions regarding this result, please call our registered nurse, Pam at [phone].  Sincerely,    Mariel Aleman MD/olivia   Weight today was 131 - [address] Comprehensive

## 2023-09-18 NOTE — TELEPHONE ENCOUNTER
Caregiver stated that yesterday she has a lot of mucus secretions coming up - gave her hot tea and is doing better.  She just wanted to let you know

## 2023-09-26 DIAGNOSIS — E55.9 VITAMIN D DEFICIENCY: ICD-10-CM

## 2023-09-26 RX ORDER — ERGOCALCIFEROL 1.25 MG/1
CAPSULE ORAL
Qty: 12 CAPSULE | Refills: 2 | Status: SHIPPED | OUTPATIENT
Start: 2023-09-26

## 2023-09-29 ENCOUNTER — OFFICE VISIT (OUTPATIENT)
Age: 88
End: 2023-09-29
Payer: COMMERCIAL

## 2023-09-29 VITALS — DIASTOLIC BLOOD PRESSURE: 70 MMHG | SYSTOLIC BLOOD PRESSURE: 128 MMHG | HEART RATE: 100 BPM | TEMPERATURE: 96.6 F

## 2023-09-29 DIAGNOSIS — R26.2 AMBULATORY DYSFUNCTION: ICD-10-CM

## 2023-09-29 DIAGNOSIS — R44.1 VISUAL HALLUCINATIONS: ICD-10-CM

## 2023-09-29 DIAGNOSIS — F01.50 VASCULAR DEMENTIA WITHOUT BEHAVIORAL DISTURBANCE (HCC): Primary | ICD-10-CM

## 2023-09-29 DIAGNOSIS — F33.0 MILD EPISODE OF RECURRENT MAJOR DEPRESSIVE DISORDER (HCC): ICD-10-CM

## 2023-09-29 DIAGNOSIS — F51.01 PRIMARY INSOMNIA: ICD-10-CM

## 2023-09-29 PROCEDURE — 99214 OFFICE O/P EST MOD 30 MIN: CPT | Performed by: NURSE PRACTITIONER

## 2023-09-29 NOTE — ASSESSMENT & PLAN NOTE
· Niece reports hallucinations were caused by UTIs, sometimes speaks to the TV  · Monitor for acute changes, contact PCP if hallucinations start abruptly again  · Promote good hygiene and pericare   · Adequate hydration for UTI prevention

## 2023-09-29 NOTE — ASSESSMENT & PLAN NOTE
· Mood stable, denies depression  · Continue Seroquel as directed  · Continue activities of enjoyment  · Continue socialization

## 2023-09-29 NOTE — ASSESSMENT & PLAN NOTE
• MOCA:  Last <10. ID 1/3 (knew function of other two, but could not name), recall 1/3  o Pt is assisted adl/dependent iadls. Lives with niece  o Pt's current cognitive level is consistent with moderate dementia   o Memory medications: None   o Mobility:  Ambulates with rollator. • Continue to stay active. Current activity level:  Fair. Does participate in fair social, minimal cognitive, some physical activity. • Monitor for changes in mood, sleep, pain control. Notify provider if any concerns  • Medication review:  Seems appropriate   o Check with pharmacist/provider before starting any new OTC/prescription medications for potential cognitive side effects  • Optimize all acute and chronic conditions.   Continue to follow-up with PCP and specialist as directed for management  • Safety concerns:  None   • Caregiver stress:  None   • Ensure adequate hydration, good nutrition

## 2023-09-29 NOTE — ASSESSMENT & PLAN NOTE
· C/o "problems" with her legs today, arrived in a wheelchair, but will use rollator at home.    · Needs new rollator, has brake wire hanging   · Fall precautions   · Recommend trying Tylenol or topical pain relievers for discomfort

## 2023-09-29 NOTE — PROGRESS NOTES
Assessment & Plan:   1. 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)  Assessment & Plan:  • MOCA:  Last <10. ID 1/3 (knew function of other two, but could not name), recall 1/3  o Pt is assisted adl/dependent iadls. Lives with niece  o Pt's current cognitive level is consistent with moderate dementia   o Memory medications: None   o Mobility:  Ambulates with rollator. • Continue to stay active. Current activity level:  Fair. Does participate in fair social, minimal cognitive, some physical activity. • Monitor for changes in mood, sleep, pain control. Notify provider if any concerns  • Medication review:  Seems appropriate   o Check with pharmacist/provider before starting any new OTC/prescription medications for potential cognitive side effects  • Optimize all acute and chronic conditions. Continue to follow-up with PCP and specialist as directed for management  • Safety concerns:  None   • Caregiver stress:  None   • Ensure adequate hydration, good nutrition        2. 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[address])  Assessment & Plan:  · Mood stable, denies depression  · Continue Seroquel as directed  · Continue activities of enjoyment  · Continue socialization       3. Ambulatory dysfunction  Assessment & Plan:  · C/o "problems" with her legs today, arrived in a wheelchair, but will use rollator at home. · Needs new rollator, has brake wire hanging   · Fall precautions   · Recommend trying Tylenol or topical pain relievers for discomfort      4. Visual hallucinations  Assessment & Plan:  · Niece reports hallucinations were caused by UTIs, sometimes speaks to the TV  · Monitor for acute changes, contact PCP if hallucinations start abruptly again  · Promote good hygiene and pericare   · Adequate hydration for UTI prevention       5.  Primary insomnia  Assessment & Plan:  · Denies difficulty sleeping   · Promote calm evening environment, dim lighting, noise reduction  · Avoid caffeine late in the day  · Toileting right before getting into bed   · Continue Remeron as directed         Chronic condition review:  Afib:   · Has chronic a-fib, not rate controlled at this time  · Not on anticoagulation due to fall risk and age  · Reports SOB w/exertion, subsides when she rests   · Follow with cardiology for management   CKD:   · BUN/Creatinine 27/1.08 on 9/1/23 decreased from last level  · GFR 42  · Continue adequate hydration   · Follow with PCP for management    CHF:    ·  9/1/23, decreased from last level   · Reports SOB w/exertion, but subsides with rest   · +1 edema to b/l LE   · Denies orthopnea   · Recent weight loss  · Continue Lasix and potassium as directed   · F/u with PCP for management or sudden change in weight or respiratory status     HPI:  We had the pleasure of evaluating Pat Tony who is a 80 y.o. female   in Geriatric follow up today. Previous MOCA:  <10. Comorbidities include CHF, CKD, ambulatory dysfunction, depression, anxiety. She lives with niece  Ms. Maria Teresa Martinez is in the office with her niece    Memory update per patient:  C/o leg "keep moving". Reports not having a memory. Word finding. Forgetting items. Misplacing items. Lives with niece [employer]. Reports forgetting names, will recognize them. Poor appetite, reports being lazy. Niece cooks. Could use the microwave, get items from the refrigerator. Niece cleans. Loose dentures, needs them fixed, denies difficulty eating. Likes coloring, has dolls.       She has difficulty finding the right word while speaking: Yes  Patient requires repeat information or ask the same question repeatedly: No  Do you do your own bathing, dressing, feeding yourself Yes  Can you make your own meals and do light cleaning/chores No, niece cooks and cleans  Do you take care of your own medications No  Do you drive: No       Do you handle your own financial affairs such as balancing your checkbook, paying bills, investments: No  Have you or your family noted any change in your mood or personality:No  Have you noticed any gait or balance disorder: Yes  Uses :Walker: roallator   Any hallucination or delusion: No  Sleep Issues: No  Urinary/Stool Incontinence: Yes, urine   Hearing and vision issue: No  ADL/IADL:  Assisted/dependent   Appetite/swallow:  Decreased appetite, denies swallowing trouble   Pain:  Unable to describe, but keeps complaining about legs not being right     Memory update per family:  Decline in cognition. Difficulty identifying items, placing items in the wrong places, happening more frequently. Niece has taken clothes out of her dresser because pt was dressing in multiple layers, putting her diaper on top of her clothes. Needs assistance with hygiene, wants to shower BID. Wears dentures, needs reminders to brush her teeth, denture is loose. Put body wash in her hair, turned her hair pink, then the next day put nail polish in her hair. Fidgeting/rummaging through cabinets. Has taken items out of the cabinets and hidden them from her. Putting locks on cabinets. Needs 24/7 supervision. Cared for by Niece and nieces . Son lives in [place], travels in the car well, sometimes has confusions. +incontinence, urine. Goes out to lunch. Had chair lift installed. Just finished PT at home, uses a walker. Good appetite, confused about what condiments to put on different foods, tried to put sugar on their meat, changes her likes and dislikes weekly. Needs reminders for meal times, can come back 45 minutes after eating for the next meal.  Hoarding food. Was in Acute rehab after a fall this summer. Has a doll and a puppy stuffed animal she talks to and takes to bed with her. Has a hospital bed with long rails so she no longer slides OOB. Forgetting conversations. Waxing and waning mood. Won't answer the phone because of the amount of spam calls she receives.  Loves socializing with family, had family visit this past weekend, was beyond happy, recognized them, but couldn't recall their names initially, was able to remember after she was told. Enjoys going out to eat and shopping. Doesn't color as often as she used to. Didn't like the senior centers because she "didn't want to be around old people", is going to try it again this week. Watches court TV, sometimes thinks the TV is talking to her. Family Review of Behavior [facility]:    agressive/combative behavior. No    agitated. Yes, when she is corrected  wandering. No   resistance to care. Yes, becomes frustrated when told to do things sometimes    hoarding/hiding objects. Yes    suspicious  Yes, when she can't find things. Hides money. rummaging/pillaging. Yes    misplacing/losing objects Yes  personal hygiene problems. Yes, needs reminders  forgetfulness of actions Yes    ROS: Review of Systems   Reason unable to perform ROS: limited by dementia. Constitutional: Negative for activity change, appetite change, chills and fatigue. HENT: Positive for dental problem (loose dentures). Negative for congestion, hearing loss and trouble swallowing. Eyes: Negative for visual disturbance. Respiratory: Negative for cough, chest tightness and shortness of breath. Cardiovascular: Negative for chest pain and leg swelling. Gastrointestinal: Negative for abdominal pain, constipation, diarrhea, nausea and vomiting. Genitourinary: Positive for urgency. Negative for difficulty urinating. Musculoskeletal: Positive for gait problem. Negative for arthralgias, back pain and myalgias. Neurological: Negative for dizziness, light-headedness and headaches. Psychiatric/Behavioral: Positive for decreased concentration (forgetful). Negative for dysphoric mood and sleep disturbance. The patient is not nervous/anxious. Past Medical, surgical, social, medication and allergy history and patients previous records reviewed. Allergies:    Allergies   Allergen Reactions   • Albuterol    • Aldactone [Spironolactone]    • Aspirin    • Atenolol    • Bactrim [Sulfamethoxazole-Trimethoprim]    • Codeine    • Hydrocodone    • Ibuprofen    • Lisinopril    • Mevacor [Lovastatin]    • Mirapex [Pramipexole]    • Morphine And Related    • Other      novacaine   • Penicillins    • Procaine    • Salicylates    • Ultram [Tramadol]    • Zoloft [Sertraline]      Night sweats       Medications:      Current Outpatient Medications:   •  ergocalciferol (VITAMIN D2) 50,000 units, TAKE 1 CAPSULE BY MOUTH ONE TIME PER WEEK, Disp: 12 capsule, Rfl: 2  •  furosemide (LASIX) 40 mg tablet, Take 40 mg in the morning and 20 mg in the afternoon, Disp: 60 tablet, Rfl: 3  •  gabapentin (NEURONTIN) 100 mg capsule, Take 1 capsule (100 mg total) by mouth 3 (three) times a day, Disp: 270 capsule, Rfl: 1  •  mirtazapine (REMERON) 15 mg tablet, TAKE 1 TABLET BY MOUTH DAILY AT BEDTIME, Disp: 45 tablet, Rfl: 1  •  Misc.  Devices (Mattress Cover) MISC, Use daily, Disp: 1 each, Rfl: 0  •  potassium chloride (K-DUR,KLOR-CON) 20 mEq tablet, Take 1 tablet (20 mEq total) by mouth daily, Disp: 30 tablet, Rfl: 5  •  QUEtiapine (SEROquel) 25 mg tablet, Take 1 tablet (25 mg total) by mouth daily at bedtime, Disp: 90 tablet, Rfl: 3    Vitals:  Vitals:    09/29/23 1133   BP: 128/70   Pulse: 100   Temp: (!) 96.6 °F (35.9 °C)       History:  Past Medical History:   Diagnosis Date   • Ankle fracture    • Arthritis     left hip   • 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HCC)     with left ureter   • Bronchitis    • Cancer (HCC)    • Carcinoma, lung (HCC)    • Dementia (HCC)    • Dependent edema    • Depression    • Diabetes mellitus ([address])    • Hypercholesterolemia    • Hypertension    • Kidney stone    • Oth abn and inconclusive findings on dx imaging of breast    • Pes planus    • Renal calculus    • Restless leg syndrome    • Rib pain    • Sprain, neck    • Varicose veins of left lower extremity      Past Surgical History:   Procedure Laterality Date   • APPENDECTOMY     • LUNG CANCER SURGERY       Family History   Problem Relation Age of Onset   • No Known Problems Mother    • No Known Problems Father    • Dementia Brother    • Breast cancer Neg Hx    • Colon cancer Neg Hx    • Ovarian cancer Neg Hx    • Uterine cancer Neg Hx    • Cervical cancer Neg Hx      Social History     Socioeconomic History   • Marital status:      Spouse name: Not on file   • Number of children: Not on file   • Years of education: Not on file   • Highest education level: Not on file   Occupational History   • Not on file   Tobacco Use   • Smoking status: Former     Packs/day: 0.50     Years: 20.00     Total pack years: 10.00     Types: Cigarettes   • Smokeless tobacco: Never   Substance and Sexual Activity   • Alcohol use: Never   • Drug use: Never   • Sexual activity: Not Currently     Birth control/protection: Post-menopausal   Other Topics Concern   • Not on file   Social History Narrative    Lives at home with niece     Social Determinants of Health     Financial Resource Strain: Low Risk  (10/11/2022)    Overall Financial Resource Strain (CARDIA)    • Difficulty of Paying Living Expenses: Not very hard   Food Insecurity: No Food Insecurity (7/3/2023)    Hunger Vital Sign    • Worried About Running Out of Food in the Last Year: Never true    • Ran Out of Food in the Last Year: Never true   Transportation Needs: No Transportation Needs (7/3/2023)    PRAPARE - Transportation    • Lack of Transportation (Medical): No    • Lack of Transportation (Non-Medical):  No   Physical Activity: Not on file   Stress: Not on file   Social Connections: Not on file   Intimate Partner Violence: Not on file   Housing Stability: Low Risk  (7/3/2023)    Housing Stability Vital Sign    • Unable to Pay for Housing in the Last Year: No    • Number of Places Lived in the Last Year: 1    • Unstable Housing in the Last Year: No     Past Surgical History:   Procedure Laterality Date   • APPENDECTOMY     • LUNG CANCER SURGERY Physical Exam:  Observed ambulation:  Arrived in a wheelchair, did not witness ambulation    Physical Exam  Vitals reviewed. Constitutional:       General: She is not in acute distress. Appearance: Normal appearance. She is well-developed. She is not diaphoretic. HENT:      Head: Normocephalic. Cardiovascular:      Rate and Rhythm: Normal rate. Rhythm irregular. Heart sounds: Murmur heard. No friction rub. No gallop. Pulmonary:      Effort: Pulmonary effort is normal. No respiratory distress. Breath sounds: Normal breath sounds. No wheezing or rales. Abdominal:      General: Bowel sounds are normal. There is no distension. Palpations: Abdomen is soft. Tenderness: There is no abdominal tenderness. There is no rebound. Musculoskeletal:         General: Normal range of motion. Right lower leg: Edema present. Left lower leg: Edema present. Skin:     General: Skin is warm and dry. Neurological:      General: No focal deficit present. Mental Status: She is alert. Mental status is at baseline. She is disoriented.       Comments: Oriented to person, disoriented to PTS

## 2023-09-29 NOTE — ASSESSMENT & PLAN NOTE
· Denies difficulty sleeping   · Promote calm evening environment, dim lighting, noise reduction  · Avoid caffeine late in the day  · Toileting right before getting into bed   · Continue Remeron as directed

## 2023-10-07 DIAGNOSIS — E87.6 HYPOKALEMIA: ICD-10-CM

## 2023-10-09 RX ORDER — POTASSIUM CHLORIDE 1500 MG/1
20 TABLET, EXTENDED RELEASE ORAL DAILY
Qty: 90 TABLET | Refills: 2 | Status: SHIPPED | OUTPATIENT
Start: 2023-10-09

## 2023-10-12 ENCOUNTER — APPOINTMENT (EMERGENCY)
Dept: RADIOLOGY | Facility: HOSPITAL | Age: 88
End: 2023-10-12
Payer: COMMERCIAL

## 2023-10-12 ENCOUNTER — TELEPHONE (OUTPATIENT)
Dept: FAMILY MEDICINE CLINIC | Facility: CLINIC | Age: 88
End: 2023-10-12

## 2023-10-12 ENCOUNTER — HOSPITAL ENCOUNTER (EMERGENCY)
Facility: HOSPITAL | Age: 88
Discharge: HOME | End: 2023-10-12
Attending: EMERGENCY MEDICINE
Payer: COMMERCIAL

## 2023-10-12 VITALS
RESPIRATION RATE: 20 BRPM | SYSTOLIC BLOOD PRESSURE: 159 MMHG | WEIGHT: 127 LBS | BODY MASS INDEX: 25.65 KG/M2 | DIASTOLIC BLOOD PRESSURE: 74 MMHG | HEART RATE: 88 BPM | TEMPERATURE: 97.8 F | OXYGEN SATURATION: 98 %

## 2023-10-12 DIAGNOSIS — R53.81 MALAISE AND FATIGUE: Primary | ICD-10-CM

## 2023-10-12 DIAGNOSIS — R53.83 MALAISE AND FATIGUE: Primary | ICD-10-CM

## 2023-10-12 LAB
ANION GAP SERPL CALCULATED.3IONS-SCNC: 6 MMOL/L
ATRIAL RATE: 125 BPM
BASOPHILS # BLD AUTO: 0.03 THOUSANDS/ÂΜL (ref 0–0.1)
BASOPHILS NFR BLD AUTO: 0 % (ref 0–1)
BUN SERPL-MCNC: 33 MG/DL (ref 5–25)
CALCIUM SERPL-MCNC: 9.2 MG/DL (ref 8.4–10.2)
CHLORIDE SERPL-SCNC: 107 MMOL/L (ref 96–108)
CO2 SERPL-SCNC: 28 MMOL/L (ref 21–32)
CREAT SERPL-MCNC: 1.25 MG/DL (ref 0.6–1.3)
EOSINOPHIL # BLD AUTO: 0.2 THOUSAND/ÂΜL (ref 0–0.61)
EOSINOPHIL NFR BLD AUTO: 3 % (ref 0–6)
ERYTHROCYTE [DISTWIDTH] IN BLOOD BY AUTOMATED COUNT: 17.6 % (ref 11.6–15.1)
GFR SERPL CREATININE-BSD FRML MDRD: 35 ML/MIN/1.73SQ M
GLUCOSE SERPL-MCNC: 89 MG/DL (ref 65–140)
HCT VFR BLD AUTO: 38.8 % (ref 34.8–46.1)
HGB BLD-MCNC: 11.9 G/DL (ref 11.5–15.4)
IMM GRANULOCYTES # BLD AUTO: 0.02 THOUSAND/UL (ref 0–0.2)
IMM GRANULOCYTES NFR BLD AUTO: 0 % (ref 0–2)
LYMPHOCYTES # BLD AUTO: 1.43 THOUSANDS/ÂΜL (ref 0.6–4.47)
LYMPHOCYTES NFR BLD AUTO: 20 % (ref 14–44)
MCH RBC QN AUTO: 26.4 PG (ref 26.8–34.3)
MCHC RBC AUTO-ENTMCNC: 30.7 G/DL (ref 31.4–37.4)
MCV RBC AUTO: 86 FL (ref 82–98)
MONOCYTES # BLD AUTO: 0.81 THOUSAND/ÂΜL (ref 0.17–1.22)
MONOCYTES NFR BLD AUTO: 11 % (ref 4–12)
NEUTROPHILS # BLD AUTO: 4.84 THOUSANDS/ÂΜL (ref 1.85–7.62)
NEUTS SEG NFR BLD AUTO: 66 % (ref 43–75)
NRBC BLD AUTO-RTO: 0 /100 WBCS
PLATELET # BLD AUTO: 232 THOUSANDS/UL (ref 149–390)
PMV BLD AUTO: 10.2 FL (ref 8.9–12.7)
POTASSIUM SERPL-SCNC: 4.2 MMOL/L (ref 3.5–5.3)
QRS AXIS: 87 DEGREES
QRSD INTERVAL: 104 MS
QT INTERVAL: 396 MS
QTC INTERVAL: 479 MS
RBC # BLD AUTO: 4.51 MILLION/UL (ref 3.81–5.12)
SODIUM SERPL-SCNC: 141 MMOL/L (ref 135–147)
T WAVE AXIS: 72 DEGREES
VENTRICULAR RATE: 88 BPM
WBC # BLD AUTO: 7.33 THOUSAND/UL (ref 4.31–10.16)

## 2023-10-12 PROCEDURE — 99284 EMERGENCY DEPT VISIT MOD MDM: CPT | Performed by: EMERGENCY MEDICINE

## 2023-10-12 PROCEDURE — 71045 X-RAY EXAM CHEST 1 VIEW: CPT

## 2023-10-12 PROCEDURE — 93010 ELECTROCARDIOGRAM REPORT: CPT | Performed by: INTERNAL MEDICINE

## 2023-10-12 PROCEDURE — 99285 EMERGENCY DEPT VISIT HI MDM: CPT

## 2023-10-12 PROCEDURE — 36415 COLL VENOUS BLD VENIPUNCTURE: CPT | Performed by: EMERGENCY MEDICINE

## 2023-10-12 PROCEDURE — 93005 ELECTROCARDIOGRAM TRACING: CPT

## 2023-10-12 PROCEDURE — 80048 BASIC METABOLIC PNL TOTAL CA: CPT | Performed by: EMERGENCY MEDICINE

## 2023-10-12 PROCEDURE — 85025 COMPLETE CBC W/AUTO DIFF WBC: CPT | Performed by: EMERGENCY MEDICINE

## 2023-10-12 NOTE — ED PROVIDER NOTES
History  Chief Complaint   Patient presents with    Medical Problem     Pt arrives via EMS from home. Family reports pt was difficult to arouse earlier. EMS states pt was alert and oriented upon their arrival. Pt states she "doesn't feel good" but has no specific complaints. 80-year-old female history of dementia, bladder cancer, hypertension, diabetes, heart failure, A-fib presenting with concern for fatigue. Per report, patient lives at home and was difficult to arouse by family members this morning. Patient reports overall general malaise and fatigue but denies any specific complaints. Denies any chest pain shortness of breath. Denies abdominal pain nausea vomiting diarrhea. Denies any URI-like symptoms. Denies any other complaints. Chart reviewed. Past Medical History:  No date: Ankle fracture  No date: Arthritis      Comment:  left hip  No date: 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HCC)      Comment:  with left ureter  No date: Bronchitis  No date: Cancer ([address])  No date: Carcinoma, lung (HCC)  No date: Dementia ([address])  No date: Dependent edema  No date: Depression  No date: Diabetes mellitus (HCC)  No date: Hypercholesterolemia  No date: Hypertension  No date: Kidney stone  No date: Oth abn and inconclusive findings on dx imaging of breast  No date: Pes planus  No date: Renal calculus  No date: Restless leg syndrome  No date: Rib pain  No date: Sprain, neck  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 Taking? Klor-Con M20 20 MEQ tablet   No No   Sig: TAKE 1 TABLET BY MOUTH EVERY DAY   Misc.  Devices (Mattress Cover) MISC  Self No No   Sig: Use daily   QUEtiapine (SEROquel) 25 mg tablet   No No   Sig: Take 1 tablet (25 mg total) by mouth daily at bedtime   ergocalciferol (VITAMIN D2) 50,000 units   No No   Sig: TAKE 1 CAPSULE BY MOUTH ONE TIME PER WEEK   furosemide (LASIX) 40 mg tablet   No No   Sig: Take 40 mg in the morning and 20 mg in the afternoon   gabapentin (NEURONTIN) 100 mg capsule   No No   Sig: Take 1 capsule (100 mg total) by mouth 3 (three) times a day   mirtazapine (REMERON) 15 mg tablet   No No   Sig: TAKE 1 TABLET BY MOUTH DAILY AT BEDTIME      Facility-Administered Medications: None       Past Medical History:   Diagnosis Date    Ankle fracture     Arthritis     left hip    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HCC)     with left ureter    Bronchitis     Cancer (HCC)     Carcinoma, lung (HCC)     Dementia ([address])     Dependent edema     Depression     Diabetes mellitus ([address])     Hypercholesterolemia     Hypertension     Kidney stone     Oth abn and inconclusive findings on dx imaging of breast     Pes planus     Renal calculus     Restless leg syndrome     Rib pain     Sprain, neck     Varicose veins of left lower extremity        Past Surgical History:   Procedure Laterality Date    APPENDECTOMY      LUNG CANCER SURGERY         Family History   Problem Relation Age of Onset    No Known Problems Mother     No Known Problems Father     Dementia Brother     Breast cancer Neg Hx     Colon cancer Neg Hx     Ovarian cancer Neg Hx     Uterine cancer Neg Hx     Cervical cancer Neg Hx      I have reviewed and agree with the history as documented. E-Cigarette/Vaping     E-Cigarette/Vaping Substances     Social History     Tobacco Use    Smoking status: Former     Packs/day: 0.50     Years: 20.00     Total pack years: 10.00     Types: Cigarettes    Smokeless tobacco: Never   Substance Use Topics    Alcohol use: Never    Drug use: Never       Review of Systems   Constitutional:  Positive for fatigue. Negative for appetite change, chills, diaphoresis, fever and unexpected weight change. HENT:  Negative for congestion and rhinorrhea. Eyes:  Negative for photophobia and visual disturbance. Respiratory:  Negative for cough, chest tightness and shortness of breath.     Cardiovascular:  Negative for chest pain, palpitations and leg swelling. Gastrointestinal:  Negative for abdominal distention, abdominal pain, blood in stool, constipation, diarrhea, nausea and vomiting. Genitourinary:  Negative for dysuria and hematuria. Musculoskeletal:  Negative for back pain, joint swelling, neck pain and neck stiffness. Skin:  Negative for color change, pallor, rash and wound. Neurological:  Negative for dizziness, syncope, weakness, light-headedness and headaches. Psychiatric/Behavioral:  Negative for agitation. All other systems reviewed and are negative. Physical Exam  Physical Exam  Vitals and nursing note reviewed. Constitutional:       General: She is not in acute distress. Appearance: Normal appearance. She is well-developed. She is not ill-appearing, toxic-appearing or diaphoretic. HENT:      Head: Normocephalic and atraumatic. Nose: Nose normal. No congestion or rhinorrhea. Mouth/Throat:      Mouth: Mucous membranes are moist.      Pharynx: Oropharynx is clear. No oropharyngeal exudate or posterior oropharyngeal erythema. Eyes:      General: No scleral icterus. Right eye: No discharge. Left eye: No discharge. Extraocular Movements: Extraocular movements intact. Conjunctiva/sclera: Conjunctivae normal.      Pupils: Pupils are equal, round, and reactive to light. Neck:      Vascular: No JVD. Trachea: No tracheal deviation. Comments: Supple. Normal range of motion. Cardiovascular:      Rate and Rhythm: Normal rate and regular rhythm. Heart sounds: Normal heart sounds. No murmur heard. No friction rub. No gallop. Comments: Normal rate and regular rhythm  Pulmonary:      Effort: Pulmonary effort is normal. No respiratory distress. Breath sounds: Normal breath sounds. No stridor. No wheezing or rales. Comments: Clear to auscultation bilaterally  Chest:      Chest wall: No tenderness.    Abdominal:      General: Bowel sounds are normal. There is no distension. Palpations: Abdomen is soft. Tenderness: There is no abdominal tenderness. There is no right CVA tenderness, left CVA tenderness, guarding or rebound. Comments: Soft, nontender, nondistended. Normal bowel sounds throughout   Musculoskeletal:         General: No swelling, tenderness, deformity or signs of injury. Normal range of motion. Cervical back: Normal range of motion and neck supple. No rigidity. No muscular tenderness. Right lower leg: No edema. Left lower leg: No edema. Lymphadenopathy:      Cervical: No cervical adenopathy. Skin:     General: Skin is warm and dry. Coloration: Skin is not pale. Findings: No erythema or rash. Neurological:      General: No focal deficit present. Mental Status: She is alert. Mental status is at baseline. Sensory: No sensory deficit. Motor: No weakness or abnormal muscle tone. Coordination: Coordination normal.      Gait: Gait normal.      Comments: Alert. Strength and sensation grossly intact. Ambulatory without difficulty at baseline. Psychiatric:         Behavior: Behavior normal.         Thought Content:  Thought content normal.         Vital Signs  ED Triage Vitals   Temperature Pulse Respirations Blood Pressure SpO2   10/12/23 1000 10/12/23 0926 10/12/23 0926 10/12/23 0926 10/12/23 0926   97.8 °F (36.6 °C) 92 18 (!) 167/102 98 %      Temp Source Heart Rate Source Patient Position - Orthostatic VS BP Location FiO2 (%)   10/12/23 1000 10/12/23 0926 10/12/23 0926 10/12/23 0926 --   Temporal Monitor Sitting Right arm       Pain Score       10/12/23 0926       No Pain           Vitals:    10/12/23 0926 10/12/23 1000 10/12/23 1130 10/12/23 1230   BP: (!) 167/102 153/67 123/64 159/74   Pulse: 92 90 85 88   Patient Position - Orthostatic VS: Sitting Lying           Visual Acuity      ED Medications  Medications - No data to display    Diagnostic Studies  Results Reviewed       Procedure Component Value Units Date/Time    Basic metabolic panel [[id number]]  (Abnormal) Collected: 10/12/23 0940    Lab Status: Final result Specimen: Blood from Arm, Left Updated: 10/12/23 1004     Sodium 141 mmol/L      Potassium 4.2 mmol/L      Chloride 107 mmol/L      CO2 28 mmol/L      ANION GAP 6 mmol/L      BUN 33 mg/dL      Creatinine 1.25 mg/dL      Glucose 89 mg/dL      Calcium 9.2 mg/dL      eGFR 35 ml/min/1.73sq m     Narrative:      [place] guidelines for Chronic Kidney Disease (CKD):     Stage 1 with normal or high GFR (GFR > 90 mL/min/1.73 square meters)    Stage 2 Mild CKD (GFR = 60-89 mL/min/1.73 square meters)    Stage 3A Moderate CKD (GFR = 45-59 mL/min/1.73 square meters)    Stage 3B Moderate CKD (GFR = 30-44 mL/min/1.73 square meters)    Stage 4 Severe CKD (GFR = 15-29 mL/min/1.73 square meters)    Stage 5 End Stage CKD (GFR <15 mL/min/1.73 square meters)  Note: GFR calculation is accurate only with a steady state creatinine    CBC and differential [[id number]]  (Abnormal) Collected: 10/12/23 0940    Lab Status: Final result Specimen: Blood from Arm, Left Updated: 10/12/23 0946     WBC 7.33 Thousand/uL      RBC 4.51 Million/uL      Hemoglobin 11.9 g/dL      Hematocrit 38.8 %      MCV 86 fL      MCH 26.4 pg      MCHC 30.7 g/dL      RDW 17.6 %      MPV 10.2 fL      Platelets 480 Thousands/uL      nRBC 0 /100 WBCs      Neutrophils Relative 66 %      Immat GRANS % 0 %      Lymphocytes Relative 20 %      Monocytes Relative 11 %      Eosinophils Relative 3 %      Basophils Relative 0 %      Neutrophils Absolute 4.84 Thousands/µL      Immature Grans Absolute 0.02 Thousand/uL      Lymphocytes Absolute 1.43 Thousands/µL      Monocytes Absolute 0.81 Thousand/µL      Eosinophils Absolute 0.20 Thousand/µL      Basophils Absolute 0.03 Thousands/µL                    XR chest 1 view portable   Final Result by Mariel Smith MD (10/12 1041)      No acute disease. Workstation performed: [id number]                    Procedures  Procedures         ED Course                               SBIRT 20yo+      Flowsheet Row Most Recent Value   Initial Alcohol Screen: US AUDIT-C     1. How often do you have a drink containing alcohol? 0 Filed at: 10/12/2023 0929   2. How many drinks containing alcohol do you have on a typical day you are drinking? 0 Filed at: 10/12/2023 0929   3a. Male UNDER 65: How often do you have five or more drinks on one occasion? 0 Filed at: 10/12/2023 0929   3b. FEMALE Any Age, or MALE 65+: How often do you have 4 or more drinks on one occassion? 0 Filed at: 10/12/2023 0929   Audit-C Score 0 Filed at: 10/12/2023 7792   ARNULFO: How many times in the past year have you. .. Used an illegal drug or used a prescription medication for non-medical reasons? Never Filed at: 10/12/2023 7645                      Medical Decision Making  68-year-old female history of dementia, bladder cancer, hypertension, diabetes, heart failure, A-fib presenting with concern for fatigue. Nonspecific malaise and fatigue. Plan for EKG and basic labs. Reassess. EKG with A-fib and nonspecific ST abnormality. Labs interpreted me without significant acute process. Patient remains at baseline during ED observation. Discussed results and recommendations. Advised follow up PCP. Medication recommendations. Given instructions and return precautions. Patient/family at bedside acknowledged understanding of all written and verbal instructions and return precautions. Discharged. Amount and/or Complexity of Data Reviewed  Labs: ordered. Radiology: ordered.              Disposition  Final diagnoses:   Malaise and fatigue     Time reflects when diagnosis was documented in both MDM as applicable and the Disposition within this note       Time User Action Codes Description Comment    10/12/2023  9:48 AM Matt Moreno Add [R53.81,  R53.83] Malaise and fatigue           ED Disposition       ED Disposition   Discharge    Condition   Stable    Date/Time   Thu Oct 12, 2023 [address] discharge to home/self care. Follow-up Information       Follow up With Specialties Details Why [address] [facility], [facility], Nurse Practitioner Schedule an appointment as soon as possible for a visit in 1 week  [address]  [phone]              Discharge Medication List as of 10/12/2023 12:46 PM        CONTINUE these medications which have NOT CHANGED    Details   ergocalciferol (VITAMIN D2) 50,000 units TAKE 1 CAPSULE BY MOUTH ONE TIME PER WEEK, Normal      furosemide (LASIX) 40 mg tablet Take 40 mg in the morning and 20 mg in the afternoon, Normal      gabapentin (NEURONTIN) 100 mg capsule Take 1 capsule (100 mg total) by mouth 3 (three) times a day, Starting Thu 8/3/2023, Until Tue 1/30/2024, Normal      Klor-Con M20 20 MEQ tablet TAKE 1 TABLET BY MOUTH EVERY DAY, Starting Mon 10/9/2023, Normal      mirtazapine (REMERON) 15 mg tablet TAKE 1 TABLET BY MOUTH DAILY AT BEDTIME, Starting Wed 9/13/2023, Normal      Misc. Devices (Mattress Cover) MISC Use daily, Starting Tue 3/22/2022, Print      QUEtiapine (SEROquel) 25 mg tablet Take 1 tablet (25 mg total) by mouth daily at bedtime, Starting Thu 8/3/2023, Normal             No discharge procedures on file.     PDMP Review       None            ED Provider  Electronically Signed by             Tim Fleischer, MD  10/12/23 4542

## 2023-10-12 NOTE — ED NOTES
Pt given turkey sandwich and snacks. Pt waiting for niece for transport home.      Whitney Ny RN  10/12/23 3502

## 2023-10-12 NOTE — TELEPHONE ENCOUNTER
Hi, this is Alize Nice calling. I'm calling in regards to [employer]. Her YOB: 1924. She's patient of Aurora Bejarano. I just had to send her to the hospital in an ambulance. She's in route to [facility]. I could not wake her up this morning. She I got no response from her. Eventually after the ambulance got here, after she was able to respond said that she was having some difficulty breathing. Just wanted to keep you informed about what's going on. My phone number is [phone]. Thank you.

## 2023-10-13 ENCOUNTER — VBI (OUTPATIENT)
Dept: FAMILY MEDICINE CLINIC | Facility: CLINIC | Age: 88
End: 2023-10-13

## 2023-10-13 NOTE — TELEPHONE ENCOUNTER
10/13/23 2:11 PM    Patient contacted post ED visit, [facility] department spoke with patient/caregiver and outreach was successful. Thank you.   Lg Irene  [facility]

## 2023-10-22 DIAGNOSIS — R44.1 VISUAL HALLUCINATIONS: ICD-10-CM

## 2023-10-23 RX ORDER — MIRTAZAPINE 15 MG/1
15 TABLET, FILM COATED ORAL
Qty: 90 TABLET | Refills: 1 | Status: SHIPPED | OUTPATIENT
Start: 2023-10-23

## 2023-10-29 DIAGNOSIS — E55.9 VITAMIN D DEFICIENCY: ICD-10-CM

## 2023-10-30 RX ORDER — ERGOCALCIFEROL 1.25 MG/1
CAPSULE ORAL
Qty: 12 CAPSULE | Refills: 3 | Status: SHIPPED | OUTPATIENT
Start: 2023-10-30

## 2023-11-20 DIAGNOSIS — E55.9 VITAMIN D DEFICIENCY: ICD-10-CM

## 2023-11-20 RX ORDER — ERGOCALCIFEROL 1.25 MG/1
CAPSULE ORAL
Qty: 12 CAPSULE | Refills: 4 | Status: SHIPPED | OUTPATIENT
Start: 2023-11-20

## 2023-12-21 DIAGNOSIS — E55.9 VITAMIN D DEFICIENCY: ICD-10-CM

## 2023-12-21 RX ORDER — ERGOCALCIFEROL 1.25 MG/1
CAPSULE ORAL
Qty: 12 CAPSULE | Refills: 5 | Status: SHIPPED | OUTPATIENT
Start: 2023-12-21

## 2024-01-04 DIAGNOSIS — E55.9 VITAMIN D DEFICIENCY: ICD-10-CM

## 2024-01-04 RX ORDER — ERGOCALCIFEROL 1.25 MG/1
CAPSULE ORAL
Qty: 12 CAPSULE | Refills: 6 | Status: SHIPPED | OUTPATIENT
Start: 2024-01-04

## 2024-01-15 NOTE — TELEPHONE ENCOUNTER
Received return call from Jose Ayon  Placed outgoing call to discuss update; left voicemail noting that I would like to discuss a music therapy referral rather than having Juan Chapman see [facility] as it may be more beneficial for her  Provided direct line for return call  15-Jose-2024 16:01

## 2024-02-05 ENCOUNTER — VBI (OUTPATIENT)
Dept: ADMINISTRATIVE | Facility: OTHER | Age: 89
End: 2024-02-05

## 2024-02-09 DIAGNOSIS — I50.9 CHRONIC CONGESTIVE HEART FAILURE, UNSPECIFIED HEART FAILURE TYPE (HCC): ICD-10-CM

## 2024-02-09 RX ORDER — FUROSEMIDE 40 MG/1
TABLET ORAL
Qty: 60 TABLET | Refills: 3 | Status: SHIPPED | OUTPATIENT
Start: 2024-02-09

## 2024-02-11 DIAGNOSIS — E55.9 VITAMIN D DEFICIENCY: ICD-10-CM

## 2024-02-12 RX ORDER — ERGOCALCIFEROL 1.25 MG/1
CAPSULE ORAL
Qty: 12 CAPSULE | Refills: 7 | Status: SHIPPED | OUTPATIENT
Start: 2024-02-12

## 2024-03-30 DIAGNOSIS — R44.1 VISUAL HALLUCINATIONS: ICD-10-CM

## 2024-04-01 RX ORDER — MIRTAZAPINE 15 MG/1
TABLET, FILM COATED ORAL
Qty: 45 TABLET | Refills: 3 | Status: SHIPPED | OUTPATIENT
Start: 2024-04-01

## 2024-04-04 ENCOUNTER — OFFICE VISIT (OUTPATIENT)
Dept: FAMILY MEDICINE CLINIC | Facility: CLINIC | Age: 89
End: 2024-04-04
Payer: MEDICARE

## 2024-04-04 VITALS
HEIGHT: 59 IN | SYSTOLIC BLOOD PRESSURE: 142 MMHG | TEMPERATURE: 97.9 F | BODY MASS INDEX: 28.06 KG/M2 | OXYGEN SATURATION: 94 % | HEART RATE: 74 BPM | WEIGHT: 139.2 LBS | DIASTOLIC BLOOD PRESSURE: 80 MMHG

## 2024-04-04 DIAGNOSIS — Z00.00 MEDICARE ANNUAL WELLNESS VISIT, SUBSEQUENT: Primary | ICD-10-CM

## 2024-04-04 DIAGNOSIS — I50.9 CHRONIC CONGESTIVE HEART FAILURE, UNSPECIFIED HEART FAILURE TYPE (HCC): ICD-10-CM

## 2024-04-04 DIAGNOSIS — E87.6 HYPOKALEMIA: ICD-10-CM

## 2024-04-04 DIAGNOSIS — N18.31 STAGE 3A CHRONIC KIDNEY DISEASE (HCC): ICD-10-CM

## 2024-04-04 DIAGNOSIS — I50.9 ACUTE CONGESTIVE HEART FAILURE, UNSPECIFIED HEART FAILURE TYPE (HCC): ICD-10-CM

## 2024-04-04 DIAGNOSIS — F01.518 VASCULAR DEMENTIA WITH BEHAVIORAL DISTURBANCE (HCC): ICD-10-CM

## 2024-04-04 DIAGNOSIS — F01.50 VASCULAR DEMENTIA WITHOUT BEHAVIORAL DISTURBANCE (HCC): ICD-10-CM

## 2024-04-04 DIAGNOSIS — F33.0 MILD EPISODE OF RECURRENT MAJOR DEPRESSIVE DISORDER (HCC): ICD-10-CM

## 2024-04-04 DIAGNOSIS — I48.20 CHRONIC ATRIAL FIBRILLATION (HCC): ICD-10-CM

## 2024-04-04 DIAGNOSIS — E44.1 MILD PROTEIN-CALORIE MALNUTRITION (HCC): ICD-10-CM

## 2024-04-04 DIAGNOSIS — R26.2 AMBULATORY DYSFUNCTION: ICD-10-CM

## 2024-04-04 DIAGNOSIS — F03.90 DEMENTIA, UNSPECIFIED DEMENTIA SEVERITY, UNSPECIFIED DEMENTIA TYPE, UNSPECIFIED WHETHER BEHAVIORAL, PSYCHOTIC, OR MOOD DISTURBANCE OR ANXIETY (HCC): ICD-10-CM

## 2024-04-04 PROBLEM — R10.32 LLQ ABDOMINAL PAIN: Status: RESOLVED | Noted: 2023-08-10 | Resolved: 2024-04-04

## 2024-04-04 PROCEDURE — G0439 PPPS, SUBSEQ VISIT: HCPCS | Performed by: NURSE PRACTITIONER

## 2024-04-04 PROCEDURE — 99215 OFFICE O/P EST HI 40 MIN: CPT | Performed by: NURSE PRACTITIONER

## 2024-04-04 RX ORDER — FUROSEMIDE 40 MG/1
40 TABLET ORAL 2 TIMES DAILY
Qty: 180 TABLET | Refills: 1 | Status: SHIPPED | OUTPATIENT
Start: 2024-04-04 | End: 2024-04-05

## 2024-04-04 RX ORDER — POTASSIUM CHLORIDE 20 MEQ/1
20 TABLET, EXTENDED RELEASE ORAL 2 TIMES DAILY
Qty: 180 TABLET | Refills: 1 | Status: SHIPPED | OUTPATIENT
Start: 2024-04-04 | End: 2024-04-11 | Stop reason: ALTCHOICE

## 2024-04-04 NOTE — ASSESSMENT & PLAN NOTE
Wt Readings from Last 3 Encounters:   04/04/24 63.1 kg (139 lb 3.2 oz)   10/12/23 57.6 kg (127 lb)   08/29/23 58.1 kg (128 lb)     Discussed with patient Sona and niece Xi that she does appear to have a weight gain of 10 pounds since October and her lung rales in the bases and atrial fibrillation HR 89 on IH EKG appears that she is in heart failure options reviewed with patient and niece xi discussed options of ordering outpatient labs and chest x-ray vs inpatient and both Xi and Sona are interested in staying at home and managing things in the outpatient setting. Will check labs and chest x-ray and also increase dose of Lasix to 40 mg bid and potassium to bid and follow up in 1 week. Also interested in pursuing a hospice consultation to discuss her goals of care and main goal is to remain home with her family and to be comfortable and managed at home. Referral placed. Will continue with discussion at next visit and discuss a POLST.

## 2024-04-04 NOTE — PROGRESS NOTES
Assessment and Plan:     Problem List Items Addressed This Visit        Cardiovascular and Mediastinum    Chronic atrial fibrillation (HCC)    Relevant Orders    Comprehensive metabolic panel    CBC and differential    B-Type Natriuretic Peptide(BNP)    XR chest pa & lateral    Chronic congestive heart failure (HCC)     Wt Readings from Last 3 Encounters:   04/04/24 63.1 kg (139 lb 3.2 oz)   10/12/23 57.6 kg (127 lb)   08/29/23 58.1 kg (128 lb)     Discussed with patient Sona and niece Xi that she does appear to have a weight gain of 10 pounds since October and her lung rales in the bases and atrial fibrillation HR 89 on IH EKG appears that she is in heart failure options reviewed with patient and niece xi discussed options of ordering outpatient labs and chest x-ray vs inpatient and both Xi and Sona are interested in staying at home and managing things in the outpatient setting. Will check labs and chest x-ray and also increase dose of Lasix to 40 mg bid and potassium to bid and follow up in 1 week. Also interested in pursuing a hospice consultation to discuss her goals of care and main goal is to remain home with her family and to be comfortable and managed at home. Referral placed. Will continue with discussion at next visit and discuss a POLST.                Relevant Medications    furosemide (LASIX) 40 mg tablet    Other Relevant Orders    Comprehensive metabolic panel    CBC and differential    B-Type Natriuretic Peptide(BNP)    XR chest pa & lateral    Ambulatory Referral to Hospice    Acute CHF (congestive heart failure) (HCC)    Relevant Orders    Comprehensive metabolic panel    CBC and differential    B-Type Natriuretic Peptide(BNP)    XR chest pa & lateral    Ambulatory Referral to Hospice       Nervous and Auditory    Vascular dementia without behavioral disturbance ([facility])    Relevant Orders    Ambulatory Referral to Hospice    Dementia ([facility])    Relevant Orders    Ambulatory Referral to  Hospice    Vascular dementia with behavioral disturbance (HCC)    Relevant Orders    Ambulatory Referral to Hospice       Genitourinary    Stage 3a chronic kidney disease (HCC) (Chronic)    Relevant Medications    furosemide (LASIX) 40 mg tablet       Behavioral Health    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HCC)       Care Coordination    Ambulatory dysfunction       Other    Medicare annual wellness visit, subsequent - Primary    Mild protein-calorie malnutrition (HCC)   Other Visit Diagnoses     Hypokalemia        Relevant Medications    potassium chloride (Klor-Con M20) 20 mEq tablet          Depression Screening and Follow-up Plan: Patient was screened for depression during today's encounter. They screened negative with a PHQ-9 score of 2.      Preventive health issues were discussed with patient, and age appropriate screening tests were ordered as noted in patient's After Visit Summary.  Personalized health advice and appropriate referrals for health education or preventive services given if needed, as noted in patient's After Visit Summary.     History of Present Illness:     Patient presents for a Medicare Wellness Visit    Patient here today with her niece Xi Gutierrez who she lives and reports that she is not sure what is bothering her and reports that when she is walking she is feeling shaky and trembling and felt unsteady and no fall and Xi took her BP and was 149/ 80 and an hour later was 148/70.        Patient Care Team:  FARZANA Stanley as PCP - General (Family Medicine)     Review of Systems:     Review of Systems   Constitutional:  Positive for unexpected weight change. Negative for activity change, appetite change, chills, diaphoresis, fatigue and fever.   HENT:  Positive for postnasal drip. Negative for congestion, ear pain, hearing loss, sinus pressure, sinus pain, sneezing, sore throat and trouble swallowing.    Eyes:  Negative for pain, discharge, redness and  visual disturbance.   Respiratory:  Positive for shortness of breath. Negative for apnea, cough, choking, chest tightness, wheezing and stridor.    Cardiovascular:  Positive for palpitations. Negative for chest pain and leg swelling.   Gastrointestinal:  Positive for constipation. Negative for abdominal distention, abdominal pain, blood in stool, diarrhea, nausea and vomiting.   Endocrine: Negative.    Genitourinary: Negative.    Musculoskeletal:  Positive for gait problem. Negative for arthralgias.   Allergic/Immunologic: Negative.    Neurological:  Positive for dizziness and weakness. Negative for tremors, seizures, light-headedness and numbness.   Hematological:  Negative for adenopathy. Does not bruise/bleed easily.   Psychiatric/Behavioral:  Negative for behavioral problems and dysphoric mood.         Problem List:     Patient Active Problem List   Diagnosis   • Degeneration of lumbar intervertebral disc   • Lumbar radiculopathy   • Arthropathy of lumbar facet joint   • 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)   • Visual hallucinations   • Restless legs syndrome   • Primary insomnia   • Vitamin D deficiency   • Ambulatory dysfunction   • Anxiety   • Stage 3a chronic kidney disease (HCC)   • 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HCC)   • Chronic atrial fibrillation (HCC)   • Chronic congestive heart failure (HCC)   • Weakness of left leg   • Does mobilize using walker   • Allergic bronchitis   • Acute CHF (congestive heart failure) (HCC)   • Dementia (HCC)   • Vascular dementia, uncomplicated (HCC)   • Medicare annual wellness visit, subsequent   • Mild protein-calorie malnutrition (HCC)   • Vascular dementia with behavioral disturbance (HCC)      Past Medical and Surgical History:     Past Medical History:   Diagnosis Date   • Ankle fracture    • Arthritis     left hip   • 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HCC)     with left ureter   • Bronchitis    • Cancer (HCC)    • Carcinoma, lung (HCC)    •  Dementia (HCC)    • Dependent edema    • Depression    • Diabetes mellitus (HCC)    • Hypercholesterolemia    • Hypertension    • Kidney stone    • Oth abn and inconclusive findings on dx imaging of breast    • Pes planus    • Renal calculus    • Restless leg syndrome    • Rib pain    • Sprain, neck    • Varicose veins of left lower extremity      Past Surgical History:   Procedure Laterality Date   • APPENDECTOMY     • LUNG CANCER SURGERY        Family History:     Family History   Problem Relation Age of Onset   • No Known Problems Mother    • No Known Problems Father    • Dementia Brother    • Breast cancer Neg Hx    • Colon cancer Neg Hx    • Ovarian cancer Neg Hx    • Uterine cancer Neg Hx    • Cervical cancer Neg Hx       Social History:     Social History     Socioeconomic History   • Marital status:      Spouse name: None   • Number of children: None   • Years of education: None   • Highest education level: None   Occupational History   • None   Tobacco Use   • Smoking status: Former     Current packs/day: 0.50     Average packs/day: 0.5 packs/day for 20.0 years (10.0 ttl pk-yrs)     Types: Cigarettes   • Smokeless tobacco: Never   Substance and Sexual Activity   • Alcohol use: Never   • Drug use: Never   • Sexual activity: Not Currently     Birth control/protection: Post-menopausal   Other Topics Concern   • None   Social History Narrative    Lives at home with niece     Social Determinants of Health     Financial Resource Strain: Low Risk  (10/11/2022)    Overall Financial Resource Strain (CARDIA)    • Difficulty of Paying Living Expenses: Not very hard   Food Insecurity: Patient Declined (4/4/2024)    Hunger Vital Sign    • Worried About Running Out of Food in the Last Year: Patient declined    • Ran Out of Food in the Last Year: Patient declined   Transportation Needs: No Transportation Needs (4/4/2024)    PRAPARE - Transportation    • Lack of Transportation (Medical): No    • Lack of  Transportation (Non-Medical): No   Physical Activity: Not on file   Stress: Not on file   Social Connections: Not on file   Intimate Partner Violence: Not on file   Housing Stability: Low Risk  (4/4/2024)    Housing Stability Vital Sign    • Unable to Pay for Housing in the Last Year: No    • Number of Places Lived in the Last Year: 1    • Unstable Housing in the Last Year: No      Medications and Allergies:     Current Outpatient Medications   Medication Sig Dispense Refill   • ergocalciferol (VITAMIN D2) 50,000 units TAKE 1 CAPSULE BY MOUTH ONE TIME PER WEEK 12 capsule 7   • furosemide (LASIX) 40 mg tablet Take 1 tablet (40 mg total) by mouth 2 (two) times a day Take 40 mg in the morning and 20 mg in the afternoon 180 tablet 1   • gabapentin (NEURONTIN) 100 mg capsule Take 1 capsule (100 mg total) by mouth 3 (three) times a day 270 capsule 1   • mirtazapine (REMERON) 15 mg tablet TAKE 0.5 TABLETS (7.5 MG TOTAL) BY MOUTH DAILY AT BEDTIME 45 tablet 3   • Misc. Devices (Mattress Cover) MISC Use daily 1 each 0   • potassium chloride (Klor-Con M20) 20 mEq tablet Take 1 tablet (20 mEq total) by mouth 2 (two) times a day 180 tablet 1   • QUEtiapine (SEROquel) 25 mg tablet Take 1 tablet (25 mg total) by mouth daily at bedtime 90 tablet 3     No current facility-administered medications for this visit.     Allergies   Allergen Reactions   • Albuterol    • Aldactone [Spironolactone]    • Aspirin    • Atenolol    • Bactrim [Sulfamethoxazole-Trimethoprim]    • Codeine    • Hydrocodone    • Ibuprofen    • Lisinopril    • Mevacor [Lovastatin]    • Mirapex [Pramipexole]    • Morphine And Related    • Other      novacaine   • Penicillins    • Procaine    • Salicylates    • Ultram [Tramadol]    • Zoloft [Sertraline]      Night sweats      Immunizations:     Immunization History   Administered Date(s) Administered   • COVID-19 PFIZER VACCINE 0.3 ML IM 03/18/2021, 04/08/2021   • INFLUENZA 10/23/2014, 11/22/2015, 02/14/2018,  11/06/2018, 09/29/2022   • Influenza, high dose seasonal 0.7 mL 09/25/2020, 10/07/2021, 09/29/2022   • Influenza, recombinant, quadrivalent,injectable, preservative free 10/11/2019   • Tuberculin Skin Test 10/22/2019, 11/01/2019      Health Maintenance:     There are no preventive care reminders to display for this patient.      Topic Date Due   • COVID-19 Vaccine (3 - 2023-24 season) 09/01/2023      Medicare Screening Tests and Risk Assessments:     Sona is here for her Subsequent Wellness visit. Last Medicare Wellness visit information reviewed, patient interviewed and updates made to the record today.      Health Risk Assessment:   Patient rates overall health as very good. Patient feels that their physical health rating is same. Patient is satisfied with their life. Eyesight was rated as same. Hearing was rated as same. Patient feels that their emotional and mental health rating is same. Patients states they are never, rarely angry. Patient states they are sometimes unusually tired/fatigued. Pain experienced in the last 7 days has been none. Patient states that she has experienced no weight loss or gain in last 6 months.     Depression Screening:   PHQ-9 Score: 2      Fall Risk Screening:   In the past year, patient has experienced: no history of falling in past year      Urinary Incontinence Screening:   Patient has leaked urine accidently in the last six months.     Home Safety:  Patient has trouble with stairs inside or outside of their home. Patient has working smoke alarms and has working carbon monoxide detector. Home safety hazards include: none.     Nutrition:   Current diet is Regular.     Medications:   Patient is not currently taking any over-the-counter supplements. Patient is not able to manage medications.     Activities of Daily Living (ADLs)/Instrumental Activities of Daily Living (IADLs):   Walk and transfer into and out of bed and chair?: Yes  Dress and groom yourself?: Yes    Bathe or shower  yourself?: Yes    Feed yourself? Yes  Do your laundry/housekeeping?: No  Manage your money, pay your bills and track your expenses?: No  Make your own meals?: No    Do your own shopping?: No    Previous Hospitalizations:   Any hospitalizations or ED visits within the last 12 months?: Yes      Advance Care Planning:   Living will: Yes    Durable POA for healthcare: Yes    Advanced directive: Yes    Advanced directive counseling given: Yes    End of Life Decisions reviewed with patient: Yes    Provider agrees with end of life decisions: Yes      Cognitive Screening:   Provider or family/friend/caregiver concerned regarding cognition?: No    PREVENTIVE SCREENINGS      Cardiovascular Screening:    General: Risks and Benefits Discussed and Screening Not Indicated      Diabetes Screening:     General: Screening Current and Risks and Benefits Discussed      Colorectal Cancer Screening:     General: Screening Not Indicated and Risks and Benefits Discussed      Breast Cancer Screening:     General: Risks and Benefits Discussed and Screening Not Indicated      Cervical Cancer Screening:    General: Screening Not Indicated and Risks and Benefits Discussed      Osteoporosis Screening:    General: Risks and Benefits Discussed and Screening Not Indicated      Abdominal Aortic Aneurysm (AAA) Screening:        General: Risks and Benefits Discussed and Screening Not Indicated      Lung Cancer Screening:     General: Screening Not Indicated, History Lung Cancer and Risks and Benefits Discussed      Hepatitis C Screening:    General: Risks and Benefits Discussed    Screening, Brief Intervention, and Referral to Treatment (SBIRT)    Screening  Typical number of drinks in a day: 0    Single Item Drug Screening:  How often have you used an illegal drug (including marijuana) or a prescription medication for non-medical reasons in the past year? never    Single Item Drug Screen Score: 0  Interpretation: Negative screen for possible drug  "use disorder    No results found.     Physical Exam:     /80 (BP Location: Right arm, Patient Position: Sitting)   Pulse 74   Temp 97.9 °F (36.6 °C) (Temporal)   Ht 4' 11\" (1.499 m)   Wt 63.1 kg (139 lb 3.2 oz)   SpO2 94%   BMI 28.11 kg/m²     Physical Exam  Vitals and nursing note reviewed. Exam conducted with a chaperone present.   Constitutional:       General: She is not in acute distress.     Appearance: Normal appearance. She is well-developed and well-groomed.   HENT:      Head: Normocephalic and atraumatic.      Right Ear: Hearing and tympanic membrane normal.      Left Ear: Hearing and tympanic membrane normal.      Nose: Nose normal.   Eyes:      General: Lids are normal.      Conjunctiva/sclera: Conjunctivae normal.   Cardiovascular:      Rate and Rhythm: Tachycardia present. Rhythm regularly irregular.      Heart sounds: Normal heart sounds, S1 normal and S2 normal. No murmur heard.  Pulmonary:      Effort: Pulmonary effort is normal. No respiratory distress.      Breath sounds: Examination of the right-lower field reveals rales. Examination of the left-lower field reveals rales. Rales present.   Abdominal:      Palpations: Abdomen is soft.      Tenderness: There is no abdominal tenderness.   Musculoskeletal:         General: No swelling.      Cervical back: Full passive range of motion without pain and neck supple.      Right lower le+ Pitting Edema present.      Left lower le+ Pitting Edema present.   Skin:     General: Skin is warm and dry.      Capillary Refill: Capillary refill takes less than 2 seconds.   Neurological:      Mental Status: She is alert.   Psychiatric:         Mood and Affect: Mood normal.         Behavior: Behavior is cooperative.          FARZANA Stanley  "

## 2024-04-04 NOTE — PATIENT INSTRUCTIONS

## 2024-04-05 ENCOUNTER — TELEPHONE (OUTPATIENT)
Dept: FAMILY MEDICINE CLINIC | Facility: CLINIC | Age: 89
End: 2024-04-05

## 2024-04-05 ENCOUNTER — RA CDI HCC (OUTPATIENT)
Dept: OTHER | Facility: HOSPITAL | Age: 89
End: 2024-04-05

## 2024-04-05 ENCOUNTER — HOME CARE VISIT (OUTPATIENT)
Dept: HOME HEALTH SERVICES | Facility: HOME HEALTHCARE | Age: 89
End: 2024-04-05

## 2024-04-05 ENCOUNTER — HOSPITAL ENCOUNTER (EMERGENCY)
Facility: HOSPITAL | Age: 89
Discharge: HOME | End: 2024-04-05
Attending: EMERGENCY MEDICINE
Payer: COMMERCIAL

## 2024-04-05 VITALS
RESPIRATION RATE: 17 BRPM | TEMPERATURE: 97.7 F | OXYGEN SATURATION: 97 % | HEART RATE: 78 BPM | DIASTOLIC BLOOD PRESSURE: 73 MMHG | SYSTOLIC BLOOD PRESSURE: 170 MMHG

## 2024-04-05 DIAGNOSIS — N39.0 UTI (URINARY TRACT INFECTION): Primary | ICD-10-CM

## 2024-04-05 DIAGNOSIS — I10 HYPERTENSION: ICD-10-CM

## 2024-04-05 DIAGNOSIS — N17.9 AKI (ACUTE KIDNEY INJURY) (HCC): ICD-10-CM

## 2024-04-05 LAB
ANION GAP SERPL CALCULATED.3IONS-SCNC: 6 MMOL/L (ref 4–13)
ATRIAL RATE: 92 BPM
BACTERIA UR QL AUTO: ABNORMAL /HPF
BASOPHILS # BLD AUTO: 0.03 THOUSANDS/ÂΜL (ref 0–0.1)
BASOPHILS NFR BLD AUTO: 0 % (ref 0–1)
BILIRUB UR QL STRIP: NEGATIVE
BUN SERPL-MCNC: 27 MG/DL (ref 5–25)
CALCIUM SERPL-MCNC: 9.1 MG/DL (ref 8.4–10.2)
CARDIAC TROPONIN I PNL SERPL HS: 17 NG/L
CHLORIDE SERPL-SCNC: 102 MMOL/L (ref 96–108)
CLARITY UR: ABNORMAL
CO2 SERPL-SCNC: 31 MMOL/L (ref 21–32)
COLOR UR: ABNORMAL
CREAT SERPL-MCNC: 1.4 MG/DL (ref 0.6–1.3)
EOSINOPHIL # BLD AUTO: 0.18 THOUSAND/ÂΜL (ref 0–0.61)
EOSINOPHIL NFR BLD AUTO: 3 % (ref 0–6)
ERYTHROCYTE [DISTWIDTH] IN BLOOD BY AUTOMATED COUNT: 16.1 % (ref 11.6–15.1)
GFR SERPL CREATININE-BSD FRML MDRD: 31 ML/MIN/1.73SQ M
GLUCOSE SERPL-MCNC: 102 MG/DL (ref 65–140)
GLUCOSE UR STRIP-MCNC: NEGATIVE MG/DL
HCT VFR BLD AUTO: 43.9 % (ref 34.8–46.1)
HGB BLD-MCNC: 13.9 G/DL (ref 11.5–15.4)
HGB UR QL STRIP.AUTO: NEGATIVE
IMM GRANULOCYTES # BLD AUTO: 0.03 THOUSAND/UL (ref 0–0.2)
IMM GRANULOCYTES NFR BLD AUTO: 0 % (ref 0–2)
KETONES UR STRIP-MCNC: NEGATIVE MG/DL
LEUKOCYTE ESTERASE UR QL STRIP: ABNORMAL
LYMPHOCYTES # BLD AUTO: 1.32 THOUSANDS/ÂΜL (ref 0.6–4.47)
LYMPHOCYTES NFR BLD AUTO: 18 % (ref 14–44)
MAGNESIUM SERPL-MCNC: 2.2 MG/DL (ref 1.9–2.7)
MCH RBC QN AUTO: 28.4 PG (ref 26.8–34.3)
MCHC RBC AUTO-ENTMCNC: 31.7 G/DL (ref 31.4–37.4)
MCV RBC AUTO: 90 FL (ref 82–98)
MONOCYTES # BLD AUTO: 0.82 THOUSAND/ÂΜL (ref 0.17–1.22)
MONOCYTES NFR BLD AUTO: 11 % (ref 4–12)
NEUTROPHILS # BLD AUTO: 4.92 THOUSANDS/ÂΜL (ref 1.85–7.62)
NEUTS SEG NFR BLD AUTO: 68 % (ref 43–75)
NITRITE UR QL STRIP: NEGATIVE
NON-SQ EPI CELLS URNS QL MICRO: ABNORMAL /HPF
NRBC BLD AUTO-RTO: 0 /100 WBCS
PH UR STRIP.AUTO: 6.5 [PH]
PLATELET # BLD AUTO: 237 THOUSANDS/UL (ref 149–390)
PMV BLD AUTO: 10.6 FL (ref 8.9–12.7)
POTASSIUM SERPL-SCNC: 5.6 MMOL/L (ref 3.5–5.3)
PROT UR STRIP-MCNC: NEGATIVE MG/DL
QRS AXIS: 76 DEGREES
QRSD INTERVAL: 104 MS
QT INTERVAL: 390 MS
QTC INTERVAL: 469 MS
RBC # BLD AUTO: 4.89 MILLION/UL (ref 3.81–5.12)
RBC #/AREA URNS AUTO: ABNORMAL /HPF
SODIUM SERPL-SCNC: 139 MMOL/L (ref 135–147)
SP GR UR STRIP.AUTO: 1.01 (ref 1–1.03)
T WAVE AXIS: 63 DEGREES
UROBILINOGEN UR STRIP-ACNC: <2 MG/DL
VENTRICULAR RATE: 87 BPM
WBC # BLD AUTO: 7.3 THOUSAND/UL (ref 4.31–10.16)
WBC #/AREA URNS AUTO: ABNORMAL /HPF

## 2024-04-05 PROCEDURE — 87186 SC STD MICRODIL/AGAR DIL: CPT | Performed by: EMERGENCY MEDICINE

## 2024-04-05 PROCEDURE — 99285 EMERGENCY DEPT VISIT HI MDM: CPT | Performed by: EMERGENCY MEDICINE

## 2024-04-05 PROCEDURE — 80048 BASIC METABOLIC PNL TOTAL CA: CPT | Performed by: EMERGENCY MEDICINE

## 2024-04-05 PROCEDURE — 93010 ELECTROCARDIOGRAM REPORT: CPT | Performed by: INTERNAL MEDICINE

## 2024-04-05 PROCEDURE — 96365 THER/PROPH/DIAG IV INF INIT: CPT

## 2024-04-05 PROCEDURE — 96361 HYDRATE IV INFUSION ADD-ON: CPT

## 2024-04-05 PROCEDURE — 87086 URINE CULTURE/COLONY COUNT: CPT | Performed by: EMERGENCY MEDICINE

## 2024-04-05 PROCEDURE — 87077 CULTURE AEROBIC IDENTIFY: CPT | Performed by: EMERGENCY MEDICINE

## 2024-04-05 PROCEDURE — 96375 TX/PRO/DX INJ NEW DRUG ADDON: CPT

## 2024-04-05 PROCEDURE — 85025 COMPLETE CBC W/AUTO DIFF WBC: CPT | Performed by: EMERGENCY MEDICINE

## 2024-04-05 PROCEDURE — 83735 ASSAY OF MAGNESIUM: CPT | Performed by: EMERGENCY MEDICINE

## 2024-04-05 PROCEDURE — 36415 COLL VENOUS BLD VENIPUNCTURE: CPT | Performed by: EMERGENCY MEDICINE

## 2024-04-05 PROCEDURE — 81001 URINALYSIS AUTO W/SCOPE: CPT | Performed by: EMERGENCY MEDICINE

## 2024-04-05 PROCEDURE — 99285 EMERGENCY DEPT VISIT HI MDM: CPT

## 2024-04-05 PROCEDURE — 84484 ASSAY OF TROPONIN QUANT: CPT | Performed by: EMERGENCY MEDICINE

## 2024-04-05 PROCEDURE — 93005 ELECTROCARDIOGRAM TRACING: CPT

## 2024-04-05 RX ORDER — CEPHALEXIN 500 MG/1
500 CAPSULE ORAL EVERY 8 HOURS SCHEDULED
Qty: 21 CAPSULE | Refills: 0 | Status: SHIPPED | OUTPATIENT
Start: 2024-04-05 | End: 2024-04-12

## 2024-04-05 RX ORDER — FUROSEMIDE 40 MG/1
40 TABLET ORAL 2 TIMES DAILY
Qty: 180 TABLET | Refills: 1 | Status: SHIPPED | OUTPATIENT
Start: 2024-04-05 | End: 2024-04-11 | Stop reason: SDUPTHER

## 2024-04-05 RX ORDER — LABETALOL HYDROCHLORIDE 5 MG/ML
10 INJECTION, SOLUTION INTRAVENOUS ONCE
Status: COMPLETED | OUTPATIENT
Start: 2024-04-05 | End: 2024-04-05

## 2024-04-05 RX ADMIN — LABETALOL HYDROCHLORIDE 10 MG: 5 INJECTION, SOLUTION INTRAVENOUS at 13:20

## 2024-04-05 RX ADMIN — CEFTRIAXONE SODIUM 1000 MG: 10 INJECTION, POWDER, FOR SOLUTION INTRAVENOUS at 13:57

## 2024-04-05 RX ADMIN — SODIUM CHLORIDE 1000 ML: 0.9 INJECTION, SOLUTION INTRAVENOUS at 13:57

## 2024-04-05 NOTE — ED PROVIDER NOTES
History  Chief Complaint   Patient presents with    Dizziness     Pt was at home, started with dizziness and headache this morning. Pt was seen yesterday at pcp. Hx CHF, afib, and dementia     This is a nineteen 89-year-old female that comes emergency department because she did not feel well.  She felt dizzy and she had a fall into her bed without any injuries.  At this point she is feeling better.  She is not complaining of any pain except for some abdominal discomfort.  Review of her records show that she was just at the doctor yesterday for a general checkup.      History provided by:  Patient   used: No    Dizziness  Quality:  Imbalance  Associated symptoms: weakness    Associated symptoms: no chest pain, no palpitations, no shortness of breath and no vomiting        Prior to Admission Medications   Prescriptions Last Dose Informant Patient Reported? Taking?   Misc. Devices (Mattress Cover) MISC  Self No No   Sig: Use daily   QUEtiapine (SEROquel) 25 mg tablet   No No   Sig: Take 1 tablet (25 mg total) by mouth daily at bedtime   ergocalciferol (VITAMIN D2) 50,000 units   No No   Sig: TAKE 1 CAPSULE BY MOUTH ONE TIME PER WEEK   furosemide (LASIX) 40 mg tablet   No No   Sig: Take 1 tablet (40 mg total) by mouth 2 (two) times a day   gabapentin (NEURONTIN) 100 mg capsule   No No   Sig: Take 1 capsule (100 mg total) by mouth 3 (three) times a day   mirtazapine (REMERON) 15 mg tablet   No No   Sig: TAKE 0.5 TABLETS (7.5 MG TOTAL) BY MOUTH DAILY AT BEDTIME   potassium chloride (Klor-Con M20) 20 mEq tablet   No No   Sig: Take 1 tablet (20 mEq total) by mouth 2 (two) times a day      Facility-Administered Medications: None       Past Medical History:   Diagnosis Date    Ankle fracture     Arthritis     left hip    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HCC)     with left ureter    Bronchitis     Cancer (HCC)     Carcinoma, lung (HCC)     Dementia (HCC)     Dependent edema     Depression     Diabetes mellitus (HCC)      Hypercholesterolemia     Hypertension     Kidney stone     Oth abn and inconclusive findings on dx imaging of breast     Pes planus     Renal calculus     Restless leg syndrome     Rib pain     Sprain, neck     Varicose veins of left lower extremity        Past Surgical History:   Procedure Laterality Date    APPENDECTOMY      LUNG CANCER SURGERY         Family History   Problem Relation Age of Onset    No Known Problems Mother     No Known Problems Father     Dementia Brother     Breast cancer Neg Hx     Colon cancer Neg Hx     Ovarian cancer Neg Hx     Uterine cancer Neg Hx     Cervical cancer Neg Hx      I have reviewed and agree with the history as documented.    E-Cigarette/Vaping     E-Cigarette/Vaping Substances     Social History     Tobacco Use    Smoking status: Former     Current packs/day: 0.50     Average packs/day: 0.5 packs/day for 20.0 years (10.0 ttl pk-yrs)     Types: Cigarettes    Smokeless tobacco: Never   Substance Use Topics    Alcohol use: Never    Drug use: Never       Review of Systems   Constitutional:  Negative for chills and fever.   HENT:  Negative for ear pain and sore throat.    Eyes:  Negative for pain and visual disturbance.   Respiratory:  Negative for cough and shortness of breath.    Cardiovascular:  Negative for chest pain and palpitations.   Gastrointestinal:  Positive for abdominal pain. Negative for vomiting.   Genitourinary:  Negative for dysuria and hematuria.   Musculoskeletal:  Negative for arthralgias and back pain.   Skin:  Negative for color change and rash.   Neurological:  Positive for dizziness and weakness. Negative for seizures and syncope.   All other systems reviewed and are negative.      Physical Exam  Physical Exam  Vitals and nursing note reviewed.   Constitutional:       General: She is not in acute distress.     Appearance: Normal appearance. She is well-developed.   HENT:      Head: Normocephalic and atraumatic.      Right Ear: External ear normal.       Left Ear: External ear normal.   Eyes:      Conjunctiva/sclera: Conjunctivae normal.      Pupils: Pupils are equal, round, and reactive to light.   Cardiovascular:      Rate and Rhythm: Normal rate and regular rhythm.      Pulses: Normal pulses.      Heart sounds: Normal heart sounds. No murmur heard.  Pulmonary:      Effort: Pulmonary effort is normal. No respiratory distress.      Breath sounds: Normal breath sounds.   Abdominal:      General: Abdomen is flat.      Palpations: Abdomen is soft.      Tenderness: There is no abdominal tenderness.   Musculoskeletal:         General: No swelling.      Cervical back: Neck supple.   Skin:     General: Skin is warm and dry.      Capillary Refill: Capillary refill takes less than 2 seconds.   Neurological:      General: No focal deficit present.      Mental Status: She is alert and oriented to person, place, and time.   Psychiatric:         Mood and Affect: Mood normal.         Thought Content: Thought content normal.         Vital Signs  ED Triage Vitals [04/05/24 1302]   Temperature Pulse Respirations Blood Pressure SpO2   97.7 °F (36.5 °C) 90 17 (!) 204/119 97 %      Temp Source Heart Rate Source Patient Position - Orthostatic VS BP Location FiO2 (%)   Oral Monitor Lying Right arm --      Pain Score       --           Vitals:    04/05/24 1302   BP: (!) 204/119   Pulse: 90   Patient Position - Orthostatic VS: Lying         Visual Acuity      ED Medications  Medications   labetalol (NORMODYNE) injection 10 mg (has no administration in time range)       Diagnostic Studies  Results Reviewed       Procedure Component Value Units Date/Time    UA w Reflex to Microscopic w Reflex to Culture [[id number]]     Lab Status: No result Specimen: Urine, Clean Catch     CBC and differential [[id number]]     Lab Status: No result Specimen: Blood     Basic metabolic panel [[id number]]     Lab Status: No result Specimen: Blood     Magnesium [[id number]]     Lab Status: No result Specimen:  Blood     HS Troponin 0hr (reflex protocol) [[id number]]     Lab Status: No result Specimen: Blood                    No orders to display              Procedures  Procedures         ED Course                                             Medical Decision Making  Patient presents emergency department for weakness.  Differential diagnose includes but not limited to: Electrolyte disturbance, hypoglycemia, dehydration, ANGIE/renal failure, atypical presentation of cardiac disease, thyroid disease, infection.    I have ordered CBC.  This was done to assess for leukocytosis versus leukopenia as possible indicators of infection viral versus bacterial.  Also included hemoglobin and hematocrit for assess for anemia.  And assess platelet numbers.  Thrombocytosis as a marker of inflammation and of course thrombocytopenia assess result of coagulation disorder or DIC.    Metabolic panel to assess for electrolyte deficiency, assess kidney function, and blood sugar levels to assess for hypoglycemia versus hyperglycemia as possible causes of the patient's symptoms.    Liver function test -to assess for liver inflammation with a be viral, obstructive, or reactive to underlying infection/shock liver    Troponin - as a marker of cardiac injury.    Amount and/or Complexity of Data Reviewed  Labs: ordered.    Risk  Prescription drug management.             Disposition  Final diagnoses:   None     ED Disposition       None          Follow-up Information    None         Patient's Medications   Discharge Prescriptions    No medications on file       No discharge procedures on file.    PDMP Review       None            ED Provider  Electronically Signed by           meters)    Stage 5 End Stage CKD (GFR <15 mL/min/1.73 square meters)  Note: GFR calculation is accurate only with a steady state creatinine    Magnesium [[id number]]  (Normal) Collected: 04/05/24 1322    Lab Status: Final result Specimen: Blood from Arm, Right Updated: 04/05/24 1350     Magnesium 2.2 mg/dL     Urine Microscopic [[id number]]  (Abnormal) Collected: 04/05/24 1322    Lab Status: Final result Specimen: Urine, Clean Catch Updated: 04/05/24 1344     RBC, UA None Seen /hpf      WBC, UA 10-20 /hpf      Epithelial Cells Occasional /hpf      Bacteria, UA None Seen /hpf     UA w Reflex to Microscopic w Reflex to Culture [[id number]]  (Abnormal) Collected: 04/05/24 1322    Lab Status: Final result Specimen: Urine, Clean Catch Updated: 04/05/24 1337     Color, UA Light Yellow     Clarity, UA Turbid     Specific Gravity, UA 1.014     pH, UA 6.5     Leukocytes, UA Moderate     Nitrite, UA Negative     Protein, UA Negative mg/dl      Glucose, UA Negative mg/dl      Ketones, UA Negative mg/dl      Urobilinogen, UA <2.0 mg/dl      Bilirubin, UA Negative     Occult Blood, UA Negative    CBC and differential [[id number]]  (Abnormal) Collected: 04/05/24 1322    Lab Status: Final result Specimen: Blood from Arm, Right Updated: 04/05/24 1330     WBC 7.30 Thousand/uL      RBC 4.89 Million/uL      Hemoglobin 13.9 g/dL      Hematocrit 43.9 %      MCV 90 fL      MCH 28.4 pg      MCHC 31.7 g/dL      RDW 16.1 %      MPV 10.6 fL      Platelets 237 Thousands/uL      nRBC 0 /100 WBCs      Neutrophils Relative 68 %      Immature Grans % 0 %      Lymphocytes Relative 18 %      Monocytes Relative 11 %      Eosinophils Relative 3 %      Basophils Relative 0 %      Neutrophils Absolute 4.92 Thousands/µL      Absolute Immature Grans 0.03 Thousand/uL      Absolute Lymphocytes 1.32 Thousands/µL      Absolute Monocytes 0.82 Thousand/µL      Eosinophils Absolute 0.18 Thousand/µL      Basophils Absolute 0.03 Thousands/µL                    No  orders to display              Procedures  Procedures         ED Course  ED Course as of 04/08/24 1628   Fri Apr 05, 2024   1352 Patient does have moderate leukocytes in her urine.  Also has mild AKA that is new IV fluids will be given.   Mon Apr 08, 2024   1627 Leukocytes, UA(!): Moderate   1628 WBC, UA(!): 10-20   1628 Creatinine(!): 1.40  Acute on chronic ANGIE (mild)                                SBIRT 20yo+      Flowsheet Row Most Recent Value   Initial Alcohol Screen: US AUDIT-C     1. How often do you have a drink containing alcohol? 0 Filed at: 04/05/2024 1324   2. How many drinks containing alcohol do you have on a typical day you are drinking?  0 Filed at: 04/05/2024 1324   3a. Male UNDER 65: How often do you have five or more drinks on one occasion? 0 Filed at: 04/05/2024 1324   3b. FEMALE Any Age, or MALE 65+: How often do you have 4 or more drinks on one occassion? 0 Filed at: 04/05/2024 1324   Audit-C Score 0 Filed at: 04/05/2024 1324   ARNULFO: How many times in the past year have you...    Used an illegal drug or used a prescription medication for non-medical reasons? Never Filed at: 04/05/2024 1324                      Medical Decision Making  Patient presents emergency department for weakness.  Differential diagnose includes but not limited to: Electrolyte disturbance, hypoglycemia, dehydration, ANGIE/renal failure, atypical presentation of cardiac disease, thyroid disease, infection.    I have ordered CBC.  This was done to assess for leukocytosis versus leukopenia as possible indicators of infection viral versus bacterial.  Also included hemoglobin and hematocrit for assess for anemia.  And assess platelet numbers.  Thrombocytosis as a marker of inflammation and of course thrombocytopenia assess result of coagulation disorder or DIC.    Metabolic panel to assess for electrolyte deficiency, assess kidney function, and blood sugar levels to assess for hypoglycemia versus hyperglycemia as possible causes  of the patient's symptoms.    Liver function test -to assess for liver inflammation with a be viral, obstructive, or reactive to underlying infection/shock liver    Troponin - as a marker of cardiac injury.    Problems Addressed:  ANGIE (acute kidney injury) (HCC): acute illness or injury  Hypertension: chronic illness or injury with exacerbation, progression, or side effects of treatment  UTI (urinary tract infection): acute illness or injury    Amount and/or Complexity of Data Reviewed  Labs: ordered.    Risk  Prescription drug management.             Disposition  Final diagnoses:   UTI (urinary tract infection)   ANGIE (acute kidney injury) (HCC)   Hypertension     Time reflects when diagnosis was documented in both MDM as applicable and the Disposition within this note       Time User Action Codes Description Comment    4/5/2024  2:51 PM Star Lunsford [N39.0] UTI (urinary tract infection)     4/5/2024  2:51 PM Star Lunsford [N17.9] ANGIE (acute kidney injury) (HCC)     4/5/2024  2:51 PM Star Lunsford [I10] Hypertension           ED Disposition       ED Disposition   Discharge    Condition   Stable    Date/Time   Fri Apr 5, 2024 1452    Comment   Sona Valenzuela discharge to home/self care.                   Follow-up Information       Follow up With Specialties Details Why Contact Info    [facility], Nurse Practitioner In 3 days  [address]  [place] [address]  [phone]              Discharge Medication List as of 4/5/2024  2:52 PM        START taking these medications    Details   cephalexin (KEFLEX) 500 mg capsule Take 1 capsule (500 mg total) by mouth every 8 (eight) hours for 7 days, Starting Fri 4/5/2024, Until Fri 4/12/2024, Normal           CONTINUE these medications which have NOT CHANGED    Details   ergocalciferol (VITAMIN D2) 50,000 units TAKE 1 CAPSULE BY MOUTH ONE TIME PER WEEK, Normal      furosemide (LASIX) 40 mg tablet Take 1 tablet (40 mg  total) by mouth 2 (two) times a day, Starting Fri 4/5/2024, Until Thu 7/4/2024, Normal      gabapentin (NEURONTIN) 100 mg capsule Take 1 capsule (100 mg total) by mouth 3 (three) times a day, Starting Thu 8/3/2023, Until Thu 4/4/2024, Normal      mirtazapine (REMERON) 15 mg tablet TAKE 0.5 TABLETS (7.5 MG TOTAL) BY MOUTH DAILY AT BEDTIME, Normal      Misc. Devices (Mattress Cover) MISC Use daily, Starting Tue 3/22/2022, Print      potassium chloride (Klor-Con M20) 20 mEq tablet Take 1 tablet (20 mEq total) by mouth 2 (two) times a day, Starting Thu 4/4/2024, Until Tue 10/1/2024, Normal      QUEtiapine (SEROquel) 25 mg tablet Take 1 tablet (25 mg total) by mouth daily at bedtime, Starting Thu 8/3/2023, Normal             No discharge procedures on file.    PDMP Review       None            ED Provider  Electronically Signed by             Star Lunsford MD  04/08/24 6930

## 2024-04-05 NOTE — ED NOTES
Family given update at this time, family states will be at bedside shortly to pick patient up     Monse Gonzales RN  04/05/24 3068

## 2024-04-05 NOTE — TELEPHONE ENCOUNTER
IVAN for Omaira - Pt's BP was high this morning, 182/89 and she had a headache.. She called the ambulance and she is currently admitted @ [facility]..

## 2024-04-05 NOTE — DISCHARGE INSTRUCTIONS
A  personal message from Dr. Star Lunsford,  Thank you so much for allowing me to care for you today.    I pride myself in the care and attention I give all my patients.  I hope you were a witness to this tonight.   If for any reason your condition does not improve or worsens, or you have a question that was not answered during your visit you can feel free to text me on my personal phone #  # [phone].   I will answer to your message and continue your care past your emergency room visit.     Please understand that although you are being discharged because your condition has been deemed stable and able to be managed on an outpatient setting. However your condition may worsen as part of the natural progression of the illness/condition, if this occurs please come back to the emergency department for a repeat evaluation.

## 2024-04-07 LAB — BACTERIA UR CULT: ABNORMAL

## 2024-04-11 ENCOUNTER — OFFICE VISIT (OUTPATIENT)
Dept: FAMILY MEDICINE CLINIC | Facility: CLINIC | Age: 89
End: 2024-04-11
Payer: MEDICARE

## 2024-04-11 VITALS
DIASTOLIC BLOOD PRESSURE: 72 MMHG | HEART RATE: 74 BPM | WEIGHT: 136.4 LBS | HEIGHT: 59 IN | OXYGEN SATURATION: 99 % | SYSTOLIC BLOOD PRESSURE: 148 MMHG | TEMPERATURE: 97.9 F | BODY MASS INDEX: 27.5 KG/M2

## 2024-04-11 DIAGNOSIS — I50.9 ACUTE CONGESTIVE HEART FAILURE, UNSPECIFIED HEART FAILURE TYPE (HCC): Primary | ICD-10-CM

## 2024-04-11 DIAGNOSIS — N30.00 ACUTE CYSTITIS WITHOUT HEMATURIA: ICD-10-CM

## 2024-04-11 DIAGNOSIS — I50.9 CHRONIC CONGESTIVE HEART FAILURE, UNSPECIFIED HEART FAILURE TYPE (HCC): ICD-10-CM

## 2024-04-11 DIAGNOSIS — F01.50 VASCULAR DEMENTIA, UNCOMPLICATED (HCC): ICD-10-CM

## 2024-04-11 DIAGNOSIS — N18.31 STAGE 3A CHRONIC KIDNEY DISEASE (HCC): Chronic | ICD-10-CM

## 2024-04-11 PROBLEM — F01.518 VASCULAR DEMENTIA WITH BEHAVIORAL DISTURBANCE (HCC): Status: RESOLVED | Noted: 2024-04-04 | Resolved: 2024-04-11

## 2024-04-11 PROBLEM — F03.90 DEMENTIA (HCC): Status: RESOLVED | Noted: 2023-07-01 | Resolved: 2024-04-11

## 2024-04-11 PROCEDURE — G2211 COMPLEX E/M VISIT ADD ON: HCPCS | Performed by: NURSE PRACTITIONER

## 2024-04-11 PROCEDURE — 99214 OFFICE O/P EST MOD 30 MIN: CPT | Performed by: NURSE PRACTITIONER

## 2024-04-11 RX ORDER — FUROSEMIDE 40 MG/1
40 TABLET ORAL DAILY
Start: 2024-04-11 | End: 2024-07-10

## 2024-04-11 NOTE — PROGRESS NOTES
Name: Sona Valenzuela      : 11/3/1924      MRN: [id number]  Encounter Provider: FARZANA Stanley  Encounter Date: 2024   Encounter department: [facility]    Assessment & Plan     1. Acute congestive heart failure, unspecified heart failure type (HCC)  Assessment & Plan:  Wt Readings from Last 3 Encounters:   24 61.9 kg (136 lb 6.4 oz)   24 63.1 kg (139 lb 3.2 oz)   10/12/23 57.6 kg (127 lb)     Has lost three pounds since last week and will cut back to once a day 40 mg Furosemide and discussed weight at home and calling if >3 pounds weight gain in 1 week            2. Vascular dementia, uncomplicated (HCC)  Assessment & Plan:  Taking the Remeron 15 mg and Seroquel 25 mg and is sleeping well at night       3. Stage 3a chronic kidney disease (HCC)  Assessment & Plan:  Lab Results   Component Value Date    EGFR 31 2024    EGFR 35 10/12/2023    EGFR 42 2023    CREATININE 1.40 (H) 2024    CREATININE 1.25 10/12/2023    CREATININE 1.08 2023   Labs ordered to recheck       4. Acute cystitis without hematuria  Assessment & Plan:  Not having any current symptoms an dis finishing the Kefelx will provide with cups to bring in a sample if any new urine symptoms or increased confusion       5. Chronic congestive heart failure, unspecified heart failure type (HCC)  -     Basic metabolic panel; Future  -     furosemide (LASIX) 40 mg tablet; Take 1 tablet (40 mg total) by mouth daily           Subjective      Patient here today for follow up from her recent ED visit and was diagnosed with a UTI and is being treated with Keflex and states that she is feeling improvement in her symptoms denies any current urinary symptoms. Patient is feeling improvement in her mentation and also is lost 3 pounds since last week and is not having any shortness of breath here today with niece Xi. Patient was given a hospice consult last week and rpeorts that she is  "interested in discussing but is not ready to pursue hospice at this point       Review of Systems   Constitutional:  Negative for activity change, appetite change, chills, diaphoresis, fatigue, fever and unexpected weight change.   HENT:  Negative for congestion, ear pain, hearing loss, postnasal drip, sinus pressure, sinus pain, sneezing and sore throat.    Eyes:  Negative for pain, redness and visual disturbance.   Respiratory:  Negative for cough and shortness of breath.    Cardiovascular:  Negative for chest pain and leg swelling.   Gastrointestinal:  Negative for abdominal pain, diarrhea, nausea and vomiting.   Endocrine: Negative.    Genitourinary: Negative.    Musculoskeletal:  Negative for arthralgias.   Skin: Negative.    Allergic/Immunologic: Negative.    Neurological:  Negative for dizziness and light-headedness.   Hematological: Negative.    Psychiatric/Behavioral:  Negative for behavioral problems and dysphoric mood.        Current Outpatient Medications on File Prior to Visit   Medication Sig   • cephalexin (KEFLEX) 500 mg capsule Take 1 capsule (500 mg total) by mouth every 8 (eight) hours for 7 days   • ergocalciferol (VITAMIN D2) 50,000 units TAKE 1 CAPSULE BY MOUTH ONE TIME PER WEEK   • gabapentin (NEURONTIN) 100 mg capsule Take 1 capsule (100 mg total) by mouth 3 (three) times a day   • mirtazapine (REMERON) 15 mg tablet TAKE 0.5 TABLETS (7.5 MG TOTAL) BY MOUTH DAILY AT BEDTIME   • Misc. Devices (Mattress Cover) MISC Use daily   • QUEtiapine (SEROquel) 25 mg tablet Take 1 tablet (25 mg total) by mouth daily at bedtime   • [DISCONTINUED] furosemide (LASIX) 40 mg tablet Take 1 tablet (40 mg total) by mouth 2 (two) times a day   • [DISCONTINUED] potassium chloride (Klor-Con M20) 20 mEq tablet Take 1 tablet (20 mEq total) by mouth 2 (two) times a day       Objective     /72 (BP Location: Right arm, Patient Position: Sitting)   Pulse 74   Temp 97.9 °F (36.6 °C) (Temporal)   Ht 4' 11\" (1.499 m) "   Wt 61.9 kg (136 lb 6.4 oz)   SpO2 99%   BMI 27.55 kg/m²     Physical Exam  Vitals and nursing note reviewed.   Constitutional:       General: She is not in acute distress.     Appearance: She is well-developed.   HENT:      Head: Normocephalic and atraumatic.   Eyes:      Pupils: Pupils are equal, round, and reactive to light.   Neck:      Thyroid: No thyromegaly.   Cardiovascular:      Rate and Rhythm: Normal rate. Rhythm regularly irregular.      Heart sounds: Normal heart sounds. No murmur heard.  Pulmonary:      Effort: Pulmonary effort is normal. No respiratory distress.      Breath sounds: Normal breath sounds. No wheezing.   Abdominal:      General: Bowel sounds are normal.      Palpations: Abdomen is soft.   Musculoskeletal:         General: Normal range of motion.      Cervical back: Normal range of motion.      Right lower leg: No edema.      Left lower leg: No edema.      Comments: Seated in wheelchair    Skin:     General: Skin is warm and dry.   Neurological:      Mental Status: She is alert and oriented to person, place, and time.       FARZANA Stanley

## 2024-04-11 NOTE — ASSESSMENT & PLAN NOTE
Lab Results   Component Value Date    EGFR 31 04/05/2024    EGFR 35 10/12/2023    EGFR 42 09/01/2023    CREATININE 1.40 (H) 04/05/2024    CREATININE 1.25 10/12/2023    CREATININE 1.08 09/01/2023   Labs ordered to recheck

## 2024-04-11 NOTE — ASSESSMENT & PLAN NOTE
Wt Readings from Last 3 Encounters:   04/11/24 61.9 kg (136 lb 6.4 oz)   04/04/24 63.1 kg (139 lb 3.2 oz)   10/12/23 57.6 kg (127 lb)     Has lost three pounds since last week and will cut back to once a day 40 mg Furosemide and discussed weight at home and calling if >3 pounds weight gain in 1 week

## 2024-04-11 NOTE — ASSESSMENT & PLAN NOTE
Not having any current symptoms an dis finishing the Kefelx will provide with cups to bring in a sample if any new urine symptoms or increased confusion

## 2024-04-22 ENCOUNTER — APPOINTMENT (OUTPATIENT)
Dept: LAB | Facility: CLINIC | Age: 89
End: 2024-04-22
Payer: COMMERCIAL

## 2024-04-22 DIAGNOSIS — I50.9 CHRONIC CONGESTIVE HEART FAILURE, UNSPECIFIED HEART FAILURE TYPE (HCC): ICD-10-CM

## 2024-04-22 DIAGNOSIS — I48.20 CHRONIC ATRIAL FIBRILLATION (HCC): ICD-10-CM

## 2024-04-22 DIAGNOSIS — I50.9 ACUTE CONGESTIVE HEART FAILURE, UNSPECIFIED HEART FAILURE TYPE (HCC): ICD-10-CM

## 2024-04-22 LAB
BASOPHILS # BLD AUTO: 0.03 THOUSANDS/ÂΜL (ref 0–0.1)
BASOPHILS NFR BLD AUTO: 0 % (ref 0–1)
BNP SERPL-MCNC: 779 PG/ML (ref 0–100)
EOSINOPHIL # BLD AUTO: 0.15 THOUSAND/ÂΜL (ref 0–0.61)
EOSINOPHIL NFR BLD AUTO: 2 % (ref 0–6)
ERYTHROCYTE [DISTWIDTH] IN BLOOD BY AUTOMATED COUNT: 15.7 % (ref 11.6–15.1)
HCT VFR BLD AUTO: 43.1 % (ref 34.8–46.1)
HGB BLD-MCNC: 12.8 G/DL (ref 11.5–15.4)
IMM GRANULOCYTES # BLD AUTO: 0.04 THOUSAND/UL (ref 0–0.2)
IMM GRANULOCYTES NFR BLD AUTO: 1 % (ref 0–2)
LYMPHOCYTES # BLD AUTO: 1.1 THOUSANDS/ÂΜL (ref 0.6–4.47)
LYMPHOCYTES NFR BLD AUTO: 14 % (ref 14–44)
MCH RBC QN AUTO: 27.6 PG (ref 26.8–34.3)
MCHC RBC AUTO-ENTMCNC: 29.7 G/DL (ref 31.4–37.4)
MCV RBC AUTO: 93 FL (ref 82–98)
MONOCYTES # BLD AUTO: 0.82 THOUSAND/ÂΜL (ref 0.17–1.22)
MONOCYTES NFR BLD AUTO: 11 % (ref 4–12)
NEUTROPHILS # BLD AUTO: 5.49 THOUSANDS/ÂΜL (ref 1.85–7.62)
NEUTS SEG NFR BLD AUTO: 72 % (ref 43–75)
NRBC BLD AUTO-RTO: 0 /100 WBCS
PLATELET # BLD AUTO: 279 THOUSANDS/UL (ref 149–390)
PMV BLD AUTO: 11.5 FL (ref 8.9–12.7)
RBC # BLD AUTO: 4.63 MILLION/UL (ref 3.81–5.12)
WBC # BLD AUTO: 7.63 THOUSAND/UL (ref 4.31–10.16)

## 2024-04-22 PROCEDURE — 80053 COMPREHEN METABOLIC PANEL: CPT

## 2024-04-22 PROCEDURE — 85025 COMPLETE CBC W/AUTO DIFF WBC: CPT

## 2024-04-22 PROCEDURE — 83880 ASSAY OF NATRIURETIC PEPTIDE: CPT

## 2024-04-22 PROCEDURE — 36415 COLL VENOUS BLD VENIPUNCTURE: CPT

## 2024-04-23 LAB
ALBUMIN SERPL BCP-MCNC: 4 G/DL (ref 3.5–5)
ALP SERPL-CCNC: 100 U/L (ref 34–104)
ALT SERPL W P-5'-P-CCNC: 11 U/L (ref 7–52)
ANION GAP SERPL CALCULATED.3IONS-SCNC: 10 MMOL/L (ref 4–13)
AST SERPL W P-5'-P-CCNC: 23 U/L (ref 13–39)
BILIRUB SERPL-MCNC: 0.64 MG/DL (ref 0.2–1)
BUN SERPL-MCNC: 25 MG/DL (ref 5–25)
CALCIUM SERPL-MCNC: 8.8 MG/DL (ref 8.4–10.2)
CHLORIDE SERPL-SCNC: 101 MMOL/L (ref 96–108)
CO2 SERPL-SCNC: 28 MMOL/L (ref 21–32)
CREAT SERPL-MCNC: 1.34 MG/DL (ref 0.6–1.3)
GFR SERPL CREATININE-BSD FRML MDRD: 32 ML/MIN/1.73SQ M
GLUCOSE P FAST SERPL-MCNC: 123 MG/DL (ref 65–99)
POTASSIUM SERPL-SCNC: 3.9 MMOL/L (ref 3.5–5.3)
PROT SERPL-MCNC: 7.1 G/DL (ref 6.4–8.4)
SODIUM SERPL-SCNC: 139 MMOL/L (ref 135–147)

## 2024-05-04 PROBLEM — Z00.00 MEDICARE ANNUAL WELLNESS VISIT, SUBSEQUENT: Status: RESOLVED | Noted: 2024-04-04 | Resolved: 2024-05-04

## 2024-05-11 PROBLEM — N30.00 ACUTE CYSTITIS WITHOUT HEMATURIA: Status: RESOLVED | Noted: 2024-04-11 | Resolved: 2024-05-11

## 2024-05-12 ENCOUNTER — APPOINTMENT (EMERGENCY)
Dept: CT IMAGING | Facility: HOSPITAL | Age: 89
DRG: 291 | End: 2024-05-12
Payer: COMMERCIAL

## 2024-05-12 ENCOUNTER — HOSPITAL ENCOUNTER (INPATIENT)
Facility: HOSPITAL | Age: 89
LOS: 3 days | Discharge: NURSING, REHAB OR LONG-TERM CARE | DRG: 291 | End: 2024-05-15
Attending: EMERGENCY MEDICINE
Payer: COMMERCIAL

## 2024-05-12 ENCOUNTER — APPOINTMENT (EMERGENCY)
Dept: RADIOLOGY | Facility: HOSPITAL | Age: 89
DRG: 291 | End: 2024-05-12
Payer: COMMERCIAL

## 2024-05-12 DIAGNOSIS — R07.9 CHEST PAIN: ICD-10-CM

## 2024-05-12 DIAGNOSIS — I48.91 ATRIAL FIBRILLATION WITH RVR (HCC): ICD-10-CM

## 2024-05-12 DIAGNOSIS — F03.90 DEMENTIA, UNSPECIFIED DEMENTIA SEVERITY, UNSPECIFIED DEMENTIA TYPE, UNSPECIFIED WHETHER BEHAVIORAL, PSYCHOTIC, OR MOOD DISTURBANCE OR ANXIETY (HCC): ICD-10-CM

## 2024-05-12 DIAGNOSIS — I50.9 ACUTE EXACERBATION OF CHF (CONGESTIVE HEART FAILURE) (HCC): Primary | ICD-10-CM

## 2024-05-12 DIAGNOSIS — M48.56XA COMPRESSION FRACTURE OF LUMBAR SPINE, NON-TRAUMATIC (HCC): ICD-10-CM

## 2024-05-12 PROBLEM — I50.33 ACUTE ON CHRONIC HEART FAILURE WITH PRESERVED EJECTION FRACTION (HCC): Status: ACTIVE | Noted: 2024-05-12

## 2024-05-12 PROBLEM — I10 PRIMARY HYPERTENSION: Status: ACTIVE | Noted: 2024-05-12

## 2024-05-12 PROBLEM — I48.0 PAROXYSMAL A-FIB (HCC): Status: ACTIVE | Noted: 2024-05-12

## 2024-05-12 PROBLEM — I27.20 PULMONARY HYPERTENSION (HCC): Status: ACTIVE | Noted: 2024-05-12

## 2024-05-12 PROBLEM — N18.4 CKD (CHRONIC KIDNEY DISEASE) STAGE 4, GFR 15-29 ML/MIN (HCC): Status: ACTIVE | Noted: 2024-05-12

## 2024-05-12 LAB
2HR DELTA HS TROPONIN: -1 NG/L
4HR DELTA HS TROPONIN: -2 NG/L
ALBUMIN SERPL BCP-MCNC: 3.6 G/DL (ref 3.5–5)
ALP SERPL-CCNC: 105 U/L (ref 34–104)
ALT SERPL W P-5'-P-CCNC: 23 U/L (ref 7–52)
ANION GAP SERPL CALCULATED.3IONS-SCNC: 8 MMOL/L (ref 4–13)
APTT PPP: 27 SECONDS (ref 23–37)
APTT PPP: 30 SECONDS (ref 23–37)
APTT PPP: >210 SECONDS (ref 23–37)
APTT PPP: >210 SECONDS (ref 23–37)
AST SERPL W P-5'-P-CCNC: 39 U/L (ref 13–39)
ATRIAL RATE: 125 BPM
ATRIAL RATE: 234 BPM
BASOPHILS # BLD AUTO: 0.03 THOUSANDS/ÂΜL (ref 0–0.1)
BASOPHILS NFR BLD AUTO: 0 % (ref 0–1)
BILIRUB DIRECT SERPL-MCNC: 0.18 MG/DL (ref 0–0.2)
BILIRUB SERPL-MCNC: 0.71 MG/DL (ref 0.2–1)
BILIRUB UR QL STRIP: NEGATIVE
BNP SERPL-MCNC: 934 PG/ML (ref 0–100)
BUN SERPL-MCNC: 25 MG/DL (ref 5–25)
CALCIUM SERPL-MCNC: 8.6 MG/DL (ref 8.4–10.2)
CARDIAC TROPONIN I PNL SERPL HS: 22 NG/L
CARDIAC TROPONIN I PNL SERPL HS: 23 NG/L
CARDIAC TROPONIN I PNL SERPL HS: 24 NG/L
CHLORIDE SERPL-SCNC: 105 MMOL/L (ref 96–108)
CLARITY UR: CLEAR
CO2 SERPL-SCNC: 29 MMOL/L (ref 21–32)
COLOR UR: COLORLESS
CREAT SERPL-MCNC: 1.39 MG/DL (ref 0.6–1.3)
D DIMER PPP FEU-MCNC: 1.45 UG/ML FEU
EOSINOPHIL # BLD AUTO: 0.19 THOUSAND/ÂΜL (ref 0–0.61)
EOSINOPHIL NFR BLD AUTO: 3 % (ref 0–6)
ERYTHROCYTE [DISTWIDTH] IN BLOOD BY AUTOMATED COUNT: 15.7 % (ref 11.6–15.1)
GFR SERPL CREATININE-BSD FRML MDRD: 31 ML/MIN/1.73SQ M
GLUCOSE SERPL-MCNC: 113 MG/DL (ref 65–140)
GLUCOSE UR STRIP-MCNC: NEGATIVE MG/DL
HCT VFR BLD AUTO: 36.4 % (ref 34.8–46.1)
HGB BLD-MCNC: 11.3 G/DL (ref 11.5–15.4)
HGB UR QL STRIP.AUTO: NEGATIVE
IMM GRANULOCYTES # BLD AUTO: 0.01 THOUSAND/UL (ref 0–0.2)
IMM GRANULOCYTES NFR BLD AUTO: 0 % (ref 0–2)
INR PPP: 1.03 (ref 0.84–1.19)
KETONES UR STRIP-MCNC: NEGATIVE MG/DL
LEUKOCYTE ESTERASE UR QL STRIP: NEGATIVE
LIPASE SERPL-CCNC: 18 U/L (ref 11–82)
LYMPHOCYTES # BLD AUTO: 1.26 THOUSANDS/ÂΜL (ref 0.6–4.47)
LYMPHOCYTES NFR BLD AUTO: 17 % (ref 14–44)
MAGNESIUM SERPL-MCNC: 2.3 MG/DL (ref 1.9–2.7)
MCH RBC QN AUTO: 27.8 PG (ref 26.8–34.3)
MCHC RBC AUTO-ENTMCNC: 31 G/DL (ref 31.4–37.4)
MCV RBC AUTO: 89 FL (ref 82–98)
MONOCYTES # BLD AUTO: 1.02 THOUSAND/ÂΜL (ref 0.17–1.22)
MONOCYTES NFR BLD AUTO: 14 % (ref 4–12)
NEUTROPHILS # BLD AUTO: 4.79 THOUSANDS/ÂΜL (ref 1.85–7.62)
NEUTS SEG NFR BLD AUTO: 66 % (ref 43–75)
NITRITE UR QL STRIP: NEGATIVE
NRBC BLD AUTO-RTO: 0 /100 WBCS
PH UR STRIP.AUTO: 7 [PH]
PHOSPHATE SERPL-MCNC: 3.4 MG/DL (ref 2.3–4.1)
PLATELET # BLD AUTO: 211 THOUSANDS/UL (ref 149–390)
PMV BLD AUTO: 10.1 FL (ref 8.9–12.7)
POTASSIUM SERPL-SCNC: 4.3 MMOL/L (ref 3.5–5.3)
PROT SERPL-MCNC: 6.9 G/DL (ref 6.4–8.4)
PROT UR STRIP-MCNC: NEGATIVE MG/DL
PROTHROMBIN TIME: 14.1 SECONDS (ref 11.6–14.5)
QRS AXIS: 79 DEGREES
QRS AXIS: 81 DEGREES
QRSD INTERVAL: 102 MS
QRSD INTERVAL: 108 MS
QT INTERVAL: 338 MS
QT INTERVAL: 390 MS
QTC INTERVAL: 427 MS
QTC INTERVAL: 510 MS
RBC # BLD AUTO: 4.07 MILLION/UL (ref 3.81–5.12)
SODIUM SERPL-SCNC: 142 MMOL/L (ref 135–147)
SP GR UR STRIP.AUTO: 1.02 (ref 1–1.03)
T WAVE AXIS: 80 DEGREES
T WAVE AXIS: 84 DEGREES
UROBILINOGEN UR STRIP-ACNC: <2 MG/DL
VENTRICULAR RATE: 103 BPM
VENTRICULAR RATE: 96 BPM
WBC # BLD AUTO: 7.3 THOUSAND/UL (ref 4.31–10.16)

## 2024-05-12 PROCEDURE — 83690 ASSAY OF LIPASE: CPT | Performed by: EMERGENCY MEDICINE

## 2024-05-12 PROCEDURE — 85379 FIBRIN DEGRADATION QUANT: CPT | Performed by: EMERGENCY MEDICINE

## 2024-05-12 PROCEDURE — 93005 ELECTROCARDIOGRAM TRACING: CPT

## 2024-05-12 PROCEDURE — 71045 X-RAY EXAM CHEST 1 VIEW: CPT

## 2024-05-12 PROCEDURE — 96360 HYDRATION IV INFUSION INIT: CPT

## 2024-05-12 PROCEDURE — 85730 THROMBOPLASTIN TIME PARTIAL: CPT | Performed by: EMERGENCY MEDICINE

## 2024-05-12 PROCEDURE — 99285 EMERGENCY DEPT VISIT HI MDM: CPT

## 2024-05-12 PROCEDURE — 83880 ASSAY OF NATRIURETIC PEPTIDE: CPT | Performed by: EMERGENCY MEDICINE

## 2024-05-12 PROCEDURE — 84484 ASSAY OF TROPONIN QUANT: CPT | Performed by: EMERGENCY MEDICINE

## 2024-05-12 PROCEDURE — 85610 PROTHROMBIN TIME: CPT | Performed by: EMERGENCY MEDICINE

## 2024-05-12 PROCEDURE — 93010 ELECTROCARDIOGRAM REPORT: CPT | Performed by: INTERNAL MEDICINE

## 2024-05-12 PROCEDURE — 85025 COMPLETE CBC W/AUTO DIFF WBC: CPT | Performed by: EMERGENCY MEDICINE

## 2024-05-12 PROCEDURE — 36415 COLL VENOUS BLD VENIPUNCTURE: CPT | Performed by: EMERGENCY MEDICINE

## 2024-05-12 PROCEDURE — 80048 BASIC METABOLIC PNL TOTAL CA: CPT | Performed by: EMERGENCY MEDICINE

## 2024-05-12 PROCEDURE — 99285 EMERGENCY DEPT VISIT HI MDM: CPT | Performed by: EMERGENCY MEDICINE

## 2024-05-12 PROCEDURE — 74177 CT ABD & PELVIS W/CONTRAST: CPT

## 2024-05-12 PROCEDURE — 83735 ASSAY OF MAGNESIUM: CPT | Performed by: EMERGENCY MEDICINE

## 2024-05-12 PROCEDURE — 80076 HEPATIC FUNCTION PANEL: CPT | Performed by: EMERGENCY MEDICINE

## 2024-05-12 PROCEDURE — 81003 URINALYSIS AUTO W/O SCOPE: CPT

## 2024-05-12 PROCEDURE — 71275 CT ANGIOGRAPHY CHEST: CPT

## 2024-05-12 PROCEDURE — 85730 THROMBOPLASTIN TIME PARTIAL: CPT

## 2024-05-12 PROCEDURE — 84100 ASSAY OF PHOSPHORUS: CPT

## 2024-05-12 PROCEDURE — 99223 1ST HOSP IP/OBS HIGH 75: CPT

## 2024-05-12 PROCEDURE — 96361 HYDRATE IV INFUSION ADD-ON: CPT

## 2024-05-12 RX ORDER — HEPARIN SODIUM 1000 [USP'U]/ML
4800 INJECTION, SOLUTION INTRAVENOUS; SUBCUTANEOUS EVERY 6 HOURS PRN
Status: DISCONTINUED | OUTPATIENT
Start: 2024-05-12 | End: 2024-05-13

## 2024-05-12 RX ORDER — FUROSEMIDE 10 MG/ML
50 INJECTION INTRAMUSCULAR; INTRAVENOUS 2 TIMES DAILY
Status: DISCONTINUED | OUTPATIENT
Start: 2024-05-12 | End: 2024-05-13

## 2024-05-12 RX ORDER — GABAPENTIN 100 MG/1
100 CAPSULE ORAL 3 TIMES DAILY
Status: DISCONTINUED | OUTPATIENT
Start: 2024-05-12 | End: 2024-05-15 | Stop reason: HOSPADM

## 2024-05-12 RX ORDER — ACETAMINOPHEN 325 MG/1
975 TABLET ORAL EVERY 8 HOURS SCHEDULED
Status: DISCONTINUED | OUTPATIENT
Start: 2024-05-12 | End: 2024-05-15 | Stop reason: HOSPADM

## 2024-05-12 RX ORDER — HEPARIN SODIUM 1000 [USP'U]/ML
2400 INJECTION, SOLUTION INTRAVENOUS; SUBCUTANEOUS EVERY 6 HOURS PRN
Status: DISCONTINUED | OUTPATIENT
Start: 2024-05-12 | End: 2024-05-13

## 2024-05-12 RX ORDER — QUETIAPINE FUMARATE 25 MG/1
25 TABLET, FILM COATED ORAL
Status: DISCONTINUED | OUTPATIENT
Start: 2024-05-12 | End: 2024-05-15 | Stop reason: HOSPADM

## 2024-05-12 RX ORDER — HYDRALAZINE HYDROCHLORIDE 20 MG/ML
5 INJECTION INTRAMUSCULAR; INTRAVENOUS EVERY 4 HOURS PRN
Status: DISCONTINUED | OUTPATIENT
Start: 2024-05-12 | End: 2024-05-15 | Stop reason: HOSPADM

## 2024-05-12 RX ORDER — MIRTAZAPINE 15 MG/1
7.5 TABLET, FILM COATED ORAL
Status: DISCONTINUED | OUTPATIENT
Start: 2024-05-12 | End: 2024-05-15 | Stop reason: HOSPADM

## 2024-05-12 RX ORDER — HEPARIN SODIUM 10000 [USP'U]/100ML
3-30 INJECTION, SOLUTION INTRAVENOUS
Status: DISCONTINUED | OUTPATIENT
Start: 2024-05-12 | End: 2024-05-13

## 2024-05-12 RX ORDER — LANOLIN ALCOHOL/MO/W.PET/CERES
3 CREAM (GRAM) TOPICAL
Status: DISCONTINUED | OUTPATIENT
Start: 2024-05-12 | End: 2024-05-15 | Stop reason: HOSPADM

## 2024-05-12 RX ORDER — FUROSEMIDE 10 MG/ML
40 INJECTION INTRAMUSCULAR; INTRAVENOUS ONCE
Status: COMPLETED | OUTPATIENT
Start: 2024-05-12 | End: 2024-05-12

## 2024-05-12 RX ORDER — MIRTAZAPINE 15 MG/1
15 TABLET, FILM COATED ORAL
Status: DISCONTINUED | OUTPATIENT
Start: 2024-05-12 | End: 2024-05-12

## 2024-05-12 RX ORDER — DILTIAZEM HYDROCHLORIDE 5 MG/ML
15 INJECTION INTRAVENOUS ONCE AS NEEDED
Status: DISCONTINUED | OUTPATIENT
Start: 2024-05-12 | End: 2024-05-15 | Stop reason: HOSPADM

## 2024-05-12 RX ORDER — HEPARIN SODIUM 1000 [USP'U]/ML
4800 INJECTION, SOLUTION INTRAVENOUS; SUBCUTANEOUS ONCE
Status: COMPLETED | OUTPATIENT
Start: 2024-05-12 | End: 2024-05-12

## 2024-05-12 RX ADMIN — ACETAMINOPHEN 975 MG: 325 TABLET, FILM COATED ORAL at 16:03

## 2024-05-12 RX ADMIN — FUROSEMIDE 50 MG: 10 INJECTION, SOLUTION INTRAMUSCULAR; INTRAVENOUS at 16:10

## 2024-05-12 RX ADMIN — Medication 3 MG: at 21:39

## 2024-05-12 RX ADMIN — MIRTAZAPINE 7.5 MG: 15 TABLET, FILM COATED ORAL at 21:39

## 2024-05-12 RX ADMIN — IOHEXOL 100 ML: 350 INJECTION, SOLUTION INTRAVENOUS at 03:37

## 2024-05-12 RX ADMIN — HEPARIN SODIUM 4800 UNITS: 1000 INJECTION INTRAVENOUS; SUBCUTANEOUS at 09:43

## 2024-05-12 RX ADMIN — HEPARIN SODIUM 18 UNITS/KG/HR: 10000 INJECTION, SOLUTION INTRAVENOUS at 09:48

## 2024-05-12 RX ADMIN — ACETAMINOPHEN 975 MG: 325 TABLET, FILM COATED ORAL at 09:57

## 2024-05-12 RX ADMIN — ACETAMINOPHEN 975 MG: 325 TABLET, FILM COATED ORAL at 21:38

## 2024-05-12 RX ADMIN — FUROSEMIDE 40 MG: 10 INJECTION, SOLUTION INTRAMUSCULAR; INTRAVENOUS at 07:37

## 2024-05-12 RX ADMIN — HYDRALAZINE HYDROCHLORIDE 5 MG: 20 INJECTION INTRAMUSCULAR; INTRAVENOUS at 10:31

## 2024-05-12 RX ADMIN — QUETIAPINE FUMARATE 25 MG: 25 TABLET ORAL at 21:39

## 2024-05-12 RX ADMIN — GABAPENTIN 100 MG: 100 CAPSULE ORAL at 21:39

## 2024-05-12 RX ADMIN — GABAPENTIN 100 MG: 100 CAPSULE ORAL at 09:57

## 2024-05-12 RX ADMIN — SODIUM CHLORIDE 500 ML: 0.9 INJECTION, SOLUTION INTRAVENOUS at 02:51

## 2024-05-12 RX ADMIN — GABAPENTIN 100 MG: 100 CAPSULE ORAL at 16:09

## 2024-05-12 NOTE — H&P
[facility]  H&P- Sona Valenzuela 11/3/1924, 89 y.o. female MRN: [id number]  Unit/Bed#: ED 08 Encounter: [id number]  Primary Care Provider: FARZANA Stanley   Date and time admitted to hospital: 5/12/2024  2:37 AM      Assessment/Plan:  * Acute on chronic HFpEF with severe pulmonary hypertension (HCC)  Assessment & Plan  Wt Readings from Last 3 Encounters:   04/11/24 61.9 kg (136 lb 6.4 oz)   04/04/24 63.1 kg (139 lb 3.2 oz)   10/12/23 57.6 kg (127 lb)       Intake/Output Summary (Last 24 hours) at 5/12/2024 2132  Last data filed at 5/12/2024 1501  Gross per 24 hour   Intake 500 ml   Output 2100 ml   Net -1600 ml     POA: Patient presented with right sided chest pain resolved on presentation found to have evidence of volume overload on exam and CT imaging, suspect right heart failure in the setting of severe pulmonary hypertension. Maintained on Lasix 40 mg p.o. daily as outpatient and adherent to treatment per patient's niece (POA and care-taker). Received Lasix 40 mg IV x 1 in the ED.  Last echo from 07/2023 showed LVEF 55% with severe TR and estimated RVSP 80 mmHg.  PE study with CT A/P on admission with no PE but cardiomegaly, more prominent on the right. Tiny dependent pleural effusion on the right with associated passive atelectasis. Reflux of contrast into the intrahepatic IVC and portal vein suggesting CHF with increased right heart pressures. Heterogeneous enhancement the liver, suspect passive hepatic congestion. Scarring and scattered parenchymal and paraseptal emphysematous changes bilaterally. Status post left lobectomy.    Plan:  Monitor strict I/O and daily weight  Follow-up repeat echo  Start IV Lasix 50 mg twice daily - monitor clinical response and renal function  Cardiac low sodium diet with 1800 FR  F/u Cardiology consult    Paroxysmal A-fib (HCC)  Assessment & Plan  POA: Paroxysmal  Afib with VR Rapid 100s. Diagnosed in 2023, however was not followed up  outpatient per pt niece.   LUZ?DS?-VASc 7    Plan:  Telemetry x 24 h  Started on heparin gtt., Eliquis for price check  Documented allergy to atenolol - IV Cardizem PRN for HR > 110 for now  Cardiology consulted - follow up recommendations for AC (crcl < 30) and rate control    Primary hypertension  Assessment & Plan  POA: Markedly elevated BP with known history of hypertension but not on pharmacologic therapy as outpatient.    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 Correlation with the patient's symptoms recommended. Patient denies back pain and per niece, ambulates well with walker.    Plan:  PT/OT eval and treat, pending orthopedic eval and clearance for weight-bearing    CKD (chronic kidney disease) stage 4, GFR 15-29 ml/min ([facility])  Assessment & Plan  Lab Results   Component Value Date    EGFR 31 05/12/2024    EGFR 32 04/22/2024    EGFR 31 04/05/2024    CREATININE 1.39 (H) 05/12/2024    CREATININE 1.34 (H) 04/22/2024    CREATININE 1.40 (H) 04/05/2024     POA: Cr at baseline around 1.4. Estimated Creatinine Clearance: 18.8 mL/min (A) (by C-G formula based on SCr of 1.39 mg/dL (H)).     Plan:  Monitor UOP and renal indices - received IV contrast for CT  Avoid hypotension and nephrotoxins  Renally dose medications as appropriate  Outpatient nephrology follow-up    Dementia ([facility])  Assessment & Plan  POA: Pleasantly confused at baseline.    Plan:  Continue PTA mirtazapine and Seroquel, monitor QTc prolonged on admission  Follow-up Geriatric recommendations  Delirium precautions  Fall precautions  Aspiration precautions        VTE Prophylaxis: High Risk (Score >/= 5) - Pharmacological DVT Prophylaxis Ordered: heparin drip. Sequential Compression Devices Ordered.   Code Status:  Level 3 - DNAR and DNI   Discussion with Patient/Family: Updated  (niece) via phone.    Anticipated Length of Stay: Inpatient. Patient will be admitted on an inpatient basis with an anticipated length of stay of greater than 2 midnights secondary to  Acute on chronic heart failure with preserved ejection fraction (HCC).    Chief Complaint: Right chest pain    History of Present Illness:  Sona Valenzuela is a 89 y.o. female with a PMH of pulmonary hypertension, hypertension, paroxysmal A-fib, and dementia who presents with right-sided chest pain. Per patient's niece (POA), patient complained of right-sided chest pain earlier in a.m. on the day of admission and tried Tylenol with minimal improvement prompting her to call EMS. On presentation, patient was noted to be markedly hypertensive and in A-fib with . CTA chest PE study with no PE but evidence of radial megaly with increased right heart pressure, small pleural effusions, and evidence of hepatic congestion. She appeared volume overloaded with diffuse crackles on exam and b/l LE edema. She received IV Lasix 40 mg x 1 in the ED and will be admitted for further evaluation.     Source/Reliability: the patient's niece. Patient was unable to provide reliable history due to dementia.     Past Medical and Surgical History:  PMHx:   Past Medical History:   Diagnosis Date    Ankle fracture     Arthritis     left hip    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HCC)     with left ureter    Bronchitis     Cancer (HCC)     Carcinoma, lung (HCC)     Dementia (HCC)     Dependent edema     Depression     Diabetes mellitus (HCC)     Hypercholesterolemia     Hypertension     Kidney stone     Oth abn and inconclusive findings on dx imaging of breast     Pes planus     Renal calculus     Restless leg syndrome     Rib pain     Sprain, neck     Varicose veins of left lower extremity      PSHx:   Past Surgical History:   Procedure Laterality Date    APPENDECTOMY      LUNG CANCER SURGERY        PTA Meds/Allergies: I have personally reviewed all medications and allergies.   Prior to Admission medications    Medication Sig Start Date End Date Taking? Authorizing Provider   ergocalciferol (VITAMIN D2) 50,000 units TAKE 1 CAPSULE BY MOUTH ONE TIME PER WEEK 2/12/24   FARZANA Stanley   furosemide (LASIX) 40 mg tablet Take 1 tablet (40 mg total) by mouth daily 4/11/24 7/10/24  FARZANA Stanley   gabapentin (NEURONTIN) 100 mg capsule Take 1 capsule (100 mg total) by mouth 3 (three) times a day 8/3/23 4/11/24  FARZANA Stanley   mirtazapine (REMERON) 15 mg tablet TAKE 0.5 TABLETS (7.5 MG TOTAL) BY MOUTH DAILY AT BEDTIME 4/1/24   [place]on Arguello [place]   [facility] [place]ress [place] MISC Use daily 3/22/22   FARZANA Stanley   QUEtiapine (SEROquel) 25 mg tablet Take 1 tablet (25 mg total) by mouth daily at bedtime 8/3/23   FARZANA Stanley      Allergies   Allergen Reactions    Albuterol     Aldactone [Spironolactone]     Aspirin     Atenolol     Bactrim [Sulfamethoxazole-Trimethoprim]     Codeine     Hydrocodone     Ibuprofen     Lisinopril     Mevacor [Lovastatin]     Mirapex [Pramipexole]     Morphine And Related     Other      novacaine    Penicillins     Procaine     Salicylates     Ultram [Tramadol]     Zoloft [Sertraline]      Night sweats       Family History:   Family History   Problem Relation Age of Onset    No Known Problems Mother     No Known Problems Father     Dementia Brother     Breast cancer Neg Hx     Colon cancer Neg Hx     Ovarian cancer Neg Hx     Uterine cancer Neg Hx     Cervical cancer Neg Hx         Personal and Social History:  Social History     Tobacco Use    Smoking status: Former     Current packs/day: 0.50     Average packs/day: 0.5 packs/day for 20.0 years (10.0 ttl pk-yrs)     Types: Cigarettes    Smokeless tobacco: Never   Substance Use Topics    Alcohol use: Never    Drug use: Never       Review of Systems:   Review of Systems    Unable to perform ROS: Dementia        Objective:   Vitals: Blood Pressure: (!) 173/93 (05/12/24 0800)  Pulse: 87 (05/12/24 0800)  Temperature: 97.5 °F (36.4 °C) (05/12/24 0250)  Temp Source: Oral (05/12/24 0250)  Respirations: 17 (05/12/24 0800)  SpO2: 98 % (05/12/24 0800)    Physical Exam  Constitutional:       General: She is not in acute distress.     Appearance: She is not toxic-appearing.   HENT:      Head: Normocephalic and atraumatic.   Cardiovascular:      Rate and Rhythm: Normal rate and regular rhythm.      Heart sounds: Normal heart sounds.   Pulmonary:      Effort: No respiratory distress.      Breath sounds: Examination of the right-upper field reveals wheezing and rales. Examination of the right-middle field reveals wheezing and rales. Examination of the right-lower field reveals wheezing and rales. Wheezing and rales present. No rhonchi.   Abdominal:      General: There is no distension.      Palpations: Abdomen is soft.      Tenderness: There is no abdominal tenderness.   Musculoskeletal:      Cervical back: Neck supple.      Right lower leg: Edema (trace) present.      Left lower leg: Edema (trace) present.   Skin:     General: Skin is warm and dry.      Findings: No lesion or rash.   Neurological:      General: No focal deficit present.      Mental Status: She is alert. Mental status is at baseline. She is disoriented and confused.      Comments: Able to follow simple questions and commands          Lab Results: I have reviewed all pertinent results listed below.    Results from last 7 days   Lab Units 05/12/24 0250   WBC Thousand/uL 7.30   HEMOGLOBIN g/dL 11.3*   HEMATOCRIT % 36.4   PLATELETS Thousands/uL 211   SEGS PCT % 66   LYMPHO PCT % 17   MONO PCT % 14*   EOS PCT % 3     Results from last 7 days   Lab Units 05/12/24 0250   POTASSIUM mmol/L 4.3   CHLORIDE mmol/L 105   CO2 mmol/L 29   BUN mg/dL 25   CREATININE mg/dL 1.39*   CALCIUM mg/dL 8.6   ALK PHOS U/L 105*   ALT U/L 23   AST U/L 39      Results from last 7 days   Lab Units 05/12/24  0250   INR  1.03       Micro:         ECG, Imaging and Other Studies Reviewed on Admission:   ECG: Atrial fibrillation.  with incomplete RBBB and QTc 510.   Imaging: Reviewed radiology reports from this admission including: chest CT scan and abdominal/pelvic CT     ** Please Note: This note has been constructed using a voice recognition system.**

## 2024-05-12 NOTE — RESPIRATORY THERAPY NOTE
RT Protocol Note  Sona Valenzuela 89 y.o. female MRN: [id number]  Unit/Bed#: ED 08 Encounter: [id number]    Assessment    Principal Problem:    Acute on chronic heart failure with preserved ejection fraction (HCC)      Home Pulmonary Medications:     05/12/24 0900   Respiratory Protocol   Protocol Initiated? Yes   Protocol Selection Respiratory   Medical & Social History Reviewed? Yes   Diagnostic Studies Reviewed? Yes   Physical Assessment Performed? Yes   Respiratory Plan Discontinue Protocol   Respiratory Assessment   Resp Comments patient admitted for chest pain, hx lung ca s/p surgery, a fib/chf, no home pulm meds, bs no wheezing or rhonchi, no indication of aerosol therapy at this time, spo2 98%, discontinue respiratory protocol   Additional Assessments   Pulse 81   Respirations 17   SpO2 98 %            Past Medical History:   Diagnosis Date    Ankle fracture     Arthritis     left hip    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HCC)     with left ureter    Bronchitis     Cancer (HCC)     Carcinoma, lung (HCC)     Dementia (HCC)     Dependent edema     Depression     Diabetes mellitus (HCC)     Hypercholesterolemia     Hypertension     Kidney stone     Oth abn and inconclusive findings on dx imaging of breast     Pes planus     Renal calculus     Restless leg syndrome     Rib pain     Sprain, neck     Varicose veins of left lower extremity      Social History     Socioeconomic History    Marital status:      Spouse name: None    Number of children: None    Years of education: None    Highest education level: None   Occupational History    None   Tobacco Use    Smoking status: Former     Current packs/day: 0.50     Average packs/day: 0.5 packs/day for 20.0 years (10.0 ttl pk-yrs)     Types: Cigarettes    Smokeless tobacco: Never   Substance and Sexual Activity    Alcohol use: Never    Drug use: Never    Sexual activity: Not Currently     Birth control/protection: Post-menopausal   Other Topics Concern    None   Social History  Narrative    Lives at home with niece     Social Determinants of Health     Financial Resource Strain: Low Risk  (10/11/2022)    Overall Financial Resource Strain (CARDIA)     Difficulty of Paying Living Expenses: Not very hard   Food Insecurity: Patient Declined (4/4/2024)    Hunger Vital Sign     Worried About Running Out of Food in the Last Year: Patient declined     Ran Out of Food in the Last Year: Patient declined   Transportation Needs: No Transportation Needs (4/4/2024)    PRAPARE - Transportation     Lack of Transportation (Medical): No     Lack of Transportation (Non-Medical): No   Physical Activity: Not on file   Stress: Not on file   Social Connections: Not on file   Intimate Partner Violence: Not on file   Housing Stability: Low Risk  (4/4/2024)    Housing Stability Vital Sign     Unable to Pay for Housing in the Last Year: No     Number of Places Lived in the Last Year: 1     Unstable Housing in the Last Year: No       Subjective         Objective    Physical Exam:        Vitals:  Blood pressure (!) 173/93, pulse 81, temperature 97.5 °F (36.4 °C), temperature source Oral, resp. rate 17, SpO2 98%, not currently breastfeeding.          Imaging and other studies: I have personally reviewed pertinent reports.            Plan    Respiratory Plan: Discontinue Protocol        Resp Comments: patient admitted for chest pain, hx lung ca s/p surgery, a fib/chf, no home pulm meds, bs no wheezing or rhonchi, no indication of aerosol therapy at this time, spo2 98%, discontinue respiratory protocol

## 2024-05-12 NOTE — ED PROVIDER NOTES
History  Chief Complaint   Patient presents with    Chest Pain     Pt arrived via ems with report of chest pain. Pt is alert to person and place and does not remember exactly where her pain was.      Patient is a 89-year-old female with past medical history of dementia, lung cancer status post surgery, bladder and left ureteral cancer, hypertension, hyperlipidemia, atrial fibrillation, restless leg syndrome, presents to the emergency department by ambulance for complaint of right-sided chest pain.  EMS provided majority of the history and history is very limited from patient due to dementia.  According to EMS, patient awoke with right-sided chest pain however patient states she never was able to fall asleep however she is unable to tell me where her pain is or when it started.  Per EMS, when she localized the pain to them, she pointed to her right lateral lower chest and right flank region.  Patient's relative also stated that a few days ago she seemed more short of breath.  Rest of review of systems is unobtainable at this time.  EMS reported patient was very hypertensive with systolic pressure over 200 on arrival.  Due to possibility of aspirin allergy and patient was unable to confirm this, they did not give aspirin but they did give 1 sublingual nitroglycerin tablet which did take her systolic pressure down into the 150s.  They did note that at 1 point prehospital, patient's O2 sat was 90%.      History provided by:  Patient and EMS personnel  History limited by:  Dementia   used: No    Chest Pain  Associated symptoms: shortness of breath        Prior to Admission Medications   Prescriptions Last Dose Informant Patient Reported? Taking?   Misc. Devices (Mattress Cover) MISC  Self No No   Sig: Use daily   QUEtiapine (SEROquel) 25 mg tablet   No No   Sig: Take 1 tablet (25 mg total) by mouth daily at bedtime   ergocalciferol (VITAMIN D2) 50,000 units   No No   Sig: TAKE 1 CAPSULE BY MOUTH ONE  TIME PER WEEK   furosemide (LASIX) 40 mg tablet   No No   Sig: Take 1 tablet (40 mg total) by mouth daily   gabapentin (NEURONTIN) 100 mg capsule   No No   Sig: Take 1 capsule (100 mg total) by mouth 3 (three) times a day   mirtazapine (REMERON) 15 mg tablet   No No   Sig: TAKE 0.5 TABLETS (7.5 MG TOTAL) BY MOUTH DAILY AT BEDTIME      Facility-Administered Medications: None       Past Medical History:   Diagnosis Date    Ankle fracture     Arthritis     left hip    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HCC)     with left ureter    Bronchitis     Cancer (HCC)     Carcinoma, lung (HCC)     Dementia (HCC)     Dependent edema     Depression     Diabetes mellitus (HCC)     Hypercholesterolemia     Hypertension     Kidney stone     Oth abn and inconclusive findings on dx imaging of breast     Pes planus     Renal calculus     Restless leg syndrome     Rib pain     Sprain, neck     Varicose veins of left lower extremity        Past Surgical History:   Procedure Laterality Date    APPENDECTOMY      LUNG CANCER SURGERY         Family History   Problem Relation Age of Onset    No Known Problems Mother     No Known Problems Father     Dementia Brother     Breast cancer Neg Hx     Colon cancer Neg Hx     Ovarian cancer Neg Hx     Uterine cancer Neg Hx     Cervical cancer Neg Hx      I have reviewed and agree with the history as documented.    E-Cigarette/Vaping     E-Cigarette/Vaping Substances     Social History     Tobacco Use    Smoking status: Former     Current packs/day: 0.50     Average packs/day: 0.5 packs/day for 20.0 years (10.0 ttl pk-yrs)     Types: Cigarettes    Smokeless tobacco: Never   Substance Use Topics    Alcohol use: Never    Drug use: Never       Review of Systems   Respiratory:  Positive for shortness of breath.    Cardiovascular:  Positive for chest pain.       Physical Exam  Physical Exam  Vitals and nursing note reviewed.   Constitutional:       General: She is not in acute distress.     Appearance: Normal appearance. She  is well-developed. She is not ill-appearing, toxic-appearing or diaphoretic.   HENT:      Head: Normocephalic and atraumatic.      Right Ear: External ear normal.      Left Ear: External ear normal.      Nose: Nose normal.      Mouth/Throat:      Mouth: Mucous membranes are moist.      Pharynx: Oropharynx is clear.   Eyes:      Extraocular Movements: Extraocular movements intact.      Conjunctiva/sclera: Conjunctivae normal.   Neck:      Vascular: No JVD.   Cardiovascular:      Rate and Rhythm: Normal rate. Rhythm irregular.      Pulses: Normal pulses.      Heart sounds: Murmur heard.      No friction rub. No gallop.   Pulmonary:      Effort: Pulmonary effort is normal. No respiratory distress.      Breath sounds: Normal breath sounds. No wheezing, rhonchi or rales.   Chest:      Chest wall: No tenderness.   Abdominal:      General: There is no distension.      Palpations: Abdomen is soft.      Tenderness: There is no abdominal tenderness. There is no guarding or rebound.   Musculoskeletal:         General: No swelling or tenderness. Normal range of motion.      Cervical back: Normal range of motion and neck supple. No rigidity.      Right lower leg: No edema.      Left lower leg: No edema.   Skin:     General: Skin is warm and dry.      Coloration: Skin is not pale.      Findings: No erythema or rash.   Neurological:      General: No focal deficit present.      Mental Status: She is alert. Mental status is at baseline. She is disoriented.      Sensory: No sensory deficit.      Motor: No weakness.      Comments: 4/5 strength throughout.  Patient oriented to person and knows she is in the hospital but otherwise is disoriented to which hospital and disoriented to time which according to EMS is her baseline.   Psychiatric:         Mood and Affect: Mood normal.         Behavior: Behavior normal.         Vital Signs  ED Triage Vitals [05/12/24 0250]   Temperature Pulse Respirations Blood Pressure SpO2   97.5 °F (36.4 °C)  87 17 (!) 189/102 94 %      Temp Source Heart Rate Source Patient Position - Orthostatic VS BP Location FiO2 (%)   Oral Monitor Lying Left arm --      Pain Score       --         Vitals:    05/12/24 0425 05/12/24 0430 05/12/24 0630 05/12/24 0700   BP:  (!) 176/81 147/79 157/92   BP Location:       Pulse:  91 85 86   Resp:  18 17 18   Temp:       TempSrc:       SpO2: (!) 88% 93% 98% 97%          Visual Acuity      ED Medications  Medications   furosemide (LASIX) injection 40 mg (has no administration in time range)   sodium chloride 0.9 % bolus 500 mL (0 mL Intravenous Stopped 5/12/24 0451)   iohexol (OMNIPAQUE) 350 MG/ML injection (MULTI-DOSE) 100 mL (100 mL Intravenous Given 5/12/24 0337)       Diagnostic Studies  Results Reviewed       Procedure Component Value Units Date/Time    HS Troponin I 4hr [[id number]]  (Normal) Collected: 05/12/24 0642    Lab Status: Final result Specimen: Blood from Arm, Right Updated: 05/12/24 0722     hs TnI 4hr 22 ng/L      Delta 4hr hsTnI -2 ng/L     HS Troponin I 2hr [[id number]]  (Normal) Collected: 05/12/24 0440    Lab Status: Final result Specimen: Blood from Arm, Right Updated: 05/12/24 0510     hs TnI 2hr 23 ng/L      Delta 2hr hsTnI -1 ng/L     Lipase [[id number]]  (Normal) Collected: 05/12/24 0250    Lab Status: Final result Specimen: Blood from Arm, Right Updated: 05/12/24 0333     Lipase 18 u/L     B-Type Natriuretic Peptide(BNP) [[id number]]  (Abnormal) Collected: 05/12/24 0250    Lab Status: Final result Specimen: Blood from Arm, Right Updated: 05/12/24 0326      pg/mL     HS Troponin 0hr (reflex protocol) [[id number]]  (Normal) Collected: 05/12/24 0250    Lab Status: Final result Specimen: Blood from Arm, Right Updated: 05/12/24 0321     hs TnI 0hr 24 ng/L     D-Dimer [[id number]]  (Abnormal) Collected: 05/12/24 0250    Lab Status: Final result Specimen: Blood from Arm, Right Updated: 05/12/24 0319     D-Dimer, Quant 1.45 ug/ml FEU     Narrative:      In the evaluation  for possible pulmonary embolism, in the appropriate (Well's Score of 4 or less) patient, the age adjusted d-dimer cutoff for this patient can be calculated as:    Age x 0.01 (in ug/mL) for Age-adjusted D-dimer exclusion threshold for a patient over 50 years.    Protime-INR [[id number]]  (Normal) Collected: 05/12/24 0250    Lab Status: Final result Specimen: Blood from Arm, Right Updated: 05/12/24 0315     Protime 14.1 seconds      INR 1.03    APTT [[id number]]  (Normal) Collected: 05/12/24 0250    Lab Status: Final result Specimen: Blood from Arm, Right Updated: 05/12/24 0315     PTT 27 seconds     Basic metabolic panel [[id number]]  (Abnormal) Collected: 05/12/24 0250    Lab Status: Final result Specimen: Blood from Arm, Right Updated: 05/12/24 0310     Sodium 142 mmol/L      Potassium 4.3 mmol/L      Chloride 105 mmol/L      CO2 29 mmol/L      ANION GAP 8 mmol/L      BUN 25 mg/dL      Creatinine 1.39 mg/dL      Glucose 113 mg/dL      Calcium 8.6 mg/dL      eGFR 31 ml/min/1.73sq m     Narrative:      National Kid[employer] Foundation guidelines for Chronic Kidney Disease (CKD):     Stage 1 with normal or high GFR (GFR > 90 mL/min/1.73 square meters)    Stage 2 Mild CKD (GFR = 60-89 mL/min/1.73 square meters)    Stage 3A Moderate CKD (GFR = 45-59 mL/min/1.73 square meters)    Stage 3B Moderate CKD (GFR = 30-44 mL/min/1.73 square meters)    Stage 4 Severe CKD (GFR = 15-29 mL/min/1.73 square meters)    Stage 5 End Stage CKD (GFR <15 mL/min/1.73 square meters)  Note: GFR calculation is accurate only with a steady state creatinine    Hepatic function panel [[id number]]  (Abnormal) Collected: 05/12/24 0250    Lab Status: Final result Specimen: Blood from Arm, Right Updated: 05/12/24 0310     Total Bilirubin 0.71 mg/dL      Bilirubin, Direct 0.18 mg/dL      Alkaline Phosphatase 105 U/L      AST 39 U/L      ALT 23 U/L      Total Protein 6.9 g/dL      Albumin 3.6 g/dL     Magnesium [[id number]]  (Normal) Collected: 05/12/24  0250    Lab Status: Final result Specimen: Blood from Arm, Right Updated: 05/12/24 0310     Magnesium 2.3 mg/dL     CBC and differential [[id number]]  (Abnormal) Collected: 05/12/24 0250    Lab Status: Final result Specimen: Blood from Arm, Right Updated: 05/12/24 0256     WBC 7.30 Thousand/uL      RBC 4.07 Million/uL      Hemoglobin 11.3 g/dL      Hematocrit 36.4 %      MCV 89 fL      MCH 27.8 pg      MCHC 31.0 g/dL      RDW 15.7 %      MPV 10.1 fL      Platelets 211 Thousands/uL      nRBC 0 /100 WBCs      Segmented % 66 %      Immature Grans % 0 %      Lymphocytes % 17 %      Monocytes % 14 %      Eosinophils Relative 3 %      Basophils Relative 0 %      Absolute Neutrophils 4.79 Thousands/µL      Absolute Immature Grans 0.01 Thousand/uL      Absolute Lymphocytes 1.26 Thousands/µL      Absolute Monocytes 1.02 Thousand/µL      Eosinophils Absolute 0.19 Thousand/µL      Basophils Absolute 0.03 Thousands/µL     UA w Reflex to Microscopic w Reflex to Culture [[id number]]     Lab Status: No result Specimen: Urine                    PE Study with CT Abdomen and Pelvis with contrast   Final Result by Amandeep Thompson DO (05/12 0714)      No pulmonary embolism is seen.      Cardiomegaly, more prominent on the right. Tiny dependent pleural effusion on the right with associated passive atelectasis. Reflux of contrast into the intrahepatic IVC and portal vein suggesting CHF with increased right heart pressures.      Heterogeneous enhancement the liver, suspect passive hepatic congestion      Scarring and scattered parenchymal and paraseptal emphysematous changes bilaterally. Status post left lobectomy.      Age-indeterminate osteoporotic compression fractures in the lumbar spine, as described. Correlation with the patient's symptoms recommended.      Atrophy of the left kidney, renal cortical scarring, right adrenal nodule,, moderate to large hiatal hernia, colonic diverticulosis, body wall edema and other findings as  above.         Workstation performed: [id number]         XR chest 1 view portable   ED Interpretation by Maria Del Carmen Whitehead DO (05/12 0720)   Cardiomegaly and increased vascular congestion consistent with CHF.                 Procedures  ECG 12 Lead Documentation Only    Date/Time: 5/12/2024 2:42 AM    Performed by: Maria Del Carmen Whitehead DO  Authorized by: Maria Del Carmen Whitehead DO    ECG reviewed by me, the ED Provider: yes    Patient location:  ED  Previous ECG:     Previous ECG:  Compared to current    Comparison ECG info:  4-5-2024  Rate:     ECG rate:  96    ECG rate assessment: normal    Rhythm:     Rhythm: atrial fibrillation    Ectopy:     Ectopy: none    QRS:     QRS axis:  Normal    QRS intervals:  Normal  Conduction:     Conduction: abnormal      Abnormal conduction: incomplete RBBB    ST segments:     ST segments:  Normal  T waves:     T waves: normal    ECG 12 Lead Documentation Only    Date/Time: 5/12/2024 4:37 AM    Performed by: Maria Del Carmen Whitehead DO  Authorized by: Maria Del Carmen Whitehead DO    ECG reviewed by me, the ED Provider: yes    Patient location:  ED  Rate:     ECG rate:  103    ECG rate assessment: tachycardic    Rhythm:     Rhythm: atrial fibrillation      Rhythm comment:  With RVR  Ectopy:     Ectopy: none    QRS:     QRS axis:  Normal    QRS intervals:  Normal  Conduction:     Conduction: abnormal      Abnormal conduction: incomplete RBBB    ST segments:     ST segments:  Normal  T waves:     T waves: normal    Other findings:     Other findings: prolonged qTc interval             ED Course  ED Course as of 05/12/24 0729   Sun May 12, 2024   0318 Creatinine(!): 1.39   0318 GFR, Calculated: 31  Renal function stable from prior labs.   0729 Chest x-ray and CT scan consistent with CHF exacerbation.  Will give 40 mg of IV Lasix and admit to internal medicine.             HEART Risk Score      Flowsheet Row Most Recent Value   Heart Score Risk Calculator    History 0 Filed  at: 05/12/2024 0721   ECG 0 Filed at: 05/12/2024 0721   Age 2 Filed at: 05/12/2024 0721   Risk Factors 2 Filed at: 05/12/2024 0721   Troponin 1 Filed at: 05/12/2024 0721   HEART Score 5 Filed at: 05/12/2024 0721                          SBIRT 20yo+      Flowsheet Row Most Recent Value   Initial Alcohol Screen:  AUDIT-C     1. How often do you have a drink containing alcohol? 0 Filed at: 05/12/2024 0246   2. How many drinks containing alcohol do you have on a typical day you are drinking?  0 Filed at: 05/12/2024 0246   3b. FEMALE Any Age, or MALE 65+: How often do you have 4 or more drinks on one occassion? 0 Filed at: 05/12/2024 0246   Audit-C Score 0 Filed at: 05/12/2024 0246   ARNULFO: How many times in the past year have you...    Used an illegal drug or used a prescription medication for non-medical reasons? Never Filed at: 05/12/2024 0246                      Medical Decision Making  89-year-old female presents to the ED for right-sided chest pain and dyspnea.  Patient is overall a poor historian due to her age and dementia history however according to EMS and family she is at her baseline.  Differential diagnosis is vast and includes ACS, musculoskeletal pain, pleurisy, pneumonia, PE, intra-abdominal pathology.  Will evaluate with cardiac and abdominal labs, UA, CTA chest with CT abdomen and pelvis to assess for PE as well as any intra-abdominal pathology.  Will provide 500 cc of IV fluids.    Amount and/or Complexity of Data Reviewed  Labs: ordered. Decision-making details documented in ED Course.  Radiology: ordered and independent interpretation performed. Decision-making details documented in ED Course.  ECG/medicine tests: ordered and independent interpretation performed. Decision-making details documented in ED Course.    Risk  Prescription drug management.  Decision regarding hospitalization.             Disposition  Final diagnoses:   Acute exacerbation of CHF (congestive heart failure) (HCC)   Chest  pain     Time reflects when diagnosis was documented in both MDM as applicable and the Disposition within this note       Time User Action Codes Description Comment    5/12/2024  7:20 AM Maria Del Carmen Whitehead Add [I50.9] Acute exacerbation of CHF (congestive heart failure) (HCC)     5/12/2024  7:20 AM Maria Del Carmen Whitehead [R07.9] Chest pain           ED Disposition       ED Disposition   Admit    Condition   Stable    Date/Time   Sun May 12, 2024 2457    Comment   Case was discussed with TONY and the patient's admission status was agreed to be Admission Status: inpatient status to the service of Dr. Cast.               Follow-up Information    None         Patient's Medications   Discharge Prescriptions    No medications on file       No discharge procedures on file.    PDMP Review       None            ED Provider  Electronically Signed by             Maria Del Carmen Whitehead DO  05/12/24 6943

## 2024-05-12 NOTE — PLAN OF CARE
Problem: Potential for Falls  Goal: Patient will remain free of falls  Description: INTERVENTIONS:  - Educate patient/family on patient safety including physical limitations  - Instruct patient to call for assistance with activity   - Consult OT/PT to assist with strengthening/mobility   - Keep Call bell within reach  - Keep bed low and locked with side rails adjusted as appropriate  - Keep care items and personal belongings within reach  - Initiate and maintain comfort rounds  - Make Fall Risk Sign visible to staff  - Apply yellow socks and bracelet for high fall risk patients  - Consider moving patient to room near nurses station  Outcome: Progressing     Problem: CARDIOVASCULAR - ADULT  Goal: Maintains optimal cardiac output and hemodynamic stability  Description: INTERVENTIONS:  - Monitor I/O, vital signs and rhythm  - Monitor for S/S and trends of decreased cardiac output  - Administer and titrate ordered vasoactive medications to optimize hemodynamic stability  - Assess quality of pulses, skin color and temperature  - Assess for signs of decreased coronary artery perfusion  - Instruct patient to report change in severity of symptoms  Outcome: Progressing

## 2024-05-13 ENCOUNTER — APPOINTMENT (INPATIENT)
Dept: NON INVASIVE DIAGNOSTICS | Facility: HOSPITAL | Age: 89
DRG: 291 | End: 2024-05-13
Payer: COMMERCIAL

## 2024-05-13 LAB
ANION GAP SERPL CALCULATED.3IONS-SCNC: 6 MMOL/L (ref 4–13)
AORTIC ROOT: 2.7 CM
APICAL FOUR CHAMBER EJECTION FRACTION: 73 %
APTT PPP: 95 SECONDS (ref 23–37)
APTT PPP: >210 SECONDS (ref 23–37)
ASCENDING AORTA: 3 CM
BSA FOR ECHO PROCEDURE: 1.62 M2
BUN SERPL-MCNC: 27 MG/DL (ref 5–25)
CALCIUM SERPL-MCNC: 8.1 MG/DL (ref 8.4–10.2)
CHLORIDE SERPL-SCNC: 105 MMOL/L (ref 96–108)
CO2 SERPL-SCNC: 28 MMOL/L (ref 21–32)
CREAT SERPL-MCNC: 1.34 MG/DL (ref 0.6–1.3)
E WAVE DECELERATION TIME: 164 MS
E/A RATIO: 3.34
ERYTHROCYTE [DISTWIDTH] IN BLOOD BY AUTOMATED COUNT: 15.7 % (ref 11.6–15.1)
FRACTIONAL SHORTENING: 32 (ref 28–44)
GFR SERPL CREATININE-BSD FRML MDRD: 32 ML/MIN/1.73SQ M
GLUCOSE SERPL-MCNC: 115 MG/DL (ref 65–140)
HCT VFR BLD AUTO: 34.7 % (ref 34.8–46.1)
HGB BLD-MCNC: 10.6 G/DL (ref 11.5–15.4)
INTERVENTRICULAR SEPTUM IN DIASTOLE (PARASTERNAL SHORT AXIS VIEW): 0.9 CM
INTERVENTRICULAR SEPTUM: 0.9 CM (ref 0.6–1.1)
LAAS-AP2: 24.5 CM2
LAAS-AP4: 23.3 CM2
LEFT ATRIUM AREA SYSTOLE SINGLE PLANE A4C: 24.2 CM2
LEFT ATRIUM SIZE: 4.5 CM
LEFT ATRIUM VOLUME (MOD BIPLANE): 78 ML
LEFT ATRIUM VOLUME INDEX (MOD BIPLANE): 48.1 ML/M2
LEFT INTERNAL DIMENSION IN SYSTOLE: 2.3 CM (ref 2.1–4)
LEFT VENTRICULAR INTERNAL DIMENSION IN DIASTOLE: 3.4 CM (ref 3.5–6)
LEFT VENTRICULAR POSTERIOR WALL IN END DIASTOLE: 0.9 CM
LEFT VENTRICULAR STROKE VOLUME: 30 ML
LVSV (TEICH): 30 ML
MAGNESIUM SERPL-MCNC: 2.1 MG/DL (ref 1.9–2.7)
MCH RBC QN AUTO: 27.6 PG (ref 26.8–34.3)
MCHC RBC AUTO-ENTMCNC: 30.5 G/DL (ref 31.4–37.4)
MCV RBC AUTO: 90 FL (ref 82–98)
MV E'TISSUE VEL-LAT: 11 CM/S
MV E'TISSUE VEL-SEP: 6 CM/S
MV PEAK A VEL: 0.35 M/S
MV PEAK E VEL: 117 CM/S
MV STENOSIS PRESSURE HALF TIME: 47 MS
MV VALVE AREA P 1/2 METHOD: 4.68
PHOSPHATE SERPL-MCNC: 4.4 MG/DL (ref 2.3–4.1)
PLATELET # BLD AUTO: 200 THOUSANDS/UL (ref 149–390)
PMV BLD AUTO: 10.4 FL (ref 8.9–12.7)
POTASSIUM SERPL-SCNC: 4.2 MMOL/L (ref 3.5–5.3)
RA PRESSURE ESTIMATED: 8 MMHG
RBC # BLD AUTO: 3.84 MILLION/UL (ref 3.81–5.12)
RIGHT ATRIUM AREA SYSTOLE A4C: 22.2 CM2
RIGHT VENTRICLE ID DIMENSION: 4 CM
RV PSP: 77 MMHG
SL CV LEFT ATRIUM LENGTH A2C: 6.1 CM
SL CV LV EF: 60
SL CV PED ECHO LEFT VENTRICLE DIASTOLIC VOLUME (MOD BIPLANE) 2D: 48 ML
SL CV PED ECHO LEFT VENTRICLE SYSTOLIC VOLUME (MOD BIPLANE) 2D: 18 ML
SODIUM SERPL-SCNC: 139 MMOL/L (ref 135–147)
TR MAX PG: 69 MMHG
TR PEAK VELOCITY: 4.1 M/S
TRICUSPID ANNULAR PLANE SYSTOLIC EXCURSION: 1.3 CM
TRICUSPID VALVE PEAK REGURGITATION VELOCITY: 4.14 M/S
WBC # BLD AUTO: 6.27 THOUSAND/UL (ref 4.31–10.16)

## 2024-05-13 PROCEDURE — 99232 SBSQ HOSP IP/OBS MODERATE 35: CPT | Performed by: STUDENT IN AN ORGANIZED HEALTH CARE EDUCATION/TRAINING PROGRAM

## 2024-05-13 PROCEDURE — 85027 COMPLETE CBC AUTOMATED: CPT | Performed by: INTERNAL MEDICINE

## 2024-05-13 PROCEDURE — 83735 ASSAY OF MAGNESIUM: CPT | Performed by: INTERNAL MEDICINE

## 2024-05-13 PROCEDURE — 80048 BASIC METABOLIC PNL TOTAL CA: CPT | Performed by: INTERNAL MEDICINE

## 2024-05-13 PROCEDURE — 93306 TTE W/DOPPLER COMPLETE: CPT

## 2024-05-13 PROCEDURE — 99222 1ST HOSP IP/OBS MODERATE 55: CPT

## 2024-05-13 PROCEDURE — 97167 OT EVAL HIGH COMPLEX 60 MIN: CPT

## 2024-05-13 PROCEDURE — 84100 ASSAY OF PHOSPHORUS: CPT | Performed by: INTERNAL MEDICINE

## 2024-05-13 PROCEDURE — 85730 THROMBOPLASTIN TIME PARTIAL: CPT | Performed by: STUDENT IN AN ORGANIZED HEALTH CARE EDUCATION/TRAINING PROGRAM

## 2024-05-13 PROCEDURE — 99223 1ST HOSP IP/OBS HIGH 75: CPT | Performed by: INTERNAL MEDICINE

## 2024-05-13 PROCEDURE — 85730 THROMBOPLASTIN TIME PARTIAL: CPT

## 2024-05-13 PROCEDURE — 97163 PT EVAL HIGH COMPLEX 45 MIN: CPT

## 2024-05-13 PROCEDURE — 93306 TTE W/DOPPLER COMPLETE: CPT | Performed by: INTERNAL MEDICINE

## 2024-05-13 PROCEDURE — 97110 THERAPEUTIC EXERCISES: CPT

## 2024-05-13 RX ORDER — HEPARIN SODIUM 5000 [USP'U]/ML
5000 INJECTION, SOLUTION INTRAVENOUS; SUBCUTANEOUS EVERY 8 HOURS SCHEDULED
Status: DISCONTINUED | OUTPATIENT
Start: 2024-05-13 | End: 2024-05-15 | Stop reason: HOSPADM

## 2024-05-13 RX ORDER — FUROSEMIDE 10 MG/ML
40 INJECTION INTRAMUSCULAR; INTRAVENOUS 2 TIMES DAILY
Status: DISCONTINUED | OUTPATIENT
Start: 2024-05-13 | End: 2024-05-14

## 2024-05-13 RX ADMIN — GABAPENTIN 100 MG: 100 CAPSULE ORAL at 15:25

## 2024-05-13 RX ADMIN — FUROSEMIDE 50 MG: 10 INJECTION, SOLUTION INTRAMUSCULAR; INTRAVENOUS at 11:05

## 2024-05-13 RX ADMIN — HEPARIN SODIUM 5000 UNITS: 5000 INJECTION INTRAVENOUS; SUBCUTANEOUS at 21:43

## 2024-05-13 RX ADMIN — ACETAMINOPHEN 975 MG: 325 TABLET, FILM COATED ORAL at 15:25

## 2024-05-13 RX ADMIN — QUETIAPINE FUMARATE 25 MG: 25 TABLET ORAL at 21:40

## 2024-05-13 RX ADMIN — MIRTAZAPINE 7.5 MG: 15 TABLET, FILM COATED ORAL at 21:42

## 2024-05-13 RX ADMIN — ACETAMINOPHEN 975 MG: 325 TABLET, FILM COATED ORAL at 05:35

## 2024-05-13 RX ADMIN — GABAPENTIN 100 MG: 100 CAPSULE ORAL at 21:41

## 2024-05-13 RX ADMIN — GABAPENTIN 100 MG: 100 CAPSULE ORAL at 08:39

## 2024-05-13 RX ADMIN — ACETAMINOPHEN 975 MG: 325 TABLET, FILM COATED ORAL at 21:43

## 2024-05-13 RX ADMIN — Medication 3 MG: at 21:42

## 2024-05-13 RX ADMIN — HEPARIN SODIUM 5000 UNITS: 5000 INJECTION INTRAVENOUS; SUBCUTANEOUS at 15:25

## 2024-05-13 RX ADMIN — FUROSEMIDE 40 MG: 10 INJECTION, SOLUTION INTRAMUSCULAR; INTRAVENOUS at 17:04

## 2024-05-13 NOTE — CASE MANAGEMENT
Case Management Progress Note    Patient name Sona Valenzuela  Location /-01 MRN [id number]  : 11/3/1924 Date 2024       LOS (days): 1  Geometric Mean LOS (GMLOS) (days):   Days to GMLOS:        OBJECTIVE:        Current admission status: Inpatient  Preferred Pharmacy:   [facility]/pharmacy #[id number] - [place] - [address]  [address]  [place] [address]  Phone: [phone] Fax: [phone]    Primary Care Provider: FARZANA Stanley    Primary Insurance: [employer]  Secondary Insurance: [place] [employer] AND [facility]    PROGRESS NOTE:  Called elisa Layne, and oksana message to call back to discuss any CM needs patient may have.

## 2024-05-13 NOTE — PROGRESS NOTES
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 Correlation with the patient's symptoms recommended. Patient denies back pain and per niece, ambulates well with walker.    Plan:  PT/OT eval and treat  Appreciated orthopedic    CKD (chronic kidney disease) stage 4, GFR 15-29 ml/min ([facility])  Assessment & Plan  Lab Results   Component Value Date    EGFR 31 05/12/2024    EGFR 32 04/22/2024    EGFR 31 04/05/2024    CREATININE 1.39 (H) 05/12/2024    CREATININE 1.34 (H) 04/22/2024    CREATININE 1.40 (H) 04/05/2024     POA: Cr at baseline around 1.4. Estimated Creatinine Clearance: 18.8 mL/min (A) (by C-G formula based on SCr of 1.39 mg/dL (H)).     Plan:  Monitor UOP and renal indices - received IV contrast for CT  Avoid hypotension and nephrotoxins  Renally dose medications as appropriate  Outpatient nephrology follow-up    Paroxysmal A-fib (HCC)  Assessment & Plan  POA: Paroxysmal  Afib with VR Rapid 100s. Diagnosed in 2023, however was not followed up outpatient per pt niece.   LUZ?DS?-VASc 7    Plan:  Consulted cardiology and let them evaluate whether to continue with anticoagulation or not.    Primary hypertension  Assessment & Plan  POA: Markedly elevated BP with known history of hypertension but not on pharmacologic therapy as outpatient.    Plan:  Hydralazine 5 mg IV as needed for SBP > 170  May need to start oral therapy if persistently elevated BP    Dementia (HCC)  Assessment & Plan  POA: Pleasantly confused at baseline.    Plan:  Continue PTA mirtazapine and Seroquel, monitor QTc prolonged on admission  Follow-up Geriatric recommendations  Delirium precautions  Fall  precautions  Aspiration precautions    * Acute on chronic HFpEF with severe pulmonary hypertension (HCC)  Assessment & Plan  Wt Readings from Last 3 Encounters:   04/11/24 61.9 kg (136 lb 6.4 oz)   04/04/24 63.1 kg (139 lb 3.2 oz)   10/12/23 57.6 kg (127 lb)       Intake/Output Summary (Last 24 hours) at 5/12/2024 2132  Last data filed at 5/12/2024 1501  Gross per 24 hour   Intake 500 ml   Output 2100 ml   Net -1600 ml     POA: Patient presented with right sided chest pain resolved on presentation found to have evidence of volume overload on exam and CT imaging, suspect right heart failure in the setting of severe pulmonary hypertension. Maintained on Lasix 40 mg p.o. daily as outpatient and adherent to treatment per patient's niece (POA and care-taker). Received Lasix 40 mg IV x 1 in the ED.  Last echo from 07/2023 showed LVEF 55% with severe TR and estimated RVSP 80 mmHg.  PE study with CT A/P on admission with no PE but cardiomegaly, more prominent on the right. Tiny dependent pleural effusion on the right with associated passive atelectasis. Reflux of contrast into the intrahepatic IVC and portal vein suggesting CHF with increased right heart pressures. Heterogeneous enhancement the liver, suspect passive hepatic congestion. Scarring and scattered parenchymal and paraseptal emphysematous changes bilaterally. Status post left lobectomy.    Plan:  Monitor strict I/O and daily weight  Follow-up repeat echo  IV Lasix 40 mg twice daily - monitor clinical response and renal function  F/u Cardiology consult               VTE Pharmacologic Prophylaxis: VTE Score: 3 Moderate Risk (Score 3-4) - Pharmacological DVT Prophylaxis Ordered: heparin.    Mobility:   Basic Mobility Inpatient Raw Score: 17  JH-HLM Goal: 5: Stand one or more mins  JH-HLM Achieved: 5: Stand (1 or more minutes)  JH-HLM Goal NOT achieved. Continue with multidisciplinary rounding and encourage appropriate mobility to improve upon JH-HLM  goals.    Patient Centered Rounds: I performed bedside rounds with nursing staff today.   Discussions with Specialists or Other Care Team Provider: Cardiology    Education and Discussions with Family / Patient: Updated  (niece) via phone.      Current Length of Stay: 1 day(s)  Current Patient Status: Inpatient   Certification Statement: The patient will continue to require additional inpatient hospital stay due to fluid overload, pending placement  Discharge Plan: Anticipate discharge in 48-72 hrs to rehab facility.    Code Status: Level 3 - DNAR and DNI    Subjective:   Patient is pleasantly confused denied complaint of chest pain, shortness of breath    Objective:     Vitals:   Temp (24hrs), Av.1 °F (36.7 °C), Min:97.9 °F (36.6 °C), Max:98.4 °F (36.9 °C)    Temp:  [97.9 °F (36.6 °C)-98.4 °F (36.9 °C)] 98.4 °F (36.9 °C)  HR:  [80-99] 82  Resp:  [16-22] 17  BP: (120-151)/(58-80) 131/69  SpO2:  [91 %-100 %] 98 %  Body mass index is 29.7 kg/m².     Input and Output Summary (last 24 hours):     Intake/Output Summary (Last 24 hours) at 2024 1309  Last data filed at 2024 0730  Gross per 24 hour   Intake 200 ml   Output 1050 ml   Net -850 ml       Physical Exam:   Constitutional: No acute distress  HEENT: Pallor or icterus negative  CVS: S1 plus S2  Respiratory: Bilateral end inspiratory crackles on the bases  Gastroenterology: Soft nontender without any palpable mass  Skin: No bruises or ecchymosis  Neurology: No focal logical deficit      Additional Data:     Labs:  Results from last 7 days   Lab Units 24  0329 24  0250   WBC Thousand/uL 6.27 7.30   HEMOGLOBIN g/dL 10.6* 11.3*   HEMATOCRIT % 34.7* 36.4   PLATELETS Thousands/uL 200 211   SEGS PCT %  --  66   LYMPHO PCT %  --  17   MONO PCT %  --  14*   EOS PCT %  --  3     Results from last 7 days   Lab Units 24  0329 24  0250   SODIUM mmol/L 139 142   POTASSIUM mmol/L 4.2 4.3   CHLORIDE mmol/L 105 105   CO2 mmol/L 28  29   BUN mg/dL 27* 25   CREATININE mg/dL 1.34* 1.39*   ANION GAP mmol/L 6 8   CALCIUM mg/dL 8.1* 8.6   ALBUMIN g/dL  --  3.6   TOTAL BILIRUBIN mg/dL  --  0.71   ALK PHOS U/L  --  105*   ALT U/L  --  23   AST U/L  --  39   GLUCOSE RANDOM mg/dL 115 113     Results from last 7 days   Lab Units 05/12/24  0250   INR  1.03                   Lines/Drains:  Invasive Devices       Peripheral Intravenous Line  Duration             Peripheral IV 05/12/24 Dorsal (posterior);Right Hand 1 day    Peripheral IV 05/12/24 Left;Ventral (anterior) Hand 1 day    Peripheral IV 05/12/24 Right Antecubital 1 day              Drain  Duration             External Urinary Catheter 1 day                          Imaging: Reviewed radiology reports from this admission including: chest xray suspected congested heart failure    Recent Cultures (last 7 days):         Last 24 Hours Medication List:   Current Facility-Administered Medications   Medication Dose Route Frequency Provider Last Rate    acetaminophen  975 mg Oral Q8H HESHAM Cast MD      diltiazem  15 mg Intravenous Once PRN Lesa Cast MD      furosemide  40 mg Intravenous BID Neeraj Martines PA-C      gabapentin  100 mg Oral TID Lesa Cast MD      heparin (porcine)  5,000 Units Subcutaneous Q8H [facility] Neeraj Martines PA-C      hydrALAZINE  5 mg Intravenous Q4H PRN Lesa Cast MD      melatonin  3 mg Oral HS Lesa Cast MD      mirtazapine  7.5 mg Oral HS Lesa Cast MD      QUEtiapine  25 mg Oral ASHLEE Cast MD      trimethobenzamide  200 mg Intramuscular Q6H PRN Lesa Cast MD          Today, Patient Was Seen By: Marshall Betancourt MD    **Please Note: This note may have been constructed using a voice recognition system.**

## 2024-05-13 NOTE — CONSULTS
Orthopedics   Sona Valenzuela 89 y.o. female MRN: [id number]  Unit/Bed#: MO ECHO      Chief Complaint:   None    HPI:   89 y.o.female with past medical history of dementia, lung cancer status post surgery, bladder and left ureteral cancer, hypertension, hyperlipidemia, atrial fibrillation, restless leg syndrome presented to the hospital yesterday with complaint of chest pain. Patient is currently admitted with acute on chronic heart failure. CT of chest abdomen and pelvis was obtained yesterday, which showed L1 and L5 compression fractures. Patient denies any pain in her back this morning. Patient denies any pain in lower extremities. Patient denies any numbness or tingling in bilateral lower extremities. Patient offers no additional complaints.     Review Of Systems:   Skin: Normal  Neuro: See HPI  Musculoskeletal: See HPI  14 point review of systems negative except as stated above     Past Medical History:   Past Medical History:   Diagnosis Date    Ankle fracture     Arthritis     left hip    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HCC)     with left ureter    Bronchitis     Cancer (HCC)     Carcinoma, lung (HCC)     Dementia (HCC)     Dependent edema     Depression     Diabetes mellitus (HCC)     Hypercholesterolemia     Hypertension     Kidney stone     Oth abn and inconclusive findings on dx imaging of breast     Pes planus     Renal calculus     Restless leg syndrome     Rib pain     Sprain, neck     Varicose veins of left lower extremity        Past Surgical History:   Past Surgical History:   Procedure Laterality Date    APPENDECTOMY      LUNG CANCER SURGERY         Family History:  Family history reviewed and non-contributory  Family History   Problem Relation Age of Onset    No Known Problems Mother     No Known Problems Father     Dementia Brother     Breast cancer Neg Hx     Colon cancer Neg Hx     Ovarian cancer Neg Hx     Uterine cancer Neg Hx     Cervical cancer Neg Hx        Social History:  Social History      Socioeconomic History    Marital status:      Spouse name: None    Number of children: None    Years of education: None    Highest education level: None   Occupational History    None   Tobacco Use    Smoking status: Former     Current packs/day: 0.50     Average packs/day: 0.5 packs/day for 20.0 years (10.0 ttl pk-yrs)     Types: Cigarettes    Smokeless tobacco: Never   Substance and Sexual Activity    Alcohol use: Never    Drug use: Never    Sexual activity: Not Currently     Birth control/protection: Post-menopausal   Other Topics Concern    None   Social History Narrative    Lives at home with niece     Social Determinants of Health     Financial Resource Strain: Low Risk  (10/11/2022)    Overall Financial Resource Strain (CARDIA)     Difficulty of Paying Living Expenses: Not very hard   Food Insecurity: No Food Insecurity (5/13/2024)    Hunger Vital Sign     Worried About Running Out of Food in the Last Year: Never true     Ran Out of Food in the Last Year: Never true   Transportation Needs: No Transportation Needs (5/13/2024)    PRAPARE - Transportation     Lack of Transportation (Medical): No     Lack of Transportation (Non-Medical): No   Physical Activity: Not on file   Stress: Not on file   Social Connections: Not on file   Intimate Partner Violence: Not on file   Housing Stability: Low Risk  (5/13/2024)    Housing Stability Vital Sign     Unable to Pay for Housing in the Last Year: No     Number of Places Lived in the Last Year: 1     Unstable Housing in the Last Year: No       Allergies:   Allergies   Allergen Reactions    Albuterol     Aldactone [Spironolactone]     Aspirin     Atenolol     Bactrim [Sulfamethoxazole-Trimethoprim]     Codeine     Hydrocodone     Ibuprofen     Lisinopril     Mevacor [Lovastatin]     Mirapex [Pramipexole]     Morphine And Related     Other      novacaine    Penicillins     Procaine     Salicylates     Ultram [Tramadol]     Zoloft [Sertraline]      Night sweats  "          Labs:  0   Lab Value Date/Time    HCT 34.7 (L) 05/13/2024 0329    HCT 36.4 05/12/2024 0250    HCT 43.1 04/22/2024 1051    HGB 10.6 (L) 05/13/2024 0329    HGB 11.3 (L) 05/12/2024 0250    HGB 12.8 04/22/2024 1051    INR 1.03 05/12/2024 0250    WBC 6.27 05/13/2024 0329    WBC 7.30 05/12/2024 0250    WBC 7.63 04/22/2024 1051       Meds:    Current Facility-Administered Medications:     acetaminophen (TYLENOL) tablet 975 mg, 975 mg, Oral, Q8H HESHAM, Lesa Cast MD, 975 mg at 05/13/24 0535    diltiazem (CARDIZEM) injection 15 mg, 15 mg, Intravenous, Once PRN, Lesa Cast MD    furosemide (LASIX) injection 50 mg, 50 mg, Intravenous, BID, Lesa Cast MD, 50 mg at 05/12/24 1610    gabapentin (NEURONTIN) capsule 100 mg, 100 mg, Oral, TID, Lesa Cast MD, 100 mg at 05/12/24 2139    heparin (porcine) 25,000 units in 0.45% NaCl 250 mL infusion (premix), 3-30 Units/kg/hr (Order-Specific), Intravenous, Titrated, Lesa Cast MD, Last Rate: 6 mL/hr at 05/13/24 0433, 10 Units/kg/hr at 05/13/24 0433    heparin (porcine) injection 2,400 Units, 2,400 Units, Intravenous, Q6H PRN, Lesa Cast MD    heparin (porcine) injection 4,800 Units, 4,800 Units, Intravenous, Q6H PRN, Lesa Cast MD    hydrALAZINE (APRESOLINE) injection 5 mg, 5 mg, Intravenous, Q4H PRN, Lesa Cast MD, 5 mg at 05/12/24 1031    melatonin tablet 3 mg, 3 mg, Oral, HS, Lesa Cast MD, 3 mg at 05/12/24 2139    mirtazapine (REMERON) tablet 7.5 mg, 7.5 mg, Oral, HS, Lesa Cast MD, 7.5 mg at 05/12/24 2139    QUEtiapine (SEROquel) tablet 25 mg, 25 mg, Oral, HS, Lesa Cast MD, 25 mg at 05/12/24 2139    trimethobenzamide (TIGAN) IM injection 200 mg, 200 mg, Intramuscular, Q6H PRN, Lesa Cast MD    Blood Culture:   Lab Results   Component Value Date    BLOODCX No Growth After 5 Days. 07/01/2023       Wound Culture:   No results found for: \"WOUNDCULT\"    Ins and Outs:  I/O last 24 hours:  In: 200 [P.O.:200]  Out: 2100 [Urine:2100]          Physical Exam:   /64   " Pulse 89   Temp 98.2 °F (36.8 °C)   Resp 17   Wt 66.7 kg (147 lb 0.8 oz)   SpO2 98%   BMI 29.70 kg/m²   Gen: No acute distress, resting comfortably in bed  HEENT: Eyes clear, moist mucus membranes, hearing intact  Respiratory: No audible wheezing or stridor  Cardiovascular: Well Perfused peripherally, 2+ distal pulse  Abdomen: nondistended, no peritoneal signs  Musculoskeletal: BACK  Skin intact, no open lesions  No Tenderness to palpation over Thoracic and Lumbar spine  5/5 strength with hip flexion/extension, knee flexion/extension, ankle dorsi/plantar flexion, EHL/FHL bilateral lower extremities  Sensation intact L2-S1 bilateral lower extremities  Leg Lengths equal  2+ DP pulse    Radiology:   I personally reviewed the films.  CT chest abdomen and pelvis:  Generalized osteopenia. Loss of height of L5 with mild retropulsion and mild loss of height of L1, suggesting age-indeterminate compression fractures; multilevel degenerative changes of the spine, most prominent in the lower lumbar region. Bones otherwise appear intact.    _*_*_*_*_*_*_*_*_*_*_*_*_*_*_*_*_*_*_*_*_*_*_*_*_*_*_*_*_*_*_*_*_*_*_*_*_*_*_*_*_*    Assessment:  89 y.o.female with chronic L1 and L5 compression fractures.     Plan:   WBAT bilateral lower extremities  DVT ppx per primary team  Analgesics per primary team  PT/OT   Body mass index is 29.7 kg/m². mildly obese. Recommend nutrition and physical activity.  Dispo: Given no significant complaints of back pain without abnormal neurologic findings on physical exam, no acute interventions are indicated at this time. Patient does not require bracing. Recommend PT/OT. There are no immediate orthopedic interventions planned at this time. Ortho signing off. If there are any additional questions or concerns please reach out. Please see above for additional details.         Rama Aparicio PA-C

## 2024-05-13 NOTE — ASSESSMENT & PLAN NOTE
POA: Pleasantly confused at baseline.    Plan:  Continue PTA mirtazapine and Seroquel, monitor QTc prolonged on admission  Follow-up Geriatric recommendations  Delirium precautions  Fall precautions  Aspiration precautions

## 2024-05-13 NOTE — UTILIZATION REVIEW
Initial Clinical Review    Admission: Date/Time/Statement:   Admission Orders (From admission, onward)       Ordered        05/12/24 0729  INPATIENT ADMISSION  Once                          Orders Placed This Encounter   Procedures    INPATIENT ADMISSION     Standing Status:   Standing     Number of Occurrences:   1     Order Specific Question:   Level of Care     Answer:   Med Surg [16]     Order Specific Question:   Bed request comments     Answer:   tele     Order Specific Question:   Estimated length of stay     Answer:   More than 2 Midnights     Order Specific Question:   Certification     Answer:   I certify that inpatient services are medically necessary for this patient for a duration of greater than two midnights. See H&P and MD Progress Notes for additional information about the patient's course of treatment.     ED Arrival Information       Expected   -    Arrival   5/12/2024 02:37    Acuity   Emergent              Means of arrival   Ambulance    Escorted by   [facility]   Hospitalist    Admission type   Emergency              Arrival complaint   Chest Pain             Chief Complaint   Patient presents with    Chest Pain     Pt arrived via ems with report of chest pain. Pt is alert to person and place and does not remember exactly where her pain was.        Initial Presentation: 89 y.o. female  PMH of pulmonary hypertension, hypertension, paroxysmal A-fib, and dementia who presents with right-sided chest pain. Per patient's niece (POA), patient complained of right-sided chest pain earlier in a.m. on the day of admission and tried Tylenol with minimal improvement prompting her to call EMS. On presentation, patient was noted to be markedly hypertensive and in A-fib with . CTA chest PE study with no PE but evidence of radial megaly with increased right heart pressure, small pleural effusions, and evidence of hepatic congestion. She appeared volume overloaded with diffuse crackles  on exam and b/l LE edema. She received IV Lasix 40 mg x 1 in the ED and will be admitted for further evaluation. admitted IP status for acute on chronic HF plan for I&O , weights , IV lasix , low Na diet , fld restriction ,cardiology consult . Afib tele , heparin gtt , iv cardizem . HTN hydralazine . Compression fx PT OT eval . CKD CR 1.39 monitor UO and renal indices . Dementia delirium precautions , fall precautions , aspiration precautions .     PE: wheezing  , rales, tr enrike LE edema , disoriented and confused .      Anticipated Length of Stay/Certification Statement: Inpatient. Patient will be admitted on an inpatient basis with an anticipated length of stay of greater than 2 midnights secondary to  Acute on chronic heart failure with preserved ejection fraction (HCC).     Date:   5/13 Day 2: cont stay for fluid overload , pending placement . Monitor I&O and weights . F/u repeat echo . Cont iv lasix BID and monitor response . Cardiology consulted . -1600 ml output last 24 hrs .  PT OT rec Level II resource .     5/13 Ortho Consult   chronic L1 and L5 compression fractures. Plan WBAT enrike LE , DVT ppx , pain control , PT OT .     ED Triage Vitals   Temperature Pulse Respirations Blood Pressure SpO2   05/12/24 0250 05/12/24 0250 05/12/24 0250 05/12/24 0250 05/12/24 0250   97.5 °F (36.4 °C) 87 17 (!) 189/102 94 %      Temp Source Heart Rate Source Patient Position - Orthostatic VS BP Location FiO2 (%)   05/12/24 0250 05/12/24 0250 05/12/24 0250 05/12/24 0250 --   Oral Monitor Lying Left arm       Pain Score       05/12/24 1804       No Pain          Wt Readings from Last 1 Encounters:   05/13/24 66.7 kg (147 lb 0.8 oz)     Additional Vital Signs:   05/13/24 07:51:11 98.2 °F (36.8 °C) 89 -- -- -- 98 % -- -- -- --   05/13/24 0730 -- -- -- -- -- -- 28 2 L/min None (Room air) --   05/13/24 02:54:18 98 °F (36.7 °C) 91 17 120/64 83 91 % -- -- -- --   05/12/24 22:22:49 97.9 °F (36.6 °C) 88 20 122/61 81 95 % -- -- -- --    05/12/24 1915 -- -- -- -- -- -- 28 2 L/min Nasal cannula --   05/12/24 18:01:41 98 °F (36.7 °C) 95 22 141/80 100 97 % -- -- -- --   05/12/24 1700 -- 91 19 125/58 83 99 % 28 2 L/min Nasal cannula --   05/12/24 1630 -- 97 22 130/59 85 99 % 28 2 L/min Nasal cannula --   05/12/24 1530 -- 99 16 145/72 101 99 % -- -- None (Room air) --   05/12/24 1430 -- 89 16 151/79 105 100 % 28 2 L/min Nasal cannula --   05/12/24 1400 -- 80 22 128/71 95 100 % 28 2 L/min Nasal cannula --   05/12/24 1230 -- 87 20 129/67 92 100 % 28 2 L/min Nasal cannula --   05/12/24 1200 -- 83 20 124/62 88 100 % 28 2 L/min Nasal cannula --   05/12/24 1100 -- 93 22 125/62 89 99 % 28 2 L/min Nasal cannula --   05/12/24 1022 -- 85 17 195/92 Abnormal  -- 99 % -- -- None (Room air) Lying   05/12/24 0900 -- 81 17 -- -- 98 % -- -- -- --   05/12/24 0800 -- 87 17 173/93 Abnormal  126 98 % -- -- None (Room air) --   05/12/24 0730 -- 88 19 144/119 Abnormal  129 97 % 28 2 L/min Nasal cannula --   05/12/24 0700 -- 86 18 157/92 118 97 % -- -- -- --   05/12/24 0630 -- 85 17 147/79 106 98 % 28 2 L/min Nasal cannula --   05/12/24 0430 -- 91 18 176/81 Abnormal  117 93 % -- -- None (Room air) --   05/12/24 0425 -- -- -- -- -- 88 % Abnormal   -- -- None (Room air) --   SpO2: pt is sleeping. oxygen at 2L applied to assist pt while sleeping at 05/12/24 0425   05/12/24 0400 -- 93 22 153/80 109 94 % -- -- None (Room air) --   05/12/24 0300 -- 84 20 196/83 Abnormal  125 93 % -- -- -- --     Pertinent Labs/Diagnostic Test Results:   5/12 EKG Atrial fibrillation.  with incomplete RBBB and QTc 510   PE Study with CT Abdomen and Pelvis with contrast   Final Result by Amandeep Thompson DO (05/12 0714)      No pulmonary embolism is seen.      Cardiomegaly, more prominent on the right. Tiny dependent pleural effusion on the right with associated passive atelectasis. Reflux of contrast into the intrahepatic IVC and portal vein suggesting CHF with increased right heart  pressures.      Heterogeneous enhancement the liver, suspect passive hepatic congestion      Scarring and scattered parenchymal and paraseptal emphysematous changes bilaterally. Status post left lobectomy.      Age-indeterminate osteoporotic compression fractures in the lumbar spine, as described. Correlation with the patient's symptoms recommended.      Atrophy of the left kidney, renal cortical scarring, right adrenal nodule,, moderate to large hiatal hernia, colonic diverticulosis, body wall edema and other findings as above.         Workstation performed: [id number]         XR chest 1 view portable   ED Interpretation by Maria Del Carmen Whitehead DO (05/12 0720)   Cardiomegaly and increased vascular congestion consistent with CHF.      Final Result by Izabela Novak MD (05/12 1051)      Mild pulmonary venous congestion.            Workstation performed: [id number]               Results from last 7 days   Lab Units 05/13/24  0329 05/12/24  0250   WBC Thousand/uL 6.27 7.30   HEMOGLOBIN g/dL 10.6* 11.3*   HEMATOCRIT % 34.7* 36.4   PLATELETS Thousands/uL 200 211   TOTAL NEUT ABS Thousands/µL  --  4.79         Results from last 7 days   Lab Units 05/13/24  0329 05/12/24  0250   SODIUM mmol/L 139 142   POTASSIUM mmol/L 4.2 4.3   CHLORIDE mmol/L 105 105   CO2 mmol/L 28 29   ANION GAP mmol/L 6 8   BUN mg/dL 27* 25   CREATININE mg/dL 1.34* 1.39*   EGFR ml/min/1.73sq m 32 31   CALCIUM mg/dL 8.1* 8.6   MAGNESIUM mg/dL 2.1 2.3   PHOSPHORUS mg/dL 4.4* 3.4     Results from last 7 days   Lab Units 05/12/24  0250   AST U/L 39   ALT U/L 23   ALK PHOS U/L 105*   TOTAL PROTEIN g/dL 6.9   ALBUMIN g/dL 3.6   TOTAL BILIRUBIN mg/dL 0.71   BILIRUBIN DIRECT mg/dL 0.18         Results from last 7 days   Lab Units 05/13/24  0329 05/12/24  0250   GLUCOSE RANDOM mg/dL 115 113       Results from last 7 days   Lab Units 05/12/24  0642 05/12/24  0440 05/12/24  0250   HS TNI 0HR ng/L  --   --  24   HS TNI 2HR ng/L  --  23  --    HSTNI D2  ng/L  --  -1  --    HS TNI 4HR ng/L 22  --   --    HSTNI D4 ng/L -2  --   --      Results from last 7 days   Lab Units 05/12/24  0250   D-DIMER QUANTITATIVE ug/ml FEU 1.45*     Results from last 7 days   Lab Units 05/13/24  0321 05/12/24  1922 05/12/24  1557 05/12/24  0943 05/12/24  0250   PROTIME seconds  --   --   --   --  14.1   INR   --   --   --   --  1.03   PTT seconds 95* >210* >210*   < > 27    < > = values in this interval not displayed.       Results from last 7 days   Lab Units 05/12/24  0250   BNP pg/mL 934*       Results from last 7 days   Lab Units 05/12/24  0250   LIPASE u/L 18         Results from last 7 days   Lab Units 05/12/24  0937   CLARITY UA  Clear   COLOR UA  Colorless   SPEC GRAV UA  1.016   PH UA  7.0   GLUCOSE UA mg/dl Negative   KETONES UA mg/dl Negative   BLOOD UA  Negative   PROTEIN UA mg/dl Negative   NITRITE UA  Negative   BILIRUBIN UA  Negative   UROBILINOGEN UA (BE) mg/dl <2.0   LEUKOCYTES UA  Negative       ED Treatment:   Medication Administration from 05/12/2024 0237 to 05/12/2024 1752         Date/Time Order Dose Route Action     05/12/2024 0251 EDT sodium chloride 0.9 % bolus 500 mL 500 mL Intravenous New Bag     05/12/2024 0737 EDT furosemide (LASIX) injection 40 mg 40 mg Intravenous Given     05/12/2024 1609 EDT gabapentin (NEURONTIN) capsule 100 mg 100 mg Oral Given     05/12/2024 0957 EDT gabapentin (NEURONTIN) capsule 100 mg 100 mg Oral Given     05/12/2024 1603 EDT acetaminophen (TYLENOL) tablet 975 mg 975 mg Oral Given     05/12/2024 0957 EDT acetaminophen (TYLENOL) tablet 975 mg 975 mg Oral Given     05/12/2024 1610 EDT furosemide (LASIX) injection 50 mg 50 mg Intravenous Given     05/12/2024 1031 EDT hydrALAZINE (APRESOLINE) injection 5 mg 5 mg Intravenous Given     05/12/2024 0943 EDT heparin (porcine) injection 4,800 Units 4,800 Units Intravenous Given     05/12/2024 0948 EDT heparin (porcine) 25,000 units in 0.45% NaCl 250 mL infusion (premix) 18 Units/kg/hr  Intravenous New Bag          Past Medical History:   Diagnosis Date    Ankle fracture     Arthritis     left hip    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HCC)     with left ureter    Bronchitis     Cancer (HCC)     Carcinoma, lung (HCC)     Dementia (HCC)     Dependent edema     Depression     Diabetes mellitus (HCC)     Hypercholesterolemia     Hypertension     Kidney stone     Oth abn and inconclusive findings on dx imaging of breast     Pes planus     Renal calculus     Restless leg syndrome     Rib pain     Sprain, neck     Varicose veins of left lower extremity      Present on Admission:   Acute on chronic HFpEF with severe pulmonary hypertension (HCC)   Dementia (HCC)   Primary hypertension   Paroxysmal A-fib (HCC)   CKD (chronic kidney disease) stage 4, GFR 15-29 ml/min (HCC)   Compression fracture of lumbar spine, non-traumatic (HCC)      Admitting Diagnosis: Chest pain [R07.9]  Acute exacerbation of CHF (congestive heart failure) (HCC) [I50.9]  Atrial fibrillation with RVR (HCC) [I48.91]  Age/Sex: 89 y.o. female  Admission Orders:  Scheduled Medications:  acetaminophen, 975 mg, Oral, Q8H HESHAM  furosemide, 50 mg, Intravenous, BID  gabapentin, 100 mg, Oral, TID  melatonin, 3 mg, Oral, HS  mirtazapine, 7.5 mg, Oral, HS  QUEtiapine, 25 mg, Oral, HS      Continuous IV Infusions:  heparin (porcine), 3-30 Units/kg/hr (Order-Specific), Intravenous, Titrated      PRN Meds:  diltiazem, 15 mg, Intravenous, Once PRN  heparin (porcine), 2,400 Units, Intravenous, Q6H PRN  heparin (porcine), 4,800 Units, Intravenous, Q6H PRN  hydrALAZINE, 5 mg, Intravenous, Q4H PRN  trimethobenzamide, 200 mg, Intramuscular, Q6H PRN    Aspiration precautions  Bladder scan   PT OT eval   Weights   Up as ruben   Tele         IP CONSULT TO CASE MANAGEMENT  IP CONSULT TO CARDIOLOGY  IP CONSULT TO ORTHOPEDIC SURGERY  IP CONSULT TO GERONTOLOGY    Network Utilization Review Department  ATTENTION: Please call with any questions or concerns to [phone] and carefully  listen to the prompts so that you are directed to the right person. All voicemails are confidential.   For Discharge needs, contact Care Management DC Support Team at [phone] opt. [phone]  Send all requests for admission clinical reviews, approved or denied determinations and any other requests to dedicated fax number below belonging to the campus where the patient is receiving treatment. List of dedicated fax numbers for the Facilities:  FACILITY NAME UR FAX NUMBER   ADMISSION DENIALS (Administrative/Medical Necessity) [phone]   DISCHARGE SUPPORT TEAM (NETWORK) [phone]   PARENT CHILD HEALTH (Maternity/NICU/Pediatrics) [phone]   [facility] [phone]   [facility] [phone]   [facility] [phone]   [facility] [phone]   [facility] [phone]   [facility] [phone]   [facility] [phone]   [facility] [phone]   [facility] [phone]   [facility] [phone]   [facility] [phone]   [facility] [phone]

## 2024-05-13 NOTE — ASSESSMENT & PLAN NOTE
Lab Results   Component Value Date    EGFR 31 05/12/2024    EGFR 32 04/22/2024    EGFR 31 04/05/2024    CREATININE 1.39 (H) 05/12/2024    CREATININE 1.34 (H) 04/22/2024    CREATININE 1.40 (H) 04/05/2024     POA: Cr at baseline around 1.4. Estimated Creatinine Clearance: 18.8 mL/min (A) (by C-G formula based on SCr of 1.39 mg/dL (H)).     Plan:  Monitor UOP and renal indices - received IV contrast for CT  Avoid hypotension and nephrotoxins  Renally dose medications as appropriate  Outpatient nephrology follow-up

## 2024-05-13 NOTE — PHYSICAL THERAPY NOTE
Physical Therapy Evaluation     Patient's Name: Sona Valenzuela    Admitting Diagnosis  Chest pain [R07.9]  Acute exacerbation of CHF (congestive heart failure) ([facility]) [I50.9]  Atrial fibrillation with RVR ([facility]) [I48.91]    Problem List  Patient Active Problem List   Diagnosis    Degeneration of lumbar intervertebral disc    Lumbar radiculopathy    Arthropathy of lumbar facet joint    Visual hallucinations    Restless legs syndrome    Primary insomnia    Vitamin D deficiency    Ambulatory dysfunction    Anxiety    Stage 3a chronic kidney disease (HCC)    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HCC)    Chronic atrial fibrillation (HCC)    Chronic congestive heart failure (HCC)    Weakness of left leg    Does mobilize using walker    Allergic bronchitis    Acute CHF (congestive heart failure) (HCC)    Dementia (HCC)    Vascular dementia, uncomplicated (HCC)    Mild protein-calorie malnutrition (HCC)    Acute on chronic HFpEF with severe pulmonary hypertension (HCC)    Primary hypertension    Paroxysmal A-fib (HCC)    CKD (chronic kidney disease) stage 4, GFR 15-29 ml/min (HCC)    Compression fracture of lumbar spine, non-traumatic (HCC)     Past Medical History  Past Medical History:   Diagnosis Date    Ankle fracture     Arthritis     left hip    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HCC)     with left ureter    Bronchitis     Cancer (HCC)     Carcinoma, lung (HCC)     Dementia (HCC)     Dependent edema     Depression     Diabetes mellitus (HCC)     Hypercholesterolemia     Hypertension     Kidney stone     Oth abn and inconclusive findings on dx imaging of breast     Pes planus     Renal calculus     Restless leg syndrome     Rib pain     Sprain, neck     Varicose veins of left lower extremity      Past Surgical History  Past Surgical History:   Procedure Laterality Date    APPENDECTOMY      LUNG CANCER SURGERY        05/13/24 0835   PT Last Visit   PT Visit Date 05/13/24   Note Type   Note type Evaluation and Treatment  "  Pain Assessment   Pain Assessment Tool 0-10   Pain Score No Pain   Restrictions/Precautions   Weight Bearing Precautions Per Order Yes   RLE Weight Bearing Per Order WBAT  (per ortho)   LLE Weight Bearing Per Order WBAT  (per ortho)   Other Precautions Cognitive;Chair Alarm;Bed Alarm;Multiple lines;Telemetry;O2;Fall Risk  (+3L O2 via NC)   Home Living   Type of Home House   Home Layout Two level;Bed/bath upstairs  (No KAY; flight of stairs to 2nd floor)   Bathroom Accessibility Accessible   Home Equipment Walker   Additional Comments Pt ambulates with a walker.   Prior Function   Level of [place] Needs assistance with functional mobility;Needs assistance with ADLs;Needs assistance with IADLS   Lives With Family   Receives Help From Family   IADLs Family/Friend/Other provides transportation;Family/Friend/Other provides meals;Family/Friend/Other provides medication management   Vocational Retired   General   Family/Caregiver Present No   Cognition   Overall Cognitive Status Impaired   Arousal/Participation Alert   Orientation Level Oriented to person;Oriented to place;Disoriented to time;Disoriented to situation   Memory Decreased recall of recent events;Decreased short term memory;Decreased recall of biographical information   Following Commands Follows one step commands with increased time or repetition   Comments Pt agreeable to PT.   Subjective   Subjective \"I'll get up.\"   RLE Assessment   RLE Assessment X   Strength RLE   RLE Overall Strength 3+/5   LLE Assessment   LLE Assessment X   Strength LLE   LLE Overall Strength 3+/5   Light Touch   RLE Light Touch Grossly intact   LLE Light Touch Grossly intact   Bed Mobility   Supine to Sit 4  Minimal assistance   Additional items Assist x 2;HOB elevated;Bedrails;Increased time required;Verbal cues;LE management   Transfers   Sit to Stand 4  Minimal assistance   Additional items Assist x 2;Increased time required;Verbal cues   Stand to Sit 4  Minimal assistance "   Additional items Assist x 2;Armrests;Increased time required;Verbal cues   Ambulation/Elevation   Gait pattern R Knee Aj;L Knee Aj;Improper Weight shift;Shuffling;Short stride;Step to;Excessively slow;Decreased heel strike   Gait Assistance 4  Minimal assist   Additional items Assist x 2;Verbal cues   Assistive Device Rolling walker   Distance 3 feet, bed to chair   Balance   Static Sitting Fair   Dynamic Sitting Fair -   Static Standing Poor +   Dynamic Standing Poor   Ambulatory Poor   Endurance Deficit   Endurance Deficit Yes   Endurance Deficit Description decreased activity tolerance   Activity Tolerance   Activity Tolerance Patient tolerated treatment well   Medical Staff Made Aware OT Tiffanie  (Co-evaluation performed with OT secondary to complex medical condition of patient and regression of functional status from baseline. PT/OT goals were addressed separately.)   Nurse Made Aware LPN Magaly   Assessment   Prognosis Good   Problem List Decreased strength;Decreased endurance;Impaired balance;Decreased mobility;Decreased cognition   Assessment Pt is 89 year old female seen for PT evaluation s/p admit to [facility] on 5/12/2024 with Acute on chronic heart failure with preserved ejection fraction (HCC). PT consulted to assess pt's functional mobility and discharge needs. Order placed for PT evaluation and treatment, with up with assist order. Comorbidities affecting pt's physical performance at time of assessment include dementia, primary hypertension, paroxysmal a-fib, CKD, stage 4, and compression fracture of lumbar spine, non-traumatic. Prior to hospitalization, pt was requiring assist for mobility. Pt ambulates household distances with a walker. Pt resides with her family, in a two level house with no steps to enter and a flight of stairs to the 2nd floor. Personal factors affecting pt at time of initial evaluation include lives in a two story house, ambulating with an assistive device,  inability to ambulate household distances, inability to navigate level surfaces without external assistance, unable to perform dynamic tasks in the community, difficulty performing ADLs, and inability to perform IADLs. Please find objective findings from PT assessment regarding body systems outlined above with impairments and limitations including weakness, impaired balance, decreased endurance, gait deviations, decreased activity tolerance, decreased functional mobility tolerance, fall risk, and decreased cognition. The following objective measures were performed on initial evaluation Barthel Index: 35/100, Modified Christine: 4 (moderate/severe disability), and AM-PAC 6-Clicks: 10/24. Pt's clinical presentation is currently unstable/unpredictable seen in pt's presentation of need for ongoing medical management/monitoring, pt is a fall risk, and pt requires cues and assist for safety with functional mobility. Pt to benefit from continued PT treatment to address deficits as defined above and maximize pt's level of function and independence with mobility. From a PT standpoint, recommendation at time of discharge would be level 2, moderate resource intensity in order to facilitate return to prior level of function.   Barriers to Discharge Inaccessible home environment   Goals   STG Expiration Date 05/23/24   Short Term Goal #1 In 10 days: Increase bilateral LE strength 1/2 grade to facilitate independent mobility, Perform all bed mobility tasks with close supervision to decrease caregiver burden, Perform all transfers with close supervision to improve independence, Ambulate > 50 ft. with RW with close supervision w/o LOB and w/ normalized gait pattern 100% of the time, Increase all balance 1/2 grade to decrease risk for falls, and PT to see and establish goals for stairs when appropriate   PT Treatment Day 1   Plan   Treatment/Interventions Functional transfer training;LE strengthening/ROM;Therapeutic exercise;Endurance  training;Cognitive reorientation;Patient/family training;Bed mobility;Gait training;Spoke to nursing;OT   PT Frequency 3-5x/wk   Discharge Recommendation   Rehab Resource Intensity Level, PT II (Moderate Resource Intensity)   AM-PAC Basic Mobility Inpatient   Turning in Flat Bed Without Bedrails 2   Lying on Back to Sitting on Edge of Flat Bed Without Bedrails 2   Moving Bed to Chair 2   Standing Up From Chair Using Arms 2   Walk in Room 1   Climb 3-5 Stairs With Railing 1   Basic Mobility Inpatient Raw Score 10   Turning Head Towards Sound 4   Follow Simple Instructions 3   Low Function Basic Mobility Raw Score  17   Low Function Basic Mobility Standardized Score  27.46   [facility] Highest Level Of Mobility   -[facility] Goal 4: Move to chair/commode   -HL Achieved 4: Move to chair/commode   Modified Christine Scale   Modified [place] Scale 4   Barthel Index   Feeding 5   Bathing 0   Grooming Score 0   Dressing Score 5   Bladder Score 5   Bowels Score 10   Toilet Use Score 5   Transfers (Bed/Chair) Score 5   Mobility (Level Surface) Score 0   Stairs Score 0   Barthel Index Score 35   Additional Treatment Session   Start Time 0850   End Time 0900   Treatment Assessment Pt agreeable to PT treatment session following PT evaluation. Pt performed seated therapeutic exercise as indicated below. Pt required verbal cues and minimal tactile cues for correct technique and form. Pt tolerated therapeutic exercise well without complaints of pain. Pt continues to exhibit decreased lower extremity strength, impaired balance, decreased endurance, gait deviations, and decreased functional mobility. PT to continue to recommend level 2, moderate resource intensity. PT to continue to follow and treat as appropriate.   Exercises   Hip Flexion Sitting;10 reps;AROM;Bilateral   Hip Adduction Sitting;10 reps;AROM;Bilateral   Knee AROM Long Arc Quad Sitting;15 reps;AROM;Bilateral   Ankle Pumps Sitting;20 reps;AROM;Bilateral   End of Consult    Patient Position at End of Consult Bedside chair;Bed/Chair alarm activated;All needs within reach  (LPN aware)     PT Evaluation Time: 7843-1170    PT Treatment Time: 9212-6278  10 minutes  Fartun Velarde, PT, DPT

## 2024-05-13 NOTE — CASE MANAGEMENT
Case Management Progress Note    Patient name Sona Valenzuela  Location /-01 MRN [id number]  : 11/3/1924 Date 2024       LOS (days): 1  Geometric Mean LOS (GMLOS) (days):   Days to GMLOS:        OBJECTIVE:        Current admission status: Inpatient  Preferred Pharmacy:   [facility] #[id number] - [place] - [address]  [place] [id number]  Phone: [phone] Fax: [phone]    Primary Care Provider: FARZANA Stanley    Primary Insurance: [employer]  Secondary Insurance: [place] [employer] [facility]    PROGRESS NOTE:    Eliquis 5mg BID is $0 copay

## 2024-05-13 NOTE — PLAN OF CARE
Problem: Potential for Falls  Goal: Patient will remain free of falls  Description: INTERVENTIONS:  - Educate patient/family on patient safety including physical limitations  - Instruct patient to call for assistance with activity   - Consult OT/PT to assist with strengthening/mobility   - Keep Call bell within reach  - Keep bed low and locked with side rails adjusted as appropriate  - Keep care items and personal belongings within reach  - Initiate and maintain comfort rounds  - Make Fall Risk Sign visible to staff  - Offer Toileting every 2 Hours, in advance of need  - Initiate/Maintain bed/chair alarm  - Apply yellow socks and bracelet for high fall risk patients  - Consider moving patient to room near nurses station  Outcome: Progressing

## 2024-05-13 NOTE — PLAN OF CARE
Problem: OCCUPATIONAL THERAPY ADULT  Goal: Performs self-care activities at highest level of function for planned discharge setting.  See evaluation for individualized goals.  Description: Treatment Interventions: ADL retraining, Functional transfer training, UE strengthening/ROM, Endurance training, Patient/family training, Equipment evaluation/education, Compensatory technique education, Cardiac education, Continued evaluation, Energy conservation          See flowsheet documentation for full assessment, interventions and recommendations.   Note: Limitation: Decreased ADL status, Decreased UE strength, Decreased Safe judgement during ADL, Decreased endurance, Decreased self-care trans, Decreased high-level ADLs  Prognosis: Good  Assessment: Patient is a 89 y.o. female seen for OT evaluation s/p admit to [facility] on 5/12/2024 w/Acute on chronic heart failure with preserved ejection fraction (HCC). Commorbidities affecting patient's functional performance at time of assessment include: Acute on Chronic heart failure, severe pulmonary hypertension, paroxysmal A fib, primary hypertension, compression fracture of lumbar spine, CKD, Dementia. Patient presented to ED with right chest pain. Orders placed for OT evaluation and treatment.  Performed at least two patient identifiers during session including name and wristband.  Prior to admission,  Chart review indicates patient lives with family in a 2 story house, no KAY with full flight to second floor bedroom. Patient ambulates with a walker.  Personal factors affecting patient at time of initial evaluation include: steps to enter, limited insight into deficits, decreased initiation and engagement, difficulty performing ADLs, and difficulty performing IADLs. Upon evaluation, patient requires moderate assist for UB ADLs, maximal assist for LB ADLs,   Occupational performance is affected by the following deficits: decreased functional use of BUEs,  decreased muscle strength, degenerative arthritic joint changes, impaired gross motor coordination, impaired fine motor coordination, dynamic sit/ stand balance deficit with poor standing tolerance time for self care and functional mobility, decreased activity tolerance, impaired problem solving, decreased safety awareness, and postural control and postural alignment deficit, requiring external assistance to complete transitional movements.  Patient to benefit from continued Occupational Therapy treatment while in the hospital to address deficits as defined above and maximize level of functional independence with ADLs and functional mobility. Occupational Performance areas to address include: grooming , bathing/ shower, dressing, toilet hygiene, transfer to all surfaces, functional mobility, and Leisure Participation. From OT standpoint, recommendation at time of d/c would be Level 2.     Rehab Resource Intensity Level, OT: II (Moderate Resource Intensity)

## 2024-05-13 NOTE — ASSESSMENT & PLAN NOTE
POA: Markedly elevated BP with known history of hypertension but not on pharmacologic therapy as outpatient.    Plan:  Hydralazine 5 mg IV as needed for SBP > 170  May need to start oral therapy if persistently elevated BP

## 2024-05-13 NOTE — CONSULTS
Consultation - Cardiology   Sona Valenzuela 89 y.o. female MRN: [id number]  Unit/Bed#: -01 Encounter: [id number]  05/13/24  2:29 PM    Assessment/ Plan:  1.  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  , CXR and CTA suggestive of CHF.  Decrease IV Lasix to 40 mg bid. Strict I/O's and daily weights; recommend sodium restriction.    2.  Chronic atrial fibrillation with RVR  HR is now well-controlled without AV rolanda blockers.  [id number], however not on AC due to fall risk in the setting of advanced age and dementia.    3.  Hypertensive urgency  BP now well-controlled, continue Lasix.    4.  Severe tricuspid regurgitation  She would likely be a poor candidate for valvular intervention due to advanced age and multiple comorbidities.    5.  Severe pulmonary hypertension  6.  Hx of lung cancer s/p left lobectomy  7.  CKD 3-4, L kidney atrophy, renal cortical scarring, R adrenal nodule  8.  Dementia  9.  Anemia  10.  IFG        History of Present Illness   Physician Requesting Consult: Marshall Betancourt MD    Reason for Consult / Principal Problem: Acute on chronic HFpEF/RV failure    HPI: Sona Valenzuela is a 89 y.o. year old female with history of chronic HFpEF/RV failure, chronic atrial fibrillation not on AC, hypertension, severe tricuspid regurgitation, severe pulmonary hypertension, lung cancer s/p left lobectomy, CKD 3, dementia, anemia, and IFG who presents with shortness of breath.  Patient found to be volume overloaded upon admission with elevated BNP and imaging suggestive of CHF..  Also noted to be in A-fib with RVR.  Cardiology consulted for further evaluation and management.  Patient endorses compliance with medications and low-sodium diet.  Denies any additional complaints at this time.      Inpatient consult to Cardiology  Consult performed by: Neeraj Martines PA-C  Consult ordered by: Lsea Cast MD          EKG: Atrial fibrillation with RVR; incomplete  RBBB      Review of Systems   Constitutional:  Negative for chills and fever.   HENT:  Negative for ear pain and sore throat.    Eyes:  Negative for pain and visual disturbance.   Respiratory:  Positive for shortness of breath. Negative for cough.    Cardiovascular:  Negative for chest pain, palpitations and leg swelling.   Gastrointestinal:  Negative for abdominal pain and vomiting.   Genitourinary:  Negative for dysuria and hematuria.   Musculoskeletal:  Negative for arthralgias and back pain.   Skin:  Negative for color change and rash.   Neurological:  Negative for seizures and syncope.   All other systems reviewed and are negative.      Historical Information   Past Medical History:   Diagnosis Date    Ankle fracture     Arthritis     left hip    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HCC)     with left ureter    Bronchitis     Cancer (HCC)     Carcinoma, lung (HCC)     Dementia (HCC)     Dependent edema     Depression     Diabetes mellitus (HCC)     Hypercholesterolemia     Hypertension     Kidney stone     Oth abn and inconclusive findings on dx imaging of breast     Pes planus     Renal calculus     Restless leg syndrome     Rib pain     Sprain, neck     Varicose veins of left lower extremity      Past Surgical History:   Procedure Laterality Date    APPENDECTOMY      LUNG CANCER SURGERY       Social History     Substance and Sexual Activity   Alcohol Use Never     Social History     Substance and Sexual Activity   Drug Use Never     Social History     Tobacco Use   Smoking Status Former    Current packs/day: 0.50    Average packs/day: 0.5 packs/day for 20.0 years (10.0 ttl pk-yrs)    Types: Cigarettes   Smokeless Tobacco Never       Family History:   Family History   Problem Relation Age of Onset    No Known Problems Mother     No Known Problems Father     Dementia Brother     Breast cancer Neg Hx     Colon cancer Neg Hx     Ovarian cancer Neg Hx     Uterine cancer Neg Hx     Cervical cancer Neg Hx        Meds/Allergies   all  current active meds have been reviewed  Allergies   Allergen Reactions    Albuterol     Aldactone [Spironolactone]     Aspirin     Atenolol     Bactrim [Sulfamethoxazole-Trimethoprim]     Codeine     Hydrocodone     Ibuprofen     Lisinopril     Mevacor [Lovastatin]     Mirapex [Pramipexole]     Morphine And Related     Other      novacaine    Penicillins     Procaine     Salicylates     Ultram [Tramadol]     Zoloft [Sertraline]      Night sweats       Objective   Vitals: Blood pressure 131/69, pulse 82, temperature 98.4 °F (36.9 °C), temperature source Oral, resp. rate 17, weight 66.7 kg (147 lb 0.8 oz), SpO2 98%, not currently breastfeeding., Body mass index is 29.7 kg/m².,   Orthostatic Blood Pressures      Flowsheet Row Most Recent Value   Blood Pressure 131/69 filed at 2024 1108   Patient Position - Orthostatic VS Lying filed at 2024 1108            Systolic (24hrs), Av , Min:120 , Max:151     Diastolic (24hrs), Av, Min:58, Max:80        Intake/Output Summary (Last 24 hours) at 2024 1429  Last data filed at 2024 0730  Gross per 24 hour   Intake 200 ml   Output 950 ml   Net -750 ml       Invasive Devices       Peripheral Intravenous Line  Duration             Peripheral IV 24 Dorsal (posterior);Right Hand 1 day    Peripheral IV 24 Left;Ventral (anterior) Hand 1 day    Peripheral IV 24 Right Antecubital 1 day              Drain  Duration             External Urinary Catheter 1 day                        Physical Exam:  GEN: Alert in no acute distress.  Ill appearing and well nourished.  NC in place.  HEENT: Sclera anicteric, conjunctivae pink, mucous membranes moist. Oropharynx clear.   NECK: Supple, no carotid bruits, no significant JVD. Trachea midline, no thyromegaly.   HEART: Irregularly irregular rhythm, normal S1 and S2, no murmurs, clicks, gallops or rubs. PMI nondisplaced, no thrills.   LUNGS: Basilar crackles bilaterally; no wheezes or rhonchi. No increased  work of breathing or signs of respiratory distress.   ABDOMEN: Soft, nontender, nondistended, normoactive bowel sounds.   EXTREMITIES: Skin warm and well perfused, no clubbing, cyanosis, or edema.  NEURO: No focal findings. Normal speech. Mood and affect normal.   SKIN: Normal without suspicious lesions on exposed skin.    Lab Results:     Cardiac Profile:   Results from last 7 days   Lab Units 05/12/24  0642 05/12/24  0440 05/12/24  0250   HS TNI 0HR ng/L  --   --  24   HS TNI 2HR ng/L  --  23  --    HSTNI D2 ng/L  --  -1  --    HS TNI 4HR ng/L 22  --   --    HSTNI D4 ng/L -2  --   --        CBC with diff:   Results from last 7 days   Lab Units 05/13/24  0329 05/12/24  0250   WBC Thousand/uL 6.27 7.30   HEMOGLOBIN g/dL 10.6* 11.3*   HEMATOCRIT % 34.7* 36.4   MCV fL 90 89   PLATELETS Thousands/uL 200 211   RBC Million/uL 3.84 4.07   MCH pg 27.6 27.8   MCHC g/dL 30.5* 31.0*   RDW % 15.7* 15.7*   MPV fL 10.4 10.1   NRBC AUTO /100 WBCs  --  0         CMP:   Results from last 7 days   Lab Units 05/13/24 0329 05/12/24  0250   POTASSIUM mmol/L 4.2 4.3   CHLORIDE mmol/L 105 105   CO2 mmol/L 28 29   BUN mg/dL 27* 25   CREATININE mg/dL 1.34* 1.39*   CALCIUM mg/dL 8.1* 8.6   AST U/L  --  39   ALT U/L  --  23   ALK PHOS U/L  --  105*   EGFR ml/min/1.73sq m 32 31

## 2024-05-13 NOTE — PLAN OF CARE
Problem: PHYSICAL THERAPY ADULT  Goal: Performs mobility at highest level of function for planned discharge setting.  See evaluation for individualized goals.  Description: Treatment/Interventions: Functional transfer training, LE strengthening/ROM, Therapeutic exercise, Endurance training, Cognitive reorientation, Patient/family training, Bed mobility, Gait training, Spoke to nursing, OT          See flowsheet documentation for full assessment, interventions and recommendations.  Note: Prognosis: Good  Problem List: Decreased strength, Decreased endurance, Impaired balance, Decreased mobility, Decreased cognition  Assessment: Pt is 89 year old female seen for PT evaluation s/p admit to [facility] on 5/12/2024 with Acute on chronic heart failure with preserved ejection fraction (HCC). PT consulted to assess pt's functional mobility and discharge needs. Order placed for PT evaluation and treatment, with up with assist order. Comorbidities affecting pt's physical performance at time of assessment include dementia, primary hypertension, paroxysmal a-fib, CKD, stage 4, and compression fracture of lumbar spine, non-traumatic. Prior to hospitalization, pt was requiring assist for mobility. Pt ambulates household distances with a walker. Pt resides with her family, in a two level house with no steps to enter and a flight of stairs to the 2nd floor. Personal factors affecting pt at time of initial evaluation include lives in a two story house, ambulating with an assistive device, inability to ambulate household distances, inability to navigate level surfaces without external assistance, unable to perform dynamic tasks in the community, difficulty performing ADLs, and inability to perform IADLs. Please find objective findings from PT assessment regarding body systems outlined above with impairments and limitations including weakness, impaired balance, decreased endurance, gait deviations, decreased activity  tolerance, decreased functional mobility tolerance, fall risk, and decreased cognition. The following objective measures were performed on initial evaluation Barthel Index: 35/100, Modified Christine: 4 (moderate/severe disability), and AM-PAC 6-Clicks: 10/24. Pt's clinical presentation is currently unstable/unpredictable seen in pt's presentation of need for ongoing medical management/monitoring, pt is a fall risk, and pt requires cues and assist for safety with functional mobility. Pt to benefit from continued PT treatment to address deficits as defined above and maximize pt's level of function and independence with mobility. From a PT standpoint, recommendation at time of discharge would be level 2, moderate resource intensity in order to facilitate return to prior level of function.    Barriers to Discharge: Inaccessible home environment     Rehab Resource Intensity Level, PT: II (Moderate Resource Intensity)    See flowsheet documentation for full assessment.

## 2024-05-13 NOTE — ASSESSMENT & PLAN NOTE
Wt Readings from Last 3 Encounters:   04/11/24 61.9 kg (136 lb 6.4 oz)   04/04/24 63.1 kg (139 lb 3.2 oz)   10/12/23 57.6 kg (127 lb)       Intake/Output Summary (Last 24 hours) at 5/12/2024 2132  Last data filed at 5/12/2024 1501  Gross per 24 hour   Intake 500 ml   Output 2100 ml   Net -1600 ml     POA: Patient presented with right sided chest pain resolved on presentation found to have evidence of volume overload on exam and CT imaging, suspect right heart failure in the setting of severe pulmonary hypertension. Maintained on Lasix 40 mg p.o. daily as outpatient and adherent to treatment per patient's niece (POA and care-taker). Received Lasix 40 mg IV x 1 in the ED.  Last echo from 07/2023 showed LVEF 55% with severe TR and estimated RVSP 80 mmHg.  PE study with CT A/P on admission with no PE but cardiomegaly, more prominent on the right. Tiny dependent pleural effusion on the right with associated passive atelectasis. Reflux of contrast into the intrahepatic IVC and portal vein suggesting CHF with increased right heart pressures. Heterogeneous enhancement the liver, suspect passive hepatic congestion. Scarring and scattered parenchymal and paraseptal emphysematous changes bilaterally. Status post left lobectomy.    Plan:  Monitor strict I/O and daily weight  Follow-up repeat echo  IV Lasix 40 mg twice daily - monitor clinical response and renal function  F/u Cardiology consult

## 2024-05-13 NOTE — OCCUPATIONAL THERAPY NOTE
Occupational Therapy Evaluation        Patient Name: Sona Valenzuela  Today's Date: 5/13/2024 05/13/24 0848   OT Last Visit   OT Visit Date 05/13/24   Note Type   Note type Evaluation   Pain Assessment   Pain Assessment Tool 0-10   Pain Score No Pain   Restrictions/Precautions   Weight Bearing Precautions Per Order No   Other Precautions Cognitive;Chair Alarm;Bed Alarm;Multiple lines;Telemetry;O2;Fall Risk  (+3L O2 via NC)   Home Living   Type of Home House   Home Layout Two level;Bed/bath upstairs  (No KAY; flight of stairs to 2nd floor)   Bathroom Accessibility Accessible   Home Equipment Walker   Additional Comments Pt ambulates with a walker.   Prior Function   Level of [place] Needs assistance with functional mobility;Needs assistance with ADLs;Needs assistance with IADLS   Lives With Family   Receives Help From Family   IADLs Family/Friend/Other provides transportation;Family/Friend/Other provides meals;Family/Friend/Other provides medication management   Falls in the last 6 months   (unknown)   Vocational Retired   Lifestyle   Autonomy Chart review indicates patient lives with family in a 2 story house, no KAY with full flight to second floor bedroom. Patient ambulates with a walker.   Reciprocal Relationships Supportive Family   General   Family/Caregiver Present No   ADL   Where Assessed Edge of bed   Eating Assistance 4  Minimal Assistance   Grooming Assistance 4  Minimal Assistance   UB Bathing Assistance 3  Moderate Assistance   LB Bathing Assistance 2  Maximal Assistance   UB Dressing Assistance 3  Moderate Assistance   LB Dressing Assistance 2  Maximal Assistance   Toileting Assistance  2  Maximal Assistance   Functional Assistance 3  Moderate Assistance   Bed Mobility   Supine to Sit 4  Minimal assistance   Additional items Assist x 2;HOB elevated;Increased time required;Verbal cues;LE management   Transfers   Sit to Stand 4  Minimal assistance   Additional items Assist x  2;Increased time required;Verbal cues   Stand to Sit 4  Minimal assistance   Additional items Increased time required;Verbal cues;Assist x 2   Functional Mobility   Functional Mobility 4  Minimal assistance   Additional Comments assist of 2 with RW/ +right knee buckling   Additional items Rolling walker   Balance   Static Sitting Fair +   Dynamic Sitting Fair   Static Standing Poor +   Dynamic Standing Poor   Activity Tolerance   Activity Tolerance Patient limited by fatigue   Medical Staff Made Aware Pt seen as a co-eval with PT due to the patient's co-morbidities, clinically unstable presentation, and present impairments which are a regression from the patient's baseline.   RUE Assessment   RUE Assessment X   RUE Strength   RUE Overall Strength Deficits  (3/5)   LUE Assessment   LUE Assessment X   LUE Strength   LUE Overall Strength Deficits  (3/5)   Hand Function   Gross Motor Coordination Impaired   Fine Motor Coordination Impaired   Sensation   Light Touch No apparent deficits  (BUEs)   Vision-Basic Assessment   Current Vision Does not wear glasses   Cognition   Overall Cognitive Status Impaired   Arousal/Participation Alert;Responsive;Cooperative   Attention Attends with cues to redirect   Orientation Level Oriented to person;Oriented to place   Memory Decreased short term memory;Decreased recall of recent events   Following Commands Follows one step commands with increased time or repetition   Assessment   Limitation Decreased ADL status;Decreased UE strength;Decreased Safe judgement during ADL;Decreased endurance;Decreased self-care trans;Decreased high-level ADLs   Prognosis Good   Assessment Patient is a 89 y.o. female seen for OT evaluation s/p admit to [facility] on 5/12/2024 w/Acute on chronic heart failure with preserved ejection fraction (HCC). Commorbidities affecting patient's functional performance at time of assessment include: Acute on Chronic heart failure, severe pulmonary  hypertension, paroxysmal A fib, primary hypertension, compression fracture of lumbar spine, CKD, Dementia. Patient presented to ED with right chest pain. Orders placed for OT evaluation and treatment.  Performed at least two patient identifiers during session including name and wristband.  Prior to admission,  Chart review indicates patient lives with family in a 2 story house, no KAY with full flight to second floor bedroom. Patient ambulates with a walker.  Personal factors affecting patient at time of initial evaluation include: steps to enter, limited insight into deficits, decreased initiation and engagement, difficulty performing ADLs, and difficulty performing IADLs. Upon evaluation, patient requires moderate assist for UB ADLs, maximal assist for LB ADLs,   Occupational performance is affected by the following deficits: decreased functional use of BUEs, decreased muscle strength, degenerative arthritic joint changes, impaired gross motor coordination, impaired fine motor coordination, dynamic sit/ stand balance deficit with poor standing tolerance time for self care and functional mobility, decreased activity tolerance, impaired problem solving, decreased safety awareness, and postural control and postural alignment deficit, requiring external assistance to complete transitional movements.  Patient to benefit from continued Occupational Therapy treatment while in the hospital to address deficits as defined above and maximize level of functional independence with ADLs and functional mobility. Occupational Performance areas to address include: grooming , bathing/ shower, dressing, toilet hygiene, transfer to all surfaces, functional mobility, and Leisure Participation. From OT standpoint, recommendation at time of d/c would be Level 2.   Plan   Treatment Interventions ADL retraining;Functional transfer training;UE strengthening/ROM;Endurance training;Patient/family training;Equipment  evaluation/education;Compensatory technique education;Cardiac education;Continued evaluation;Energy conservation   Goal Expiration Date 05/27/24   Discharge Recommendation   Rehab Resource Intensity Level, OT II (Moderate Resource Intensity)   AM-PAC Daily Activity Inpatient   Lower Body Dressing 2   Bathing 2   Toileting 2   Upper Body Dressing 2   Grooming 3   Eating 3   Daily Activity Raw Score 14   Daily Activity Standardized Score (Calc for Raw Score >=11) 33.39   AM-PAC Applied Cognition Inpatient   Following a Speech/Presentation 2   Understanding Ordinary Conversation 2   Taking Medications 1   Remembering Where Things Are Placed or Put Away 1   Remembering List of 4-5 Errands 1   Taking Care of Complicated Tasks 1   Applied Cognition Raw Score 8   Applied Cognition Standardized Score 19.32   Barthel Index   Feeding 5   Bathing 0   Grooming Score 0   Dressing Score 5   Bladder Score 5   Bowels Score 5   Toilet Use Score 5   Transfers (Bed/Chair) Score 5   Mobility (Level Surface) Score 0   Stairs Score 0   Barthel Index Score 30             1 - Patient will verbalize and demonstrate use of energy conservation/ deep breathing technique and work simplification skills during functional activity with no verbal cues.    2 - Patient will verbalize and demonstrate good body mechanics and joint protection techniques during  ADLs/ IADLs with no verbal cues.    3 - Patient will increase OOB/ sitting tolerance to 2-4 hours per day for increased participation in self care and leisure tasks with no s/s of exertion.    4 - Patient will increase standing tolerance time to 5  minutes with unilateral UE support to complete sink level ADLs@ mod I level.    5 - Patient will increase sitting tolerance at edge of bed to 20 minutes to complete UB ADLs @ set up assist level.    6 - Patient will transfer bed to Chair / toilet at Set up assist level with AD as indicated.     7 - Patient will complete UB ADLs with set up assist.      8 - Patient will complete LB ADLs with min assist with the use of adaptive equipment.     9 - Patient will complete toileting hygiene with set up assist/ supervision for thoroughness    10 - Patient/ Family  will demonstrate competency with UE Home Exercise Program.

## 2024-05-13 NOTE — ASSESSMENT & PLAN NOTE
POA: Age-indeterminate osteoporotic compression fractures in the lumbar spine, as described. Correlation with the patient's symptoms recommended. Patient denies back pain and per niece, ambulates well with walker.    Plan:  PT/OT eval and treat  Appreciated orthopedic

## 2024-05-13 NOTE — ASSESSMENT & PLAN NOTE
POA: Paroxysmal  Afib with VR Rapid 100s. Diagnosed in 2023, however was not followed up outpatient per pt niece.   LUZ?DS?-VASc 7    Plan:  Consulted cardiology and let them evaluate whether to continue with anticoagulation or not.

## 2024-05-14 LAB
ANION GAP SERPL CALCULATED.3IONS-SCNC: 4 MMOL/L (ref 4–13)
BUN SERPL-MCNC: 25 MG/DL (ref 5–25)
CALCIUM SERPL-MCNC: 8.1 MG/DL (ref 8.4–10.2)
CHLORIDE SERPL-SCNC: 105 MMOL/L (ref 96–108)
CO2 SERPL-SCNC: 31 MMOL/L (ref 21–32)
CREAT SERPL-MCNC: 1.37 MG/DL (ref 0.6–1.3)
GFR SERPL CREATININE-BSD FRML MDRD: 31 ML/MIN/1.73SQ M
GLUCOSE SERPL-MCNC: 98 MG/DL (ref 65–140)
MAGNESIUM SERPL-MCNC: 2.2 MG/DL (ref 1.9–2.7)
POTASSIUM SERPL-SCNC: 3.9 MMOL/L (ref 3.5–5.3)
SODIUM SERPL-SCNC: 140 MMOL/L (ref 135–147)

## 2024-05-14 PROCEDURE — 80048 BASIC METABOLIC PNL TOTAL CA: CPT | Performed by: STUDENT IN AN ORGANIZED HEALTH CARE EDUCATION/TRAINING PROGRAM

## 2024-05-14 PROCEDURE — 99232 SBSQ HOSP IP/OBS MODERATE 35: CPT | Performed by: FAMILY MEDICINE

## 2024-05-14 PROCEDURE — 83735 ASSAY OF MAGNESIUM: CPT | Performed by: STUDENT IN AN ORGANIZED HEALTH CARE EDUCATION/TRAINING PROGRAM

## 2024-05-14 PROCEDURE — 99232 SBSQ HOSP IP/OBS MODERATE 35: CPT | Performed by: INTERNAL MEDICINE

## 2024-05-14 RX ORDER — FUROSEMIDE 40 MG/1
40 TABLET ORAL
Status: DISCONTINUED | OUTPATIENT
Start: 2024-05-14 | End: 2024-05-15 | Stop reason: HOSPADM

## 2024-05-14 RX ADMIN — QUETIAPINE FUMARATE 25 MG: 25 TABLET ORAL at 21:12

## 2024-05-14 RX ADMIN — ACETAMINOPHEN 975 MG: 325 TABLET, FILM COATED ORAL at 05:34

## 2024-05-14 RX ADMIN — Medication 3 MG: at 21:12

## 2024-05-14 RX ADMIN — ACETAMINOPHEN 975 MG: 325 TABLET, FILM COATED ORAL at 14:05

## 2024-05-14 RX ADMIN — HEPARIN SODIUM 5000 UNITS: 5000 INJECTION INTRAVENOUS; SUBCUTANEOUS at 05:34

## 2024-05-14 RX ADMIN — GABAPENTIN 100 MG: 100 CAPSULE ORAL at 21:12

## 2024-05-14 RX ADMIN — GABAPENTIN 100 MG: 100 CAPSULE ORAL at 08:07

## 2024-05-14 RX ADMIN — MIRTAZAPINE 7.5 MG: 15 TABLET, FILM COATED ORAL at 21:12

## 2024-05-14 RX ADMIN — FUROSEMIDE 40 MG: 10 INJECTION, SOLUTION INTRAMUSCULAR; INTRAVENOUS at 08:33

## 2024-05-14 RX ADMIN — FUROSEMIDE 40 MG: 40 TABLET ORAL at 16:24

## 2024-05-14 RX ADMIN — GABAPENTIN 100 MG: 100 CAPSULE ORAL at 16:24

## 2024-05-14 RX ADMIN — ACETAMINOPHEN 975 MG: 325 TABLET, FILM COATED ORAL at 21:11

## 2024-05-14 RX ADMIN — HEPARIN SODIUM 5000 UNITS: 5000 INJECTION INTRAVENOUS; SUBCUTANEOUS at 14:05

## 2024-05-14 RX ADMIN — HEPARIN SODIUM 5000 UNITS: 5000 INJECTION INTRAVENOUS; SUBCUTANEOUS at 21:11

## 2024-05-14 NOTE — UTILIZATION REVIEW
Initial Clinical Review - Continued    SEE INITIAL REVIEW AT BOTTOM    Date: 05/14/24                          Current Patient Class: IP  Current Level of Care: Med/Surg    HPI:89 y.o. female initially admitted on 5/12 for CHF w/ severe pulmonary hypertension.     Cardiology:  Pt w/ CHF. Decrease Lasix to IV 40 mg BID, DW, I&O, sodium restriction.     Assessment/Plan: Pt overall much improved. On exam, irregular rhythm. Plan: DW, I&O, transition to PO lasix today, continue current meds, Trend labs, replete electrolytes as needed; fall/aspiration/delirium precautions. Trend labs, replete electrolytes as needed.    Vital Signs:   Date/Time Temp Pulse Resp BP MAP (mmHg) SpO2 Calculated FIO2 (%) - Nasal Cannula Nasal Cannula O2 Flow Rate (L/min) O2 Device   05/14/24 08:01:10 -- 84 18 -- -- 91 % -- -- --   05/14/24 07:29:57 97.6 °F (36.4 °C) -- -- 145/85 105 -- -- -- --   05/14/24 00:01:38 97.8 °F (36.6 °C) 104 -- 124/59 81 92 % -- -- --   05/13/24 2143 -- -- -- -- -- 97 % 28 2 L/min Nasal cannula   05/13/24 15:22:12 97.6 °F (36.4 °C) 92 -- 124/61 82 92 % -- -- --   05/13/24 15:21:38 97.6 °F (36.4 °C) 85 -- 124/61 82 91 % -- -- --   05/13/24 1430 -- 82 -- 131/69 -- -- -- -- --   05/13/24 1108 98.4 °F (36.9 °C) 82 17 131/69 90 98 % -- -- --       Pertinent Labs/Diagnostic Results:   Results from last 7 days   Lab Units 05/13/24  0329 05/12/24  0250   WBC Thousand/uL 6.27 7.30   HEMOGLOBIN g/dL 10.6* 11.3*   HEMATOCRIT % 34.7* 36.4   PLATELETS Thousands/uL 200 211   TOTAL NEUT ABS Thousands/µL  --  4.79         Results from last 7 days   Lab Units 05/14/24  0632 05/13/24  0329 05/12/24  0250   SODIUM mmol/L 140 139 142   POTASSIUM mmol/L 3.9 4.2 4.3   CHLORIDE mmol/L 105 105 105   CO2 mmol/L 31 28 29   ANION GAP mmol/L 4 6 8   BUN mg/dL 25 27* 25   CREATININE mg/dL 1.37* 1.34* 1.39*   EGFR ml/min/1.73sq m 31 32 31   CALCIUM mg/dL 8.1* 8.1* 8.6   MAGNESIUM mg/dL 2.2 2.1 2.3   PHOSPHORUS mg/dL  --  4.4* 3.4     Results from  last 7 days   Lab Units 05/12/24  0250   AST U/L 39   ALT U/L 23   ALK PHOS U/L 105*   TOTAL PROTEIN g/dL 6.9   ALBUMIN g/dL 3.6   TOTAL BILIRUBIN mg/dL 0.71   BILIRUBIN DIRECT mg/dL 0.18         Results from last 7 days   Lab Units 05/14/24  0632 05/13/24  0329 05/12/24  0250   GLUCOSE RANDOM mg/dL 98 115 113      Results from last 7 days   Lab Units 05/12/24  0642 05/12/24  0440 05/12/24  0250   HS TNI 0HR ng/L  --   --  24   HS TNI 2HR ng/L  --  23  --    HSTNI D2 ng/L  --  -1  --    HS TNI 4HR ng/L 22  --   --    HSTNI D4 ng/L -2  --   --      Results from last 7 days   Lab Units 05/12/24  0250   D-DIMER QUANTITATIVE ug/ml FEU 1.45*     Results from last 7 days   Lab Units 05/13/24  1046 05/13/24  0321 05/12/24  1922 05/12/24  0943 05/12/24  0250   PROTIME seconds  --   --   --   --  14.1   INR   --   --   --   --  1.03   PTT seconds >210* 95* >210*   < > 27    < > = values in this interval not displayed.      Results from last 7 days   Lab Units 05/12/24  0250   BNP pg/mL 934*      Results from last 7 days   Lab Units 05/12/24  0250   LIPASE u/L 18      Results from last 7 days   Lab Units 05/12/24  0937   CLARITY UA  Clear   COLOR UA  Colorless   SPEC GRAV UA  1.016   PH UA  7.0   GLUCOSE UA mg/dl Negative   KETONES UA mg/dl Negative   BLOOD UA  Negative   PROTEIN UA mg/dl Negative   NITRITE UA  Negative   BILIRUBIN UA  Negative   UROBILINOGEN UA (BE) mg/dl <2.0   LEUKOCYTES UA  Negative       Medications:   Scheduled Medications:  acetaminophen, 975 mg, Oral, Q8H HESHAM  furosemide, 40 mg, Oral, BID (diuretic)  gabapentin, 100 mg, Oral, TID  heparin (porcine), 5,000 Units, Subcutaneous, Q8H HESHAM  melatonin, 3 mg, Oral, HS  mirtazapine, 7.5 mg, Oral, HS  QUEtiapine, 25 mg, Oral, HS    Continuous IV Infusions: none    PRN Meds:  diltiazem, 15 mg, Intravenous, Once PRN  hydrALAZINE, 5 mg, Intravenous, Q4H PRN  trimethobenzamide, 200 mg, Intramuscular, Q6H PRN    furosemide (LASIX) injection 40 mg  Dose: 40 mg  Freq:  2 times daily Route: IV  Start: 05/13/24 1430 End: 05/14/24 1151       Network Utilization Review Department  ATTENTION: Please call with any questions or concerns to [phone] and carefully listen to the prompts so that you are directed to the right person. All voicemails are confidential.   For Discharge needs, contact Care Management DC Support Team at [phone] opt. 2  Send all requests for admission clinical reviews, approved or denied determinations and any other requests to dedicated fax number below belonging to the campus where the patient is receiving treatment. List of dedicated fax numbers for the Facilities:  FACILITY NAME UR FAX NUMBER   ADMISSION DENIALS (Administrative/Medical Necessity) [phone]   DISCHARGE SUPPORT TEAM (NETWORK) [phone]   PARENT CHILD HEALTH (Maternity/NICU/Pediatrics) [phone]   [facility] [phone]   [facility] [phone]   [facility] [phone]   [facility] [phone]   [facility] [phone]   [facility] [phone]   [facility] [phone]   [facility] [phone]   [facility] [phone]   [facility] [phone]   [facility] [phone]   [facility] [phone]

## 2024-05-14 NOTE — ASSESSMENT & PLAN NOTE
POA: Age-indeterminate osteoporotic compression fractures in the lumbar spine, as described. Correlation with the patient's symptoms recommended. Patient denies back pain and per niece, ambulates well with walker.    Plan:  PT/OT eval : snf  recommendation  Appreciated orthopedic consult : PT/OT, no acute intervention

## 2024-05-14 NOTE — ASSESSMENT & PLAN NOTE
POA: Paroxysmal  Afib   LUZ?DS?-VASc 7  Patient is not a candidate for anticoagulation for A-fib given advanced age, fall risk, dementia.

## 2024-05-14 NOTE — CASE MANAGEMENT
Case Management Progress Note    Patient name Sona Valenzuela  Location /-01 MRN [id number]  : 11/3/1924 Date 2024       LOS (days): 2  Geometric Mean LOS (GMLOS) (days): 3.9  Days to GMLOS:1.5        OBJECTIVE:        Current admission status: Inpatient  Preferred Pharmacy:   [facility]/pharmacy #[id number] - [address]  [place] [id number]  Phone: [phone] Fax: [phone]    Primary Care Provider: FARZANA Stanley    Primary Insurance: [employer] RE[employer]  Secondary Insurance: [place] [employer] AND [facility]    PROGRESS NOTE:  Sushilaece called back and confirmed she wants Jeremy. Reserved in Aidin and auth request submitted.

## 2024-05-14 NOTE — PROGRESS NOTES
"Cardiology Progress Note - Sona Valenzuela 89 y.o. female MRN: [id number]    Unit/Bed#: -01 Encounter: [id number]      Assessment/Plan:  Acute on chronic HFpEF/RV failure  Pretty close to euvolemic  Transition to Lasix 40 oral twice daily  Daily weights, salt restriction, strict I's and O's  Net -3.8 L  Echo done EF 60%, RV systolic function mildly reduced biatrial dilation, severe TR, severe pulmonary hypertension with PA pressure 77    2.  Chronic atrial fibrillation  Rate Controlled without AV rolanda blockers  No AC given fall risk, advanced age, dementia      3.  Hypertension  Stable  Continue Lasix    4.  Severe TR  Poor candidate for valvular intervention given advanced age and multiple comorbidities    5.  Severe pulmonary hypertension  6.  History of lung cancer status post left lobectomy  7.  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  Dementia  9.  Anemia  10.  IFG      Subjective:   Patient seen and examined.  No significant events overnight. Im feeling good. Denies cp    Objective:     Vitals: Blood pressure 145/85, pulse 84, temperature 97.6 °F (36.4 °C), resp. rate 18, height 4' 11\" (1.499 m), weight 65 kg (143 lb 4.8 oz), SpO2 91%, not currently breastfeeding., Body mass index is 28.94 kg/m².,   Orthostatic Blood Pressures      Flowsheet Row Most Recent Value   Blood Pressure 145/85 filed at 05/14/2024 0729   Patient Position - Orthostatic VS Lying filed at 05/13/2024 1108              Intake/Output Summary (Last 24 hours) at 5/14/2024 1522  Last data filed at 5/14/2024 1153  Gross per 24 hour   Intake --   Output 1800 ml   Net -1800 ml         Physical Exam:  GEN: Alert and oriented x 3, in no acute distress.  Well appearing and well nourished.   HEENT: Sclera anicteric, conjunctivae pink, mucous membranes moist. Oropharynx clear.   NECK: Supple, no carotid bruits, no significant JVD. Trachea midline, no thyromegaly.   HEART:Irregularly irregular normal " S1 and S2, no murmurs, clicks, gallops or rubs. PMI nondisplaced, no thrills.   LUNGS: decreased breath sounds bilaterally; no wheezes, rales, or rhonchi. No increased work of breathing or signs of respiratory distress.   ABDOMEN: Soft, nontender, nondistended, normoactive bowel sounds.   EXTREMITIES: + trace edema, Skin warm and well perfused, no clubbing, cyanosis  NEURO: No focal findings. Normal speech. Mood and affect normal.   SKIN: Normal without suspicious lesions on exposed skin.      Medications:      Current Facility-Administered Medications:     acetaminophen (TYLENOL) tablet 975 mg, 975 mg, Oral, Q8H [facility], Lesa Cast MD, 975 mg at 05/14/24 1405    diltiazem (CARDIZEM) injection 15 mg, 15 mg, Intravenous, Once PRN, Lesa Cast MD    furosemide (LASIX) tablet 40 mg, 40 mg, Oral, BID (diuretic), Jes Vasquez PA-C    gabapentin (NEURONTIN) capsule 100 mg, 100 mg, Oral, TID, Lesa Cast MD, 100 mg at 05/14/24 0807    heparin (porcine) subcutaneous injection 5,000 Units, 5,000 Units, Subcutaneous, Q8H [facility], Neeraj Martines PA-C, 5,000 Units at 05/14/24 1405    hydrALAZINE (APRESOLINE) injection 5 mg, 5 mg, Intravenous, Q4H PRN, Lesa Cast MD, 5 mg at 05/12/24 1031    melatonin tablet 3 mg, 3 mg, Oral, HS, Lesa Cast MD, 3 mg at 05/13/24 2142    mirtazapine (REMERON) tablet 7.5 mg, 7.5 mg, Oral, HS, Lesa Cast MD, 7.5 mg at 05/13/24 2142    QUEtiapine (SEROquel) tablet 25 mg, 25 mg, Oral, HS, Lesa Cast MD, 25 mg at 05/13/24 2140    trimethobenzamide (TIGAN) IM injection 200 mg, 200 mg, Intramuscular, Q6H PRN, Lesa Cast MD     Labs & Results:    Results from last 7 days   Lab Units 05/12/24  0642 05/12/24  0440 05/12/24  0250   HS TNI 0HR ng/L  --   --  24   HS TNI 2HR ng/L  --  23  --    HS TNI 4HR ng/L 22  --   --    HSTNI D4 ng/L -2  --   --      Results from last 7 days   Lab Units 05/13/24  0329 05/12/24  0250   WBC Thousand/uL 6.27 7.30   HEMOGLOBIN g/dL 10.6* 11.3*   HEMATOCRIT % 34.7* 36.4    PLATELETS Thousands/uL 200 211         Results from last 7 days   Lab Units 05/14/24  0632 05/13/24  0329 05/12/24  0250   POTASSIUM mmol/L 3.9 4.2 4.3   CHLORIDE mmol/L 105 105 105   CO2 mmol/L 31 28 29   BUN mg/dL 25 27* 25   CREATININE mg/dL 1.37* 1.34* 1.39*   CALCIUM mg/dL 8.1* 8.1* 8.6   ALK PHOS U/L  --   --  105*   ALT U/L  --   --  23   AST U/L  --   --  39     Results from last 7 days   Lab Units 05/13/24  1046 05/13/24  0321 05/12/24  1922 05/12/24  0943 05/12/24  0250   INR   --   --   --   --  1.03   PTT seconds >210* 95* >210*   < > 27    < > = values in this interval not displayed.     Results from last 7 days   Lab Units 05/14/24  0632 05/13/24  0329 05/12/24  0250   MAGNESIUM mg/dL 2.2 2.1 2.3

## 2024-05-14 NOTE — UTILIZATION REVIEW
NOTIFICATION OF INPATIENT ADMISSION   AUTHORIZATION REQUEST   SERVICING FACILITY:   [place] [address]  Tax ID: [id number]  NPI: [id number] ATTENDING PROVIDER:  Attending Name and NPI#: Michael Phan Md [[id number]]  Address: [address] [place] [address]  Phone: [phone]     ADMISSION INFORMATION:  Place of Service: [facility]  Place of Service Code: 21  Inpatient Admission Date/Time: 5/12/24  7:29 AM  Discharge Date/Time: No discharge date for patient encounter.  Admitting Diagnosis Code/Description:  Chest pain [R07.9]  Acute exacerbation of CHF (congestive heart failure) ([facility]) [I50.9]  Atrial fibrillation with RVR (HCC) [I48.91]     UTILIZATION REVIEW CONTACT:  Mallory Humphreys, Utilization   Network Utilization Review Department  Phone: [phone]  Fax [phone]  Email: [email or web address]  Contact for approvals/pending authorizations, clinical reviews, and discharge.     PHYSICIAN ADVISORY SERVICES:  Medical Necessity Denial & Cxyg-qb-Uedp Review  Phone: [phone]  Fax: [phone]  Email: [email or web address]     DISCHARGE SUPPORT TEAM:  For Patients Discharge Needs & Updates  Phone: [phone] opt. 2 Fax: [phone]  Email: [email or web address]

## 2024-05-14 NOTE — ASSESSMENT & PLAN NOTE
Lab Results   Component Value Date    EGFR 31 05/14/2024    EGFR 32 05/13/2024    EGFR 31 05/12/2024    CREATININE 1.37 (H) 05/14/2024    CREATININE 1.34 (H) 05/13/2024    CREATININE 1.39 (H) 05/12/2024     POA: Cr at baseline around 1.4    Plan:  Monitor UOP and renal indices - received IV contrast for CT  Renally dose medications as appropriate  Outpatient nephrology follow-up

## 2024-05-14 NOTE — CASE MANAGEMENT
Case Management Progress Note    Patient name Sona Valenzuela  Location /-01 MRN [id number]  : 11/3/1924 Date 2024       LOS (days): 2  Geometric Mean LOS (GMLOS) (days): 3.9  Days to GMLOS:1.6        OBJECTIVE:        Current admission status: Inpatient  Preferred Pharmacy:   [facility]/pharmacy #[id number] - [address]  [address]  [place] [address]  Phone: [phone] Fax: [phone]    Primary Care Provider: FARZANA Stanley    Primary Insurance: [email or web address]  Secondary Insurance: [place] [employer] AND [employer] [facility]    PROGRESS NOTE:  Left message for niece to call back regarding availability of Sargeant and Leslie.

## 2024-05-14 NOTE — ASSESSMENT & PLAN NOTE
POA: Markedly elevated BP with known history of hypertension but not on pharmacologic therapy as outpatient.    Plan:  Hydralazine 5 mg IV as needed for SBP > 170  May need to start oral therapy if persistently elevated BP  Better controlled now

## 2024-05-14 NOTE — ASSESSMENT & PLAN NOTE
POA: Pleasantly confused at baseline.    Plan:  Continue mirtazapine and Seroquel, monitor QTc prolonged on admission  Delirium precautions  Fall precautions  Aspiration precautions

## 2024-05-14 NOTE — PLAN OF CARE
Problem: PAIN - ADULT  Goal: Verbalizes/displays adequate comfort level or baseline comfort level  Description: Interventions:  - Encourage patient to monitor pain and request assistance  - Assess pain using appropriate pain scale  - Administer analgesics based on type and severity of pain and evaluate response  - Implement non-pharmacological measures as appropriate and evaluate response  - Consider cultural and social influences on pain and pain management  - Notify physician/advanced practitioner if interventions unsuccessful or patient reports new pain  Outcome: Progressing     Problem: INFECTION - ADULT  Goal: Absence or prevention of progression during hospitalization  Description: INTERVENTIONS:  - Assess and monitor for signs and symptoms of infection  - Monitor lab/diagnostic results  - Monitor all insertion sites, i.e. indwelling lines, tubes, and drains  - Monitor endotracheal if appropriate and nasal secretions for changes in amount and color  - [place] appropriate cooling/warming therapies per order  - Administer medications as ordered  - Instruct and encourage patient and family to use good hand hygiene technique  - Identify and instruct in appropriate isolation precautions for identified infection/condition  Outcome: Progressing

## 2024-05-14 NOTE — ASSESSMENT & PLAN NOTE
Wt Readings from Last 3 Encounters:   05/14/24 65 kg (143 lb 4.8 oz)   04/11/24 61.9 kg (136 lb 6.4 oz)   04/04/24 63.1 kg (139 lb 3.2 oz)       Intake/Output Summary (Last 24 hours) at 5/14/2024 1418  Last data filed at 5/14/2024 1153  Gross per 24 hour   Intake --   Output 1800 ml   Net -1800 ml       C/c : presented with right sided chest pain now resolved   found to have evidence of volume overload on exam and CT imaging, suspect right heart failure in the setting of severe pulmonary hypertension. Maintained on Lasix 40 mg p.o. daily as outpatient and adherent to treatment per patient's niece (POA and care-taker). Received Lasix 40 mg IV x 1 in the ED.    echo showed LVEF 60% with severe TR and estimated RVSP 77 mmHg.  PE study with CT A/P : cardiomegaly. Tiny dependent pleural effusion on the right with associated atelectasis. Reflux of contrast into the intrahepatic IVC and portal vein suggesting CHF with increased right heart pressures. Heterogeneous enhancement the liver, suspect passive hepatic congestion. Status post left lobectomy.    Plan:  Monitor strict I/O and daily weight  S/p IV Lasix 40 mg twice daily - > transitioned to po lasix  Cardiology on board

## 2024-05-14 NOTE — CASE MANAGEMENT
Case Management Assessment & Discharge Planning Note    Patient name Sona Valenzuela  Location /-01 MRN [id number]  : 11/3/1924 Date 2024       Current Admission Date: 2024  Current Admission Diagnosis:Acute on chronic HFpEF with severe pulmonary hypertension (HCC)   Patient Active Problem List    Diagnosis Date Noted    Acute on chronic HFpEF with severe pulmonary hypertension ([facility]) 2024    Primary hypertension 2024    Paroxysmal A-fib ([facility]) 2024    CKD (chronic kidney disease) stage 4, GFR 15-29 ml/min ([facility]) 2024    Compression fracture of lumbar spine, non-traumatic ([facility]) 2024    Mild protein-calorie malnutrition ([facility]) 2024    Vascular dementia, uncomplicated ([facility]) 2023    Acute CHF (congestive heart failure) ([facility]) 2023    Dementia ([facility]) 2023    Allergic bronchitis 2023    Weakness of left leg 2023    Does mobilize using walker 2023    Chronic congestive heart failure (HCC) 2022    Chronic atrial fibrillation ([facility]) 10/11/2022    Stage 3a chronic kidney disease ([facility]) 2022    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[facility]) 2022    Ambulatory dysfunction 2021    Anxiety 2021    Vitamin D deficiency 2020    Primary insomnia 2019    Degeneration of lumbar intervertebral disc 2019    Lumbar radiculopathy 2019    Arthropathy of lumbar facet joint 2019    Visual hallucinations 2019    Restless legs syndrome 2019      LOS (days): 2  Geometric Mean LOS (GMLOS) (days): 3.9  Days to GMLOS:1.6     OBJECTIVE:    Risk of Unplanned Readmission Score: 20.47         Current admission status: Inpatient       Preferred Pharmacy:   [facility]/pharmacy #[id number] - [place] [address]  [place] [id number]  Phone: [phone] Fax: [phone]    Primary Care Provider: FARZANA Stanley    Primary Insurance:  [employer]UR  REP  Secondary Insurance: [place] [employer] [facility]    ASSESSMENT:  Active Health Care Proxies    There are no active Health Care Proxies on file.                 Readmission Root Cause  30 Day Readmission: No    Patient Information  Admitted from:: Home  Mental Status: Alert  During Assessment patient was accompanied by: Not accompanied during assessment  Assessment information provided by:: Other - please comment (Xi, nikorina/ POA)  Primary Caregiver: Self  Support Systems: Family members  County of Residence: [place]  What city do you live in?: [place]  Home entry access options. Select all that apply.: No steps to enter home  Do the steps have railings?: Yes  Type of Current Residence: 2 story home  Upon entering residence, is there a bedroom on the main floor (no further steps)?: No  A bedroom is located on the following floor levels of residence (select all that apply):: 2nd Floor  Upon entering residence, is there a bathroom on the main floor (no further steps)?: No  Indicate which floors of current residence have a bathroom (select all the apply):: 2nd Floor  Number of steps to 2nd floor from main floor: One Flight  Living Arrangements: Lives w/ Extended Family (Lives with Xi, her fierro)  Is patient a ?: No    Activities of Daily Living Prior to Admission  Functional Status: Assistance  Completes ADLs independently?: No  Level of ADL dependence: Assistance  Ambulates independently?: No  Level of ambulatory dependence: Assistance  Does patient use assisted devices?: Yes  Assisted Devices (DME) used: Walker  Does patient currently own DME?: Yes  What DME does the patient currently own?: Walker  Does patient have a history of Outpatient Therapy (PT/OT)?: No  Does the patient have a history of Short-Term Rehab?: No  Does patient have a history of HHC?: Yes  Does patient currently have HHC?: No         Patient Information Continued  Income Source:  Pension/senior living  Does patient have prescription coverage?: Yes  Does patient receive dialysis treatments?: No  Does patient have a history of substance abuse?: No  Does patient have a history of Mental Health Diagnosis?: No    PHQ 2/9 Screening   Reviewed PHQ 2/9 Depression Screening Score?: No    Means of Transportation  Means of Transport to Appts:: Family transport      Social Determinants of Health (SDOH)      Flowsheet Row Most Recent Value   Housing Stability    In the last 12 months, was there a time when you were not able to pay the mortgage or rent on time? N   In the last 12 months, how many places have you lived? 1   In the last 12 months, was there a time when you did not have a steady place to sleep or slept in a shelter (including now)? N   Transportation Needs    In the past 12 months, has lack of transportation kept you from medical appointments or from getting medications? no   In the past 12 months, has lack of transportation kept you from meetings, work, or from getting things needed for daily living? No   Food Insecurity    Within the past 12 months, you worried that your food would run out before you got the money to buy more. Never true   Within the past 12 months, the food you bought just didn't last and you didn't have money to get more. Never true   Utilities    In the past 12 months has the electric, gas, oil, or water company threatened to shut off services in your home? No            DISCHARGE DETAILS:    Discharge planning discussed with:: Patient at bedside, Xi over phone  Freedom of Choice: Yes  Comments - Freedom of Choice: FOC maintained in discussion regarding discharge planning. Patient is alert oriented and competent to make decisions. Called elisa Layne after speaking with patient. Introduced self and role, explained role of CM in arranging services such as DME, STR, HHC, and providing community resource information. Discussed current living situation and needs at  discharge. Patient is in need of assistance at home for ambulation and ADLs- niece and family take care of her and are always with her so she is not alone, take her to appointments and on errands. CM offered HHC, STR, DME, PCP, OP CM, community resources. Patient & niece agreeable to STR, prefer [facility] if possible as she has been there in the past. [facility] would be second choice for proximity to home. Does not need additional DME. Pt and niece are aware and encouraged to seek CM for any questions or concerns. CM continues to follow.  CM contacted family/caregiver?: Yes  Were Treatment Team discharge recommendations reviewed with patient/caregiver?: Yes  Did patient/caregiver verbalize understanding of patient care needs?: N/A- going to facility  Were patient/caregiver advised of the risks associated with not following Treatment Team discharge recommendations?: Yes    Contacts  Patient Contacts: Xi  Relationship to Patient:: Family  Contact Method: Phone  Phone Number: [phone]  Reason/Outcome: Continuity of Care, Discharge Planning    Requested Home Health Care         Is the patient interested in HHC at discharge?: No    DME Referral Provided  Referral made for DME?: No    Other Referral/Resources/Interventions Provided:  Interventions: Short Term Rehab  Referral Comments: Preference is [facility]/ [facility]. PLS FU REFERRAL. CM will continue to follow for needs.    Would you like to participate in our [facility] service program?  : No - Declined    Treatment Team Recommendation: Short Term Rehab  Discharge Destination Plan:: Short Term Rehab  Transport at Discharge : Wheelchair van, Stretcher van                             IMM Given (Date):: 05/14/24  IMM Given to:: Patient  Family notified:: elisa Layne  Additional Comments: IMM and Medicare rights reviewed with patient at the bedside. Patient verbalized understanding and signed original. Copy given to patient, signed original filed to  medical records.

## 2024-05-14 NOTE — PROGRESS NOTES
[facility]  Progress Note  Name: Sona Valenzuela I  MRN: [id number]  Unit/Bed#: -01 I Date of Admission: 5/12/2024   Date of Service: 5/14/2024 I Hospital Day: 2    Assessment/Plan   * Acute on chronic HFpEF with severe pulmonary hypertension (HCC)  Assessment & Plan  Wt Readings from Last 3 Encounters:   05/14/24 65 kg (143 lb 4.8 oz)   04/11/24 61.9 kg (136 lb 6.4 oz)   04/04/24 63.1 kg (139 lb 3.2 oz)       Intake/Output Summary (Last 24 hours) at 5/14/2024 1418  Last data filed at 5/14/2024 1153  Gross per 24 hour   Intake --   Output 1800 ml   Net -1800 ml       C/c : presented with right sided chest pain now resolved   found to have evidence of volume overload on exam and CT imaging, suspect right heart failure in the setting of severe pulmonary hypertension. Maintained on Lasix 40 mg p.o. daily as outpatient and adherent to treatment per patient's niece (POA and care-taker). Received Lasix 40 mg IV x 1 in the ED.    echo showed LVEF 60% with severe TR and estimated RVSP 77 mmHg.  PE study with CT A/P : cardiomegaly. Tiny dependent pleural effusion on the right with associated atelectasis. Reflux of contrast into the intrahepatic IVC and portal vein suggesting CHF with increased right heart pressures. Heterogeneous enhancement the liver, suspect passive hepatic congestion. Status post left lobectomy.    Plan:  Monitor strict I/O and daily weight  S/p IV Lasix 40 mg twice daily - > transitioned to po lasix  Cardiology on board    Paroxysmal A-fib ([facility])  Assessment & Plan  POA: Paroxysmal  Afib   LUZ?DS?-VASc 7  Patient is not a candidate for anticoagulation for A-fib given advanced age, fall risk, dementia.     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 Correlation with the patient's symptoms recommended. Patient denies back pain and per niece, ambulates well with walker.    Plan:  PT/OT eval : snf  recommendation  Appreciated orthopedic consult : PT/OT, no acute intervention    Primary hypertension  Assessment & Plan  POA: Markedly elevated BP with known history of hypertension but not on pharmacologic therapy as outpatient.    Plan:  Hydralazine 5 mg IV as needed for SBP > 170  May need to start oral therapy if persistently elevated BP  Better controlled now    Dementia (HCC)  Assessment & Plan  POA: Pleasantly confused at baseline.    Plan:  Continue mirtazapine and Seroquel, monitor QTc prolonged on admission  Delirium precautions  Fall precautions  Aspiration precautions           VTE Pharmacologic Prophylaxis: VTE Score: 3 heparin    Mobility:   Basic Mobility Inpatient Raw Score: 10  -HLM Goal: 4: Move to chair/commode  JH-HLM Achieved: 1: Laying in bed  JH-HLM Goal NOT achieved. Continue with multidisciplinary rounding and encourage appropriate mobility to improve upon JH-HLM goals.    Patient Centered Rounds: I performed bedside rounds with nursing staff today.   Discussions with Specialists or Other Care Team Provider: cardiology    Education and Discussions with Family / Patient: patient    Total Time Spent on Date of Encounter in care of patient: 35 mins.     Current Length of Stay: 2 day(s)  Current Patient Status: Inpatient   Certification Statement: The patient will continue to require additional inpatient hospital stay due to needs monitoring on lasix  Discharge Planlikely am    Code  Status: Level 3 - DNAR and DNI    Subjective:   Overall much improved  Denies any shortness of breath/chest pain/lower extremity edema.  Patient afebrile.  No new concerns.    Objective:     Vitals:   Temp (24hrs), Av.7 °F (36.5 °C), Min:97.6 °F (36.4 °C), Max:97.8 °F (36.6 °C)    Temp:  [97.6 °F (36.4 °C)-97.8 °F (36.6 °C)] 97.6 °F (36.4 °C)  HR:  [] 84  Resp:  [18] 18  BP: (124-145)/(59-85) 145/85  SpO2:  [91 %-97 %] 91 %  Body mass index is 28.94 kg/m².     Input and Output Summary (last 24 hours):     Intake/Output Summary (Last 24 hours) at 2024 1424  Last data filed at 2024 1153  Gross per 24 hour   Intake --   Output 1800 ml   Net -1800 ml       Physical Exam:   Physical Exam  Constitutional:       General: She is not in acute distress.     Appearance: She is normal weight. She is not toxic-appearing.   HENT:      Mouth/Throat:      Mouth: Mucous membranes are moist.      Pharynx: Oropharynx is clear.   Cardiovascular:      Rate and Rhythm: Normal rate. Rhythm irregular.      Pulses: Normal pulses.   Pulmonary:      Effort: Pulmonary effort is normal. No respiratory distress.      Breath sounds: Normal breath sounds. No wheezing.   Abdominal:      General: Abdomen is flat. Bowel sounds are normal.      Palpations: Abdomen is soft.   Musculoskeletal:      Right lower leg: No edema.      Left lower leg: No edema.   Neurological:      Mental Status: She is alert and oriented to person, place, and time.          Additional Data:     Labs:  Results from last 7 days   Lab Units 24  0329 24  0250   WBC Thousand/uL 6.27 7.30   HEMOGLOBIN g/dL 10.6* 11.3*   HEMATOCRIT % 34.7* 36.4   PLATELETS Thousands/uL 200 211   SEGS PCT %  --  66   LYMPHO PCT %  --  17   MONO PCT %  --  14*   EOS PCT %  --  3     Results from last 7 days   Lab Units 24  0632 24  0329 24  0250   SODIUM mmol/L 140   < > 142   POTASSIUM mmol/L 3.9   < > 4.3   CHLORIDE mmol/L 105   < > 105   CO2 mmol/L  31   < > 29   BUN mg/dL 25   < > 25   CREATININE mg/dL 1.37*   < > 1.39*   ANION GAP mmol/L 4   < > 8   CALCIUM mg/dL 8.1*   < > 8.6   ALBUMIN g/dL  --   --  3.6   TOTAL BILIRUBIN mg/dL  --   --  0.71   ALK PHOS U/L  --   --  105*   ALT U/L  --   --  23   AST U/L  --   --  39   GLUCOSE RANDOM mg/dL 98   < > 113    < > = values in this interval not displayed.     Results from last 7 days   Lab Units 05/12/24  0250   INR  1.03                   Lines/Drains:  Invasive Devices       Peripheral Intravenous Line  Duration             Peripheral IV 05/14/24 Distal;Right;Ventral (anterior) Forearm <1 day              Drain  Duration             External Urinary Catheter 2 days                        Recent Cultures (last 7 days):         Last 24 Hours Medication List:   Current Facility-Administered Medications   Medication Dose Route Frequency Provider Last Rate    acetaminophen  975 mg Oral Q8H [facility] Lesa Cast MD      diltiazem  15 mg Intravenous Once PRN Lesa Cast MD      furosemide  40 mg Oral BID (diuretic) Jes Vasquez PA-C      gabapentin  100 mg Oral TID Lesa Cast MD      heparin (porcine)  5,000 Units Subcutaneous Q8H [facility] Neeraj Martines PA-C      hydrALAZINE  5 mg Intravenous Q4H PRN Lesa Cast MD      melatonin  3 mg Oral HS Lesa Cast MD      mirtazapine  7.5 mg Oral HS Lesa Cast MD      QUEtiapine  25 mg Oral HS Lesa Cast MD      trimethobenzamide  200 mg Intramuscular Q6H PRN Lesa Cast MD          Today, Patient Was Seen By: Michael Phan MD    **Please Note: This note may have been constructed using a voice recognition system.**

## 2024-05-14 NOTE — CASE MANAGEMENT
[place] [facility] received request for authorization from Care Manager.  Authorization request submitted for: [facility]  Facility Name:[facility]   NPI:[id number]  Facility MD:  Dr. Kathleen Acrhuleta    NPI: [id number]  Authorization initiated by contacting insurance:  [employer]  Via: [employer] Portal   Clinicals submitted via [employer] attachment   Pending Reference #:[id number]     Care Manager notified: Brittany Farnsworth      Updates to authorization status will be noted in chart. Please reach out to CM for updates on any clinical information.

## 2024-05-15 ENCOUNTER — TELEPHONE (OUTPATIENT)
Age: 89
End: 2024-05-15

## 2024-05-15 VITALS
TEMPERATURE: 98.5 F | HEART RATE: 99 BPM | DIASTOLIC BLOOD PRESSURE: 83 MMHG | BODY MASS INDEX: 28.18 KG/M2 | OXYGEN SATURATION: 95 % | RESPIRATION RATE: 18 BRPM | SYSTOLIC BLOOD PRESSURE: 152 MMHG | HEIGHT: 59 IN | WEIGHT: 139.77 LBS

## 2024-05-15 LAB
ANION GAP SERPL CALCULATED.3IONS-SCNC: 6 MMOL/L (ref 4–13)
BUN SERPL-MCNC: 25 MG/DL (ref 5–25)
CALCIUM SERPL-MCNC: 8.2 MG/DL (ref 8.4–10.2)
CHLORIDE SERPL-SCNC: 105 MMOL/L (ref 96–108)
CO2 SERPL-SCNC: 30 MMOL/L (ref 21–32)
CREAT SERPL-MCNC: 1.26 MG/DL (ref 0.6–1.3)
GFR SERPL CREATININE-BSD FRML MDRD: 35 ML/MIN/1.73SQ M
GLUCOSE SERPL-MCNC: 103 MG/DL (ref 65–140)
MAGNESIUM SERPL-MCNC: 2.2 MG/DL (ref 1.9–2.7)
POTASSIUM SERPL-SCNC: 3.9 MMOL/L (ref 3.5–5.3)
SARS-COV-2 AG UPPER RESP QL IA: NEGATIVE
SARS-COV-2 AG UPPER RESP QL IA: NORMAL
SODIUM SERPL-SCNC: 141 MMOL/L (ref 135–147)

## 2024-05-15 PROCEDURE — 97110 THERAPEUTIC EXERCISES: CPT

## 2024-05-15 PROCEDURE — 99232 SBSQ HOSP IP/OBS MODERATE 35: CPT | Performed by: INTERNAL MEDICINE

## 2024-05-15 PROCEDURE — 83735 ASSAY OF MAGNESIUM: CPT | Performed by: STUDENT IN AN ORGANIZED HEALTH CARE EDUCATION/TRAINING PROGRAM

## 2024-05-15 PROCEDURE — 99239 HOSP IP/OBS DSCHRG MGMT >30: CPT | Performed by: FAMILY MEDICINE

## 2024-05-15 PROCEDURE — 97116 GAIT TRAINING THERAPY: CPT

## 2024-05-15 PROCEDURE — 80048 BASIC METABOLIC PNL TOTAL CA: CPT | Performed by: STUDENT IN AN ORGANIZED HEALTH CARE EDUCATION/TRAINING PROGRAM

## 2024-05-15 RX ORDER — FUROSEMIDE 40 MG/1
40 TABLET ORAL 2 TIMES DAILY
Qty: 60 TABLET | Refills: 0
Start: 2024-05-15 | End: 2024-06-14

## 2024-05-15 RX ADMIN — GABAPENTIN 100 MG: 100 CAPSULE ORAL at 09:15

## 2024-05-15 RX ADMIN — FUROSEMIDE 40 MG: 40 TABLET ORAL at 09:15

## 2024-05-15 RX ADMIN — HEPARIN SODIUM 5000 UNITS: 5000 INJECTION INTRAVENOUS; SUBCUTANEOUS at 15:44

## 2024-05-15 RX ADMIN — ACETAMINOPHEN 975 MG: 325 TABLET, FILM COATED ORAL at 05:43

## 2024-05-15 RX ADMIN — GABAPENTIN 100 MG: 100 CAPSULE ORAL at 15:44

## 2024-05-15 RX ADMIN — ACETAMINOPHEN 975 MG: 325 TABLET, FILM COATED ORAL at 15:52

## 2024-05-15 RX ADMIN — HEPARIN SODIUM 5000 UNITS: 5000 INJECTION INTRAVENOUS; SUBCUTANEOUS at 05:43

## 2024-05-15 RX ADMIN — FUROSEMIDE 40 MG: 40 TABLET ORAL at 15:44

## 2024-05-15 NOTE — CASE MANAGEMENT
Case Management Progress Note    Patient name Sona Valenzuela  Location /-01 MRN [id number]  : 11/3/1924 Date 5/15/2024       LOS (days): 3  Geometric Mean LOS (GMLOS) (days): 3.9  Days to GMLOS:0.6        OBJECTIVE:        Current admission status: Inpatient  Preferred Pharmacy:   [facility]/pharmacy #[id number] - [place] - [address]  [address]  [place] [address]  Phone: [phone] Fax: [phone]    Primary Care Provider: FARZANA Stanley    Primary Insurance: [employer]  Secondary Insurance: [place] [employer] [facility] [facility]    PROGRESS NOTE:  Patient has approved insurance authorization for [place]. Updated SLIM, facility, patient, and patient's niece Ramona Rodrigues currently not working, reached out to [facility] to assist with transport arrangements. Pending updates.

## 2024-05-15 NOTE — DISCHARGE SUMMARY
[facility]  Discharge- Sona Valenzuela 11/3/1924, 89 y.o. female MRN: [id number]  Unit/Bed#: -01 Encounter: [id number]  Primary Care Provider: FARZANA Stanley   Date and time admitted to hospital: 5/12/2024  2:37 AM    * Acute on chronic HFpEF with severe pulmonary hypertension (HCC)  Assessment & Plan  Wt Readings from Last 3 Encounters:   05/15/24 63.4 kg (139 lb 12.4 oz)   04/11/24 61.9 kg (136 lb 6.4 oz)   04/04/24 63.1 kg (139 lb 3.2 oz)       Intake/Output Summary (Last 24 hours) at 5/15/2024 1453  Last data filed at 5/15/2024 1204  Gross per 24 hour   Intake 340 ml   Output 1000 ml   Net -660 ml       C/c : presented with right sided chest pain now resolved   found to have evidence of volume overload on exam and CT imaging, suspect right heart failure in the setting of severe pulmonary hypertension. Maintained on Lasix 40 mg p.o. daily as outpatient and adherent to treatment per patient's niece (POA and care-taker). Received Lasix 40 mg IV x 1 in the ED.    echo showed LVEF 60% with severe TR and estimated RVSP 77 mmHg.  PE study with CT A/P : cardiomegaly. Tiny dependent pleural effusion on the right with associated atelectasis. Reflux of contrast into the intrahepatic IVC and portal vein suggesting CHF with increased right heart pressures. Heterogeneous enhancement the liver, suspect passive hepatic congestion. Status post left lobectomy.    Plan:  Monitor strict I/O and daily weight  S/p IV Lasix 40 mg twice daily - > transitioned to po lasix  Cardiology on board. Cleared for dc    Paroxysmal A-fib (HCC)  Assessment & Plan  POA: Paroxysmal  Afib   LUZ?[id number]?-VASc 7  Patient is not a candidate for anticoagulation for A-fib given advanced age, fall risk, dementia.     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 Correlation with the patient's symptoms recommended. Patient denies back pain and per niece, ambulates well with walker.    Plan:  PT/OT eval : snf  recommendation  Appreciated orthopedic consult : PT/OT, no acute intervention    Primary hypertension  Assessment & Plan  POA: Markedly elevated BP with known history of hypertension but not on pharmacologic therapy as outpatient.    Plan:  Hydralazine 5 mg IV as needed for SBP > 170  May need to start oral therapy if persistently elevated BP  Better controlled now    Dementia (HCC)  Assessment & Plan  POA: Pleasantly confused at baseline.    Plan:  Continue mirtazapine and Seroquel, monitor QTc prolonged on admission  Delirium precautions  Fall precautions  Aspiration precautions      Discharging Physician / Practitioner: Michael Phan MD  PCP: FARZANA Stanley  Admission Date:   Admission Orders (From admission, onward)       Ordered        05/12/24 0729  INPATIENT ADMISSION  Once                          Discharge Date: 05/15/24    Disposition:      snf    Reason for Admission: shortness of breath    Discharge Diagnoses:     Please see assessment and plan section above for further details regarding discharge diagnoses.     Medical Problems       Resolved Problems  Date Reviewed: 9/29/2023   None         Consultations During Hospital Stay:  Cardiology  Orthopedic surgery    Medication Adjustments and Discharge Medications:  Summary of Medication Adjustments made as a result of this hospitalization: see med rec  Medication Dosing  Tapers - Please refer to Discharge Medication List for details on any medication dosing tapers (if applicable to patient).  Medications being temporarily held (include recommended restart time): see med rec  Discharge Medication List: See after visit summary for reconciled discharge medications.     Diet Recommendations at Discharge:  Diet -        Diet Orders   (From admission, onward)                 Start     Ordered    05/12/24 0837  Diet Cardiovascular; Cardiac; Sodium 2 GM, Fluid Restriction 1800 ML  Diet effective now        References:    Adult Nutrition Support Algorithm    RD Therapeutic Diet Order Protocol   Question Answer Comment   Diet Type Cardiovascular    Cardiac Cardiac    Other Restriction(s): Sodium 2 GM    Other Restriction(s): Fluid Restriction 1800 ML    RD to adjust diet per protocol? Yes        05/12/24 0836                    Hospital Course:     Sona Valenzuela is a 89 y.o. female patient who originally presented to the hospital on 5/12/2024 with underlying history of pulmonary artery hypertension, paroxysmal A-fib, dementia presenting with right-sided chest pain.  On presentation patient noted to be markedly hypertensive and in A-fib with RVR.  CT PE protocol notes no PE but does note increased right heart pressure, small pleural effusions and evidence of hepatic congestion.  She was noted to be volume overloaded and started on IV Lasix.  Admitted for further evaluation.    Echo notes EF 60% with severe tricuspid regurgitation and right ventricular systolic pressure 77 mmHg, CT PE notes cardiomegaly, tiny dependent pleural effusions on the right with associated atelectasis.  Reflux of contrast into the intrahepatic IVC and portal vein suggest CHF with increased right heart pressure.  Heterogeneous enhancement of the liver suspect passive hepatic congestion.  S/p left-sided lobectomy.  Patient received IV Lasix 40 twice daily and transition to oral Lasix.  Cleared by cardiology for  "discharge.    She was noted to have age-indeterminate osteoporotic compression fractures in her lumbar spine.  Seen by orthopedic surgery, recommended PT OT and no acute intervention.  SNF recommendation.  Patient going to rehab today  Above plan of care discussed with patient and family.  They verbalized understanding and are in agreement.    Condition at Discharge: good     Discharge Day Visit / Exam:       Vitals: Blood Pressure: 152/83 (05/15/24 1422)  Pulse: 99 (05/15/24 1422)  Temperature: 98.5 °F (36.9 °C) (05/15/24 1422)  Temp Source: Oral (05/14/24 2340)  Respirations: 18 (05/14/24 0801)  Height: 4' 11\" (149.9 cm) (05/13/24 1430)  Weight - Scale: 63.4 kg (139 lb 12.4 oz) (05/15/24 0547)  SpO2: 95 % (05/15/24 1422)  Exam:   Physical Exam  Constitutional:       General: She is not in acute distress.     Appearance: She is normal weight. She is not toxic-appearing.   HENT:      Mouth/Throat:      Mouth: Mucous membranes are moist.      Pharynx: Oropharynx is clear.   Cardiovascular:      Rate and Rhythm: Normal rate and regular rhythm.      Pulses: Normal pulses.   Pulmonary:      Effort: Pulmonary effort is normal. No respiratory distress.      Breath sounds: Normal breath sounds.   Musculoskeletal:      Right lower leg: No edema.      Left lower leg: No edema.   Neurological:      General: No focal deficit present.      Mental Status: She is alert and oriented to person, place, and time.           Goals of Care Discussions:  Code Status at Discharge: Level 3 - DNAR and DNI      Discharge instructions/Information to patient and family:   See after visit summary section titled Discharge Instructions for information provided to patient and family.         Discharge Statement:  I spent 35 minutes discharging the patient. This time was spent on the day of discharge. I had direct contact with the patient on the day of discharge. Greater than 50% of the total time was spent examining patient, answering all patient " questions, arranging and discussing plan of care with patient as well as directly providing post-discharge instructions.  Additional time then spent on discharge activities.    ** Please Note: This note has been constructed using a voice recognition system **

## 2024-05-15 NOTE — PROGRESS NOTES
General Cardiology   Progress Note   Sona Valenzuela 89 y.o. female MRN: [id number]  Unit/Bed#: -01 Encounter: [id number]      SUBJECTIVE:   No significant events overnight.  Patient does not offer any complaints.  Patient has dementia.    REVIEW OF SYSTEMS:  Constitutional:  Denies fever or chills   Eyes:  Denies change in visual acuity   HENT:  Denies nasal congestion or sore throat   Respiratory:  Denies cough or shortness of breath   Cardiovascular:  Denies chest pain or edema   GI:  Denies abdominal pain, nausea, vomiting, bloody stools or diarrhea   :  Denies dysuria, frequency, difficulty in micturition and nocturia  Musculoskeletal:  Denies back pain or joint pain   Neurologic: Dementia  Endocrine:  Denies polyuria or polydipsia   Lymphatic:  Denies swollen glands   Psychiatric:  Denies depression or anxiety     OBJECTIVE:   Vitals:  Vitals:    05/15/24 1422   BP: 152/83   Pulse: 99   Resp:    Temp: 98.5 °F (36.9 °C)   SpO2: 95%     Body mass index is 28.23 kg/m².  Systolic (24hrs), Av , Min:125 , Max:152     Diastolic (24hrs), Av, Min:68, Max:86      Intake/Output Summary (Last 24 hours) at 5/15/2024 1427  Last data filed at 5/15/2024 1204  Gross per 24 hour   Intake 340 ml   Output 1000 ml   Net -660 ml     Weight (last 2 days)       Date/Time Weight    05/15/24 0547 63.4 (139.77)    24 0600 65 (143.3)    24 1430 66.7 (147)    24 0600 66.7 (147.05)                PHYSICAL EXAMS:  General:  Patient is not in acute distress, laying in the bed comfortably, awake, alert responding to commands.  Head: Normocephalic, Atraumatic.  HEENT:  Both pupils normal-size atraumatic, normocephalic, nonicteric  Neck:  JVP not raised. Trachea central  Respiratory: Decreased breath sounds bilateral  Cardiovascular: Irregularly irregular  GI:  Abdomen soft nontender. Liver and spleen normal size  Lymphatic:  No cervical or inguinal lymphadenopathy  Neurologic:  Patient is awake alert,  "responding to command, well-oriented to time and place and person moving   Extremities trace edema    LABORATORY RESULTS:        CBC with diff:   Results from last 7 days   Lab Units 05/13/24 0329 05/12/24  0250   WBC Thousand/uL 6.27 7.30   HEMOGLOBIN g/dL 10.6* 11.3*   HEMATOCRIT % 34.7* 36.4   MCV fL 90 89   PLATELETS Thousands/uL 200 211   RBC Million/uL 3.84 4.07   MCH pg 27.6 27.8   MCHC g/dL 30.5* 31.0*   RDW % 15.7* 15.7*   MPV fL 10.4 10.1   NRBC AUTO /100 WBCs  --  0       CMP:  Results from last 7 days   Lab Units 05/15/24  0504 05/14/24 0632 05/13/24 0329 05/12/24  0250   POTASSIUM mmol/L 3.9 3.9 4.2 4.3   CHLORIDE mmol/L 105 105 105 105   CO2 mmol/L 30 31 28 29   BUN mg/dL 25 25 27* 25   CREATININE mg/dL 1.26 1.37* 1.34* 1.39*   CALCIUM mg/dL 8.2* 8.1* 8.1* 8.6   AST U/L  --   --   --  39   ALT U/L  --   --   --  23   ALK PHOS U/L  --   --   --  105*   EGFR ml/min/1.73sq m 35 31 32 31       BMP:  Results from last 7 days   Lab Units 05/15/24  0504 05/14/24  0632 05/13/24  0329   POTASSIUM mmol/L 3.9 3.9 4.2   CHLORIDE mmol/L 105 105 105   CO2 mmol/L 30 31 28   BUN mg/dL 25 25 27*   CREATININE mg/dL 1.26 1.37* 1.34*   CALCIUM mg/dL 8.2* 8.1* 8.1*            Results from last 7 days   Lab Units 05/15/24  0504 05/14/24  0632 05/13/24 0329 05/12/24  0250   MAGNESIUM mg/dL 2.2 2.2 2.1 2.3             Results from last 7 days   Lab Units 05/12/24  0250   INR  1.03       Lipid Profile:   No results found for: \"CHOL\"  No results found for: \"HDL\"  No results found for: \"LDLCALC\"  No results found for: \"TRIG\"    Cardiac testing:  No results found for this or any previous visit.    No results found for this or any previous visit.    No results found for this or any previous visit.    No valid procedures specified.  No results found for this or any previous visit.      Meds/Allergies   all current active meds have been reviewed    Medications Prior to Admission:     furosemide (LASIX) 40 mg tablet    " "mirtazapine (REMERON) 15 mg tablet    QUEtiapine (SEROquel) 25 mg tablet    ergocalciferol (VITAMIN D2) 50,000 units    gabapentin (NEURONTIN) 100 mg capsule    Misc. Devices (Mattress Cover) MISC       ASSESSMENT & PLAN   Principal Problem:    Acute on chronic HFpEF with severe pulmonary hypertension (HCC)  Active Problems:    Dementia (HCC)    Primary hypertension    A-fib (HCC)    CKD (chronic kidney disease)     Compression fracture of lumbar spine, non-traumatic (HCC)    This patient has acute on chronic heart failure with preserved ejection fraction in the setting of severe pulmonary hypertension.  Patient's volume status has improved.  Patient was switched to oral diuretics in the form of Lasix 40 mg p.o. twice daily.  Importance of salt restriction reinforced.  Continue to monitor renal functions.    Patient has atrial fibrillation and is not considered a candidate for anticoagulation because of advanced age, fall risk, dementia.    Overall conservative management given patient is 89 years old.  Patient is likely to be discharged to [place] facility.    At this time we will sign off.  Please reconsult as needed.    Liz Mi MD  5/15/2024,2:27 PM    Portions of the record may have been created with voice recognition software.  Occasional wrong word or \"sound a like\" substitutions may have occurred due to the inherent limitations of voice recognition software.  Read the chart carefully and recognize, using context, where substitutions have occurred.   "

## 2024-05-15 NOTE — CASE MANAGEMENT
[facility] has received APPROVED authorization.  Insurance:   [employer]  Authorization received for: SNF  Facility: [place]    Authorization #:[id number]    Start of Care: 5/15  Next Review Date:5/20  Continued Stay Care Coordinator: Rosibel ROLLINS#: [phone]  Submit next review to: [phone]     Care Manager notified:Brittany Farnsworth      Please reach out to  for updates on any clinical information.

## 2024-05-15 NOTE — CASE MANAGEMENT
Case Management Discharge Planning Note    Patient name Sona Valenzuela  Location /-01 MRN [id number]  : 11/3/1924 Date 5/15/2024       Current Admission Date: 2024  Current Admission Diagnosis:Acute on chronic HFpEF with severe pulmonary hypertension (HCC)   Patient Active Problem List    Diagnosis Date Noted Date Diagnosed    Acute on chronic HFpEF with severe pulmonary hypertension ([facility]) 2024     Primary hypertension 2024     Paroxysmal A-fib ([facility]) 2024     CKD (chronic kidney disease) stage 4, GFR 15-29 ml/min ([facility]) 2024     Compression fracture of lumbar spine, non-traumatic ([facility]) 2024     Mild protein-calorie malnutrition ([facility]) 2024     Vascular dementia, uncomplicated ([facility]) 2023     Acute CHF (congestive heart failure) ([facility]) 2023     Dementia ([facility]) 2023     Allergic bronchitis 2023     Weakness of left leg 2023     Does mobilize using walker 2023     Chronic congestive heart failure ([facility]) 2022     Chronic atrial fibrillation ([facility]) 10/11/2022     Stage 3a chronic kidney disease ([facility]) 2022     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[facility]) 2022     Ambulatory dysfunction 2021     Anxiety 2021     Vitamin D deficiency 2020     Primary insomnia 2019     Degeneration of lumbar intervertebral disc 2019     Lumbar radiculopathy 2019     Arthropathy of lumbar facet joint 2019     Visual hallucinations 2019     Restless legs syndrome 2019       LOS (days): 3  Geometric Mean LOS (GMLOS) (days): 3.9  Days to GMLOS:0.6     OBJECTIVE:  Risk of Unplanned Readmission Score: 19.5         Current admission status: Inpatient   Preferred Pharmacy:   [facility]/pharm[facility] #[id number] - [place] - [address]  [place] [id number]  Phone: [phone] Fax: [phone]    Primary Care Provider: Kiki Allen  FARZANA    Primary Insurance: [employer]  REP  Secondary Insurance: [place] [employer] [facility]    DISCHARGE DETAILS:                                                                                                               Facility Insurance Auth Number: [id number]

## 2024-05-15 NOTE — ASSESSMENT & PLAN NOTE
POA: Paroxysmal  Afib   [id number]  Patient is not a candidate for anticoagulation for A-fib given advanced age, fall risk, dementia.

## 2024-05-15 NOTE — ASSESSMENT & PLAN NOTE
Wt Readings from Last 3 Encounters:   05/15/24 63.4 kg (139 lb 12.4 oz)   04/11/24 61.9 kg (136 lb 6.4 oz)   04/04/24 63.1 kg (139 lb 3.2 oz)       Intake/Output Summary (Last 24 hours) at 5/15/2024 1453  Last data filed at 5/15/2024 1204  Gross per 24 hour   Intake 340 ml   Output 1000 ml   Net -660 ml       C/c : presented with right sided chest pain now resolved   found to have evidence of volume overload on exam and CT imaging, suspect right heart failure in the setting of severe pulmonary hypertension. Maintained on Lasix 40 mg p.o. daily as outpatient and adherent to treatment per patient's niece (POA and care-taker). Received Lasix 40 mg IV x 1 in the ED.    echo showed LVEF 60% with severe TR and estimated RVSP 77 mmHg.  PE study with CT A/P : cardiomegaly. Tiny dependent pleural effusion on the right with associated atelectasis. Reflux of contrast into the intrahepatic IVC and portal vein suggesting CHF with increased right heart pressures. Heterogeneous enhancement the liver, suspect passive hepatic congestion. Status post left lobectomy.    Plan:  Monitor strict I/O and daily weight  S/p IV Lasix 40 mg twice daily - > transitioned to po lasix  Cardiology on board. Cleared for dc

## 2024-05-15 NOTE — PHYSICAL THERAPY NOTE
"   Physical Therapy Treatment Note    Patient Name: Sona Valenzuela    Diagnosis: Chest pain [R07.9]  Acute exacerbation of CHF (congestive heart failure) ([facility]) [I50.9]  Atrial fibrillation with RVR ([facility]) [I48.91]     05/15/24 0919   PT Last Visit   PT Visit Date 05/15/24   Note Type   Note Type Treatment   Pain Assessment   Pain Assessment Tool 0-10   Pain Score No Pain   Restrictions/Precautions   Weight Bearing Precautions Per Order Yes   RLE Weight Bearing Per Order WBAT  (per ortho)   LLE Weight Bearing Per Order WBAT  (per ortho)   Other Precautions Cognitive;Chair Alarm;Bed Alarm;Fall Risk   General   Chart Reviewed Yes   Response to Previous Treatment Patient unable to report, no changes reported from family or staff   Family/Caregiver Present No   Cognition   Overall Cognitive Status Impaired   Arousal/Participation Alert;Responsive;Cooperative   Attention Within functional limits   Orientation Level Oriented to person;Oriented to place;Disoriented to time;Disoriented to situation   Memory Decreased recall of recent events;Decreased short term memory   Following Commands Follows one step commands without difficulty   Comments Pt agreeable to PT.   Subjective   Subjective \"I would like to get up.\"   Bed Mobility   Supine to Sit 4  Minimal assistance   Additional items Assist x 1;HOB elevated;Bedrails;Increased time required;Verbal cues;LE management   Transfers   Sit to Stand 3  Moderate assistance   Additional items Assist x 1;Increased time required;Verbal cues   Stand to Sit 3  Moderate assistance   Additional items Assist x 2;Armrests;Increased time required;Verbal cues   Ambulation/Elevation   Gait pattern Decreased toe off;Decreased heel strike;Decreased hip extension;Excessively slow;Step to;Short stride;Shuffling   Gait Assistance 3  Moderate assist   Additional items Assist x 1;Verbal cues   Assistive Device Rolling walker   Distance 3 feet x 1 trial; 12 feet x 1 trial; seated rest break between each " trial   Balance   Static Sitting Fair   Dynamic Sitting Fair -   Static Standing Poor +   Dynamic Standing Poor   Ambulatory Poor   Endurance Deficit   Endurance Deficit Yes   Endurance Deficit Description decreased activity tolerance   Activity Tolerance   Activity Tolerance Patient tolerated treatment well   Nurse Made Aware LEVAR Farrar   Exercises   Hip Flexion Sitting;15 reps;AROM;Bilateral   Hip Adduction Sitting;10 reps;AROM;Bilateral   Knee AROM Long Arc Quad Sitting;20 reps;AROM;Bilateral   Ankle Pumps Sitting;20 reps;AROM;Bilateral   Assessment   Prognosis Good   Problem List Decreased strength;Decreased endurance;Impaired balance;Decreased mobility;Decreased cognition   Assessment Chart reviewed. Patient was received supine in bed in NAD and agreeable to PT session. Today's PT treatment session consisted of therapeutic activity for facilitation of transitional movements and safe performance of correct technique for bed mobility and sit to stand transfers, therapeutic exercise to increase lower extremity muscle strength, and gait training to promote safe and functional ambulation on level surfaces. In comparison to the previous session the patient has made progress as evident by requiring decreased assistance with all functional mobility. Pt was able to perform all functional mobility with assist of one. Pt also demonstrated progress this session as evident by ability to ambulate two gait trials and ambulate an increased distance. Pt required a seated rest break between each ambulation trial. When ambulating pt exhibits decreased yosef, decreased stride length, step to pattern, and decreased heel strike. Pt with no evidence of knee buckling this session. Pt tolerated all therapeutic exercise well. Pt performed increased repetitions of seated hip flexion and seated knee extension without complaints. Overall, patient tolerated today's session well and continues to be making progress towards achieving her  STG's. Patient's prognosis for achieving their STG's is good as evident by pt's motivation. PT intervention continues to be appropriate as the patient continues to be limited by decreased lower extremity strength, impaired balance, decreased endurance, gait deviations, and decreased functional mobility. Continue to recommend level 2, moderate resource intensity. PT to continue to see patient in order to address the deficits listed above and provide interventions consistent with the POC in order to achieve STG's and optimize the patient's independence with functional mobility.   Barriers to Discharge Inaccessible home environment   Goals   STG Expiration Date 05/23/24   PT Treatment Day 2   Plan   Treatment/Interventions Functional transfer training;LE strengthening/ROM;Therapeutic exercise;Endurance training;Cognitive reorientation;Patient/family training;Bed mobility;Gait training;OT;Spoke to nursing   Progress Progressing toward goals   PT Frequency 3-5x/wk   Discharge Recommendation   Rehab Resource Intensity Level, PT II (Moderate Resource Intensity)   AM-PAC Basic Mobility Inpatient   Turning in Flat Bed Without Bedrails 3   Lying on Back to Sitting on Edge of Flat Bed Without Bedrails 3   Moving Bed to Chair 2   Standing Up From Chair Using Arms 2   Walk in Room 2   Climb 3-5 Stairs With Railing 1   Basic Mobility Inpatient Raw Score 13   Basic Mobility Standardized Score 33.99   [facility] Highest Level Of Mobility   -HL Goal 4: Move to chair/commode   -HL Achieved 6: Walk 10 steps or more   Education   Education Provided Mobility training;Home exercise program;Assistive device   Patient Reinforcement needed   End of Consult   Patient Position at End of Consult Bedside chair;Bed/Chair alarm activated;All needs within reach  (LPN made aware)     Fartun Velarde, PT, DPT    Time of PT treatment session: 0919-0943  24 minutes

## 2024-05-15 NOTE — PLAN OF CARE
Problem: Potential for Falls  Goal: Patient will remain free of falls  Description: INTERVENTIONS:  - Educate patient/family on patient safety including physical limitations  - Instruct patient to call for assistance with activity   - Consult OT/PT to assist with strengthening/mobility   - Keep Call bell within reach  - Keep bed low and locked with side rails adjusted as appropriate  - Keep care items and personal belongings within reach  - Initiate and maintain comfort rounds  - Make Fall Risk Sign visible to staff  - Offer Toileting every 2 Hours, in advance of need  - Initiate/Maintain bed alarm  - Obtain necessary fall risk management equipment: bed alarm  - Apply yellow socks and bracelet for high fall risk patients   - Consider moving patient to room near nurses station  Outcome: Progressing     Problem: PAIN - ADULT  Goal: Verbalizes/displays adequate comfort level or baseline comfort level  Description: Interventions:  - Encourage patient to monitor pain and request assistance  - Assess pain using appropriate pain scale  - Administer analgesics based on type and severity of pain and evaluate response  - Implement non-pharmacological measures as appropriate and evaluate response  - Consider cultural and social influences on pain and pain management  - Notify physician/advanced practitioner if interventions unsuccessful or patient reports new pain  Outcome: Progressing     Problem: INFECTION - ADULT  Goal: Absence or prevention of progression during hospitalization  Description: INTERVENTIONS:  - Assess and monitor for signs and symptoms of infection  - Monitor lab/diagnostic results  - Monitor all insertion sites, i.e. indwelling lines, tubes, and drains  - Monitor endotracheal if appropriate and nasal secretions for changes in amount and color  - [place] appropriate cooling/warming therapies per order  - Administer medications as ordered  - Instruct and encourage patient and family to use good hand  hygiene technique  - Identify and instruct in appropriate isolation precautions for identified infection/condition  Outcome: Progressing     Problem: SAFETY ADULT  Goal: Patient will remain free of falls  Description: INTERVENTIONS:  - Educate patient/family on patient safety including physical limitations  - Instruct patient to call for assistance with activity   - Consult OT/PT to assist with strengthening/mobility   - Keep Call bell within reach  - Keep bed low and locked with side rails adjusted as appropriate  - Keep care items and personal belongings within reach  - Initiate and maintain comfort rounds  - Make Fall Risk Sign visible to staff  - Offer Toileting every 2 Hours, in advance of need  - Initiate/Maintain bed alarm  - Obtain necessary fall risk management equipment: bed alarm  - Apply yellow socks and bracelet for high fall risk patients  - Consider moving patient to room near nurses station  Outcome: Progressing  Goal: Maintain or return to baseline ADL function  Description: INTERVENTIONS:  -  Assess patient's ability to carry out ADLs; assess patient's baseline for ADL function and identify physical deficits which impact ability to perform ADLs (bathing, care of mouth/teeth, toileting, grooming, dressing, etc.)  - Assess/evaluate cause of self-care deficits   - Assess range of motion  - Assess patient's mobility; develop plan if impaired  - Assess patient's need for assistive devices and provide as appropriate  - Encourage maximum independence but intervene and supervise when necessary  - Involve family in performance of ADLs  - Assess for home care needs following discharge   - Consider OT consult to assist with ADL evaluation and planning for discharge  - Provide patient education as appropriate  Outcome: Progressing  Goal: Maintains/Returns to pre admission functional level  Description: INTERVENTIONS:  - Perform AM-PAC 6 Click Basic Mobility/ Daily Activity assessment daily.  - Set and  communicate daily mobility goal to care team and patient/family/caregiver.   - Collaborate with rehabilitation services on mobility goals if consulted  - Perform Range of Motion 4 times a day.  - Reposition patient every 2 hours.  - Dangle patient 3 times a day  - Stand patient 3 times a day  - Ambulate patient 3 times a day  - Out of bed to chair 3 times a day   - Out of bed for meals 3 times a day  - Out of bed for toileting  - Record patient progress and toleration of activity level   Outcome: Progressing     Problem: DISCHARGE PLANNING  Goal: Discharge to home or other facility with appropriate resources  Description: INTERVENTIONS:  - Identify barriers to discharge w/patient and caregiver  - Arrange for needed discharge resources and transportation as appropriate  - Identify discharge learning needs (meds, wound care, etc.)  - Arrange for interpretive services to assist at discharge as needed  - Refer to Case Management Department for coordinating discharge planning if the patient needs post-hospital services based on physician/advanced practitioner order or complex needs related to functional status, cognitive ability, or social support system  Outcome: Progressing     Problem: Knowledge Deficit  Goal: Patient/family/caregiver demonstrates understanding of disease process, treatment plan, medications, and discharge instructions  Description: Complete learning assessment and assess knowledge base.  Interventions:  - Provide teaching at level of understanding  - Provide teaching via preferred learning methods  Outcome: Progressing     Problem: CARDIOVASCULAR - ADULT  Goal: Maintains optimal cardiac output and hemodynamic stability  Description: INTERVENTIONS:  - Monitor I/O, vital signs and rhythm  - Monitor for S/S and trends of decreased cardiac output  - Administer and titrate ordered vasoactive medications to optimize hemodynamic stability  - Assess quality of pulses, skin color and temperature  - Assess for  signs of decreased coronary artery perfusion  - Instruct patient to report change in severity of symptoms  Outcome: Progressing  Goal: Absence of cardiac dysrhythmias or at baseline rhythm  Description: INTERVENTIONS:  - Continuous cardiac monitoring, vital signs, obtain 12 lead EKG if ordered  - Administer antiarrhythmic and heart rate control medications as ordered  - Monitor electrolytes and administer replacement therapy as ordered  Outcome: Progressing     Problem: Prexisting or High Potential for Compromised Skin Integrity  Goal: Skin integrity is maintained or improved  Description: INTERVENTIONS:  - Identify patients at risk for skin breakdown  - Assess and monitor skin integrity  - Assess and monitor nutrition and hydration status  - Monitor labs   - Assess for incontinence   - Turn and reposition patient  - Assist with mobility/ambulation  - Relieve pressure over bony prominences  - Avoid friction and shearing  - Provide appropriate hygiene as needed including keeping skin clean and dry  - Evaluate need for skin moisturizer/barrier cream  - Collaborate with interdisciplinary team   - Patient/family teaching  - Consider wound care consult   Outcome: Progressing

## 2024-05-15 NOTE — ASSESSMENT & PLAN NOTE
Lab Results   Component Value Date    EGFR 35 05/15/2024    EGFR 31 05/14/2024    EGFR 32 05/13/2024    CREATININE 1.26 05/15/2024    CREATININE 1.37 (H) 05/14/2024    CREATININE 1.34 (H) 05/13/2024     POA: Cr at baseline around 1.4    Plan:  Monitor UOP and renal indices - received IV contrast for CT  Renally dose medications as appropriate  Outpatient nephrology follow-up

## 2024-05-15 NOTE — PLAN OF CARE
Problem: PHYSICAL THERAPY ADULT  Goal: Performs mobility at highest level of function for planned discharge setting.  See evaluation for individualized goals.  Description: Treatment/Interventions: Functional transfer training, LE strengthening/ROM, Therapeutic exercise, Endurance training, Cognitive reorientation, Patient/family training, Bed mobility, Gait training, Spoke to nursing, OT          See flowsheet documentation for full assessment, interventions and recommendations.  Outcome: Progressing  Note: Prognosis: Good  Problem List: Decreased strength, Decreased endurance, Impaired balance, Decreased mobility, Decreased cognition  Assessment: Chart reviewed. Patient was received supine in bed in NAD and agreeable to PT session. Today's PT treatment session consisted of therapeutic activity for facilitation of transitional movements and safe performance of correct technique for bed mobility and sit to stand transfers, therapeutic exercise to increase lower extremity muscle strength, and gait training to promote safe and functional ambulation on level surfaces. In comparison to the previous session the patient has made progress as evident by requiring decreased assistance with all functional mobility. Pt was able to perform all functional mobility with assist of one. Pt also demonstrated progress this session as evident by ability to ambulate two gait trials and ambulate an increased distance. Pt required a seated rest break between each ambulation trial. When ambulating pt exhibits decreased yosef, decreased stride length, step to pattern, and decreased heel strike. Pt with no evidence of knee buckling this session. Pt tolerated all therapeutic exercise well. Pt performed increased repetitions of seated hip flexion and seated knee extension without complaints. Overall, patient tolerated today's session well and continues to be making progress towards achieving her STG's. Patient's prognosis for achieving  their STG's is good as evident by pt's motivation. PT intervention continues to be appropriate as the patient continues to be limited by decreased lower extremity strength, impaired balance, decreased endurance, gait deviations, and decreased functional mobility. Continue to recommend level 2, moderate resource intensity. PT to continue to see patient in order to address the deficits listed above and provide interventions consistent with the POC in order to achieve STG's and optimize the patient's independence with functional mobility.    Barriers to Discharge: Inaccessible home environment     Rehab Resource Intensity Level, PT: II (Moderate Resource Intensity)    See flowsheet documentation for full assessment.

## 2024-05-15 NOTE — TELEPHONE ENCOUNTER
FYI : patients jskorina Layne ralf would like Alejandro to know that she's being discharge tomorrow from [facility] & going to rehab [facility]

## 2024-05-15 NOTE — PLAN OF CARE
Problem: Potential for Falls  Goal: Patient will remain free of falls  Description: INTERVENTIONS:  - Educate patient/family on patient safety including physical limitations  - Instruct patient to call for assistance with activity   - Consult OT/PT to assist with strengthening/mobility   - Keep Call bell within reach  - Keep bed low and locked with side rails adjusted as appropriate  - Keep care items and personal belongings within reach  - Initiate and maintain comfort rounds  - Make Fall Risk Sign visible to staff  - Offer Toileting every bed chair  Hours, in advance of need  - Initiate/Maintain 2 alarm  - Obtain necessary fall risk management equipment:   - Apply yellow socks and bracelet for high fall risk patients  - Consider moving patient to room near nurses station  Outcome: Progressing

## 2024-05-16 ENCOUNTER — TRANSITIONAL CARE MANAGEMENT (OUTPATIENT)
Dept: FAMILY MEDICINE CLINIC | Facility: CLINIC | Age: 89
End: 2024-05-16

## 2024-05-16 ENCOUNTER — TELEPHONE (OUTPATIENT)
Dept: CARDIOLOGY CLINIC | Facility: CLINIC | Age: 89
End: 2024-05-16

## 2024-05-16 NOTE — UTILIZATION REVIEW
NOTIFICATION OF ADMISSION DISCHARGE   This is a Notification of Discharge from [facility]. Please be advised that this patient has been discharge from our facility. Below you will find the admission and discharge date and time including the patient’s disposition.   UTILIZATION REVIEW CONTACT:  Kenyetta Humphreys  Utilization   Network Utilization Review Department  Phone: [phone] x carefully listen to the prompts. All voicemails are confidential.  Email: [email or web address]     ADMISSION INFORMATION  PRESENTATION DATE: 5/12/2024  2:37 AM  OBERVATION ADMISSION DATE:   INPATIENT ADMISSION DATE: 5/12/24  7:29 AM   DISCHARGE DATE: 5/15/2024  5:25 PM   DISPOSITION:Released to SNF/TCU/SNU Facility    Network Utilization Review Department  ATTENTION: Please call with any questions or concerns to [phone] and carefully listen to the prompts so that you are directed to the right person. All voicemails are confidential.   For Discharge needs, contact Care Management DC Support Team at [phone] opt. 2  Send all requests for admission clinical reviews, approved or denied determinations and any other requests to dedicated fax number below belonging to the campus where the patient is receiving treatment. List of dedicated fax numbers for the Facilities:  FACILITY NAME UR FAX NUMBER   ADMISSION DENIALS (Administrative/Medical Necessity) [phone]   DISCHARGE SUPPORT TEAM ([facility]) [phone]   PARENT CHILD HEALTH (Maternity/NICU/Pediatrics) [phone]   [facility] [phone]   [facility] [phone]   [facility] [phone]   [facility] [phone]   [facility] [phone]   [facility] [phone]   [facility] [phone]   [facility]  [facility] [phone]   [facility] [phone]   [facility] [phone]   [facility] [phone]   [facility] [phone]

## 2024-05-17 ENCOUNTER — TELEPHONE (OUTPATIENT)
Dept: CARDIOLOGY CLINIC | Facility: CLINIC | Age: 89
End: 2024-05-17

## 2024-05-17 NOTE — TELEPHONE ENCOUNTER
48 Hr [facility] Hsp CHF dc call to [facility] P: [phone].  Spoke with nurse Hayden who is currently caring for patient.       Self-management/education and teach back:  Primary learner: Nurse Hayden  Following low sodium diet: Yes  Following fluid restriction:Yes  Hospital discharge weight: 139 lb 12.4 oz.  Weighing daily: Yes    Recording: Yes  1st home weight       Weight today:  Monitoring symptoms: Yes  Any current symptoms: No  Knows when to call provider: Yes  Medication reviewed and taking all as prescribed:Yes  Knows name of diuretic: Yes  Escalation plan:   HF education reviewed/reinforced including low sodium diet, 64 oz fluid restriction, activity, symptoms of decompensation and when and who to call. Yes    Care Coordination:  Aware of cardiology follow up appointment: None currently.  Aware of PCP follow up appointment:7/29  Transportation: Facility  Social Support:  Insurance/financial concerns:  Home health care:   Health literacy:  Engagement:  Personal Goal:  Additional comments:

## 2024-06-04 ENCOUNTER — OFFICE VISIT (OUTPATIENT)
Dept: FAMILY MEDICINE CLINIC | Facility: CLINIC | Age: 89
End: 2024-06-04
Payer: COMMERCIAL

## 2024-06-04 ENCOUNTER — APPOINTMENT (OUTPATIENT)
Dept: LAB | Facility: CLINIC | Age: 89
End: 2024-06-04
Payer: COMMERCIAL

## 2024-06-04 ENCOUNTER — APPOINTMENT (OUTPATIENT)
Dept: RADIOLOGY | Facility: CLINIC | Age: 89
End: 2024-06-04
Payer: COMMERCIAL

## 2024-06-04 VITALS
BODY MASS INDEX: 27.96 KG/M2 | OXYGEN SATURATION: 99 % | HEIGHT: 59 IN | WEIGHT: 138.7 LBS | DIASTOLIC BLOOD PRESSURE: 78 MMHG | SYSTOLIC BLOOD PRESSURE: 142 MMHG | HEART RATE: 97 BPM | TEMPERATURE: 97.8 F

## 2024-06-04 DIAGNOSIS — F01.50 VASCULAR DEMENTIA, UNCOMPLICATED (HCC): Primary | ICD-10-CM

## 2024-06-04 DIAGNOSIS — R05.1 ACUTE COUGH: ICD-10-CM

## 2024-06-04 DIAGNOSIS — I50.33 ACUTE ON CHRONIC HEART FAILURE WITH PRESERVED EJECTION FRACTION (HCC): ICD-10-CM

## 2024-06-04 DIAGNOSIS — I48.0 PAROXYSMAL A-FIB (HCC): ICD-10-CM

## 2024-06-04 DIAGNOSIS — I50.9 CHRONIC CONGESTIVE HEART FAILURE, UNSPECIFIED HEART FAILURE TYPE (HCC): ICD-10-CM

## 2024-06-04 DIAGNOSIS — I10 PRIMARY HYPERTENSION: ICD-10-CM

## 2024-06-04 DIAGNOSIS — R05.1 ACUTE COUGH: Primary | ICD-10-CM

## 2024-06-04 DIAGNOSIS — I48.20 CHRONIC ATRIAL FIBRILLATION (HCC): ICD-10-CM

## 2024-06-04 PROBLEM — F03.90 DEMENTIA (HCC): Status: RESOLVED | Noted: 2023-07-01 | Resolved: 2024-06-04

## 2024-06-04 PROBLEM — J45.909 ALLERGIC BRONCHITIS: Status: RESOLVED | Noted: 2023-06-20 | Resolved: 2024-06-04

## 2024-06-04 LAB
ALBUMIN SERPL BCP-MCNC: 3.9 G/DL (ref 3.5–5)
ALP SERPL-CCNC: 102 U/L (ref 34–104)
ALT SERPL W P-5'-P-CCNC: 13 U/L (ref 7–52)
ANION GAP SERPL CALCULATED.3IONS-SCNC: 7 MMOL/L (ref 4–13)
AST SERPL W P-5'-P-CCNC: 19 U/L (ref 13–39)
BILIRUB SERPL-MCNC: 0.7 MG/DL (ref 0.2–1)
BNP SERPL-MCNC: 553 PG/ML (ref 0–100)
BUN SERPL-MCNC: 44 MG/DL (ref 5–25)
CALCIUM SERPL-MCNC: 8.6 MG/DL (ref 8.4–10.2)
CHLORIDE SERPL-SCNC: 99 MMOL/L (ref 96–108)
CO2 SERPL-SCNC: 31 MMOL/L (ref 21–32)
CREAT SERPL-MCNC: 1.63 MG/DL (ref 0.6–1.3)
GFR SERPL CREATININE-BSD FRML MDRD: 25 ML/MIN/1.73SQ M
GLUCOSE SERPL-MCNC: 114 MG/DL (ref 65–140)
POTASSIUM SERPL-SCNC: 3.8 MMOL/L (ref 3.5–5.3)
PROT SERPL-MCNC: 7.6 G/DL (ref 6.4–8.4)
SODIUM SERPL-SCNC: 137 MMOL/L (ref 135–147)

## 2024-06-04 PROCEDURE — 71046 X-RAY EXAM CHEST 2 VIEWS: CPT

## 2024-06-04 PROCEDURE — 36415 COLL VENOUS BLD VENIPUNCTURE: CPT

## 2024-06-04 PROCEDURE — 83880 ASSAY OF NATRIURETIC PEPTIDE: CPT

## 2024-06-04 PROCEDURE — 85025 COMPLETE CBC W/AUTO DIFF WBC: CPT

## 2024-06-04 PROCEDURE — 99214 OFFICE O/P EST MOD 30 MIN: CPT | Performed by: NURSE PRACTITIONER

## 2024-06-04 PROCEDURE — G2211 COMPLEX E/M VISIT ADD ON: HCPCS | Performed by: NURSE PRACTITIONER

## 2024-06-04 PROCEDURE — 80053 COMPREHEN METABOLIC PANEL: CPT

## 2024-06-04 RX ORDER — DEXTROMETHORPHAN HBR. AND GUAIFENESIN 10; 100 MG/5ML; MG/5ML
5 SOLUTION ORAL EVERY 12 HOURS
Qty: 118 ML | Refills: 0 | Status: SHIPPED | OUTPATIENT
Start: 2024-06-04 | End: 2024-06-16

## 2024-06-04 NOTE — ASSESSMENT & PLAN NOTE
Wt Readings from Last 3 Encounters:   06/04/24 62.9 kg (138 lb 11.2 oz)   05/15/24 63.4 kg (139 lb 12.4 oz)   04/11/24 61.9 kg (136 lb 6.4 oz)     Will update labs and chest x-ray with cough at night

## 2024-06-04 NOTE — PROGRESS NOTES
Ambulatory Visit  Name: Sona Valenzuela      : 11/3/1924      MRN: [id number]  Encounter Provider: FARZANA Stanley  Encounter Date: 2024   Encounter department: [facility]    Assessment & Plan   1. Vascular dementia, uncomplicated (HCC)  Assessment & Plan:  Sleeping well at night but having cough Seroquel 25 mg   2. Primary hypertension  Assessment & Plan:  Not on BP medications current  3. Paroxysmal A-fib (HCC)  Assessment & Plan:  Eliquis currently and is rate controlled without medication   4. Chronic congestive heart failure, unspecified heart failure type (HCC)  Assessment & Plan:  Wt Readings from Last 3 Encounters:   24 62.9 kg (138 lb 11.2 oz)   05/15/24 63.4 kg (139 lb 12.4 oz)   24 61.9 kg (136 lb 6.4 oz)     Will update labs and chest x-ray with cough at night         5. Chronic atrial fibrillation (HCC)  6. Acute on chronic HFpEF with severe pulmonary hypertension (HCC)  Assessment & Plan:  Wt Readings from Last 3 Encounters:   24 62.9 kg (138 lb 11.2 oz)   05/15/24 63.4 kg (139 lb 12.4 oz)   24 61.9 kg (136 lb 6.4 oz)     Weight is stable         7. Acute cough  -     Comprehensive metabolic panel; Future  -     CBC and differential; Future  -     XR chest pa & lateral; Future; Expected date: 2024  -     B-Type Natriuretic Peptide(BNP); Future         History of Present Illness   {Disappearing Hyperlinks I Encounters * My Last Note * Since Last Visit * History :40262}  Hospital Course:      Sona Valenzuela is a 89 y.o. female patient who originally presented to the hospital on 2024 with underlying history of pulmonary artery hypertension, paroxysmal A-fib, dementia presenting with right-sided chest pain.  On presentation patient noted to be markedly hypertensive and in A-fib with RVR.  CT PE protocol notes no PE but does note increased right heart pressure, small pleural effusions and evidence of hepatic congestion.  She was  noted to be volume overloaded and started on IV Lasix.  Admitted for further evaluation.   Echo notes EF 60% with severe tricuspid regurgitation and right ventricular systolic pressure 77 mmHg, CT PE notes cardiomegaly, tiny dependent pleural effusions on the right with associated atelectasis.  Reflux of contrast into the intrahepatic IVC and portal vein suggest CHF with increased right heart pressure.  Heterogeneous enhancement of the liver suspect passive hepatic congestion.  S/p left-sided lobectomy.  Patient received IV Lasix 40 twice daily and transition to oral Lasix.  Cleared by cardiology for discharge.   She was noted to have age-indeterminate osteoporotic compression fractures in her lumbar spine.  Seen by orthopedic surgery, recommended PT OT and no acute intervention.  SNF recommendation.       Hospital Follow up   Patient here today and reports that [facility] and is back home with her family. Patient here and rpeorts that at night having lots of coughing and having lots of clear mucus coming out just having the coughing. She has nursing and PT coming to the home to help rehabilitate her. Her appetite is also low. She had her diuretic increased and eliquis       Review of Systems   Constitutional:  Negative for activity change, appetite change, chills, diaphoresis, fatigue, fever and unexpected weight change.   HENT:  Negative for congestion, ear pain, hearing loss, postnasal drip, sinus pressure, sinus pain, sneezing and sore throat.    Eyes:  Negative for pain, redness and visual disturbance.   Respiratory:  Positive for cough. Negative for shortness of breath.    Cardiovascular:  Negative for chest pain and leg swelling.   Gastrointestinal:  Negative for abdominal pain, diarrhea, nausea and vomiting.   Endocrine: Negative.    Genitourinary: Negative.    Musculoskeletal:  Positive for gait problem and myalgias. Negative for arthralgias.   Skin: Negative.     Allergic/Immunologic: Negative.    Neurological:  Negative for dizziness and light-headedness.   Psychiatric/Behavioral:  Negative for behavioral problems and dysphoric mood.      Past Medical History:   Diagnosis Date   • Ankle fracture    • Arthritis     left hip   • 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HCC)     with left ureter   • Bronchitis    • Cancer (HCC)    • Carcinoma, lung (HCC)    • Dementia (HCC)    • Dependent edema    • Depression    • Diabetes mellitus (HCC)    • Hypercholesterolemia    • Hypertension    • Kidney stone    • Oth abn and inconclusive findings on dx imaging of breast    • Pes planus    • Renal calculus    • Restless leg syndrome    • Rib pain    • Sprain, neck    • Varicose veins of left lower extremity      Past Surgical History:   Procedure Laterality Date   • APPENDECTOMY     • LUNG CANCER SURGERY       Family History   Problem Relation Age of Onset   • No Known Problems Mother    • No Known Problems Father    • Dementia Brother    • Breast cancer Neg Hx    • Colon cancer Neg Hx    • Ovarian cancer Neg Hx    • Uterine cancer Neg Hx    • Cervical cancer Neg Hx      Social History     Tobacco Use   • Smoking status: Former     Current packs/day: 0.50     Average packs/day: 0.5 packs/day for 20.0 years (10.0 ttl pk-yrs)     Types: Cigarettes   • Smokeless tobacco: Never   Substance and Sexual Activity   • Alcohol use: Never   • Drug use: Never   • Sexual activity: Not Currently     Birth control/protection: Post-menopausal     Current Outpatient Medications on File Prior to Visit   Medication Sig   • apixaban (Eliquis) 5 mg Take 1 tablet (5 mg total) by mouth 2 (two) times a day   • ergocalciferol (VITAMIN D2) 50,000 units TAKE 1 CAPSULE BY MOUTH ONE TIME PER WEEK   • furosemide (LASIX) 40 mg tablet Take 1 tablet (40 mg total) by mouth 2 (two) times a day   • gabapentin (NEURONTIN) 100 mg capsule Take 1 capsule (100 mg total) by mouth 3 (three) times a day   • mirtazapine (REMERON) 15 mg tablet TAKE 0.5  "TABLETS (7.5 MG TOTAL) BY MOUTH DAILY AT BEDTIME   • Misc. Devices (Mattress Cover) MISC Use daily   • QUEtiapine (SEROquel) 25 mg tablet Take 1 tablet (25 mg total) by mouth daily at bedtime     Allergies   Allergen Reactions   • Albuterol    • Aldactone [Spironolactone]    • Aspirin    • Atenolol    • Bactrim [Sulfamethoxazole-Trimethoprim]    • Codeine    • Hydrocodone    • Ibuprofen    • Lisinopril    • Mevacor [Lovastatin]    • Mirapex [Pramipexole]    • Morphine And Codeine    • Other      novacaine   • Penicillins    • Procaine    • Salicylates    • Ultram [Tramadol]    • Zoloft [Sertraline]      Night sweats     Immunization History   Administered Date(s) Administered   • COVID-19 PFIZER VACCINE 0.3 ML IM 03/18/2021, 04/08/2021   • INFLUENZA 10/23/2014, 11/22/2015, 02/14/2018, 11/06/2018, 09/29/2022   • Influenza, high dose seasonal 0.7 mL 09/25/2020, 10/07/2021, 09/29/2022   • Influenza, recombinant, quadrivalent,injectable, preservative free 10/11/2019   • Tuberculin Skin Test 10/22/2019, 11/01/2019     Objective   {Disappearing Hyperlinks   Review Vitals * Enter New Vitals * Results Review * Labs * Imaging * Cardiology * Procedures * Lung Cancer Screening :89355}  /78 (BP Location: Left arm)   Pulse 97   Temp 97.8 °F (36.6 °C) (Temporal)   Ht 4' 11\" (1.499 m)   Wt 62.9 kg (138 lb 11.2 oz)   SpO2 99%   BMI 28.01 kg/m²     Physical Exam  Vitals and nursing note reviewed.   Constitutional:       General: She is not in acute distress.     Appearance: She is well-developed.   HENT:      Head: Normocephalic and atraumatic.   Eyes:      Pupils: Pupils are equal, round, and reactive to light.   Neck:      Thyroid: No thyromegaly.   Cardiovascular:      Rate and Rhythm: Normal rate and regular rhythm.      Heart sounds: Normal heart sounds. No murmur heard.  Pulmonary:      Effort: Pulmonary effort is normal. No respiratory distress.      Breath sounds: Examination of the right-lower field reveals " decreased breath sounds. Examination of the left-lower field reveals decreased breath sounds. Decreased breath sounds present. No wheezing.   Abdominal:      General: Bowel sounds are normal.      Palpations: Abdomen is soft.   Musculoskeletal:         General: Normal range of motion.      Cervical back: Normal range of motion.   Skin:     General: Skin is warm and dry.   Neurological:      Mental Status: She is alert and oriented to person, place, and time.       Administrative Statements {Disappearing Hyperlinks I  Level of Service * [id number]}

## 2024-06-04 NOTE — ASSESSMENT & PLAN NOTE
Wt Readings from Last 3 Encounters:   06/04/24 62.9 kg (138 lb 11.2 oz)   05/15/24 63.4 kg (139 lb 12.4 oz)   04/11/24 61.9 kg (136 lb 6.4 oz)     Weight is stable

## 2024-06-05 DIAGNOSIS — N18.4 STAGE 4 CHRONIC KIDNEY DISEASE (HCC): Primary | ICD-10-CM

## 2024-06-05 LAB
BASOPHILS # BLD AUTO: 0.04 THOUSANDS/ÂΜL (ref 0–0.1)
BASOPHILS NFR BLD AUTO: 0 % (ref 0–1)
EOSINOPHIL # BLD AUTO: 0.24 THOUSAND/ÂΜL (ref 0–0.61)
EOSINOPHIL NFR BLD AUTO: 2 % (ref 0–6)
ERYTHROCYTE [DISTWIDTH] IN BLOOD BY AUTOMATED COUNT: 14.6 % (ref 11.6–15.1)
HCT VFR BLD AUTO: 39.3 % (ref 34.8–46.1)
HGB BLD-MCNC: 12 G/DL (ref 11.5–15.4)
IMM GRANULOCYTES # BLD AUTO: 0.04 THOUSAND/UL (ref 0–0.2)
IMM GRANULOCYTES NFR BLD AUTO: 0 % (ref 0–2)
LYMPHOCYTES # BLD AUTO: 0.98 THOUSANDS/ÂΜL (ref 0.6–4.47)
LYMPHOCYTES NFR BLD AUTO: 10 % (ref 14–44)
MCH RBC QN AUTO: 27.5 PG (ref 26.8–34.3)
MCHC RBC AUTO-ENTMCNC: 30.5 G/DL (ref 31.4–37.4)
MCV RBC AUTO: 90 FL (ref 82–98)
MONOCYTES # BLD AUTO: 1.39 THOUSAND/ÂΜL (ref 0.17–1.22)
MONOCYTES NFR BLD AUTO: 14 % (ref 4–12)
NEUTROPHILS # BLD AUTO: 7.58 THOUSANDS/ÂΜL (ref 1.85–7.62)
NEUTS SEG NFR BLD AUTO: 74 % (ref 43–75)
NRBC BLD AUTO-RTO: 0 /100 WBCS
PLATELET # BLD AUTO: 306 THOUSANDS/UL (ref 149–390)
PMV BLD AUTO: 11.9 FL (ref 8.9–12.7)
RBC # BLD AUTO: 4.36 MILLION/UL (ref 3.81–5.12)
WBC # BLD AUTO: 10.27 THOUSAND/UL (ref 4.31–10.16)

## 2024-06-06 ENCOUNTER — TELEPHONE (OUTPATIENT)
Dept: FAMILY MEDICINE CLINIC | Facility: CLINIC | Age: 89
End: 2024-06-06

## 2024-06-06 ENCOUNTER — TELEPHONE (OUTPATIENT)
Age: 89
End: 2024-06-06

## 2024-06-06 NOTE — TELEPHONE ENCOUNTER
----- Message from FARZANA Carter sent at 6/5/2024  6:41 PM EDT -----  Cbc was normal   BNP was better than last check heart failure number   CMP showing normal liver function and electrolytes the renal function is decreased 1.63 creatinine is related to her diuretics but need to maintain her heart. Would like to recheck her kidney function in 2 weeks to make sure stable I will order

## 2024-06-06 NOTE — TELEPHONE ENCOUNTER
Vita with [facility], called requesting some equipment :    Transport wheel chair with cushion  And a   Tub transfer bench    Insurance ,including medicaid needs to be sent with orders and please [facility]

## 2024-06-13 ENCOUNTER — TELEPHONE (OUTPATIENT)
Age: 89
End: 2024-06-13

## 2024-06-13 NOTE — TELEPHONE ENCOUNTER
Xi, pt's niece called, needs pt discharge paperwork from hospital visit 5/12-5/15 and also [facility] to her  at [facility]. Fax #[phone] attn to Gloria. Please advise.

## 2024-06-13 NOTE — TELEPHONE ENCOUNTER
This writer faxed the Hospital Discharge information from the patient's stay at [facility] from 5/12 to 5/15 to [phone]. This writer informed the niece that we do not have any of the information from [facility] where she went to after that. This writer reached out to [place] and requested they faxed this information to our office to add to her chart.

## 2024-06-13 NOTE — TELEPHONE ENCOUNTER
Physical therapist reports pts VSS: Pt reports she has some abdominal pain, and frequent urination.Home nurse is visiting  on 6/14/24 to obtain a urine sample. Any questions/concerns please call elisa @  [phone]

## 2024-06-14 ENCOUNTER — TELEPHONE (OUTPATIENT)
Age: 89
End: 2024-06-14

## 2024-06-14 ENCOUNTER — APPOINTMENT (OUTPATIENT)
Dept: LAB | Facility: CLINIC | Age: 89
End: 2024-06-14
Payer: COMMERCIAL

## 2024-06-14 DIAGNOSIS — R39.9 UTI SYMPTOMS: Primary | ICD-10-CM

## 2024-06-14 DIAGNOSIS — R39.9 UTI SYMPTOMS: ICD-10-CM

## 2024-06-14 DIAGNOSIS — N18.4 STAGE 4 CHRONIC KIDNEY DISEASE (HCC): ICD-10-CM

## 2024-06-14 LAB
ANION GAP SERPL CALCULATED.3IONS-SCNC: 10 MMOL/L (ref 4–13)
BUN SERPL-MCNC: 30 MG/DL (ref 5–25)
CALCIUM SERPL-MCNC: 8.7 MG/DL (ref 8.4–10.2)
CHLORIDE SERPL-SCNC: 100 MMOL/L (ref 96–108)
CO2 SERPL-SCNC: 31 MMOL/L (ref 21–32)
CREAT SERPL-MCNC: 1.51 MG/DL (ref 0.6–1.3)
GFR SERPL CREATININE-BSD FRML MDRD: 28 ML/MIN/1.73SQ M
GLUCOSE SERPL-MCNC: 127 MG/DL (ref 65–140)
POTASSIUM SERPL-SCNC: 4 MMOL/L (ref 3.5–5.3)
SODIUM SERPL-SCNC: 141 MMOL/L (ref 135–147)

## 2024-06-14 PROCEDURE — 80048 BASIC METABOLIC PNL TOTAL CA: CPT

## 2024-06-14 PROCEDURE — 36415 COLL VENOUS BLD VENIPUNCTURE: CPT

## 2024-06-14 NOTE — TELEPHONE ENCOUNTER
[facility] that yesterday patient had physical therapy and said about belly pain and pain with urination. States that today when he went to see patient, initially she said there were no issues, toward end of appointment, patient then mentioned about belly pain and pain with urination. Rodriguez states patient had a bowel movement today but experienced difficulty urinating. States that patient does not have a fever, urine looked and smelled normal per the patient's niece. States he left a specimen collection cup for patient. States nikorina Xi said that orders for UA and Culture and Sensitivity have been placed before for patient. States order needs to be put in so when specimen is taken, there won't be an issue. Rodriguez Layne can be contacted at [phone] when order is in the system.       Please advise.

## 2024-06-15 DIAGNOSIS — F01.518 VASCULAR DEMENTIA WITH BEHAVIORAL DISTURBANCE (HCC): ICD-10-CM

## 2024-06-17 ENCOUNTER — TELEPHONE (OUTPATIENT)
Dept: FAMILY MEDICINE CLINIC | Facility: CLINIC | Age: 89
End: 2024-06-17

## 2024-06-17 RX ORDER — QUETIAPINE FUMARATE 25 MG/1
25 TABLET, FILM COATED ORAL
Qty: 90 TABLET | Refills: 3 | Status: SHIPPED | OUTPATIENT
Start: 2024-06-17

## 2024-06-17 NOTE — TELEPHONE ENCOUNTER
----- Message from FARZANA Carter sent at 6/15/2024  1:52 PM EDT -----  BMP is showing stable renal function is decreased how is she feeling

## 2024-06-18 ENCOUNTER — TELEPHONE (OUTPATIENT)
Age: 89
End: 2024-06-18

## 2024-06-18 ENCOUNTER — NURSE TRIAGE (OUTPATIENT)
Age: 89
End: 2024-06-18

## 2024-06-18 NOTE — TELEPHONE ENCOUNTER
S/W Xi and she is making sure she is hydrated and she does not take anything OTC for constipation as she has never had this issue. Last BM was 3 days ago but pt reported to xi that it was not enough.

## 2024-06-18 NOTE — TELEPHONE ENCOUNTER
Nurse attempted to call Sonia, and no one answered.  Please further advise message albert.   Thank you    Message from Fartun COWAN sent at 6/18/2024  3:25 PM EDT    Summary: Constipation with out abdominal pain    Xi called in for patient. Patient is experiencing constipation for 3 days. Patient does not have abdominal pain.  Xi is on the medical consent and would like the return phone call.

## 2024-06-21 ENCOUNTER — TELEPHONE (OUTPATIENT)
Age: 89
End: 2024-06-21

## 2024-06-21 NOTE — TELEPHONE ENCOUNTER
Rhina from [facility] calling to advise that the patients weight went from 132 to 136 in 3 days. Patient does have slight edema in both legs but is not short of breath

## 2024-06-22 ENCOUNTER — NURSE TRIAGE (OUTPATIENT)
Dept: OTHER | Facility: OTHER | Age: 89
End: 2024-06-22

## 2024-06-22 NOTE — TELEPHONE ENCOUNTER
"Regarding: lab question  ----- Message from Steph HOWARD sent at 6/22/2024  1:01 PM EDT -----  \"I have a specimen for the lab but they are not open today. Am I allowed to keep it in the fridge until I can take it to them.\"    "

## 2024-06-22 NOTE — TELEPHONE ENCOUNTER
"Pt's elisa was able to collect urine sample today but lab is closed. Inquiring if can be kept refrigerated until lab is open.  Reason for Disposition   Health Information question, no triage required and triager able to answer question    Answer Assessment - Initial Assessment Questions  1. REASON FOR CALL or QUESTION: \"What is your reason for calling today?\" or \"How can I best help you?\" or \"What question do you have that I can help answer?\"      Pt's niece was able to collect urine sample today but lab is closed. Inquiring if can be kept refrigerated until lab is open.    Protocols used: Information Only Call-ADULT-      Advised pt's niece to keep sample refrigerated and call lab directly on Monday to see if they will be able to process.  "

## 2024-06-24 ENCOUNTER — APPOINTMENT (OUTPATIENT)
Dept: LAB | Facility: CLINIC | Age: 89
End: 2024-06-24
Payer: COMMERCIAL

## 2024-06-24 LAB
BILIRUB UR QL STRIP: NEGATIVE
CLARITY UR: CLEAR
COLOR UR: NORMAL
GLUCOSE UR STRIP-MCNC: NEGATIVE MG/DL
HGB UR QL STRIP.AUTO: NEGATIVE
KETONES UR STRIP-MCNC: NEGATIVE MG/DL
LEUKOCYTE ESTERASE UR QL STRIP: NEGATIVE
NITRITE UR QL STRIP: NEGATIVE
PH UR STRIP.AUTO: 6.5 [PH]
PROT UR STRIP-MCNC: NEGATIVE MG/DL
SP GR UR STRIP.AUTO: 1.01 (ref 1–1.03)
UROBILINOGEN UR STRIP-ACNC: <2 MG/DL

## 2024-06-24 PROCEDURE — 81003 URINALYSIS AUTO W/O SCOPE: CPT

## 2024-06-24 PROCEDURE — 87086 URINE CULTURE/COLONY COUNT: CPT

## 2024-06-25 LAB — BACTERIA UR CULT: NORMAL

## 2024-07-02 ENCOUNTER — TELEPHONE (OUTPATIENT)
Age: 89
End: 2024-07-02

## 2024-07-02 NOTE — TELEPHONE ENCOUNTER
Ganesh [facility] called, states she wanted to update provider on patient's weight. States today patient's weight was 131.5 pounds, on 06/27/2024, patient weighted 134.5. States if provider would like to change weight parameters (currently set at 135-141), Jaguar can be called back at .

## 2024-07-03 ENCOUNTER — APPOINTMENT (EMERGENCY)
Dept: CT IMAGING | Facility: HOSPITAL | Age: 89
DRG: 312 | End: 2024-07-03
Payer: COMMERCIAL

## 2024-07-03 ENCOUNTER — HOSPITAL ENCOUNTER (INPATIENT)
Facility: HOSPITAL | Age: 89
LOS: 2 days | Discharge: HOME HEALTH | DRG: 312 | End: 2024-07-05
Attending: EMERGENCY MEDICINE | Admitting: INTERNAL MEDICINE
Payer: COMMERCIAL

## 2024-07-03 ENCOUNTER — TELEPHONE (OUTPATIENT)
Age: 89
End: 2024-07-03

## 2024-07-03 ENCOUNTER — APPOINTMENT (EMERGENCY)
Dept: RADIOLOGY | Facility: HOSPITAL | Age: 89
DRG: 312 | End: 2024-07-03
Payer: COMMERCIAL

## 2024-07-03 DIAGNOSIS — M54.6 ACUTE MIDLINE THORACIC BACK PAIN: ICD-10-CM

## 2024-07-03 DIAGNOSIS — F01.50 VASCULAR DEMENTIA, UNCOMPLICATED (HCC): ICD-10-CM

## 2024-07-03 DIAGNOSIS — I48.0 PAROXYSMAL A-FIB (HCC): ICD-10-CM
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
## 2024-07-03 DIAGNOSIS — I50.9 ACUTE EXACERBATION OF CHF (CONGESTIVE HEART FAILURE) (HCC): ICD-10-CM

## 2024-07-03 DIAGNOSIS — R26.2 AMBULATORY DYSFUNCTION: Primary | ICD-10-CM

## 2024-07-03 DIAGNOSIS — N18.4 CKD (CHRONIC KIDNEY DISEASE) STAGE 4, GFR 15-29 ML/MIN (HCC): ICD-10-CM

## 2024-07-03 PROBLEM — R42 DIZZINESS: Status: ACTIVE | Noted: 2021-12-13
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
## 2024-07-03 LAB
2HR DELTA HS TROPONIN: 0 NG/L
ALBUMIN SERPL BCG-MCNC: 3.7 G/DL (ref 3.5–5)
ALP SERPL-CCNC: 94 U/L (ref 34–104)
ALT SERPL W P-5'-P-CCNC: 10 U/L (ref 7–52)
ANION GAP SERPL CALCULATED.3IONS-SCNC: 7 MMOL/L (ref 4–13)
AST SERPL W P-5'-P-CCNC: 16 U/L (ref 13–39)
ATRIAL RATE: 92 BPM
BASOPHILS # BLD AUTO: 0.03 THOUSANDS/ÂΜL (ref 0–0.1)
BASOPHILS NFR BLD AUTO: 0 % (ref 0–1)
BILIRUB SERPL-MCNC: 0.66 MG/DL (ref 0.2–1)
BUN SERPL-MCNC: 38 MG/DL (ref 5–25)
CALCIUM SERPL-MCNC: 8.8 MG/DL (ref 8.4–10.2)
CARDIAC TROPONIN I PNL SERPL HS: 21 NG/L
CARDIAC TROPONIN I PNL SERPL HS: 21 NG/L
CHLORIDE SERPL-SCNC: 102 MMOL/L (ref 96–108)
CO2 SERPL-SCNC: 31 MMOL/L (ref 21–32)
CREAT SERPL-MCNC: 1.34 MG/DL (ref 0.6–1.3)
EOSINOPHIL # BLD AUTO: 0.18 THOUSAND/ÂΜL (ref 0–0.61)
EOSINOPHIL NFR BLD AUTO: 3 % (ref 0–6)
ERYTHROCYTE [DISTWIDTH] IN BLOOD BY AUTOMATED COUNT: 15.1 % (ref 11.6–15.1)
GFR SERPL CREATININE-BSD FRML MDRD: 32 ML/MIN/1.73SQ M
GLUCOSE SERPL-MCNC: 125 MG/DL (ref 65–140)
HCT VFR BLD AUTO: 35.8 % (ref 34.8–46.1)
HGB BLD-MCNC: 11.2 G/DL (ref 11.5–15.4)
IMM GRANULOCYTES # BLD AUTO: 0.03 THOUSAND/UL (ref 0–0.2)
IMM GRANULOCYTES NFR BLD AUTO: 0 % (ref 0–2)
LIPASE SERPL-CCNC: 18 U/L (ref 11–82)
LYMPHOCYTES # BLD AUTO: 0.96 THOUSANDS/ÂΜL (ref 0.6–4.47)
LYMPHOCYTES NFR BLD AUTO: 13 % (ref 14–44)
MCH RBC QN AUTO: 26.2 PG (ref 26.8–34.3)
MCHC RBC AUTO-ENTMCNC: 31.3 G/DL (ref 31.4–37.4)
MCV RBC AUTO: 84 FL (ref 82–98)
MONOCYTES # BLD AUTO: 0.99 THOUSAND/ÂΜL (ref 0.17–1.22)
MONOCYTES NFR BLD AUTO: 14 % (ref 4–12)
NEUTROPHILS # BLD AUTO: 4.98 THOUSANDS/ÂΜL (ref 1.85–7.62)
NEUTS SEG NFR BLD AUTO: 70 % (ref 43–75)
NRBC BLD AUTO-RTO: 0 /100 WBCS
PLATELET # BLD AUTO: 326 THOUSANDS/UL (ref 149–390)
PMV BLD AUTO: 9.6 FL (ref 8.9–12.7)
POTASSIUM SERPL-SCNC: 3.5 MMOL/L (ref 3.5–5.3)
PROT SERPL-MCNC: 7.6 G/DL (ref 6.4–8.4)
QRS AXIS: 84 DEGREES
QRSD INTERVAL: 102 MS
QT INTERVAL: 408 MS
QTC INTERVAL: 488 MS
RBC # BLD AUTO: 4.28 MILLION/UL (ref 3.81–5.12)
SODIUM SERPL-SCNC: 140 MMOL/L (ref 135–147)
T WAVE AXIS: 42 DEGREES
VENTRICULAR RATE: 86 BPM
WBC # BLD AUTO: 7.17 THOUSAND/UL (ref 4.31–10.16)

## 2024-07-03 PROCEDURE — 84484 ASSAY OF TROPONIN QUANT: CPT | Performed by: EMERGENCY MEDICINE

## 2024-07-03 PROCEDURE — 99285 EMERGENCY DEPT VISIT HI MDM: CPT | Performed by: EMERGENCY MEDICINE

## 2024-07-03 PROCEDURE — 99285 EMERGENCY DEPT VISIT HI MDM: CPT

## 2024-07-03 PROCEDURE — 97163 PT EVAL HIGH COMPLEX 45 MIN: CPT

## 2024-07-03 PROCEDURE — 74176 CT ABD & PELVIS W/O CONTRAST: CPT

## 2024-07-03 PROCEDURE — 72125 CT NECK SPINE W/O DYE: CPT

## 2024-07-03 PROCEDURE — 83690 ASSAY OF LIPASE: CPT | Performed by: EMERGENCY MEDICINE

## 2024-07-03 PROCEDURE — 93005 ELECTROCARDIOGRAM TRACING: CPT

## 2024-07-03 PROCEDURE — 85025 COMPLETE CBC W/AUTO DIFF WBC: CPT | Performed by: EMERGENCY MEDICINE

## 2024-07-03 PROCEDURE — 36415 COLL VENOUS BLD VENIPUNCTURE: CPT | Performed by: EMERGENCY MEDICINE

## 2024-07-03 PROCEDURE — 93010 ELECTROCARDIOGRAM REPORT: CPT | Performed by: INTERNAL MEDICINE

## 2024-07-03 PROCEDURE — 99222 1ST HOSP IP/OBS MODERATE 55: CPT | Performed by: STUDENT IN AN ORGANIZED HEALTH CARE EDUCATION/TRAINING PROGRAM

## 2024-07-03 PROCEDURE — 71250 CT THORAX DX C-: CPT

## 2024-07-03 PROCEDURE — 70450 CT HEAD/BRAIN W/O DYE: CPT

## 2024-07-03 PROCEDURE — 71045 X-RAY EXAM CHEST 1 VIEW: CPT

## 2024-07-03 PROCEDURE — 80053 COMPREHEN METABOLIC PANEL: CPT | Performed by: EMERGENCY MEDICINE

## 2024-07-03 RX ORDER — ACETAMINOPHEN 325 MG/1
975 TABLET ORAL ONCE
Status: COMPLETED | OUTPATIENT
Start: 2024-07-03 | End: 2024-07-03

## 2024-07-03 RX ORDER — MIRTAZAPINE 15 MG/1
7.5 TABLET, FILM COATED ORAL
Status: DISCONTINUED | OUTPATIENT
Start: 2024-07-03 | End: 2024-07-05 | Stop reason: HOSPADM

## 2024-07-03 RX ORDER — FUROSEMIDE 40 MG/1
40 TABLET ORAL 2 TIMES DAILY
Status: DISCONTINUED | OUTPATIENT
Start: 2024-07-03 | End: 2024-07-04

## 2024-07-03 RX ORDER — QUETIAPINE FUMARATE 25 MG/1
25 TABLET, FILM COATED ORAL
Status: DISCONTINUED | OUTPATIENT
Start: 2024-07-03 | End: 2024-07-05 | Stop reason: HOSPADM

## 2024-07-03 RX ORDER — GABAPENTIN 100 MG/1
100 CAPSULE ORAL 3 TIMES DAILY
Status: DISCONTINUED | OUTPATIENT
Start: 2024-07-03 | End: 2024-07-05 | Stop reason: HOSPADM

## 2024-07-03 RX ADMIN — APIXABAN 5 MG: 5 TABLET, FILM COATED ORAL at 17:36

## 2024-07-03 RX ADMIN — GABAPENTIN 100 MG: 100 CAPSULE ORAL at 17:37

## 2024-07-03 RX ADMIN — GABAPENTIN 100 MG: 100 CAPSULE ORAL at 21:11

## 2024-07-03 RX ADMIN — FUROSEMIDE 40 MG: 40 TABLET ORAL at 17:37

## 2024-07-03 RX ADMIN — QUETIAPINE FUMARATE 25 MG: 25 TABLET ORAL at 21:11

## 2024-07-03 RX ADMIN — MIRTAZAPINE 7.5 MG: 15 TABLET, FILM COATED ORAL at 21:11

## 2024-07-03 RX ADMIN — ACETAMINOPHEN 975 MG: 325 TABLET, FILM COATED ORAL at 10:33

## 2024-07-03 NOTE — TELEPHONE ENCOUNTER
Its a weight loss - caregiver stated that she had a fall this AM out of bed and is going to the hospital to be evaluated.

## 2024-07-03 NOTE — ASSESSMENT & PLAN NOTE
Wt Readings from Last 3 Encounters:   06/04/24 62.9 kg (138 lb 11.2 oz)   05/15/24 63.4 kg (139 lb 12.4 oz)   04/11/24 61.9 kg (136 lb 6.4 oz)   Appears euvolemic on exam.  Continue home diuretics for now

## 2024-07-03 NOTE — TELEPHONE ENCOUNTER
Veda from [facility] called.  States that pt's caregiver initially told nursing that they won't be in town till Sunday, however on Monday they called [facility] and stated they didn't really feel they needed nursing services.  Veda states that nursing services will be D/c'd but that PT services will continue.  Just FYI.

## 2024-07-03 NOTE — TELEPHONE ENCOUNTER
7/3 Left Message on Machine to Call Back for both April at [facility] as well as Filemon's caregiver

## 2024-07-03 NOTE — ED PROVIDER NOTES
History  Chief Complaint   Patient presents with    Fall     PT arrives VIA EMS. Unwitnessed fall onto right side landing on carpet this AM. +HS on carpet, +BT, -LOC. C/o of upper back pain. Denies headache.      89-year-old female history of dementia, A-fib on Eliquis, bladder cancer, diabetes, hypertension presenting with back pain after fall.  Patient reports mechanical ground-level fall falling between her bed and the wall.  No reported LOC.  Complaining of midthoracic back pain.  Denies any other pain or injury.  Denies any headache or neck pain.  Denies any chest pain shortness of breath.  Denies abdominal pain nausea vomiting diarrhea.  Denies any other complaints.  Chart reviewed.    Airway intact   Breathing clear to auscultation bilaterally  Circulation 2+ DP PT pulses and radial pulses bilaterally  Disability GCS 15  Exposure completed    Past Medical History:  No date: Ankle fracture  No date: Arthritis      Comment:  left hip  No date: 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HCC)      Comment:  with left ureter  No date: Bronchitis  No date: Cancer (HCC)  No date: Carcinoma, lung (HCC)  No date: Dementia (HCC)  No date: Dependent edema  No date: Depression  No date: Diabetes mellitus (HCC)  No date: Hypercholesterolemia  No date: Hypertension  No date: Kidney stone  No date: Oth abn and inconclusive findings on dx imaging of breast  No date: Pes planus  No date: Renal calculus  No date: Restless leg syndrome  No date: Rib pain  No date: Sprain, neck  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 Taking?   Misc. Devices (Mattress Cover) MISC  Self No No   Sig: Use daily   QUEtiapine (SEROquel) 25 mg tablet   No No   Sig: TAKE 1 TABLET BY MOUTH DAILY AT BEDTIME   apixaban (Eliquis) 5 mg   No No   Sig: Take 1 tablet (5 mg total) by mouth 2 (two) times a day   ergocalciferol (VITAMIN D2)  50,000 units   No No   Sig: TAKE 1 CAPSULE BY MOUTH ONE TIME PER WEEK   furosemide (LASIX) 40 mg tablet   No No   Sig: Take 1 tablet (40 mg total) by mouth 2 (two) times a day   gabapentin (NEURONTIN) 100 mg capsule   No No   Sig: Take 1 capsule (100 mg total) by mouth 3 (three) times a day   mirtazapine (REMERON) 15 mg tablet   No No   Sig: TAKE 0.5 TABLETS (7.5 MG TOTAL) BY MOUTH DAILY AT BEDTIME      Facility-Administered Medications: None       Past Medical History:   Diagnosis Date    Ankle fracture     Arthritis     left hip    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HCC)     with left ureter    Bronchitis     Cancer (HCC)     Carcinoma, lung (HCC)     Dementia (HCC)     Dependent edema     Depression     Diabetes mellitus (HCC)     Hypercholesterolemia     Hypertension     Kidney stone     Oth abn and inconclusive findings on dx imaging of breast     Pes planus     Renal calculus     Restless leg syndrome     Rib pain     Sprain, neck     Varicose veins of left lower extremity        Past Surgical History:   Procedure Laterality Date    APPENDECTOMY      LUNG CANCER SURGERY         Family History   Problem Relation Age of Onset    No Known Problems Mother     No Known Problems Father     Dementia Brother     Breast cancer Neg Hx     Colon cancer Neg Hx     Ovarian cancer Neg Hx     Uterine cancer Neg Hx     Cervical cancer Neg Hx      I have reviewed and agree with the history as documented.    E-Cigarette/Vaping     E-Cigarette/Vaping Substances     Social History     Tobacco Use    Smoking status: Former     Current packs/day: 0.50     Average packs/day: 0.5 packs/day for 20.0 years (10.0 ttl pk-yrs)     Types: Cigarettes    Smokeless tobacco: Never   Substance Use Topics    Alcohol use: Never    Drug use: Never       Review of Systems   Constitutional:  Negative for appetite change, chills, diaphoresis, fever and unexpected weight change.   HENT:  Negative for congestion and rhinorrhea.    Eyes:  Negative for photophobia and  visual disturbance.   Respiratory:  Negative for cough, chest tightness and shortness of breath.    Cardiovascular:  Negative for chest pain, palpitations and leg swelling.   Gastrointestinal:  Negative for abdominal distention, abdominal pain, blood in stool, constipation, diarrhea, nausea and vomiting.   Genitourinary:  Negative for dysuria and hematuria.   Musculoskeletal:  Positive for back pain. Negative for joint swelling, neck pain and neck stiffness.   Skin:  Negative for color change, pallor, rash and wound.   Neurological:  Negative for dizziness, syncope, weakness, light-headedness and headaches.   Psychiatric/Behavioral:  Negative for agitation.    All other systems reviewed and are negative.      Physical Exam  Physical Exam  Vitals and nursing note reviewed.   Constitutional:       General: She is not in acute distress.     Appearance: Normal appearance. She is well-developed. She is not ill-appearing, toxic-appearing or diaphoretic.   HENT:      Head: Normocephalic and atraumatic.      Nose: Nose normal. No congestion or rhinorrhea.      Mouth/Throat:      Mouth: Mucous membranes are moist.      Pharynx: Oropharynx is clear. No oropharyngeal exudate or posterior oropharyngeal erythema.   Eyes:      General: No scleral icterus.        Right eye: No discharge.         Left eye: No discharge.      Extraocular Movements: Extraocular movements intact.      Conjunctiva/sclera: Conjunctivae normal.      Pupils: Pupils are equal, round, and reactive to light.   Neck:      Vascular: No JVD.      Trachea: No tracheal deviation.      Comments: Supple. Normal range of motion.   Cardiovascular:      Rate and Rhythm: Normal rate and regular rhythm.      Heart sounds: Normal heart sounds. No murmur heard.     No friction rub. No gallop.      Comments: Normal rate and regular rhythm  Pulmonary:      Effort: Pulmonary effort is normal. No respiratory distress.      Breath sounds: Normal breath sounds. No stridor. No  wheezing or rales.      Comments: Clear to auscultation bilaterally  Chest:      Chest wall: No tenderness.   Abdominal:      General: Bowel sounds are normal. There is no distension.      Palpations: Abdomen is soft.      Tenderness: There is no abdominal tenderness. There is no right CVA tenderness, left CVA tenderness, guarding or rebound.      Comments: Soft, nontender, nondistended.  Normal bowel sounds throughout   Musculoskeletal:         General: Tenderness present. No swelling, deformity or signs of injury. Normal range of motion.      Cervical back: Normal range of motion and neck supple. No rigidity or tenderness. No muscular tenderness.      Right lower leg: No edema.      Left lower leg: No edema.      Comments: Midthoracic back tenderness   Lymphadenopathy:      Cervical: No cervical adenopathy.   Skin:     General: Skin is warm and dry.      Coloration: Skin is not pale.      Findings: No erythema or rash.   Neurological:      General: No focal deficit present.      Mental Status: She is alert. Mental status is at baseline.      Sensory: No sensory deficit.      Motor: No weakness or abnormal muscle tone.      Coordination: Coordination normal.      Comments: Alert.  Oriented to self.  Strength and sensation grossly intact.     Psychiatric:         Behavior: Behavior normal.         Thought Content: Thought content normal.         Vital Signs  ED Triage Vitals   Temperature Pulse Respirations Blood Pressure SpO2   07/03/24 1000 07/03/24 1000 07/03/24 1000 07/03/24 1000 07/03/24 1000   98 °F (36.7 °C) 83 20 (!) 174/77 95 %      Temp Source Heart Rate Source Patient Position - Orthostatic VS BP Location FiO2 (%)   07/03/24 1000 07/03/24 1000 07/03/24 1000 07/03/24 1000 --   Oral Monitor Lying Left arm       Pain Score       07/03/24 1033       9           Vitals:    07/03/24 1300 07/03/24 1400 07/03/24 1415 07/03/24 1500   BP: 140/60 160/66 134/63 109/56   Pulse: 82 76 80 79   Patient Position -  Orthostatic VS:  Lying           Visual Acuity  Visual Acuity      Flowsheet Row Most Recent Value   L Pupil Size (mm) 3   R Pupil Size (mm) 3            ED Medications  Medications   acetaminophen (TYLENOL) tablet 975 mg (975 mg Oral Given 7/3/24 1033)       Diagnostic Studies  Results Reviewed       Procedure Component Value Units Date/Time    HS Troponin I 2hr [[id number]]  (Normal) Collected: 07/03/24 1228    Lab Status: Final result Specimen: Blood from Arm, Left Updated: 07/03/24 1310     hs TnI 2hr 21 ng/L      Delta 2hr hsTnI 0 ng/L     HS Troponin 0hr (reflex protocol) [[id number]]  (Normal) Collected: 07/03/24 1011    Lab Status: Final result Specimen: Blood from Arm, Left Updated: 07/03/24 1040     hs TnI 0hr 21 ng/L     Comprehensive metabolic panel [[id number]]  (Abnormal) Collected: 07/03/24 1011    Lab Status: Final result Specimen: Blood from Arm, Left Updated: 07/03/24 1034     Sodium 140 mmol/L      Potassium 3.5 mmol/L      Chloride 102 mmol/L      CO2 31 mmol/L      ANION GAP 7 mmol/L      BUN 38 mg/dL      Creatinine 1.34 mg/dL      Glucose 125 mg/dL      Calcium 8.8 mg/dL      AST 16 U/L      ALT 10 U/L      Alkaline Phosphatase 94 U/L      Total Protein 7.6 g/dL      Albumin 3.7 g/dL      Total Bilirubin 0.66 mg/dL      eGFR 32 ml/min/1.73sq m     Narrative:      National Kidney Disease Foundation guidelines for Chronic Kidney Disease (CKD):     Stage 1 with normal or high GFR (GFR > 90 mL/min/1.73 square meters)    Stage 2 Mild CKD (GFR = 60-89 mL/min/1.73 square meters)    Stage 3A Moderate CKD (GFR = 45-59 mL/min/1.73 square meters)    Stage 3B Moderate CKD (GFR = 30-44 mL/min/1.73 square meters)    Stage 4 Severe CKD (GFR = 15-29 mL/min/1.73 square meters)    Stage 5 End Stage CKD (GFR <15 mL/min/1.73 square meters)  Note: GFR calculation is accurate only with a steady state creatinine    Lipase [[id number]]  (Normal) Collected: 07/03/24 1011    Lab Status: Final result Specimen: Blood from  Arm, Left Updated: 07/03/24 1034     Lipase 18 u/L     CBC and differential [[id number]]  (Abnormal) Collected: 07/03/24 1011    Lab Status: Final result Specimen: Blood from Arm, Left Updated: 07/03/24 1021     WBC 7.17 Thousand/uL      RBC 4.28 Million/uL      Hemoglobin 11.2 g/dL      Hematocrit 35.8 %      MCV 84 fL      MCH 26.2 pg      MCHC 31.3 g/dL      RDW 15.1 %      MPV 9.6 fL      Platelets 326 Thousands/uL      nRBC 0 /100 WBCs      Segmented % 70 %      Immature Grans % 0 %      Lymphocytes % 13 %      Monocytes % 14 %      Eosinophils Relative 3 %      Basophils Relative 0 %      Absolute Neutrophils 4.98 Thousands/µL      Absolute Immature Grans 0.03 Thousand/uL      Absolute Lymphocytes 0.96 Thousands/µL      Absolute Monocytes 0.99 Thousand/µL      Eosinophils Absolute 0.18 Thousand/µL      Basophils Absolute 0.03 Thousands/µL                    TRAUMA - CT head wo contrast   Final Result by Nando Burrows MD (07/03 1058)      No acute intracranial abnormality.  Chronic microangiopathic changes.      The study was marked in EPIC for immediate notification.            Workstation performed: [id number]         TRAUMA - CT spine cervical wo contrast   Final Result by Nando Burrows MD (07/03 1052)      No cervical spine fracture or traumatic malalignment. Loss of the normal cervical lordosis which is likely secondary to patient positioning, cervical collar, or muscle spasm.      The study was marked in EPIC for immediate notification.            Workstation performed: [id number]         TRAUMA - CT chest abdomen pelvis wo contrast   Final Result by Nando Burrows MD (07/03 1132)      1.  No acute traumatic injury to the chest, abdomen, or pelvis.      2.  No acute spinal fracture.      3.  Large hiatal hernia.      The study was marked in EPIC for immediate notification.      Workstation performed: [id number]         CT recon only thoracolumbar (No Charge)   Final Result by Nando Burrows MD  (07/03 1132)      1.  No acute traumatic injury to the chest, abdomen, or pelvis.      2.  No acute spinal fracture.      3.  Large hiatal hernia.      The study was marked in EPIC for immediate notification.      Workstation performed: [id number]         XR Trauma chest portable   Final Result by Nando Burrows MD (07/03 1044)      No acute cardiopulmonary disease.            Workstation performed: [id number]                    Procedures  Procedures         ED Course             HEART Risk Score      Flowsheet Row Most Recent Value   Heart Score Risk Calculator    History 0 Filed at: 07/03/2024 1200   ECG 1 Filed at: 07/03/2024 1200   Age 2 Filed at: 07/03/2024 1200   Risk Factors 2 Filed at: 07/03/2024 1200   Troponin 1 Filed at: 07/03/2024 1200   HEART Score 6 Filed at: 07/03/2024 1200                          SBIRT 20yo+      Flowsheet Row Most Recent Value   Initial Alcohol Screen: US AUDIT-C     1. How often do you have a drink containing alcohol? 0 Filed at: 07/03/2024 1000   2. How many drinks containing alcohol do you have on a typical day you are drinking?  0 Filed at: 07/03/2024 1000   3b. FEMALE Any Age, or MALE 65+: How often do you have 4 or more drinks on one occassion? 0 Filed at: 07/03/2024 1000   Audit-C Score 0 Filed at: 07/03/2024 1000   ARNULFO: How many times in the past year have you...    Used an illegal drug or used a prescription medication for non-medical reasons? Never Filed at: 07/03/2024 1000                      Medical Decision Making  89-year-old female history of dementia, A-fib on Eliquis, bladder cancer, diabetes, hypertension presenting with back pain after fall.  Fall on Eliquis.  Patient does not recall the events.  Plan for basic labs and cardiac evaluation including EKG troponin.  Trauma evaluation.  Reassess.    EKG interpreted by me with rate controlled A-fib and nonspecific ST abnormality.  Labs interpreted by me notable for troponin of 21 with normal delta.  Pain improved  with medications.  Imaging no significant acute process.  Patient requires significant assistance to stand even with walker.  Patient presenting from home.  Ambulatory dysfunction.  Plan for further evaluation and management patient.  Admitted.    Amount and/or Complexity of Data Reviewed  Labs: ordered.  Radiology: ordered.    Risk  OTC drugs.  Decision regarding hospitalization.             Disposition  Final diagnoses:   Fall from standing, initial encounter   Acute midline thoracic back pain   Ambulatory dysfunction     Time reflects when diagnosis was documented in both MDM as applicable and the Disposition within this note       Time User Action Codes Description Comment    7/3/2024  1:42 PM Erne, Christian Add [W19.XXXA] Fall from standing, initial encounter     7/3/2024  1:42 PM Erne, Christian Add [M54.6] Acute midline thoracic back pain     7/3/2024  1:42 PM Erne, Christian Modify [W19.XXXA] Fall from standing, initial encounter     7/3/2024  1:42 PM Erne, Christian Modify [M54.6] Acute midline thoracic back pain     7/3/2024  3:14 PM Erne, Christian Add [R26.2] Ambulatory dysfunction     7/3/2024  3:14 PM Erne, Christian Modify [M54.6] Acute midline thoracic back pain     7/3/2024  3:14 PM Erne, Christian Modify [R26.2] Ambulatory dysfunction           ED Disposition       ED Disposition   Admit    Condition   Stable    Date/Time   Wed Jul 3, 2024 1514    Comment   Case was discussed with TONY and the patient's admission status was agreed to be Admission Status: inpatient status to the service of Dr. Stephens .               Follow-up Information       Follow up With Specialties Details Why Contact Info    FARZANA [facility], Nurse Practitioner Schedule an appointment as soon as possible for a visit in 1 week  [address]   [address]  [facility] [address]  [phone]              Patient's Medications   Discharge Prescriptions    No medications on file       No discharge procedures on file.    PDMP  Review       None            ED Provider  Electronically Signed by             Christian Yu MD  07/03/24 8972

## 2024-07-03 NOTE — ED NOTES
Tried ambulating pt with walker. Once pt was standing she expressed that she felt dizzy but felt well enough to try walking. Pt then attempted to take a step and attempted to fall but she caught herself on the walker as well as my arm. Pt was then returned to bed and did not complete the walk.      Chris Christianson  07/03/24 0706

## 2024-07-03 NOTE — DISCHARGE INSTRUCTIONS
Please follow up PCP. Recommend tylenol 650 mg every 6 hours as needed for pain. Please return for severe chest pain, significant shortness of breath, severely worsening symptoms, or any other concerning signs or symptoms. Please refer to the following documents for additional instructions and return precautions.

## 2024-07-03 NOTE — H&P
[facility]  H&P  Name: Sona Valenzuela 89 y.o. female I MRN: [id number]  Unit/Bed#: ED 09 I Date of Admission: 7/3/2024   Date of Service: 7/3/2024 I Hospital Day: 0      Assessment & Plan   CKD (chronic kidney disease) stage 4, GFR 15-29 ml/min ([facility])  Assessment & Plan  Lab Results   Component Value Date    EGFR 32 07/03/2024    EGFR 28 06/14/2024    EGFR 25 06/04/2024    CREATININE 1.34 (H) 07/03/2024    CREATININE 1.51 (H) 06/14/2024    CREATININE 1.63 (H) 06/04/2024   Baseline creatinine 1.3-1.6.  At baseline on admission.    Paroxysmal A-fib ([facility])  Assessment & Plan  Rate controlled without medication for this. Confirmed with niece that she was restarted on Eliquis after her most recent discharge.  Will defer to primary care provider on continuing this chronically, but in this 89-year-old patient with multiple falls risks may outweigh the benefits even despite her elevated [id number] score    Primary hypertension  Assessment & Plan  Continue home furosemide for now pending orthostatic vitals    Dementia ([facility])  Assessment & Plan  Continue mirtazapine, Seroquel at bedtime  Delirium precautions    Chronic congestive heart failure ([facility])  Assessment & Plan  Wt Readings from Last 3 Encounters:   06/04/24 62.9 kg (138 lb 11.2 oz)   05/15/24 63.4 kg (139 lb 12.4 oz)   04/11/24 61.9 kg (136 lb 6.4 oz)   Appears euvolemic on exam.  Continue home diuretics for now            Dizziness  Assessment & Plan  Patient endorsed to dizziness after standing in the emergency room.  On chart review, she has had intermittent issues with dizziness before and was previously prescribed meclizine.  Would like to avoid this given her age and fall risk to begin with.  Unclear whether her dizziness which even vertigo versus true lightheadedness  Check orthostatic vitals given that she is on diuretics at home  Gabapentin dose is appropriately low for her renal function, but theoretically could be contributing to  this symptom as well    * Fall at home  Assessment & Plan  Had what sounds like a mechanical fall at home, although in the setting of complaint of dizziness in ED, raises some concern/suspicion that this played a role.  Trauma scan negative for any acute abnormality  PT/OT.  Patient was recently admitted to [place] in late May/early June.  Also already set up for [facility] at home.       VTE Pharmacologic Prophylaxis: VTE Score: 5 High Risk (Score >/= 5) - Pharmacological DVT Prophylaxis Ordered: apixaban (Eliquis). Sequential Compression Devices Ordered.  Code Status: Level 3 - DNAR and DNI   Discussion with family: Updated  (niece) via phone.    Anticipated Length of Stay: Patient will be admitted on an inpatient basis with an anticipated length of stay of greater than 2 midnights secondary to fall, dizziness, PT/OT and possible placement.    Total Time Spent on Date of Encounter in care of patient: 60 mins. This time was spent on one or more of the following: performing physical exam; counseling and coordination of care; obtaining or reviewing history; documenting in the medical record; reviewing/ordering tests, medications or procedures; communicating with other healthcare professionals and discussing with patient's family/caregivers.    Chief Complaint: Fall    History of Present Illness:  Sona Valenzuela is a 89 y.o. female with a PMH of HFpEF, dementia, paroxysmal A-fib who presents with fall.  She had what sounds like a mechanical fall based on the EMS description after trying to get up to walk with her walker.  She endorsed back pain on arrival to the emergency room.  Trauma scan was negative.  While attempting to stand up and ambulate with assistance in the ED, patient complained of dizziness and had to be assisted back down into the bed.  Patient had difficulty clarifying for me whether it was lightheadedness or vertigo.  Patient's ability to contribute to the history is limited by her  dementia.    Discussed with the patient's niece Xi over the phone.  Patient was recently admitted in May (discharge summary reviewed) and discharged to [place] for STR.  She was then transition to home health care PT and apparently saw them yesterday.  Confirmed with the niece that she was on Eliquis, as discharge summary stated that she was off due to her fall risk.  Reviewed primary care note which shows that she was back on it.    Review of Systems:  Review of Systems   Unable to perform ROS: Dementia       Past Medical and Surgical History:   Past Medical History:   Diagnosis Date    Ankle fracture     Arthritis     left hip    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HCC)     with left ureter    Bronchitis     Cancer (HCC)     Carcinoma, lung (HCC)     Dementia (HCC)     Dependent edema     Depression     Diabetes mellitus (HCC)     Hypercholesterolemia     Hypertension     Kidney stone     Oth abn and inconclusive findings on dx imaging of breast     Pes planus     Renal calculus     Restless leg syndrome     Rib pain     Sprain, neck     Varicose veins of left lower extremity        Past Surgical History:   Procedure Laterality Date    APPENDECTOMY      LUNG CANCER SURGERY         Meds/Allergies:  Prior to Admission medications    Medication Sig Start Date End Date Taking? Authorizing Provider   apixaban (Eliquis) 5 mg Take 1 tablet (5 mg total) by mouth 2 (two) times a day 5/12/24 7/3/24 Yes Lesa Cast MD   ergocalciferol (VITAMIN D2) 50,000 units TAKE 1 CAPSULE BY MOUTH ONE TIME PER WEEK 2/12/24  Yes FARZANA Stanley   furosemide (LASIX) 40 mg tablet Take 1 tablet (40 mg total) by mouth 2 (two) times a day 5/15/24 7/3/24 Yes Michael Phan MD   gabapentin (NEURONTIN) 100 mg capsule Take 1 capsule (100 mg total) by mouth 3 (three) times a day 8/3/23 7/3/24 Yes FARZANA Stanley   mirtazapine (REMERON) 15 mg tablet TAKE 0.5 TABLETS (7.5 MG TOTAL) BY MOUTH DAILY AT BEDTIME 4/1/24  Yes Weston Arguello MD   Misc.  Devices (Mattress Cover) MISC Use daily 3/22/22  Yes FARZANA Stanley   QUEtiapine (SEROquel) 25 mg tablet TAKE 1 TABLET BY MOUTH DAILY AT BEDTIME 6/17/24  Yes FARZANA Stanley     I have reviewed home medications with patient family member.    Allergies:   Allergies   Allergen Reactions    Albuterol     Aldactone [Spironolactone]     Aspirin     Atenolol     Bactrim [Sulfamethoxazole-Trimethoprim]     Codeine     Hydrocodone     Ibuprofen     Lisinopril     Mevacor [Lovastatin]     Mirapex [Pramipexole]     Morphine And Codeine     Other      novacaine    Penicillins     Procaine     Salicylates     Ultram [Tramadol]     Zoloft [Sertraline]      Night sweats       Social History:  Marital Status:    Occupation: N/A  Patient Pre-hospital Living Situation: Home  Patient Pre-hospital Level of Mobility: walks with walker  Patient Pre-hospital Diet Restrictions: None  Substance Use History:   Social History     Substance and Sexual Activity   Alcohol Use Never     Social History     Tobacco Use   Smoking Status Former    Current packs/day: 0.50    Average packs/day: 0.5 packs/day for 20.0 years (10.0 ttl pk-yrs)    Types: Cigarettes   Smokeless Tobacco Never     Social History     Substance and Sexual Activity   Drug Use Never       Family History:  Family History   Problem Relation Age of Onset    No Known Problems Mother     No Known Problems Father     Dementia Brother     Breast cancer Neg Hx     Colon cancer Neg Hx     Ovarian cancer Neg Hx     Uterine cancer Neg Hx     Cervical cancer Neg Hx        Physical Exam:     Vitals:   Blood Pressure: 129/64 (07/03/24 1613)  Pulse: 78 (07/03/24 1613)  Temperature: 98.4 °F (36.9 °C) (07/03/24 1613)  Temp Source: Oral (07/03/24 1613)  Respirations: 18 (07/03/24 1500)  SpO2: 94 % (07/03/24 1613)    Physical Exam  Vitals and nursing note reviewed.   Constitutional:       Appearance: Normal appearance.   HENT:      Head: Normocephalic and atraumatic.       Mouth/Throat:      Mouth: Mucous membranes are moist.      Comments: Poor dentition  Eyes:      Conjunctiva/sclera: Conjunctivae normal.      Pupils: Pupils are equal, round, and reactive to light.   Cardiovascular:      Rate and Rhythm: Normal rate and regular rhythm.      Pulses: Normal pulses.      Heart sounds: Normal heart sounds. No murmur heard.     No gallop.      Comments: No peripheral edema  Pulmonary:      Effort: Pulmonary effort is normal. No respiratory distress.      Breath sounds: Normal breath sounds. No wheezing or rales.   Abdominal:      General: Abdomen is flat. Bowel sounds are normal. There is no distension.      Palpations: Abdomen is soft.      Tenderness: There is no abdominal tenderness. There is no guarding or rebound.   Musculoskeletal:         General: No swelling. Normal range of motion.   Skin:     General: Skin is warm and dry.      Capillary Refill: Capillary refill takes less than 2 seconds.   Neurological:      General: No focal deficit present.      Mental Status: She is alert. Mental status is at baseline. She is disoriented.   Psychiatric:         Mood and Affect: Mood normal.          Additional Data:     Lab Results:  Results from last 7 days   Lab Units 07/03/24  1011   WBC Thousand/uL 7.17   HEMOGLOBIN g/dL 11.2*   HEMATOCRIT % 35.8   PLATELETS Thousands/uL 326   SEGS PCT % 70   LYMPHO PCT % 13*   MONO PCT % 14*   EOS PCT % 3     Results from last 7 days   Lab Units 07/03/24  1011   SODIUM mmol/L 140   POTASSIUM mmol/L 3.5   CHLORIDE mmol/L 102   CO2 mmol/L 31   BUN mg/dL 38*   CREATININE mg/dL 1.34*   ANION GAP mmol/L 7   CALCIUM mg/dL 8.8   ALBUMIN g/dL 3.7   TOTAL BILIRUBIN mg/dL 0.66   ALK PHOS U/L 94   ALT U/L 10   AST U/L 16   GLUCOSE RANDOM mg/dL 125             Lab Results   Component Value Date    HGBA1C 6.0 (H) 08/30/2022           Lines/Drains:  Invasive Devices       Peripheral Intravenous Line  Duration             Peripheral IV 07/03/24 Left Antecubital  <1 day              Drain  Duration             External Urinary Catheter 52 days                        Imaging: Reviewed radiology reports from this admission including: chest xray, chest CT scan, and abdominal/pelvic CT  TRAUMA - CT head wo contrast   Final Result by Nando Burrows MD (07/03 1058)      No acute intracranial abnormality.  Chronic microangiopathic changes.      The study was marked in EPIC for immediate notification.            Workstation performed: [id number]         TRAUMA - CT spine cervical wo contrast   Final Result by Nando Burrows MD (07/03 1052)      No cervical spine fracture or traumatic malalignment. Loss of the normal cervical lordosis which is likely secondary to patient positioning, cervical collar, or muscle spasm.      The study was marked in EPIC for immediate notification.            Workstation performed: [id number]         TRAUMA - CT chest abdomen pelvis wo contrast   Final Result by Nando Burrows MD (07/03 1132)      1.  No acute traumatic injury to the chest, abdomen, or pelvis.      2.  No acute spinal fracture.      3.  Large hiatal hernia.      The study was marked in EPIC for immediate notification.      Workstation performed: [id number]         CT recon only thoracolumbar (No Charge)   Final Result by Nando Burrows MD (07/03 1132)      1.  No acute traumatic injury to the chest, abdomen, or pelvis.      2.  No acute spinal fracture.      3.  Large hiatal hernia.      The study was marked in EPIC for immediate notification.      Workstation performed: [id number]         XR Trauma chest portable   Final Result by Nando Burrows MD (07/03 1044)      No acute cardiopulmonary disease.            Workstation performed: [id number]             EKG and Other Studies Reviewed on Admission:   EKG: Atrial fibrillation. HR 86.    ** Please Note: This note has been constructed using a voice recognition system. **

## 2024-07-03 NOTE — ASSESSMENT & PLAN NOTE
Had what sounds like a mechanical fall at home, although in the setting of complaint of dizziness in ED, raises some concern/suspicion that this played a role.  Trauma scan negative for any acute abnormality  PT/OT.  Patient was recently admitted to [place] in late May/early June.  Also already set up for HHC at home.

## 2024-07-03 NOTE — ASSESSMENT & PLAN NOTE
Lab Results   Component Value Date    EGFR 32 07/03/2024    EGFR 28 06/14/2024    EGFR 25 06/04/2024    CREATININE 1.34 (H) 07/03/2024    CREATININE 1.51 (H) 06/14/2024    CREATININE 1.63 (H) 06/04/2024   Baseline creatinine 1.3-1.6.  At baseline on admission.

## 2024-07-03 NOTE — ASSESSMENT & PLAN NOTE
Rate controlled without medication for this. Confirmed with niece that she was restarted on Eliquis after her most recent discharge.  Will defer to primary care provider on continuing this chronically, but in this 89-year-old patient with multiple falls risks may outweigh the benefits even despite her elevated [id number] score

## 2024-07-03 NOTE — ASSESSMENT & PLAN NOTE
>>ASSESSMENT AND PLAN FOR DEMENTIA (HCC) WRITTEN ON 7/3/2024  4:28 PM BY AZAM EDGAR MD    Continue mirtazapine, Seroquel at bedtime  Delirium precautions

## 2024-07-03 NOTE — PLAN OF CARE
Problem: PHYSICAL THERAPY ADULT  Goal: Performs mobility at highest level of function for planned discharge setting.  See evaluation for individualized goals.  Description: Treatment/Interventions: Functional transfer training, LE strengthening/ROM, Therapeutic exercise, Endurance training, Patient/family training, Bed mobility, Gait training, Spoke to nursing, Spoke to MD, Continued evaluation  Equipment Recommended: Walker       See flowsheet documentation for full assessment, interventions and recommendations.  Note: Prognosis: Good  Problem List: Decreased strength, Decreased endurance, Impaired balance, Decreased mobility, Decreased cognition, Decreased safety awareness  Assessment: Pt is 89 y.o. female seen for high-complexity PT evaluation on 7/3/2024 s/p admit to [facility] on 7/3/2024 w/ Ambulatory dysfunction. PT was consulted to assess pt's functional mobility and d/c needs. Order placed for PT eval and tx. PTA, pt was living with family in a two story home with 0 KAY and FOS to second floor and pt was ambulatory with RW and required assist for ADLs and IADLs. Information regarding home set up and PLOF gathered via chart review as pt is poor historian. At time of eval, patient required modA for sup <> sit, Maribell for STS to RW from stretcher and completed 3 side steps R to HOB. Further mobility deferred due to safety concerns as pt with knee buckling. Upon evaluation, pt presenting with impaired functional mobility d/t decreased strength, decreased endurance, impaired balance, decreased mobility, decreased cognition, decreased safety awareness, and activity intolerance. Pertinent PMHx and current co-morbidities affecting pt's physical performance at time of assessment include: dementia, atrial fibrillation, bladder cancer, HTN, + fall history. Personal factors affecting pt at time of eval include: inaccessible home environment, lives in two story house, inability to ambulate household  distances, inability to navigate level surfaces w/o external assistance, positive fall history, and unable to perform physical activity. The following objective measures performed on IE also reveal limitations: AM-PAC 6-Clicks: 11/24. Pt's clinical presentation is currently unstable/unpredictable seen in pt's presentation of advanced age, need for minimal to moderate assist w/ all phases of mobility when usually mobilizing independently, ongoing medical assessment, and on telemetry monitoring. Overall, pt's rehab potential and prognosis to return to PLOF is good as impacted by objective findings, warranting pt to receive further skilled PT interventions to address impairments, activity limitations, and participation restrictions. Pt to benefit from continued PT services to address deficits as defined above and maximize level of functional independent mobility and consistency. From PT/mobility standpoint, recommendation at time of d/c would be level 2, moderate resource intensity in order to facilitate return to PLOF.  Barriers to Discharge: Inaccessible home environment, Decreased caregiver support     Rehab Resource Intensity Level, PT: II (Moderate Resource Intensity)    See flowsheet documentation for full assessment.   Wanda Lind; PT, DPT

## 2024-07-03 NOTE — PHYSICAL THERAPY NOTE
PHYSICAL THERAPY EVALUATION  NAME:  Sona Valenzuela  DATE: 07/03/24    AGE:   89 y.o.  Mrn:   [id number]  ADMIT DX:  Back injury [S39.92XA]  Problem List:   Patient Active Problem List   Diagnosis    Degeneration of lumbar intervertebral disc    Lumbar radiculopathy    Arthropathy of lumbar facet joint    Visual hallucinations    Restless legs syndrome    Primary insomnia    Vitamin D deficiency    Advanced age    Ambulatory dysfunction    Anxiety    Stage 3a chronic kidney disease (HCC)    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HCC)    Chronic atrial fibrillation (HCC)    Chronic congestive heart failure (HCC)    Weakness of left leg    Does mobilize using walker    Dementia (HCC)    Vascular dementia, uncomplicated (HCC)    Mild protein-calorie malnutrition (HCC)    Acute on chronic HFpEF with severe pulmonary hypertension (HCC)    Primary hypertension    Paroxysmal A-fib (HCC)    CKD (chronic kidney disease) stage 4, GFR 15-29 ml/min (HCC)    Compression fracture of lumbar spine, non-traumatic (HCC)       Past Medical History  Past Medical History:   Diagnosis Date    Ankle fracture     Arthritis     left hip    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HCC)     with left ureter    Bronchitis     Cancer (HCC)     Carcinoma, lung (HCC)     Dementia (HCC)     Dependent edema     Depression     Diabetes mellitus (HCC)     Hypercholesterolemia     Hypertension     Kidney stone     Oth abn and inconclusive findings on dx imaging of breast     Pes planus     Renal calculus     Restless leg syndrome     Rib pain     Sprain, neck     Varicose veins of left lower extremity        Past Surgical History  Past Surgical History:   Procedure Laterality Date    APPENDECTOMY      LUNG CANCER SURGERY         Length Of Stay: 0  Performed at least 2 patient identifiers during session: Name and Birthday       07/03/24 1527   PT Last Visit   PT Visit Date 07/03/24   Note Type   Note type Evaluation   Pain Assessment   Pain Assessment Tool  0-10   Pain Score No Pain   Restrictions/Precautions   Weight Bearing Precautions Per Order   (WBAT BLEs per ortho note 5/13)   Braces or Orthoses   (none reported. Ortho consult regarding L1 and L5 compression fracture during prior admission, per ortho note 5/13 no bracing required.)   Other Precautions Chair Alarm;Bed Alarm;Cognitive;Fall Risk;Spinal precautions;Telemetry;Multiple lines   Home Living   Type of Home House   Home Layout Two level;Bed/bath upstairs  (0 KAY. flight of steps to second floor)   Bathroom Accessibility Accessible   Home Equipment Walker  (RW used at baseline)   Additional Comments Patient is poor historian due to cognitive impairments with diagnosis of dementia at baseline. Information regarding home set up and PLOF gathered via chart review including prior PT eval (5/13/24) and CM note (5/14/24)   Prior Function   Level of [place] Needs assistance with ADLs;Needs assistance with functional mobility;Needs assistance with IADLS   Lives With Family   Receives Help From Family   IADLs Family/Friend/Other provides meals;Family/Friend/Other provides transportation;Family/Friend/Other provides medication management   Falls in the last 6 months 1 to 4  (at least 1 PTA - no acute injury per imaging 7/3. Unknown additional fall history)   General   Additional Pertinent History Of note, patient recently admitted to this facility and per chart review was discharged to rehab 5/15. Chart review indicates patient arrived this date from home   Family/Caregiver Present No   Cognition   Overall Cognitive Status Impaired   Arousal/Participation Cooperative   Orientation Level Oriented to person;Oriented to place;Disoriented to time;Disoriented to situation   Memory Decreased recall of recent events;Decreased recall of biographical information   Following Commands Follows one step commands with increased time or repetition   Comments Pt agreeable to PT evaluation   RLE Assessment   RLE Assessment X    Strength RLE   RLE Overall Strength 3+/5  (grossly assesed during functional mobility)   LLE Assessment   LLE Assessment X   Strength LLE   LLE Overall Strength 3+/5  (grossly assesed during functional mobility)   Coordination   Sensation WFL   Bed Mobility   Supine to Sit 3  Moderate assistance   Additional items Assist x 1;Increased time required;Verbal cues;LE management   Sit to Supine 3  Moderate assistance   Additional items Assist x 1;Increased time required;Verbal cues;LE management   Additional Comments Pt without complaints of lightheadedness, SOB, chest pain, dizziness throughout session. BP at start of session 132/70, BP sitting /65, BP at end of session 133/63. SPo2 maintained >/= 93%   Transfers   Sit to Stand 4  Minimal assistance   Additional items Assist x 1;Increased time required;Verbal cues   Stand to Sit 4  Minimal assistance   Additional items Assist x 1;Increased time required;Verbal cues   Additional Comments STS from stretcher to RW   Ambulation/Elevation   Gait pattern Improper Weight shift;Decreased foot clearance;Narrow PORFIRIO;Shuffling;Short stride;Step to;Excessively slow;Knees flexed   Gait Assistance 3  Moderate assist   Additional items Assist x 1;Verbal cues   Assistive Device Rolling walker   Distance 3 side steps to HOB   Stair Management Assistance Not tested   Ambulation/Elevation Additional Comments Further mobility deferred due to safety concerns as pt with knee buckling observed   Balance   Static Sitting Fair +   Dynamic Sitting Fair -   Static Standing Poor +   Dynamic Standing Poor +   Ambulatory Poor +   Endurance Deficit   Endurance Deficit Yes   Endurance Deficit Description decreased activity tolerance, fatigue   Activity Tolerance   Activity Tolerance Patient limited by fatigue   Medical Staff Made Aware Dr. Yu made aware of session outcomes and PT recommendations   Nurse Made Aware RN made aware   Assessment   Prognosis Good   Problem List Decreased  strength;Decreased endurance;Impaired balance;Decreased mobility;Decreased cognition;Decreased safety awareness   Assessment Pt is 89 y.o. female seen for high-complexity PT evaluation on 7/3/2024 s/p admit to [facility] on 7/3/2024 w/ Ambulatory dysfunction. PT was consulted to assess pt's functional mobility and d/c needs. Order placed for PT eval and tx. PTA, pt was living with family in a two story home with 0 KAY and FOS to second floor and pt was ambulatory with RW and required assist for ADLs and IADLs. Information regarding home set up and PLOF gathered via chart review as pt is poor historian. At time of eval, patient required modA for sup <> sit, [place] for STS to RW from stretcher and completed 3 side steps R to HOB. Further mobility deferred due to safety concerns as pt with knee buckling. Upon evaluation, pt presenting with impaired functional mobility d/t decreased strength, decreased endurance, impaired balance, decreased mobility, decreased cognition, decreased safety awareness, and activity intolerance. Pertinent PMHx and current co-morbidities affecting pt's physical performance at time of assessment include: dementia, atrial fibrillation, bladder cancer, HTN, + fall history. Personal factors affecting pt at time of eval include: inaccessible home environment, lives in two story house, inability to ambulate household distances, inability to navigate level surfaces w/o external assistance, positive fall history, and unable to perform physical activity. The following objective measures performed on IE also reveal limitations: AM-PAC 6-Clicks: 11/24. Pt's clinical presentation is currently unstable/unpredictable seen in pt's presentation of advanced age, need for minimal to moderate assist w/ all phases of mobility when usually mobilizing independently, ongoing medical assessment, and on telemetry monitoring. Overall, pt's rehab potential and prognosis to return to PLOF is good as impacted  by objective findings, warranting pt to receive further skilled PT interventions to address impairments, activity limitations, and participation restrictions. Pt to benefit from continued PT services to address deficits as defined above and maximize level of functional independent mobility and consistency. From PT/mobility standpoint, recommendation at time of d/c would be level 2, moderate resource intensity in order to facilitate return to PLOF.   Barriers to Discharge Inaccessible home environment;Decreased caregiver support   Goals   STG Expiration Date 07/13/24   Short Term Goal #1 In 10 days: Increase bilateral LE strength 1/2 grade to facilitate independent mobility, Perform all bed mobility tasks with close S to decrease caregiver burden, Perform all transfers with close S to improve independence, Ambulate > 30 ft. with RW with close S w/o LOB and w/ normalized gait pattern 100% of the time, Increase all balance 1/2 grade to decrease risk for falls, Tolerate standing 3-5 minutes to facilitate functional task performance, and PT to see and establish goals for stairs when appropriate   PT Treatment Day 0   Plan   Treatment/Interventions Functional transfer training;LE strengthening/ROM;Therapeutic exercise;Endurance training;Patient/family training;Bed mobility;Gait training;Spoke to nursing;Spoke to MD;Continued evaluation   PT Frequency 3-5x/wk   Discharge Recommendation   Rehab Resource Intensity Level, PT II (Moderate Resource Intensity)   Equipment Recommended Walker   AM-PAC Basic Mobility Inpatient   Turning in Flat Bed Without Bedrails 2   Lying on Back to Sitting on Edge of Flat Bed Without Bedrails 2   Moving Bed to Chair 1   Standing Up From Chair Using Arms 3   Walk in Room 2   Climb 3-5 Stairs With Railing 1   Basic Mobility Inpatient Raw Score 11   Basic Mobility Standardized Score 30.25   [facility] Highest Level Of Mobility   -[facility] Goal 4: Move to chair/commode   -[facility] Achieved 5: Stand (1  or more minutes)   End of Consult   Patient Position at End of Consult All needs within reach;Supine  (sitting up in stretcher)       Time In: 1523  Time Out: 1538  Total Evaluation Minutes: 15     Wanda Lind, PT

## 2024-07-03 NOTE — ASSESSMENT & PLAN NOTE
Continue home furosemide for now pending orthostatic vitals   Left message on voicemail to return call

## 2024-07-03 NOTE — ASSESSMENT & PLAN NOTE
Patient endorsed to dizziness after standing in the emergency room.  On chart review, she has had intermittent issues with dizziness before and was previously prescribed meclizine.  Would like to avoid this given her age and fall risk to begin with.  Unclear whether her dizziness which even vertigo versus true lightheadedness  Check orthostatic vitals given that she is on diuretics at home  Gabapentin dose is appropriately low for her renal function, but theoretically could be contributing to this symptom as well

## 2024-07-04 LAB
ANION GAP SERPL CALCULATED.3IONS-SCNC: 7 MMOL/L (ref 4–13)
BUN SERPL-MCNC: 38 MG/DL (ref 5–25)
CALCIUM SERPL-MCNC: 8.6 MG/DL (ref 8.4–10.2)
CHLORIDE SERPL-SCNC: 104 MMOL/L (ref 96–108)
CO2 SERPL-SCNC: 30 MMOL/L (ref 21–32)
CREAT SERPL-MCNC: 1.33 MG/DL (ref 0.6–1.3)
GFR SERPL CREATININE-BSD FRML MDRD: 33 ML/MIN/1.73SQ M
GLUCOSE SERPL-MCNC: 96 MG/DL (ref 65–140)
POTASSIUM SERPL-SCNC: 3.6 MMOL/L (ref 3.5–5.3)
SODIUM SERPL-SCNC: 141 MMOL/L (ref 135–147)

## 2024-07-04 PROCEDURE — 97167 OT EVAL HIGH COMPLEX 60 MIN: CPT

## 2024-07-04 PROCEDURE — 97110 THERAPEUTIC EXERCISES: CPT

## 2024-07-04 PROCEDURE — 99232 SBSQ HOSP IP/OBS MODERATE 35: CPT | Performed by: STUDENT IN AN ORGANIZED HEALTH CARE EDUCATION/TRAINING PROGRAM

## 2024-07-04 PROCEDURE — 97530 THERAPEUTIC ACTIVITIES: CPT

## 2024-07-04 PROCEDURE — 80048 BASIC METABOLIC PNL TOTAL CA: CPT | Performed by: STUDENT IN AN ORGANIZED HEALTH CARE EDUCATION/TRAINING PROGRAM

## 2024-07-04 RX ORDER — FUROSEMIDE 20 MG/1
20 TABLET ORAL 2 TIMES DAILY
Status: DISCONTINUED | OUTPATIENT
Start: 2024-07-04 | End: 2024-07-05 | Stop reason: HOSPADM

## 2024-07-04 RX ADMIN — APIXABAN 5 MG: 5 TABLET, FILM COATED ORAL at 18:11

## 2024-07-04 RX ADMIN — GABAPENTIN 100 MG: 100 CAPSULE ORAL at 09:08

## 2024-07-04 RX ADMIN — FUROSEMIDE 20 MG: 20 TABLET ORAL at 09:08

## 2024-07-04 RX ADMIN — FUROSEMIDE 20 MG: 20 TABLET ORAL at 18:10

## 2024-07-04 RX ADMIN — GABAPENTIN 100 MG: 100 CAPSULE ORAL at 16:00

## 2024-07-04 RX ADMIN — MIRTAZAPINE 7.5 MG: 15 TABLET, FILM COATED ORAL at 21:07

## 2024-07-04 RX ADMIN — APIXABAN 5 MG: 5 TABLET, FILM COATED ORAL at 09:08

## 2024-07-04 RX ADMIN — GABAPENTIN 100 MG: 100 CAPSULE ORAL at 21:07

## 2024-07-04 RX ADMIN — QUETIAPINE FUMARATE 25 MG: 25 TABLET ORAL at 21:07

## 2024-07-04 NOTE — PLAN OF CARE
Problem: SAFETY ADULT  Goal: Patient will remain free of falls  Description: INTERVENTIONS:  - Educate patient/family on patient safety including physical limitations  - Instruct patient to call for assistance with activity   - Consult OT/PT to assist with strengthening/mobility   - Keep Call bell within reach  - Keep bed low and locked with side rails adjusted as appropriate  - Keep care items and personal belongings within reach  - Initiate and maintain comfort rounds  - Make Fall Risk Sign visible to staff  - Offer Toileting every 2 Hours, in advance of need  - Initiate/Maintain bed alarm  - Apply yellow socks and bracelet for high fall risk patients  - Consider moving patient to room near nurses station  Outcome: Progressing  Goal: Maintain or return to baseline ADL function  Description: INTERVENTIONS:  -  Assess patient's ability to carry out ADLs; assess patient's baseline for ADL function and identify physical deficits which impact ability to perform ADLs (bathing, care of mouth/teeth, toileting, grooming, dressing, etc.)  - Assess/evaluate cause of self-care deficits   - Assess range of motion  - Assess patient's mobility; develop plan if impaired  - Assess patient's need for assistive devices and provide as appropriate  - Encourage maximum independence but intervene and supervise when necessary  - Involve family in performance of ADLs  - Assess for home care needs following discharge   - Consider OT consult to assist with ADL evaluation and planning for discharge  - Provide patient education as appropriate  Outcome: Progressing  Goal: Maintains/Returns to pre admission functional level  Description: INTERVENTIONS:  - Perform AM-PAC 6 Click Basic Mobility/ Daily Activity assessment daily.  - Set and communicate daily mobility goal to care team and patient/family/caregiver.   - Collaborate with rehabilitation services on mobility goals if consulted  - Perform Range of Motion 4 times a day.  - Reposition  patient every 2 hours.  - Dangle patient 3 times a day  - Stand patient 4 times a day  - Ambulate patient 4 times a day  - Out of bed to chair 3 times a day   - Out of bed for meals 3 times a day  - Out of bed for toileting  - Record patient progress and toleration of activity level   Outcome: Progressing     Problem: Prexisting or High Potential for Compromised Skin Integrity  Goal: Skin integrity is maintained or improved  Description: INTERVENTIONS:  - Identify patients at risk for skin breakdown  - Assess and monitor skin integrity  - Assess and monitor nutrition and hydration status  - Monitor labs   - Assess for incontinence   - Turn and reposition patient  - Assist with mobility/ambulation  - Relieve pressure over bony prominences  - Avoid friction and shearing  - Provide appropriate hygiene as needed including keeping skin clean and dry  - Evaluate need for skin moisturizer/barrier cream  - Collaborate with interdisciplinary team   - Patient/family teaching  - Consider wound care consult   Outcome: Progressing

## 2024-07-04 NOTE — ASSESSMENT & PLAN NOTE
>>ASSESSMENT AND PLAN FOR DEMENTIA (HCC) WRITTEN ON 7/4/2024  1:22 PM BY AZAM EDGAR MD    Continue mirtazapine, Seroquel at bedtime  Delirium precautions

## 2024-07-04 NOTE — PLAN OF CARE
Problem: OCCUPATIONAL THERAPY ADULT  Goal: Performs self-care activities at highest level of function for planned discharge setting.  See evaluation for individualized goals.  Description: Treatment Interventions: ADL retraining, Functional transfer training, UE strengthening/ROM, Endurance training, Cognitive reorientation, Patient/family training, Equipment evaluation/education, Compensatory technique education, Energy conservation, Activityengagement          See flowsheet documentation for full assessment, interventions and recommendations.   Note: Limitation: Decreased ADL status, Decreased UE ROM, Decreased UE strength, Decreased cognition, Decreased endurance, Decreased self-care trans, Decreased high-level ADLs, Decreased Safe judgement during ADL  Prognosis: Fair  Assessment: Pt is a 89 y.o. female  who [facility]  with Fall at home. Pt's PMH indicates dizziness, chronic congestive heart failure, dementia, primary hypertension, paroxysmal A-fib, CKD, degeneration of lumbar intervertebral disc, anxiety, weakness of L leg, and compression fracture of lumbar spine (non-traumatic). Orders placed for OT eval and treat, up and OOB as tolerated. At baseline, pt lives in 2 story house with family. Pt reports prior to admission, she required assistance with ADLs and IADLs. Upon initial evaluation, personal factors affecting pt include anxiety, high fall risk, limited community mobility, decreased ability to complete ADLs, and decreased ability to complete IADLs. Functional assessment indicates pt requires Max A with LB ADLs/toileting, Mod A with UB ADLs, and Min A with feeding/grooming. Pt requires Min A x 1 with bed mobility, and Min A x 2 for functional transfers and mobility. Pt deficits at this time include decreased BUE ROM , decreased BUE strength , decreased standing tolerance , decreased endurance , impaired balance , decreased functional reach, decreased memory,  decreased attention , decreased safety awareness , and poor sequencing. These deficits have an impact on pt's ability to participate in daily activities. Throughout session, pt required verbal cueing from therapist to address safety, sequencing, and proper body positioning during ADLs. Occupational performance areas to address include feeding, grooming, dressing, bathing, toileting, functional mobility , bed mobility , and functional transfers. Pt would benefit from continued skilled OT services while hospitalized to address the above impairments and activity limitations to improve functional independence with meaningful activity. From an OT standpoint, d/c recommendation at this time is a level II moderate resource intensity .     Rehab Resource Intensity Level, OT: II (Moderate Resource Intensity)     EBONY Hamilton

## 2024-07-04 NOTE — ASSESSMENT & PLAN NOTE
Had what sounds like a mechanical fall at home, although in the setting of complaint of dizziness in ED, raises some concern/suspicion that this played a role.  Trauma scan negative for any acute abnormality  PT/OT.  Patient was recently admitted to [facility] [facility] in late May/early June.  Also already set up for HHC at home.

## 2024-07-04 NOTE — CASE MANAGEMENT
Case Management Discharge Planning Note    Patient name Sona Valenzuela  Location [address]  256-01 MRN [id number]  : 11/3/1924 Date 2024       Current Admission Date: 7/3/2024  Current Admission Diagnosis:Fall at home   Patient Active Problem List    Diagnosis Date Noted Date Diagnosed    Acute on chronic HFpEF with severe pulmonary hypertension ([facility]) 2024     Primary hypertension 2024     Paroxysmal A-fib ([facility]) 2024     CKD (chronic kidney disease) stage 4, GFR 15-29 ml/min ([facility]) 2024     Compression fracture of lumbar spine, non-traumatic ([facility]) 2024     Mild protein-calorie malnutrition ([facility]) 2024     Vascular dementia, uncomplicated ([facility]) 2023     Dementia ([facility]) 2023     Weakness of left leg 2023     Does mobilize using walker 2023     Chronic congestive heart failure ([facility]) 2022     Fall at home 10/11/2022     Chronic atrial fibrillation ([facility]) 10/11/2022     Stage 3a chronic kidney disease ([facility]) 2022     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[facility]) 2022     Advanced age 2021     Dizziness 2021     Anxiety 2021     Vitamin D deficiency 2020     Primary insomnia 2019     Degeneration of lumbar intervertebral disc 2019     Lumbar radiculopathy 2019     Arthropathy of lumbar facet joint 2019     Visual hallucinations 2019     Restless legs syndrome 2019       LOS (days): 1  Geometric Mean LOS (GMLOS) (days):   Days to GMLOS:     OBJECTIVE:  Risk of Unplanned Readmission Score: 19.5     Current admission status: Inpatient   Preferred Pharmacy:   [facility]pharm[id number] - [place] - [address]  [place] [id number]  Phone: [phone] Fax: [phone]    Primary Care Provider: FARZANA Stanley  Primary Insurance: [employer]  REP  Secondary Insurance: [place] [employer] [facility]    DISCHARGE  DETAILS:    Other Referral/Resources/Interventions Provided:  Interventions: HHC  Referral Comments: [facility] reserved in [facility] for HHC.  AVS updated.

## 2024-07-04 NOTE — CASE MANAGEMENT
Case Management Assessment & Discharge Planning Note    Patient name Sona Valenzuela  Location [address] [address]/2 [address] 256-01 MRN [id number]  : 11/3/1924 Date 2024       Current Admission Date: 7/3/2024  Current Admission Diagnosis:Fall at home   Patient Active Problem List    Diagnosis Date Noted Date Diagnosed    Acute on chronic HFpEF with severe pulmonary hypertension ([facility]) 2024     Primary hypertension 2024     Paroxysmal A-fib ([facility]) 2024     CKD (chronic kidney disease) stage 4, GFR 15-29 ml/min ([facility]) 2024     Compression fracture of lumbar spine, non-traumatic ([facility]) 2024     Mild protein-calorie malnutrition ([facility]) 2024     Vascular dementia, uncomplicated ([facility]) 2023     Dementia ([facility]) 2023     Weakness of left leg 2023     Does mobilize using walker 2023     Chronic congestive heart failure ([facility]) 2022     Fall at home 10/11/2022     Chronic atrial fibrillation ([facility]) 10/11/2022     Stage 3a chronic kidney disease ([facility]) 2022     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[facility]) 2022     Advanced age 2021     Dizziness 2021     Anxiety 2021     Vitamin D deficiency 2020     Primary insomnia 2019     Degeneration of lumbar intervertebral disc 2019     Lumbar radiculopathy 2019     Arthropathy of lumbar facet joint 2019     Visual hallucinations 2019     Restless legs syndrome 2019       LOS (days): 1  Geometric Mean LOS (GMLOS) (days):   Days to GMLOS:     OBJECTIVE:    Risk of Unplanned Readmission Score: 19.5      Current admission status: Inpatient  Referral Reason: Other (dispo planning)    Preferred Pharmacy:   [id number] [place] [address]  [place] [address]  Phone: [phone] Fax: [phone]    Primary Care Provider: FARZANA Stanley  Primary Insurance: [employer]  Secondary Insurance:  [facility]    ASSESSMENT:  Active Health Care Proxies    There are no active Health Care Proxies on file.       Advance Directives  Does patient have a Health Care POA?: Yes  Does patient have Advance Directives?: Yes  Advance Directives: Power of  for health care  Primary Contact: Xi (elisa/POA) [phone]    Readmission Root Cause  30 Day Readmission: No    Patient Information  Admitted from:: Home  Mental Status: Alert  Assessment information provided by:: Other - please comment (niece/POA & chart review from recent admission in May 2024)  Primary Caregiver: Family  Caregiver's Name:: Xi (elisa/APOLLOA)  Caregiver's Relationship to Patient:: Family Member  Caregiver's Telephone Number:: [phone]  Support Systems: Self, Family members  County of Residence: [place]  What city do you live in?: [place]  Home entry access options. Select all that apply.: No steps to enter home  Type of Current Residence: 2 story home  Living Arrangements: Lives w/ Extended Family  Is patient a ?: No    Activities of Daily Living Prior to Admission  Functional Status: Assistance  Completes ADLs independently?: No  Level of ADL dependence: Assistance  Ambulates independently?: No  Level of ambulatory dependence: Assistance  Does patient use assisted devices?: Yes  Assisted Devices (DME) used: Walker  Does patient currently own DME?: Yes  What DME does the patient currently own?: Walker  Does patient have a history of Outpatient Therapy (PT/OT)?: No  Does the patient have a history of Short-Term Rehab?: Yes ([place] 5/2024)  Does patient have a history of HHC?: Yes ([facility])    Patient Information Continued  Does patient have prescription coverage?: Yes  Does patient receive dialysis treatments?: No  Does patient have a history of substance abuse?: No  Does patient have a history of Mental Health Diagnosis?: Yes (depression & anxiety)  Has patient received inpatient  treatment related to mental health in the last 2 years?: No    PHQ 2/9 Screening   Reviewed PHQ 2/9 Depression Screening Score?: No    Means of Transportation  Means of Transport to Appts:: Family transport      Social Determinants of Health (SDOH)      Flowsheet Row Most Recent Value   Housing Stability    In the last 12 months, was there a time when you were not able to pay the mortgage or rent on time? N   In the past 12 months, how many times have you moved where you were living? 0   At any time in the past 12 months, were you homeless or living in a shelter (including now)? N   Transportation Needs    In the past 12 months, has lack of transportation kept you from medical appointments or from getting medications? no   In the past 12 months, has lack of transportation kept you from meetings, work, or from getting things needed for daily living? No   Food Insecurity    Within the past 12 months, you worried that your food would run out before you got the money to buy more. Never true   Within the past 12 months, the food you bought just didn't last and you didn't have money to get more. Never true   Utilities    In the past 12 months has the electric, gas, oil, or water company threatened to shut off services in your home? No       DISCHARGE DETAILS:    Discharge planning discussed with:: Sushilakorina/JAMES Xi via phone.  Freedom of Choice: Yes  Comments - Freedom of Choice: FOC maintained - CM introduced self and role.  Reviewed Level II rec from PT/OT for [facility] vs C.  Preference is d/c home w/ HHC.  [facility]  CM to send AIDIN referral for services at home upon d/c.  Family denies any other needs at this time.  Patient lives with Xi pérez, who is her primary caregiver.  CM contacted family/caregiver?: Yes  Were Treatment Team discharge recommendations reviewed with patient/caregiver?: Yes (As it pertains to d/c planning and CM role.)  Did patient/caregiver verbalize understanding of patient  care needs?: Yes (As it pertains to d/c planning and CM role.)  Were patient/caregiver advised of the risks associated with not following Treatment Team discharge recommendations?: Yes (As it pertains to d/c planning and CM role.)    Contacts  Patient Contacts: Xi (niece/POA)  Relationship to Patient:: Family  Contact Method: Phone  Phone Number: [phone]  Reason/Outcome: Continuity of Care, Referral, Discharge Planning    Requested Home Health Care         Is the patient interested in HHC at discharge?: Yes  Home Health Discipline requested:: Nursing, Occupational Therapy, Physical Therapy, Home Health Aide  Home Health Agency Name:: [facility]  HHA External Referral Reason (only applicable if external HHA name selected): Patient has established relationship with provider  Home Health Follow-Up Provider:: PCP (FARZANA Stanley)  Home Health Services Needed:: Evaluate Functional Status and Safety, Gait/ADL Training, Strengthening/Theraputic Exercises to Improve Function, Heart Failure Management  Homebound Criteria Met:: Uses an Assist Device (i.e. cane, walker, etc), Requires the Assistance of Another Person for Safe Ambulation or to Leave the Home  Supporting Clincal Findings:: Fatigues Easliy in Short Distances, Limited Endurance    DME Referral Provided  Referral made for DME?: No    Other Referral/Resources/Interventions Provided:  Interventions: HHC, Outpatient   Referral Comments: AIDIN referral to [facility] for HHC.  OP CM referral via In Basket due to dx CHF.  Programs:: CHF    Treatment Team Recommendation: Short Term Rehab, Home with Home Health Care (Level II)  Discharge Destination Plan:: Home with Home Health Care  Transport at Discharge : Family

## 2024-07-04 NOTE — ASSESSMENT & PLAN NOTE
Wt Readings from Last 3 Encounters:   07/03/24 63.1 kg (139 lb 1.8 oz)   06/04/24 62.9 kg (138 lb 11.2 oz)   05/15/24 63.4 kg (139 lb 12.4 oz)   Appears euvolemic on exam.  Continue home diuretics for now

## 2024-07-04 NOTE — ASSESSMENT & PLAN NOTE
Rate controlled without medication for this. Confirmed with niece that she was restarted on Eliquis after her most recent discharge.  Will defer to primary care provider on continuing this chronically, but in this 89-year-old patient with multiple falls risks may outweigh the benefits even despite her elevated [id number] score

## 2024-07-04 NOTE — ASSESSMENT & PLAN NOTE
Lab Results   Component Value Date    EGFR 33 07/04/2024    EGFR 32 07/03/2024    EGFR 28 06/14/2024    CREATININE 1.33 (H) 07/04/2024    CREATININE 1.34 (H) 07/03/2024    CREATININE 1.51 (H) 06/14/2024   Baseline creatinine 1.3-1.6.  At baseline on admission.

## 2024-07-04 NOTE — PLAN OF CARE
Problem: PHYSICAL THERAPY ADULT  Goal: Performs mobility at highest level of function for planned discharge setting.  See evaluation for individualized goals.  Description: Treatment/Interventions: Functional transfer training, LE strengthening/ROM, Elevations, Therapeutic exercise, Endurance training, Patient/family training, Equipment eval/education, Bed mobility, Gait training, OT, Continued evaluation  Equipment Recommended: Walker       See flowsheet documentation for full assessment, interventions and recommendations.  Outcome: Progressing  Note: Prognosis: Good  Problem List: Decreased strength, Decreased mobility, Impaired balance, Decreased endurance, Decreased cognition, Decreased safety awareness, Impaired judgement  Assessment: Chart review and two person identifiers were completed. Pt seen for PT treatment session this date, consisting of ther act focused on bed mobility, transfers, and functional mobility and ther ex focused on strengthening. Since previous session, pt has made good progress in terms of increased distance ambulated and tolerance to exercises.  Pt greeted supine in bed, pt required min A x1 to complete supine to sit. Pt completed STS with min A x1 and RW. Pt ambulated 3' with RW and min A x1. Pt required blocking at B knees to prevent buckling. Pt completed stand to sit with min a x1. Pt completed all seated exercises well with VC and TC to complete full ROM.  Pt ended session seated in recliner with alarm activated and all needs within reach. Spoke to RN about session outcomes. Pertinent barriers during this session include cognitive status. Current goals and POC established on IE remain appropriate. Pt prognosis for achieving goals is good, pending pt progress with hospitalization/medical status improvements, and indicated by previous response to intervention. Pt limited d/t cognitive status. Pt continues to be functioning below baseline level, and remains limited due to factors  listed above. PT will continue to see pt during current hospitalization in order to address the deficits listed above and provide interventions consistent w/ POC in effort to achieve STGs. PT recommends level 2, moderate resource intensity upon discharge.  Barriers to Discharge: Inaccessible home environment, Decreased caregiver support     Rehab Resource Intensity Level, PT: II (Moderate Resource Intensity)    See flowsheet documentation for full assessment.

## 2024-07-04 NOTE — PROGRESS NOTES
[facility]  Progress Note  Name: Sona Valenzuela I  MRN: [id number]  Unit/Bed#: 2 E 256-01 I Date of Admission: 7/3/2024   Date of Service: 7/4/2024 I Hospital Day: 1    Assessment & Plan   CKD (chronic kidney disease) stage 4, GFR 15-29 ml/min ([facility])  Assessment & Plan  Lab Results   Component Value Date    EGFR 33 07/04/2024    EGFR 32 07/03/2024    EGFR 28 06/14/2024    CREATININE 1.33 (H) 07/04/2024    CREATININE 1.34 (H) 07/03/2024    CREATININE 1.51 (H) 06/14/2024   Baseline creatinine 1.3-1.6.  At baseline on admission.    Paroxysmal A-fib ([facility])  Assessment & Plan  Rate controlled without medication for this. Confirmed with niece that she was restarted on Eliquis after her most recent discharge.  Will defer to primary care provider on continuing this chronically, but in this 89-year-old patient with multiple falls risks may outweigh the benefits even despite her elevated [id number] score    Primary hypertension  Assessment & Plan  Decreased dose of furosemide as above    Dementia ([facility])  Assessment & Plan  Continue mirtazapine, Seroquel at bedtime  Delirium precautions    Chronic congestive heart failure ([facility])  Assessment & Plan  Wt Readings from Last 3 Encounters:   07/03/24 63.1 kg (139 lb 1.8 oz)   06/04/24 62.9 kg (138 lb 11.2 oz)   05/15/24 63.4 kg (139 lb 12.4 oz)   Appears euvolemic on exam.  Continue home diuretics for now            Dizziness  Assessment & Plan  Patient endorsed to dizziness after standing in the emergency room.  On chart review, she has had intermittent issues with dizziness before and was previously prescribed meclizine.  Would like to avoid this given her age and fall risk to begin with.  Unclear whether her dizziness which even vertigo versus true lightheadedness  Orthostatic vital signs weakly positive, notable drop after standing for few minutes.  Likely the etiology for this lightheadedness   Decrease Lasix dose to 20 mg, will recheck orthostatic vital  signs tomorrow morning    * Fall at home  Assessment & Plan  Had what sounds like a mechanical fall at home, although in the setting of complaint of dizziness in ED, raises some concern/suspicion that this played a role.  Trauma scan negative for any acute abnormality  PT/OT.  Patient was recently admitted to [facility] in late May/early June.  Also already set up for [facility] at home.           VTE Pharmacologic Prophylaxis: VTE Score: 5 High Risk (Score >/= 5) - Pharmacological DVT Prophylaxis Ordered: apixaban (Eliquis). Sequential Compression Devices Ordered.    Mobility:   Basic Mobility Inpatient Raw Score: 11  JH-HLM Goal: 4: Move to chair/commode  JH-HLM Achieved: 1: Laying in bed  JH-HLM Goal NOT achieved. Continue with multidisciplinary rounding and encourage appropriate mobility to improve upon JH-HLM goals.    Patient Centered Rounds: I performed bedside rounds with nursing staff today.   Discussions with Specialists or Other Care Team Provider: LIANE    Education and Discussions with Family / Patient: Updated  (niece) via phone.    Total Time Spent on Date of Encounter in care of patient: 35 mins. This time was spent on one or more of the following: performing physical exam; counseling and coordination of care; obtaining or reviewing history; documenting in the medical record; reviewing/ordering tests, medications or procedures; communicating with other healthcare professionals and discussing with patient's family/caregivers.    Current Length of Stay: 1 day(s)  Current Patient Status: Inpatient   Certification Statement: The patient will continue to require additional inpatient hospital stay due to orthostatic hypotension  Discharge Plan: Anticipate discharge tomorrow to home with home services.    Code Status: Level 3 - DNAR and DNI    Subjective:   Patient reports that she feels well this morning.  Denies any current lightheadedness or dizziness.  History limited by her dementia.    Objective:      Vitals:   Temp (24hrs), Av °F (36.7 °C), Min:97.5 °F (36.4 °C), Max:98.4 °F (36.9 °C)    Temp:  [97.5 °F (36.4 °C)-98.4 °F (36.9 °C)] 97.5 °F (36.4 °C)  HR:  [] 81  Resp:  [14-19] 16  BP: (104-160)/(56-90) 145/73  SpO2:  [92 %-96 %] 94 %  Body mass index is 28.1 kg/m².     Input and Output Summary (last 24 hours):     Intake/Output Summary (Last 24 hours) at 2024 1324  Last data filed at 2024 0601  Gross per 24 hour   Intake 240 ml   Output --   Net 240 ml       Physical Exam:   Physical Exam  Vitals and nursing note reviewed.   Constitutional:       Appearance: Normal appearance.   HENT:      Head: Normocephalic and atraumatic.      Mouth/Throat:      Mouth: Mucous membranes are moist.      Comments: Poor dentition  Eyes:      Conjunctiva/sclera: Conjunctivae normal.      Pupils: Pupils are equal, round, and reactive to light.   Cardiovascular:      Rate and Rhythm: Normal rate and regular rhythm.      Pulses: Normal pulses.      Heart sounds: Normal heart sounds. No murmur heard.     No gallop.      Comments: No peripheral edema  Pulmonary:      Effort: Pulmonary effort is normal. No respiratory distress.      Breath sounds: Normal breath sounds. No wheezing or rales.   Abdominal:      General: Abdomen is flat. Bowel sounds are normal. There is no distension.      Palpations: Abdomen is soft.      Tenderness: There is no abdominal tenderness. There is no guarding or rebound.   Musculoskeletal:         General: No swelling. Normal range of motion.   Skin:     General: Skin is warm and dry.      Capillary Refill: Capillary refill takes less than 2 seconds.   Neurological:      General: No focal deficit present.      Mental Status: She is alert. Mental status is at baseline. She is disoriented.   Psychiatric:         Mood and Affect: Mood normal.          Additional Data:     Labs:  Results from last 7 days   Lab Units 24  1011   WBC Thousand/uL 7.17   HEMOGLOBIN g/dL 11.2*   HEMATOCRIT %  35.8   PLATELETS Thousands/uL 326   SEGS PCT % 70   LYMPHO PCT % 13*   MONO PCT % 14*   EOS PCT % 3     Results from last 7 days   Lab Units 07/04/24  0456 07/03/24  1011   SODIUM mmol/L 141 140   POTASSIUM mmol/L 3.6 3.5   CHLORIDE mmol/L 104 102   CO2 mmol/L 30 31   BUN mg/dL 38* 38*   CREATININE mg/dL 1.33* 1.34*   ANION GAP mmol/L 7 7   CALCIUM mg/dL 8.6 8.8   ALBUMIN g/dL  --  3.7   TOTAL BILIRUBIN mg/dL  --  0.66   ALK PHOS U/L  --  94   ALT U/L  --  10   AST U/L  --  16   GLUCOSE RANDOM mg/dL 96 125                       Lines/Drains:  Invasive Devices       Peripheral Intravenous Line  Duration             Peripheral IV 07/04/24 Right Antecubital <1 day                          Imaging: Reviewed radiology reports from this admission including: chest xray, chest CT scan, abdominal/pelvic CT, and CT head    Recent Cultures (last 7 days):         Last 24 Hours Medication List:   Current Facility-Administered Medications   Medication Dose Route Frequency Provider Last Rate    apixaban  5 mg Oral BID Jhno Granados MD      furosemide  20 mg Oral BID Jhon Granados MD      gabapentin  100 mg Oral TID Jhon Granados MD      mirtazapine  7.5 mg Oral HS Jhon Granados MD      QUEtiapine  25 mg Oral HS Jhon Granados MD          Today, Patient Was Seen By: Jhon Granados MD    **Please Note: This note may have been constructed using a voice recognition system.**

## 2024-07-04 NOTE — PROGRESS NOTES
Patient:  ARABELLA KATE    MRN:  [id number]    Aidin Request ID:  [id number]    Level of care reserved:  Home Health Agency    Partner Reserved:  [facility] [place] [address] [phone]    Clinical needs requested:    Geography searched:  [id number]    Start of Service:    Request sent:  9:19am EDT on 7/4/2024 by Timbo Gonzalez    Partner reserved:  10:12am EDT on 7/4/2024 by Timbo Gonzalez    Choice list shared:  10:12am EDT on 7/4/2024 by Timbo Gonzalez

## 2024-07-04 NOTE — ASSESSMENT & PLAN NOTE
Patient endorsed to dizziness after standing in the emergency room.  On chart review, she has had intermittent issues with dizziness before and was previously prescribed meclizine.  Would like to avoid this given her age and fall risk to begin with.  Unclear whether her dizziness which even vertigo versus true lightheadedness  Orthostatic vital signs weakly positive, notable drop after standing for few minutes.  Likely the etiology for this lightheadedness   Decrease Lasix dose to 20 mg, will recheck orthostatic vital signs tomorrow morning

## 2024-07-04 NOTE — OCCUPATIONAL THERAPY NOTE
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)    Past Medical History  Past Medical History:   Diagnosis Date    Ankle fracture     Arthritis     left hip    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HCC)     with left ureter    Bronchitis     Cancer (HCC)     Carcinoma, lung (HCC)     Dementia (HCC)     Dependent edema     Depression     Diabetes mellitus (HCC)     Hypercholesterolemia     Hypertension     Kidney stone     Oth abn and inconclusive findings on dx imaging of breast     Pes planus     Renal calculus     Restless leg syndrome     Rib pain     Sprain, neck     Varicose veins of left lower extremity      Past Surgical History  Past Surgical History:   Procedure Laterality Date    APPENDECTOMY      LUNG CANCER SURGERY              07/04/24 0913   OT Last Visit   OT Visit Date 07/04/24   Note Type   Note type Evaluation   Additional Comments Pt presented supine in bed upon arrival. Pt agreeable to OT eval.   Pain Assessment   Pain Assessment Tool Elena-Baker FACES   Pain Score No Pain    Elena-Baker FACES Pain Rating 0 (pt denied pain)   Restrictions/Precautions   Weight Bearing Precautions Per Order   (WBAT BLE per ortho note 5/13)   Braces or Orthoses   (per ortho note 5/13, L1 L5 compression fracture no bracing required.)   Other Precautions Cognitive;Chair Alarm;Bed Alarm;Telemetry;Fall Risk   Home Living   Type of Home House   Home Layout Two level;Bed/bath upstairs  (0 KAY; flight of steps to second floor)   Bathroom Accessibility Accessible   Home Equipment Walker  (RW used at baseline)   Additional Comments Home setup from OT eval 5/13/24.   Prior Function   Level of [place] Needs assistance with ADLs;Needs assistance with  functional mobility;Needs assistance with IADLS   Lives With Family   Receives Help From Family   IADLs Family/Friend/Other provides meals;Family/Friend/Other provides transportation;Family/Friend/Other provides medication management   Falls in the last 6 months 1 to 4  (at least 1 PTA; unknown fall history)   Vocational Retired   Comments Pt is a poor historian with a hx of dementia. PLOF and home setup obtained from OT eval 5/13.   Lifestyle   Autonomy Per chart review pt lives in a 2 story house with a full flight of steps to second floor. Pt ambulates with a RW.   Reciprocal Relationships Supportive family   Service to Others Retired   General   Family/Caregiver Present No   ADL   Eating Assistance 4  Minimal Assistance   Grooming Assistance 4  Minimal Assistance   UB Bathing Assistance 3  Moderate Assistance   LB Bathing Assistance 2  Maximal Assistance   UB Dressing Assistance 3  Moderate Assistance   LB Dressing Assistance 2  Maximal Assistance   Toileting Assistance  2  Maximal Assistance   Additional Comments ADL levels based on pt functional performance during eval.   Bed Mobility   Supine to Sit 4  Minimal assistance   Additional items Assist x 1;HOB elevated;Bedrails;Increased time required;Verbal cues;LE management   Sit to Supine   (DNT pt in recliner at end of session.)   Additional Comments Pt denied any dizziness or SOB while seated EOB. BP at beginning of session 145/73. Pt given verbal cues to look up in order to increase head/postural control.    Transfers   Sit to Stand 4  Minimal assistance   Additional items Assist x 2;Increased time required;Verbal cues   Stand to Sit 4  Minimal assistance   Additional items Assist x 2;Increased time required;Armrests;Verbal cues   Additional Comments Pt completed STS transfer from bed to RW. Verbal cueing was given to pt for hand placement and proper positioning.   Functional Mobility   Functional Mobility 4  Minimal assistance   Additional Comments Assist x  2; Pt ambulated short distance from bed to recliner with RW. Pt required verbal cueing for sequencing and navigating the RW. Pt denied any dizziness or SOB.   Additional items Rolling walker   Balance   Static Sitting Fair   Dynamic Sitting Fair -   Static Standing Poor +   Dynamic Standing Poor   Activity Tolerance   Activity Tolerance Patient tolerated treatment well   Medical Staff Made Aware Co-session with PT Fredy due to pt's complex medical status and hx of comorbidities. Skilled therapist needed for transfers.   Nurse Made Aware RN made aware.   RUE Assessment   RUE Assessment X  (limited shoulder ROM ~ 130, grossly 3+/5 MMT)   LUE Assessment   LUE Assessment X  (limited shoulder ROM ~ 130, grossly 3+/5 MMT)   Hand Function   Gross Motor Coordination Functional   Fine Motor Coordination Functional   Sensation   Light Touch No apparent deficits   Vision-Basic Assessment   Current Vision Does not wear glasses   Cognition   Overall Cognitive Status Impaired   Arousal/Participation Alert;Cooperative;Responsive   Attention Attends with cues to redirect   Orientation Level Oriented to person;Oriented to place;Disoriented to time;Disoriented to situation   Memory Decreased recall of recent events;Decreased short term memory;Decreased recall of biographical information;Decreased recall of precautions   Following Commands Follows one step commands with increased time or repetition   Comments Hx of dementia.   Assessment   Limitation Decreased ADL status;Decreased UE ROM;Decreased UE strength;Decreased cognition;Decreased endurance;Decreased self-care trans;Decreased high-level ADLs;Decreased Safe judgement during ADL   Prognosis Fair   Assessment Pt is a 89 y.o. female  who presents to [facility] on 7/3/[facility]24  with Fall at home. Pt's PMH indicates dizziness, chronic congestive heart failure, dementia, primary hypertension, paroxysmal A-fib, CKD, degeneration of lumbar intervertebral disc, anxiety,  weakness of L leg, and compression fracture of lumbar spine (non-traumatic). Orders placed for OT eval and treat, up and OOB as tolerated. At baseline, pt lives in 2 story house with family. Pt reports prior to admission, she required assistance with ADLs and IADLs. Upon initial evaluation, personal factors affecting pt include anxiety, high fall risk, limited community mobility, decreased ability to complete ADLs, and decreased ability to complete IADLs. Functional assessment indicates pt requires Max A with LB ADLs/toileting, Mod A with UB ADLs, and Min A with feeding/grooming. Pt requires Min A x 1  with bed mobility, and Min A x 2 for functional transfers and mobility. Pt deficits at this time include decreased BUE ROM , decreased BUE strength , decreased standing tolerance , decreased endurance , impaired balance , decreased functional reach, decreased memory, decreased attention , decreased safety awareness , and poor sequencing. These deficits have an impact on pt's ability to participate in daily activities. Throughout session, pt required verbal cueing from therapist to address safety, sequencing, and proper body positioning during ADLs. Occupational performance areas to address include feeding, grooming, dressing, bathing, toileting, functional mobility , bed mobility , and functional transfers. Pt would benefit from continued skilled OT services while hospitalized to address the above impairments and activity limitations to improve functional independence with meaningful activity. From an OT standpoint, d/c recommendation at this time is a level II moderate resource intensity .   Goals   Patient Goals none verbalized at this time   Plan   Treatment Interventions ADL retraining;Functional transfer training;UE strengthening/ROM;Endurance training;Cognitive reorientation;Patient/family training;Equipment evaluation/education;Compensatory technique education;Energy conservation;Activityengagement   Goal  Expiration Date 07/19/24   OT Treatment Day 0   OT Frequency 3-5x/wk   Discharge Recommendation   Rehab Resource Intensity Level, OT II (Moderate Resource Intensity)   AM-PAC Daily Activity Inpatient   Lower Body Dressing 1   Bathing 1   Toileting 1   Upper Body Dressing 2   Grooming 3   Eating 3   Daily Activity Raw Score 11   Daily Activity Standardized Score (Calc for Raw Score >=11) 29.04   AM-PAC Applied Cognition Inpatient   Following a Speech/Presentation 1   Understanding Ordinary Conversation 2   Taking Medications 2   Remembering Where Things Are Placed or Put Away 2   Remembering List of 4-5 Errands 1   Taking Care of Complicated Tasks 1   Applied Cognition Raw Score 9   Applied Cognition Standardized Score 22.48   Barthel Index   Feeding 5   Bathing 0   Grooming Score 0   Dressing Score 0   Bladder Score 10   Bowels Score 10   Toilet Use Score 0   Transfers (Bed/Chair) Score 5   Mobility (Level Surface) Score 0   Stairs Score 0   Barthel Index Score 30   Modified Carver Scale   Modified Carver Scale 4   End of Consult   Patient Position at End of Consult Bed/Chair alarm activated;All needs within reach;Bedside chair   Nurse Communication Nurse aware of consult  (RN made aware.)     GOALS    Pt will complete bed mobility with supervision in order to prepare self for OOB/EOB ADL tasks.     Pt will complete functional transfers including a toilet transfer, with Min A in order to prepare self for ADL tasks.    Pt will complete UB ADLs, including dressing, with Min A  in order to increase functional independence with feeding, grooming, and bathing.    Pt will complete LB ADLs with Mod A in order to increase functional independence with meaningful activities.     Pt will complete toileting hygiene tasks with Mod A in order to increase ability to perform self-care management tasks.     Pt will increase standing tolerance to 1 min. in order to decrease fall risk and increase endurance to engage in upright ADL  tasks.     Pt will utilize and/or verbalize 3 energy conservation techniques in order to increase activity tolerance for functional tasks.     Pt will engage in ongoing cognitive assessment to identify deficits in cognition and assist with safe d/c.     Pt will engage in UE therapeutic exercise for 10 min in order to increase strength and endurance for purposeful tasks.     Ratna Thomson OTS

## 2024-07-04 NOTE — PHYSICAL THERAPY NOTE
PHYSICAL THERAPY NOTE          Patient Name: Sona Valenzuela  Today's Date: 7/4/2024 07/04/24 0918   PT Last Visit   PT Visit Date 07/04/24   Note Type   Note Type Treatment   Pain Assessment   Pain Assessment Tool 0-10   Pain Score No Pain   Restrictions/Precautions   RUE Weight Bearing Per Order   (WBAT per ortho note 5/13)   Braces or Orthoses Other (Comment)  (none)   Other Precautions Cognitive;Bed Alarm;Chair Alarm;Telemetry;Fall Risk   General   Chart Reviewed Yes   Additional Pertinent History Of note, patient recently admitted to this facility and per chart review was discharged to rehab 5/15. Chart review indicates patient arrived this date from home   Response to Previous Treatment Patient with no complaints from previous session.   Family/Caregiver Present No   Cognition   Overall Cognitive Status Impaired   Arousal/Participation Alert;Cooperative   Attention Attends with cues to redirect   Orientation Level Oriented to person;Oriented to place;Disoriented to situation;Disoriented to time   Memory Decreased long term memory;Decreased recall of biographical information;Decreased short term memory;Decreased recall of recent events;Decreased recall of precautions   Following Commands Follows one step commands with increased time or repetition   Comments hx of dementia   Bed Mobility   Supine to Sit 4  Minimal assistance   Additional items Assist x 1;HOB elevated;Increased time required;Verbal cues;LE management   Transfers   Sit to Stand 4  Minimal assistance   Additional items Assist x 2;Increased time required;Bedrails;Verbal cues   Stand to Sit 4  Minimal assistance   Additional items Assist x 2;Armrests;Increased time required;Verbal cues   Additional Comments With RW   Ambulation/Elevation   Gait pattern Improper Weight shift;Decreased foot clearance;Narrow PORFIRIO;Shuffling;Short stride;Step to;Excessively slow;Knees  flexed   Gait Assistance 4  Minimal assist   Additional items Assist x 2;Verbal cues   Assistive Device Rolling walker   Distance 3' to bedside chair   Balance   Static Sitting Fair   Dynamic Sitting Fair -   Static Standing Poor +   Dynamic Standing Poor   Ambulatory Poor -   Endurance Deficit   Endurance Deficit Yes   Activity Tolerance   Activity Tolerance Patient tolerated treatment well   Medical Staff Made Aware OTS Amina  (Pt seen as a co-treatment due to pt's co-morbidites, clinically unstable presentation, and present impairments)   Nurse Made Aware Spoke to RN   Exercises   Glute Sets Sitting;10 reps;AROM;Bilateral   Hip Abduction Sitting;10 reps;AROM;Bilateral   Hip Adduction Sitting;10 reps;AROM;Bilateral   Knee AROM Long Arc Quad Sitting;10 reps;AROM;Bilateral   Ankle Pumps Sitting;10 reps;AROM;Bilateral   Marching Sitting;10 reps;AROM;Bilateral   Assessment   Prognosis Good   Problem List Decreased strength;Decreased mobility;Impaired balance;Decreased endurance;Decreased cognition;Decreased safety awareness;Impaired judgement   Assessment Chart review and two person identifiers were completed. Pt seen for PT treatment session this date, consisting of ther act focused on bed mobility, transfers, and functional mobility and ther ex focused on strengthening. Since previous session, pt has made good progress in terms of increased distance ambulated and tolerance to exercises.  Pt greeted supine in bed, pt required min A x1 to complete supine to sit. Pt completed STS with min A x1 and RW. Pt ambulated 3' with RW and min A x1. Pt required blocking at B knees to prevent buckling. Pt completed stand to sit with min a x1. Pt completed all seated exercises well with VC and TC to complete full ROM.  Pt ended session seated in recliner with alarm activated and all needs within reach. Spoke to RN about session outcomes. Pertinent barriers during this session include cognitive status. Current goals and POC  established on IE remain appropriate. Pt prognosis for achieving goals is good, pending pt progress with hospitalization/medical status improvements, and indicated by previous response to intervention. Pt limited d/t cognitive status. Pt continues to be functioning below baseline level, and remains limited due to factors listed above. PT will continue to see pt during current hospitalization in order to address the deficits listed above and provide interventions consistent w/ POC in effort to achieve STGs. PT recommends level 2, moderate resource intensity upon discharge.   Barriers to Discharge Inaccessible home environment;Decreased caregiver support   Goals   Patient Goals none verbalized at this time   STG Expiration Date 07/13/24   PT Treatment Day 1   Plan   Treatment/Interventions Functional transfer training;LE strengthening/ROM;Elevations;Therapeutic exercise;Endurance training;Patient/family training;Equipment eval/education;Bed mobility;Gait training;OT;Continued evaluation   Progress Progressing toward goals   PT Frequency 3-5x/wk   Discharge Recommendation   Rehab Resource Intensity Level, PT II (Moderate Resource Intensity)   Equipment Recommended Walker   AM-PAC Basic Mobility Inpatient   Turning in Flat Bed Without Bedrails 2   Lying on Back to Sitting on Edge of Flat Bed Without Bedrails 2   Moving Bed to Chair 2   Standing Up From Chair Using Arms 2   Walk in Room 2   Climb 3-5 Stairs With Railing 1   Basic Mobility Inpatient Raw Score 11   Basic Mobility Standardized Score 30.25   [facility] Highest Level Of Mobility   -HLM Goal 4: Move to chair/commode   -HLM Achieved 4: Move to chair/commode   Education   Education Provided Mobility training;Home exercise program;Assistive device   Patient Demonstrates acceptance/verbal understanding   End of Consult   Patient Position at End of Consult All needs within reach;Bedside chair;Bed/Chair alarm activated       Fredy Brambila, PT, DPT

## 2024-07-05 ENCOUNTER — TRANSITIONAL CARE MANAGEMENT (OUTPATIENT)
Dept: FAMILY MEDICINE CLINIC | Facility: CLINIC | Age: 89
End: 2024-07-05

## 2024-07-05 VITALS
SYSTOLIC BLOOD PRESSURE: 118 MMHG | OXYGEN SATURATION: 94 % | RESPIRATION RATE: 16 BRPM | TEMPERATURE: 97.8 F | WEIGHT: 139.11 LBS | HEIGHT: 59 IN | HEART RATE: 105 BPM | BODY MASS INDEX: 28.04 KG/M2 | DIASTOLIC BLOOD PRESSURE: 94 MMHG

## 2024-07-05 PROCEDURE — 99239 HOSP IP/OBS DSCHRG MGMT >30: CPT | Performed by: STUDENT IN AN ORGANIZED HEALTH CARE EDUCATION/TRAINING PROGRAM

## 2024-07-05 RX ORDER — FUROSEMIDE 40 MG/1
20 TABLET ORAL 2 TIMES DAILY
Qty: 30 TABLET | Refills: 0 | Status: SHIPPED | OUTPATIENT
Start: 2024-07-05 | End: 2024-07-06 | Stop reason: SDUPTHER

## 2024-07-05 RX ADMIN — FUROSEMIDE 20 MG: 20 TABLET ORAL at 08:19

## 2024-07-05 RX ADMIN — APIXABAN 5 MG: 5 TABLET, FILM COATED ORAL at 08:19

## 2024-07-05 RX ADMIN — GABAPENTIN 100 MG: 100 CAPSULE ORAL at 08:19

## 2024-07-05 NOTE — PLAN OF CARE
Problem: PAIN - ADULT  Goal: Verbalizes/displays adequate comfort level or baseline comfort level  Description: Interventions:  - Encourage patient to monitor pain and request assistance  - Assess pain using appropriate pain scale  - Administer analgesics based on type and severity of pain and evaluate response  - Implement non-pharmacological measures as appropriate and evaluate response  - Consider cultural and social influences on pain and pain management  - Notify physician/advanced practitioner if interventions unsuccessful or patient reports new pain  Outcome: Progressing     Problem: INFECTION - ADULT  Goal: Absence or prevention of progression during hospitalization  Description: INTERVENTIONS:  - Assess and monitor for signs and symptoms of infection  - Monitor lab/diagnostic results  - Monitor all insertion sites, i.e. indwelling lines, tubes, and drains  - Monitor endotracheal if appropriate and nasal secretions for changes in amount and color  - [place] appropriate cooling/warming therapies per order  - Administer medications as ordered  - Instruct and encourage patient and family to use good hand hygiene technique  - Identify and instruct in appropriate isolation precautions for identified infection/condition  Outcome: Progressing  Goal: Absence of fever/infection during neutropenic period  Description: INTERVENTIONS:  - Monitor WBC    Outcome: Progressing     Problem: SAFETY ADULT  Goal: Patient will remain free of falls  Description: INTERVENTIONS:  - Educate patient/family on patient safety including physical limitations  - Instruct patient to call for assistance with activity   - Consult OT/PT to assist with strengthening/mobility   - Keep Call bell within reach  - Keep bed low and locked with side rails adjusted as appropriate  - Keep care items and personal belongings within reach  - Initiate and maintain comfort rounds  - Make Fall Risk Sign visible to staff  - Offer Toileting every 2 Hours,  in advance of need  - Initiate/Maintain bed and chair alarm  - Obtain necessary fall risk management equipment: fall risk bracelet, yellow socks, bed and chair alarm  - Apply yellow socks and bracelet for high fall risk patients  - Consider moving patient to room near nurses station  Outcome: Progressing  Goal: Maintain or return to baseline ADL function  Description: INTERVENTIONS:  -  Assess patient's ability to carry out ADLs; assess patient's baseline for ADL function and identify physical deficits which impact ability to perform ADLs (bathing, care of mouth/teeth, toileting, grooming, dressing, etc.)  - Assess/evaluate cause of self-care deficits   - Assess range of motion  - Assess patient's mobility; develop plan if impaired  - Assess patient's need for assistive devices and provide as appropriate  - Encourage maximum independence but intervene and supervise when necessary  - Involve family in performance of ADLs  - Assess for home care needs following discharge   - Consider OT consult to assist with ADL evaluation and planning for discharge  - Provide patient education as appropriate  Outcome: Progressing  Goal: Maintains/Returns to pre admission functional level  Description: INTERVENTIONS:  - Perform AM-PAC 6 Click Basic Mobility/ Daily Activity assessment daily.  - Set and communicate daily mobility goal to care team and patient/family/caregiver.   - Collaborate with rehabilitation services on mobility goals if consulted  - Perform Range of Motion 2 times a day.  - Reposition patient every 2 hours.  - Dangle patient 2 times a day  - Stand patient 2 times a day  - Ambulate patient 2 times a day  - Out of bed to chair 2 times a day   - Out of bed for meals 2 times a day  - Out of bed for toileting  - Record patient progress and toleration of activity level   Outcome: Progressing

## 2024-07-05 NOTE — DISCHARGE INSTR - AVS FIRST PAGE
Decrease furosemide dose to 20 mg twice daily to hopefully prevent further lightheadedness/dizziness when standing.  If she has increased shortness of breath or accumulation of fluid in her legs, please call her primary care or cardiologist for further direction.  Would strongly recommend discussing with her outpatient providers whether or not continuing Eliquis makes sense given her fall risk and the associated risk of bleeding.

## 2024-07-05 NOTE — UTILIZATION REVIEW
Initial Clinical Review    Admission: Date/Time/Statement:   Admission Orders (From admission, onward)       Ordered        07/03/24 1515  INPATIENT ADMISSION  Once                          Orders Placed This Encounter   Procedures    INPATIENT ADMISSION     Standing Status:   Standing     Number of Occurrences:   1     Order Specific Question:   Level of Care     Answer:   Med Surg [16]     Order Specific Question:   Estimated length of stay     Answer:   More than 2 Midnights     Order Specific Question:   Certification     Answer:   I certify that inpatient services are medically necessary for this patient for a duration of greater than two midnights. See H&P and MD Progress Notes for additional information about the patient's course of treatment.     ED Arrival Information       Expected   -    Arrival   7/3/2024 09:57    Acuity   Emergent              Means of arrival   Ambulance    Escorted by   [facility]   Hospitalist    Admission type   Emergency              Arrival complaint   FALL BACK INJ             Chief Complaint   Patient presents with    Fall     PT arrives VIA EMS. Unwitnessed fall onto right side landing on carpet this AM. +HS on carpet, +BT, -LOC. C/o of upper back pain. Denies headache.        Initial Presentation: 89 y.o. female to ED from home via EMS w/ PMH of HFpEF, dementia, paroxysmal A-fib who presents with fall.  She had what sounds like a mechanical fall based on the EMS description after trying to get up to walk with her walker.  She endorsed back pain on arrival to the emergency room.  Trauma scan was negative.  While attempting to stand up and ambulate with assistance in the ED, patient complained of dizziness and had to be assisted back down into the bed. Trauma scan neg . Admitted IP status w/ dizziness , fall . Plan to check orthostatic , PT OT eval . CKD CR 1.34 at baseline . Afib rate controlled . HTN cont lasix. Dementia mirtazapine, seroquel .      Anticipated Length of Stay/Certification Statement:  Patient will be admitted on an inpatient basis with an anticipated length of stay of greater than 2 midnights secondary to fall, dizziness, PT/OT and possible placement.       Date: 7/4 Day 2: Orthostatic vital signs weakly positive, notable drop after standing for few minutes. Likely the etiology for this lightheadedness . Plan to dec lasix to 20 mg and recheck orthostatics in am . OT rec level II resources .     Date: 7/5  Day 3: Has surpassed a 2nd midnight with active treatments and services. Orthostatics neg this am. DC to home / [facility]        ED Triage Vitals   Temperature Pulse Respirations Blood Pressure SpO2 Pain Score   07/03/24 1000 07/03/24 1000 07/03/24 1000 07/03/24 1000 07/03/24 1000 07/03/24 1033   98 °F (36.7 °C) 83 20 (!) 174/77 95 % 9     Weight (last 2 days)       Date/Time Weight    07/03/24 1655 63.1 (139.11)            Vital Signs (last 3 days)       Date/Time Temp Pulse Resp BP MAP (mmHg) SpO2 O2 Device Patient Position - Orthostatic VS Pam Coma Scale Score Pain    07/05/24 1102 -- -- -- 118/94 -- -- -- Standing - Orthostatic VS -- --    07/05/24 1101 -- -- -- 143/77 -- -- -- Sitting - Orthostatic VS -- --    07/05/24 1100 97.8 °F (36.6 °C) 105 16 120/65 83 -- -- Lying - Orthostatic VS -- --    07/05/24 0821 -- -- -- -- -- -- -- -- 15 No Pain    07/05/24 07:22:42 97.9 °F (36.6 °C) -- 16 142/72 95 -- -- -- -- --    07/04/24 2300 98.1 °F (36.7 °C) -- 17 125/65 85 -- None (Room air) Lying -- --    07/04/24 2021 -- -- -- -- -- -- -- -- -- No Pain    07/04/24 2019 -- -- -- -- -- -- -- -- 15 --    07/04/24 15:30:33 98 °F (36.7 °C) 88 -- 114/64 81 94 % -- -- -- --    07/04/24 0918 -- -- -- -- -- -- -- -- -- No Pain    07/04/24 09:16:01 -- 81 -- 145/73 97 94 % -- -- -- --    07/04/24 0913 -- -- -- -- -- -- -- -- -- No Pain    07/04/24 0800 -- -- -- -- -- -- -- -- 15 --    07/04/24 07:47:51 97.5 °F (36.4 °C) 86 -- 137/85 102 94 % -- -- -- No  Pain    07/03/24 2241 98 °F (36.7 °C) 84 16 151/71 98 95 % -- -- -- --    07/03/24 21:31:01 -- 100 -- 104/77 86 92 % -- Standing for 3 minutes - Orthostatic VS -- --    07/03/24 21:27:31 -- 89 -- 132/58 83 95 % -- Standing - Orthostatic VS -- --    07/03/24 21:25:07 -- 91 -- 128/77 94 93 % -- Sitting - Orthostatic VS -- --    07/03/24 21:21:39 -- 93 -- 121/72 88 93 % -- Lying - Orthostatic VS -- --    07/03/24 2114 -- -- -- -- -- -- -- -- 15 No Pain    07/03/24 17:06:40 98.1 °F (36.7 °C) 90 18 150/90 110 96 % None (Room air) Lying -- --    07/03/24 1655 98.1 °F (36.7 °C) 90 19 150/90 110 96 % None (Room air) Lying 15 No Pain    07/03/24 1613 98.4 °F (36.9 °C) 78 -- 129/64 88 94 % None (Room air) Lying -- 2    07/03/24 1527 -- -- -- -- -- -- -- -- -- No Pain    07/03/24 1500 -- 79 18 109/56 80 -- -- -- -- --    07/03/24 1415 -- 80 17 134/63 90 94 % -- -- -- --    07/03/24 1400 -- 76 14 160/66 95 96 % None (Room air) Lying -- --    07/03/24 1300 -- 82 19 140/60 -- 91 % -- -- -- --    07/03/24 1230 98 °F (36.7 °C) 79 21 130/60 87 91 % None (Room air) Lying -- --    07/03/24 1215 98.1 °F (36.7 °C) 79 19 127/60 86 92 % None (Room air) Lying -- --    07/03/24 1200 98 °F (36.7 °C) 82 20 135/65 -- 91 % None (Room air) Lying 15 No Pain    07/03/24 1145 -- 80 18 139/68 -- 92 % -- -- -- --    07/03/24 1130 -- 80 17 131/62 89 93 % None (Room air) Lying -- --    07/03/24 1103 -- -- -- -- -- -- -- -- -- No Pain    07/03/24 1100 98 °F (36.7 °C) 78 18 146/67 97 94 % None (Room air) Lying 15 --    07/03/24 1045 98.1 °F (36.7 °C) 76 17 164/82 114 95 % None (Room air) Sitting 15 --    07/03/24 1033 98 °F (36.7 °C) -- -- -- -- -- None (Room air) Lying -- 9    07/03/24 1030 -- -- -- -- -- -- -- -- 15 --    07/03/24 1015 -- -- -- -- -- -- -- -- 15 --    07/03/24 1010 -- -- -- -- -- -- None (Room air) -- -- --    07/03/24 1001 -- -- -- -- -- -- -- -- 15 --    07/03/24 1000 98 °F (36.7 °C) 83 20 174/77 110 95 % None (Room air) Lying -- --               Pertinent Labs/Diagnostic Test Results:   Radiology:  TRAUMA - CT head wo contrast   Final Interpretation by Nando Burrows MD (07/03 1058)      No acute intracranial abnormality.  Chronic microangiopathic changes.      The study was marked in EPIC for immediate notification.            Workstation performed: [id number]         TRAUMA - CT spine cervical wo contrast   Final Interpretation by Nando Burrows MD (07/03 1052)      No cervical spine fracture or traumatic malalignment. Loss of the normal cervical lordosis which is likely secondary to patient positioning, cervical collar, or muscle spasm.      The study was marked in EPIC for immediate notification.            Workstation performed: [id number]         TRAUMA - CT chest abdomen pelvis wo contrast   Final Interpretation by Nando Burrows MD (07/03 1132)      1.  No acute traumatic injury to the chest, abdomen, or pelvis.      2.  No acute spinal fracture.      3.  Large hiatal hernia.      The study was marked in EPIC for immediate notification.      Workstation performed: [id number]         CT recon only thoracolumbar (No Charge)   Final Interpretation by Nando Burrows MD (07/03 1132)      1.  No acute traumatic injury to the chest, abdomen, or pelvis.      2.  No acute spinal fracture.      3.  Large hiatal hernia.      The study was marked in EPIC for immediate notification.      Workstation performed: [id number]         XR Trauma chest portable   Final Interpretation by Nando Burrows MD (07/03 1044)      No acute cardiopulmonary disease.            Workstation performed: [id number]           Cardiology:  No orders to display   7/3 EKG afib w / RBBB  GI:  No orders to display           Results from last 7 days   Lab Units 07/03/24  1011   WBC Thousand/uL 7.17   HEMOGLOBIN g/dL 11.2*   HEMATOCRIT % 35.8   PLATELETS Thousands/uL 326   TOTAL NEUT ABS Thousands/µL 4.98         Results from last 7 days   Lab Units 07/04/24  5226  "07/03/24  1011   SODIUM mmol/L 141 140   POTASSIUM mmol/L 3.6 3.5   CHLORIDE mmol/L 104 102   CO2 mmol/L 30 31   ANION GAP mmol/L 7 7   BUN mg/dL 38* 38*   CREATININE mg/dL 1.33* 1.34*   EGFR ml/min/1.73sq m 33 32   CALCIUM mg/dL 8.6 8.8     Results from last 7 days   Lab Units 07/03/24  1011   AST U/L 16   ALT U/L 10   ALK PHOS U/L 94   TOTAL PROTEIN g/dL 7.6   ALBUMIN g/dL 3.7   TOTAL BILIRUBIN mg/dL 0.66         Results from last 7 days   Lab Units 07/04/24  0456 07/03/24  1011   GLUCOSE RANDOM mg/dL 96 125             No results found for: \"BETA-HYDROXYBUTYRATE\"                   Results from last 7 days   Lab Units 07/03/24  1228 07/03/24  1011   HS TNI 0HR ng/L  --  21   HS TNI 2HR ng/L 21  --    HSTNI D2 ng/L 0  --                                                      Results from last 7 days   Lab Units 07/03/24  1011   LIPASE u/L 18                                                                   ED Treatment-Medication Administration from 07/03/2024 0957 to 07/03/2024 1655         Date/Time Order Dose Route Action     07/03/2024 1033 acetaminophen (TYLENOL) tablet 975 mg 975 mg Oral Given            Past Medical History:   Diagnosis Date    Ankle fracture     Arthritis     left hip    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HCC)     with left ureter    Bronchitis     Cancer (HCC)     Carcinoma, lung (HCC)     Dementia (HCC)     Dependent edema     Depression     Diabetes mellitus (HCC)     Hypercholesterolemia     Hypertension     Kidney stone     Oth abn and inconclusive findings on dx imaging of breast     Pes planus     Renal calculus     Restless leg syndrome     Rib pain     Sprain, neck     Varicose veins of left lower extremity      Present on Admission:   Primary hypertension   Dementia (HCC)   Dizziness   CKD (chronic kidney disease) stage 4, GFR 15-29 ml/min (HCC)   Chronic congestive heart failure (HCC)   Paroxysmal A-fib (HCC)      Admitting Diagnosis: Back injury [S39.92XA]  Fall from standing, initial encounter " [W19.XXXA]  Ambulatory dysfunction [R26.2]  Acute midline thoracic back pain [M54.6]  Age/Sex: 89 y.o. female  Admission Orders:  Scheduled Medications:  apixaban, 5 mg, Oral, BID  furosemide, 20 mg, Oral, BID  gabapentin, 100 mg, Oral, TID  mirtazapine, 7.5 mg, Oral, HS  QUEtiapine, 25 mg, Oral, HS      Continuous IV Infusions:     PRN Meds:     Tele   PT OT eval   I&O   Up and OOB   Orthostatics     IP CONSULT TO CASE MANAGEMENT    Network Utilization Review Department  ATTENTION: Please call with any questions or concerns to [phone] and carefully listen to the prompts so that you are directed to the right person. All voicemails are confidential.   For Discharge needs, contact Care Management DC Support Team at [phone] opt. 2  Send all requests for admission clinical reviews, approved or denied determinations and any other requests to dedicated fax number below belonging to the [place] where the patient is receiving treatment. List of dedicated fax numbers for the Facilities:  FACILITY NAME UR FAX NUMBER   ADMISSION DENIALS (Administrative/Medical Necessity) [phone]   DISCHARGE SUPPORT TEAM (NETWORK) [phone]   PARENT CHILD HEALTH (Maternity/NICU/Pediatrics) [phone]   [facility] [phone]   [facility] [phone]   [facility] [phone]   [facility] [phone]   [facility] [phone]   [facility] [phone]   [facility] [phone]   [facility] [phone]   [facility] [phone]   [facility] [phone]   [facility] [phone]   [facility] [phone]

## 2024-07-05 NOTE — ASSESSMENT & PLAN NOTE
Patient endorsed to dizziness after standing in the emergency room.  On chart review, she has had intermittent issues with dizziness before and was previously prescribed meclizine.  Would like to avoid this given her age and fall risk to begin with.  Unclear whether her dizziness which even vertigo versus true lightheadedness  Orthostatic vital signs weakly positive, notable drop after standing for few minutes.  Likely the etiology for this lightheadedness   Decrease Lasix dose to 20 mg, repeat orthostatic vitals improved

## 2024-07-05 NOTE — UTILIZATION REVIEW
NOTIFICATION OF INPATIENT ADMISSION   AUTHORIZATION REQUEST   SERVICING FACILITY:   [place] [address]  Tax ID: [id number]  NPI: [id number] ATTENDING PROVIDER:  Attending Name and NPI#: Jhon Granados Md [[id number]]  Address: [address] [place] [address]  Phone: [phone]     ADMISSION INFORMATION:  Place of Service: Inpatient [facility]  Place of Service Code: 21  Inpatient Admission Date/Time: 7/3/24  3:15 PM  Discharge Date/Time: No discharge date for patient encounter.  Admitting Diagnosis Code/Description:  Back injury [S39.92XA]  Fall from standing, initial encounter [W19.XXXA]  Ambulatory dysfunction [R26.2]  Acute midline thoracic back pain [M54.6]     UTILIZATION REVIEW CONTACT:  Mallory Humphreys Utilization   Network Utilization Review Department  Phone: [phone]  Fax [phone]  Email: [email or web address]  Contact for approvals/pending authorizations, clinical reviews, and discharge.     PHYSICIAN ADVISORY SERVICES:  Medical Necessity Denial & Obcd-hh-Qwba Review  Phone: [phone]  Fax: [phone]  Email: [email or web address]     DISCHARGE SUPPORT TEAM:  For Patients Discharge Needs & Updates  Phone: [phone] opt. 2 Fax: [phone]  Email: [email or web address]

## 2024-07-05 NOTE — ASSESSMENT & PLAN NOTE
Had what sounds like a mechanical fall at home, although in the setting of complaint of dizziness in ED, raises some concern/suspicion that this played a role.  Trauma scan negative for any acute abnormality  PT/OT.  Patient was recently admitted to [facility] in late May/early June.  Also already set up for HHC at home.  Family prefers home health care for her, case management set up again on discharge

## 2024-07-05 NOTE — ASSESSMENT & PLAN NOTE
Rate controlled without medication for this. Confirmed with niece that she was restarted on Eliquis after her most recent discharge.  Will defer to primary care provider on continuing this chronically, but in this 89-year-old patient with multiple falls risks may outweigh the benefits even despite her elevated [id number] score

## 2024-07-05 NOTE — ASSESSMENT & PLAN NOTE
>>ASSESSMENT AND PLAN FOR DEMENTIA (HCC) WRITTEN ON 7/5/2024  4:30 PM BY AZAM EDGAR MD    Continue mirtazapine, Seroquel at bedtime  Delirium precautions   Dutasteride Pregnancy And Lactation Text: This medication is absolutely contraindicated in women, especially during pregnancy and breast feeding. Feminization of male fetuses is possible if taking while pregnant.

## 2024-07-05 NOTE — DISCHARGE SUMMARY
[facility]  Discharge- Sona Valenzuela 11/3/1924, 89 y.o. female MRN: [id number]  Unit/Bed#: 2 E 256-01 Encounter: [id number]  Primary Care Provider: FARZANA Stanley   Date and time admitted to hospital: 7/3/2024  9:57 AM    CKD (chronic kidney disease) stage 4, GFR 15-29 ml/min ([facility])  Assessment & Plan  Lab Results   Component Value Date    EGFR 33 07/04/2024    EGFR 32 07/03/2024    EGFR 28 06/14/2024    CREATININE 1.33 (H) 07/04/2024    CREATININE 1.34 (H) 07/03/2024    CREATININE 1.51 (H) 06/14/2024   Baseline creatinine 1.3-1.6.  At baseline on admission.    Paroxysmal A-fib ([facility])  Assessment & Plan  Rate controlled without medication for this. Confirmed with niece that she was restarted on Eliquis after her most recent discharge.  Will defer to primary care provider on continuing this chronically, but in this 89-year-old patient with multiple falls risks may outweigh the benefits even despite her elevated [id number] score    Primary hypertension  Assessment & Plan  Decreased dose of furosemide as above    Dementia ([facility])  Assessment & Plan  Continue mirtazapine, Seroquel at bedtime  Delirium precautions    Chronic congestive heart failure ([facility])  Assessment & Plan  Wt Readings from Last 3 Encounters:   07/03/24 63.1 kg (139 lb 1.8 oz)   06/04/24 62.9 kg (138 lb 11.2 oz)   05/15/24 63.4 kg (139 lb 12.4 oz)   Appears euvolemic on exam.  Continue home diuretics for now            Dizziness  Assessment & Plan  Patient endorsed to dizziness after standing in the emergency room.  On chart review, she has had intermittent issues with dizziness before and was previously prescribed meclizine.  Would like to avoid this given her age and fall risk to begin with.  Unclear whether her dizziness which even vertigo versus true lightheadedness  Orthostatic vital signs weakly positive, notable drop after standing for few minutes.  Likely the etiology for this lightheadedness   Decrease Lasix  dose to 20 mg, repeat orthostatic vitals improved    * Fall at home  Assessment & Plan  Had what sounds like a mechanical fall at home, although in the setting of complaint of dizziness in ED, raises some concern/suspicion that this played a role.  Trauma scan negative for any acute abnormality  PT/OT.  Patient was recently admitted to [facility] in late May/early June.  Also already set up for C at home.  Family prefers home health care for her, case management set up again on discharge      Medical Problems       Resolved Problems  Date Reviewed: 9/29/2023   None       Discharging Physician / Practitioner: Jhon Granados MD  PCP: FARZANA Stanley  Admission Date:   Admission Orders (From admission, onward)       Ordered        07/03/24 1515  INPATIENT ADMISSION  Once                          Discharge Date: 07/05/24    Consultations During Hospital Stay:  None    Procedures Performed:   None    Significant Findings / Test Results:   TRAUMA - CT head wo contrast   Final Result by Nando Burrows MD (07/03 1058)      No acute intracranial abnormality.  Chronic microangiopathic changes.      The study was marked in EPIC for immediate notification.            Workstation performed: [id number]         TRAUMA - CT spine cervical wo contrast   Final Result by Nando Burrows MD (07/03 1052)      No cervical spine fracture or traumatic malalignment. Loss of the normal cervical lordosis which is likely secondary to patient positioning, cervical collar, or muscle spasm.      The study was marked in EPIC for immediate notification.            Workstation performed: [id number]         TRAUMA - CT chest abdomen pelvis wo contrast   Final Result by Nando Burrows MD (07/03 1132)      1.  No acute traumatic injury to the chest, abdomen, or pelvis.      2.  No acute spinal fracture.      3.  Large hiatal hernia.      The study was marked in EPIC for immediate notification.      Workstation performed: [id number]          CT recon only thoracolumbar (No Charge)   Final Result by Nando Burrows MD (07/03 1132)      1.  No acute traumatic injury to the chest, abdomen, or pelvis.      2.  No acute spinal fracture.      3.  Large hiatal hernia.      The study was marked in EPIC for immediate notification.      Workstation performed: [id number]         XR Trauma chest portable   Final Result by Nando Burrows MD (07/03 1044)      No acute cardiopulmonary disease.            Workstation performed: [id number]             Incidental Findings:   None    Test Results Pending at Discharge (will require follow up):   None     Outpatient Tests Requested:  None     Complications:  None    Reason for Admission: Fall    Hospital Course:   Sona Valenzuela is a 89 y.o. female patient who originally presented to the hospital on 7/3/2024 due to fall at home.  History is somewhat limited given her dementia, but initial story was concerning for mechanical fall.  Orthostatic vital signs weakly positive here, so orthostasis may have been playing a role as well.  Furosemide dose cut in half with improvement in orthostatic vitals.  Trauma scan negative for any acute injury.  PT/OT recommended level 2.  Family preferred to take her home with home health care.    Discussed with patient's caregiver (niece), recommended considering discontinuing Eliquis given her frequency of falls and age.  She would like to continue this for now and will follow-up with PCP for further management.    Please see above list of diagnoses and related plan for additional information.     Condition at Discharge: stable    Discharge Day Visit / Exam:   Subjective: Patient reports she feels well today.  Denies any lightheadedness, chest pain, shortness of breath.  No events overnight.  Vitals: Blood Pressure: 118/94 (07/05/24 1102)  Pulse: 105 (07/05/24 1100)  Temperature: 97.8 °F (36.6 °C) (07/05/24 1100)  Temp Source: Oral (07/04/24 2300)  Respirations: 16 (07/05/24  "1100)  Height: 4' 11\" (149.9 cm) (07/03/24 1655)  Weight - Scale: 63.1 kg (139 lb 1.8 oz) (07/03/24 1655)  SpO2: 94 % (07/04/24 1530)  Exam:   Physical Exam  Vitals and nursing note reviewed.   Constitutional:       Appearance: Normal appearance.   HENT:      Head: Normocephalic and atraumatic.      Mouth/Throat:      Mouth: Mucous membranes are moist.      Comments: Poor dentition  Eyes:      Conjunctiva/sclera: Conjunctivae normal.      Pupils: Pupils are equal, round, and reactive to light.   Cardiovascular:      Rate and Rhythm: Normal rate and regular rhythm.      Pulses: Normal pulses.      Heart sounds: Normal heart sounds. No murmur heard.     No gallop.      Comments: No peripheral edema  Pulmonary:      Effort: Pulmonary effort is normal. No respiratory distress.      Breath sounds: Normal breath sounds. No wheezing or rales.   Abdominal:      General: Abdomen is flat. Bowel sounds are normal. There is no distension.      Palpations: Abdomen is soft.      Tenderness: There is no abdominal tenderness. There is no guarding or rebound.   Musculoskeletal:         General: No swelling. Normal range of motion.   Skin:     General: Skin is warm and dry.      Capillary Refill: Capillary refill takes less than 2 seconds.   Neurological:      General: No focal deficit present.      Mental Status: She is alert. Mental status is at baseline. She is disoriented.   Psychiatric:         Mood and Affect: Mood normal.        Discussion with Family: Attempted to update  (niece) via phone. Left voicemail.     Discharge instructions/Information to patient and family:   See after visit summary for information provided to patient and family.      Provisions for Follow-Up Care:  See after visit summary for information related to follow-up care and any pertinent home health orders.      Mobility at time of Discharge:   Basic Mobility Inpatient Raw Score: 13  -HL Goal: 4: Move to chair/commode  -HLM Achieved: 2: " Bed activities/Dependent transfer  HLM Goal NOT achieved. Continue to encourage mobility in post discharge setting.     Disposition:   Home with [facility]NA Services (Reminder: Complete face to face encounter)    Planned Readmission: None     Discharge Statement:  I spent 40 minutes discharging the patient. This time was spent on the day of discharge. I had direct contact with the patient on the day of discharge. Greater than 50% of the total time was spent examining patient, answering all patient questions, arranging and discussing plan of care with patient as well as directly providing post-discharge instructions.  Additional time then spent on discharge activities.    Discharge Medications:  See after visit summary for reconciled discharge medications provided to patient and/or family.      **Please Note: This note may have been constructed using a voice recognition system**

## 2024-07-06 DIAGNOSIS — I50.9 ACUTE EXACERBATION OF CHF (CONGESTIVE HEART FAILURE) (HCC): ICD-10-CM

## 2024-07-06 RX ORDER — FUROSEMIDE 20 MG/1
20 TABLET ORAL 2 TIMES DAILY
Start: 2024-07-06 | End: 2024-08-05

## 2024-07-08 ENCOUNTER — TELEPHONE (OUTPATIENT)
Age: 89
End: 2024-07-08

## 2024-07-08 DIAGNOSIS — Z71.89 COMPLEX CARE COORDINATION: Primary | ICD-10-CM

## 2024-07-08 NOTE — TELEPHONE ENCOUNTER
Home PT calls to report weight changes for pt.     Pt's weight today was 129.6 and the previous weight they had was on 7/6 119lbs. Pt not having any symptoms.

## 2024-07-09 ENCOUNTER — PATIENT OUTREACH (OUTPATIENT)
Dept: FAMILY MEDICINE CLINIC | Facility: CLINIC | Age: 89
End: 2024-07-09

## 2024-07-09 PROBLEM — I13.0 HYPERTENSIVE HEART AND RENAL DISEASE WITH CONGESTIVE HEART FAILURE (HCC): Status: ACTIVE | Noted: 2024-07-09

## 2024-07-09 PROBLEM — F01.50 VASCULAR DEMENTIA, UNCOMPLICATED (HCC): Status: ACTIVE | Noted: 2023-07-01

## 2024-07-09 PROBLEM — N18.4 CKD (CHRONIC KIDNEY DISEASE) STAGE 4, GFR 15-29 ML/MIN (HCC): Chronic | Status: ACTIVE | Noted: 2022-08-23

## 2024-07-09 PROBLEM — R42 DIZZINESS: Status: RESOLVED | Noted: 2021-12-13 | Resolved: 2024-07-09

## 2024-07-09 NOTE — PROGRESS NOTES
Xi returned my call. Reports Sona is working with home care nurse and PT. They will weigh her when they are out providing care but Xi cannot weigh Sona by herself.   Xi states she does not see any lower extremity swelling on Sona's legs.   No questions or concerns about discharge instructions. Declines further outreaches at this time

## 2024-07-10 ENCOUNTER — TELEPHONE (OUTPATIENT)
Age: 89
End: 2024-07-10

## 2024-07-10 NOTE — TELEPHONE ENCOUNTER
[facility] called regarding patient care update. States she wanted to report that patient's daughter Xi is refusing the nursing visits. States Xi called last week and wanted patient to stop receiving nursing care. States patient was readmitted to the hospital and resumption of care: 07/06. Fartun states she was scheduled to see patient yesterday and today-appointment refused by Xi. Fartun states that the [facility] office has attempted to reach out to Xi to try to schedule better times, states that Xi continues to hang up on the office staff. States if this continues to be an issue, she will reach out to the office again.

## 2024-07-11 ENCOUNTER — TELEPHONE (OUTPATIENT)
Age: 89
End: 2024-07-11

## 2024-07-11 NOTE — TELEPHONE ENCOUNTER
[facility] called again:    Completed Home Health assesment on the 9th. Patient and caregiver are declining Occupational Therapy visits.

## 2024-07-11 NOTE — TELEPHONE ENCOUNTER
Imelda from [facility] calling,   Patient is losing weight  129.6.  The parameters is set for 135 to 141  She's eating no lost of appetite ..     The parameters would needs to be change     Patient dose have an upcoming appointment on 7/29/24    Office # [phone]

## 2024-07-12 ENCOUNTER — TELEPHONE (OUTPATIENT)
Age: 89
End: 2024-07-12

## 2024-07-12 NOTE — TELEPHONE ENCOUNTER
[facility] health called to advise patient is refusing a nursing visit. Therapy saw him yesterday and seemed ok. Emergency contact was already contacted
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
## 2024-07-28 PROBLEM — R54 ADVANCED AGE: Status: RESOLVED | Noted: 2021-12-13 | Resolved: 2024-07-28
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
## 2024-07-28 PROBLEM — R29.898 WEAKNESS OF LEFT LEG: Status: RESOLVED | Noted: 2023-01-24 | Resolved: 2024-07-28

## 2024-07-29 ENCOUNTER — OFFICE VISIT (OUTPATIENT)
Dept: FAMILY MEDICINE CLINIC | Facility: CLINIC | Age: 89
End: 2024-07-29
Payer: COMMERCIAL

## 2024-07-29 VITALS
DIASTOLIC BLOOD PRESSURE: 88 MMHG | OXYGEN SATURATION: 94 % | SYSTOLIC BLOOD PRESSURE: 144 MMHG | WEIGHT: 134 LBS | TEMPERATURE: 95.7 F | BODY MASS INDEX: 27.01 KG/M2 | HEART RATE: 71 BPM | HEIGHT: 59 IN

## 2024-07-29 DIAGNOSIS — I48.20 CHRONIC ATRIAL FIBRILLATION (HCC): ICD-10-CM

## 2024-07-29 DIAGNOSIS — F01.50 VASCULAR DEMENTIA, UNCOMPLICATED (HCC): ICD-10-CM

## 2024-07-29 DIAGNOSIS — N18.4 CKD (CHRONIC KIDNEY DISEASE) STAGE 4, GFR 15-29 ML/MIN (HCC): Chronic | ICD-10-CM

## 2024-07-29 DIAGNOSIS — I50.9 CHRONIC CONGESTIVE HEART FAILURE, UNSPECIFIED HEART FAILURE TYPE (HCC): Primary | ICD-10-CM

## 2024-07-29 PROCEDURE — 1159F MED LIST DOCD IN RCRD: CPT | Performed by: NURSE PRACTITIONER

## 2024-07-29 PROCEDURE — 1160F RVW MEDS BY RX/DR IN RCRD: CPT | Performed by: NURSE PRACTITIONER

## 2024-07-29 PROCEDURE — 99214 OFFICE O/P EST MOD 30 MIN: CPT | Performed by: NURSE PRACTITIONER

## 2024-07-29 PROCEDURE — G2211 COMPLEX E/M VISIT ADD ON: HCPCS | Performed by: NURSE PRACTITIONER

## 2024-07-29 RX ORDER — MIRTAZAPINE 7.5 MG/1
TABLET, FILM COATED ORAL
COMMUNITY
Start: 2024-06-15 | End: 2024-07-29 | Stop reason: SDUPTHER

## 2024-07-29 RX ORDER — MIRTAZAPINE 7.5 MG/1
7.5 TABLET, FILM COATED ORAL
Qty: 30 TABLET | Refills: 2 | Status: SHIPPED | OUTPATIENT
Start: 2024-07-29 | End: 2024-10-27

## 2024-07-29 NOTE — ASSESSMENT & PLAN NOTE
Renal function stable.    Lab Results   Component Value Date    EGFR 33 07/04/2024    EGFR 32 07/03/2024    EGFR 28 06/14/2024    CREATININE 1.33 (H) 07/04/2024    CREATININE 1.34 (H) 07/03/2024    CREATININE 1.51 (H) 06/14/2024

## 2024-07-29 NOTE — PROGRESS NOTES
Ambulatory Visit  Name: Sona Valenzuela      : 11/3/1924      MRN: [id number]  Encounter Provider: FARZANA Stanley  Encounter Date: 2024   Encounter department: [facility]    Assessment & Plan   1. Chronic congestive heart failure, unspecified heart failure type (HCC)  Assessment & Plan:  Taking 40mg of lasix in the morning and 20 in the evening.    Wt Readings from Last 3 Encounters:   24 60.8 kg (134 lb)   24 63.1 kg (139 lb 1.8 oz)   24 62.9 kg (138 lb 11.2 oz)           Orders:  -     Comprehensive metabolic panel; Future  -     CBC and differential; Future  2. Chronic atrial fibrillation (HCC)  Assessment & Plan:  Not on thinners or rate control medications  Orders:  -     Comprehensive metabolic panel; Future  -     CBC and differential; Future  3. Vascular dementia, uncomplicated (HCC)  Assessment & Plan:  Sleeping well at night.  Orders:  -     mirtazapine (REMERON) 7.5 MG tablet; Take 1 tablet (7.5 mg total) by mouth daily at bedtime  -     Comprehensive metabolic panel; Future  -     CBC and differential; Future  4. CKD (chronic kidney disease) stage 4, GFR 15-29 ml/min ([facility])  Assessment & Plan:  Renal function stable.    Lab Results   Component Value Date    EGFR 33 2024    EGFR 32 2024    EGFR 28 2024    CREATININE 1.33 (H) 2024    CREATININE 1.34 (H) 2024    CREATININE 1.51 (H) 2024     Orders:  -     Comprehensive metabolic panel; Future  -     CBC and differential; Future         History of Present Illness     The patient is here for follow up. She's has good energy, she denies any falls. She's been having good energy. She had been in the hospital in the beginning of the month for falls and dizziness. She has been doing better and doing physical therapy at home. She reports no falls with the walker.      Review of Systems   Constitutional:  Positive for fatigue. Negative for activity change, appetite  change, chills, fever and unexpected weight change.   HENT: Negative.     Eyes:  Negative for pain and visual disturbance.   Respiratory:  Negative for cough, chest tightness and shortness of breath.    Cardiovascular:  Negative for chest pain, palpitations and leg swelling.   Gastrointestinal:  Negative for abdominal pain, constipation, diarrhea, nausea and vomiting.   Endocrine: Negative.    Genitourinary:  Negative for dysuria, hematuria and urgency.   Musculoskeletal:  Positive for gait problem. Negative for arthralgias and back pain.        Wheelchair or walker use   Skin:  Negative for color change and rash.   Allergic/Immunologic: Negative.    Neurological:  Negative for dizziness, seizures, syncope, light-headedness and headaches.   Hematological: Negative.    Psychiatric/Behavioral: Negative.     All other systems reviewed and are negative.    Past Medical History:   Diagnosis Date   • Ankle fracture    • Arthritis     left hip   • 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HCC)     with left ureter   • Bronchitis    • Cancer (HCC)    • Carcinoma, lung (HCC)    • Dementia (HCC)    • Dependent edema    • Depression    • Diabetes mellitus (HCC)    • Dizziness 12/13/2021   • Hypercholesterolemia    • Hypertension    • Kidney stone    • Oth abn and inconclusive findings on dx imaging of breast    • Pes planus    • Renal calculus    • Restless leg syndrome    • Rib pain    • Sprain, neck    • Varicose veins of left lower extremity      Past Surgical History:   Procedure Laterality Date   • APPENDECTOMY     • LUNG CANCER SURGERY       Family History   Problem Relation Age of Onset   • No Known Problems Mother    • No Known Problems Father    • Dementia Brother    • Breast cancer Neg Hx    • Colon cancer Neg Hx    • Ovarian cancer Neg Hx    • Uterine cancer Neg Hx    • Cervical cancer Neg Hx      Social History     Tobacco Use   • Smoking status: Former     Current packs/day: 0.50     Average packs/day: 0.5 packs/day for 20.0 years (10.0  "ttl pk-yrs)     Types: Cigarettes   • Smokeless tobacco: Never   Vaping Use   • Vaping status: Never Used   Substance and Sexual Activity   • Alcohol use: Never   • Drug use: Never   • Sexual activity: Not Currently     Birth control/protection: Post-menopausal     Current Outpatient Medications on File Prior to Visit   Medication Sig   • [DISCONTINUED] mirtazapine (REMERON) 7.5 MG tablet TAKE 1 TABLET BY MOUTH AT BEDTIME FOR DEPRESSION   • ergocalciferol (VITAMIN D2) 50,000 units TAKE 1 CAPSULE BY MOUTH ONE TIME PER WEEK   • furosemide (LASIX) 20 mg tablet Take 1 tablet (20 mg total) by mouth 2 (two) times a day   • gabapentin (NEURONTIN) 100 mg capsule Take 1 capsule (100 mg total) by mouth 3 (three) times a day   • Misc. Devices (Mattress Cover) MISC Use daily   • QUEtiapine (SEROquel) 25 mg tablet TAKE 1 TABLET BY MOUTH DAILY AT BEDTIME   • [DISCONTINUED] mirtazapine (REMERON) 15 mg tablet TAKE 0.5 TABLETS (7.5 MG TOTAL) BY MOUTH DAILY AT BEDTIME     Allergies   Allergen Reactions   • Albuterol    • Aldactone [Spironolactone]    • Aspirin    • Atenolol    • Bactrim [Sulfamethoxazole-Trimethoprim]    • Codeine    • Hydrocodone    • Ibuprofen    • Lisinopril    • Mevacor [Lovastatin]    • Mirapex [Pramipexole]    • Morphine And Codeine    • Other      novacaine   • Penicillins    • Procaine    • Salicylates    • Ultram [Tramadol]    • Zoloft [Sertraline]      Night sweats     Immunization History   Administered Date(s) Administered   • COVID-19 PFIZER VACCINE 0.3 ML IM 03/18/2021, 04/08/2021   • INFLUENZA 10/23/2014, 11/22/2015, 02/14/2018, 11/06/2018, 09/29/2022   • Influenza, high dose seasonal 0.7 mL 09/25/2020, 10/07/2021, 09/29/2022   • Influenza, recombinant, quadrivalent,injectable, preservative free 10/11/2019   • Tuberculin Skin Test 10/22/2019, 11/01/2019     Objective     /88 (BP Location: Left arm, Patient Position: Sitting, Cuff Size: Adult)   Pulse 71   Temp (!) 95.7 °F (35.4 °C)   Ht 4' 11\" " (1.499 m)   Wt 60.8 kg (134 lb)   SpO2 94%   BMI 27.06 kg/m²     Physical Exam  Constitutional:       General: She is not in acute distress.     Appearance: She is well-developed.   HENT:      Head: Normocephalic and atraumatic.   Eyes:      Pupils: Pupils are equal, round, and reactive to light.   Neck:      Thyroid: No thyromegaly.   Cardiovascular:      Rate and Rhythm: Normal rate. Rhythm regularly irregular.      Heart sounds: Normal heart sounds. No murmur heard.  Pulmonary:      Effort: Pulmonary effort is normal. No respiratory distress.      Breath sounds: Normal breath sounds. No wheezing.   Abdominal:      General: Bowel sounds are normal.      Palpations: Abdomen is soft.   Musculoskeletal:         General: Normal range of motion.      Cervical back: Normal range of motion.      Right lower leg: No edema.      Left lower leg: No edema.   Skin:     General: Skin is warm and dry.   Neurological:      Mental Status: She is alert and oriented to person, place, and time.

## 2024-07-29 NOTE — ASSESSMENT & PLAN NOTE
Taking 40mg of lasix in the morning and 20 in the evening.    Wt Readings from Last 3 Encounters:   07/29/24 60.8 kg (134 lb)   07/03/24 63.1 kg (139 lb 1.8 oz)   06/04/24 62.9 kg (138 lb 11.2 oz)

## 2024-08-20 ENCOUNTER — TELEPHONE (OUTPATIENT)
Age: 89
End: 2024-08-20

## 2024-08-20 NOTE — TELEPHONE ENCOUNTER
[facility] stated that pt has weight gain by 4 pounds in last 2 days her diuretic was decrease nurse stated she might need to start taking again asking for Ventre opinion.

## 2024-08-20 NOTE — TELEPHONE ENCOUNTER
Patients niece was notified.  She confirmed 40 in the morning 20 in the afternoon.  Provided her with the new directions.

## 2024-08-21 DIAGNOSIS — F01.50 VASCULAR DEMENTIA, UNCOMPLICATED (HCC): ICD-10-CM

## 2024-08-21 RX ORDER — MIRTAZAPINE 7.5 MG/1
7.5 TABLET, FILM COATED ORAL
Qty: 90 TABLET | Refills: 1 | Status: SHIPPED | OUTPATIENT
Start: 2024-08-21 | End: 2025-02-17

## 2024-08-30 ENCOUNTER — TELEPHONE (OUTPATIENT)
Age: 89
End: 2024-08-30

## 2024-08-30 DIAGNOSIS — R30.0 DYSURIA: Primary | ICD-10-CM

## 2024-08-30 NOTE — TELEPHONE ENCOUNTER
Pts elisa is requesting if pt can have a UA ordered. Elisa has noticed that she has been acting strange and would like to just make sure this isn't a UTI.     Triage nurse asked pts elisa if pt was having any UTI s/s: Urgency, frequency, retention, foul smell and burning with urination. Pts elisa denied any and all s/s.   PCP please call pts elisa back to further advise @    stefanie rodriguez (Niece)  [phone] (Mobile)

## 2024-09-03 ENCOUNTER — APPOINTMENT (OUTPATIENT)
Dept: LAB | Facility: HOSPITAL | Age: 89
End: 2024-09-03

## 2024-09-03 DIAGNOSIS — R30.0 DYSURIA: ICD-10-CM

## 2024-09-07 ENCOUNTER — APPOINTMENT (OUTPATIENT)
Dept: LAB | Facility: HOSPITAL | Age: 89
End: 2024-09-07
Payer: COMMERCIAL

## 2024-09-07 DIAGNOSIS — I48.20 CHRONIC ATRIAL FIBRILLATION (HCC): ICD-10-CM

## 2024-09-07 DIAGNOSIS — F01.50 VASCULAR DEMENTIA, UNCOMPLICATED (HCC): ICD-10-CM

## 2024-09-07 DIAGNOSIS — R30.0 DYSURIA: ICD-10-CM

## 2024-09-07 DIAGNOSIS — N18.4 CKD (CHRONIC KIDNEY DISEASE) STAGE 4, GFR 15-29 ML/MIN (HCC): Chronic | ICD-10-CM

## 2024-09-07 DIAGNOSIS — I50.9 CHRONIC CONGESTIVE HEART FAILURE, UNSPECIFIED HEART FAILURE TYPE (HCC): ICD-10-CM

## 2024-09-07 LAB
BACTERIA UR QL AUTO: ABNORMAL /HPF
BILIRUB UR QL STRIP: NEGATIVE
CLARITY UR: ABNORMAL
COLOR UR: YELLOW
GLUCOSE UR STRIP-MCNC: NEGATIVE MG/DL
HGB UR QL STRIP.AUTO: ABNORMAL
KETONES UR STRIP-MCNC: NEGATIVE MG/DL
LEUKOCYTE ESTERASE UR QL STRIP: ABNORMAL
MUCOUS THREADS UR QL AUTO: ABNORMAL
NITRITE UR QL STRIP: NEGATIVE
NON-SQ EPI CELLS URNS QL MICRO: ABNORMAL /HPF
PH UR STRIP.AUTO: 6.5 [PH]
PROT UR STRIP-MCNC: ABNORMAL MG/DL
RBC #/AREA URNS AUTO: ABNORMAL /HPF
SP GR UR STRIP.AUTO: 1.01 (ref 1–1.03)
UROBILINOGEN UR STRIP-ACNC: <2 MG/DL
WBC #/AREA URNS AUTO: ABNORMAL /HPF
WBC CLUMPS # UR AUTO: PRESENT /UL

## 2024-09-07 PROCEDURE — 81001 URINALYSIS AUTO W/SCOPE: CPT

## 2024-09-09 ENCOUNTER — TELEPHONE (OUTPATIENT)
Dept: FAMILY MEDICINE CLINIC | Facility: CLINIC | Age: 89
End: 2024-09-09

## 2024-09-09 NOTE — TELEPHONE ENCOUNTER
Spoke with elisa watts who states pt is not c/o any symptoms, however patient does have dementia so she is not sure if she is having any issues or not.     Please advise

## 2024-09-09 NOTE — TELEPHONE ENCOUNTER
----- Message from FARZANA Carter sent at 9/7/2024 12:32 PM EDT -----  Large amount of white cells in the urine indicating possible infection is she having burning with urination

## 2024-09-09 NOTE — TELEPHONE ENCOUNTER
Notified and they will watch for any signs or symptoms and advise prn. Also her weight is now at 131 pounds

## 2024-10-28 ENCOUNTER — OFFICE VISIT (OUTPATIENT)
Dept: FAMILY MEDICINE CLINIC | Facility: CLINIC | Age: 89
End: 2024-10-28
Payer: COMMERCIAL

## 2024-10-28 ENCOUNTER — APPOINTMENT (OUTPATIENT)
Dept: LAB | Facility: CLINIC | Age: 89
End: 2024-10-28
Payer: COMMERCIAL

## 2024-10-28 VITALS
HEIGHT: 59 IN | SYSTOLIC BLOOD PRESSURE: 110 MMHG | HEART RATE: 89 BPM | DIASTOLIC BLOOD PRESSURE: 80 MMHG | TEMPERATURE: 98.1 F | WEIGHT: 135 LBS | BODY MASS INDEX: 27.21 KG/M2

## 2024-10-28 DIAGNOSIS — I48.0 PAROXYSMAL A-FIB (HCC): ICD-10-CM

## 2024-10-28 DIAGNOSIS — G89.29 CHRONIC WRIST PAIN, LEFT: ICD-10-CM

## 2024-10-28 DIAGNOSIS — F01.50 VASCULAR DEMENTIA, UNCOMPLICATED (HCC): ICD-10-CM

## 2024-10-28 DIAGNOSIS — M25.532 CHRONIC WRIST PAIN, LEFT: ICD-10-CM

## 2024-10-28 DIAGNOSIS — M25.532 CHRONIC PAIN OF LEFT WRIST: ICD-10-CM

## 2024-10-28 DIAGNOSIS — I10 PRIMARY HYPERTENSION: Primary | ICD-10-CM

## 2024-10-28 DIAGNOSIS — I13.0 HYPERTENSIVE HEART AND RENAL DISEASE WITH CONGESTIVE HEART FAILURE (HCC): ICD-10-CM

## 2024-10-28 DIAGNOSIS — I50.9 CHRONIC CONGESTIVE HEART FAILURE, UNSPECIFIED HEART FAILURE TYPE (HCC): ICD-10-CM

## 2024-10-28 DIAGNOSIS — G89.29 CHRONIC PAIN OF LEFT WRIST: ICD-10-CM

## 2024-10-28 PROBLEM — R44.1 VISUAL HALLUCINATIONS: Status: RESOLVED | Noted: 2019-06-18 | Resolved: 2024-10-28

## 2024-10-28 PROBLEM — F41.9 ANXIETY: Status: RESOLVED | Noted: 2021-12-13 | Resolved: 2024-10-28

## 2024-10-28 LAB
ALBUMIN SERPL BCG-MCNC: 3.8 G/DL (ref 3.5–5)
ALP SERPL-CCNC: 107 U/L (ref 34–104)
ALT SERPL W P-5'-P-CCNC: 10 U/L (ref 7–52)
ANION GAP SERPL CALCULATED.3IONS-SCNC: 11 MMOL/L (ref 4–13)
AST SERPL W P-5'-P-CCNC: 20 U/L (ref 13–39)
BASOPHILS # BLD AUTO: 0.04 THOUSANDS/ΜL (ref 0–0.1)
BASOPHILS NFR BLD AUTO: 1 % (ref 0–1)
BILIRUB SERPL-MCNC: 0.65 MG/DL (ref 0.2–1)
BUN SERPL-MCNC: 32 MG/DL (ref 5–25)
CALCIUM SERPL-MCNC: 9.1 MG/DL (ref 8.4–10.2)
CHLORIDE SERPL-SCNC: 103 MMOL/L (ref 96–108)
CO2 SERPL-SCNC: 29 MMOL/L (ref 21–32)
CREAT SERPL-MCNC: 1.26 MG/DL (ref 0.6–1.3)
EOSINOPHIL # BLD AUTO: 0.13 THOUSAND/ΜL (ref 0–0.61)
EOSINOPHIL NFR BLD AUTO: 2 % (ref 0–6)
ERYTHROCYTE [DISTWIDTH] IN BLOOD BY AUTOMATED COUNT: 18.9 % (ref 11.6–15.1)
GFR SERPL CREATININE-BSD FRML MDRD: 35 ML/MIN/1.73SQ M
GLUCOSE P FAST SERPL-MCNC: 113 MG/DL (ref 65–99)
HCT VFR BLD AUTO: 40 % (ref 34.8–46.1)
HGB BLD-MCNC: 11.6 G/DL (ref 11.5–15.4)
IMM GRANULOCYTES # BLD AUTO: 0.02 THOUSAND/UL (ref 0–0.2)
IMM GRANULOCYTES NFR BLD AUTO: 0 % (ref 0–2)
LYMPHOCYTES # BLD AUTO: 1.11 THOUSANDS/ΜL (ref 0.6–4.47)
LYMPHOCYTES NFR BLD AUTO: 17 % (ref 14–44)
MCH RBC QN AUTO: 23.7 PG (ref 26.8–34.3)
MCHC RBC AUTO-ENTMCNC: 29 G/DL (ref 31.4–37.4)
MCV RBC AUTO: 82 FL (ref 82–98)
MONOCYTES # BLD AUTO: 0.89 THOUSAND/ΜL (ref 0.17–1.22)
MONOCYTES NFR BLD AUTO: 13 % (ref 4–12)
NEUTROPHILS # BLD AUTO: 4.46 THOUSANDS/ΜL (ref 1.85–7.62)
NEUTS SEG NFR BLD AUTO: 67 % (ref 43–75)
NRBC BLD AUTO-RTO: 0 /100 WBCS
PLATELET # BLD AUTO: 299 THOUSANDS/UL (ref 149–390)
PMV BLD AUTO: 10.8 FL (ref 8.9–12.7)
POTASSIUM SERPL-SCNC: 3.9 MMOL/L (ref 3.5–5.3)
PROT SERPL-MCNC: 7.3 G/DL (ref 6.4–8.4)
RBC # BLD AUTO: 4.9 MILLION/UL (ref 3.81–5.12)
SODIUM SERPL-SCNC: 143 MMOL/L (ref 135–147)
WBC # BLD AUTO: 6.65 THOUSAND/UL (ref 4.31–10.16)

## 2024-10-28 PROCEDURE — G2211 COMPLEX E/M VISIT ADD ON: HCPCS | Performed by: NURSE PRACTITIONER

## 2024-10-28 PROCEDURE — 99214 OFFICE O/P EST MOD 30 MIN: CPT | Performed by: NURSE PRACTITIONER

## 2024-10-28 NOTE — ASSESSMENT & PLAN NOTE
Wt Readings from Last 3 Encounters:   10/28/24 61.2 kg (135 lb)   07/29/24 60.8 kg (134 lb)   07/03/24 63.1 kg (139 lb 1.8 oz)     Patient appearing to euvolemic           Orders:    Comprehensive metabolic panel; Future    CBC and differential; Future

## 2024-10-28 NOTE — ASSESSMENT & PLAN NOTE
Wt Readings from Last 3 Encounters:   10/28/24 61.2 kg (135 lb)   07/29/24 60.8 kg (134 lb)   07/03/24 63.1 kg (139 lb 1.8 oz)

## 2024-10-28 NOTE — PROGRESS NOTES
Ambulatory Visit  Name: Sona Valenzuela      : 11/3/1924      MRN: [id number]  Encounter Provider: FARZANA Stanley  Encounter Date: 10/28/2024   Encounter department: [facility]    Assessment & Plan  Primary hypertension  BP is well controlled          Vascular dementia, uncomplicated (HCC)  Taking the Remeron 7.5 mg and Seroquel 25 mg          Paroxysmal A-fib (HCC)  Heart rate controlled irregular rhythm in office          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)  Wt Readings from Last 3 Encounters:   10/28/24 61.2 kg (135 lb)   24 60.8 kg (134 lb)   24 63.1 kg (139 lb 1.8 oz)                    Chronic congestive heart failure, unspecified heart failure type (HCC)  Wt Readings from Last 3 Encounters:   10/28/24 61.2 kg (135 lb)   24 60.8 kg (134 lb)   24 63.1 kg (139 lb 1.8 oz)     Patient appearing to euvolemic           Orders:    Comprehensive metabolic panel; Future    CBC and differential; Future    Chronic pain of left wrist         Chronic wrist pain, left    Orders:    Diclofenac Sodium (VOLTAREN) 1 %; Apply 2 g topically 2 (two) times a day       History of Present Illness     Patient here today with her niece Xi and is home and doing well and left wrist pain. Patient will be celebrating 89 years on 11/3.         History obtained from : patient  Review of Systems   Constitutional:  Negative for activity change, appetite change, chills, diaphoresis, fatigue, fever and unexpected weight change.   HENT:  Negative for congestion, ear pain, hearing loss, postnasal drip, sinus pressure, sinus pain, sneezing, sore throat, trouble swallowing and voice change.    Eyes:  Negative for photophobia, pain, discharge, redness, itching and visual disturbance.   Respiratory:  Negative for apnea, cough, choking, chest tightness, shortness of breath, wheezing and stridor.    Cardiovascular:  Negative for chest pain,  "palpitations and leg swelling.   Gastrointestinal:  Negative for abdominal distention, abdominal pain, diarrhea, nausea and vomiting.   Endocrine: Negative.    Genitourinary:  Negative for difficulty urinating.   Musculoskeletal:  Positive for arthralgias and gait problem. Negative for back pain, joint swelling, myalgias, neck pain and neck stiffness.        Seated in wheelchair    Skin: Negative.    Allergic/Immunologic: Negative.    Neurological:  Negative for dizziness, seizures, syncope and light-headedness.   Hematological: Negative.    Psychiatric/Behavioral:  Negative for agitation, behavioral problems, confusion, decreased concentration, dysphoric mood, hallucinations and sleep disturbance. The patient is not nervous/anxious.            Objective     /80 (BP Location: Left arm, Patient Position: Sitting, Cuff Size: Adult)   Temp 98.1 °F (36.7 °C)   Ht 4' 11\" (1.499 m)   Wt 61.2 kg (135 lb)   BMI 27.27 kg/m²     Physical Exam  Vitals and nursing note reviewed.   Constitutional:       General: She is not in acute distress.     Appearance: Normal appearance. She is well-developed.   HENT:      Head: Normocephalic and atraumatic.      Right Ear: Hearing and tympanic membrane normal.      Left Ear: Hearing and tympanic membrane normal.      Nose: Nose normal.      Mouth/Throat:      Lips: Pink.      Mouth: Mucous membranes are moist.   Eyes:      General: Lids are normal.      Pupils: Pupils are equal, round, and reactive to light.      Comments: Wearing glasses    Neck:      Thyroid: No thyromegaly.   Cardiovascular:      Rate and Rhythm: Normal rate. Rhythm irregular.      Heart sounds: Normal heart sounds. No murmur heard.  Pulmonary:      Effort: Pulmonary effort is normal. No respiratory distress.      Breath sounds: Normal breath sounds. No wheezing.   Abdominal:      General: Bowel sounds are normal.      Palpations: Abdomen is soft.   Musculoskeletal:         General: Normal range of motion.     "  Left wrist: Deformity and tenderness present.      Cervical back: Full passive range of motion without pain and normal range of motion.      Comments: Seated in wheelchair    Skin:     General: Skin is warm and dry.   Neurological:      Mental Status: She is alert and oriented to person, place, and time.   Psychiatric:         Attention and Perception: Attention normal.         Mood and Affect: Mood normal.         Speech: Speech normal.         Behavior: Behavior normal. Behavior is cooperative.         Thought Content: Thought content normal.         Cognition and Memory: Cognition normal.

## 2024-11-09 ENCOUNTER — VBI (OUTPATIENT)
Dept: ADMINISTRATIVE | Facility: OTHER | Age: 89
End: 2024-11-09

## 2024-11-09 NOTE — TELEPHONE ENCOUNTER
11/09/24 12:15 PM     Chart reviewed for blood pressure taken was/were submitted to the patient's insurance.     Vilma Malave   [facility]

## 2024-12-05 ENCOUNTER — APPOINTMENT (EMERGENCY)
Dept: CT IMAGING | Facility: HOSPITAL | Age: 89
DRG: 092 | End: 2024-12-05
Payer: COMMERCIAL

## 2024-12-05 ENCOUNTER — HOSPITAL ENCOUNTER (INPATIENT)
Facility: HOSPITAL | Age: 89
LOS: 4 days | Discharge: NURSING, REHAB OR LONG-TERM CARE | DRG: 092 | End: 2024-12-09
Attending: EMERGENCY MEDICINE
Payer: COMMERCIAL

## 2024-12-05 ENCOUNTER — APPOINTMENT (EMERGENCY)
Dept: RADIOLOGY | Facility: HOSPITAL | Age: 89
DRG: 092 | End: 2024-12-05
Payer: COMMERCIAL

## 2024-12-05 ENCOUNTER — TELEPHONE (OUTPATIENT)
Age: 89
End: 2024-12-05

## 2024-12-05 DIAGNOSIS — F02.B0 MODERATE EARLY ONSET ALZHEIMER'S DEMENTIA WITHOUT BEHAVIORAL DISTURBANCE, PSYCHOTIC DISTURBANCE, MOOD DISTURBANCE, OR ANXIETY (HCC): ICD-10-CM

## 2024-12-05 DIAGNOSIS — R07.89 CHEST WALL PAIN: ICD-10-CM

## 2024-12-05 DIAGNOSIS — R53.1 GENERALIZED WEAKNESS: Primary | ICD-10-CM

## 2024-12-05 DIAGNOSIS — R26.2 AMBULATORY DYSFUNCTION: ICD-10-CM

## 2024-12-05 DIAGNOSIS — G30.0 MODERATE EARLY ONSET ALZHEIMER'S DEMENTIA WITHOUT BEHAVIORAL DISTURBANCE, PSYCHOTIC DISTURBANCE, MOOD DISTURBANCE, OR ANXIETY (HCC): ICD-10-CM

## 2024-12-05 PROBLEM — G30.9 MODERATE ALZHEIMER'S DEMENTIA WITHOUT BEHAVIORAL DISTURBANCE, PSYCHOTIC DISTURBANCE, MOOD DISTURBANCE, OR ANXIETY (HCC): Status: ACTIVE | Noted: 2024-12-05

## 2024-12-05 LAB
ALBUMIN SERPL BCG-MCNC: 3.7 G/DL (ref 3.5–5)
ALP SERPL-CCNC: 117 U/L (ref 34–104)
ALT SERPL W P-5'-P-CCNC: 9 U/L (ref 7–52)
ANION GAP SERPL CALCULATED.3IONS-SCNC: 7 MMOL/L (ref 4–13)
APTT PPP: 29 SECONDS (ref 23–34)
AST SERPL W P-5'-P-CCNC: 16 U/L (ref 13–39)
BASOPHILS # BLD AUTO: 0.03 THOUSANDS/ÂΜL (ref 0–0.1)
BASOPHILS NFR BLD AUTO: 0 % (ref 0–1)
BILIRUB DIRECT SERPL-MCNC: 0.16 MG/DL (ref 0–0.2)
BILIRUB SERPL-MCNC: 0.63 MG/DL (ref 0.2–1)
BUN SERPL-MCNC: 24 MG/DL (ref 5–25)
CALCIUM SERPL-MCNC: 8.5 MG/DL (ref 8.4–10.2)
CARDIAC TROPONIN I PNL SERPL HS: 20 NG/L (ref ?–50)
CHLORIDE SERPL-SCNC: 104 MMOL/L (ref 96–108)
CO2 SERPL-SCNC: 28 MMOL/L (ref 21–32)
CREAT SERPL-MCNC: 1.17 MG/DL (ref 0.6–1.3)
EOSINOPHIL # BLD AUTO: 0.16 THOUSAND/ÂΜL (ref 0–0.61)
EOSINOPHIL NFR BLD AUTO: 2 % (ref 0–6)
ERYTHROCYTE [DISTWIDTH] IN BLOOD BY AUTOMATED COUNT: 18.2 % (ref 11.6–15.1)
FLUAV AG UPPER RESP QL IA.RAPID: NEGATIVE
FLUBV AG UPPER RESP QL IA.RAPID: NEGATIVE
GFR SERPL CREATININE-BSD FRML MDRD: 38 ML/MIN/1.73SQ M
GLUCOSE SERPL-MCNC: 137 MG/DL (ref 65–140)
HCT VFR BLD AUTO: 35.7 % (ref 34.8–46.1)
HGB BLD-MCNC: 10.6 G/DL (ref 11.5–15.4)
IMM GRANULOCYTES # BLD AUTO: 0.03 THOUSAND/UL (ref 0–0.2)
IMM GRANULOCYTES NFR BLD AUTO: 0 % (ref 0–2)
INR PPP: 0.98 (ref 0.85–1.19)
LACTATE SERPL-SCNC: 1.8 MMOL/L (ref 0.5–2)
LYMPHOCYTES # BLD AUTO: 1.17 THOUSANDS/ÂΜL (ref 0.6–4.47)
LYMPHOCYTES NFR BLD AUTO: 15 % (ref 14–44)
MCH RBC QN AUTO: 23.8 PG (ref 26.8–34.3)
MCHC RBC AUTO-ENTMCNC: 29.7 G/DL (ref 31.4–37.4)
MCV RBC AUTO: 80 FL (ref 82–98)
MONOCYTES # BLD AUTO: 0.99 THOUSAND/ÂΜL (ref 0.17–1.22)
MONOCYTES NFR BLD AUTO: 13 % (ref 4–12)
NEUTROPHILS # BLD AUTO: 5.29 THOUSANDS/ÂΜL (ref 1.85–7.62)
NEUTS SEG NFR BLD AUTO: 70 % (ref 43–75)
NRBC BLD AUTO-RTO: 0 /100 WBCS
PLATELET # BLD AUTO: 270 THOUSANDS/UL (ref 149–390)
PLATELET # BLD AUTO: 294 THOUSANDS/UL (ref 149–390)
PMV BLD AUTO: 9.2 FL (ref 8.9–12.7)
PMV BLD AUTO: 9.5 FL (ref 8.9–12.7)
POTASSIUM SERPL-SCNC: 3.7 MMOL/L (ref 3.5–5.3)
PROT SERPL-MCNC: 7.2 G/DL (ref 6.4–8.4)
PROTHROMBIN TIME: 13.7 SECONDS (ref 12.3–15)
RBC # BLD AUTO: 4.45 MILLION/UL (ref 3.81–5.12)
SARS-COV+SARS-COV-2 AG RESP QL IA.RAPID: NEGATIVE
SODIUM SERPL-SCNC: 139 MMOL/L (ref 135–147)
TSH SERPL DL<=0.05 MIU/L-ACNC: 2.54 UIU/ML (ref 0.45–4.5)
WBC # BLD AUTO: 7.67 THOUSAND/UL (ref 4.31–10.16)

## 2024-12-05 PROCEDURE — 83605 ASSAY OF LACTIC ACID: CPT | Performed by: EMERGENCY MEDICINE

## 2024-12-05 PROCEDURE — 85049 AUTOMATED PLATELET COUNT: CPT | Performed by: FAMILY MEDICINE

## 2024-12-05 PROCEDURE — 71260 CT THORAX DX C+: CPT

## 2024-12-05 PROCEDURE — 72125 CT NECK SPINE W/O DYE: CPT

## 2024-12-05 PROCEDURE — 99285 EMERGENCY DEPT VISIT HI MDM: CPT

## 2024-12-05 PROCEDURE — 70450 CT HEAD/BRAIN W/O DYE: CPT

## 2024-12-05 PROCEDURE — 85025 COMPLETE CBC W/AUTO DIFF WBC: CPT | Performed by: EMERGENCY MEDICINE

## 2024-12-05 PROCEDURE — 85730 THROMBOPLASTIN TIME PARTIAL: CPT | Performed by: EMERGENCY MEDICINE

## 2024-12-05 PROCEDURE — 84443 ASSAY THYROID STIM HORMONE: CPT | Performed by: FAMILY MEDICINE

## 2024-12-05 PROCEDURE — 74177 CT ABD & PELVIS W/CONTRAST: CPT

## 2024-12-05 PROCEDURE — 84484 ASSAY OF TROPONIN QUANT: CPT | Performed by: EMERGENCY MEDICINE

## 2024-12-05 PROCEDURE — 87811 SARS-COV-2 COVID19 W/OPTIC: CPT | Performed by: EMERGENCY MEDICINE

## 2024-12-05 PROCEDURE — 80076 HEPATIC FUNCTION PANEL: CPT | Performed by: EMERGENCY MEDICINE

## 2024-12-05 PROCEDURE — 71045 X-RAY EXAM CHEST 1 VIEW: CPT

## 2024-12-05 PROCEDURE — 99223 1ST HOSP IP/OBS HIGH 75: CPT | Performed by: FAMILY MEDICINE

## 2024-12-05 PROCEDURE — 87804 INFLUENZA ASSAY W/OPTIC: CPT | Performed by: EMERGENCY MEDICINE

## 2024-12-05 PROCEDURE — 36415 COLL VENOUS BLD VENIPUNCTURE: CPT | Performed by: EMERGENCY MEDICINE

## 2024-12-05 PROCEDURE — 85610 PROTHROMBIN TIME: CPT | Performed by: EMERGENCY MEDICINE

## 2024-12-05 PROCEDURE — 93005 ELECTROCARDIOGRAM TRACING: CPT

## 2024-12-05 PROCEDURE — 80048 BASIC METABOLIC PNL TOTAL CA: CPT | Performed by: EMERGENCY MEDICINE

## 2024-12-05 RX ORDER — ENOXAPARIN SODIUM 100 MG/ML
30 INJECTION SUBCUTANEOUS DAILY
Status: DISCONTINUED | OUTPATIENT
Start: 2024-12-06 | End: 2024-12-09 | Stop reason: HOSPADM

## 2024-12-05 RX ORDER — ERGOCALCIFEROL 1.25 MG/1
50000 CAPSULE, LIQUID FILLED ORAL WEEKLY
Status: DISCONTINUED | OUTPATIENT
Start: 2024-12-05 | End: 2024-12-09 | Stop reason: HOSPADM

## 2024-12-05 RX ORDER — FUROSEMIDE 20 MG/1
20 TABLET ORAL 2 TIMES DAILY
Status: DISCONTINUED | OUTPATIENT
Start: 2024-12-05 | End: 2024-12-09 | Stop reason: HOSPADM

## 2024-12-05 RX ORDER — QUETIAPINE FUMARATE 25 MG/1
25 TABLET, FILM COATED ORAL
Status: DISCONTINUED | OUTPATIENT
Start: 2024-12-05 | End: 2024-12-09 | Stop reason: HOSPADM

## 2024-12-05 RX ORDER — MIRTAZAPINE 15 MG/1
7.5 TABLET, FILM COATED ORAL
Status: DISCONTINUED | OUTPATIENT
Start: 2024-12-05 | End: 2024-12-09 | Stop reason: HOSPADM

## 2024-12-05 RX ORDER — GABAPENTIN 100 MG/1
100 CAPSULE ORAL 3 TIMES DAILY
Status: DISCONTINUED | OUTPATIENT
Start: 2024-12-05 | End: 2024-12-09 | Stop reason: HOSPADM

## 2024-12-05 RX ADMIN — GABAPENTIN 100 MG: 100 CAPSULE ORAL at 21:10

## 2024-12-05 RX ADMIN — IOHEXOL 100 ML: 350 INJECTION, SOLUTION INTRAVENOUS at 17:24

## 2024-12-05 RX ADMIN — QUETIAPINE FUMARATE 25 MG: 25 TABLET ORAL at 21:10

## 2024-12-05 RX ADMIN — ERGOCALCIFEROL 50000 UNITS: 1.25 CAPSULE, LIQUID FILLED ORAL at 21:10

## 2024-12-05 RX ADMIN — FUROSEMIDE 20 MG: 20 TABLET ORAL at 21:10

## 2024-12-05 RX ADMIN — MIRTAZAPINE 7.5 MG: 15 TABLET, FILM COATED ORAL at 21:10

## 2024-12-05 NOTE — ED PROVIDER NOTES
Time reflects when diagnosis was documented in both MDM as applicable and the Disposition within this note       Time User Action Codes Description Comment    12/5/2024  7:02 PM Jessie Flood Add [R53.1] Generalized weakness     12/5/2024  7:03 PM Jessie Flood Add [R07.89] Chest wall pain           ED Disposition       ED Disposition   Admit    Condition   Stable    Date/Time   Thu Dec 5, 2024  7:02 PM    Comment   Case was discussed with Dr. Guerra and the patient's admission status was agreed to be Admission Status: inpatient status to the service of Dr. Oro .               Assessment & Plan       Medical Decision Making  89 y.o. female presents to ED for evaluation of left sided chest pain x 10 hours, hx fall 1 week ago. Further details in HPI.      Ddx: Consider life-threatening diagnosis including ACS, aortic dissection, tamponade, Boerhaave syndrome, pulmonary embolism or tension pneumothorax.  Also consider pneumonia, costochondritis, Tietze's syndrome, rib fracture, esophageal spasm, and anxiety     Plan: blood work, EKG, CXR and CT head, cspine and CAP.    On re-evaluation: well appearing in NAD, vital signs are normal. A&O x 3, airway patent, no chest pain or respiratory distress, Abdomen non distended, non tender. M/S no gross deformity, BARRETO x 4,GCS 15     Final Evaluation:  (see ED course for additional MDM)  Given history of progressive generalized weakness, discussed with niece regarding possible admission for rehab placement. Patient and patients' niece agreeable for rehab placement.   Case was discussed with Dr. Guerra and the patient's admission status was agreed to be Admission Status: inpatient status to the service of Dr. Oro.    Amount and/or Complexity of Data Reviewed  Labs: ordered.  Radiology: ordered. Decision-making details documented in ED Course.    Risk  Prescription drug management.  Decision regarding hospitalization.        ED Course as of 12/05/24 1925   Thu Dec 05, 2024    1828 CT spine cervical without contrast  No evidence of acute cervical spine fracture or traumatic malalignment.   1828 CT head without contrast  No acute intracranial abnormality.   1838 CT chest abdomen pelvis w contrast  No evidence of acute trauma in the chest, abdomen or pelvis.   1858 Spoke to patients elisa Layne about results regarding workup. States patient has had progressive weakness for the past couple of weeks causing her to walk much slower and be slower to stand. States it has been hard to care for her over the past week due to this. Agreeable to hospital admission pending rehab placement.       Medications   iohexol (OMNIPAQUE) 350 MG/ML injection (MULTI-DOSE) 100 mL (100 mL Intravenous Given 12/5/24 1724)       ED Risk Strat Scores                                               History of Present Illness       Chief Complaint   Patient presents with    Chest Pain     BIBA from home for complaints of left sided chest started today worsen on movement., also patient had a fall last week. Denies nausea and vomiting. Hx of AFIB.       Past Medical History:   Diagnosis Date    Ankle fracture     Arthritis     left hip    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HCC)     with left ureter    Bronchitis     Cancer (HCC)     Carcinoma, lung (HCC)     Dementia (HCC)     Dependent edema     Depression     Diabetes mellitus (HCC)     Dizziness 12/13/2021    Hypercholesterolemia     Hypertension     Kidney stone     Oth abn and inconclusive findings on dx imaging of breast     Pes planus     Renal calculus     Restless leg syndrome     Rib pain     Sprain, neck     Varicose veins of left lower extremity       Past Surgical History:   Procedure Laterality Date    APPENDECTOMY      LUNG CANCER SURGERY      US BREAST CYST ASPIRATION LEFT INITIAL Left 3/23/2018      Family History   Problem Relation Age of Onset    No Known Problems Mother     No Known Problems Father     Dementia Brother     Breast cancer Neg Hx     Colon cancer Neg Hx      Ovarian cancer Neg Hx     Uterine cancer Neg Hx     Cervical cancer Neg Hx       Social History     Tobacco Use    Smoking status: Former     Current packs/day: 0.50     Average packs/day: 0.5 packs/day for 20.0 years (10.0 ttl pk-yrs)     Types: Cigarettes    Smokeless tobacco: Never   Vaping Use    Vaping status: Never Used   Substance Use Topics    Alcohol use: Never    Drug use: Never      E-Cigarette/Vaping    E-Cigarette Use Never User       E-Cigarette/Vaping Substances    Nicotine No     THC No     CBD No     Flavoring No     Other No     Unknown No       I have reviewed and agree with the history as documented.     Patient is a 89yo female with history of atrial fibrillation, CHF presents with left upper chest pain x 10 hours. States the pain started this morning and is located in her left upper chest, stabbing in nature, does not radiate anywhere. States the pain is worse with movement and when she touches her chest, feels like she cannot take a deep breath. Reports a mechanical fall 1 week ago of which she was never evaluated for, no headstrike or LOC. Additionally endorses vague diffuse abdominal pain. Denies shortness of breath, palpitations, lightheadedness, dizziness, fevers, chills, nausea, vomiting.       Chest Pain  Associated symptoms: abdominal pain    Associated symptoms: no back pain, no cough, no fever, no palpitations, no shortness of breath and not vomiting        Review of Systems   Constitutional:  Negative for chills and fever.   HENT:  Negative for ear pain and sore throat.    Eyes:  Negative for pain and visual disturbance.   Respiratory:  Negative for cough and shortness of breath.    Cardiovascular:  Positive for chest pain. Negative for palpitations.   Gastrointestinal:  Positive for abdominal pain. Negative for vomiting.   Genitourinary:  Negative for dysuria and hematuria.   Musculoskeletal:  Negative for arthralgias and back pain.   Skin:  Negative for color change and rash.    Neurological:  Negative for seizures and syncope.   All other systems reviewed and are negative.          Objective       ED Triage Vitals [12/05/24 1622]   Temperature Pulse Blood Pressure Respirations SpO2 Patient Position - Orthostatic VS   97.7 °F (36.5 °C) (!) 106 166/93 16 96 % Sitting      Temp src Heart Rate Source BP Location FiO2 (%) Pain Score    -- Monitor Left arm -- --      Vitals      Date and Time Temp Pulse SpO2 Resp BP Pain Score FACES Pain Rating User   12/05/24 1730 -- 88 93 % 22 166/72 -- -- DO   12/05/24 1622 97.7 °F (36.5 °C) 106 96 % 16 166/93 -- -- LM            Physical Exam  Vitals and nursing note reviewed.   Constitutional:       General: She is not in acute distress.     Appearance: She is not ill-appearing.   HENT:      Head: Normocephalic and atraumatic.      Right Ear: External ear normal.      Left Ear: External ear normal.      Nose: Nose normal.      Mouth/Throat:      Pharynx: Oropharynx is clear. No oropharyngeal exudate or posterior oropharyngeal erythema.   Eyes:      General: No scleral icterus.     Conjunctiva/sclera: Conjunctivae normal.   Cardiovascular:      Rate and Rhythm: Normal rate.      Pulses: Normal pulses.   Pulmonary:      Effort: Pulmonary effort is normal. No respiratory distress.   Chest:      Chest wall: Tenderness (left upper chest pain reproducable on palpation.) present.   Abdominal:      General: There is no distension.      Palpations: Abdomen is soft.      Tenderness: There is abdominal tenderness (mild diffuse tenderness, no guarding or rebound.). There is no guarding or rebound.   Musculoskeletal:         General: Normal range of motion.      Cervical back: Normal range of motion. No rigidity.      Right lower leg: No edema.      Left lower leg: No edema.   Skin:     General: Skin is warm and dry.      Findings: No erythema.   Neurological:      General: No focal deficit present.      Mental Status: She is alert and oriented to person, place, and  time.   Psychiatric:         Mood and Affect: Mood normal.         Behavior: Behavior normal.         Results Reviewed       Procedure Component Value Units Date/Time    HS Troponin 0hr (reflex protocol) [[id number]]  (Normal) Collected: 12/05/24 1640    Lab Status: Final result Specimen: Blood from Arm, Right Updated: 12/05/24 1708     hs TnI 0hr 20 ng/L     Lactic acid, plasma (w/reflex if result > 2.0) [[id number]]  (Normal) Collected: 12/05/24 1640    Lab Status: Final result Specimen: Blood from Arm, Right Updated: 12/05/24 1706     LACTIC ACID 1.8 mmol/L     Narrative:      Result may be elevated if tourniquet was used during collection.    FLU/COVID Rapid Antigen (30 min. TAT) - Preferred screening test in ED [[id number]]  (Normal) Collected: 12/05/24 1640    Lab Status: Final result Specimen: Nares from Nose Updated: 12/05/24 1705     SARS COV Rapid Antigen Negative     Influenza A Rapid Antigen Negative     Influenza B Rapid Antigen Negative    Narrative:      This test has been performed using the [employer] Sarai 2 FLU+SARS Antigen test under the Emergency Use Authorization (EUA). This test has been validated by the  and verified by the performing laboratory. The Sarai uses lateral flow immunofluorescent sandwich assay to detect SARS-COV, Influenza A and Influenza B Antigen.     The [employer] Sarai 2 SARS Antigen test does not differentiate between SARS-CoV and SARS-CoV-2.     Negative results are presumptive and may be confirmed with a molecular assay, if necessary, for patient management. Negative results do not rule out SARS-CoV-2 or influenza infection and should not be used as the sole basis for treatment or patient management decisions. A negative test result may occur if the level of antigen in a sample is below the limit of detection of this test.     Positive results are indicative of the presence of viral antigens, but do not rule out bacterial infection or co-infection with other viruses.      All test results should be used as an adjunct to clinical observations and other information available to the provider.    FOR PEDIATRIC PATIENTS - copy/paste COVID Guidelines URL to browser: [email or web address]    Basic metabolic panel [[id number]] Collected: 12/05/24 1640    Lab Status: Final result Specimen: Blood from Arm, Right Updated: 12/05/24 1705     Sodium 139 mmol/L      Potassium 3.7 mmol/L      Chloride 104 mmol/L      CO2 28 mmol/L      ANION GAP 7 mmol/L      BUN 24 mg/dL      Creatinine 1.17 mg/dL      Glucose 137 mg/dL      Calcium 8.5 mg/dL      eGFR 38 ml/min/1.73sq m     Narrative:      National Kidney Disease Foundation guidelines for Chronic Kidney Disease (CKD):     Stage 1 with normal or high GFR (GFR > 90 mL/min/1.73 square meters)    Stage 2 Mild CKD (GFR = 60-89 mL/min/1.73 square meters)    Stage 3A Moderate CKD (GFR = 45-59 mL/min/1.73 square meters)    Stage 3B Moderate CKD (GFR = 30-44 mL/min/1.73 square meters)    Stage 4 Severe CKD (GFR = 15-29 mL/min/1.73 square meters)    Stage 5 End Stage CKD (GFR <15 mL/min/1.73 square meters)  Note: GFR calculation is accurate only with a steady state creatinine    Hepatic function panel [[id number]]  (Abnormal) Collected: 12/05/24 1640    Lab Status: Final result Specimen: Blood from Arm, Right Updated: 12/05/24 1705     Total Bilirubin 0.63 mg/dL      Bilirubin, Direct 0.16 mg/dL      Alkaline Phosphatase 117 U/L      AST 16 U/L      ALT 9 U/L      Total Protein 7.2 g/dL      Albumin 3.7 g/dL     Protime-INR [[id number]]  (Normal) Collected: 12/05/24 1640    Lab Status: Final result Specimen: Blood from Arm, Right Updated: 12/05/24 1703     Protime 13.7 seconds      INR 0.98    Narrative:      INR Therapeutic Range    Indication                                             INR Range      Atrial Fibrillation                                               2.0-3.0  Hypercoagulable State                                     2.0.2.3  Left Ventricular Asist Device                            2.0-3.0  Mechanical Heart Valve                                  -    Aortic(with afib, MI, embolism, HF, LA enlargement,    and/or coagulopathy)                                     2.0-3.0 (2.5-3.5)     Mitral                                                             2.5-3.5  Prosthetic/Bioprosthetic Heart Valve               2.0-3.0  Venous thromboembolism (VTE: VT, PE        2.0-3.0    APTT [[id number]]  (Normal) Collected: 12/05/24 1640    Lab Status: Final result Specimen: Blood from Arm, Right Updated: 12/05/24 1703     PTT 29 seconds     CBC and differential [[id number]]  (Abnormal) Collected: 12/05/24 1640    Lab Status: Final result Specimen: Blood from Arm, Right Updated: 12/05/24 1647     WBC 7.67 Thousand/uL      RBC 4.45 Million/uL      Hemoglobin 10.6 g/dL      Hematocrit 35.7 %      MCV 80 fL      MCH 23.8 pg      MCHC 29.7 g/dL      RDW 18.2 %      MPV 9.5 fL      Platelets 294 Thousands/uL      nRBC 0 /100 WBCs      Segmented % 70 %      Immature Grans % 0 %      Lymphocytes % 15 %      Monocytes % 13 %      Eosinophils Relative 2 %      Basophils Relative 0 %      Absolute Neutrophils 5.29 Thousands/µL      Absolute Immature Grans 0.03 Thousand/uL      Absolute Lymphocytes 1.17 Thousands/µL      Absolute Monocytes 0.99 Thousand/µL      Eosinophils Absolute 0.16 Thousand/µL      Basophils Absolute 0.03 Thousands/µL     UA (URINE) with reflex to Scope [[id number]]     Lab Status: No result Specimen: Urine             CT chest abdomen pelvis w contrast   Final Interpretation by Dev Weston MD (12/05 1832)      No evidence of acute trauma in the chest, abdomen or pelvis.               Workstation performed: [id number]         CT head without contrast   Final Interpretation by Dev Weston MD (12/05 1819)      No acute intracranial abnormality.                  Workstation performed: [id number]          CT spine cervical without contrast   Final Interpretation by Dev Weston MD (12/05 1822)      No evidence of acute cervical spine fracture or traumatic malalignment.                  Workstation performed: [id number]         XR chest 1 view portable   Final Interpretation by Chilo Agudelo MD (12/05 1711)      Left basilar effusion with atelectasis or pneumonia.            Workstation performed: [id number]             Procedures    ED Medication and Procedure Management   Prior to Admission Medications   Prescriptions Last Dose Informant Patient Reported? Taking?   Diclofenac Sodium (VOLTAREN) 1 %   No No   Sig: Apply 2 g topically 2 (two) times a day   Misc. Devices (Mattress Cover) MISC  Self No No   Sig: Use daily   QUEtiapine (SEROquel) 25 mg tablet   No No   Sig: TAKE 1 TABLET BY MOUTH DAILY AT BEDTIME   ergocalciferol (VITAMIN D2) 50,000 units   No No   Sig: TAKE 1 CAPSULE BY MOUTH ONE TIME PER WEEK   furosemide (LASIX) 20 mg tablet   No No   Sig: Take 1 tablet (20 mg total) by mouth 2 (two) times a day   gabapentin (NEURONTIN) 100 mg capsule   No No   Sig: Take 1 capsule (100 mg total) by mouth 3 (three) times a day   mirtazapine (REMERON) 7.5 MG tablet   No No   Sig: TAKE 1 TABLET (7.5 MG TOTAL) BY MOUTH DAILY AT BEDTIME      Facility-Administered Medications: None     Patient's Medications   Discharge Prescriptions    No medications on file     No discharge procedures on file.  ED SEPSIS DOCUMENTATION   Time reflects when diagnosis was documented in both MDM as applicable and the Disposition within this note       Time User Action Codes Description Comment    12/5/2024  7:02 PM Jessie Flood [R53.1] Generalized weakness     12/5/2024  7:03 PM Jessie Flood Add [R07.89] Chest wall pain                  Jessie Flood PA-C  12/05/24 1926

## 2024-12-06 PROBLEM — N18.32 STAGE 3B CHRONIC KIDNEY DISEASE (HCC): Status: ACTIVE | Noted: 2022-08-23

## 2024-12-06 LAB
ATRIAL RATE: 91 BPM
BACTERIA UR QL AUTO: ABNORMAL /HPF
BILIRUB UR QL STRIP: NEGATIVE
CLARITY UR: ABNORMAL
COLOR UR: ABNORMAL
GLUCOSE UR STRIP-MCNC: NEGATIVE MG/DL
HGB UR QL STRIP.AUTO: NEGATIVE
KETONES UR STRIP-MCNC: NEGATIVE MG/DL
LEUKOCYTE ESTERASE UR QL STRIP: ABNORMAL
NITRITE UR QL STRIP: NEGATIVE
NON-SQ EPI CELLS URNS QL MICRO: ABNORMAL /HPF
PH UR STRIP.AUTO: 8.5 [PH]
PROT UR STRIP-MCNC: ABNORMAL MG/DL
QRS AXIS: 79 DEGREES
QRSD INTERVAL: 110 MS
QT INTERVAL: 392 MS
QTC INTERVAL: 476 MS
RBC #/AREA URNS AUTO: ABNORMAL /HPF
SP GR UR STRIP.AUTO: 1.02 (ref 1–1.03)
T WAVE AXIS: 74 DEGREES
TRI-PHOS CRY URNS QL MICRO: ABNORMAL /HPF
UROBILINOGEN UR STRIP-ACNC: <2 MG/DL
VENTRICULAR RATE: 89 BPM
WBC #/AREA URNS AUTO: ABNORMAL /HPF

## 2024-12-06 PROCEDURE — 93010 ELECTROCARDIOGRAM REPORT: CPT | Performed by: INTERNAL MEDICINE

## 2024-12-06 PROCEDURE — 97163 PT EVAL HIGH COMPLEX 45 MIN: CPT

## 2024-12-06 PROCEDURE — 97167 OT EVAL HIGH COMPLEX 60 MIN: CPT

## 2024-12-06 PROCEDURE — 81001 URINALYSIS AUTO W/SCOPE: CPT | Performed by: FAMILY MEDICINE

## 2024-12-06 PROCEDURE — 99232 SBSQ HOSP IP/OBS MODERATE 35: CPT | Performed by: PHYSICIAN ASSISTANT

## 2024-12-06 PROCEDURE — 97110 THERAPEUTIC EXERCISES: CPT

## 2024-12-06 RX ADMIN — GABAPENTIN 100 MG: 100 CAPSULE ORAL at 08:34

## 2024-12-06 RX ADMIN — FUROSEMIDE 20 MG: 20 TABLET ORAL at 17:48

## 2024-12-06 RX ADMIN — GABAPENTIN 100 MG: 100 CAPSULE ORAL at 21:27

## 2024-12-06 RX ADMIN — FUROSEMIDE 20 MG: 20 TABLET ORAL at 08:33

## 2024-12-06 RX ADMIN — ENOXAPARIN SODIUM 30 MG: 30 INJECTION SUBCUTANEOUS at 08:34

## 2024-12-06 RX ADMIN — GABAPENTIN 100 MG: 100 CAPSULE ORAL at 17:48

## 2024-12-06 RX ADMIN — MIRTAZAPINE 7.5 MG: 15 TABLET, FILM COATED ORAL at 21:27

## 2024-12-06 RX ADMIN — QUETIAPINE FUMARATE 25 MG: 25 TABLET ORAL at 21:27

## 2024-12-06 NOTE — PLAN OF CARE
Problem: PHYSICAL THERAPY ADULT  Goal: Performs mobility at highest level of function for planned discharge setting.  See evaluation for individualized goals.  Description: Treatment/Interventions: Functional transfer training, LE strengthening/ROM, Therapeutic exercise, Endurance training, Cognitive reorientation, Patient/family training, Bed mobility, Gait training, Spoke to nursing          See flowsheet documentation for full assessment, interventions and recommendations.  Note: Prognosis: Good  Problem List: Decreased strength, Decreased endurance, Impaired balance, Decreased mobility, Decreased cognition, Pain  Assessment: Pt is a 89 y/o female who presents to [facility] with ambulatory dysfunction and was consulted for PT. Pt's physical function is affected by comorbidities which include chronic CHF, primary hypertension, paroxysmal A-fib, stage 3b CKD, and moderate Alzheimer's dementia. Pt prior level of function was requiring assistance with all ADLs and IADLs, requiring assistance with all functional activities, and ambulates with the use of a RW. Pt currently resides with her family in a 2-level house with 0 KAY and a flight of stairs to the 2nd floor bedroom. Pt is a poor historian due to cognitive impairments; home setup and PLOF gathered via chart review. Personal factors currently affecting the pt include additional assistance required with functional mobility and transfers, the inability to ambulate household and community distances, and stairs to reach the 2nd floor bedroom. Impairments found during the PT initial evaluation include decreased strength, decreased endurance, decreased mobility, impaired balance, and impaired cognition. Pt AM-PAC mobility score is 16/24. Pt will benefit from continued PT in order to address the impairments outlined above. At time of discharge, pt is recommended for Level 2, moderate resource intensity in order to return to prior level of  function.  Barriers to Discharge: Inaccessible home environment     Rehab Resource Intensity Level, PT: II (Moderate Resource Intensity)    See flowsheet documentation for full assessment.

## 2024-12-06 NOTE — H&P
H&P - Hospitalist   Name: Sona Valenzuela 89 y.o. female I MRN: [id number]  Unit/Bed#: 2 E 255-01 I Date of Admission: 12/5/2024   Date of Service: 12/5/2024 I Hospital Day: 0     Assessment & Plan  Vitamin D deficiency  Patient on vitamin D supplementation continue for now and check vit d levels  Chronic congestive heart failure (HCC)  Wt Readings from Last 3 Encounters:   12/05/24 64.1 kg (141 lb 5 oz)   10/28/24 61.2 kg (135 lb)   07/29/24 60.8 kg (134 lb)       Patient carries diagnosis of heart failure she is on a diuretic.  Her weight is slightly up since October but no signs of failure either radiologically or clinically we will continue her Lasix      Primary hypertension  Carries diagnosis of hypertension does not take a blood pressure medicine at home.  Blood pressures in the 160s.  Will continue to follow  Paroxysmal A-fib ([facility])  Patient carries a diagnosis of atrial fibrillation.  On my exam she is in sinus rhythm.  She is not on rate control or anticoagulation medicines  CKD (chronic kidney disease) stage 4, GFR 15-29 ml/min ([facility])  Lab Results   Component Value Date    EGFR 38 12/05/2024    EGFR 35 10/28/2024    EGFR 33 07/04/2024    CREATININE 1.17 12/05/2024    CREATININE 1.26 10/28/2024    CREATININE 1.33 (H) 07/04/2024   Patient with a GFR of 38 which actually is better than it was about 6 months ago.  Will be cognizant of dosing medicines appropriately for GFR  Other chest pain  Patient presented with complaints of chest pain.  Workup here in the ED to include chest x-ray CT EKG troponins were all unremarkable and the patient is not complaining of chest pain at this point.  Ambulatory dysfunction  Patient's family reports that the patient amatory function is reduced she is not able to move about her apartment where she lives with a family less able to take care of her self.  We can get physical therapy to see her and consider some, placement that may enhance her strength maybe get her back to  home.  There is nothing focal on my exam or the workup here in the emergency room  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, psychotic disturbance, mood disturbance, or anxiety (HCC)  Not insightful into where she is or why she is in the hospital is able to follow commands easily and is not agitated or having hallucinations or anxiety anxious      VTE Pharmacologic Prophylaxis:   Moderate Risk (Score 3-4) - Pharmacological DVT Prophylaxis Contraindicated. Sequential Compression Devices Ordered.  Code Status: Prior full  Discussion with family: Updated  (pt) at bedside.    Anticipated Length of Stay: Patient will be admitted on an inpatient basis with an anticipated length of stay of greater than 2 midnights secondary to cp.    History of Present Illness   Chief Complaint: c is also he ishest pain, he is like to let me people need to stop applying    Soan VOSS Nicolas is a 89 y.o. female with a PMH of afib,  htn, vit d deficiency, dementia, ckd who presents with cp.and a recent fall.  Patient was at home with her family.  And this dictation is mainly based on interaction with the nurses as the patient has dementia and I could not reach the family.  In any event the patient presented with chest pain.  She had fallen recently so the concern was was the chest pain due to a fall or was it something else.  She had a pretty extensive workup including CT scans of her chest abdomen and pelvis and x-ray of her chest.  All were unremarkable.  Her blood pressure is slightly elevated but she had a BMP and CBC and troponin which were unremarkable.  The patient is chest and resolved and she did not complain about it when I was with her.  Her EKG looked fine.  Additionally the family reports that she is becoming less and less able to care for herself presents.  Unable to participate in basic ADLs and with the house.  So the thought is she needs to be brought in overnight and assessed by physical  therapy with the thought of potentially placing her to get her stronger before she can go home again.  The patient was without complaints comfortable appearing and happy when I saw her.  Review of Systems   Constitutional:  Positive for activity change. Negative for appetite change, chills, diaphoresis, fatigue and fever.   HENT:  Negative for congestion, dental problem, drooling, facial swelling, postnasal drip, rhinorrhea, sinus pressure and sneezing.    Respiratory:  Positive for chest tightness. Negative for apnea, cough, choking, shortness of breath and stridor.    Cardiovascular:  Positive for chest pain. Negative for palpitations and leg swelling.   Gastrointestinal:  Negative for abdominal distention, abdominal pain, blood in stool and constipation.   Musculoskeletal:  Negative for back pain.   Skin:  Negative for pallor and rash.   Neurological:  Positive for weakness. Negative for tremors and seizures.   Hematological:  Negative for adenopathy.   Psychiatric/Behavioral:  Negative for agitation, behavioral problems, confusion and decreased concentration.        Historical Information   Past Medical History:   Diagnosis Date    Ankle fracture     Arthritis     left hip    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HCC)     with left ureter    Bronchitis     Cancer (HCC)     Carcinoma, lung (HCC)     Dementia (HCC)     Dependent edema     Depression     Diabetes mellitus (HCC)     Dizziness 12/13/2021    Hypercholesterolemia     Hypertension     Kidney stone     Oth abn and inconclusive findings on dx imaging of breast     Pes planus     Renal calculus     Restless leg syndrome     Rib pain     Sprain, neck     Varicose veins of left lower extremity      Past Surgical History:   Procedure Laterality Date    APPENDECTOMY      LUNG CANCER SURGERY      US BREAST CYST ASPIRATION LEFT INITIAL Left 3/23/2018     Social History     Tobacco Use    Smoking status: Former     Current packs/day: 0.50     Average packs/day: 0.5 packs/day for 20.0  years (10.0 ttl pk-yrs)     Types: Cigarettes    Smokeless tobacco: Never   Vaping Use    Vaping status: Never Used   Substance and Sexual Activity    Alcohol use: Never    Drug use: Never    Sexual activity: Not Currently     Birth control/protection: Post-menopausal     E-Cigarette/Vaping    E-Cigarette Use Never User      E-Cigarette/Vaping Substances    Nicotine No     THC No     CBD No     Flavoring No     Other No     Unknown No        Social History:  Marital Status:    Occupation: not working  Patient Pre-hospital Living Situation: Home  Patient Pre-hospital Level of Mobility: walks  Patient Pre-hospital Diet Restrictions: na    Meds/Allergies   I have reviewed home medications with patient personally.  Prior to Admission medications    Medication Sig Start Date End Date Taking? Authorizing Provider   Diclofenac Sodium (VOLTAREN) 1 % Apply 2 g topically 2 (two) times a day 10/28/24 11/27/24  FARZANA Stanley   ergocalciferol (VITAMIN D2) 50,000 units TAKE 1 CAPSULE BY MOUTH ONE TIME PER WEEK 2/12/24   FARZANA Stanley   furosemide (LASIX) 20 mg tablet Take 1 tablet (20 mg total) by mouth 2 (two) times a day 7/6/24 8/5/24  FARZANA Stanley   gabapentin (NEURONTIN) 100 mg capsule Take 1 capsule (100 mg total) by mouth 3 (three) times a day 8/3/23 7/3/24  FARZANA Stanley   mirtazapine (REMERON) 7.5 MG tablet TAKE 1 TABLET (7.5 MG TOTAL) BY MOUTH DAILY AT BEDTIME 8/21/24 2/17/25  FARZANA Stanley   Misc. Devices (Mattress Cover) MISC Use daily 3/22/22   FARZANA Stanley   QUEtiapine (SEROquel) 25 mg tablet TAKE 1 TABLET BY MOUTH DAILY AT BEDTIME 6/17/24   FARZANA Stanley     Allergies   Allergen Reactions    Albuterol     Aldactone [Spironolactone]     Aspirin     Atenolol     Bactrim [Sulfamethoxazole-Trimethoprim]     Codeine     Hydrocodone     Ibuprofen     Lisinopril     Mevacor [Lovastatin]     Mirapex [Pramipexole]     Morphine  And Codeine     Other      novacaine    Penicillins     Procaine     Salicylates     Ultram [Tramadol]     Zoloft [Sertraline]      Night sweats       Objective :  Temp:  [97.7 °F (36.5 °C)-98.1 °F (36.7 °C)] 98.1 °F (36.7 °C)  HR:  [] 97  BP: (159-166)/(72-93) 159/83  Resp:  [16-22] 22  SpO2:  [93 %-96 %] 96 %  O2 Device: None (Room air)    Physical Exam  Constitutional:       General: She is not in acute distress.     Appearance: Normal appearance. She is not ill-appearing.      Comments: Patient is very pleasant and engaging.  He is very poor and does not have insight into why she is in the hospital   HENT:      Head: Normocephalic and atraumatic.      Right Ear: Tympanic membrane normal. There is no impacted cerumen.      Left Ear: Tympanic membrane normal. There is no impacted cerumen.      Nose: Nose normal. No congestion.      Mouth/Throat:      Mouth: Mucous membranes are moist.      Pharynx: No oropharyngeal exudate or posterior oropharyngeal erythema.   Eyes:      General:         Right eye: No discharge.         Left eye: No discharge.      Pupils: Pupils are equal, round, and reactive to light.   Cardiovascular:      Rate and Rhythm: Normal rate and regular rhythm.   Pulmonary:      Effort: Pulmonary effort is normal. No respiratory distress.      Breath sounds: No stridor. No wheezing or rhonchi.   Abdominal:      General: Abdomen is flat. There is no distension.      Palpations: There is no mass.      Tenderness: There is no abdominal tenderness.      Hernia: No hernia is present.   Musculoskeletal:         General: No swelling or tenderness. Normal range of motion.      Cervical back: Normal range of motion. No rigidity.      Right lower leg: No edema.      Left lower leg: No edema.      Comments: Patient able to move her arms to command equally bilaterally strength 5 out of 5   Skin:     General: Skin is warm and dry.      Capillary Refill: Capillary refill takes less than 2 seconds.       Coloration: Skin is not jaundiced or pale.   Neurological:      General: No focal deficit present.      Mental Status: She is alert and oriented to person, place, and time.   Psychiatric:         Mood and Affect: Mood normal.         Behavior: Behavior normal.      Comments: Patient  just cannot recall where she was born, not insightful into why she is in the hospital where she currently lives.          Lines/Drains:            Lab Results: I have reviewed the following results:  Results from last 7 days   Lab Units 12/05/24  1640   WBC Thousand/uL 7.67   HEMOGLOBIN g/dL 10.6*   HEMATOCRIT % 35.7   PLATELETS Thousands/uL 294   SEGS PCT % 70   LYMPHO PCT % 15   MONO PCT % 13*   EOS PCT % 2     Results from last 7 days   Lab Units 12/05/24  1640   SODIUM mmol/L 139   POTASSIUM mmol/L 3.7   CHLORIDE mmol/L 104   CO2 mmol/L 28   BUN mg/dL 24   CREATININE mg/dL 1.17   ANION GAP mmol/L 7   CALCIUM mg/dL 8.5   ALBUMIN g/dL 3.7   TOTAL BILIRUBIN mg/dL 0.63   ALK PHOS U/L 117*   ALT U/L 9   AST U/L 16   GLUCOSE RANDOM mg/dL 137     Results from last 7 days   Lab Units 12/05/24  1640   INR  0.98         Lab Results   Component Value Date    HGBA1C 6.0 (H) 08/30/2022     Results from last 7 days   Lab Units 12/05/24  1640   LACTIC ACID mmol/L 1.8       Imaging Results Review: I personally reviewed the following image studies/reports in PACS and discussed pertinent findings with Radiology: CT chest. My interpretation of the radiology images/reports is:  .      Administrative Statements       ** Please Note: This note has been constructed using a voice recognition system. **

## 2024-12-06 NOTE — PLAN OF CARE
Problem: OCCUPATIONAL THERAPY ADULT  Goal: Performs self-care activities at highest level of function for planned discharge setting.  See evaluation for individualized goals.  Description: Treatment Interventions: ADL retraining, Functional transfer training, Endurance training, Cognitive reorientation, Patient/family training          See flowsheet documentation for full assessment, interventions and recommendations.   Outcome: Progressing  Note: Limitation: Decreased ADL status, Decreased cognition, Decreased endurance, Decreased UE strength     Assessment: Pt is a 89 y.o. female seen for OT evaluation s/p admit to [facility] on 12/5/2024 w/ Ambulatory dysfunction.  Comorbidities affecting pt's functional performance at time of assessment include: HTN, obesity, previous surgery, limited cognition, dementia, and CKD . Personal factors affecting pt at time of IE include:difficulty performing ADLS, difficulty performing IADLS , limited insight into deficits, decreased initiation and engagement , and health management . Prior to admission, pt was needing assistance with ADLs and functional mobility with RW. Upon evaluation: Pt requires Moderate Assistance x1 with RW for mobility, and Moderate Assistance to Maximal Assistance for some basic ADLs 2* the following deficits impacting occupational performance: decreased strength, decreased balance, decreased tolerance, impaired attention, impaired memory, and impaired sequencing. Pt to benefit from continued skilled OT tx while in the hospital to address deficits as defined above and maximize level of functional independence w ADL's and functional mobility. Occupational Performance areas to address include: grooming, bathing/shower, toilet hygiene, dressing, and functional mobility. From OT standpoint, recommendation at time of d/c would be Level 2 Moderate Resource Intensity.     Rehab Resource Intensity Level, OT: II (Moderate Resource Intensity)        CYRIL Gibbs/BERNARDO

## 2024-12-06 NOTE — UTILIZATION REVIEW
Initial Clinical Review    Admission: Date/Time/Statement:   Admission Orders (From admission, onward)       Ordered        12/05/24 1904  INPATIENT ADMISSION  Once                          Orders Placed This Encounter   Procedures    INPATIENT ADMISSION     Standing Status:   Standing     Number of Occurrences:   1     Level of Care:   Med Surg [16]     Estimated length of stay:   More than 2 Midnights     Certification:   I certify that inpatient services are medically necessary for this patient for a duration of greater than two midnights. See H&P and MD Progress Notes for additional information about the patient's course of treatment.     ED Arrival Information       Expected   -    Arrival   12/5/2024 16:18    Acuity   Emergent              Means of arrival   Ambulance    Escorted by   [facility]   Hospitalist    Admission type   Emergency              Arrival complaint   -             Chief Complaint   Patient presents with    Chest Pain     BIBA from home for complaints of left sided chest started today worsen on movement., also patient had a fall last week. Denies nausea and vomiting. Hx of AFIB.       Initial Presentation: 89 y.o. female to the ED  via EMS with complaints of chest pain. Admitted to inpatient for   H/O afib, htn, vit d deficiency, dementia, ckd . Unclear as to why she is in the hospital. GCs 14. Moving all extremities. ON arrival, tachycardic, has diffuse abdominal tenderness, reproducible left upper chest pain on palpation.       Anticipated Length of Stay/Certification Statement: Patient will be admitted on an inpatient basis with an anticipated length of stay of greater than 2 midnights secondary to cp.     Date: 12/6   Day 2:  Ambulatory dysfunction evaluated by PT/OT and recommended STR.  Case management assisting with safe dc plan    ED Treatment-Medication Administration from 12/05/2024 1618 to 12/05/2024 1941         Date/Time Order Dose Route Action      12/05/2024 1724 iohexol (OMNIPAQUE) 350 MG/ML injection (MULTI-DOSE) 100 mL 100 mL Intravenous Given            Scheduled Medications:  enoxaparin, 30 mg, Subcutaneous, Daily  ergocalciferol, 50,000 Units, Oral, Weekly  furosemide, 20 mg, Oral, BID  gabapentin, 100 mg, Oral, TID  mirtazapine, 7.5 mg, Oral, HS  QUEtiapine, 25 mg, Oral, HS      Continuous IV Infusions:     PRN Meds:     ED Triage Vitals   Temperature Pulse Respirations Blood Pressure SpO2 Pain Score   12/05/24 1622 12/05/24 1622 12/05/24 1622 12/05/24 1622 12/05/24 1622 12/05/24 1946   97.7 °F (36.5 °C) (!) 106 16 166/93 96 % No Pain     Weight (last 2 days)       Date/Time Weight    12/05/24 19:47:08 62.8 (138.4)    12/05/24 1622 64.1 (141.32)            Vital Signs (last 3 days)       Date/Time Temp Pulse Resp BP MAP (mmHg) SpO2 O2 Device Patient Position - Orthostatic VS Pain    12/06/24 0751 -- -- -- -- -- -- -- -- No Pain    12/06/24 07:29:40 98 °F (36.7 °C) 87 18 124/68 87 95 % None (Room air) -- --    12/05/24 22:08:06 98 °F (36.7 °C) 117 18 137/58 84 90 % None (Room air) Lying --    12/05/24 1955 -- -- -- -- -- -- None (Room air) -- No Pain    12/05/24 19:47:08 98.1 °F (36.7 °C) 97 18 159/83 108 96 % -- Sitting --    12/05/24 1946 -- -- -- -- -- -- -- -- No Pain    12/05/24 1730 -- 88 22 166/72 103 93 % -- -- --    12/05/24 1622 97.7 °F (36.5 °C) 106 16 166/93 119 96 % None (Room air) Sitting --              Pertinent Labs/Diagnostic Test Results:   Radiology:  CT chest abdomen pelvis w contrast   Final Interpretation by Dev Weston MD (12/05 1832)      No evidence of acute trauma in the chest, abdomen or pelvis.               Workstation performed: [id number]         CT head without contrast   Final Interpretation by Dev Weston MD (12/05 1819)      No acute intracranial abnormality.                  Workstation performed: [id number]         CT spine cervical without contrast   Final Interpretation by Dev Weston MD  (12/05 1822)      No evidence of acute cervical spine fracture or traumatic malalignment.                  Workstation performed: [id number]         XR chest 1 view portable   Final Interpretation by Chilo Agudelo MD (12/05 1711)      Left basilar effusion with atelectasis or pneumonia.            Workstation performed: [id number]           Cardiology:        Results from last 7 days   Lab Units 12/05/24 2028 12/05/24  1640   WBC Thousand/uL  --  7.67   HEMOGLOBIN g/dL  --  10.6*   HEMATOCRIT %  --  35.7   PLATELETS Thousands/uL 270 294   TOTAL NEUT ABS Thousands/µL  --  5.29         Results from last 7 days   Lab Units 12/05/24  1640   SODIUM mmol/L 139   POTASSIUM mmol/L 3.7   CHLORIDE mmol/L 104   CO2 mmol/L 28   ANION GAP mmol/L 7   BUN mg/dL 24   CREATININE mg/dL 1.17   EGFR ml/min/1.73sq m 38   CALCIUM mg/dL 8.5     Results from last 7 days   Lab Units 12/05/24  1640   AST U/L 16   ALT U/L 9   ALK PHOS U/L 117*   TOTAL PROTEIN g/dL 7.2   ALBUMIN g/dL 3.7   TOTAL BILIRUBIN mg/dL 0.63   BILIRUBIN DIRECT mg/dL 0.16         Results from last 7 days   Lab Units 12/05/24  1640   GLUCOSE RANDOM mg/dL 137         Results from last 7 days   Lab Units 12/05/24  1640   HS TNI 0HR ng/L 20         Results from last 7 days   Lab Units 12/05/24  1640   PROTIME seconds 13.7   INR  0.98   PTT seconds 29     Results from last 7 days   Lab Units 12/05/24 2028   TSH 3RD GENERATON uIU/mL 2.541         Results from last 7 days   Lab Units 12/05/24  1640   LACTIC ACID mmol/L 1.8               Results from last 7 days   Lab Units 12/06/24  0845   CLARITY UA  Turbid   COLOR UA  Light Yellow   SPEC GRAV UA  1.023   PH UA  8.5*   GLUCOSE UA mg/dl Negative   KETONES UA mg/dl Negative   BLOOD UA  Negative   PROTEIN UA mg/dl Trace*   NITRITE UA  Negative   BILIRUBIN UA  Negative   UROBILINOGEN UA (BE) mg/dl <2.0   LEUKOCYTES UA  Large*   WBC UA /hpf 30-50*   RBC UA /hpf 2-4*   BACTERIA UA /hpf Occasional   EPITHELIAL CELLS  WET PREP /hpf Occasional       Past Medical History:   Diagnosis Date    Ankle fracture     Arthritis     left hip    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HCC)     with left ureter    Bronchitis     Cancer (HCC)     Carcinoma, lung (HCC)     Dementia (HCC)     Dependent edema     Depression     Diabetes mellitus (HCC)     Dizziness 12/13/2021    Hypercholesterolemia     Hypertension     Kidney stone     Oth abn and inconclusive findings on dx imaging of breast     Pes planus     Renal calculus     Restless leg syndrome     Rib pain     Sprain, neck     Varicose veins of left lower extremity      Present on Admission:   Stage 3b chronic kidney disease (HCC)   Paroxysmal A-fib (HCC)   Primary hypertension   Vitamin D deficiency   Chronic congestive heart failure (HCC)      Admitting Diagnosis: Chest wall pain [R07.89]  Generalized weakness [R53.1]  Age/Sex: 89 y.o. female    Network Utilization Review Department  ATTENTION: Please call with any questions or concerns to [phone] and carefully listen to the prompts so that you are directed to the right person. All voicemails are confidential.   For Discharge needs, contact Care Management DC Support Team at [phone] opt. 2  Send all requests for admission clinical reviews, approved or denied determinations and any other requests to dedicated fax number below belonging to the campus where the patient is receiving treatment. List of dedicated fax numbers for the Facilities:  FACILITY NAME UR FAX NUMBER   ADMISSION DENIALS (Administrative/Medical Necessity) [phone]   DISCHARGE SUPPORT TEAM (NETWORK) [phone]   PARENT CHILD HEALTH (Maternity/NICU/Pediatrics) [phone]   [facility] [phone]   [facility] [phone]   [facility] [phone]   [facility] [phone]   [facility] [phone]   [facility]  [phone]   [facility] [phone]   [facility] [phone]   [facility] [phone]   [facility] [phone]   [facility] [phone]   [facility] [phone]

## 2024-12-06 NOTE — ASSESSMENT & PLAN NOTE
Chronic, elevated on presentation  Continue lasix  Monitor VS per unit protocol, Blood Pressure: 124/68

## 2024-12-06 NOTE — ASSESSMENT & PLAN NOTE
Not insightful into where she is or why she is in the hospital is able to follow commands easily  Delirium precautions

## 2024-12-06 NOTE — PROGRESS NOTES
Progress Note - Hospitalist   Name: Sona Valenzuela 89 y.o. female I MRN: [id number]  Unit/Bed#: 2 E 255-01 I Date of Admission: 12/5/2024   Date of Service: 12/6/2024 I Hospital Day: 1    Assessment & Plan  Chronic congestive heart failure (HCC)  Chronic HFpEF, compensated on presentation   Continue lasix 20 mg daily  Daily standing weights  Monitor I/Os, serial BMP  Sodium restriction, consider fluid restriction   Last echo 5/2024  Primary hypertension  Chronic, elevated on presentation  Continue lasix  Monitor VS per unit protocol, Blood Pressure: 124/68   Paroxysmal A-fib (HCC)  Rate control/Anticoagulation: none  Monitor  Stage 3b chronic kidney disease (HCC)  Baseline Cr 1.3-1.4, eGFR typically around 31-34  Currently at baseline  Monitor  Other chest pain  Patient presented with complaints of chest pain, cardiac workup in ER negative  RANJAN 1  hsTnI 20, BNP not assessed  CXR questionable LLL finding, none on CT chest  EKG afib 89, incomplete RBBB, no acute ST elevations/depressions   Ambulatory dysfunction  Patient brought in due to family report of ambulatory difficulties. Currently resides in an apartment  PT/OT evals  CM consult for disposition planning  Fall precautions  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, psychotic disturbance, mood disturbance, or anxiety ([facility])  Not insightful into where she is or why she is in the hospital is able to follow commands easily  Delirium precautions     VTE Pharmacologic Prophylaxis: VTE Score: 3 Moderate Risk (Score 3-4) - Pharmacological DVT Prophylaxis Ordered: enoxaparin (Lovenox).    Mobility:   Basic Mobility Inpatient Raw Score: 18  JH-HLM Goal: 6: Walk 10 steps or more  JH-HLM Achieved: 5: Stand (1 or more minutes)  JH-HLM Goal NOT achieved. Continue with multidisciplinary rounding and encourage appropriate mobility to improve upon JH-HLM goals.    Patient Centered Rounds: I performed bedside rounds with nursing staff today.   Discussions  "with Specialists or Other Care Team Provider: CM    Education and Discussions with Family / Patient:  deferred, family coming to bedside to discuss d/c planning with CM .     Current Length of Stay: 1 day(s)  Current Patient Status: Inpatient   Certification Statement: The patient will continue to require additional inpatient hospital stay due to ambulation issues, possible rehab placement   Discharge Plan:  medically stable for discharge pending decision of location    Code Status: Level 1 - Full Code    Subjective   Patient seen this morning, out of bed to the chair.  She is a little confused, but pleasant and cooperative.  When asked where we are, she responds with \"in the bed\"; when asked specifically if she is at home or not, she responds with \"I'm not really sure where we are\".  Denies any specific complaints at present time.    Objective :  Temp:  [97.7 °F (36.5 °C)-98.1 °F (36.7 °C)] 98 °F (36.7 °C)  HR:  [] 87  BP: (124-166)/(58-93) 124/68  Resp:  [16-22] 18  SpO2:  [90 %-96 %] 95 %  O2 Device: None (Room air)    Body mass index is 26.15 kg/m².     Input and Output Summary (last 24 hours):   No intake or output data in the 24 hours ending 12/06/24 0745    Physical Exam  Vitals and nursing note reviewed.   Constitutional:       General: She is awake. She is not in acute distress.     Appearance: Normal appearance. She is not ill-appearing or toxic-appearing.      Comments: Frail but not acutely ill appearing    Cardiovascular:      Rate and Rhythm: Normal rate and regular rhythm.      Heart sounds: Normal heart sounds.   Pulmonary:      Effort: Pulmonary effort is normal. No respiratory distress.      Breath sounds: Normal breath sounds. No wheezing.   Abdominal:      General: Bowel sounds are normal. There is no distension.      Palpations: Abdomen is soft.      Tenderness: There is no abdominal tenderness.   Skin:     General: Skin is warm and dry.      Coloration: Skin is not pale.   Neurological: "      General: No focal deficit present.      Mental Status: She is alert. Mental status is at baseline. She is disoriented.   Psychiatric:         Mood and Affect: Mood normal.         Behavior: Behavior normal. Behavior is cooperative.                Lab Results: I have reviewed the following results:   Results from last 7 days   Lab Units 12/05/24 2028 12/05/24  1640   WBC Thousand/uL  --  7.67   HEMOGLOBIN g/dL  --  10.6*   HEMATOCRIT %  --  35.7   PLATELETS Thousands/uL 270 294   SEGS PCT %  --  70   LYMPHO PCT %  --  15   MONO PCT %  --  13*   EOS PCT %  --  2     Results from last 7 days   Lab Units 12/05/24  1640   SODIUM mmol/L 139   POTASSIUM mmol/L 3.7   CHLORIDE mmol/L 104   CO2 mmol/L 28   BUN mg/dL 24   CREATININE mg/dL 1.17   ANION GAP mmol/L 7   CALCIUM mg/dL 8.5   ALBUMIN g/dL 3.7   TOTAL BILIRUBIN mg/dL 0.63   ALK PHOS U/L 117*   ALT U/L 9   AST U/L 16   GLUCOSE RANDOM mg/dL 137     Results from last 7 days   Lab Units 12/05/24  1640   INR  0.98             Results from last 7 days   Lab Units 12/05/24  1640   LACTIC ACID mmol/L 1.8       Recent Cultures (last 7 days):         Imaging Results Review: No pertinent imaging studies reviewed.  Other Study Results Review: No additional pertinent studies reviewed.    Last 24 Hours Medication List:     Current Facility-Administered Medications:     enoxaparin (LOVENOX) subcutaneous injection 30 mg, Daily    ergocalciferol (VITAMIN D2) capsule 50,000 Units, Weekly    furosemide (LASIX) tablet 20 mg, BID    gabapentin (NEURONTIN) capsule 100 mg, TID    mirtazapine (REMERON) tablet 7.5 mg, HS    QUEtiapine (SEROquel) tablet 25 mg, HS    Administrative Statements   Today, Patient Was Seen By: Catalina Perry PA-C  I have spent a total time of 37 minutes in caring for this patient on the day of the visit/encounter including Counseling / Coordination of care, Documenting in the medical record, Reviewing / ordering tests, medicine, procedures  , and  Communicating with other healthcare professionals .    **Please Note: This note may have been constructed using a voice recognition system.**

## 2024-12-06 NOTE — ASSESSMENT & PLAN NOTE
Carries diagnosis of hypertension does not take a blood pressure medicine at home.  Blood pressures in the 160s.  Will continue to follow

## 2024-12-06 NOTE — ASSESSMENT & PLAN NOTE
Chronic HFpEF, compensated on presentation   Continue lasix 20 mg daily  Daily standing weights  Monitor I/Os, serial BMP  Sodium restriction, consider fluid restriction   Last echo 5/2024

## 2024-12-06 NOTE — ASSESSMENT & PLAN NOTE
Patient presented with complaints of chest pain.  Workup here in the ED to include chest x-ray CT EKG troponins were all unremarkable and the patient is not complaining of chest pain at this point.

## 2024-12-06 NOTE — CASE MANAGEMENT
Case Management Assessment & Discharge Planning Note    Patient name Sona Valenzuela  Location 2 [facility] 255/2  255-01 MRN [id number]  : 11/3/1924 Date 2024       Current Admission Date: 2024  Current Admission Diagnosis:Ambulatory dysfunction   Patient Active Problem List    Diagnosis Date Noted Date Diagnosed    Other chest pain 2024     Ambulatory dysfunction 2024     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, psychotic disturbance, mood disturbance, or anxiety (HCC) 2024     Chronic pain of left wrist 10/28/2024     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) 2024     Acute on chronic HFpEF with severe pulmonary hypertension ([facility]) 2024     Primary hypertension 2024     Paroxysmal A-fib ([facility]) 2024     Compression fracture of lumbar spine, non-traumatic ([facility]) 2024     Mild protein-calorie malnutrition ([facility]) 2024     Vascular dementia, uncomplicated ([facility]) 2023     Does mobilize using walker 2023     Chronic congestive heart failure (HCC) 2022     Chronic atrial fibrillation ([facility]) 10/11/2022     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[facility]) 2022     Stage 3b chronic kidney disease ([facility]) 2022     Vitamin D deficiency 2020     Primary insomnia 2019     Degeneration of lumbar intervertebral disc 2019     Lumbar radiculopathy 2019     Arthropathy of lumbar facet joint 2019     Restless legs syndrome 2019       LOS (days): 1  Geometric Mean LOS (GMLOS) (days): 3  Days to GMLOS:2.2     OBJECTIVE:    Risk of Unplanned Readmission Score: 14.24         Current admission status: Inpatient       Preferred Pharmacy:   [id number] [place] - [address]  [place] [address]  Phone: [phone] Fax: [phone]    Primary Care Provider: FARZANA Stanley    [facility]  Secondary Insurance: [place] [employer] [facility]    ASSESSMENT:  Active Health Care Proxies       stefanie rodriguez Health Care Representative - [place]   Primary Phone: [phone] (Mobile)                 Advance Directives  Primary Contact: stefanie (Niece)  [phone]         Readmission Root Cause  30 Day Readmission: No    Patient Information  Admitted from:: Home  Mental Status: Alert  During Assessment patient was accompanied by: Other-Comment (niece)  Assessment information provided by:: Patient, Other - please comment (niece)  Primary Caregiver: Self  Support Systems: Family members  County of Residence: [place]  What [facility] do you live in?: [place]  Home entry access options. Select all that apply.: No steps to enter home  Type of Current Residence: 2 story home  Upon entering residence, is there a bedroom on the main floor (no further steps)?: No  A bedroom is located on the following floor levels of residence (select all that apply):: 2nd Floor  Upon entering residence, is there a bathroom on the main floor (no further steps)?: No  Indicate which floors of current residence have a bathroom (select all the apply):: 2nd Floor  Number of steps to 2nd floor from main floor: One Flight (has stair lift)  Living Arrangements: Lives w/ Extended Family  Is patient a ?: No    Activities of Daily Living Prior to Admission  Functional Status: Independent  Completes ADLs independently?: No  Level of ADL dependence: Assistance  Ambulates independently?: No  Level of ambulatory dependence: Assistance  Does patient use assisted devices?: Yes  Assisted Devices (DME) used: Walker, Wheelchair  Does patient currently own DME?: Yes  What DME does the patient currently own?: Wheelchair, Walker  Does patient have a history of Outpatient Therapy (PT/OT)?: Yes  Does the patient have a history of Short-Term Rehab?: Yes ([facility])  Does patient have a history of HHC?: Yes  Does patient currently have  HHC?: No         Patient Information Continued  Income Source: SSI/SSD  Does patient have prescription coverage?: Yes  Does patient receive dialysis treatments?: No  Does patient have a history of substance abuse?: No  Does patient have a history of Mental Health Diagnosis?: No         Means of Transportation  Means of Transport to Appts:: Family transport          DISCHARGE DETAILS:    Discharge planning discussed with:: patient and patients niece at bedside  Freedom of Choice: Yes  Comments - Freedom of Choice: CM maintained freedom of choice as it pertains to discharge planning. CM addressed level 2 rec with patient and family, they are agreeable to STR placement. Pt choice list provided for STR, they initially selected [facility], however, [facility] reported unable to offer a bed but could offer a bed at [facility] which pt and family were agreeable to. CM advised of need to request insurance auth.  CM contacted family/caregiver?: Yes  Were Treatment Team discharge recommendations reviewed with patient/caregiver?: Yes  Did patient/caregiver verbalize understanding of patient care needs?: N/A- going to facility  Were patient/caregiver advised of the risks associated with not following Treatment Team discharge recommendations?: Yes    Contacts  Patient Contacts: Xi (niece/POA)  Relationship to Patient:: Family  Contact Method: In Person  Reason/Outcome: Continuity of Care, Emergency Contact, Discharge Planning    Requested Home Health Care         Is the patient interested in HHC at discharge?: No    DME Referral Provided  Referral made for DME?: No    Other Referral/Resources/Interventions Provided:  Interventions: Short Term Rehab  Referral Comments: Pt choice list for STR [facility] pt and family and [facility] pt/family choice and faciltiy contacts updated and requested auth in [facility] via CM DCS.    Would you like to participate in our [facility] service program?  : No -  Declined    Treatment Team Recommendation: Short Term Rehab  Discharge Destination Plan:: Short Term Rehab  Transport at Discharge : Wheelchair van    Accepting Facility Name, City & [facility] : [facility] [place] [address]  Receiving Facility/Agency Phone Number: Phone: [phone]  Facility/Agency Fax Number: Fax: [phone]

## 2024-12-06 NOTE — ASSESSMENT & PLAN NOTE
Patient brought in due to family report of ambulatory difficulties. Currently resides in an apartment  PT/OT [place]  CM consult for disposition planning  Fall precautions

## 2024-12-06 NOTE — ASSESSMENT & PLAN NOTE
Wt Readings from Last 3 Encounters:   12/05/24 64.1 kg (141 lb 5 oz)   10/28/24 61.2 kg (135 lb)   07/29/24 60.8 kg (134 lb)       Patient carries diagnosis of heart failure she is on a diuretic.  Her weight is slightly up since October but no signs of failure either radiologically or clinically we will continue her Lasix

## 2024-12-06 NOTE — CASE MANAGEMENT
[facility] received request for authorization from Care Manager.  Authorization request submitted for: [facility]  Facility Name: [facility] NPI: [id number]   Facility MD: Jagdeep Hdez NPI: [id number]   Authorization initiated by contacting insurance: [employer]   Via: [employer]   Clinicals submitted via Portal attachment   Pending Reference #: [id number]     Care Manager notified: Monse Gutierrez    Updates to authorization status will be noted in chart. Please reach out to CM for updates on any clinical information.

## 2024-12-06 NOTE — PHYSICAL THERAPY NOTE
Physical Therapy Evaluation     Patient's Name: Sona Valenzuela    Admitting Diagnosis  Chest wall pain [R07.89]  Generalized weakness [R53.1]    Problem List  Patient Active Problem List   Diagnosis    Degeneration of lumbar intervertebral disc    Lumbar radiculopathy    Arthropathy of lumbar facet joint    Restless legs syndrome    Primary insomnia    Vitamin D deficiency    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HCC)    Chronic atrial fibrillation (HCC)    Chronic congestive heart failure (HCC)    Does mobilize using walker    Vascular dementia, uncomplicated (HCC)    Mild protein-calorie malnutrition (HCC)    Acute on chronic HFpEF with severe pulmonary hypertension (HCC)    Primary hypertension    Paroxysmal A-fib (HCC)    Stage 3b chronic kidney disease (HCC)    Compression fracture of lumbar spine, non-traumatic (HCC)    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)    Chronic pain of left wrist    Other chest pain    Ambulatory dysfunction    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, psychotic disturbance, mood disturbance, or anxiety (HCC)     Past Medical History  Past Medical History:   Diagnosis Date    Ankle fracture     Arthritis     left hip    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HCC)     with left ureter    Bronchitis     Cancer (HCC)     Carcinoma, lung (HCC)     Dementia (HCC)     Dependent edema     Depression     Diabetes mellitus (HCC)     Dizziness 12/13/2021    Hypercholesterolemia     Hypertension     Kidney stone     Oth abn and inconclusive findings on dx imaging of breast     Pes planus     Renal calculus     Restless leg syndrome     Rib pain     Sprain, neck     Varicose veins of left lower extremity      Past Surgical History  Past Surgical History:   Procedure Laterality Date    APPENDECTOMY      LUNG CANCER SURGERY      US BREAST CYST ASPIRATION LEFT INITIAL Left 3/23/2018        12/06/24 0817   PT Last Visit   PT Visit Date 12/06/24   Note Type    Note type Evaluation and Treatment   Pain Assessment   Pain Assessment Tool FLACC   Pain Location/Orientation Orientation: Left;Location: Arm   Pain Onset/Description Onset: Ongoing   Hospital Pain Intervention(s) Repositioned;Ambulation/increased activity   Pain Rating: FLACC (Rest) - Face 1   Pain Rating: FLACC (Rest) - Legs 0   Pain Rating: FLACC (Rest) - Activity 0   Pain Rating: FLACC (Rest) - Cry 0   Pain Rating: FLACC (Rest) - Consolability 0   Score: FLACC (Rest) 1   Pain Rating: FLACC (Activity) - Face 1   Pain Rating: FLACC (Activity) - Legs 1   Pain Rating: FLACC (Activity) - Activity 0   Pain Rating: FLACC (Activity) - Cry 1   Pain Rating: FLACC (Activity) - Consolability 0   Score: FLACC (Activity) 3   Restrictions/Precautions   Weight Bearing Precautions Per Order No   Other Precautions Cognitive;Chair Alarm;Bed Alarm;Fall Risk;Pain   Home Living   Type of Home House   Home Layout Two level;Bed/bath upstairs  (No KAY; flight of stairs to 2nd floor)   Bathroom Accessibility Accessible   Home Equipment Walker   Additional Comments Pt ambulates with a walker.   Prior Function   Level of [place] Needs assistance with functional mobility;Needs assistance with ADLs;Needs assistance with IADLS   Lives With Family   Receives Help From Family   IADLs Family/Friend/Other provides transportation;Family/Friend/Other provides meals;Family/Friend/Other provides medication management   Falls in the last 6 months 1 to 4  (2 falls per chart review)   Vocational Retired   Comments Pt is a poor historian due to cognitive impairments. Home setup and PLOF gathered via chart review.   General   Family/Caregiver Present No   Cognition   Overall Cognitive Status Impaired   Arousal/Participation Cooperative   Orientation Level Disoriented to place;Disoriented to time;Disoriented to situation;Oriented to person  (Pt stated name but did not know ; Pt stated hospital but did not know where or what hospital; Pt stated it  "was June and did not know year)   Memory Decreased recall of biographical information;Decreased short term memory;Decreased recall of recent events   Following Commands Follows one step commands with increased time or repetition   Comments Pt agreeable to PT   Subjective   Subjective \"I can get up.\"   RLE Assessment   RLE Assessment X   Strength RLE   RLE Overall Strength 3+/5  (grossly assessed)   LLE Assessment   LLE Assessment X   Strength LLE   LLE Overall Strength 3+/5  (grossly assessed)   Light Touch   RLE Light Touch Grossly intact   LLE Light Touch Grossly intact   Bed Mobility   Supine to Sit 4  Minimal assistance   Additional items Assist x 1;HOB elevated;Bedrails;Increased time required;Verbal cues   Transfers   Sit to Stand 4  Minimal assistance   Additional items Assist x 1;Increased time required;Verbal cues   Stand to Sit 4  Minimal assistance   Additional items Assist x 1;Increased time required;Verbal cues;Armrests   Ambulation/Elevation   Gait pattern Improper Weight shift;Decreased foot clearance;Short stride;Step to;Excessively slow;Decreased hip extension;Decreased heel strike;Decreased toe off  (decreased right foot clearance)   Gait Assistance 4  Minimal assist   Additional items Assist x 1;Verbal cues   Assistive Device Rolling walker   Distance 15 feet   Balance   Static Sitting Fair +   Dynamic Sitting Fair   Static Standing Fair -   Dynamic Standing Poor +   Ambulatory Poor +   Endurance Deficit   Endurance Deficit Yes   Endurance Deficit Description Decreased activity tolerance   Activity Tolerance   Activity Tolerance Patient tolerated treatment well   Medical Staff Made Aware PT Palmira   Nurse Made Aware KRAEEM Dangelo   Assessment   Prognosis Good   Problem List Decreased strength;Decreased endurance;Impaired balance;Decreased mobility;Decreased cognition;Pain   Assessment Pt is a 89 y/o female who [facility] to [facility] with ambulatory dysfunction and was consulted for PT. " Pt's physical function is affected by comorbidities which include chronic CHF, primary hypertension, paroxysmal A-fib, stage 3b CKD, and moderate Alzheimer's dementia. Pt prior level of function was requiring assistance with all ADLs and IADLs, requiring assistance with all functional activities, and ambulates with the use of a RW. Pt currently resides with her family in a 2-level house with 0 KAY and a flight of stairs to the 2nd floor bedroom. Pt is a poor historian due to cognitive impairments; home setup and PLOF gathered via chart review. Personal factors currently affecting the pt include additional assistance required with functional mobility and transfers, the inability to ambulate household and community distances, and stairs to reach the 2nd floor bedroom. Impairments found during the PT initial evaluation include decreased strength, decreased endurance, decreased mobility, impaired balance, and impaired cognition. Pt AM-PAC mobility score is 16/24. Pt will benefit from continued PT in order to address the impairments outlined above. At time of discharge, pt is recommended for Level 2, moderate resource intensity in order to return to prior level of function.   Barriers to Discharge Inaccessible home environment   Goals   STG Expiration Date 12/16/24   Short Term Goal #1 In 10 days, pt will be require close supervision with bed mobility in order to demonstrate improved mobility and function. Pt will improve manual muscle testing strength grade by 1 in order to demonstrate improved strength for functional transfers. Pt will require close supervision with all functional transfers in order to decrease burden of caregiver. Pt will ambulate 50 feet with close supervision with the least restrictive AD in order to ambulate in the household. Pt will improve AM-PAC mobility score by 4 points or greater in order to demonstrate improvements in functional mobility, independence, and ADLs.   PT Treatment Day 1   Plan    Treatment/Interventions Functional transfer training;LE strengthening/ROM;Therapeutic exercise;Endurance training;Cognitive reorientation;Patient/family training;Bed mobility;Gait training;Spoke to nursing   PT Frequency 3-5x/wk   Discharge Recommendation   Rehab Resource Intensity Level, PT II (Moderate Resource Intensity)   AM-PAC Basic Mobility Inpatient   Turning in Flat Bed Without Bedrails 3   Lying on Back to Sitting on Edge of Flat Bed Without Bedrails 3   Moving Bed to Chair 3   Standing Up From Chair Using Arms 3   Walk in Room 3   Climb 3-5 Stairs With Railing 1   Basic Mobility Inpatient Raw Score 16   Basic Mobility Standardized Score 38.32   [facility] Highest Level Of Mobility   -HL Goal 5: Stand one or more mins   -HL Achieved 6: Walk 10 steps or more   Modified [place] Scale   Modified [place] Scale 4   Barthel Index   Feeding 5   Bathing 0   Grooming Score 0   Dressing Score 5   Bladder Score 5   Bowels Score 10   Toilet Use Score 5   Transfers (Bed/Chair) Score 10   Mobility (Level Surface) Score 0   Stairs Score 0   Barthel Index Score 40   Additional Treatment Session   Start Time 0828   End Time 0838   Treatment Assessment Pt agreeable to PT treatment session including therapeutic exercises. Pt performed seated therapeutic exercises as indicated below. Pt continues to demonstrate decreased strength, decreased endurance, decreased mobility, impaired balance, and impaired cognition. Pt required verbal/tactile cues for proper form during therapeutic activities. Pt tolerated treatment well as indicated by no adverse reactions to treatment. PT still recommends Level 2, moderate resource intensity at time of discharge. PT recommended to continue treatment in order to address the remaining deficits.   Exercises   Heelslides Sitting;10 reps;AROM;Bilateral  (long sit)   Hip Flexion Sitting;10 reps;AROM;Bilateral  (marching)   Hip Abduction Sitting;10 reps;AROM;Bilateral   Knee AROM Long Arc Quad  Sitting;10 reps;AROM;Bilateral   Ankle Pumps Sitting;10 reps;AROM;Bilateral   End of Consult   Patient Position at End of Consult Bedside chair;Bed/Chair alarm activated;All needs within reach  (RN aware)       PT Evaluation Time: 0817-0827  PT Treatment Time: 0828-0838  10 Minutes  Amandeep Ortiz, SPT

## 2024-12-06 NOTE — CASE MANAGEMENT
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 ([facility]) 2024     Acute on chronic HFpEF with severe pulmonary hypertension ([facility]) 2024     Primary hypertension 2024     Paroxysmal A-fib ([facility]) 2024     Compression fracture of lumbar spine, non-traumatic ([facility]) 2024     Mild protein-calorie malnutrition ([facility]) 2024     Vascular dementia, uncomplicated ([facility]) 2023     Does mobilize using walker 2023     Chronic congestive heart failure (HCC) 2022     Chronic atrial fibrillation ([facility]) 10/11/2022     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[facility]) 2022     Stage 3b chronic kidney disease ([facility]) 2022     Vitamin D deficiency 2020     Primary insomnia 2019     Degeneration of lumbar intervertebral disc 2019     Lumbar radiculopathy 2019     Arthropathy of lumbar facet joint 2019     Restless legs syndrome 2019       LOS (days): 1  Geometric Mean LOS (GMLOS) (days): 3  Days to GMLOS:2.4     OBJECTIVE:  Risk of Unplanned Readmission Score: 14.21         Current admission status: Inpatient   Preferred Pharmacy:   [facility]/pharmacy #[id number] - [place] - [address]  [place] [address]  Phone: [phone] Fax: [phone]    Primary Care Provider: FARZANA Stanley    Primary Insurance: [employer]  Secondary Insurance: [place]  [facility]    DISCHARGE DETAILS:    Discharge planning discussed with:: patient at bedside, patient with documented dementia and attempted to reach patient's listed contacts, niece and nephew by phone, no answer, left message w call back number for niece and nephew's VM box full  Freedom of Choice: Yes  Comments - Freedom of Choice: CM maintained freedom of choice as it pertains to discharge planning. Patient reports she thinks she may need to go to a rehab facility, denied preferences to facility and requested for CM to speak with her family.  CM contacted family/caregiver?: Yes  Were Treatment Team discharge recommendations reviewed with patient/caregiver?: Yes  Did patient/caregiver verbalize understanding of patient care needs?: Yes  Were patient/caregiver advised of the risks associated with not following Treatment Team discharge recommendations?: Yes    Contacts  Patient Contacts: Xi (niece/POA) and nephmicah Palacio  Relationship to Patient:: Family  Contact Method: Phone  Phone Number: [phone]/ [phone]  Reason/Outcome: Continuity of Care, Emergency Contact, Discharge Planning    Requested Home Health Care         Is the patient interested in HHC at discharge?: No    DME Referral Provided  Referral made for DME?: No    Other Referral/Resources/Interventions Provided:  Interventions: HHC, Short Term Rehab  Referral Comments: [facility] referrals sent in [facility] for STR and HHC  Programs:: CHF    Would you like to participate in our [facility] service program?  : No - Declined    Treatment Team Recommendation: Other (pending recs)  Discharge Destination Plan:: Short Term Rehab

## 2024-12-06 NOTE — ASSESSMENT & PLAN NOTE
Patient's family reports that the patient amatory function is reduced she is not able to move about her apartment where she lives with a family less able to take care of her self.  We can get physical therapy to see her and consider some, placement that may enhance her strength maybe get her back to home.  There is nothing focal on my exam or the workup here in the emergency room

## 2024-12-06 NOTE — ASSESSMENT & PLAN NOTE
Patient presented with complaints of chest pain, cardiac workup in ER negative  RANJAN 1  hsTnI 20, BNP not assessed  CXR questionable LLL finding, none on CT chest  EKG afib 89, incomplete RBBB, no acute ST elevations/depressions

## 2024-12-06 NOTE — OCCUPATIONAL THERAPY NOTE
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, psychotic disturbance, mood disturbance, or anxiety (HCC)    Past Medical History  Past Medical History:   Diagnosis Date    Ankle fracture     Arthritis     left hip    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HCC)     with left ureter    Bronchitis     Cancer (HCC)     Carcinoma, lung (HCC)     Dementia (HCC)     Dependent edema     Depression     Diabetes mellitus (HCC)     Dizziness 12/13/2021    Hypercholesterolemia     Hypertension     Kidney stone     Oth abn and inconclusive findings on dx imaging of breast     Pes planus     Renal calculus     Restless leg syndrome     Rib pain     Sprain, neck     Varicose veins of left lower extremity      Past Surgical History  Past Surgical History:   Procedure Laterality Date    APPENDECTOMY      LUNG CANCER SURGERY      US BREAST CYST ASPIRATION LEFT INITIAL Left 3/23/2018         12/06/24 1323   OT Last Visit   OT Visit Date 12/06/24   Note Type   Note type Evaluation   Pain Assessment   Pain Assessment Tool FLACC   Pain Rating: FLACC (Rest) - Face 0   Pain Rating: FLACC (Rest) - Legs 0   Pain Rating: FLACC (Rest) - Activity 0   Pain Rating: FLACC (Rest) - Cry 0   Pain Rating: FLACC (Rest) - Consolability 0   Score: FLACC (Rest) 0   Pain Rating: FLACC (Activity) - Face 1   Pain Rating: FLACC (Activity) - Legs 0   Pain Rating: FLACC (Activity) - Activity 0   Pain Rating: FLACC (Activity) - Cry 0   Pain Rating: FLACC (Activity) - Consolability 0   Score: FLACC (Activity) 1   Restrictions/Precautions   Weight Bearing Precautions Per Order No   Other Precautions Cognitive;Chair Alarm;Bed  Alarm;Fall Risk;Pain   Home Living   Type of Home House   Home Layout Two level;Bed/bath upstairs  (No KAY; flight of stairs to 2nd floor)   Home Equipment Walker   Prior Function   Level of [place] Needs assistance with ADLs;Needs assistance with functional mobility;Needs assistance with IADLS  (pt uses a RW at baseline)   Lives With Family   Receives Help From Family   IADLs Family/Friend/Other provides transportation;Family/Friend/Other provides meals;Family/Friend/Other provides medication management   Falls in the last 6 months 1 to 4  (2 falls per chart)   Vocational Retired   Comments Pt is a poor historian due to cognitive impairments. Home setup and PLOF gathered via chart review.   Subjective   Subjective Pt agreeable to OT evaluation   ADL   Where Assessed Other (Comment)  (Assist levels for some self care tasks are based on functional assessment of performance skills and deficits observed during session.)   Eating Assistance 4  Minimal Assistance   Eating Deficit Setup   Grooming Assistance 4  Minimal Assistance   Grooming Deficit Setup  (seated)   UB Dressing Assistance 4  Minimal Assistance   UB Dressing Deficit Setup;Supervision/safety;Increased time to complete  (seated)   LB Dressing Assistance 3  Moderate Assistance   LB Dressing Deficit Setup;Supervision/safety;Increased time to complete   Toileting Assistance  3  Moderate Assistance   Toileting Deficit Setup;Steadying;Supervison/safety;Increased time to complete;Clothing management up  (pt able to perform wendi hygiene)   Bed Mobility   Additional Comments Pt received seated in bed side chair, remained in chair at end of session   Transfers   Sit to Stand 4  Minimal assistance   Additional items Assist x 1;Armrests;Increased time required   Stand to Sit 4  Minimal assistance   Additional items Assist x 1;Armrests;Increased time required   Stand pivot 3  Moderate assistance   Additional items Assist x 1;Increased time required;Verbal cues    Toilet transfer 3  Moderate assistance   Additional items Assist x 1;Standard toilet  (grab bar)   Additional Comments with RW   Functional Mobility   Functional Mobility 3  Moderate assistance   Additional Comments assist x1   Additional items Rolling walker   Activity Tolerance   Activity Tolerance Patient tolerated treatment well   RUE Assessment   RUE Assessment   (mild generalized deconditioning, however grossly 4/5 throughout)   LUE Assessment   LUE Assessment   (mild generalized deconditioning, however grossly 4/5 throughout)   Hand Function   Gross Motor Coordination Functional   Fine Motor Coordination Functional   Vision-Basic Assessment   Current Vision Wears glasses only for reading   Patient Visual Report   (able to read print up close, able to navigate environment. Pt unable to read clock on the [place] [employer])   Psychosocial   Psychosocial (WDL) WDL   Patient Behaviors/Mood Calm;Cooperative;Pleasant   Cognition   Overall Cognitive Status Impaired  (Pt with moderate Alzhiemer's dementia at baseline)   Arousal/Participation Alert;Responsive   Attention Attends with cues to redirect   Orientation Level Oriented to person;Oriented to place;Disoriented to time;Disoriented to situation   Memory Decreased recall of biographical information;Decreased recall of recent events   Following Commands Follows one step commands without difficulty   Assessment   Limitation Decreased ADL status;Decreased cognition;Decreased endurance;Decreased UE strength   Assessment Pt is a 89 y.o. female seen for OT evaluation s/p admit to [facility] on 12/5/2024 w/ Ambulatory dysfunction.  Comorbidities affecting pt's functional performance at time of assessment include: HTN, obesity, previous surgery, limited cognition, dementia, and CKD . Personal factors affecting pt at time of IE include:difficulty performing ADLS, difficulty performing IADLS , limited insight into deficits, decreased initiation and engagement , and health management .  Prior to admission, pt was needing assistance with ADLs and functional mobility with RW. Upon evaluation: Pt requires Moderate Assistance x1 with RW for mobility, and Moderate Assistance to Maximal Assistance for some basic ADLs 2* the following deficits impacting occupational performance: decreased strength, decreased balance, decreased tolerance, impaired attention, impaired memory, and impaired sequencing. Pt to benefit from continued skilled OT tx while in the hospital to address deficits as defined above and maximize level of functional independence w ADL's and functional mobility. Occupational Performance areas to address include: grooming, bathing/shower, toilet hygiene, dressing, and functional mobility. From OT standpoint, recommendation at time of d/c would be Level 2 Moderate Resource Intensity.   Plan   Treatment Interventions ADL retraining;Functional transfer training;Endurance training;Cognitive reorientation;Patient/family training   Goal Expiration Date 12/20/24   OT Treatment Day 0   OT Frequency 2-3x/wk   Discharge Recommendation   Rehab Resource Intensity Level, OT II (Moderate Resource Intensity)   AM-PAC Daily Activity Inpatient   Lower Body Dressing 2   Bathing 2   Toileting 2   Upper Body Dressing 3   Grooming 3   Eating 3   Daily Activity Raw Score 15   Daily Activity Standardized Score (Calc for Raw Score >=11) 34.69   AM-PAC Applied Cognition Inpatient   Following a Speech/Presentation 2   Understanding Ordinary Conversation 3   Taking Medications 1   Remembering Where Things Are Placed or Put Away 1   Remembering List of 4-5 Errands 1   Taking Care of Complicated Tasks 1   Applied Cognition Raw Score 9   Applied Cognition Standardized Score 22.48       Goals: to be met by 12/20/24    Patient will perform functional bed mobility with Minimal Assistance, with HOB flat, no rails  Patient will perform functional transfers with Minimal Assistance x1 using LRAD in preparation for ADL tasks,  with good safety awareness  Patient will perform UB dressing task with Standby Assistance while seated, with set up  Patient will perform LB dressing task with Minimal Assistance  Patient will perform toilet transfer with Minimal Assistance  Patient will perform toileting with Minimal Assistance, including hygiene and clothing management   Patient will increase BUE strength by 1 MM grade in preparation to increase ability to participate in ADL tasks  Patient will improve activity tolerance by participating in 25 minutes of session at a time in preparation for participation in ADL tasks  Patient will identify 2 potential fall hazards and identify compensatory techniques to decrease fall risk in the home environment    Giuseppe Mohan OTR/L

## 2024-12-06 NOTE — ASSESSMENT & PLAN NOTE
Patient carries a diagnosis of atrial fibrillation.  On my exam she is in sinus rhythm.  She is not on rate control or anticoagulation medicines

## 2024-12-06 NOTE — ASSESSMENT & PLAN NOTE
Lab Results   Component Value Date    EGFR 38 12/05/2024    EGFR 35 10/28/2024    EGFR 33 07/04/2024    CREATININE 1.17 12/05/2024    CREATININE 1.26 10/28/2024    CREATININE 1.33 (H) 07/04/2024   Patient with a GFR of 38 which actually is better than it was about 6 months ago.  Will be cognizant of dosing medicines appropriately for GFR

## 2024-12-06 NOTE — ASSESSMENT & PLAN NOTE
Not insightful into where she is or why she is in the hospital is able to follow commands easily and is not agitated or having hallucinations or anxiety anxious

## 2024-12-07 PROBLEM — R07.89 OTHER CHEST PAIN: Status: RESOLVED | Noted: 2024-12-05 | Resolved: 2024-12-07

## 2024-12-07 PROCEDURE — 99232 SBSQ HOSP IP/OBS MODERATE 35: CPT | Performed by: PHYSICIAN ASSISTANT

## 2024-12-07 RX ADMIN — FUROSEMIDE 20 MG: 20 TABLET ORAL at 17:27

## 2024-12-07 RX ADMIN — ENOXAPARIN SODIUM 30 MG: 30 INJECTION SUBCUTANEOUS at 08:43

## 2024-12-07 RX ADMIN — GABAPENTIN 100 MG: 100 CAPSULE ORAL at 08:43

## 2024-12-07 RX ADMIN — GABAPENTIN 100 MG: 100 CAPSULE ORAL at 17:27

## 2024-12-07 RX ADMIN — GABAPENTIN 100 MG: 100 CAPSULE ORAL at 21:23

## 2024-12-07 RX ADMIN — FUROSEMIDE 20 MG: 20 TABLET ORAL at 08:43

## 2024-12-07 RX ADMIN — MIRTAZAPINE 7.5 MG: 15 TABLET, FILM COATED ORAL at 21:23

## 2024-12-07 RX ADMIN — QUETIAPINE FUMARATE 25 MG: 25 TABLET ORAL at 21:23

## 2024-12-07 NOTE — ASSESSMENT & PLAN NOTE
Chronic, elevated on presentation  Continue lasix  Monitor VS per unit protocol, Blood Pressure: (!) 174/89

## 2024-12-07 NOTE — PLAN OF CARE
Problem: Potential for Falls  Goal: Patient will remain free of falls  Description: INTERVENTIONS:  - Educate patient/family on patient safety including physical limitations  - Instruct patient to call for assistance with activity   - Consult OT/PT to assist with strengthening/mobility   - Keep Call bell within reach  - Keep bed low and locked with side rails adjusted as appropriate  - Keep care items and personal belongings within reach  - Initiate and maintain comfort rounds  - Make Fall Risk Sign visible to staff  - Offer Toileting every  Hours, in advance of need  - Initiate/Maintain alarm  - Obtain necessary fall risk management equipment:   - Apply yellow socks and bracelet for high fall risk patients  - Consider moving patient to room near nurses station  Outcome: Not Progressing

## 2024-12-07 NOTE — PROGRESS NOTES
Progress Note - Hospitalist   Name: Sona Valenzuela 89 y.o. female I MRN: [id number]  Unit/Bed#: 2 E 255-01 I Date of Admission: 12/5/2024   Date of Service: 12/7/2024 I Hospital Day: 2    Assessment & Plan  Ambulatory dysfunction  Patient brought in due to family report of ambulatory difficulties. Currently resides in an apartment  PT/OT saminaals  CM consult for disposition planning  Fall precautions  Chronic congestive heart failure (HCC)  Chronic HFpEF, compensated on presentation   Continue lasix 20 mg daily  Daily standing weights  Monitor I/Os, serial BMP  Sodium restriction, consider fluid restriction   Last echo 5/2024  Paroxysmal A-fib (HCC)  Rate control/Anticoagulation: none  Monitor  Stage 3b chronic kidney disease (HCC)  Baseline Cr 1.3-1.4, eGFR typically around 31-34  Currently at baseline  Monitor  Primary hypertension  Chronic, elevated on presentation  Continue lasix  Monitor VS per unit protocol, Blood Pressure: (!) 174/89   Other chest pain (Resolved: 12/7/2024)  Patient presented with complaints of chest pain, cardiac workup in ER negative  RANJAN 1  hsTnI 20, BNP not assessed  CXR questionable LLL finding, none on CT chest  EKG afib 89, incomplete RBBB, no acute ST elevations/depressions   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, psychotic disturbance, mood disturbance, or anxiety ([facility])  Not insightful into where she is or why she is in the hospital is able to follow commands easily  Delirium precautions   Vitamin D deficiency  Patient on vitamin D supplementation continue for now and check vit d levels    VTE Pharmacologic Prophylaxis: VTE Score: 3 Moderate Risk (Score 3-4) - Pharmacological DVT Prophylaxis Ordered: enoxaparin (Lovenox).    Mobility:   Basic Mobility Inpatient Raw Score: 18  JH-HLM Goal: 6: Walk 10 steps or more  JH-HLM Achieved: 4: Move to chair/commode  JH-HLM Goal NOT achieved. Continue with multidisciplinary rounding and encourage appropriate mobility to  improve upon [facility] goals.    Patient Centered Rounds: I performed bedside rounds with nursing staff today.   Discussions with Specialists or Other Care Team Provider:     Education and Discussions with Family / Patient:  deferred - awaiting auth, no new medical updates .     Current Length of Stay: 2 day(s)  Current Patient Status: Inpatient   Certification Statement: The patient will continue to require additional inpatient hospital stay due to pending safe discharge to rehab, awaiting auth  Discharge Plan:  medically stable pending auth    Code Status: Level 1 - Full Code    Subjective   Patient doing well, no acute issues overnight from nursing staff. Remains pleasantly confused.     Objective :  Temp:  [97.4 °F (36.3 °C)-98.8 °F (37.1 °C)] 97.4 °F (36.3 °C)  HR:  [] 79  BP: ()/(58-89) 174/89  Resp:  [18] 18  SpO2:  [91 %-99 %] 92 %  O2 Device: None (Room air)    Body mass index is 26.15 kg/m².     Input and Output Summary (last 24 hours):     Intake/Output Summary (Last 24 hours) at 12/7/2024 0922  Last data filed at 12/6/2024 1501  Gross per 24 hour   Intake 600 ml   Output 0 ml   Net 600 ml       Physical Exam  Vitals and nursing note reviewed.   Constitutional:       General: She is awake. She is not in acute distress.     Appearance: Normal appearance. She is not ill-appearing or toxic-appearing.      Comments: Frail but not acutely ill   Cardiovascular:      Rate and Rhythm: Normal rate.   Pulmonary:      Effort: Pulmonary effort is normal. No respiratory distress.   Skin:     Coloration: Skin is not pale.   Neurological:      General: No focal deficit present.      Mental Status: She is alert. Mental status is at baseline. She is disoriented.   Psychiatric:         Mood and Affect: Mood normal.         Behavior: Behavior normal. Behavior is cooperative.             Lab Results: I have reviewed the following results:   Results from last 7 days   Lab Units 12/05/24 2028 12/05/24  1640   WBC  Thousand/uL  --  7.67   HEMOGLOBIN g/dL  --  10.6*   HEMATOCRIT %  --  35.7   PLATELETS Thousands/uL 270 294   SEGS PCT %  --  70   LYMPHO PCT %  --  15   MONO PCT %  --  13*   EOS PCT %  --  2     Results from last 7 days   Lab Units 12/05/24  1640   SODIUM mmol/L 139   POTASSIUM mmol/L 3.7   CHLORIDE mmol/L 104   CO2 mmol/L 28   BUN mg/dL 24   CREATININE mg/dL 1.17   ANION GAP mmol/L 7   CALCIUM mg/dL 8.5   ALBUMIN g/dL 3.7   TOTAL BILIRUBIN mg/dL 0.63   ALK PHOS U/L 117*   ALT U/L 9   AST U/L 16   GLUCOSE RANDOM mg/dL 137     Results from last 7 days   Lab Units 12/05/24  1640   INR  0.98             Results from last 7 days   Lab Units 12/05/24  1640   LACTIC ACID mmol/L 1.8       Recent Cultures (last 7 days):         Imaging Results Review: No pertinent imaging studies reviewed.  Other Study Results Review: No additional pertinent studies reviewed.    Last 24 Hours Medication List:     Current Facility-Administered Medications:     enoxaparin (LOVENOX) subcutaneous injection 30 mg, Daily    ergocalciferol (VITAMIN D2) capsule 50,000 Units, Weekly    furosemide (LASIX) tablet 20 mg, BID    gabapentin (NEURONTIN) capsule 100 mg, TID    mirtazapine (REMERON) tablet 7.5 mg, HS    QUEtiapine (SEROquel) tablet 25 mg, HS    Administrative Statements   Today, Patient Was Seen By: Catalina Perry PA-C  I have spent a total time of 25 minutes in caring for this patient on the day of the visit/encounter including Counseling / Coordination of care and Documenting in the medical record.    **Please Note: This note may have been constructed using a voice recognition system.**

## 2024-12-07 NOTE — UTILIZATION REVIEW
SEE INITIAL REVIEW AT BOTTOM    Continued Stay Review    Date: 12/7                          Current Patient Class: Inpatient  Current Level of Care: Med Surg    HPI:89 y.o. female initially admitted on 12/5     Assessment/Plan: Date: 12/7  Day 3: Has surpassed a 2nd midnight with active treatments and services.  Serial BMP. Sodium restriction. Continue lasix, Elevated BP on admit.   PT/OT eval and treat. Need Rehab at discharge.    Per CM notes; Insurance auth still pending per Availity portal    Medications:   Scheduled Medications:  enoxaparin, 30 mg, Subcutaneous, Daily  ergocalciferol, 50,000 Units, Oral, Weekly  furosemide, 20 mg, Oral, BID  gabapentin, 100 mg, Oral, TID  mirtazapine, 7.5 mg, Oral, HS  QUEtiapine, 25 mg, Oral, HS      Continuous IV Infusions: None     PRN Meds: None     Discharge Plan: TBD    Vital Signs (last 3 days)       Date/Time Temp Pulse Resp BP MAP (mmHg) SpO2 O2 Device Patient Position - Orthostatic VS Pain    12/07/24 0900 -- -- -- -- -- -- None (Room air) -- No Pain    12/07/24 07:34:59 97.4 °F (36.3 °C) 79 18 174/89 117 92 % None (Room air) Lying No Pain    12/06/24 23:06:40 98.1 °F (36.7 °C) 94 -- 108/58 75 91 % -- -- --    12/06/24 23:06:24 98.1 °F (36.7 °C) 82 -- 108/58 75 91 % -- -- --    12/06/24 2205 -- -- -- -- -- -- -- -- No Pain    12/06/24 15:53:30 -- 83 -- 118/59 79 99 % -- -- --    12/06/24 15:15:21 98.8 °F (37.1 °C) 100 -- 91/66 74 97 % -- -- --    12/06/24 1500 98.8 °F (37.1 °C) 100 18 91/66 74 -- None (Room air) Lying --    12/06/24 0751 -- -- -- -- -- -- -- -- No Pain    12/06/24 07:29:40 98 °F (36.7 °C) 87 18 124/68 87 95 % None (Room air) -- --    12/05/24 22:08:06 98 °F (36.7 °C) 117 18 137/58 84 90 % None (Room air) Lying --    12/05/24 1955 -- -- -- -- -- -- None (Room air) -- No Pain    12/05/24 19:47:08 98.1 °F (36.7 °C) 97 18 159/83 108 96 % -- Sitting --    12/05/24 1946 -- -- -- -- -- -- -- -- No Pain    12/05/24 1730 -- 88 22 166/72 103 93 % -- -- --     12/05/24 1622 97.7 °F (36.5 °C) 106 16 166/93 119 96 % None (Room air) Sitting --          Weight (last 2 days)       Date/Time Weight    12/05/24 19:47:08 62.8 (138.4)    12/05/24 1622 64.1 (141.32)            Pertinent Labs/Diagnostic Results:   Radiology:  CT chest abdomen pelvis w contrast   Final Interpretation by Dev Weston MD (12/05 1832)      No evidence of acute trauma in the chest, abdomen or pelvis.               Workstation performed: [id number]         CT head without contrast   Final Interpretation by Dev Weston MD (12/05 1819)      No acute intracranial abnormality.                  Workstation performed: [id number]         CT spine cervical without contrast   Final Interpretation by Dev Weston MD (12/05 1822)      No evidence of acute cervical spine fracture or traumatic malalignment.                  Workstation performed: [id number]         XR chest 1 view portable   Final Interpretation by Chilo Agudelo MD (12/05 1711)      Left basilar effusion with atelectasis or pneumonia.            Workstation performed: [id number]           Cardiology:  ECG 12 lead   Final Result by Naveen Saldivar MD (12/06 8255)   Atrial fibrillation with premature ventricular or aberrantly conducted    complexes   Incomplete right bundle branch block   Nonspecific ST abnormality   Abnormal ECG   Confirmed by Naveen Saldivar ([id number]) on 12/6/2024 1:15:00 PM        GI:  No orders to display           Results from last 7 days   Lab Units 12/05/24 2028 12/05/24  1640   WBC Thousand/uL  --  7.67   HEMOGLOBIN g/dL  --  10.6*   HEMATOCRIT %  --  35.7   PLATELETS Thousands/uL 270 294   TOTAL NEUT ABS Thousands/µL  --  5.29         Results from last 7 days   Lab Units 12/05/24  1640   SODIUM mmol/L 139   POTASSIUM mmol/L 3.7   CHLORIDE mmol/L 104   CO2 mmol/L 28   ANION GAP mmol/L 7   BUN mg/dL 24   CREATININE mg/dL 1.17   EGFR ml/min/1.73sq m 38   CALCIUM mg/dL 8.5     Results from last 7  days   Lab Units 12/05/24  1640   AST U/L 16   ALT U/L 9   ALK PHOS U/L 117*   TOTAL PROTEIN g/dL 7.2   ALBUMIN g/dL 3.7   TOTAL BILIRUBIN mg/dL 0.63   BILIRUBIN DIRECT mg/dL 0.16         Results from last 7 days   Lab Units 12/05/24  1640   GLUCOSE RANDOM mg/dL 137       Results from last 7 days   Lab Units 12/05/24  1640   HS TNI 0HR ng/L 20         Results from last 7 days   Lab Units 12/05/24  1640   PROTIME seconds 13.7   INR  0.98   PTT seconds 29     Results from last 7 days   Lab Units 12/05/24  2028   TSH 3RD GENERATON uIU/mL 2.541         Results from last 7 days   Lab Units 12/05/24  1640   LACTIC ACID mmol/L 1.8       Results from last 7 days   Lab Units 12/06/24  0845   CLARITY UA  Turbid   COLOR UA  Light Yellow   SPEC GRAV UA  1.023   PH UA  8.5*   GLUCOSE UA mg/dl Negative   KETONES UA mg/dl Negative   BLOOD UA  Negative   PROTEIN UA mg/dl Trace*   NITRITE UA  Negative   BILIRUBIN UA  Negative   UROBILINOGEN UA (BE) mg/dl <2.0   LEUKOCYTES UA  Large*   WBC UA /hpf 30-50*   RBC UA /hpf 2-4*   BACTERIA UA /hpf Occasional   EPITHELIAL CELLS WET PREP /hpf Occasional             Network Utilization Review Department  ATTENTION: Please call with any questions or concerns to [phone] and carefully listen to the prompts so that you are directed to the right person. All voicemails are confidential.   For Discharge needs, contact Care Management DC Support Team at [phone] opt. [phone]  Send all requests for admission clinical reviews, approved or denied determinations and any other requests to dedicated fax number below belonging to the campus where the patient is receiving treatment. List of dedicated fax numbers for the Facilities:  FACILITY NAME UR FAX NUMBER   ADMISSION DENIALS (Administrative/Medical Necessity) [phone]   DISCHARGE SUPPORT TEAM (NETWORK) [phone]   PARENT CHILD HEALTH (Maternity/NICU/Pediatrics) [phone]   [facility] [phone]   [facility]  [facility] [phone]   [facility] [phone]   [facility] [phone]   formerly [facility] [phone]   [facility] [phone]   [facility] [phone]   [facility] [phone]   [facility] [phone]   [facility] [phone]   [facility] [phone]   [facility] [phone]

## 2024-12-08 PROCEDURE — 99232 SBSQ HOSP IP/OBS MODERATE 35: CPT | Performed by: PHYSICIAN ASSISTANT

## 2024-12-08 RX ADMIN — GABAPENTIN 100 MG: 100 CAPSULE ORAL at 21:37

## 2024-12-08 RX ADMIN — FUROSEMIDE 20 MG: 20 TABLET ORAL at 08:10

## 2024-12-08 RX ADMIN — FUROSEMIDE 20 MG: 20 TABLET ORAL at 17:57

## 2024-12-08 RX ADMIN — ENOXAPARIN SODIUM 30 MG: 30 INJECTION SUBCUTANEOUS at 08:10

## 2024-12-08 RX ADMIN — MIRTAZAPINE 7.5 MG: 15 TABLET, FILM COATED ORAL at 21:08

## 2024-12-08 RX ADMIN — QUETIAPINE FUMARATE 25 MG: 25 TABLET ORAL at 21:07

## 2024-12-08 RX ADMIN — GABAPENTIN 100 MG: 100 CAPSULE ORAL at 17:57

## 2024-12-08 RX ADMIN — GABAPENTIN 100 MG: 100 CAPSULE ORAL at 08:10

## 2024-12-08 NOTE — PROGRESS NOTES
Progress Note - Hospitalist   Name: Sona Valenzuela 89 y.o. female I MRN: [id number]  Unit/Bed#: 2 E 255-01 I Date of Admission: 12/5/2024   Date of Service: 12/8/2024 I Hospital Day: 3    Assessment & Plan  Ambulatory dysfunction  Patient brought in due to family report of ambulatory difficulties. Currently resides in an apartment  PT/OT saminaals  CM consult for disposition planning  Fall precautions  Chronic congestive heart failure (HCC)  Chronic HFpEF, compensated on presentation   Continue lasix 20 mg daily  Daily standing weights  Monitor I/Os, serial BMP  Sodium restriction, consider fluid restriction   Last echo 5/2024  Paroxysmal A-fib (HCC)  Rate control/Anticoagulation: none  Monitor  Stage 3b chronic kidney disease (HCC)  Baseline Cr 1.3-1.4, eGFR typically around 31-34  Currently at baseline  Monitor  Primary hypertension  Chronic, elevated on presentation  Continue lasix  Monitor VS per unit protocol, Blood Pressure: 134/79   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, psychotic disturbance, mood disturbance, or anxiety (HCC)  Not insightful into where she is or why she is in the hospital is able to follow commands easily  Delirium precautions   Vitamin D deficiency  Patient on vitamin D supplementation continue for now and check vit d levels    VTE Pharmacologic Prophylaxis: VTE Score: 3 Moderate Risk (Score 3-4) - Pharmacological DVT Prophylaxis Ordered: enoxaparin (Lovenox).    Mobility:   Basic Mobility Inpatient Raw Score: 18  JH-HLM Goal: 6: Walk 10 steps or more  JH-HLM Achieved: 6: Walk 10 steps or more  JH-HLM Goal achieved. Continue to encourage appropriate mobility.    Patient Centered Rounds: I performed bedside rounds with nursing staff today.   Discussions with Specialists or Other Care Team Provider: LIANE re placement     Education and Discussions with Family / Patient: Attempted to update  (Xi pérez @ 10:43 am) via phone. Left voicemail.     Current  Length of Stay: 3 day(s)  Current Patient Status: Inpatient   Certification Statement: The patient will continue to require additional inpatient hospital stay due to awaiting authorization for rehab  Discharge Plan:  medically stable for discharge pending auth for rehab placement     Code Status: Level 1 - Full Code    Subjective   Patient tired this am, reports she just needs a nap. Denies pain or other issues. Pleasant, but less chatty today.     Objective :  Temp:  [97.8 °F (36.6 °C)] 97.8 °F (36.6 °C)  HR:  [74-84] 74  BP: (115-134)/(67-79) 134/79  SpO2:  [95 %-97 %] 95 %  O2 Device: None (Room air)    Body mass index is 26.15 kg/m².     Input and Output Summary (last 24 hours):     Intake/Output Summary (Last 24 hours) at 12/8/2024 0850  Last data filed at 12/7/2024 2100  Gross per 24 hour   Intake 220 ml   Output --   Net 220 ml       Physical Exam  Vitals and nursing note reviewed.   Constitutional:       General: She is awake. She is not in acute distress.     Appearance: Normal appearance. She is well-developed and well-groomed. She is not ill-appearing or toxic-appearing.      Comments: Frail but not ill appearing    Cardiovascular:      Rate and Rhythm: Normal rate.   Pulmonary:      Effort: Pulmonary effort is normal. No respiratory distress.   Skin:     Coloration: Skin is not pale.   Neurological:      Mental Status: She is alert. Mental status is at baseline. She is disoriented.   Psychiatric:         Mood and Affect: Mood normal.         Behavior: Behavior normal. Behavior is cooperative.             Lab Results: I have reviewed the following results:   Results from last 7 days   Lab Units 12/05/24 2028 12/05/24  1640   WBC Thousand/uL  --  7.67   HEMOGLOBIN g/dL  --  10.6*   HEMATOCRIT %  --  35.7   PLATELETS Thousands/uL 270 294   SEGS PCT %  --  70   LYMPHO PCT %  --  15   MONO PCT %  --  13*   EOS PCT %  --  2     Results from last 7 days   Lab Units 12/05/24  1640   SODIUM mmol/L 139    POTASSIUM mmol/L 3.7   CHLORIDE mmol/L 104   CO2 mmol/L 28   BUN mg/dL 24   CREATININE mg/dL 1.17   ANION GAP mmol/L 7   CALCIUM mg/dL 8.5   ALBUMIN g/dL 3.7   TOTAL BILIRUBIN mg/dL 0.63   ALK PHOS U/L 117*   ALT U/L 9   AST U/L 16   GLUCOSE RANDOM mg/dL 137     Results from last 7 days   Lab Units 12/05/24  1640   INR  0.98             Results from last 7 days   Lab Units 12/05/24  1640   LACTIC ACID mmol/L 1.8       Recent Cultures (last 7 days):         Imaging Results Review: No pertinent imaging studies reviewed.  Other Study Results Review: No additional pertinent studies reviewed.    Last 24 Hours Medication List:     Current Facility-Administered Medications:     enoxaparin (LOVENOX) subcutaneous injection 30 mg, Daily    ergocalciferol (VITAMIN D2) capsule 50,000 Units, Weekly    furosemide (LASIX) tablet 20 mg, BID    gabapentin (NEURONTIN) capsule 100 mg, TID    mirtazapine (REMERON) tablet 7.5 mg, HS    QUEtiapine (SEROquel) tablet 25 mg, HS    Administrative Statements   Today, Patient Was Seen By: Catalina Perry PA-C  I have spent a total time of 36 minutes in caring for this patient on the day of the visit/encounter including Counseling / Coordination of care, Documenting in the medical record, and Communicating with other healthcare professionals .    **Please Note: This note may have been constructed using a voice recognition system.**

## 2024-12-08 NOTE — PLAN OF CARE
Problem: Potential for Falls  Goal: Patient will remain free of falls  Description: INTERVENTIONS:  - Educate patient/family on patient safety including physical limitations  - Instruct patient to call for assistance with activity   - Consult OT/PT to assist with strengthening/mobility   - Keep Call bell within reach  - Keep bed low and locked with side rails adjusted as appropriate  - Keep care items and personal belongings within reach  - Initiate and maintain comfort rounds  - Make Fall Risk Sign visible to staff  - Offer Toileting every 2 Hours, in advance of need  - Initiate/Maintain bed/chair alarm  - Obtain necessary fall risk management equipment:   - Apply yellow socks and bracelet for high fall risk patients  - Consider moving patient to room near nurses station  Outcome: Progressing

## 2024-12-08 NOTE — ASSESSMENT & PLAN NOTE
Chronic, elevated on presentation  Continue lasix  Monitor VS per unit protocol, Blood Pressure: 134/79

## 2024-12-08 NOTE — PLAN OF CARE
Problem: Prexisting or High Potential for Compromised Skin Integrity  Goal: Skin integrity is maintained or improved  Description: INTERVENTIONS:  - Identify patients at risk for skin breakdown  - Assess and monitor skin integrity  - Assess and monitor nutrition and hydration status  - Monitor labs   - Assess for incontinence   - Turn and reposition patient  - Assist with mobility/ambulation  - Relieve pressure over bony prominences  - Avoid friction and shearing  - Provide appropriate hygiene as needed including keeping skin clean and dry  - Evaluate need for skin moisturizer/barrier cream  - Collaborate with interdisciplinary team   - Patient/family teaching  - Consider wound care consult   Outcome: Progressing     Problem: Potential for Falls  Goal: Patient will remain free of falls  Description: INTERVENTIONS:  - Educate patient/family on patient safety including physical limitations  - Instruct patient to call for assistance with activity   - Consult OT/PT to assist with strengthening/mobility   - Keep Call bell within reach  - Keep bed low and locked with side rails adjusted as appropriate  - Keep care items and personal belongings within reach  - Initiate and maintain comfort rounds  - Make Fall Risk Sign visible to staff  - Offer Toileting every 2 Hours, in advance of need  - Initiate/Maintain bed/chair alarms  - Obtain necessary fall risk management equipment: bed/chair alarms, non-slip footwear, call bell within reach, belongings within reach.   - Apply yellow socks and bracelet for high fall risk patients  - Consider moving patient to room near nurses station  Outcome: Progressing

## 2024-12-09 ENCOUNTER — TRANSITIONAL CARE MANAGEMENT (OUTPATIENT)
Dept: FAMILY MEDICINE CLINIC | Facility: CLINIC | Age: 89
End: 2024-12-09

## 2024-12-09 ENCOUNTER — TELEPHONE (OUTPATIENT)
Age: 89
End: 2024-12-09

## 2024-12-09 VITALS
OXYGEN SATURATION: 95 % | WEIGHT: 138.4 LBS | SYSTOLIC BLOOD PRESSURE: 129 MMHG | HEIGHT: 61 IN | DIASTOLIC BLOOD PRESSURE: 62 MMHG | RESPIRATION RATE: 18 BRPM | BODY MASS INDEX: 26.13 KG/M2 | TEMPERATURE: 97.3 F | HEART RATE: 91 BPM

## 2024-12-09 PROCEDURE — 99239 HOSP IP/OBS DSCHRG MGMT >30: CPT | Performed by: PHYSICIAN ASSISTANT

## 2024-12-09 RX ORDER — AMOXICILLIN 250 MG
2 CAPSULE ORAL ONCE
Status: COMPLETED | OUTPATIENT
Start: 2024-12-09 | End: 2024-12-09

## 2024-12-09 RX ORDER — ONDANSETRON 4 MG/1
4 TABLET, ORALLY DISINTEGRATING ORAL EVERY 6 HOURS PRN
Status: DISCONTINUED | OUTPATIENT
Start: 2024-12-09 | End: 2024-12-09 | Stop reason: HOSPADM

## 2024-12-09 RX ADMIN — GABAPENTIN 100 MG: 100 CAPSULE ORAL at 08:58

## 2024-12-09 RX ADMIN — FUROSEMIDE 20 MG: 20 TABLET ORAL at 08:58

## 2024-12-09 RX ADMIN — SENNOSIDES AND DOCUSATE SODIUM 2 TABLET: 50; 8.6 TABLET ORAL at 13:02

## 2024-12-09 RX ADMIN — ENOXAPARIN SODIUM 30 MG: 30 INJECTION SUBCUTANEOUS at 08:58

## 2024-12-09 RX ADMIN — ONDANSETRON 4 MG: 4 TABLET, ORALLY DISINTEGRATING ORAL at 11:31

## 2024-12-09 NOTE — PLAN OF CARE
Problem: Potential for Falls  Goal: Patient will remain free of falls  Description: INTERVENTIONS:  - Educate patient/family on patient safety including physical limitations  - Instruct patient to call for assistance with activity   - Consult OT/PT to assist with strengthening/mobility   - Keep Call bell within reach  - Keep bed low and locked with side rails adjusted as appropriate  - Keep care items and personal belongings within reach  - Initiate and maintain comfort rounds  - Make Fall Risk Sign visible to staff  - Offer Toileting in advance of need  - Initiate/Maintain alarm  - Obtain necessary fall risk management equipment  - Apply yellow socks and bracelet for high fall risk patients  - Consider moving patient to room near nurses station  Outcome: Progressing

## 2024-12-09 NOTE — PLAN OF CARE
Problem: Prexisting or High Potential for Compromised Skin Integrity  Goal: Skin integrity is maintained or improved  Description: INTERVENTIONS:  - Identify patients at risk for skin breakdown  - Assess and monitor skin integrity  - Assess and monitor nutrition and hydration status  - Monitor labs   - Assess for incontinence   - Turn and reposition patient  - Assist with mobility/ambulation  - Relieve pressure over bony prominences  - Avoid friction and shearing  - Provide appropriate hygiene as needed including keeping skin clean and dry  - Evaluate need for skin moisturizer/barrier cream  - Collaborate with interdisciplinary team   - Patient/family teaching  - Consider wound care consult   Outcome: Progressing     Problem: Potential for Falls  Goal: Patient will remain free of falls  Description: INTERVENTIONS:  - Educate patient/family on patient safety including physical limitations  - Instruct patient to call for assistance with activity   - Consult OT/PT to assist with strengthening/mobility   - Keep Call bell within reach  - Keep bed low and locked with side rails adjusted as appropriate  - Keep care items and personal belongings within reach  - Initiate and maintain comfort rounds  - Make Fall Risk Sign visible to staff  - Offer Toileting every 2 Hours, in advance of need  - Initiate/Maintain bed alarm  - Obtain necessary fall risk management equipment: non-slip socks, bed alarm, belonging within reach, call bell within reach  - Apply yellow socks and bracelet for high fall risk patients  - Consider moving patient to room near nurses station  Outcome: Progressing

## 2024-12-09 NOTE — CASE MANAGEMENT
Case Management Discharge Planning Note    Patient name Sona Valenzuela  Location 2 [facility] 255/2  255-01 MRN [id number]  : 11/3/1924 Date 2024       Current Admission Date: 2024  Current Admission Diagnosis:Ambulatory dysfunction   Patient Active Problem List    Diagnosis Date Noted Date Diagnosed    Ambulatory dysfunction 2024     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, psychotic disturbance, mood disturbance, or anxiety ([facility]) 2024     Chronic pain of left wrist 10/28/2024     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) 2024     Acute on chronic HFpEF with severe pulmonary hypertension ([facility]) 2024     Primary hypertension 2024     Paroxysmal A-fib ([facility]) 2024     Compression fracture of lumbar spine, non-traumatic ([facility]) 2024     Mild protein-calorie malnutrition ([facility]) 2024     Vascular dementia, uncomplicated ([facility]) 2023     Does mobilize using walker 2023     Chronic congestive heart failure (HCC) 2022     Chronic atrial fibrillation ([facility]) 10/11/2022     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[facility]) 2022     Stage 3b chronic kidney disease ([facility]) 2022     Vitamin D deficiency 2020     Primary insomnia 2019     Degeneration of lumbar intervertebral disc 2019     Lumbar radiculopathy 2019     Arthropathy of lumbar facet joint 2019     Restless legs syndrome 2019       LOS (days): 4  Geometric Mean LOS (GMLOS) (days): 3  Days to GMLOS:-0.8     OBJECTIVE:  Risk of Unplanned Readmission Score: 13.57         Current admission status: Inpatient   Preferred Pharmacy:   [facility]/pharmacy #[id number] - [place] - [address]  [place] [address]  Phone: [phone] Fax: [phone]    Primary Care Provider: FARZANA Stanley    Primary Insurance: [email or web address]  Secondary Insurance: [place] [employer] AND [address] [facility]  HEALTHCHOICES    DISCHARGE DETAILS:    Discharge planning discussed with:: patient's niece over the phone  Freedom of Choice: Yes  Comments - Freedom of Choice: CM maintained freedom of choice as it pertains to discharge planning. CM advised of approved insurance auth for STR. Patient's niece agreeable to plan to d/c to [facility] today w 1pm scheduled  time.  CM contacted family/caregiver?: Yes  Were Treatment Team discharge recommendations reviewed with patient/caregiver?: Yes  Did patient/caregiver verbalize understanding of patient care needs?: N/A- going to facility  Were patient/caregiver advised of the risks associated with not following Treatment Team discharge recommendations?: Yes    Contacts  Patient Contacts: Xi (niece/POA)  Relationship to Patient:: Family  Contact Method: Phone  Phone Number: [phone]  Reason/Outcome: Continuity of Care, Emergency Contact, Discharge Planning    Requested Home Health Care         Is the patient interested in HHC at discharge?: No    DME Referral Provided  Referral made for DME?: No    Other Referral/Resources/Interventions Provided:  Interventions: Short Term Rehab  Referral Comments: CM advised [facility] of approved auth jeraldJazmyn Ashraf agreeable to accept pt today w 1pm scheduled  time    Would you like to participate in our [facility] service program?  : No - Declined    Treatment Team Recommendation: Short Term Rehab  Discharge Destination Plan:: Short Term Rehab  Transport at Discharge : Wheelchair van     Number/Name of Dispatcher: Roundtrip  Transported by (Company and Unit #): [facility]  ETA of Transport (Date): 12/09/24  ETA of Transport (Time): 1300     IMM Given (Date):: 12/09/24  IMM Given to:: Family  Family notified:: CM reviewed IMM with patient's niece over the phone who verbalized understanding of rights and provided verbal acknowledgement of IMM. Copy of IMM left with patient at bedside. IMM  returned to medical records.    SLIM and bedside RN informed of scheduled  time.

## 2024-12-09 NOTE — ASSESSMENT & PLAN NOTE
Chronic, elevated on presentation; does not need tight control given age and fall risk  Continue lasix

## 2024-12-09 NOTE — TELEPHONE ENCOUNTER
Patients niece called to advise the patient is now going to [facility] and rehab [phone] [address] [facility] [place] [address]  Thank you

## 2024-12-09 NOTE — DISCHARGE SUMMARY
Discharge Summary - Hospitalist   Name: Sona Valenzuela 89 y.o. female I MRN: [id number]  Unit/Bed#: 2 E 255-01 I Date of Admission: 12/5/2024   Date of Service: 12/9/2024 I Hospital Day: 4     Assessment & Plan  Ambulatory dysfunction  Patient brought in due to family report of ambulatory difficulties. Currently resides in an apartment  PT/OT lebron noted, CM on board for SNF rehab placement   Fall precautions  Chronic congestive heart failure (HCC)  Chronic HFpEF, compensated on presentation   Continue lasix 20 mg daily  Weights stable  Paroxysmal A-fib (HCC)  Rate control/Anticoagulation: none  Stage 3b chronic kidney disease (HCC)  Baseline Cr 1.3-1.4, eGFR typically around 31-34  Currently at baseline  Primary hypertension  Chronic, elevated on presentation; does not need tight control given age and fall risk  Continue lasix  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, psychotic disturbance, mood disturbance, or anxiety (HCC)  Not insightful into where she is or why she is in the hospital is able to follow commands easily  Delirium precautions   Vitamin D deficiency  Patient on vitamin D supplementation continue for now and check vit d levels       Discharging Physician / Practitioner: Catalina Perry PA-C  PCP: FARZANA Stanley  Admission Date:   Admission Orders (From admission, onward)       Ordered        12/05/24 1904  INPATIENT ADMISSION  Once                          Discharge Date: 12/09/24    Disposition:    SNF rehab    Discharge Diagnoses:   Please see assessment and plan section above for further details regarding discharge diagnoses.     Consultations During Hospital Stay:  IP CONSULT TO CASE MANAGEMENT    Procedures Performed:   None      Significant Findings / Test Results:   CT chest abdomen pelvis w contrast  Result Date: 12/5/2024  Impression: No evidence of acute trauma in the chest, abdomen or pelvis. Workstation performed: [id number]     CT spine cervical without  "contrast  Result Date: 12/5/2024  Impression: No evidence of acute cervical spine fracture or traumatic malalignment. Workstation performed: [id number]     CT head without contrast  Result Date: 12/5/2024  Impression: No acute intracranial abnormality. Workstation performed: [id number]     XR chest 1 view portable  Result Date: 12/5/2024  Impression: Left basilar effusion with atelectasis or pneumonia. Workstation performed: [id number]       No Chest XR results available for this patient.     Results for orders placed during the hospital encounter of 05/12/24    Echo complete    Interpretation Summary    Left Ventricle: Left ventricular cavity size is normal. Wall thickness is normal. The left ventricular ejection fraction is 60%. Systolic function is normal. Wall motion cannot be accurately assessed. Diastolic function is normal.    Right Ventricle: Right ventricular cavity size is normal. Systolic function is mildly reduced.    Left Atrium: The atrium is mildly dilated.    Right Atrium: The atrium is mildly dilated.    Mitral Valve: There is mild annular calcification. There is mild regurgitation.    Tricuspid Valve: There is severe regurgitation. The right ventricular systolic pressure is severely elevated. The estimated right ventricular systolic pressure is 77 mmHg.    Pulmonic Valve: There is mild regurgitation.    IVC/SVC: The right atrial pressure is estimated at 8.0 mmHg. The inferior vena cava is dilated. Respirophasic changes were normal.      No results for input(s): \"BLOODCX\", \"SPUTUMCULTUR\", \"GRAMSTAIN\", \"URINECX\", \"WOUNDCULT\", \"BODYFLUIDCUL\", \"MRSACULTURE\", \"INFLUAPCR\", \"INFLUBPCR\", \"RSVPCR\", \"LEGIONELLAUR\", \"STPU\", \"CDIFFTOXINB\" in the last 72 hours.    Incidental Findings:   None other than noted above. I reviewed the above mentioned incidental findings with the patient and/or family and they expressed understanding    Test Results Pending at Discharge (will require follow up):   None     " "  Outpatient Tests Requested:  None      Complications:  none      Reason for Admission:   Chief Complaint   Patient presents with    Chest Pain     BIBA from home for complaints of left sided chest started today worsen on movement., also patient had a fall last week. Denies nausea and vomiting. Hx of AFIB.       Hospital Course:    Brought in by family due to fall and chest pain, underlying dementia but living with family in an apartment, per H&P. Workup unremarkable here. Patient evaluated by therapy teams and is recommended for rehab, to which the family is agreeable. Hemodynamically stable for discharge today.     Condition at Discharge: fair    Discharge Day Visit / Exam:   Subjective: this am reports she is tired and has an upset stomach. No acute events reported overnight. Remains pleasantly confuse and cooperative.     Vitals: Blood Pressure: 129/62 (12/09/24 1100)  Pulse: 91 (12/09/24 1100)  Temperature: (!) 97.3 °F (36.3 °C) (12/09/24 0727)  Temp Source: Oral (12/08/24 1500)  Respirations: 18 (12/09/24 0727)  Height: 5' 1\" (154.9 cm) (12/05/24 1947)  Weight - Scale: 62.8 kg (138 lb 6.4 oz) (12/05/24 1947)  SpO2: 95 % (12/09/24 1100)  Exam:   Physical Exam  Vitals and nursing note reviewed.   Constitutional:       General: She is awake.      Appearance: Normal appearance. She is well-developed and well-groomed. She is not ill-appearing or toxic-appearing.      Comments: Frail and tired today, but not acutely ill appearing    Cardiovascular:      Rate and Rhythm: Normal rate.   Pulmonary:      Effort: Pulmonary effort is normal. No respiratory distress.   Skin:     Coloration: Skin is not pale.   Neurological:      General: No focal deficit present.      Mental Status: She is alert. Mental status is at baseline. She is disoriented.   Psychiatric:         Mood and Affect: Mood normal.         Behavior: Behavior normal. Behavior is cooperative.         Discussion with Family: LIANE has updated niece "     Medication Adjustments and Discharge Medications:  Discharge Medication List: See after visit summary for reconciled discharge medications.   Medication Dosing Tapers - Please refer to Discharge Medication List for details on any medication dosing tapers (if applicable to patient).   Summary of Medication Adjustments made as a result of this hospitalization: as noted on med rec  Medications being temporarily held (include recommended restart time): as noted on med rec    Wound Care Recommendations:  When applicable, please see wound care section of After Visit Summary.    Instructions for any Catheters / Lines Present at Discharge (including removal date, if applicable): n/a    Diet Recommendations at Discharge:  Diet -        Diet Orders   (From admission, onward)                 Start     Ordered    12/05/24 2009  Diet Regular; Regular House  Diet effective now        References:    Adult Nutrition Support Algorithm    RD Therapeutic Diet Order Protocol   Question Answer Comment   Diet Type Regular    Regular Regular House    RD to adjust diet per protocol? Yes        12/05/24 2009                    Mobility at time of Discharge:   Basic Mobility Inpatient Raw Score: 18  JH-HLM Goal: 6: Walk 10 steps or more  JH-HLM Achieved: 6: Walk 10 steps or more  HLM Goal achieved. Continue to encourage appropriate mobility.    Goals of Care Discussions:  Code Status at Discharge: Level 1 - Full Code  Goals of care were not discussed during this admission.    Discharge instructions/Information to patient and family:   See after visit summary section titled Discharge Instructions for information provided to patient and family.      Planned Readmission: none      Discharge Statement:  I spent 41 minutes discharging the patient. This time was spent on the day of discharge. I had direct contact with the patient on the day of discharge. Greater than 50% of the total time was spent examining patient, answering all patient  questions, arranging and discussing plan of care with patient as well as directly providing post-discharge instructions.  Additional time then spent on discharge activities.    **Please Note: This note may have been constructed using a voice recognition system.**

## 2024-12-09 NOTE — UTILIZATION REVIEW
NOTIFICATION OF INPATIENT ADMISSION   AUTHORIZATION REQUEST   SERVICING FACILITY:   [place] [address]  Tax ID: [id number]  NPI: [id number] ATTENDING PROVIDER:  Attending Name and NPI#: Tomás Sher Do [[id number]]  Address: [address] [place] [address]  Phone: [phone]     ADMISSION INFORMATION:  Place of Service: Inpatient [facility]  Place of Service Code: 21  Inpatient Admission Date/Time: 12/5/24  7:04 PM  Discharge Date/Time: No discharge date for patient encounter.  Admitting Diagnosis Code/Description:  Chest wall pain [R07.89]  Generalized weakness [R53.1]     UTILIZATION REVIEW CONTACT:  Mallory Humphreys Utilization   Network Utilization Review Department  Phone: [phone]  Fax [phone]  Email: [email or web address]  Contact for approvals/pending authorizations, clinical reviews, and discharge.     PHYSICIAN ADVISORY SERVICES:  Medical Necessity Denial & Gatq-ky-Kjcu Review  Phone: [phone]  Fax: [phone]  Email: [email or web address]     DISCHARGE SUPPORT TEAM:  For Patients Discharge Needs & Updates  Phone: [phone] opt. 2 Fax: [phone]  Email: [email or web address]

## 2024-12-09 NOTE — ASSESSMENT & PLAN NOTE
Patient brought in due to family report of ambulatory difficulties. Currently resides in an apartment  PT/OT lebron noted, CM on board for SNF rehab placement   Fall precautions

## 2024-12-09 NOTE — CASE MANAGEMENT
[facility] has received APPROVED authorization.  Insurance: [employer]     Auth obtained via Insurance Rep: [place] P#: [phone] [phone]  Authorization received for: SNF  Facility: [facility]   Authorization #: [id number]   Start of Care: 12/09  Next Review Date: 12/12  Continued Stay Care Coordinator: Gem ROLLINS#: [phone]  Submit next review to F#: [phone]    Care Manager notified: Monse Gutierrez     Please reach out to CM for updates on any clinical information.

## 2024-12-09 NOTE — CASE MANAGEMENT
Case Management Discharge Planning Note    Patient name Sona Valenzuela  Location 2 [facility] 255/2  255-01 MRN [id number]  : 11/3/1924 Date 2024       Current Admission Date: 2024  Current Admission Diagnosis:Ambulatory dysfunction   Patient Active Problem List    Diagnosis Date Noted Date Diagnosed    Ambulatory dysfunction 2024     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, psychotic disturbance, mood disturbance, or anxiety ([facility]) 2024     Chronic pain of left wrist 10/28/2024     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) 2024     Acute on chronic HFpEF with severe pulmonary hypertension ([facility]) 2024     Primary hypertension 2024     Paroxysmal A-fib ([facility]) 2024     Compression fracture of lumbar spine, non-traumatic ([facility]) 2024     Mild protein-calorie malnutrition ([facility]) 2024     Vascular dementia, uncomplicated ([facility]) 2023     Does mobilize using walker 2023     Chronic congestive heart failure (HCC) 2022     Chronic atrial fibrillation ([facility]) 10/11/2022     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[facility]) 2022     Stage 3b chronic kidney disease ([facility]) 2022     Vitamin D deficiency 2020     Primary insomnia 2019     Degeneration of lumbar intervertebral disc 2019     Lumbar radiculopathy 2019     Arthropathy of lumbar facet joint 2019     Restless legs syndrome 2019       LOS (days): 4  Geometric Mean LOS (GMLOS) (days): 3  Days to GMLOS:-0.6     OBJECTIVE:  Risk of Unplanned Readmission Score: 13.39         Current admission status: Inpatient   Preferred Pharmacy:   [facility]/pharmacy #[id number] - [place] - [address]  [place] [address]  Phone: [phone] Fax: [phone]    Primary Care Provider: FARZANA Stanley    Primary Insurance: [employer]  Secondary Insurance: [place] [employer] AND [employer] [facility]  HEALTHCHOICES    DISCHARGE DETAILS:                                                                                                               Facility Insurance Auth Number: [id number]

## 2024-12-12 NOTE — UTILIZATION REVIEW
NOTIFICATION OF ADMISSION DISCHARGE   This is a Notification of Discharge from [facility]. Please be advised that this patient has been discharge from our facility. Below you will find the admission and discharge date and time including the patient’s disposition.   UTILIZATION REVIEW CONTACT:  Kenyetta Humphreys  Utilization   Network Utilization Review Department  Phone: [phone] x carefully listen to the prompts. All voicemails are confidential.  Email: [email or web address]     ADMISSION INFORMATION  PRESENTATION DATE: 12/5/2024  4:18 PM  OBERVATION ADMISSION DATE: N/A  INPATIENT ADMISSION DATE: 12/5/24  7:04 PM   DISCHARGE DATE: 12/9/2024  1:35 PM   DISPOSITION:Non [facility] SNF/TCU/SNU    Network Utilization Review Department  ATTENTION: Please call with any questions or concerns to [phone] and carefully listen to the prompts so that you are directed to the right person. All voicemails are confidential.   For Discharge needs, contact Care Management DC Support Team at [phone] opt. 2  Send all requests for admission clinical reviews, approved or denied determinations and any other requests to dedicated fax number below belonging to the campus where the patient is receiving treatment. List of dedicated fax numbers for the Facilities:  FACILITY NAME UR FAX NUMBER   ADMISSION DENIALS (Administrative/Medical Necessity) [phone]   DISCHARGE SUPPORT TEAM ([facility]) [phone]   PARENT CHILD HEALTH (Maternity/NICU/Pediatrics) [phone]   [facility] [phone]   [facility] [phone]   [facility] [phone]   [facility] [phone]   [facility] [phone]   [facility] [phone]   [facility] [phone]   [facility]  [phone]   [facility] [phone]   [facility] [phone]   [facility] [phone]   [facility] [phone]

## 2024-12-27 ENCOUNTER — TELEPHONE (OUTPATIENT)
Age: 89
End: 2024-12-27

## 2024-12-27 NOTE — TELEPHONE ENCOUNTER
[place] [facility] home care call in requesting a verbal concent to continue PT home care. Call was transferred to the office.

## 2024-12-31 ENCOUNTER — OFFICE VISIT (OUTPATIENT)
Dept: FAMILY MEDICINE CLINIC | Facility: CLINIC | Age: 89
End: 2024-12-31
Payer: COMMERCIAL

## 2024-12-31 VITALS
HEIGHT: 61 IN | WEIGHT: 138 LBS | DIASTOLIC BLOOD PRESSURE: 86 MMHG | TEMPERATURE: 95.8 F | SYSTOLIC BLOOD PRESSURE: 132 MMHG | BODY MASS INDEX: 26.06 KG/M2

## 2024-12-31 DIAGNOSIS — F01.50 VASCULAR DEMENTIA, UNCOMPLICATED (HCC): ICD-10-CM

## 2024-12-31 DIAGNOSIS — I10 PRIMARY HYPERTENSION: ICD-10-CM

## 2024-12-31 DIAGNOSIS — G89.29 CHRONIC WRIST PAIN, LEFT: ICD-10-CM

## 2024-12-31 DIAGNOSIS — F01.50 VASCULAR DEMENTIA WITHOUT BEHAVIORAL DISTURBANCE (HCC): ICD-10-CM

## 2024-12-31 DIAGNOSIS — G30.9 MODERATE ALZHEIMER'S DEMENTIA WITHOUT BEHAVIORAL DISTURBANCE, PSYCHOTIC DISTURBANCE, MOOD DISTURBANCE, OR ANXIETY, UNSPECIFIED TIMING OF DEMENTIA ONSET (HCC): ICD-10-CM

## 2024-12-31 DIAGNOSIS — M25.532 CHRONIC WRIST PAIN, LEFT: ICD-10-CM

## 2024-12-31 DIAGNOSIS — E55.9 VITAMIN D DEFICIENCY: ICD-10-CM

## 2024-12-31 DIAGNOSIS — I48.0 PAROXYSMAL A-FIB (HCC): ICD-10-CM

## 2024-12-31 DIAGNOSIS — R26.2 AMBULATORY DYSFUNCTION: Primary | ICD-10-CM

## 2024-12-31 DIAGNOSIS — Z23 ENCOUNTER FOR IMMUNIZATION: ICD-10-CM

## 2024-12-31 DIAGNOSIS — I50.42 CHRONIC COMBINED SYSTOLIC AND DIASTOLIC CONGESTIVE HEART FAILURE (HCC): ICD-10-CM

## 2024-12-31 DIAGNOSIS — F02.B0 MODERATE ALZHEIMER'S DEMENTIA WITHOUT BEHAVIORAL DISTURBANCE, PSYCHOTIC DISTURBANCE, MOOD DISTURBANCE, OR ANXIETY, UNSPECIFIED TIMING OF DEMENTIA ONSET (HCC): ICD-10-CM

## 2024-12-31 DIAGNOSIS — F01.518 VASCULAR DEMENTIA WITH BEHAVIORAL DISTURBANCE (HCC): ICD-10-CM

## 2024-12-31 DIAGNOSIS — M54.16 LUMBAR RADICULOPATHY: ICD-10-CM

## 2024-12-31 DIAGNOSIS — N18.32 STAGE 3B CHRONIC KIDNEY DISEASE (HCC): ICD-10-CM

## 2024-12-31 PROCEDURE — 99495 TRANSJ CARE MGMT MOD F2F 14D: CPT | Performed by: NURSE PRACTITIONER

## 2024-12-31 PROCEDURE — G0008 ADMIN INFLUENZA VIRUS VAC: HCPCS

## 2024-12-31 PROCEDURE — 90677 PCV20 VACCINE IM: CPT

## 2024-12-31 PROCEDURE — G0009 ADMIN PNEUMOCOCCAL VACCINE: HCPCS

## 2024-12-31 PROCEDURE — 90662 IIV NO PRSV INCREASED AG IM: CPT

## 2024-12-31 RX ORDER — MIRTAZAPINE 7.5 MG/1
7.5 TABLET, FILM COATED ORAL
Qty: 90 TABLET | Refills: 1 | Status: SHIPPED | OUTPATIENT
Start: 2024-12-31 | End: 2025-06-29

## 2024-12-31 RX ORDER — QUETIAPINE FUMARATE 25 MG/1
25 TABLET, FILM COATED ORAL
Qty: 90 TABLET | Refills: 3 | Status: SHIPPED | OUTPATIENT
Start: 2024-12-31

## 2024-12-31 RX ORDER — FUROSEMIDE 20 MG/1
20 TABLET ORAL 2 TIMES DAILY
Qty: 180 TABLET | Refills: 1 | Status: SHIPPED | OUTPATIENT
Start: 2024-12-31 | End: 2025-06-29

## 2024-12-31 RX ORDER — ERGOCALCIFEROL 1.25 MG/1
50000 CAPSULE, LIQUID FILLED ORAL WEEKLY
Qty: 12 CAPSULE | Refills: 3 | Status: SHIPPED | OUTPATIENT
Start: 2024-12-31

## 2024-12-31 RX ORDER — FUROSEMIDE 40 MG/1
20 TABLET ORAL 2 TIMES DAILY
COMMUNITY
Start: 2024-10-26 | End: 2024-12-31 | Stop reason: SDUPTHER

## 2024-12-31 RX ORDER — GABAPENTIN 100 MG/1
100 CAPSULE ORAL 3 TIMES DAILY
Qty: 270 CAPSULE | Refills: 1 | Status: SHIPPED | OUTPATIENT
Start: 2024-12-31 | End: 2025-06-29

## 2024-12-31 NOTE — PROGRESS NOTES
Transition of Care Visit  Name: Sona Valenzuela      : 11/3/1924      MRN: [id number]  Encounter Provider: FARZANA Stanley  Encounter Date: 2024   Encounter department: [facility]    Assessment & Plan  Ambulatory dysfunction  Patient brought in due to family report of ambulatory difficulties. Currently resides in an apartment  PT/OT [facility], CM on board for SNF rehab placement   Fall precautions       Chronic combined systolic and diastolic congestive heart failure (HCC)  Wt Readings from Last 3 Encounters:   24 62.6 kg (138 lb)   24 62.8 kg (138 lb 6.4 oz)   10/28/24 61.2 kg (135 lb)     Chronic HFpEF, compensated on presentation   Continue lasix 20 mg daily  Weights stable        Orders:  •  furosemide (LASIX) 20 mg tablet; Take 1 tablet (20 mg total) by mouth 2 (two) times a day    Paroxysmal A-fib (HCC)  Rate control/Anticoagulation: none   Orders:  •  furosemide (LASIX) 20 mg tablet; Take 1 tablet (20 mg total) by mouth 2 (two) times a day    Stage 3b chronic kidney disease (HCC)  Lab Results   Component Value Date    EGFR 38 2024    EGFR 35 10/28/2024    EGFR 33 2024    CREATININE 1.17 2024    CREATININE 1.26 10/28/2024    CREATININE 1.33 (H) 2024   Baseline Cr 1.3-1.4, eGFR typically around 31-34  Currently at baseline       Primary hypertension  Chronic, elevated on presentation; does not need tight control given age and fall risk  Continue lasix       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, psychotic disturbance, mood disturbance, or anxiety, unspecified timing of dementia onset (HCC)  Not insightful into where she is or why she is in the hospital is able to follow commands easily  Delirium precautions        Vitamin D deficiency    Orders:  •  ergocalciferol (VITAMIN D2) 50,000 units; Take 1 capsule (50,000 Units total) by mouth once a week    Chronic wrist pain, left    Orders:  •  Diclofenac Sodium  (VOLTAREN) 1 %; Apply 2 g topically 2 (two) times a day    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)    Orders:  •  gabapentin (NEURONTIN) 100 mg capsule; Take 1 capsule (100 mg total) by mouth 3 (three) times a day    Lumbar radiculopathy    Orders:  •  gabapentin (NEURONTIN) 100 mg capsule; Take 1 capsule (100 mg total) by mouth 3 (three) times a day    Vascular dementia with behavioral disturbance    Orders:  •  QUEtiapine (SEROquel) 25 mg tablet; Take 1 tablet (25 mg total) by mouth daily at bedtime    Vascular dementia, uncomplicated (HCC)    Orders:  •  mirtazapine (REMERON) 7.5 MG tablet; Take 1 tablet (7.5 mg total) by mouth daily at bedtime         History of Present Illness     Transitional Care Management Review:   Sona Valenzuela is a 89 y.o. female here for TCM follow up.     During the TCM phone call patient stated:  TCM Call     Date and time call was made  11/19/2019  2:31 PM    Hospital care reviewed  Records reviewed    Patient was hospitialized at  [facility]    Date of Admission  10/19/19    Date of discharge  10/22/19    Diagnosis  Closed compression fracture of L5 lumbar vertebra, initial encounter     Disposition  Home    Were the patients medications reviewed and updated  Yes    Current Symptoms  Back pain - right side    Back pain, right side, severity  Moderate    Back pain, right side, onset  Gradual      TCM Call     Post hospital issues  None    Scheduled for follow up?  Yes    Did you obtain your prescribed medications  Yes    Do you need help managing your prescriptions or medications  No    Is transportation to your appointment needed  No    I have advised the patient to call PCP with any new or worsening symptoms  Mai Kelly    Living Arrangements  Family members    Support System  Family    Have you fallen in the last 12 months  Yes    How many times  1    Interperter language line needed  No    Counseling  Family  [facility] Course:   "12/5-12/9  Brought in by family due to fall and chest pain, underlying dementia but living with family in an apartment, per H&P. Workup unremarkable here. Patient evaluated by therapy teams and is recommended for rehab, to which the family is agreeable. Hemodynamically stable for discharge today.     12/31/2024 Hospital Follow up   Patient here today for follow up and reports that she was sent to the [facility] [place] and she spent two weeks at the facility and was ready to come home and she is doing well and not having any present complaints. She has PT/OT coming to her home and is doing well.       Review of Systems   Constitutional:  Negative for activity change, appetite change, chills, diaphoresis, fatigue, fever and unexpected weight change.   HENT:  Negative for congestion, ear pain, hearing loss, postnasal drip, sinus pressure, sinus pain, sneezing and sore throat.    Eyes:  Negative for pain, redness and visual disturbance.   Respiratory:  Negative for cough and shortness of breath.    Cardiovascular:  Negative for chest pain and leg swelling.   Gastrointestinal:  Negative for abdominal pain, diarrhea, nausea and vomiting.   Endocrine: Negative.    Genitourinary: Negative.    Musculoskeletal:  Positive for gait problem. Negative for arthralgias.   Skin: Negative.    Allergic/Immunologic: Negative.    Neurological:  Negative for dizziness, light-headedness, numbness and headaches.   Hematological: Negative.    Psychiatric/Behavioral:  Negative for behavioral problems and dysphoric mood.      Objective   /86 (BP Location: Left arm, Patient Position: Sitting, Cuff Size: Adult)   Temp (!) 95.8 °F (35.4 °C)   Ht 5' 1\" (1.549 m)   Wt 62.6 kg (138 lb)   BMI 26.07 kg/m²     Physical Exam  Constitutional:       General: She is not in acute distress.     Appearance: She is well-developed.   HENT:      Head: Normocephalic and atraumatic.   Eyes:      Pupils: Pupils are equal, round, and reactive to light. "   Neck:      Thyroid: No thyromegaly.   Cardiovascular:      Rate and Rhythm: Normal rate and regular rhythm.      Heart sounds: Murmur heard.   Pulmonary:      Effort: Pulmonary effort is normal. No respiratory distress.      Breath sounds: Normal breath sounds. No wheezing.   Abdominal:      General: Bowel sounds are normal.      Palpations: Abdomen is soft.   Musculoskeletal:         General: Normal range of motion.      Cervical back: Normal range of motion.      Right lower leg: No edema.      Left lower leg: No edema.   Skin:     General: Skin is warm and dry.   Neurological:      Mental Status: She is alert and oriented to person, place, and time.       Medications have been reviewed by provider in current encounter

## 2024-12-31 NOTE — ASSESSMENT & PLAN NOTE
Lab Results   Component Value Date    EGFR 38 12/05/2024    EGFR 35 10/28/2024    EGFR 33 07/04/2024    CREATININE 1.17 12/05/2024    CREATININE 1.26 10/28/2024    CREATININE 1.33 (H) 07/04/2024   Baseline Cr 1.3-1.4, eGFR typically around 31-34  Currently at baseline

## 2024-12-31 NOTE — ASSESSMENT & PLAN NOTE
Wt Readings from Last 3 Encounters:   12/31/24 62.6 kg (138 lb)   12/05/24 62.8 kg (138 lb 6.4 oz)   10/28/24 61.2 kg (135 lb)     Chronic HFpEF, compensated on presentation   Continue lasix 20 mg daily  Weights stable        Orders:  •  furosemide (LASIX) 20 mg tablet; Take 1 tablet (20 mg total) by mouth 2 (two) times a day

## 2024-12-31 NOTE — ASSESSMENT & PLAN NOTE
Orders:  •  ergocalciferol (VITAMIN D2) 50,000 units; Take 1 capsule (50,000 Units total) by mouth once a week

## 2024-12-31 NOTE — ASSESSMENT & PLAN NOTE
Orders:  •  mirtazapine (REMERON) 7.5 MG tablet; Take 1 tablet (7.5 mg total) by mouth daily at bedtime

## 2024-12-31 NOTE — ASSESSMENT & PLAN NOTE
Rate control/Anticoagulation: none   Orders:  •  furosemide (LASIX) 20 mg tablet; Take 1 tablet (20 mg total) by mouth 2 (two) times a day

## 2025-01-01 ENCOUNTER — HOSPITAL ENCOUNTER (INPATIENT)
Facility: HOSPITAL | Age: OVER 89
LOS: 4 days | Discharge: HOSPICE | DRG: 291 | End: 2025-08-06
Attending: EMERGENCY MEDICINE | Admitting: INTERNAL MEDICINE
Payer: MEDICARE

## 2025-01-01 ENCOUNTER — APPOINTMENT (INPATIENT)
Dept: CT IMAGING | Facility: HOSPITAL | Age: OVER 89
DRG: 291 | End: 2025-01-01
Payer: MEDICARE

## 2025-01-01 ENCOUNTER — APPOINTMENT (INPATIENT)
Dept: VASCULAR ULTRASOUND | Facility: HOSPITAL | Age: OVER 89
DRG: 291 | End: 2025-01-01
Payer: MEDICARE

## 2025-01-01 ENCOUNTER — APPOINTMENT (INPATIENT)
Dept: RADIOLOGY | Facility: HOSPITAL | Age: OVER 89
DRG: 291 | End: 2025-01-01
Payer: MEDICARE

## 2025-01-01 ENCOUNTER — APPOINTMENT (EMERGENCY)
Dept: RADIOLOGY | Facility: HOSPITAL | Age: OVER 89
DRG: 291 | End: 2025-01-01
Payer: MEDICARE

## 2025-01-03 ENCOUNTER — TELEPHONE (OUTPATIENT)
Age: OVER 89
End: 2025-01-03

## 2025-01-03 NOTE — TELEPHONE ENCOUNTER
Phone call from Gloria with [facility] stating she needs a copy of patient's discharge summary faxed to her at [phone]. She states patients JAMES Layne states she never got a copy from hospital.  Please advise. Thank you.    used

## 2025-01-09 ENCOUNTER — VBI (OUTPATIENT)
Dept: ADMINISTRATIVE | Facility: OTHER | Age: OVER 89
End: 2025-01-09

## 2025-01-09 NOTE — TELEPHONE ENCOUNTER
01/09/25 3:35 PM     Chart reviewed for  ; nothing is submitted to the patient's insurance at this time.     Vilma Malave   [facility]

## 2025-01-13 ENCOUNTER — TELEPHONE (OUTPATIENT)
Age: OVER 89
End: 2025-01-13

## 2025-01-13 NOTE — TELEPHONE ENCOUNTER
Patient elisa would like new script for a mattress for the patients hospital bed sent to a DME. Please advise elisa if this is possible at [phone]

## 2025-01-14 LAB
DME PARACHUTE DELIVERY DATE ACTUAL: NORMAL
DME PARACHUTE DELIVERY DATE EXPECTED: NORMAL
DME PARACHUTE DELIVERY DATE REQUESTED: NORMAL
DME PARACHUTE ITEM DESCRIPTION: NORMAL
DME PARACHUTE ITEM DESCRIPTION: NORMAL
DME PARACHUTE ORDER STATUS: NORMAL
DME PARACHUTE SUPPLIER NAME: NORMAL
DME PARACHUTE SUPPLIER PHONE: NORMAL

## 2025-01-16 ENCOUNTER — TELEPHONE (OUTPATIENT)
Age: OVER 89
End: 2025-01-16

## 2025-01-16 NOTE — TELEPHONE ENCOUNTER
Received a call from Norman Hamlin-PT with [facility]. He wanted to infor PCP that he visited the patient today for her routine PT session at home and noticed the patient seems to have an upper respiratory infection. He states that she was coughing (dry cough)     Sputum has no color, He states that all vitals were fine. No fever reported.     Norman states that patients niece who lives with the patient has an upper respiratory infection and was given antibiotics.     He wanted to inform incase PCP wants to follow the same course of action for the patient or wait until other symptoms are present.       PT will continue the routine schedule of 1 x weekly.       Norman can be reached at [phone]

## 2025-01-17 ENCOUNTER — OFFICE VISIT (OUTPATIENT)
Dept: FAMILY MEDICINE CLINIC | Facility: CLINIC | Age: OVER 89
End: 2025-01-17
Payer: COMMERCIAL

## 2025-01-17 VITALS
TEMPERATURE: 97.8 F | WEIGHT: 142.6 LBS | DIASTOLIC BLOOD PRESSURE: 86 MMHG | BODY MASS INDEX: 26.92 KG/M2 | HEART RATE: 88 BPM | SYSTOLIC BLOOD PRESSURE: 130 MMHG | HEIGHT: 61 IN | OXYGEN SATURATION: 95 %

## 2025-01-17 DIAGNOSIS — G30.9 MODERATE ALZHEIMER'S DEMENTIA WITHOUT BEHAVIORAL DISTURBANCE, PSYCHOTIC DISTURBANCE, MOOD DISTURBANCE, OR ANXIETY, UNSPECIFIED TIMING OF DEMENTIA ONSET (HCC): ICD-10-CM

## 2025-01-17 DIAGNOSIS — F01.518 VASCULAR DEMENTIA WITH BEHAVIORAL DISTURBANCE (HCC): ICD-10-CM

## 2025-01-17 DIAGNOSIS — J01.00 ACUTE NON-RECURRENT MAXILLARY SINUSITIS: Primary | ICD-10-CM

## 2025-01-17 DIAGNOSIS — E44.1 MILD PROTEIN-CALORIE MALNUTRITION (HCC): ICD-10-CM

## 2025-01-17 DIAGNOSIS — I50.33 ACUTE ON CHRONIC HEART FAILURE WITH PRESERVED EJECTION FRACTION (HCC): ICD-10-CM

## 2025-01-17 DIAGNOSIS — I50.42 CHRONIC COMBINED SYSTOLIC AND DIASTOLIC CONGESTIVE HEART FAILURE (HCC): ICD-10-CM

## 2025-01-17 DIAGNOSIS — R05.9 COUGH, UNSPECIFIED TYPE: ICD-10-CM

## 2025-01-17 DIAGNOSIS — N18.4 CHRONIC KIDNEY DISEASE, STAGE 4 (SEVERE) (HCC): ICD-10-CM

## 2025-01-17 DIAGNOSIS — R68.89 CONGESTION OF THROAT: ICD-10-CM

## 2025-01-17 DIAGNOSIS — I48.0 PAROXYSMAL A-FIB (HCC): ICD-10-CM

## 2025-01-17 DIAGNOSIS — F33.0 MILD EPISODE OF RECURRENT MAJOR DEPRESSIVE DISORDER (HCC): ICD-10-CM

## 2025-01-17 DIAGNOSIS — N18.32 STAGE 3B CHRONIC KIDNEY DISEASE (HCC): ICD-10-CM

## 2025-01-17 DIAGNOSIS — F02.B0 MODERATE ALZHEIMER'S DEMENTIA WITHOUT BEHAVIORAL DISTURBANCE, PSYCHOTIC DISTURBANCE, MOOD DISTURBANCE, OR ANXIETY, UNSPECIFIED TIMING OF DEMENTIA ONSET (HCC): ICD-10-CM

## 2025-01-17 LAB
SARS-COV-2 AG UPPER RESP QL IA: NEGATIVE
SL AMB POCT RAPID FLU A: NEGATIVE
SL AMB POCT RAPID FLU B: NEGATIVE
VALID CONTROL: NORMAL

## 2025-01-17 PROCEDURE — G2211 COMPLEX E/M VISIT ADD ON: HCPCS | Performed by: NURSE PRACTITIONER

## 2025-01-17 PROCEDURE — 87804 INFLUENZA ASSAY W/OPTIC: CPT | Performed by: NURSE PRACTITIONER

## 2025-01-17 PROCEDURE — 99213 OFFICE O/P EST LOW 20 MIN: CPT | Performed by: NURSE PRACTITIONER

## 2025-01-17 PROCEDURE — 87811 SARS-COV-2 COVID19 W/OPTIC: CPT | Performed by: NURSE PRACTITIONER

## 2025-01-17 RX ORDER — AZITHROMYCIN 250 MG/1
TABLET, FILM COATED ORAL
Qty: 6 TABLET | Refills: 0 | Status: SHIPPED | OUTPATIENT
Start: 2025-01-17 | End: 2025-01-21

## 2025-01-17 NOTE — ASSESSMENT & PLAN NOTE
Lab Results   Component Value Date    EGFR 38 12/05/2024    EGFR 35 10/28/2024    EGFR 33 07/04/2024    CREATININE 1.17 12/05/2024    CREATININE 1.26 10/28/2024    CREATININE 1.33 (H) 07/04/2024   Patient is at Stable on the recent labs stage 3B

## 2025-01-17 NOTE — ASSESSMENT & PLAN NOTE
Lab Results   Component Value Date    EGFR 38 12/05/2024    EGFR 35 10/28/2024    EGFR 33 07/04/2024    CREATININE 1.17 12/05/2024    CREATININE 1.26 10/28/2024    CREATININE 1.33 (H) 07/04/2024   Stable CKD

## 2025-01-17 NOTE — ASSESSMENT & PLAN NOTE
Wt Readings from Last 3 Encounters:   01/17/25 64.7 kg (142 lb 9.6 oz)   12/31/24 62.6 kg (138 lb)   12/05/24 62.8 kg (138 lb 6.4 oz)         Stable and no acute complaints 20 lasix bid

## 2025-01-17 NOTE — ASSESSMENT & PLAN NOTE
Orders:  •  azithromycin (ZITHROMAX) 250 mg tablet; Take 2 tablets today then 1 tablet daily x 4 days

## 2025-01-17 NOTE — ASSESSMENT & PLAN NOTE
Orders:  •  POCT rapid flu A and B  •  azithromycin (ZITHROMAX) 250 mg tablet; Take 2 tablets today then 1 tablet daily x 4 days

## 2025-01-17 NOTE — PROGRESS NOTES
Name: Sona Valenzuela      : 11/3/1924      MRN: [id number]  Encounter Provider: FARZANA Stanley  Encounter Date: 2025   Encounter department: [facility]  :  Assessment & Plan  Congestion of throat    Orders:  •  POCT Rapid Covid Ag    Cough, unspecified type    Orders:  •  POCT rapid flu A and B  •  azithromycin (ZITHROMAX) 250 mg tablet; Take 2 tablets today then 1 tablet daily x 4 days    Acute non-recurrent maxillary sinusitis    Orders:  •  azithromycin (ZITHROMAX) 250 mg tablet; Take 2 tablets today then 1 tablet daily x 4 days    Chronic kidney disease, stage 4 (severe) ([facility])  Lab Results   Component Value Date    EGFR 38 2024    EGFR 35 10/28/2024    EGFR 33 2024    CREATININE 1.17 2024    CREATININE 1.26 10/28/2024    CREATININE 1.33 (H) 2024   Patient is at Stable on the recent labs stage 3B         Vascular dementia with behavioral disturbance (HCC)       stable and not changing Taking the Seroquel and remeron   Mild protein-calorie malnutrition (HCC)       good appetite has gained some weight   Acute on chronic heart failure with preserved ejection fraction (HCC)  Wt Readings from Last 3 Encounters:   25 64.7 kg (142 lb 9.6 oz)   24 62.6 kg (138 lb)   24 62.8 kg (138 lb 6.4 oz)         Stable and no acute complaints 20 lasix bid            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, psychotic disturbance, mood disturbance, or anxiety, unspecified timing of dementia onset (HCC)       stable and is at home with family   Chronic combined systolic and diastolic congestive heart failure (HCC)  Wt Readings from Last 3 Encounters:   25 64.7 kg (142 lb 9.6 oz)   24 62.6 kg (138 lb)   24 62.8 kg (138 lb 6.4 oz)       Appearing to be euvolemic              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HCC)  Doing well          Paroxysmal A-fib (HCC)       RRR today in office   Stage 3b  "chronic kidney disease (HCC)  Lab Results   Component Value Date    EGFR 38 12/05/2024    EGFR 35 10/28/2024    EGFR 33 07/04/2024    CREATININE 1.17 12/05/2024    CREATININE 1.26 10/28/2024    CREATININE 1.33 (H) 07/04/2024   Stable CKD              History of Present Illness     Patient here today for her sick symptoms started about 5 days now having symptoms of having some sinus congestion and coughing taking the tylenol and nyquil and other family members are sick as well.       Review of Systems   Constitutional:  Negative for activity change, appetite change, chills, diaphoresis, fatigue, fever and unexpected weight change.   HENT:  Positive for congestion, postnasal drip, rhinorrhea, sinus pressure and sinus pain. Negative for ear pain, hearing loss, sneezing and sore throat.    Eyes:  Negative for pain, redness and visual disturbance.   Respiratory:  Positive for cough. Negative for shortness of breath.    Cardiovascular:  Negative for chest pain and leg swelling.   Gastrointestinal:  Negative for abdominal pain, diarrhea, nausea and vomiting.   Endocrine: Negative.    Genitourinary: Negative.    Musculoskeletal:  Negative for arthralgias.   Skin: Negative.    Allergic/Immunologic: Negative.    Neurological:  Negative for dizziness and light-headedness.   Hematological: Negative.    Psychiatric/Behavioral:  Negative for behavioral problems and dysphoric mood.        Objective   /86 (BP Location: Left arm, Patient Position: Sitting)   Pulse 88   Temp 97.8 °F (36.6 °C) (Temporal)   Ht 5' 1\" (1.549 m)   Wt 64.7 kg (142 lb 9.6 oz)   SpO2 95%   BMI 26.94 kg/m²      Physical Exam  Vitals and nursing note reviewed.   Constitutional:       Appearance: Normal appearance. She is well-developed and well-groomed.   HENT:      Right Ear: Tympanic membrane normal.      Left Ear: Tympanic membrane normal.      Nose: Congestion present.      Right Sinus: Maxillary sinus tenderness present.      Left Sinus: " Maxillary sinus tenderness present.      Mouth/Throat:      Lips: Pink.      Mouth: Mucous membranes are moist.   Eyes:      General: Lids are normal.   Cardiovascular:      Rate and Rhythm: Normal rate and regular rhythm.      Heart sounds: Normal heart sounds, S1 normal and S2 normal.   Pulmonary:      Effort: Pulmonary effort is normal.      Breath sounds: Rhonchi present.   Musculoskeletal:      Cervical back: Full passive range of motion without pain.   Neurological:      Mental Status: She is alert.   Psychiatric:         Behavior: Behavior is cooperative.

## 2025-01-17 NOTE — ASSESSMENT & PLAN NOTE
Wt Readings from Last 3 Encounters:   01/17/25 64.7 kg (142 lb 9.6 oz)   12/31/24 62.6 kg (138 lb)   12/05/24 62.8 kg (138 lb 6.4 oz)       Appearing to be euvolemic

## 2025-02-16 PROBLEM — R05.9 COUGH: Status: RESOLVED | Noted: 2025-01-17 | Resolved: 2025-02-16

## 2025-02-16 PROBLEM — J01.00 ACUTE NON-RECURRENT MAXILLARY SINUSITIS: Status: RESOLVED | Noted: 2025-01-17 | Resolved: 2025-02-16

## 2025-03-03 ENCOUNTER — TELEPHONE (OUTPATIENT)
Dept: FAMILY MEDICINE CLINIC | Facility: CLINIC | Age: OVER 89
End: 2025-03-03
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
## 2025-03-04 ENCOUNTER — HOSPITAL ENCOUNTER (INPATIENT)
Facility: HOSPITAL | Age: OVER 89
LOS: 9 days | Discharge: NURSING, REHAB OR LONG-TERM CARE | DRG: 101 | End: 2025-03-13
Attending: EMERGENCY MEDICINE | Admitting: HOSPITALIST
Payer: COMMERCIAL

## 2025-03-04 ENCOUNTER — APPOINTMENT (EMERGENCY)
Dept: CT IMAGING | Facility: HOSPITAL | Age: OVER 89
DRG: 101 | End: 2025-03-04
Payer: COMMERCIAL

## 2025-03-04 ENCOUNTER — APPOINTMENT (EMERGENCY)
Dept: RADIOLOGY | Facility: HOSPITAL | Age: OVER 89
DRG: 101 | End: 2025-03-04
Payer: COMMERCIAL

## 2025-03-04 DIAGNOSIS — R79.89 ELEVATED TROPONIN: Primary | ICD-10-CM

## 2025-03-04 DIAGNOSIS — I13.0 HYPERTENSIVE HEART AND RENAL DISEASE WITH CONGESTIVE HEART FAILURE (HCC): ICD-10-CM

## 2025-03-04 DIAGNOSIS — R56.9 SEIZURE-LIKE ACTIVITY (HCC): ICD-10-CM

## 2025-03-04 DIAGNOSIS — I48.20 CHRONIC ATRIAL FIBRILLATION (HCC): ICD-10-CM

## 2025-03-04 LAB
2HR DELTA HS TROPONIN: -41 NG/L
4HR DELTA HS TROPONIN: -81 NG/L
ALBUMIN SERPL BCG-MCNC: 3.7 G/DL (ref 3.5–5)
ALP SERPL-CCNC: 123 U/L (ref 34–104)
ALT SERPL W P-5'-P-CCNC: 19 U/L (ref 7–52)
AMMONIA PLAS-SCNC: 19 UMOL/L (ref 18–72)
ANION GAP SERPL CALCULATED.3IONS-SCNC: 8 MMOL/L (ref 4–13)
APTT PPP: 28 SECONDS (ref 23–34)
AST SERPL W P-5'-P-CCNC: 30 U/L (ref 13–39)
ATRIAL RATE: 125 BPM
BACTERIA UR QL AUTO: ABNORMAL /HPF
BASOPHILS # BLD AUTO: 0.03 THOUSANDS/ÂΜL (ref 0–0.1)
BASOPHILS NFR BLD AUTO: 0 % (ref 0–1)
BILIRUB SERPL-MCNC: 0.82 MG/DL (ref 0.2–1)
BILIRUB UR QL STRIP: NEGATIVE
BNP SERPL-MCNC: 643 PG/ML (ref 0–100)
BUN SERPL-MCNC: 34 MG/DL (ref 5–25)
CALCIUM SERPL-MCNC: 8.3 MG/DL (ref 8.4–10.2)
CARDIAC TROPONIN I PNL SERPL HS: 659 NG/L (ref ?–50)
CARDIAC TROPONIN I PNL SERPL HS: 699 NG/L (ref ?–50)
CARDIAC TROPONIN I PNL SERPL HS: 740 NG/L (ref ?–50)
CHLORIDE SERPL-SCNC: 109 MMOL/L (ref 96–108)
CLARITY UR: CLEAR
CO2 SERPL-SCNC: 25 MMOL/L (ref 21–32)
COLOR UR: YELLOW
CREAT SERPL-MCNC: 1.32 MG/DL (ref 0.6–1.3)
EOSINOPHIL # BLD AUTO: 0.08 THOUSAND/ÂΜL (ref 0–0.61)
EOSINOPHIL NFR BLD AUTO: 1 % (ref 0–6)
ERYTHROCYTE [DISTWIDTH] IN BLOOD BY AUTOMATED COUNT: 18.1 % (ref 11.6–15.1)
FERRITIN SERPL-MCNC: 5 NG/ML (ref 11–307)
GFR SERPL CREATININE-BSD FRML MDRD: 33 ML/MIN/1.73SQ M
GLUCOSE SERPL-MCNC: 139 MG/DL (ref 65–140)
GLUCOSE UR STRIP-MCNC: NEGATIVE MG/DL
HCT VFR BLD AUTO: 30.4 % (ref 34.8–46.1)
HGB BLD-MCNC: 9.1 G/DL (ref 11.5–15.4)
HGB UR QL STRIP.AUTO: NEGATIVE
IMM GRANULOCYTES # BLD AUTO: 0.03 THOUSAND/UL (ref 0–0.2)
IMM GRANULOCYTES NFR BLD AUTO: 0 % (ref 0–2)
INR PPP: 1.2 (ref 0.85–1.19)
IRON SATN MFR SERPL: 3 % (ref 15–50)
IRON SERPL-MCNC: 13 UG/DL (ref 50–212)
KETONES UR STRIP-MCNC: ABNORMAL MG/DL
LACTATE SERPL-SCNC: 1.8 MMOL/L (ref 0.5–2)
LACTATE SERPL-SCNC: 2.1 MMOL/L (ref 0.5–2)
LEUKOCYTE ESTERASE UR QL STRIP: ABNORMAL
LYMPHOCYTES # BLD AUTO: 0.54 THOUSANDS/ÂΜL (ref 0.6–4.47)
LYMPHOCYTES NFR BLD AUTO: 7 % (ref 14–44)
MCH RBC QN AUTO: 22.6 PG (ref 26.8–34.3)
MCHC RBC AUTO-ENTMCNC: 29.9 G/DL (ref 31.4–37.4)
MCV RBC AUTO: 75 FL (ref 82–98)
MONOCYTES # BLD AUTO: 0.83 THOUSAND/ÂΜL (ref 0.17–1.22)
MONOCYTES NFR BLD AUTO: 11 % (ref 4–12)
NEUTROPHILS # BLD AUTO: 6.41 THOUSANDS/ÂΜL (ref 1.85–7.62)
NEUTS SEG NFR BLD AUTO: 81 % (ref 43–75)
NITRITE UR QL STRIP: POSITIVE
NON-SQ EPI CELLS URNS QL MICRO: ABNORMAL /HPF
NRBC BLD AUTO-RTO: 0 /100 WBCS
PH UR STRIP.AUTO: 6 [PH]
PLATELET # BLD AUTO: 302 THOUSANDS/UL (ref 149–390)
PMV BLD AUTO: 10 FL (ref 8.9–12.7)
POTASSIUM SERPL-SCNC: 4 MMOL/L (ref 3.5–5.3)
PROCALCITONIN SERPL-MCNC: 0.46 NG/ML
PROT SERPL-MCNC: 6.8 G/DL (ref 6.4–8.4)
PROT UR STRIP-MCNC: ABNORMAL MG/DL
PROTHROMBIN TIME: 16 SECONDS (ref 12.3–15)
QRS AXIS: 76 DEGREES
QRSD INTERVAL: 110 MS
QT INTERVAL: 394 MS
QTC INTERVAL: 484 MS
RBC # BLD AUTO: 4.03 MILLION/UL (ref 3.81–5.12)
RBC #/AREA URNS AUTO: ABNORMAL /HPF
SODIUM SERPL-SCNC: 142 MMOL/L (ref 135–147)
SP GR UR STRIP.AUTO: 1.02 (ref 1–1.03)
T WAVE AXIS: 90 DEGREES
TIBC SERPL-MCNC: 450.8 UG/DL (ref 250–450)
TRANSFERRIN SERPL-MCNC: 322 MG/DL (ref 203–362)
UIBC SERPL-MCNC: 438 UG/DL (ref 155–355)
UROBILINOGEN UR STRIP-ACNC: <2 MG/DL
VENTRICULAR RATE: 91 BPM
WBC # BLD AUTO: 7.92 THOUSAND/UL (ref 4.31–10.16)
WBC #/AREA URNS AUTO: ABNORMAL /HPF

## 2025-03-04 PROCEDURE — 83880 ASSAY OF NATRIURETIC PEPTIDE: CPT | Performed by: EMERGENCY MEDICINE

## 2025-03-04 PROCEDURE — 83540 ASSAY OF IRON: CPT | Performed by: STUDENT IN AN ORGANIZED HEALTH CARE EDUCATION/TRAINING PROGRAM

## 2025-03-04 PROCEDURE — 87040 BLOOD CULTURE FOR BACTERIA: CPT | Performed by: EMERGENCY MEDICINE

## 2025-03-04 PROCEDURE — 84145 PROCALCITONIN (PCT): CPT | Performed by: EMERGENCY MEDICINE

## 2025-03-04 PROCEDURE — 81001 URINALYSIS AUTO W/SCOPE: CPT | Performed by: EMERGENCY MEDICINE

## 2025-03-04 PROCEDURE — 83550 IRON BINDING TEST: CPT | Performed by: STUDENT IN AN ORGANIZED HEALTH CARE EDUCATION/TRAINING PROGRAM

## 2025-03-04 PROCEDURE — 93010 ELECTROCARDIOGRAM REPORT: CPT | Performed by: INTERNAL MEDICINE

## 2025-03-04 PROCEDURE — 82728 ASSAY OF FERRITIN: CPT | Performed by: STUDENT IN AN ORGANIZED HEALTH CARE EDUCATION/TRAINING PROGRAM

## 2025-03-04 PROCEDURE — 36415 COLL VENOUS BLD VENIPUNCTURE: CPT | Performed by: EMERGENCY MEDICINE

## 2025-03-04 PROCEDURE — 70450 CT HEAD/BRAIN W/O DYE: CPT

## 2025-03-04 PROCEDURE — 96360 HYDRATION IV INFUSION INIT: CPT

## 2025-03-04 PROCEDURE — 87181 SC STD AGAR DILUTION PER AGT: CPT | Performed by: EMERGENCY MEDICINE

## 2025-03-04 PROCEDURE — 84484 ASSAY OF TROPONIN QUANT: CPT | Performed by: EMERGENCY MEDICINE

## 2025-03-04 PROCEDURE — 87186 SC STD MICRODIL/AGAR DIL: CPT | Performed by: EMERGENCY MEDICINE

## 2025-03-04 PROCEDURE — 85025 COMPLETE CBC W/AUTO DIFF WBC: CPT | Performed by: EMERGENCY MEDICINE

## 2025-03-04 PROCEDURE — 85730 THROMBOPLASTIN TIME PARTIAL: CPT | Performed by: EMERGENCY MEDICINE

## 2025-03-04 PROCEDURE — 99223 1ST HOSP IP/OBS HIGH 75: CPT | Performed by: STUDENT IN AN ORGANIZED HEALTH CARE EDUCATION/TRAINING PROGRAM

## 2025-03-04 PROCEDURE — 83605 ASSAY OF LACTIC ACID: CPT | Performed by: EMERGENCY MEDICINE

## 2025-03-04 PROCEDURE — 87077 CULTURE AEROBIC IDENTIFY: CPT | Performed by: EMERGENCY MEDICINE

## 2025-03-04 PROCEDURE — 85610 PROTHROMBIN TIME: CPT | Performed by: EMERGENCY MEDICINE

## 2025-03-04 PROCEDURE — 71045 X-RAY EXAM CHEST 1 VIEW: CPT

## 2025-03-04 PROCEDURE — 99285 EMERGENCY DEPT VISIT HI MDM: CPT

## 2025-03-04 PROCEDURE — 71275 CT ANGIOGRAPHY CHEST: CPT

## 2025-03-04 PROCEDURE — 87086 URINE CULTURE/COLONY COUNT: CPT | Performed by: EMERGENCY MEDICINE

## 2025-03-04 PROCEDURE — 82140 ASSAY OF AMMONIA: CPT | Performed by: EMERGENCY MEDICINE

## 2025-03-04 PROCEDURE — 93005 ELECTROCARDIOGRAM TRACING: CPT

## 2025-03-04 PROCEDURE — 80053 COMPREHEN METABOLIC PANEL: CPT | Performed by: EMERGENCY MEDICINE

## 2025-03-04 RX ORDER — OLANZAPINE 10 MG/2ML
2.5 INJECTION, POWDER, FOR SOLUTION INTRAMUSCULAR ONCE
Status: COMPLETED | OUTPATIENT
Start: 2025-03-04 | End: 2025-03-04

## 2025-03-04 RX ORDER — WATER 10 ML/10ML
INJECTION INTRAMUSCULAR; INTRAVENOUS; SUBCUTANEOUS
Status: COMPLETED
Start: 2025-03-04 | End: 2025-03-04

## 2025-03-04 RX ORDER — HEPARIN SODIUM 5000 [USP'U]/ML
5000 INJECTION, SOLUTION INTRAVENOUS; SUBCUTANEOUS EVERY 8 HOURS SCHEDULED
Status: DISCONTINUED | OUTPATIENT
Start: 2025-03-04 | End: 2025-03-13 | Stop reason: HOSPADM

## 2025-03-04 RX ORDER — GABAPENTIN 100 MG/1
100 CAPSULE ORAL 3 TIMES DAILY
Status: DISCONTINUED | OUTPATIENT
Start: 2025-03-04 | End: 2025-03-13 | Stop reason: HOSPADM

## 2025-03-04 RX ORDER — FUROSEMIDE 20 MG/1
20 TABLET ORAL 2 TIMES DAILY
Status: DISCONTINUED | OUTPATIENT
Start: 2025-03-04 | End: 2025-03-06

## 2025-03-04 RX ORDER — MIRTAZAPINE 15 MG/1
7.5 TABLET, FILM COATED ORAL
Status: DISCONTINUED | OUTPATIENT
Start: 2025-03-04 | End: 2025-03-13 | Stop reason: HOSPADM

## 2025-03-04 RX ORDER — QUETIAPINE FUMARATE 25 MG/1
25 TABLET, FILM COATED ORAL
Status: DISCONTINUED | OUTPATIENT
Start: 2025-03-04 | End: 2025-03-13 | Stop reason: HOSPADM

## 2025-03-04 RX ADMIN — HEPARIN SODIUM 5000 UNITS: 5000 INJECTION INTRAVENOUS; SUBCUTANEOUS at 21:03

## 2025-03-04 RX ADMIN — OLANZAPINE 2.5 MG: 10 INJECTION, POWDER, FOR SOLUTION INTRAMUSCULAR at 22:19

## 2025-03-04 RX ADMIN — HEPARIN SODIUM 5000 UNITS: 5000 INJECTION INTRAVENOUS; SUBCUTANEOUS at 17:37

## 2025-03-04 RX ADMIN — MIRTAZAPINE 7.5 MG: 15 TABLET, FILM COATED ORAL at 21:02

## 2025-03-04 RX ADMIN — GABAPENTIN 100 MG: 100 CAPSULE ORAL at 21:02

## 2025-03-04 RX ADMIN — WATER 10 ML: 1 INJECTION, SOLUTION INTRAMUSCULAR; INTRAVENOUS; SUBCUTANEOUS at 22:20

## 2025-03-04 RX ADMIN — CEFTRIAXONE SODIUM 1000 MG: 10 INJECTION, POWDER, FOR SOLUTION INTRAVENOUS at 17:37

## 2025-03-04 RX ADMIN — IOHEXOL 85 ML: 350 INJECTION, SOLUTION INTRAVENOUS at 14:07

## 2025-03-04 RX ADMIN — SODIUM CHLORIDE 500 ML: 0.9 INJECTION, SOLUTION INTRAVENOUS at 11:52

## 2025-03-04 RX ADMIN — QUETIAPINE FUMARATE 25 MG: 25 TABLET ORAL at 21:02

## 2025-03-04 NOTE — ASSESSMENT & PLAN NOTE
Troponin 740/699/659  Patient without any chest pain.  EKG without acute ischemic changes.    Plan  Monitor on telemetry  Echocardiogram

## 2025-03-04 NOTE — ASSESSMENT & PLAN NOTE
Patient has chronic A-fib.  Rate controlled.  Not on any blood thinners due to dementia and fall risk.

## 2025-03-04 NOTE — ASSESSMENT & PLAN NOTE
Wt Readings from Last 3 Encounters:   03/04/25 70.3 kg (154 lb 15.7 oz)   01/17/25 64.7 kg (142 lb 9.6 oz)   12/31/24 62.6 kg (138 lb)       Euvolemic on examination.    Plan  Continue Lasix 20 mg twice daily  SCOTT, daily weight  Fluid restriction

## 2025-03-04 NOTE — ASSESSMENT & PLAN NOTE
Patient presents with 1 episode of slurred speech, slowness followed by body stiffening, not responding, unable to talk for 3 minutes.  No documented prior episode or seizures.  Patient is afebrile.  A and O x 2 secondary to dementia.  Has a UTI but no other evidence of infection or neck stiffness.  Neurological examination without focal neurological deficits.    Plan  Monitor on telemetry  Check orthostatic vitals  MRI brain  EEG  Echocardiogram  Seizure precautions  Fall precautions

## 2025-03-04 NOTE — ASSESSMENT & PLAN NOTE
Patient having lower abdominal pressure and discomfort.  UA significant for any minimal WBC and bacteria.    Plan  Start ceftriaxone  Follow-up urine culture

## 2025-03-04 NOTE — H&P
H&P - Hospitalist   Name: Sona Valenzuela 89 y.o. female I MRN: [id number]  Unit/Bed#: ED 21 I Date of Admission: 3/4/2025   Date of Service: 3/4/2025 I Hospital Day: 0     Assessment & Plan  Seizure-like activity (HCC)  Patient presents with 1 episode of slurred speech, slowness followed by body stiffening, not responding, unable to talk for 3 minutes.  No documented prior episode or seizures.  Patient is afebrile.  A and O x 2 secondary to dementia.  Has a UTI but no other evidence of infection or neck stiffness.  Neurological examination without focal neurological deficits.    Plan  Monitor on telemetry  Check orthostatic vitals  MRI brain  EEG  Echocardiogram  Seizure precautions  Fall precautions  UTI (urinary tract infection)  Patient having lower abdominal pressure and discomfort.  UA significant for any minimal WBC and bacteria.    Plan  Start ceftriaxone  Follow-up urine culture  Elevated troponin  Troponin 740/699/659  Patient without any chest pain.  EKG without acute ischemic changes.    Plan  Monitor on telemetry  Echocardiogram  Chronic atrial fibrillation (HCC)  Patient has chronic A-fib.  Rate controlled.  Not on any blood thinners due to dementia and fall risk.  Chronic congestive heart failure (HCC)  Wt Readings from Last 3 Encounters:   03/04/25 70.3 kg (154 lb 15.7 oz)   01/17/25 64.7 kg (142 lb 9.6 oz)   12/31/24 62.6 kg (138 lb)       Euvolemic on examination.    Plan  Continue Lasix 20 mg twice daily  SCOTT, daily weight  Fluid restriction    Vascular dementia, uncomplicated (HCC)  Patient is A and O x 2.    Plan  Delirium precautions  Primary hypertension  Plan  Continue Lasix  Stage 3b chronic kidney disease (HCC)  Lab Results   Component Value Date    EGFR 33 03/04/2025    EGFR 38 12/05/2024    EGFR 35 10/28/2024    CREATININE 1.32 (H) 03/04/2025    CREATININE 1.17 12/05/2024    CREATININE 1.26 10/28/2024     Plan  Monitor creatinine  Avoid nephrotoxins      VTE Pharmacologic Prophylaxis:    Moderate Risk (Score 3-4) - Pharmacological DVT Prophylaxis Ordered: heparin.  Code Status: Level 3 - DNAR and DNI   Discussion with family: Updated  (niece) via phone.    Anticipated Length of Stay: Patient will be admitted on an inpatient basis with an anticipated length of stay of greater than 2 midnights secondary to seizure like activity need workup for syncope.    History of Present Illness   Chief Complaint: Seizure-like activity    Sona Valenzuela is a 89 y.o. female with a PMH of CHF, chronic A-fib, CKD-4, dementia, HTN, lung cancer who presents with an episode of seizure-like activity.  Patient has dementia, could not recall past events.  History obtained from ED physician's note.    Patient was having lower abdominal pressure and discomfort and patient was going to message her PCP.  During that patient developed an episode of slurred speech, slowness, body stiffness, unresponsiveness which lasted for about 3 minutes and then recovered.    Upon my evaluation patient denies any pain, discomfort.  Having mild headache.  Appears comfortable.    Review of Systems   Constitutional:  Negative for chills and fever.   HENT:  Negative for ear pain and sore throat.    Eyes:  Negative for pain and visual disturbance.   Respiratory:  Negative for cough and shortness of breath.    Cardiovascular:  Negative for chest pain and palpitations.   Gastrointestinal:  Positive for abdominal pain (lower). Negative for vomiting.   Genitourinary:  Negative for dysuria and hematuria.   Musculoskeletal:  Negative for arthralgias and back pain.   Skin:  Negative for color change and rash.   Neurological:  Positive for headaches.   All other systems reviewed and are negative.      Historical Information   Past Medical History:   Diagnosis Date    Ankle fracture     Arthritis     left hip    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HCC)     with left ureter    Bronchitis     Cancer (HCC)     Carcinoma, lung (HCC)     Chronic kidney disease, stage  4 (severe) (HCC) 01/17/2025    Dementia (HCC)     Dependent edema     Depression     Diabetes mellitus (HCC)     Dizziness 12/13/2021    Hypercholesterolemia     Hypertension     Kidney stone     Oth abn and inconclusive findings on dx imaging of breast     Pes planus     Renal calculus     Restless leg syndrome     Rib pain     Sprain, neck     Varicose veins of left lower extremity      Past Surgical History:   Procedure Laterality Date    APPENDECTOMY      LUNG CANCER SURGERY      US BREAST CYST ASPIRATION LEFT INITIAL Left 3/23/2018     Social History     Tobacco Use    Smoking status: Former     Current packs/day: 0.50     Average packs/day: 0.5 packs/day for 20.0 years (10.0 ttl pk-yrs)     Types: Cigarettes    Smokeless tobacco: Never   Vaping Use    Vaping status: Never Used   Substance and Sexual Activity    Alcohol use: Never    Drug use: Never    Sexual activity: Not Currently     Birth control/protection: Post-menopausal     E-Cigarette/Vaping    E-Cigarette Use Never User      E-Cigarette/Vaping Substances    Nicotine No     THC No     CBD No     Flavoring No     Other No     Unknown No      Family History   Problem Relation Age of Onset    No Known Problems Mother     No Known Problems Father     Dementia Brother     Breast cancer Neg Hx     Colon cancer Neg Hx     Ovarian cancer Neg Hx     Uterine cancer Neg Hx     Cervical cancer Neg Hx      Social History:  Marital Status:    Occupation:   Patient Pre-hospital Living Situation: Home  Patient Pre-hospital Level of Mobility: walks with walker  Patient Pre-hospital Diet Restrictions:     Meds/Allergies   I have reviewed home medications using recent Epic encounter.  Prior to Admission medications    Medication Sig Start Date End Date Taking? Authorizing Provider   Diclofenac Sodium (VOLTAREN) 1 % Apply 2 g topically 2 (two) times a day 12/31/24 1/30/25  FARZANA Stanley   ergocalciferol (VITAMIN D2) 50,000 units Take 1 capsule (50,000  Units total) by mouth once a week 12/31/24   Kiki Alondra HeidyFARZANA torre   furosemide (LASIX) 20 mg tablet Take 1 tablet (20 mg total) by mouth 2 (two) times a day 12/31/24 6/29/25  FARZANA Stanley   gabapentin (NEURONTIN) 100 mg capsule Take 1 capsule (100 mg total) by mouth 3 (three) times a day 12/31/24 6/29/25  FARZANA Stanley   mirtazapine (REMERON) 7.5 MG tablet Take 1 tablet (7.5 mg total) by mouth daily at bedtime 12/31/24 6/29/25  FARZANA Stanley   Misc. Devices (Mattress Cover) MISC Use daily 3/22/22   FARZANA Stanley   QUEtiapine (SEROquel) 25 mg tablet Take 1 tablet (25 mg total) by mouth daily at bedtime 12/31/24   FARZANA Stanley     Allergies   Allergen Reactions    Albuterol     Aldactone [Spironolactone]     Aspirin     Atenolol     Bactrim [Sulfamethoxazole-Trimethoprim]     Codeine     Hydrocodone     Ibuprofen     Lisinopril     Mevacor [Lovastatin]     Mirapex [Pramipexole]     Morphine And Codeine     Other      novacaine    Penicillins     Procaine     Salicylates     Ultram [Tramadol]     Zoloft [Sertraline]      Night sweats       Objective :  Temp:  [97.8 °F (36.6 °C)-97.9 °F (36.6 °C)] 97.9 °F (36.6 °C)  HR:  [] 87  BP: (119-156)/() 137/60  Resp:  [16-18] 17  SpO2:  [92 %-96 %] 94 %  O2 Device: None (Room air)    Physical Exam  Vitals and nursing note reviewed.   Constitutional:       General: She is not in acute distress.     Appearance: She is well-developed.   HENT:      Head: Normocephalic and atraumatic.   Eyes:      Conjunctiva/sclera: Conjunctivae normal.   Cardiovascular:      Rate and Rhythm: Normal rate and regular rhythm.      Heart sounds: No murmur heard.  Pulmonary:      Effort: Pulmonary effort is normal. No respiratory distress.      Breath sounds: Normal breath sounds.   Abdominal:      Palpations: Abdomen is soft.      Tenderness: There is no abdominal tenderness.   Musculoskeletal:         General: No  swelling.      Cervical back: Neck supple.   Skin:     General: Skin is warm and dry.      Capillary Refill: Capillary refill takes less than 2 seconds.   Neurological:      Mental Status: She is alert. Mental status is at baseline.      Comments: Neurologic Physical Exam:    Mental Status  Orientation to: person, place  GCS: Eye Spontaneous = 4, Verbal Confused = 4, Motor Obeys commands = 6  Follows commands x2: Can follow 2 step commands  Speech: normal rate, tone and rhythm    Cranial Nerves  Pupils: symmetrical  EOM: full and intact  Facial Symmetry: facial symmetry equal  Visual fields: 0 = No visual field loss    Motor  RUE strength: 4/5: Full ROM against gravity and moderate resistance  RLE strength: 4/5: Full ROM against gravity and moderate resistance    LUE strength: 4/5: Full ROM against gravity and moderate resistance  LLE strength: 4/5: Full ROM against gravity and moderate resistance    Sensation  RUE sensation: normal  RLE sensation: normal    LUE sensation: normal  LLE sensation: normal     Psychiatric:         Mood and Affect: Mood normal.          Lines/Drains:            Lab Results: I have reviewed the following results:  Results from last 7 days   Lab Units 03/04/25  1108   WBC Thousand/uL 7.92   HEMOGLOBIN g/dL 9.1*   HEMATOCRIT % 30.4*   PLATELETS Thousands/uL 302   SEGS PCT % 81*   LYMPHO PCT % 7*   MONO PCT % 11   EOS PCT % 1     Results from last 7 days   Lab Units 03/04/25  1108   SODIUM mmol/L 142   POTASSIUM mmol/L 4.0   CHLORIDE mmol/L 109*   CO2 mmol/L 25   BUN mg/dL 34*   CREATININE mg/dL 1.32*   ANION GAP mmol/L 8   CALCIUM mg/dL 8.3*   ALBUMIN g/dL 3.7   TOTAL BILIRUBIN mg/dL 0.82   ALK PHOS U/L 123*   ALT U/L 19   AST U/L 30   GLUCOSE RANDOM mg/dL 139     Results from last 7 days   Lab Units 03/04/25  1108   INR  1.20*         Lab Results   Component Value Date    HGBA1C 6.0 (H) 08/30/2022     Results from last 7 days   Lab Units 03/04/25  1254 03/04/25  1108   LACTIC ACID mmol/L  1.8 2.1*   PROCALCITONIN ng/ml  --  0.46*       Imaging Results Review: I reviewed radiology reports from this admission including: CT chest.  Other Study Results Review: EKG was reviewed.     Administrative Statements   I have spent a total time of 20 minutes in caring for this patient on the day of the visit/encounter including Diagnostic results, Prognosis, Risks and benefits of tx options, Instructions for management, Documenting in the medical record, Reviewing/placing orders in the medical record (including tests, medications, and/or procedures), and Obtaining or reviewing history  .    ** Please Note: This note has been constructed using a voice recognition system. **

## 2025-03-04 NOTE — ASSESSMENT & PLAN NOTE
Lab Results   Component Value Date    EGFR 33 03/04/2025    EGFR 38 12/05/2024    EGFR 35 10/28/2024    CREATININE 1.32 (H) 03/04/2025    CREATININE 1.17 12/05/2024    CREATININE 1.26 10/28/2024     Plan  Monitor creatinine  Avoid nephrotoxins

## 2025-03-04 NOTE — ED PROVIDER NOTES
Time reflects when diagnosis was documented in both MDM as applicable and the Disposition within this note       Time User Action Codes Description Comment    3/4/2025  3:09 PM Sissy Gillespie [R79.89] Elevated troponin     3/4/2025  3:09 PM Sissy Gillespie [R56.9] Seizure-like activity (HCC)     3/4/2025  3:09 PM Sissy Gillespie [I48.20] Chronic atrial fibrillation (HCC)     3/4/2025  3:10 PM Sissy Gillespie [I13.0] 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)           ED Disposition       ED Disposition   Admit    Condition   Stable    Date/Time   Tue Mar 4, 2025  3:12 PM    Comment   Case was discussed with SLIM and the patient's admission status was agreed to be Admission Status: inpatient status to the service of Dr. Cast.               Assessment & Plan   {Hyperlinks  Risk Stratification - NIHSS - HEART SCORE - Fill out sepsis note and make sure you call [id number] if severe or septic shock:[id number]}    Medical Decision Making  Amount and/or Complexity of Data Reviewed  Labs: ordered. Decision-making details documented in ED Course.  Radiology: ordered and independent interpretation performed. Decision-making details documented in ED Course.    Risk  Prescription drug management.  Decision regarding hospitalization.        ED Course as of 03/04/25 1521   Tue Mar 04, 2025   1107 Temperature: 97.8 °F (36.6 °C)   1107 SpO2: 92 %   1108 Procedure Note: EKG  Date/Time: 03/04/25 11:08 AM   Interpreted by: Sissy Gillespie D.O.  Indications / Diagnosis: tachycardia, possible seizure vs syncope  ECG reviewed by me, the ED Provider: yes   The EKG demonstrates:  Rhythm: a-fib at 91 bpm  Intervals: Qtc of 484 ms, otherwise normal intervals  Axis: normal axis  QRS/Blocks: wide QRS, RBBB  ST Changes: No acute ST Changes, no STD/KAY.   1131 Hemoglobin(!): 9.1  Was 10.6 2 months ago.    1131 WBC: 7.92   1145 LACTIC ACID(!): 2.1  Will give gentle IV fluids.    1152 BNP(!): 643  Chronically elevated.    1152 hs TnI 0hr(!): 740  Newly elevated. Given possible seizure vs syncopal episode, intermittent tachycardia, and elevated troponin, will obtain CTA PE study to evaluate for a possible underlying PE.    1155 Procalcitonin(!): 0.46   1331 Delta 2hr hsTnI: -41   1356 Creatinine(!): 1.32  Similar to previous   1520 CTA chest pe study  No large PE or other acute findings.        Medications   sodium chloride 0.9 % bolus 500 mL (0 mL Intravenous Stopped 3/4/25 1256)   iohexol (OMNIPAQUE) 350 MG/ML injection (MULTI-DOSE) 85 mL (85 mL Intravenous Given 3/4/25 1407)       ED Risk Strat Scores                            SBIRT 20yo+      Flowsheet Row Most Recent Value   Initial Alcohol Screen: US AUDIT-C     1. How often do you have a drink containing alcohol? 0 Filed at: 03/04/2025 1100   2. How many drinks containing alcohol do you have on a typical day you are drinking?  0 Filed at: 03/04/2025 1100   3b. FEMALE Any Age, or MALE 65+: How often do you have 4 or more drinks on one occassion? 0 Filed at: 03/04/2025 1100   Audit-C Score 0 Filed at: 03/04/2025 1100   ARNULFO: How many times in the past year have you...    Used an illegal drug or used a prescription medication for non-medical reasons? Never Filed at: 03/04/2025 1100                            History of Present Illness   {Hyperlinks  History (Med, Surg, Fam, Social) - Current Medications - Allergies  :[id number]}    Chief Complaint   Patient presents with    Seizure - Prior Hx Of     Patient arrived via EMS. Possible Seizure in car, slurred/ slow speech. Was in route to PCP for possible UTI. No HX of Seizures. HX of A FIB, HTN, Dementia, Alzheimer's.        Past Medical History:   Diagnosis Date    Ankle fracture     Arthritis     left hip    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HCC)     with left ureter    Bronchitis     Cancer (HCC)     Carcinoma, lung (HCC)     Chronic kidney disease, stage 4 (severe) (HCC) 01/17/2025    Dementia (HCC)     Dependent edema     Depression      "Diabetes mellitus (HCC)     Dizziness 12/13/2021    Hypercholesterolemia     Hypertension     Kidney stone     Oth abn and inconclusive findings on dx imaging of breast     Pes planus     Renal calculus     Restless leg syndrome     Rib pain     Sprain, neck     Varicose veins of left lower extremity       Past Surgical History:   Procedure Laterality Date    APPENDECTOMY      LUNG CANCER SURGERY      US BREAST CYST ASPIRATION LEFT INITIAL Left 3/23/2018      Family History   Problem Relation Age of Onset    No Known Problems Mother     No Known Problems Father     Dementia Brother     Breast cancer Neg Hx     Colon cancer Neg Hx     Ovarian cancer Neg Hx     Uterine cancer Neg Hx     Cervical cancer Neg Hx       Social History     Tobacco Use    Smoking status: Former     Current packs/day: 0.50     Average packs/day: 0.5 packs/day for 20.0 years (10.0 ttl pk-yrs)     Types: Cigarettes    Smokeless tobacco: Never   Vaping Use    Vaping status: Never Used   Substance Use Topics    Alcohol use: Never    Drug use: Never      E-Cigarette/Vaping    E-Cigarette Use Never User       E-Cigarette/Vaping Substances    Nicotine No     THC No     CBD No     Flavoring No     Other No     Unknown No       I have reviewed and agree with the history as documented.     89-year-old female with a past medical history of hypertension, hyperlipidemia, diabetes, CKD, and dementia brought in by EMS after a possible seizure.  Per EMS, patient's family was taken her to her primary care doctor for evaluation of a possible UTI as she has been endorsing some lower abdominal pressure/discomfort.  While in the car, patient was noted to have slurred/slow speech and possible seizure-like activity. Family states that the patient went \"rigid\" and was unable to talk for approximately 3 minutes. Point-of-care glucose for EMS was noted to be in the 180s.  Patient currently denies any complaints at this time.        Review of Systems   Unable to " perform ROS: Dementia   Cardiovascular:  Negative for chest pain.   Gastrointestinal:  Negative for abdominal pain.   Neurological:         Possible seizure, slurred speech           Objective   {Hyperlinks  Historical Vitals - Historical Labs - Chart Review/Microbiology - Last Echo - Code Status  :[id number]}    ED Triage Vitals   Temperature Pulse Blood Pressure Respirations SpO2 Patient Position - Orthostatic VS   03/04/25 1100 03/04/25 1051 03/04/25 1051 03/04/25 1051 03/04/25 1051 03/04/25 1051   97.8 °F (36.6 °C) 72 119/76 17 92 % Sitting      Temp Source Heart Rate Source BP Location FiO2 (%) Pain Score    03/04/25 1100 03/04/25 1051 03/04/25 1051 -- 03/04/25 1140    Oral Monitor Left arm  4      Vitals      Date and Time Temp Pulse SpO2 Resp BP Pain Score FACES Pain Rating User   03/04/25 1500 -- 86 95 % 16 139/78 No Pain -- MB   03/04/25 1430 -- 83 95 % 16 145/65 No Pain -- MB   03/04/25 1330 -- 88 95 % 17 156/82 No Pain -- MB   03/04/25 1300 -- 88 95 % 17 141/71 No Pain -- MB   03/04/25 1230 -- 84 96 % 16 119/79 No Pain -- MB   03/04/25 1145 -- 107 96 % 18 145/100 2 2 MB   03/04/25 1140 -- -- -- -- -- 4 -- MB   03/04/25 1130 -- 98 95 % 18 145/100 -- -- MB   03/04/25 1100 97.8 °F (36.6 °C) 88 94 % 16 149/78 -- -- MB   03/04/25 1051 -- 72 92 % 17 119/76 -- 4 MB            Physical Exam  Vitals and nursing note reviewed.   Constitutional:       General: She is awake. She is not in acute distress.     Appearance: She is not toxic-appearing.   HENT:      Head: Normocephalic and atraumatic.   Eyes:      General: Vision grossly intact. Gaze aligned appropriately.   Cardiovascular:      Rate and Rhythm: Regular rhythm. Tachycardia present.      Heart sounds: Normal heart sounds.   Pulmonary:      Effort: Pulmonary effort is normal. No respiratory distress.      Breath sounds: Rhonchi (left upper lung) present. No wheezing.   Abdominal:      General: There is no distension.      Palpations: Abdomen is soft.       Tenderness: There is no abdominal tenderness.   Musculoskeletal:      Cervical back: Full passive range of motion without pain and neck supple.   Skin:     General: Skin is warm and dry.   Neurological:      General: No focal deficit present.      Mental Status: She is alert.      Cranial Nerves: No dysarthria or facial asymmetry.      Comments: Patient currently oriented to self and place.  Equal motor function in all 4 extremities.           Results Reviewed       Procedure Component Value Units Date/Time    Comprehensive metabolic panel [[id number]]  (Abnormal) Collected: 03/04/25 1108    Lab Status: Final result Specimen: Blood from Arm, Right Updated: 03/04/25 1349     Sodium 142 mmol/L      Potassium 4.0 mmol/L      Chloride 109 mmol/L      CO2 25 mmol/L      ANION GAP 8 mmol/L      BUN 34 mg/dL      Creatinine 1.32 mg/dL      Glucose 139 mg/dL      Calcium 8.3 mg/dL      AST 30 U/L      ALT 19 U/L      Alkaline Phosphatase 123 U/L      Total Protein 6.8 g/dL      Albumin 3.7 g/dL      Total Bilirubin 0.82 mg/dL      eGFR 33 ml/min/1.73sq m     Narrative:      [employer] guidelines for Chronic Kidney Disease (CKD):     Stage 1 with normal or high GFR (GFR > 90 mL/min/1.73 square meters)    Stage 2 Mild CKD (GFR = 60-89 mL/min/1.73 square meters)    Stage 3A Moderate CKD (GFR = 45-59 mL/min/1.73 square meters)    Stage 3B Moderate CKD (GFR = 30-44 mL/min/1.73 square meters)    Stage 4 Severe CKD (GFR = 15-29 mL/min/1.73 square meters)    Stage 5 End Stage CKD (GFR <15 mL/min/1.73 square meters)  Note: GFR calculation is accurate only with a steady state creatinine    Lactic acid 2 Hours [[id number]]  (Normal) Collected: 03/04/25 1254    Lab Status: Final result Specimen: Blood from Arm, Right Updated: 03/04/25 1348     LACTIC ACID 1.8 mmol/L     Narrative:      Result may be elevated if tourniquet was used during collection.    HS Troponin I 2hr [[id number]]  (Abnormal) Collected: 03/04/25  1254    Lab Status: Final result Specimen: Blood from Arm, Right Updated: 03/04/25 1330     hs TnI 2hr 699 ng/L      Delta 2hr hsTnI -41 ng/L     HS Troponin I 4hr [[id number]]     Lab Status: No result Specimen: Blood     Procalcitonin [[id number]]  (Abnormal) Collected: 03/04/25 1108    Lab Status: Final result Specimen: Blood from Arm, Right Updated: 03/04/25 1154     Procalcitonin 0.46 ng/ml     HS Troponin 0hr (reflex protocol) [[id number]]  (Abnormal) Collected: 03/04/25 1108    Lab Status: Final result Specimen: Blood from Arm, Right Updated: 03/04/25 1152     hs TnI 0hr 740 ng/L     B-Type Natriuretic Peptide(BNP) [[id number]]  (Abnormal) Collected: 03/04/25 1108    Lab Status: Final result Specimen: Blood from Arm, Right Updated: 03/04/25 1151      pg/mL     Protime-INR [[id number]]  (Abnormal) Collected: 03/04/25 1108    Lab Status: Final result Specimen: Blood from Arm, Right Updated: 03/04/25 1144     Protime 16.0 seconds      INR 1.20    Narrative:      INR Therapeutic Range    Indication                                             INR Range      Atrial Fibrillation                                               2.0-3.0  Hypercoagulable State                                    2.0.2.3  Left Ventricular Asist Device                            2.0-3.0  Mechanical Heart Valve                                  -    Aortic(with afib, MI, embolism, HF, LA enlargement,    and/or coagulopathy)                                     2.0-3.0 (2.5-3.5)     Mitral                                                             2.5-3.5  Prosthetic/Bioprosthetic Heart Valve               2.0-3.0  Venous thromboembolism (VTE: VT, PE        2.0-3.0    APTT [[id number]]  (Normal) Collected: 03/04/25 1108    Lab Status: Final result Specimen: Blood from Arm, Right Updated: 03/04/25 1144     PTT 28 seconds     Lactic acid [[id number]]  (Abnormal) Collected: 03/04/25 1108    Lab Status: Final result Specimen: Blood from Arm, Right  Updated: 03/04/25 1138     LACTIC ACID 2.1 mmol/L     Narrative:      Result may be elevated if tourniquet was used during collection.    Ammonia [[id number]]  (Normal) Collected: 03/04/25 1108    Lab Status: Final result Specimen: Blood from Arm, Right Updated: 03/04/25 1138     Ammonia 19 umol/L     CBC and differential [[id number]]  (Abnormal) Collected: 03/04/25 1108    Lab Status: Final result Specimen: Blood from Arm, Right Updated: 03/04/25 1124     WBC 7.92 Thousand/uL      RBC 4.03 Million/uL      Hemoglobin 9.1 g/dL      Hematocrit 30.4 %      MCV 75 fL      MCH 22.6 pg      MCHC 29.9 g/dL      RDW 18.1 %      MPV 10.0 fL      Platelets 302 Thousands/uL      nRBC 0 /100 WBCs      Segmented % 81 %      Immature Grans % 0 %      Lymphocytes % 7 %      Monocytes % 11 %      Eosinophils Relative 1 %      Basophils Relative 0 %      Absolute Neutrophils 6.41 Thousands/µL      Absolute Immature Grans 0.03 Thousand/uL      Absolute Lymphocytes 0.54 Thousands/µL      Absolute Monocytes 0.83 Thousand/µL      Eosinophils Absolute 0.08 Thousand/µL      Basophils Absolute 0.03 Thousands/µL     Blood culture #1 [[id number]] Collected: 03/04/25 1108    Lab Status: In process Specimen: Blood from Arm, Right Updated: 03/04/25 1117    Blood culture #2 [[id number]] Collected: 03/04/25 1109    Lab Status: In process Specimen: Blood from Arm, Left Updated: 03/04/25 1117    UA w Reflex to Microscopic w Reflex to Culture [[id number]]     Lab Status: No result Specimen: Urine             CTA chest pe study   Final Interpretation by Jabier Pop MD (03/04 1429)      1.  No evidence of a central pulmonary embolus. However, the peripheral pulmonary arteries cannot be adequately assessed, due to respiratory motion artifact.   2.  There are patchy opacities within the left upper lobe and both lung bases, which favor atelectasis. Entirely excluded.   3.  Moderate cardiomegaly, with disproportionate enlargement of the right atrium.  Given the small pleural effusions and prominent reflux of contrast into the hepatic veins, this is consistent with right heart dysfunction.                  Workstation performed: [id number]         CT head without contrast   Final Interpretation by Peter Ratliff MD (03/04 1131)      No acute intracranial abnormality.                  Workstation performed: [id number]         XR chest portable   ED Interpretation by Sissy Gillespie DO (03/04 1149)   No acute cardiopulmonary abnormalities.      Final Interpretation by Babatunde Ulloa MD (03/04 1221)      Limited visualization of the left diaphragm suggesting some left basilar atelectasis or infiltrate as well as possible small left pleural effusion. Consider lateral view for further assessment.      The study was marked in EPIC for immediate notification.            Workstation performed: [id number]             Procedures    ED Medication and Procedure Management   Prior to Admission Medications   Prescriptions Last Dose Informant Patient Reported? Taking?   Diclofenac Sodium (VOLTAREN) 1 %   No No   Sig: Apply 2 g topically 2 (two) times a day   Misc. Devices (Mattress Cover) MISC  Self No No   Sig: Use daily   QUEtiapine (SEROquel) 25 mg tablet   No No   Sig: Take 1 tablet (25 mg total) by mouth daily at bedtime   ergocalciferol (VITAMIN D2) 50,000 units   No No   Sig: Take 1 capsule (50,000 Units total) by mouth once a week   furosemide (LASIX) 20 mg tablet   No No   Sig: Take 1 tablet (20 mg total) by mouth 2 (two) times a day   gabapentin (NEURONTIN) 100 mg capsule   No No   Sig: Take 1 capsule (100 mg total) by mouth 3 (three) times a day   mirtazapine (REMERON) 7.5 MG tablet   No No   Sig: Take 1 tablet (7.5 mg total) by mouth daily at bedtime      Facility-Administered Medications: None     Patient's Medications   Discharge Prescriptions    No medications on file     No discharge procedures on file.  ED SEPSIS DOCUMENTATION   Time reflects when  diagnosis was documented in both MDM as applicable and the Disposition within this note       Time User Action Codes Description Comment    3/4/2025  3:09 PM Sissy Gillespie [R79.89] Elevated troponin     3/4/2025  3:09 PM Sissy Gillespie [R56.9] Seizure-like activity (HCC)     3/4/2025  3:09 PM Sissy Gillespie [I48.20] Chronic atrial fibrillation (HCC)     3/4/2025  3:10 PM Sissy Gillespie [I13.0] 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)

## 2025-03-05 ENCOUNTER — APPOINTMENT (INPATIENT)
Dept: NEUROLOGY | Facility: HOSPITAL | Age: OVER 89
DRG: 101 | End: 2025-03-05
Payer: COMMERCIAL

## 2025-03-05 LAB
ALBUMIN SERPL BCG-MCNC: 3.8 G/DL (ref 3.5–5)
ALP SERPL-CCNC: 127 U/L (ref 34–104)
ALT SERPL W P-5'-P-CCNC: 23 U/L (ref 7–52)
ANION GAP SERPL CALCULATED.3IONS-SCNC: 9 MMOL/L (ref 4–13)
AST SERPL W P-5'-P-CCNC: 34 U/L (ref 13–39)
BILIRUB SERPL-MCNC: 0.89 MG/DL (ref 0.2–1)
BUN SERPL-MCNC: 34 MG/DL (ref 5–25)
CALCIUM SERPL-MCNC: 8.6 MG/DL (ref 8.4–10.2)
CHLORIDE SERPL-SCNC: 106 MMOL/L (ref 96–108)
CO2 SERPL-SCNC: 23 MMOL/L (ref 21–32)
CREAT SERPL-MCNC: 1.37 MG/DL (ref 0.6–1.3)
ERYTHROCYTE [DISTWIDTH] IN BLOOD BY AUTOMATED COUNT: 18 % (ref 11.6–15.1)
GFR SERPL CREATININE-BSD FRML MDRD: 31 ML/MIN/1.73SQ M
GLUCOSE SERPL-MCNC: 123 MG/DL (ref 65–140)
HCT VFR BLD AUTO: 29.8 % (ref 34.8–46.1)
HGB BLD-MCNC: 8.9 G/DL (ref 11.5–15.4)
MCH RBC QN AUTO: 22.3 PG (ref 26.8–34.3)
MCHC RBC AUTO-ENTMCNC: 29.9 G/DL (ref 31.4–37.4)
MCV RBC AUTO: 75 FL (ref 82–98)
PLATELET # BLD AUTO: 286 THOUSANDS/UL (ref 149–390)
PMV BLD AUTO: 10.1 FL (ref 8.9–12.7)
POTASSIUM SERPL-SCNC: 3.8 MMOL/L (ref 3.5–5.3)
PROT SERPL-MCNC: 6.9 G/DL (ref 6.4–8.4)
RBC # BLD AUTO: 3.99 MILLION/UL (ref 3.81–5.12)
SODIUM SERPL-SCNC: 138 MMOL/L (ref 135–147)
WBC # BLD AUTO: 8.12 THOUSAND/UL (ref 4.31–10.16)

## 2025-03-05 PROCEDURE — 85027 COMPLETE CBC AUTOMATED: CPT | Performed by: STUDENT IN AN ORGANIZED HEALTH CARE EDUCATION/TRAINING PROGRAM

## 2025-03-05 PROCEDURE — 80053 COMPREHEN METABOLIC PANEL: CPT | Performed by: STUDENT IN AN ORGANIZED HEALTH CARE EDUCATION/TRAINING PROGRAM

## 2025-03-05 PROCEDURE — 95816 EEG AWAKE AND DROWSY: CPT | Performed by: PSYCHIATRY & NEUROLOGY

## 2025-03-05 PROCEDURE — 95816 EEG AWAKE AND DROWSY: CPT

## 2025-03-05 PROCEDURE — 99232 SBSQ HOSP IP/OBS MODERATE 35: CPT | Performed by: STUDENT IN AN ORGANIZED HEALTH CARE EDUCATION/TRAINING PROGRAM

## 2025-03-05 RX ORDER — OLANZAPINE 10 MG/2ML
2.5 INJECTION, POWDER, FOR SOLUTION INTRAMUSCULAR ONCE
Status: COMPLETED | OUTPATIENT
Start: 2025-03-05 | End: 2025-03-05

## 2025-03-05 RX ORDER — LIDOCAINE 40 MG/G
CREAM TOPICAL 2 TIMES DAILY PRN
Status: DISCONTINUED | OUTPATIENT
Start: 2025-03-05 | End: 2025-03-13 | Stop reason: HOSPADM

## 2025-03-05 RX ORDER — WATER 10 ML/10ML
INJECTION INTRAMUSCULAR; INTRAVENOUS; SUBCUTANEOUS
Status: COMPLETED
Start: 2025-03-05 | End: 2025-03-05

## 2025-03-05 RX ORDER — SODIUM CHLORIDE 9 MG/ML
75 INJECTION, SOLUTION INTRAVENOUS CONTINUOUS
Status: DISCONTINUED | OUTPATIENT
Start: 2025-03-05 | End: 2025-03-06

## 2025-03-05 RX ORDER — ACETAMINOPHEN 325 MG/1
650 TABLET ORAL EVERY 6 HOURS PRN
Status: DISCONTINUED | OUTPATIENT
Start: 2025-03-05 | End: 2025-03-13 | Stop reason: HOSPADM

## 2025-03-05 RX ADMIN — IRON SUCROSE 200 MG: 20 INJECTION, SOLUTION INTRAVENOUS at 10:41

## 2025-03-05 RX ADMIN — HEPARIN SODIUM 5000 UNITS: 5000 INJECTION INTRAVENOUS; SUBCUTANEOUS at 15:46

## 2025-03-05 RX ADMIN — SODIUM CHLORIDE 75 ML/HR: 0.9 INJECTION, SOLUTION INTRAVENOUS at 10:43

## 2025-03-05 RX ADMIN — MIRTAZAPINE 7.5 MG: 15 TABLET, FILM COATED ORAL at 21:56

## 2025-03-05 RX ADMIN — QUETIAPINE FUMARATE 25 MG: 25 TABLET ORAL at 21:56

## 2025-03-05 RX ADMIN — WATER 0.25 ML: 1 INJECTION INTRAMUSCULAR; INTRAVENOUS; SUBCUTANEOUS at 00:45

## 2025-03-05 RX ADMIN — CEFTRIAXONE SODIUM 1000 MG: 10 INJECTION, POWDER, FOR SOLUTION INTRAVENOUS at 17:23

## 2025-03-05 RX ADMIN — HEPARIN SODIUM 5000 UNITS: 5000 INJECTION INTRAVENOUS; SUBCUTANEOUS at 05:13

## 2025-03-05 RX ADMIN — GABAPENTIN 100 MG: 100 CAPSULE ORAL at 21:56

## 2025-03-05 RX ADMIN — GABAPENTIN 100 MG: 100 CAPSULE ORAL at 17:23

## 2025-03-05 RX ADMIN — OLANZAPINE 2.5 MG: 10 INJECTION, POWDER, FOR SOLUTION INTRAMUSCULAR at 00:45

## 2025-03-05 RX ADMIN — HEPARIN SODIUM 5000 UNITS: 5000 INJECTION INTRAVENOUS; SUBCUTANEOUS at 21:56

## 2025-03-05 RX ADMIN — SODIUM CHLORIDE 75 ML/HR: 0.9 INJECTION, SOLUTION INTRAVENOUS at 23:04

## 2025-03-05 RX ADMIN — GABAPENTIN 100 MG: 100 CAPSULE ORAL at 09:49

## 2025-03-05 RX ADMIN — ACETAMINOPHEN 650 MG: 325 TABLET, FILM COATED ORAL at 23:39

## 2025-03-05 NOTE — UTILIZATION REVIEW
Initial Clinical Review    Admission: Date/Time/Statement:   Admission Orders (From admission, onward)       Ordered        03/04/25 1534  INPATIENT ADMISSION  Once                          Orders Placed This Encounter   Procedures    INPATIENT ADMISSION     Standing Status:   Standing     Number of Occurrences:   1     Level of Care:   Med Surg [16]     Estimated length of stay:   More than 2 Midnights     Certification:   I certify that inpatient services are medically necessary for this patient for a duration of greater than two midnights. See H&P and MD Progress Notes for additional information about the patient's course of treatment.     ED Arrival Information       Expected   -    Arrival   3/4/2025 10:47    Acuity   Emergent              Means of arrival   Ambulance    Escorted by   [facility]   Hospitalist    Admission type   Emergency              Arrival complaint   Possible Seizure             Chief Complaint   Patient presents with    Seizure - Prior Hx Of     Patient arrived via EMS. Possible Seizure in car, slurred/ slow speech. Was in route to PCP for possible UTI. No HX of Seizures. HX of A FIB, HTN, Dementia, Alzheimer's.        Initial Presentation: 89 y.o. female to ED from home w/  PMH of CHF, chronic A-fib, CKD-4, dementia, HTN, lung cancer who presents with an episode of seizure-like activity. C/o lower abd pressure and discomfort . Mild HA . + UTI . Admitted IP status w/ seizure like activity . Plan for tele , check orthostatics , MRI brain , EEG , echo , seizure precautions , fall  . UTI start ceftriaxone , f/u ua cx. Elevated trop 740/699/659 EKG w/ acute ischemic changes . Afib rate controlled. CHF cont lasix , daily weight , fld restriction . Cr 1.2 ,monitor .     Anticipated Length of Stay/Certification Statement:  Patient will be admitted on an inpatient basis with an anticipated length of stay of greater than 2 midnights secondary to seizure like activity need  workup for syncope.     Date: 3/5   Day 2:  tele , check orthostatics , MRI brain , echo , EEG , seizure precautions . Cont ceftriaxone w/ UTI .  Pt disoriented . Anticipate discharge tomorrow to discharge location to be determined pending rehab evaluations.       ED Treatment-Medication Administration from 03/04/2025 1047 to 03/04/2025 1937         Date/Time Order Dose Route Action     03/04/2025 1152 sodium chloride 0.9 % bolus 500 mL 500 mL Intravenous New Bag     03/04/2025 1407 iohexol (OMNIPAQUE) 350 MG/ML injection (MULTI-DOSE) 85 mL 85 mL Intravenous Given     03/04/2025 1737 heparin (porcine) subcutaneous injection 5,000 Units 5,000 Units Subcutaneous Given     03/04/2025 1737 ceftriaxone (ROCEPHIN) 1 g/50 mL in dextrose IVPB 1,000 mg Intravenous New Bag            Scheduled Medications:  cefTRIAXone, 1,000 mg, Intravenous, Q24H  [Held by provider] furosemide, 20 mg, Oral, BID  gabapentin, 100 mg, Oral, TID  heparin (porcine), 5,000 Units, Subcutaneous, Q8H HESHAM  mirtazapine, 7.5 mg, Oral, HS  QUEtiapine, 25 mg, Oral, HS      Continuous IV Infusions:  sodium chloride, 75 mL/hr, Intravenous, Continuous      PRN Meds:     ED Triage Vitals   Temperature Pulse Respirations Blood Pressure SpO2 Pain Score   03/04/25 1100 03/04/25 1051 03/04/25 1051 03/04/25 1051 03/04/25 1051 03/04/25 1140   97.8 °F (36.6 °C) 72 17 119/76 92 % 4     Weight (last 2 days)       Date/Time Weight    03/05/25 0600 63.2 (139.33)    03/04/25 19:51:27 63.2 (139.33)    03/04/25 1140 70.3 (154.98)            Vital Signs (last 3 days)       Date/Time Temp Pulse Resp BP MAP (mmHg) SpO2 O2 Device Patient Position - Orthostatic VS [place] Coma Scale Score Pain    03/05/25 1100 -- 88 17 134/71 -- 94 % -- Sitting -- No Pain    03/05/25 0711 -- 95 16 136/70 -- 94 % -- -- -- --    03/05/25 03:09:08 -- -- -- 115/70 85 -- -- -- -- --    03/04/25 23:25:29 97 °F (36.1 °C) 74 -- 128/74 92 97 % -- -- -- --    03/04/25 2000 -- -- -- -- -- -- None (Room  air) -- 15 No Pain    03/04/25 1956 -- -- -- 111/62 -- -- -- Lying -- --    03/04/25 19:51:27 98.2 °F (36.8 °C) 102 16 93/59 70 93 % None (Room air) Lying -- --    03/04/25 1800 97.9 °F (36.6 °C) 86 16 145/65 93 95 % None (Room air) Sitting -- No Pain    03/04/25 1730 -- 88 18 151/74 103 95 % None (Room air) Lying -- No Pain    03/04/25 1700 -- 105 16 143/66 95 95 % -- -- -- No Pain    03/04/25 1630 97.9 °F (36.6 °C) 87 17 137/60 86 94 % None (Room air) Lying -- No Pain    03/04/25 1600 -- 89 18 126/70 90 95 % None (Room air) Lying -- --    03/04/25 1530 97.9 °F (36.6 °C) 87 16 146/77 103 95 % None (Room air) Lying -- No Pain    03/04/25 1500 -- 86 16 139/78 104 95 % None (Room air) Lying -- No Pain    03/04/25 1430 -- 83 16 145/65 94 95 % None (Room air) Lying -- No Pain    03/04/25 1330 -- 88 17 156/82 112 95 % None (Room air) Lying -- No Pain    03/04/25 1300 -- 88 17 141/71 98 95 % None (Room air) Lying -- No Pain    03/04/25 1230 -- 84 16 119/79 87 96 % None (Room air) Lying -- No Pain    03/04/25 1145 -- 107 18 145/100 116 96 % None (Room air) Sitting -- 2    03/04/25 1140 -- -- -- -- -- -- None (Room air) Sitting 15 4    03/04/25 1130 -- 98 18 145/100 116 95 % None (Room air) Sitting -- --    03/04/25 1100 97.8 °F (36.6 °C) 88 16 149/78 105 94 % None (Room air) Sitting -- --    03/04/25 1051 -- 72 17 119/76 91 92 % None (Room air) Sitting -- --    03/04/25 1050 -- -- -- -- -- -- None (Room air) -- -- --              Pertinent Labs/Diagnostic Test Results:   Radiology:  CTA chest pe study   Final Interpretation by Jabier Pop MD (03/04 2709)      1.  No evidence of a central pulmonary embolus. However, the peripheral pulmonary arteries cannot be adequately assessed, due to respiratory motion artifact.   2.  There are patchy opacities within the left upper lobe and both lung bases, which favor atelectasis. Entirely excluded.   3.  Moderate cardiomegaly, with disproportionate enlargement of the right  atrium. Given the small pleural effusions and prominent reflux of contrast into the hepatic veins, this is consistent with right heart dysfunction.                  Workstation performed: [id number]         CT head without contrast   Final Interpretation by Peter Ratliff MD (03/04 1131)      No acute intracranial abnormality.                  Workstation performed: [id number]         XR chest portable   ED Interpretation by Sissy Gillespie DO (03/04 1149)   No acute cardiopulmonary abnormalities.      Final Interpretation by Babatunde Ulloa MD (03/04 1221)      Limited visualization of the left diaphragm suggesting some left basilar atelectasis or infiltrate as well as possible small left pleural effusion. Consider lateral view for further assessment.      The study was marked in EPIC for immediate notification.            Workstation performed: [id number]         MRI inpatient order    (Results Pending)     Cardiology:  ECG 12 lead   Final Result by Liz Mi MD (03/04 1110)   Atrial fibrillation   Incomplete right bundle branch block   Nonspecific ST and T wave abnormality   Prolonged QT   Abnormal ECG      Confirmed by Liz Mi ([id number]) on 3/4/2025 11:10:44 AM        GI:  No orders to display           Results from last 7 days   Lab Units 03/05/25  0507 03/04/25  1108   WBC Thousand/uL 8.12 7.92   HEMOGLOBIN g/dL 8.9* 9.1*   HEMATOCRIT % 29.8* 30.4*   PLATELETS Thousands/uL 286 302   TOTAL NEUT ABS Thousands/µL  --  6.41         Results from last 7 days   Lab Units 03/05/25  0507 03/04/25  1108   SODIUM mmol/L 138 142   POTASSIUM mmol/L 3.8 4.0   CHLORIDE mmol/L 106 109*   CO2 mmol/L 23 25   ANION GAP mmol/L 9 8   BUN mg/dL 34* 34*   CREATININE mg/dL 1.37* 1.32*   EGFR ml/min/1.73sq m 31 33   CALCIUM mg/dL 8.6 8.3*     Results from last 7 days   Lab Units 03/05/25  0507 03/04/25  1108   AST U/L 34 30   ALT U/L 23 19   ALK PHOS U/L 127* 123*   TOTAL PROTEIN g/dL 6.9 6.8   ALBUMIN g/dL  3.8 3.7   TOTAL BILIRUBIN mg/dL 0.89 0.82   AMMONIA umol/L  --  19       Results from last 7 days   Lab Units 03/05/25  0507 03/04/25  1108   GLUCOSE RANDOM mg/dL 123 139       Results from last 7 days   Lab Units 03/04/25  1524 03/04/25  1254 03/04/25  1108   HS TNI 0HR ng/L  --   --  740*   HS TNI 2HR ng/L  --  699*  --    HSTNI D2 ng/L  --  -41  --    HS TNI 4HR ng/L 659*  --   --    HSTNI D4 ng/L -81  --   --          Results from last 7 days   Lab Units 03/04/25  1108   PROTIME seconds 16.0*   INR  1.20*   PTT seconds 28       Results from last 7 days   Lab Units 03/04/25  1108   PROCALCITONIN ng/ml 0.46*     Results from last 7 days   Lab Units 03/04/25  1254 03/04/25  1108   LACTIC ACID mmol/L 1.8 2.1*     Results from last 7 days   Lab Units 03/04/25  1108   BNP pg/mL 643*     Results from last 7 days   Lab Units 03/04/25  1524   FERRITIN ng/mL 5*   IRON SATURATION % 3*   IRON ug/dL 13*   TIBC ug/dL 450.8*     Results from last 7 days   Lab Units 03/04/25  1524   TRANSFERRIN mg/dL 322       Results from last 7 days   Lab Units 03/04/25  1549   CLARITY UA  Clear   COLOR UA  Yellow   SPEC GRAV UA  1.023   PH UA  6.0   GLUCOSE UA mg/dl Negative   KETONES UA mg/dl Trace*   BLOOD UA  Negative   PROTEIN UA mg/dl 50 (1+)*   NITRITE UA  Positive*   BILIRUBIN UA  Negative   UROBILINOGEN UA (BE) mg/dl <2.0   LEUKOCYTES UA  Moderate*   WBC UA /hpf Innumerable*   RBC UA /hpf 1-2   BACTERIA UA /hpf Innumerable*   EPITHELIAL CELLS WET PREP /hpf Occasional         Results from last 7 days   Lab Units 03/04/25  1109 03/04/25  1108   BLOOD CULTURE  Received in Microbiology Lab. Culture in Progress. Received in Microbiology Lab. Culture in Progress.     Past Medical History:   Diagnosis Date    Ankle fracture     Arthritis     left hip    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HCC)     with left ureter    Bronchitis     Cancer (HCC)     Carcinoma, lung (HCC)     Chronic kidney disease, stage 4 (severe) (HCC) 01/17/2025    Dementia (HCC)      Dependent edema     Depression     Diabetes mellitus (HCC)     Dizziness 12/13/2021    Hypercholesterolemia     Hypertension     Kidney stone     Oth abn and inconclusive findings on dx imaging of breast     Pes planus     Renal calculus     Restless leg syndrome     Rib pain     Sprain, neck     Varicose veins of left lower extremity      Present on Admission:   Chronic atrial fibrillation (HCC)   Chronic congestive heart failure (HCC)   Vascular dementia, uncomplicated (HCC)   Primary hypertension   Stage 3b chronic kidney disease (HCC)   UTI (urinary tract infection)      Admitting Diagnosis: Seizures (HCC) [R56.9]  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) [I13.0]  Chronic atrial fibrillation (HCC) [I48.20]  Elevated troponin [R79.89]  Seizure-like activity (HCC) [R56.9]  Age/Sex: 89 y.o. female    Network Utilization Review Department  ATTENTION: Please call with any questions or concerns to [phone] and carefully listen to the prompts so that you are directed to the right person. All voicemails are confidential.   For Discharge needs, contact Care Management DC Support Team at [phone] opt. [phone]  Send all requests for admission clinical reviews, approved or denied determinations and any other requests to dedicated fax number below belonging to the campus where the patient is receiving treatment. List of dedicated fax numbers for the Facilities:  FACILITY NAME UR FAX NUMBER   ADMISSION DENIALS (Administrative/Medical Necessity) [phone]   DISCHARGE SUPPORT TEAM (NETWORK) [phone]   PARENT CHILD HEALTH (Maternity/NICU/Pediatrics) [phone]   [facility] [phone]   [facility] [phone]   [facility] [phone]   [facility] [phone]   [facility] [phone]   [facility] [phone]   [facility]  [facility] [phone]   [facility] [phone]   [facility] [phone]   [facility] [phone]   [facility] [phone]   [facility] [phone]

## 2025-03-05 NOTE — CASE MANAGEMENT
Case Management Assessment & Discharge Planning Note    Patient name Sona Valenzuela  Location /-01 MRN [id number]  : 11/3/1924 Date 3/5/2025       Current Admission Date: 3/4/2025  Current Admission Diagnosis:Seizure-like activity (HCC)   Patient Active Problem List    Diagnosis Date Noted Date Diagnosed    Seizure-like activity (HCC) 2025     Elevated troponin 2025     Congestion of throat 2025     Vascular dementia with behavioral disturbance (HCC) 2025     Ambulatory dysfunction 2024     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, psychotic disturbance, mood disturbance, or anxiety ([facility]) 2024     Chronic pain of left wrist 10/28/2024     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 ([facility]) 2024     Acute on chronic HFpEF with severe pulmonary hypertension ([facility]) 2024     Primary hypertension 2024     Paroxysmal A-fib ([facility]) 2024     Compression fracture of lumbar spine, non-traumatic ([facility]) 2024     Mild protein-calorie malnutrition ([facility]) 2024     Vascular dementia, uncomplicated ([facility]) 2023     Does mobilize using walker 2023     Chronic congestive heart failure ([facility]) 2022     Chronic atrial fibrillation ([facility]) 10/11/2022     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[facility]) 2022     Stage 3b chronic kidney disease ([facility]) 2022     Vitamin D deficiency 2020     Primary insomnia 2019     UTI (urinary tract infection) 10/20/2019     Degeneration of lumbar intervertebral disc 2019     Lumbar radiculopathy 2019     Arthropathy of lumbar facet joint 2019     Restless legs syndrome 2019       LOS (days): 1  Geometric Mean LOS (GMLOS) (days): 2.7  Days to GMLOS:1.7     OBJECTIVE:    Risk of Unplanned Readmission Score: 22.04         Current admission status: Inpatient       Preferred Pharmacy:   [id number]  [place] - [address]  [address]  [place] [address]  Phone: [phone] Fax: [phone]    Primary Care Provider: FARZANA Stanley    Primary Insurance: [employer] REP  Secondary Insurance: [facility]    ASSESSMENT:  Active Health Care Proxies       xi rodriguez Health Care Representative - [place]   Primary Phone: [phone] (Mobile)                 Advance Directives  Does patient have a Health Care POA?: Yes  Does patient have Advance Directives?: Yes  Advance Directives: POLST, Power of  for health care  Primary Contact: xi rodriguez (Niece)  [phone]         Readmission Root Cause  30 Day Readmission: No    Patient Information  Admitted from:: Home  Mental Status: Alert  During Assessment patient was accompanied by: Not accompanied during assessment  Assessment information provided by:: Patient, Other - please comment (Niece)  Primary Caregiver: Family  Caregiver's Name:: Xi  Caregiver's Relationship to Patient:: Family Member  Caregiver's Telephone Number:: [phone]  Support Systems: Family members  County of Residence: [place]  Home entry access options. Select all that apply.: No steps to enter home  Type of Current Residence: 2 story home  Upon entering residence, is there a bedroom on the main floor (no further steps)?: No  A bedroom is located on the following floor levels of residence (select all that apply):: 2nd Floor  Upon entering residence, is there a bathroom on the main floor (no further steps)?: No  Indicate which floors of current residence have a bathroom (select all the apply):: 2nd Floor  Number of steps to 2nd floor from main floor: One Flight  Living Arrangements: Lives w/ Extended Family  Is patient a ?: No    Activities of Daily Living Prior to Admission  Functional Status: Independent  Completes ADLs independently?: No  Level of ADL dependence: Assistance  Ambulates independently?: No  Level of ambulatory  dependence: Assistance  Does patient use assisted devices?: Yes  Assisted Devices (DME) used: Wheelchair, Walker  Does patient currently own DME?: Yes  What DME does the patient currently own?: Walker, Wheelchair  Does patient have a history of Outpatient Therapy (PT/OT)?: Yes  Does the patient have a history of Short-Term Rehab?: Yes ([facility] and [facility])  Does patient have a history of HHC?: No  Does patient currently have HHC?: No    Current Home Health Care  Type of Current Home Care Services: Meals on Wheels    Patient Information Continued  Income Source: SSI/SSD  Does patient have prescription coverage?: Yes  Does patient receive dialysis treatments?: No  Does patient have a history of substance abuse?: No  Does patient have a history of Mental Health Diagnosis?: No         Means of Transportation  Means of Transport to Appts:: Family transport          DISCHARGE DETAILS:    Discharge planning discussed with:: Pt and POA  Freedom of Choice: Yes  Comments - Freedom of Choice: CM met with pt and introduced self/role. CM spoke with pt JAMES Layne and disucssed dc plans. Pt dc plan is to return home with homecare. [place] referral made.  CM contacted family/caregiver?: Yes  Were Treatment Team discharge recommendations reviewed with patient/caregiver?: Yes  Did patient/caregiver verbalize understanding of patient care needs?: Yes  Were patient/caregiver advised of the risks associated with not following Treatment Team discharge recommendations?: Yes    Contacts  Patient Contacts: Xi (niece/POA)  Relationship to Patient:: Family  Contact Method: Phone  Phone Number: [phone]  Reason/Outcome: Continuity of Care, Emergency Contact, Discharge Planning    Requested Home Health Care         Is the patient interested in HHC at discharge?: No    DME Referral Provided  Referral made for DME?: No    Other Referral/Resources/Interventions Provided:  Interventions: HHC         Treatment Team Recommendation: Home with  Home Health Care  Discharge Destination Plan:: Home with Home Health Care  Transport at Discharge : Family

## 2025-03-05 NOTE — ASSESSMENT & PLAN NOTE
Patient is A and O x 1.    Plan  Delirium precautions  Continue home mirtazapine 7.5mg daily  Continue home quetiapine 25mg daily

## 2025-03-05 NOTE — HOSPITAL COURSE
"Sona Valenzuela is a 89 year old female with PMH of hypertension, CHF, chronic A-fib, CKD3, vascular dementia, HTN, lung cancer s/p lobectomy who presented to the ED on 3/4/2025 with an episode of seizure-like activity noted by a family member. Patient was having lower abdominal pressure and discomfort and patient was being taken to see her PCP. On the way, patient developed an episode of slurred speech, slowness, body stiffness, unresponsiveness which lasted for about 3 minutes and then recovered. CT head showed no acute intracranial abnormality. She was found to have a positive UA, urine culture growing E Coli and received IV ceftriaxone for a UTI. She was also found to have elevated but stable troponin levels (740/699/659). EKG with no acute ischemic changes. CXR showed suggested some left basilar atelectasis or infiltrate as well as possible small left pleural effusion. Consider lateral view for further assessment. CTPA showed: 1. no evidence of a central pulmonary embolus. However, the peripheral pulmonary arteries could not be adequately assessed, due to respiratory motion artifact. 2. There were patchy opacities within the left upper lobe and both lung bases, which favor atelectasis. Entirely excluded. 3. Moderate cardiomegaly, with disproportionate enlargement of the right atrium. Given the small pleural effusions and prominent reflux of contrast into the hepatic veins, this is consistent with right heart dysfunction.    Patient had no focal neurological deficits upon initial evaluation and subsequent ones, but was unaware of what led to her hospitalization and was A and O x1-2. EEG showed \"Background activities and posteriorly dominant rhythm are too slow suggesting mild diffuse cerebral dysfunction of nonspecific etiology.\"  "

## 2025-03-05 NOTE — ASSESSMENT & PLAN NOTE
Lab Results   Component Value Date    EGFR 31 03/05/2025    EGFR 33 03/04/2025    EGFR 38 12/05/2024    CREATININE 1.37 (H) 03/05/2025    CREATININE 1.32 (H) 03/04/2025    CREATININE 1.17 12/05/2024   Assessment  Creatinine and BUN are at baseline of 1.3-1.4 and 31-34 respectively    Plan  Monitor creatinine  Avoid nephrotoxins

## 2025-03-05 NOTE — ASSESSMENT & PLAN NOTE
Patient presents with 1 episode of slurred speech, slowness followed by body stiffening, not responding, unable to talk for 3 minutes.  No documented prior episode or seizures.  Patient is afebrile.  A and O x 1 secondary to dementia.  Has a UTI but no other evidence of infection or neck stiffness.  Neurological examination without focal neurological deficits.    Plan  Monitor on telemetry  Check orthostatic vitals  MRI brain  EEG  Echocardiogram  Seizure precautions  Fall precautions

## 2025-03-05 NOTE — ASSESSMENT & PLAN NOTE
Patient had lower abdominal pressure and discomfort.  UA significant for any minimal WBC and bacteria.    Plan  Continue ceftriaxone  Follow-up urine culture

## 2025-03-05 NOTE — ASSESSMENT & PLAN NOTE
Wt Readings from Last 3 Encounters:   03/05/25 63.2 kg (139 lb 5.3 oz)   01/17/25 64.7 kg (142 lb 9.6 oz)   12/31/24 62.6 kg (138 lb)       Euvolemic on examination.    Plan  Held Lasix 20 mg twice daily  SCOTT, daily weight  Fluid restriction

## 2025-03-05 NOTE — PROGRESS NOTES
Progress Note - Hospitalist   Name: Sona Valenzuela 89 y.o. female I MRN: [id number]  Unit/Bed#: -01 I Date of Admission: 3/4/2025   Date of Service: 3/5/2025 I Hospital Day: 1    Assessment & Plan  Seizure-like activity (HCC)  Patient presents with 1 episode of slurred speech, slowness followed by body stiffening, not responding, unable to talk for 3 minutes.  No documented prior episode or seizures.  Patient is afebrile.  A and O x 1 secondary to dementia.  Has a UTI but no other evidence of infection or neck stiffness.  Neurological examination without focal neurological deficits.    Plan  Monitor on telemetry  Check orthostatic vitals  MRI brain  EEG  Echocardiogram  Seizure precautions  Fall precautions  UTI (urinary tract infection)  Patient had lower abdominal pressure and discomfort.  UA significant for any minimal WBC and bacteria.    Plan  Continue ceftriaxone  Follow-up urine culture  Elevated troponin  Troponin 740/699/659  Patient without any chest pain.  EKG without acute ischemic changes.    Plan  Monitor on telemetry  Echocardiogram  Chronic atrial fibrillation (HCC)  Patient has chronic A-fib.  Rate controlled.  Not on any blood thinners due to dementia and fall risk.  Chronic congestive heart failure (HCC)  Wt Readings from Last 3 Encounters:   03/05/25 63.2 kg (139 lb 5.3 oz)   01/17/25 64.7 kg (142 lb 9.6 oz)   12/31/24 62.6 kg (138 lb)       Euvolemic on examination.    Plan  Held Lasix 20 mg twice daily  SCOTT, daily weight  Fluid restriction    Vascular dementia, uncomplicated (HCC)  Patient is A and O x 1.    Plan  Delirium precautions  Continue home mirtazapine 7.5mg daily  Continue home quetiapine 25mg daily  Primary hypertension  Plan  Held Lasix  Stage 3b chronic kidney disease (HCC)  Lab Results   Component Value Date    EGFR 31 03/05/2025    EGFR 33 03/04/2025    EGFR 38 12/05/2024    CREATININE 1.37 (H) 03/05/2025    CREATININE 1.32 (H) 03/04/2025    CREATININE 1.17 12/05/2024    Assessment  Creatinine and BUN are at baseline of 1.3-1.4 and 31-34 respectively    Plan  Monitor creatinine  Avoid nephrotoxins    VTE Pharmacologic Prophylaxis:   High Risk (Score >/= 5) - Pharmacological DVT Prophylaxis Ordered: heparin. Sequential Compression Devices Ordered.    Mobility:   Basic Mobility Inpatient Raw Score: 12  -[facility] Goal: 4: Move to chair/commode  -HL Achieved: 4: Move to chair/commode  Given lack of ability to obtain history from patient 2/2 dementia and unable to reach family today, uncertain about patient's baseline to determine if she is at goal.    Patient Centered Rounds: I performed bedside rounds with nursing staff today.   Discussions with Specialists or Other Care Team Provider: N/A    Education and Discussions with Family / Patient: Attempted to update  (niece) via phone. Unable to contact. Will call back later,    Current Length of Stay: 1 day(s)  Current Patient Status: Inpatient   Certification Statement: The patient will continue to require additional inpatient hospital stay due to further workup of presenting symptoms, pending imaging results.  Discharge Plan: Anticipate discharge tomorrow to discharge location to be determined pending rehab evaluations.    Code Status: Level 3 - DNAR and DNI    Subjective   Patient is unaware why she is hospitalized. When attempting to recall, she mumbles incomprehensible words, difficult to assess subjective but reports feeling fine. Denies abdominal pain or burning with urination. States her niece was with her last night.    Objective :  Temp:  [97 °F (36.1 °C)-98.2 °F (36.8 °C)] 97 °F (36.1 °C)  HR:  [] 88  BP: ()/(59-78) 134/71  Resp:  [16-18] 17  SpO2:  [93 %-97 %] 94 %  O2 Device: None (Room air)    Body mass index is 26.33 kg/m².     Input and Output Summary (last 24 hours):     Intake/Output Summary (Last 24 hours) at 3/5/2025 1352  Last data filed at 3/5/2025 1101  Gross per 24 hour   Intake 280 ml    Output 100 ml   Net 180 ml       Physical Exam  Cardiovascular:      Rate and Rhythm: Rhythm irregular.      Heart sounds: Normal heart sounds. No murmur heard.  Pulmonary:      Effort: Pulmonary effort is normal. No respiratory distress.      Breath sounds: Normal breath sounds.   Skin:     General: Skin is warm and dry.   Neurological:      Mental Status: She is alert. She is disoriented.      Cranial Nerves: No cranial nerve deficit.      Sensory: Sensory deficit: AOx1, only oriented to her first name (was not able to state last name), unsure of month or year, and unsure of location; mumble incomprehensible words when trying to recall.      Motor: No weakness.         Lines/Drains: patient pulled her lines, no access as of this AM, plan to re-establish        Telemetry:  Telemetry Orders (From admission, onward)               24 Hour Telemetry Monitoring  (ED Bridging Orders Panel)  Continuous x 24 Hours (Telem)        Expiring   Question:  Reason for 24 Hour Telemetry  Answer:  PCI/EP study (including pacer and ICD implementation), Cardiac surgery, MI, abnormal cardiac cath, and chest pain- rule out MI                     Telemetry Reviewed: Atrial fibrillation. HR averaging 80s  Indication for Continued Telemetry Use: Arrthymias requiring medical therapy               Lab Results: I have reviewed the following results:   Results from last 7 days   Lab Units 03/05/25  0507 03/04/25  1108   WBC Thousand/uL 8.12 7.92   HEMOGLOBIN g/dL 8.9* 9.1*   HEMATOCRIT % 29.8* 30.4*   PLATELETS Thousands/uL 286 302   SEGS PCT %  --  81*   LYMPHO PCT %  --  7*   MONO PCT %  --  11   EOS PCT %  --  1     Results from last 7 days   Lab Units 03/05/25  0507   SODIUM mmol/L 138   POTASSIUM mmol/L 3.8   CHLORIDE mmol/L 106   CO2 mmol/L 23   BUN mg/dL 34*   CREATININE mg/dL 1.37*   ANION GAP mmol/L 9   CALCIUM mg/dL 8.6   ALBUMIN g/dL 3.8   TOTAL BILIRUBIN mg/dL 0.89   ALK PHOS U/L 127*   ALT U/L 23   AST U/L 34   GLUCOSE RANDOM  mg/dL 123     Results from last 7 days   Lab Units 03/04/25  1108   INR  1.20*             Results from last 7 days   Lab Units 03/04/25  1254 03/04/25  1108   LACTIC ACID mmol/L 1.8 2.1*   PROCALCITONIN ng/ml  --  0.46*       Recent Cultures (last 7 days):   Results from last 7 days   Lab Units 03/04/25  1549 03/04/25  1109 03/04/25  1108   BLOOD CULTURE   --  Received in Microbiology Lab. Culture in Progress. Received in Microbiology Lab. Culture in Progress.   URINE CULTURE  >100,000 cfu/ml Gram Negative Rustam*  --   --        Imaging Results Review: I reviewed radiology reports from this admission including: chest xray, CT chest, and CT head.  Other Study Results Review: EKG was reviewed.     Last 24 Hours Medication List:     Current Facility-Administered Medications:     ceftriaxone (ROCEPHIN) 1 g/50 mL in dextrose IVPB, Q24H, Last Rate: Stopped (03/04/25 1817)    [Held by provider] furosemide (LASIX) tablet 20 mg, BID    gabapentin (NEURONTIN) capsule 100 mg, TID    heparin (porcine) subcutaneous injection 5,000 Units, Q8H HESHAM **AND** [CANCELED] Platelet count, Once    mirtazapine (REMERON) tablet 7.5 mg, HS    QUEtiapine (SEROquel) tablet 25 mg, HS    sodium chloride 0.9 % infusion, Continuous, Last Rate: 75 mL/hr (03/05/25 1043)    Administrative Statements   Today, Patient Was Seen By: Mark Barbour  I have spent a total time of 20 minutes in caring for this patient on the day of the visit/encounter including Diagnostic results, Patient and family education, Documenting in the medical record, Reviewing/placing orders in the medical record (including tests, medications, and/or procedures), and Obtaining or reviewing history  .    **Please Note: This note may have been constructed using a voice recognition system.**

## 2025-03-05 NOTE — PLAN OF CARE
Problem: Potential for Falls  Goal: Patient will remain free of falls  Description: INTERVENTIONS:  - Educate patient/family on patient safety including physical limitations  - Instruct patient to call for assistance with activity   - Consult OT/PT to assist with strengthening/mobility   - Keep Call bell within reach  - Keep bed low and locked with side rails adjusted as appropriate  - Keep care items and personal belongings within reach  - Initiate and maintain comfort rounds  - Make Fall Risk Sign visible to staff  - Offer Toileting every 2 Hours, in advance of need  - Initiate/Maintain bed alarm  - Obtain necessary fall risk management equipment: bed alarm  - Apply yellow socks and bracelet for high fall risk patients  - Consider moving patient to room near nurses station  Outcome: Progressing     Problem: SAFETY ADULT  Goal: Patient will remain free of falls  Description: INTERVENTIONS:  - Educate patient/family on patient safety including physical limitations  - Instruct patient to call for assistance with activity   - Consult OT/PT to assist with strengthening/mobility   - Keep Call bell within reach  - Keep bed low and locked with side rails adjusted as appropriate  - Keep care items and personal belongings within reach  - Initiate and maintain comfort rounds  - Make Fall Risk Sign visible to staff  - Offer Toileting every 2 Hours, in advance of need  - Initiate/Maintain bed alarm  - Obtain necessary fall risk management equipment: bed alarm  - Apply yellow socks and bracelet for high fall risk patients  - Consider moving patient to room near nurses station  Outcome: Progressing  Goal: Maintains/Returns to pre admission functional level  Description: INTERVENTIONS:  - Perform AM-PAC 6 Click Basic Mobility/ Daily Activity assessment daily.  - Set and communicate daily mobility goal to care team and patient/family/caregiver.   - Collaborate with rehabilitation services on mobility goals if consulted  -  Perform Range of Motion 2 times a day.  - Reposition patient every 2 hours.  - Dangle patient 2 times a day  - Stand patient 2 times a day  - Ambulate patient 2 times a day  - Out of bed to chair 2 times a day   - Out of bed for meals 2 times a day  - Out of bed for toileting  - Record patient progress and toleration of activity level   Outcome: Progressing     Problem: Knowledge Deficit  Goal: Patient/family/caregiver demonstrates understanding of disease process, treatment plan, medications, and discharge instructions  Description: Complete learning assessment and assess knowledge base.  Interventions:  - Provide teaching at level of understanding  - Provide teaching via preferred learning methods  Outcome: Not Progressing

## 2025-03-05 NOTE — NURSING NOTE
"Pt confused overnight. Became agitated, anxious and was attempting to get out of bed saying \"I need to get out of here\", and \"I want to go home.\" Pt also removed all her IVs throughout the night, including the 2 this RN placed. SLIM notified. Orders received. Medication given. Pt calmed after the second dose of Zyprexa but hasn't slept much. Will continue to monitor.    Pt remains awake with short periods of sleep. Easy to re-orient at this time. Still pulling on Lines and removing clothing.     Pt a poor historian. Family not present at bedside. Unable to complete admission at this time.   "

## 2025-03-06 ENCOUNTER — APPOINTMENT (INPATIENT)
Dept: NON INVASIVE DIAGNOSTICS | Facility: HOSPITAL | Age: OVER 89
DRG: 101 | End: 2025-03-06
Payer: COMMERCIAL

## 2025-03-06 ENCOUNTER — APPOINTMENT (INPATIENT)
Dept: RADIOLOGY | Facility: HOSPITAL | Age: OVER 89
DRG: 101 | End: 2025-03-06
Payer: COMMERCIAL

## 2025-03-06 PROBLEM — R40.4 TRANSIENT ALTERATION OF AWARENESS: Status: ACTIVE | Noted: 2025-03-05

## 2025-03-06 LAB
ANION GAP SERPL CALCULATED.3IONS-SCNC: 9 MMOL/L (ref 4–13)
AORTIC ROOT: 2.8 CM
ASCENDING AORTA: 3.4 CM
BSA FOR ECHO PROCEDURE: 1.66 M2
BUN SERPL-MCNC: 32 MG/DL (ref 5–25)
CALCIUM SERPL-MCNC: 8.4 MG/DL (ref 8.4–10.2)
CHLORIDE SERPL-SCNC: 109 MMOL/L (ref 96–108)
CO2 SERPL-SCNC: 21 MMOL/L (ref 21–32)
CREAT SERPL-MCNC: 1.27 MG/DL (ref 0.6–1.3)
ERYTHROCYTE [DISTWIDTH] IN BLOOD BY AUTOMATED COUNT: 18.3 % (ref 11.6–15.1)
FLUAV RNA RESP QL NAA+PROBE: NEGATIVE
FLUBV RNA RESP QL NAA+PROBE: NEGATIVE
FRACTIONAL SHORTENING: 47 (ref 28–44)
GFR SERPL CREATININE-BSD FRML MDRD: 34 ML/MIN/1.73SQ M
GLUCOSE SERPL-MCNC: 127 MG/DL (ref 65–140)
HCT VFR BLD AUTO: 30.1 % (ref 34.8–46.1)
HGB BLD-MCNC: 8.8 G/DL (ref 11.5–15.4)
INTERVENTRICULAR SEPTUM IN DIASTOLE (PARASTERNAL SHORT AXIS VIEW): 0.7 CM
INTERVENTRICULAR SEPTUM: 0.7 CM (ref 0.6–1.1)
LAAS-AP2: 16.2 CM2
LAAS-AP4: 15.9 CM2
LEFT ATRIUM AREA SYSTOLE SINGLE PLANE A4C: 16.7 CM2
LEFT ATRIUM SIZE: 4.5 CM
LEFT ATRIUM VOLUME (MOD BIPLANE): 34 ML
LEFT ATRIUM VOLUME INDEX (MOD BIPLANE): 20.5 ML/M2
LEFT INTERNAL DIMENSION IN SYSTOLE: 1.6 CM (ref 2.1–4)
LEFT VENTRICULAR INTERNAL DIMENSION IN DIASTOLE: 3 CM (ref 3.5–6)
LEFT VENTRICULAR POSTERIOR WALL IN END DIASTOLE: 0.9 CM
LEFT VENTRICULAR STROKE VOLUME: 28 ML
LV EF US.2D.A4C+ESTIMATED: 56 %
LVSV (TEICH): 28 ML
MCH RBC QN AUTO: 22 PG (ref 26.8–34.3)
MCHC RBC AUTO-ENTMCNC: 29.2 G/DL (ref 31.4–37.4)
MCV RBC AUTO: 75 FL (ref 82–98)
PLATELET # BLD AUTO: 275 THOUSANDS/UL (ref 149–390)
PMV BLD AUTO: 10.2 FL (ref 8.9–12.7)
POTASSIUM SERPL-SCNC: 4.1 MMOL/L (ref 3.5–5.3)
RA PRESSURE ESTIMATED: 15 MMHG
RBC # BLD AUTO: 4 MILLION/UL (ref 3.81–5.12)
RIGHT ATRIUM AREA SYSTOLE A4C: 31.4 CM2
RIGHT VENTRICLE ID DIMENSION: 3.3 CM
RSV RNA RESP QL NAA+PROBE: NEGATIVE
RV PSP: 65 MMHG
SARS-COV-2 RNA RESP QL NAA+PROBE: NEGATIVE
SL CV LEFT ATRIUM LENGTH A2C: 6.2 CM
SL CV LV EF: 55
SL CV PED ECHO LEFT VENTRICLE DIASTOLIC VOLUME (MOD BIPLANE) 2D: 35 ML
SL CV PED ECHO LEFT VENTRICLE SYSTOLIC VOLUME (MOD BIPLANE) 2D: 7 ML
SODIUM SERPL-SCNC: 139 MMOL/L (ref 135–147)
TR MAX PG: 50 MMHG
TR PEAK VELOCITY: 3.6 M/S
TRICUSPID ANNULAR PLANE SYSTOLIC EXCURSION: 1 CM
TRICUSPID VALVE PEAK REGURGITATION VELOCITY: 3.55 M/S
WBC # BLD AUTO: 8 THOUSAND/UL (ref 4.31–10.16)

## 2025-03-06 PROCEDURE — 93306 TTE W/DOPPLER COMPLETE: CPT

## 2025-03-06 PROCEDURE — 80048 BASIC METABOLIC PNL TOTAL CA: CPT | Performed by: INTERNAL MEDICINE

## 2025-03-06 PROCEDURE — 99232 SBSQ HOSP IP/OBS MODERATE 35: CPT | Performed by: STUDENT IN AN ORGANIZED HEALTH CARE EDUCATION/TRAINING PROGRAM

## 2025-03-06 PROCEDURE — 93306 TTE W/DOPPLER COMPLETE: CPT | Performed by: INTERNAL MEDICINE

## 2025-03-06 PROCEDURE — 85027 COMPLETE CBC AUTOMATED: CPT | Performed by: INTERNAL MEDICINE

## 2025-03-06 PROCEDURE — 71045 X-RAY EXAM CHEST 1 VIEW: CPT

## 2025-03-06 PROCEDURE — 0241U HB NFCT DS VIR RESP RNA 4 TRGT: CPT | Performed by: INTERNAL MEDICINE

## 2025-03-06 PROCEDURE — 99222 1ST HOSP IP/OBS MODERATE 55: CPT | Performed by: INTERNAL MEDICINE

## 2025-03-06 RX ORDER — FUROSEMIDE 20 MG/1
20 TABLET ORAL 2 TIMES DAILY
Status: DISCONTINUED | OUTPATIENT
Start: 2025-03-06 | End: 2025-03-13 | Stop reason: HOSPADM

## 2025-03-06 RX ADMIN — HEPARIN SODIUM 5000 UNITS: 5000 INJECTION INTRAVENOUS; SUBCUTANEOUS at 14:26

## 2025-03-06 RX ADMIN — HEPARIN SODIUM 5000 UNITS: 5000 INJECTION INTRAVENOUS; SUBCUTANEOUS at 06:11

## 2025-03-06 RX ADMIN — QUETIAPINE FUMARATE 25 MG: 25 TABLET ORAL at 21:30

## 2025-03-06 RX ADMIN — IRON SUCROSE 200 MG: 20 INJECTION, SOLUTION INTRAVENOUS at 10:42

## 2025-03-06 RX ADMIN — CEFTRIAXONE SODIUM 1000 MG: 10 INJECTION, POWDER, FOR SOLUTION INTRAVENOUS at 16:56

## 2025-03-06 RX ADMIN — FUROSEMIDE 20 MG: 20 TABLET ORAL at 21:23

## 2025-03-06 RX ADMIN — HEPARIN SODIUM 5000 UNITS: 5000 INJECTION INTRAVENOUS; SUBCUTANEOUS at 21:24

## 2025-03-06 RX ADMIN — GABAPENTIN 100 MG: 100 CAPSULE ORAL at 21:23

## 2025-03-06 RX ADMIN — GABAPENTIN 100 MG: 100 CAPSULE ORAL at 10:03

## 2025-03-06 RX ADMIN — FUROSEMIDE 20 MG: 20 TABLET ORAL at 14:26

## 2025-03-06 RX ADMIN — MIRTAZAPINE 7.5 MG: 15 TABLET, FILM COATED ORAL at 21:30

## 2025-03-06 NOTE — PLAN OF CARE
Problem: Potential for Falls  Goal: Patient will remain free of falls  Description: INTERVENTIONS:  - Educate patient/family on patient safety including physical limitations  - Instruct patient to call for assistance with activity   - Consult OT/PT to assist with strengthening/mobility   - Keep Call bell within reach  - Keep bed low and locked with side rails adjusted as appropriate  - Keep care items and personal belongings within reach  - Initiate and maintain comfort rounds  - Make Fall Risk Sign visible to staff  - Offer Toileting every 2 Hours, in advance of need  - Initiate/Maintain bed alarm  - Obtain necessary fall risk management equipment: call bell within reach   - Apply yellow socks and bracelet for high fall risk patients  - Consider moving patient to room near nurses station  Outcome: Progressing     Problem: PAIN - ADULT  Goal: Verbalizes/displays adequate comfort level or baseline comfort level  Description: Interventions:  - Encourage patient to monitor pain and request assistance  - Assess pain using appropriate pain scale  - Administer analgesics based on type and severity of pain and evaluate response  - Implement non-pharmacological measures as appropriate and evaluate response  - Consider cultural and social influences on pain and pain management  - Notify physician/advanced practitioner if interventions unsuccessful or patient reports new pain  Outcome: Progressing     Problem: SAFETY ADULT  Goal: Patient will remain free of falls  Description: INTERVENTIONS:  - Educate patient/family on patient safety including physical limitations  - Instruct patient to call for assistance with activity   - Consult OT/PT to assist with strengthening/mobility   - Keep Call bell within reach  - Keep bed low and locked with side rails adjusted as appropriate  - Keep care items and personal belongings within reach  - Initiate and maintain comfort rounds  - Make Fall Risk Sign visible to staff  - Offer  Toileting every 2 Hours, in advance of need  - Initiate/Maintain bed alarm  - Obtain necessary fall risk management equipment: call bell within reach   - Apply yellow socks and bracelet for high fall risk patients  - Consider moving patient to room near nurses station  Outcome: Progressing  Goal: Maintain or return to baseline ADL function  Description: INTERVENTIONS:  -  Assess patient's ability to carry out ADLs; assess patient's baseline for ADL function and identify physical deficits which impact ability to perform ADLs (bathing, care of mouth/teeth, toileting, grooming, dressing, etc.)  - Assess/evaluate cause of self-care deficits   - Assess range of motion  - Assess patient's mobility; develop plan if impaired  - Assess patient's need for assistive devices and provide as appropriate  - Encourage maximum independence but intervene and supervise when necessary  - Involve family in performance of ADLs  - Assess for home care needs following discharge   - Consider OT consult to assist with ADL evaluation and planning for discharge  - Provide patient education as appropriate  Outcome: Progressing  Goal: Maintains/Returns to pre admission functional level  Description: INTERVENTIONS:  - Perform AM-PAC 6 Click Basic Mobility/ Daily Activity assessment daily.  - Set and communicate daily mobility goal to care team and patient/family/caregiver.   - Collaborate with rehabilitation services on mobility goals if consulted  - Perform Range of Motion 4 times a day.  - Reposition patient every 2 hours.  - Dangle patient 3 times a day  - Stand patient 3 times a day  - Ambulate patient 3 times a day  - Out of bed to chair 3 times a day   - Out of bed for meals 3 times a day  - Out of bed for toileting  - Record patient progress and toleration of activity level   Outcome: Progressing

## 2025-03-06 NOTE — ASSESSMENT & PLAN NOTE
Lab Results   Component Value Date    EGFR 34 03/06/2025    EGFR 31 03/05/2025    EGFR 33 03/04/2025    CREATININE 1.27 03/06/2025    CREATININE 1.37 (H) 03/05/2025    CREATININE 1.32 (H) 03/04/2025   Assessment  Creatinine and BUN are at baseline of 1.3-1.4 and 31-34 respectively    Plan  Monitor creatinine  Avoid nephrotoxins

## 2025-03-06 NOTE — ASSESSMENT & PLAN NOTE
Troponin 740/699/659  Patient without any chest pain.  EKG without acute ischemic changes.  Patient O2 between 84-97% without oxygen, placed on 2L NC d/t 84% reading; is a mouth breather while asleep and has increased respiratory effort when sleeping; breathing comfortable while awake with O2 around 90%  CHF vs myocarditis vs PE for new O2 requirement with and elevated tropnin  CHF most likely given Lasix being held on admission and crackles in lungs; less likely myocarditis or PE given lack of persistent tachypnea and no tachycardia and no reported SOB but patient is unable to reliably report symptoms d/t dementia    Plan  Monitor on telemetry  Echocardiogram  2L NC as needed for O2 <92%  Flu and COVID swabs for potential viral myocarditis etiology  Lasix restarted  CXR  Cardiology recommendations appreciated

## 2025-03-06 NOTE — PROGRESS NOTES
"Progress Note - Hospitalist   Name: Sona Valenzuela 89 y.o. female I MRN: [id number]  Unit/Bed#: -01 I Date of Admission: 3/4/2025   Date of Service: 3/6/2025 I Hospital Day: 2  { ?Quick Links I Problem List I PORCH I Billing Tip:05629}  Assessment & Plan  Seizure-like activity (HCC)  Patient presents with 1 episode of slurred speech, slowness followed by body stiffening, not responding, unable to talk for 3 minutes.  No documented prior episode or seizures.  Patient is afebrile.  A and O x 1 secondary to dementia.  Has a UTI but no other evidence of infection or neck stiffness.  Neurological examination without focal neurological deficits.  EEG results 3/5: \"Background activities and posteriorly dominant rhythm are too slow suggesting mild diffuse cerebral dysfunction of nonspecific etiology.\" Transient alteration of awareness     Plan  Monitor on telemetry  Check orthostatic vitals  MRI brain  Echocardiogram  Seizure precautions  Fall precautions  Transient alteration of awareness  Patient presented on 3/4/3035 with 1 episode of slurred speech, slowness followed by body stiffening, not responding, unable to talk for 3 minutes.  No documented prior episode or seizures.  Found to have a UTI but no other evidence of infection or neck stiffness.  Neurological examination without focal neurological deficits.  EEG resulted on 3/5/2025 showed \"Background activities and posteriorly dominant rhythm are too slow suggesting mild diffuse cerebral dysfunction of nonspecific etiology.\"    Plan:  MRI brain  UTI (urinary tract infection)  Patient had lower abdominal pressure and discomfort.  UA significant for any minimal WBC and bacteria.  Urine culture: E Coli,     Plan  Continue ceftriaxone  Pending urine culture sensitivities   Elevated troponin  Troponin 740/699/659  Patient without any chest pain.  EKG without acute ischemic changes.  Patient O2 between 84-97% without oxygen, placed on 2L NC d/t 84% reading; is a " mouth breather while asleep and has increased respiratory effort when sleeping; breathing comfortable while awake with O2 around 90%  CHF vs myocarditis vs PE for new O2 requirement with and elevated tropnin  CHF most likely given Lasix being held on admission and crackles in lungs; less likely myocarditis or PE given lack of persistent tachypnea and no tachycardia and no reported SOB but patient is unable to reliably report symptoms d/t dementia    Plan  Monitor on telemetry  Echocardiogram  2L NC as needed for O2 <92%  Flu and COVID swabs for potential viral myocarditis etiology  Lasix restarted  CXR  Cardiology recommendations appreciated  Chronic atrial fibrillation (HCC)  Patient has chronic A-fib.  Rate controlled.  Not on any blood thinners due to dementia and fall risk.  Primary hypertension  Plan  Restart Lasix as there is no ANGIE  Chronic congestive heart failure (HCC)  Wt Readings from Last 3 Encounters:   03/06/25 66.9 kg (147 lb 7.8 oz)   01/17/25 64.7 kg (142 lb 9.6 oz)   12/31/24 62.6 kg (138 lb)       Euvolemic on examination.  Some crackles noted in lungs    Plan  Restarted Lasix  SCOTT, daily weight  Fluid restriction    Stage 3b chronic kidney disease (HCC)  Lab Results   Component Value Date    EGFR 34 03/06/2025    EGFR 31 03/05/2025    EGFR 33 03/04/2025    CREATININE 1.27 03/06/2025    CREATININE 1.37 (H) 03/05/2025    CREATININE 1.32 (H) 03/04/2025   Assessment  Creatinine and BUN are at baseline of 1.3-1.4 and 31-34 respectively    Plan  Monitor creatinine  Avoid nephrotoxins  Vascular dementia, uncomplicated (HCC)  Patient is A and O x 1.    Plan  Delirium precautions  Continue home mirtazapine 7.5mg daily  Continue home quetiapine 25mg daily    VTE Pharmacologic Prophylaxis:   High Risk (Score >/= 5) - Pharmacological DVT Prophylaxis Ordered: heparin. Sequential Compression Devices Ordered.    Mobility:   Basic Mobility Inpatient Raw Score: 12  [facility] Goal: 4: Move to chair/commode  [facility]  Achieved: 3: Sit at edge of bed  -HL Goal achieved. Continue to encourage appropriate mobility. Per family, patient is bed bound at home.    Patient Centered Rounds: I performed bedside rounds with nursing staff today.   Discussions with Specialists or Other Care Team Provider: N/A    Education and Discussions with Family / Patient: Updated  (niece) via phone.    Current Length of Stay: 2 day(s)  Current Patient Status: Inpatient   Certification Statement: The patient will continue to require additional inpatient hospital stay due to  further workup of presenting symptoms, pending consults and imagining.  Discharge Plan: Anticipate discharge in 24-48 hrs to home with home services.    Code Status: Level 3 - DNAR and DNI    Subjective   Patient reports not feeling well today but is not able to fully express wrong. Upon further questioning, she states that she has pain and motions to her abdomen.    Objective :{?Quick Links I ICU Summary I Vitals I I/Os I LDAs I Mobility (PT/OT) I Code Status / ACP   ?Quick Links I Active Meds I Pain Meds I Antibiotics I Anticoagulants:40206}  Temp:  [96.4 °F (35.8 °C)-98.9 °F (37.2 °C)] 96.4 °F (35.8 °C)  HR:  [80-95] 88  BP: (107-160)/(72-90) 148/73  Resp:  [16-19] 16  SpO2:  [84 %-95 %] 84 %  O2 Device: None (Room air)    Body mass index is 27.87 kg/m².     Input and Output Summary (last 24 hours):     Intake/Output Summary (Last 24 hours) at 3/6/2025 1337  Last data filed at 3/6/2025 1209  Gross per 24 hour   Intake 500 ml   Output 0 ml   Net 500 ml       Physical Exam  Cardiovascular:      Rate and Rhythm: Rhythm irregular.      Heart sounds: Normal heart sounds. No murmur heard.  Pulmonary:      Effort: Pulmonary effort is normal.      Breath sounds: Rales present. No wheezing.      Comments: mouth breathing and increased work of breathing while sleeping only  Abdominal:      General: Abdomen is flat.      Palpations: Abdomen is soft.      Tenderness: There is  no abdominal tenderness. There is no guarding.   Skin:     General: Skin is warm and dry.   Neurological:      Mental Status: She is disoriented.      Comments: A and O x1         Lines/Drains: peripheral IV access        Telemetry:  Telemetry Orders (From admission, onward)               24 Hour Telemetry Monitoring  (ED Bridging Orders Panel)  Continuous x 24 Hours (Telem)        Expiring   Question:  Reason for 24 Hour Telemetry  Answer:  PCI/EP study (including pacer and ICD implementation), Cardiac surgery, MI, abnormal cardiac cath, and chest pain- rule out MI                     Telemetry Reviewed: Atrial fibrillation. HR averaging 80s-90s  Indication for Continued Telemetry Use: No indication for continued use. Will discontinue.              {?Quick Links I Lab Review I Micro Results I Radiology I Cardiology:51313}  Lab Results: I have reviewed the following results:   Results from last 7 days   Lab Units 03/06/25  0504 03/05/25  0507 03/04/25  1108   WBC Thousand/uL 8.00   < > 7.92   HEMOGLOBIN g/dL 8.8*   < > 9.1*   HEMATOCRIT % 30.1*   < > 30.4*   PLATELETS Thousands/uL 275   < > 302   SEGS PCT %  --   --  81*   LYMPHO PCT %  --   --  7*   MONO PCT %  --   --  11   EOS PCT %  --   --  1    < > = values in this interval not displayed.     Results from last 7 days   Lab Units 03/06/25  0504 03/05/25  0507   SODIUM mmol/L 139 138   POTASSIUM mmol/L 4.1 3.8   CHLORIDE mmol/L 109* 106   CO2 mmol/L 21 23   BUN mg/dL 32* 34*   CREATININE mg/dL 1.27 1.37*   ANION GAP mmol/L 9 9   CALCIUM mg/dL 8.4 8.6   ALBUMIN g/dL  --  3.8   TOTAL BILIRUBIN mg/dL  --  0.89   ALK PHOS U/L  --  127*   ALT U/L  --  23   AST U/L  --  34   GLUCOSE RANDOM mg/dL 127 123     Results from last 7 days   Lab Units 03/04/25  1108   INR  1.20*             Results from last 7 days   Lab Units 03/04/25  1254 03/04/25  1108   LACTIC ACID mmol/L 1.8 2.1*   PROCALCITONIN ng/ml  --  0.46*       Recent Cultures (last 7 days):   Results from last 7  days   Lab Units 03/04/25  1549 03/04/25  1109 03/04/25  1108   BLOOD CULTURE   --  No Growth at 24 hrs. No Growth at 24 hrs.   URINE CULTURE  >100,000 cfu/ml Escherichia coli*  --   --        Imaging Results Review: No pertinent imaging studies reviewed.  Other Study Results Review: Other studies reviewed include: EEG    Last 24 Hours Medication List:     Current Facility-Administered Medications:     acetaminophen (TYLENOL) tablet 650 mg, Q6H PRN    ceftriaxone (ROCEPHIN) 1 g/50 mL in dextrose IVPB, Q24H, Last Rate: 1,000 mg (03/05/25 1723)    furosemide (LASIX) tablet 20 mg, BID    gabapentin (NEURONTIN) capsule 100 mg, TID    heparin (porcine) subcutaneous injection 5,000 Units, Q8H HESHAM **AND** [CANCELED] Platelet count, Once    lidocaine (LMX) 4 % cream, BID PRN    mirtazapine (REMERON) tablet 7.5 mg, HS    QUEtiapine (SEROquel) tablet 25 mg, HS    Administrative Statements   Today, Patient Was Seen By: Mark Barbour  I have spent a total time of 30 minutes in caring for this patient on the day of the visit/encounter including Diagnostic results, Patient and family education, Impressions, Counseling / Coordination of care, Documenting in the medical record, Reviewing/placing orders in the medical record (including tests, medications, and/or procedures), Obtaining or reviewing history  , and Communicating with other healthcare professionals .    **Please Note: This note may have been constructed using a voice recognition system.**

## 2025-03-06 NOTE — ASSESSMENT & PLAN NOTE
"Patient presents with 1 episode of slurred speech, slowness followed by body stiffening, not responding, unable to talk for 3 minutes.  No documented prior episode or seizures.  Patient is afebrile.  A and O x 1 secondary to dementia.  Has a UTI but no other evidence of infection or neck stiffness.  Neurological examination without focal neurological deficits.  EEG results 3/5: \"Background activities and posteriorly dominant rhythm are too slow suggesting mild diffuse cerebral dysfunction of nonspecific etiology.\" Transient alteration of awareness     Plan  Monitor on telemetry  Check orthostatic vitals  MRI brain  Echocardiogram  Seizure precautions  Fall precautions  "

## 2025-03-06 NOTE — ASSESSMENT & PLAN NOTE
Peak troponin 740  Nonischemic myocardial injury dated 2 ANGIE, elevated lactic acidosis, anemia  Echocardiogram done EF 55%, systolic flattening of the IVS, septum bows into the left atrium suggesting increased right atrial pressure, moderate MAC, moderate to severe TR with severe pulmonary hypertension with RVSP at 65  Patient currently without anginal symptoms  NO further cardiac workup planned at this time

## 2025-03-06 NOTE — PROGRESS NOTES
"Progress Note - Hospitalist   Name: Sona Valenzuela 89 y.o. female I MRN: [id number]  Unit/Bed#: -01 I Date of Admission: 3/4/2025   Date of Service: 3/6/2025 I Hospital Day: 2    Assessment & Plan  Seizure-like activity (HCC)  Patient presents with 1 episode of slurred speech, slowness followed by body stiffening, not responding, unable to talk for 3 minutes.  No documented prior episode or seizures.  Patient is afebrile.  A and O x 1 secondary to dementia.  Has a UTI but no other evidence of infection or neck stiffness.  Neurological examination without focal neurological deficits.  EEG results 3/5: \"Background activities and posteriorly dominant rhythm are too slow suggesting mild diffuse cerebral dysfunction of nonspecific etiology.\" Transient alteration of awareness     Plan  Monitor on telemetry  Check orthostatic vitals  MRI brain  Echocardiogram  Seizure precautions  Fall precautions  Transient alteration of awareness  Patient presented on 3/4/3035 with 1 episode of slurred speech, slowness followed by body stiffening, not responding, unable to talk for 3 minutes.  No documented prior episode or seizures.  Found to have a UTI but no other evidence of infection or neck stiffness.  Neurological examination without focal neurological deficits.  EEG resulted on 3/5/2025 showed \"Background activities and posteriorly dominant rhythm are too slow suggesting mild diffuse cerebral dysfunction of nonspecific etiology.\"    Plan:  MRI brain  UTI (urinary tract infection)  Patient had lower abdominal pressure and discomfort.  UA significant for any minimal WBC and bacteria.  Urine culture: E Coli,     Plan  Continue ceftriaxone  Pending urine culture sensitivities   Elevated troponin  Troponin 740/699/659  Patient without any chest pain.  EKG without acute ischemic changes.  Patient O2 between 84-97% without oxygen, placed on 2L NC d/t 84% reading; is a mouth breather while asleep and has increased respiratory " effort when sleeping; breathing comfortable while awake with O2 around 90%  CHF vs myocarditis vs PE for new O2 requirement with and elevated tropnin  CHF most likely given Lasix being held on admission and crackles in lungs; less likely myocarditis or PE given lack of persistent tachypnea and no tachycardia and no reported SOB but patient is unable to reliably report symptoms d/t dementia    Plan  Monitor on telemetry  Echocardiogram  2L NC as needed for O2 <92%  Flu and COVID swabs for potential viral myocarditis etiology  Lasix restarted  CXR  Cardiology recommendations appreciated  Chronic atrial fibrillation (HCC)  Patient has chronic A-fib.  Rate controlled.  Not on any blood thinners due to dementia and fall risk.  Primary hypertension  Plan  Restart Lasix as there is no ANGIE  Chronic congestive heart failure (HCC)  Wt Readings from Last 3 Encounters:   03/06/25 66.9 kg (147 lb 7.8 oz)   01/17/25 64.7 kg (142 lb 9.6 oz)   12/31/24 62.6 kg (138 lb)       Euvolemic on examination.  Some crackles noted in lungs    Plan  Restarted Lasix  SCOTT, daily weight  Fluid restriction    Stage 3b chronic kidney disease (HCC)  Lab Results   Component Value Date    EGFR 34 03/06/2025    EGFR 31 03/05/2025    EGFR 33 03/04/2025    CREATININE 1.27 03/06/2025    CREATININE 1.37 (H) 03/05/2025    CREATININE 1.32 (H) 03/04/2025   Assessment  Creatinine and BUN are at baseline of 1.3-1.4 and 31-34 respectively    Plan  Monitor creatinine  Avoid nephrotoxins  Vascular dementia, uncomplicated (HCC)  Patient is A and O x 1.    Plan  Delirium precautions  Continue home mirtazapine 7.5mg daily  Continue home quetiapine 25mg daily    VTE Pharmacologic Prophylaxis:   High Risk (Score >/= 5) - Pharmacological DVT Prophylaxis Ordered: heparin. Sequential Compression Devices Ordered.    Mobility:   Basic Mobility Inpatient Raw Score: 12  [facility]-HLM Goal: 4: Move to chair/commode  JH-HLM Achieved: 3: Sit at edge of bed  [facility]-HLM Goal achieved.  Continue to encourage appropriate mobility. Per family, patient is bed bound at home.    Patient Centered Rounds: I performed bedside rounds with nursing staff today.   Discussions with Specialists or Other Care Team Provider: N/A    Education and Discussions with Family / Patient: Updated  (niece) via phone.    Current Length of Stay: 2 day(s)  Current Patient Status: Inpatient   Certification Statement: The patient will continue to require additional inpatient hospital stay due to  further workup of presenting symptoms, pending consults and imagining.  Discharge Plan: Anticipate discharge in 24-48 hrs to home with home services.    Code Status: Level 3 - DNAR and DNI    Subjective   Patient reports not feeling well today but is not able to fully express wrong. Upon further questioning, she states that she has pain and motions to her abdomen.    Objective :  Temp:  [96.4 °F (35.8 °C)-98.9 °F (37.2 °C)] 96.4 °F (35.8 °C)  HR:  [80-95] 88  BP: (107-160)/(72-90) 148/73  Resp:  [16-19] 16  SpO2:  [84 %-95 %] 84 %  O2 Device: None (Room air)    Body mass index is 27.87 kg/m².     Input and Output Summary (last 24 hours):     Intake/Output Summary (Last 24 hours) at 3/6/2025 1337  Last data filed at 3/6/2025 1209  Gross per 24 hour   Intake 500 ml   Output 0 ml   Net 500 ml       Physical Exam  Constitutional:       General: She is not in acute distress.  HENT:      Head: Normocephalic.      Nose: No congestion.      Mouth/Throat:      Mouth: Mucous membranes are moist.      Pharynx: Oropharynx is clear.   Cardiovascular:      Rate and Rhythm: Normal rate. Rhythm irregular.      Heart sounds: Normal heart sounds. No murmur heard.  Pulmonary:      Effort: Pulmonary effort is normal.      Breath sounds: Rales present. No wheezing.      Comments: mouth breathing and increased work of breathing while sleeping only  Abdominal:      General: Abdomen is flat.      Palpations: Abdomen is soft.      Tenderness:  There is no abdominal tenderness. There is no guarding.   Musculoskeletal:         General: No swelling.   Skin:     General: Skin is warm and dry.   Neurological:      Mental Status: She is alert. She is disoriented.      Comments: A and O x1         Lines/Drains: peripheral IV access        Telemetry:  Telemetry Orders (From admission, onward)               24 Hour Telemetry Monitoring  (ED Bridging Orders Panel)  Continuous x 24 Hours (Telem)        Expiring   Question:  Reason for 24 Hour Telemetry  Answer:  PCI/EP study (including pacer and ICD implementation), Cardiac surgery, MI, abnormal cardiac cath, and chest pain- rule out MI                     Telemetry Reviewed: Atrial fibrillation. HR averaging 80s-90s  Indication for Continued Telemetry Use: No indication for continued use. Will discontinue.                Lab Results: I have reviewed the following results:   Results from last 7 days   Lab Units 03/06/25  0504 03/05/25  0507 03/04/25  1108   WBC Thousand/uL 8.00   < > 7.92   HEMOGLOBIN g/dL 8.8*   < > 9.1*   HEMATOCRIT % 30.1*   < > 30.4*   PLATELETS Thousands/uL 275   < > 302   SEGS PCT %  --   --  81*   LYMPHO PCT %  --   --  7*   MONO PCT %  --   --  11   EOS PCT %  --   --  1    < > = values in this interval not displayed.     Results from last 7 days   Lab Units 03/06/25  0504 03/05/25  0507   SODIUM mmol/L 139 138   POTASSIUM mmol/L 4.1 3.8   CHLORIDE mmol/L 109* 106   CO2 mmol/L 21 23   BUN mg/dL 32* 34*   CREATININE mg/dL 1.27 1.37*   ANION GAP mmol/L 9 9   CALCIUM mg/dL 8.4 8.6   ALBUMIN g/dL  --  3.8   TOTAL BILIRUBIN mg/dL  --  0.89   ALK PHOS U/L  --  127*   ALT U/L  --  23   AST U/L  --  34   GLUCOSE RANDOM mg/dL 127 123     Results from last 7 days   Lab Units 03/04/25  1108   INR  1.20*             Results from last 7 days   Lab Units 03/04/25  1254 03/04/25  1108   LACTIC ACID mmol/L 1.8 2.1*   PROCALCITONIN ng/ml  --  0.46*       Recent Cultures (last 7 days):   Results from last 7  days   Lab Units 03/04/25  1549 03/04/25  1109 03/04/25  1108   BLOOD CULTURE   --  No Growth at 24 hrs. No Growth at 24 hrs.   URINE CULTURE  >100,000 cfu/ml Escherichia coli*  --   --        Imaging Results Review: No pertinent imaging studies reviewed.  Other Study Results Review: Other studies reviewed include: EEG    Last 24 Hours Medication List:     Current Facility-Administered Medications:     acetaminophen (TYLENOL) tablet 650 mg, Q6H PRN    ceftriaxone (ROCEPHIN) 1 g/50 mL in dextrose IVPB, Q24H, Last Rate: 1,000 mg (03/05/25 1723)    furosemide (LASIX) tablet 20 mg, BID    gabapentin (NEURONTIN) capsule 100 mg, TID    heparin (porcine) subcutaneous injection 5,000 Units, Q8H HESHAM **AND** [CANCELED] Platelet count, Once    lidocaine (LMX) 4 % cream, BID PRN    mirtazapine (REMERON) tablet 7.5 mg, HS    QUEtiapine (SEROquel) tablet 25 mg, HS    Administrative Statements   Today, Patient Was Seen By: Sara Thomas MD  I have spent a total time of 30 minutes in caring for this patient on the day of the visit/encounter including Diagnostic results, Patient and family education, Impressions, Counseling / Coordination of care, Documenting in the medical record, Reviewing/placing orders in the medical record (including tests, medications, and/or procedures), Obtaining or reviewing history  , and Communicating with other healthcare professionals .    **Please Note: This note may have been constructed using a voice recognition system.**

## 2025-03-06 NOTE — PROGRESS NOTES
"Progress Note - Hospitalist   Name: Sona Valenzuela 89 y.o. female I MRN: [id number]  Unit/Bed#: -01 I Date of Admission: 3/4/2025   Date of Service: 3/6/2025 I Hospital Day: 2    Assessment & Plan  Seizure-like activity (HCC)  Patient presents with 1 episode of slurred speech, slowness followed by body stiffening, not responding, unable to talk for 3 minutes.  No documented prior episode or seizures.  Patient is afebrile.  A and O x 1 secondary to dementia.  Has a UTI but no other evidence of infection or neck stiffness.  Neurological examination without focal neurological deficits.  EEG results 3/5: \"Background activities and posteriorly dominant rhythm are too slow suggesting mild diffuse cerebral dysfunction of nonspecific etiology.\" Transient alteration of awareness     Plan  Monitor on telemetry  Check orthostatic vitals  MRI brain  Echocardiogram  Seizure precautions  Fall precautions  Transient alteration of awareness  Patient presented on 3/4/3035 with 1 episode of slurred speech, slowness followed by body stiffening, not responding, unable to talk for 3 minutes.  No documented prior episode or seizures.  Found to have a UTI but no other evidence of infection or neck stiffness.  Neurological examination without focal neurological deficits.  EEG resulted on 3/5/2025 showed \"Background activities and posteriorly dominant rhythm are too slow suggesting mild diffuse cerebral dysfunction of nonspecific etiology.\"    Plan:  MRI brain  UTI (urinary tract infection)  Patient had lower abdominal pressure and discomfort.  UA significant for any minimal WBC and bacteria.  Urine culture: E Coli,     Plan  Continue ceftriaxone  Pending urine culture sensitivities   Elevated troponin  Troponin 740/699/659  Patient without any chest pain.  EKG without acute ischemic changes.  Patient O2 between 84-97% without oxygen, placed on 2L NC d/t 84% reading; is a mouth breather while asleep and has increased respiratory " effort when sleeping; breathing comfortable while awake with O2 around 90%  CHF vs myocarditis vs PE for new O2 requirement with and elevated tropnin  CHF most likely given Lasix being held on admission and crackles in lungs; less likely myocarditis or PE given lack of persistent tachypnea and no tachycardia and no reported SOB but patient is unable to reliably report symptoms d/t dementia    Plan  Monitor on telemetry  Echocardiogram  2L NC as needed for O2 <92%  Flu and COVID swabs for potential viral myocarditis etiology  Lasix restarted  CXR  Cardiology recommendations appreciated  Chronic atrial fibrillation (HCC)  Patient has chronic A-fib.  Rate controlled.  Not on any blood thinners due to dementia and fall risk.  Primary hypertension  Plan  Restart Lasix as there is no ANGIE  Chronic congestive heart failure (HCC)  Wt Readings from Last 3 Encounters:   03/06/25 66.9 kg (147 lb 7.8 oz)   01/17/25 64.7 kg (142 lb 9.6 oz)   12/31/24 62.6 kg (138 lb)       Euvolemic on examination.  Some crackles noted in lungs    Plan  Restarted Lasix  SCOTT, daily weight  Fluid restriction    Stage 3b chronic kidney disease (HCC)  Lab Results   Component Value Date    EGFR 34 03/06/2025    EGFR 31 03/05/2025    EGFR 33 03/04/2025    CREATININE 1.27 03/06/2025    CREATININE 1.37 (H) 03/05/2025    CREATININE 1.32 (H) 03/04/2025   Assessment  Creatinine and BUN are at baseline of 1.3-1.4 and 31-34 respectively    Plan  Monitor creatinine  Avoid nephrotoxins  Vascular dementia, uncomplicated (HCC)  Patient is A and O x 1.    Plan  Delirium precautions  Continue home mirtazapine 7.5mg daily  Continue home quetiapine 25mg daily    VTE Pharmacologic Prophylaxis:   High Risk (Score >/= 5) - Pharmacological DVT Prophylaxis Ordered: heparin. Sequential Compression Devices Ordered.    Mobility:   Basic Mobility Inpatient Raw Score: 12  JH-HLM Goal: 4: Move to chair/commode  JH-HLM Achieved: 3: Sit at edge of bed  JH-HLM Goal achieved.  Continue to encourage appropriate mobility. Per family, patient is bed bound at home.    Patient Centered Rounds: I performed bedside rounds with nursing staff today.   Discussions with Specialists or Other Care Team Provider: N/A    Education and Discussions with Family / Patient: Updated  (niece) via phone.    Current Length of Stay: 2 day(s)  Current Patient Status: Inpatient   Certification Statement: The patient will continue to require additional inpatient hospital stay due to  further workup of presenting symptoms, pending consults and imagining.  Discharge Plan: Anticipate discharge in 24-48 hrs to home with home services.    Code Status: Level 3 - DNAR and DNI    Subjective   Patient reports not feeling well today but is not able to fully express wrong. Upon further questioning, she states that she has pain and motions to her abdomen.    Objective :  Temp:  [96.4 °F (35.8 °C)-98.9 °F (37.2 °C)] 96.4 °F (35.8 °C)  HR:  [80-95] 80  BP: (107-160)/(72-90) 148/73  Resp:  [16-19] 16  SpO2:  [84 %-95 %] 84 %  O2 Device: None (Room air)    Body mass index is 27.87 kg/m².     Input and Output Summary (last 24 hours):     Intake/Output Summary (Last 24 hours) at 3/6/2025 1238  Last data filed at 3/6/2025 1209  Gross per 24 hour   Intake 500 ml   Output 0 ml   Net 500 ml       Physical Exam  Cardiovascular:      Rate and Rhythm: Rhythm irregular.      Heart sounds: Normal heart sounds. No murmur heard.  Pulmonary:      Effort: Pulmonary effort is normal.      Breath sounds: Rales present. No wheezing.      Comments: mouth breathing and increased work of breathing while sleeping only  Abdominal:      General: Abdomen is flat.      Palpations: Abdomen is soft.      Tenderness: There is no abdominal tenderness. There is no guarding.   Skin:     General: Skin is warm and dry.   Neurological:      Mental Status: She is disoriented.      Comments: A and O x1         Lines/Drains: peripheral IV  access        Telemetry:  Telemetry Orders (From admission, onward)               24 Hour Telemetry Monitoring  (ED Bridging Orders Panel)  Continuous x 24 Hours (Telem)        Expiring   Question:  Reason for 24 Hour Telemetry  Answer:  PCI/EP study (including pacer and ICD implementation), Cardiac surgery, MI, abnormal cardiac cath, and chest pain- rule out MI                     Telemetry Reviewed: Atrial fibrillation. HR averaging 80s-90s  Indication for Continued Telemetry Use: No indication for continued use. Will discontinue.                Lab Results: I have reviewed the following results:   Results from last 7 days   Lab Units 03/06/25  0504 03/05/25  0507 03/04/25  1108   WBC Thousand/uL 8.00   < > 7.92   HEMOGLOBIN g/dL 8.8*   < > 9.1*   HEMATOCRIT % 30.1*   < > 30.4*   PLATELETS Thousands/uL 275   < > 302   SEGS PCT %  --   --  81*   LYMPHO PCT %  --   --  7*   MONO PCT %  --   --  11   EOS PCT %  --   --  1    < > = values in this interval not displayed.     Results from last 7 days   Lab Units 03/06/25  0504 03/05/25  0507   SODIUM mmol/L 139 138   POTASSIUM mmol/L 4.1 3.8   CHLORIDE mmol/L 109* 106   CO2 mmol/L 21 23   BUN mg/dL 32* 34*   CREATININE mg/dL 1.27 1.37*   ANION GAP mmol/L 9 9   CALCIUM mg/dL 8.4 8.6   ALBUMIN g/dL  --  3.8   TOTAL BILIRUBIN mg/dL  --  0.89   ALK PHOS U/L  --  127*   ALT U/L  --  23   AST U/L  --  34   GLUCOSE RANDOM mg/dL 127 123     Results from last 7 days   Lab Units 03/04/25  1108   INR  1.20*             Results from last 7 days   Lab Units 03/04/25  1254 03/04/25  1108   LACTIC ACID mmol/L 1.8 2.1*   PROCALCITONIN ng/ml  --  0.46*       Recent Cultures (last 7 days):   Results from last 7 days   Lab Units 03/04/25  1549 03/04/25  1109 03/04/25  1108   BLOOD CULTURE   --  No Growth at 24 hrs. No Growth at 24 hrs.   URINE CULTURE  >100,000 cfu/ml Escherichia coli*  --   --        Imaging Results Review: No pertinent imaging studies reviewed.  Other Study Results  Review: Other studies reviewed include: EEG    Last 24 Hours Medication List:     Current Facility-Administered Medications:     acetaminophen (TYLENOL) tablet 650 mg, Q6H PRN    ceftriaxone (ROCEPHIN) 1 g/50 mL in dextrose IVPB, Q24H, Last Rate: 1,000 mg (03/05/25 1723)    furosemide (LASIX) tablet 20 mg, BID    gabapentin (NEURONTIN) capsule 100 mg, TID    heparin (porcine) subcutaneous injection 5,000 Units, Q8H HESHAM **AND** [CANCELED] Platelet count, Once    lidocaine (LMX) 4 % cream, BID PRN    mirtazapine (REMERON) tablet 7.5 mg, HS    QUEtiapine (SEROquel) tablet 25 mg, HS    Administrative Statements   Today, Patient Was Seen By: Mark Barbour  I have spent a total time of 30 minutes in caring for this patient on the day of the visit/encounter including Diagnostic results, Patient and family education, Impressions, Counseling / Coordination of care, Documenting in the medical record, Reviewing/placing orders in the medical record (including tests, medications, and/or procedures), Obtaining or reviewing history  , and Communicating with other healthcare professionals .    **Please Note: This note may have been constructed using a voice recognition system.**

## 2025-03-06 NOTE — ASSESSMENT & PLAN NOTE
Patient had lower abdominal pressure and discomfort.  UA significant for any minimal WBC and bacteria.  Urine culture: E Coli,     Plan  Continue ceftriaxone  Pending urine culture sensitivities

## 2025-03-06 NOTE — UTILIZATION REVIEW
Continued Stay Review    Date:  3/6                          Current Patient Class: Inpatient  Current Level of Care:  med surg    HPI:89 y.o. female initially admitted on    3/4     Current Diagnosis: Seizure like activity  Transient alteration  of awareness    Assessment/Plan:   Date:   3/6  Day 3: Has surpassed a 2nd midnight with active treatments and services.  Remains on  GUSTABO.   F/U  urine culture.  Continue seizure/fall precautions.    Remains on tele.   Continue daily weight, fluid restrict,  lasix.  Some crackles noted  in lungs.  Alert and oriented X1. Disoriented.    On delirium precautions,    States  not feeling well, points to abdomen.   Mouth breathing, increased  WOB  while asleep only.       Medications:   Scheduled Medications:  cefTRIAXone, 1,000 mg, Intravenous, Q24H  furosemide, 20 mg, Oral, BID  gabapentin, 100 mg, Oral, TID  heparin (porcine), 5,000 Units, Subcutaneous, Q8H HESHAM  mirtazapine, 7.5 mg, Oral, HS  QUEtiapine, 25 mg, Oral, HS      Continuous IV Infusions:     PRN Meds:  acetaminophen, 650 mg, Oral, Q6H PRN  lidocaine, , Topical, BID PRN      Discharge Plan:  TBD    Vital Signs (last 3 days)       Date/Time Temp Pulse Resp BP MAP (mmHg) SpO2 O2 Device Patient Position - Orthostatic VS Pam Coma Scale Score Pain    03/06/25 1301 -- 88 -- 148/73 -- -- -- -- -- --    03/06/25 0820 96.4 °F (35.8 °C) 80 -- 148/73 -- 84 % -- -- -- --    03/06/25 08:09:05 -- -- -- 160/87 111 -- -- -- -- --    03/06/25 02:22:24 -- -- -- 122/74 90 -- -- Lying -- --    03/05/25 2339 -- -- -- -- -- -- -- -- -- 6    03/05/25 22:52:25 98 °F (36.7 °C) -- 16 131/72 92 -- -- -- -- --    03/05/25 2100 -- -- -- -- -- -- None (Room air) -- 14 No Pain    03/05/25 18:40:04 98.9 °F (37.2 °C) 95 18 107/76 86 93 % -- -- -- --    03/05/25 16:10:32 97.9 °F (36.6 °C) 87 19 140/90 107 95 % -- -- -- --    03/05/25 1100 -- 88 17 134/71 -- 94 % -- Sitting -- No Pain    03/05/25 0945 -- -- -- -- -- -- -- -- 15 No Pain     03/05/25 0711 -- 95 16 136/70 -- 94 % -- -- -- --    03/05/25 03:09:08 -- -- -- 115/70 85 -- -- -- -- --    03/04/25 23:25:29 97 °F (36.1 °C) 74 -- 128/74 92 97 % -- -- -- --    03/04/25 2000 -- -- -- -- -- -- None (Room air) -- 15 No Pain    03/04/25 1956 -- -- -- 111/62 -- -- -- Lying -- --    03/04/25 19:51:27 98.2 °F (36.8 °C) 102 16 93/59 70 93 % None (Room air) Lying -- --    03/04/25 1800 97.9 °F (36.6 °C) 86 16 145/65 93 95 % None (Room air) Sitting -- No Pain    03/04/25 1730 -- 88 18 151/74 103 95 % None (Room air) Lying -- No Pain    03/04/25 1700 -- 105 16 143/66 95 95 % -- -- -- No Pain    03/04/25 1630 97.9 °F (36.6 °C) 87 17 137/60 86 94 % None (Room air) Lying -- No Pain    03/04/25 1600 -- 89 18 126/70 90 95 % None (Room air) Lying -- --    03/04/25 1530 97.9 °F (36.6 °C) 87 16 146/77 103 95 % None (Room air) Lying -- No Pain    03/04/25 1500 -- 86 16 139/78 104 95 % None (Room air) Lying -- No Pain    03/04/25 1430 -- 83 16 145/65 94 95 % None (Room air) Lying -- No Pain    03/04/25 1330 -- 88 17 156/82 112 95 % None (Room air) Lying -- No Pain    03/04/25 1300 -- 88 17 141/71 98 95 % None (Room air) Lying -- No Pain    03/04/25 1230 -- 84 16 119/79 87 96 % None (Room air) Lying -- No Pain    03/04/25 1145 -- 107 18 145/100 116 96 % None (Room air) Sitting -- 2    03/04/25 1140 -- -- -- -- -- -- None (Room air) Sitting 15 4    03/04/25 1130 -- 98 18 145/100 116 95 % None (Room air) Sitting -- --    03/04/25 1100 97.8 °F (36.6 °C) 88 16 149/78 105 94 % None (Room air) Sitting -- --    03/04/25 1051 -- 72 17 119/76 91 92 % None (Room air) Sitting -- --    03/04/25 1050 -- -- -- -- -- -- None (Room air) -- -- --          Weight (last 2 days)       Date/Time Weight    03/06/25 1301 66.9 (147.49)    03/06/25 0559 66.9 (147.49)    03/05/25 0600 63.2 (139.33)    03/04/25 19:51:27 63.2 (139.33)    03/04/25 1140 70.3 (154.98)            Pertinent Labs/Diagnostic Results:   Radiology:  CTA chest pe study    Final Interpretation by Jabier Pop MD (03/04 2627)      1.  No evidence of a central pulmonary embolus. However, the peripheral pulmonary arteries cannot be adequately assessed, due to respiratory motion artifact.   2.  There are patchy opacities within the left upper lobe and both lung bases, which favor atelectasis. Entirely excluded.   3.  Moderate cardiomegaly, with disproportionate enlargement of the right atrium. Given the small pleural effusions and prominent reflux of contrast into the hepatic veins, this is consistent with right heart dysfunction.                  Workstation performed: [id number]         CT head without contrast   Final Interpretation by Peter Ratliff MD (03/04 1131)      No acute intracranial abnormality.                  Workstation performed: [id number]         XR chest portable   ED Interpretation by Sissy Gillespie DO (03/04 1149)   No acute cardiopulmonary abnormalities.      Final Interpretation by Babatunde Ulloa MD (03/04 1221)      Limited visualization of the left diaphragm suggesting some left basilar atelectasis or infiltrate as well as possible small left pleural effusion. Consider lateral view for further assessment.      The study was marked in EPIC for immediate notification.            Workstation performed: [id number]         MRI inpatient order    (Results Pending)   XR chest portable    (Results Pending)     Cardiology:  ECG 12 lead   Final Result by Liz Mi MD (03/04 1110)   Atrial fibrillation   Incomplete right bundle branch block   Nonspecific ST and T wave abnormality   Prolonged QT   Abnormal ECG      Confirmed by Liz Mi ([id number]) on 3/4/2025 11:10:44 AM        GI:      Results from last 7 days   Lab Units 03/06/25  0504 03/05/25  0507 03/04/25  1108   WBC Thousand/uL 8.00 8.12 7.92   HEMOGLOBIN g/dL 8.8* 8.9* 9.1*   HEMATOCRIT % 30.1* 29.8* 30.4*   PLATELETS Thousands/uL 275 286 302   TOTAL NEUT ABS Thousands/µL  --    --  6.41         Results from last 7 days   Lab Units 03/06/25  0504 03/05/25  0507 03/04/25  1108   SODIUM mmol/L 139 138 142   POTASSIUM mmol/L 4.1 3.8 4.0   CHLORIDE mmol/L 109* 106 109*   CO2 mmol/L 21 23 25   ANION GAP mmol/L 9 9 8   BUN mg/dL 32* 34* 34*   CREATININE mg/dL 1.27 1.37* 1.32*   EGFR ml/min/1.73sq m 34 31 33   CALCIUM mg/dL 8.4 8.6 8.3*     Results from last 7 days   Lab Units 03/05/25  0507 03/04/25  1108   AST U/L 34 30   ALT U/L 23 19   ALK PHOS U/L 127* 123*   TOTAL PROTEIN g/dL 6.9 6.8   ALBUMIN g/dL 3.8 3.7   TOTAL BILIRUBIN mg/dL 0.89 0.82   AMMONIA umol/L  --  19         Results from last 7 days   Lab Units 03/06/25  0504 03/05/25  0507 03/04/25  1108   GLUCOSE RANDOM mg/dL 127 123 139           Results from last 7 days   Lab Units 03/04/25  1524 03/04/25  1254 03/04/25  1108   HS TNI 0HR ng/L  --   --  740*   HS TNI 2HR ng/L  --  699*  --    HSTNI D2 ng/L  --  -41  --    HS TNI 4HR ng/L 659*  --   --    HSTNI D4 ng/L -81  --   --          Results from last 7 days   Lab Units 03/04/25  1108   PROTIME seconds 16.0*   INR  1.20*   PTT seconds 28         Results from last 7 days   Lab Units 03/04/25  1108   PROCALCITONIN ng/ml 0.46*     Results from last 7 days   Lab Units 03/04/25  1254 03/04/25  1108   LACTIC ACID mmol/L 1.8 2.1*             Results from last 7 days   Lab Units 03/04/25  1108   BNP pg/mL 643*     Results from last 7 days   Lab Units 03/04/25  1524   FERRITIN ng/mL 5*   IRON SATURATION % 3*   IRON ug/dL 13*   TIBC ug/dL 450.8*     Results from last 7 days   Lab Units 03/04/25  1524   TRANSFERRIN mg/dL 322           Results from last 7 days   Lab Units 03/04/25  1549   CLARITY UA  Clear   COLOR UA  Yellow   SPEC GRAV UA  1.023   PH UA  6.0   GLUCOSE UA mg/dl Negative   KETONES UA mg/dl Trace*   BLOOD UA  Negative   PROTEIN UA mg/dl 50 (1+)*   NITRITE UA  Positive*   BILIRUBIN UA  Negative   UROBILINOGEN UA (BE) mg/dl <2.0   LEUKOCYTES UA  Moderate*   WBC UA /hpf Innumerable*    RBC UA /hpf 1-2   BACTERIA UA /hpf Innumerable*   EPITHELIAL CELLS WET PREP /hpf Occasional               Results from last 7 days   Lab Units 03/04/25  1549 03/04/25  1109 03/04/25  1108   BLOOD CULTURE   --  No Growth at 24 hrs. No Growth at 24 hrs.   URINE CULTURE  >100,000 cfu/ml Escherichia coli*  --   --                    Network Utilization Review Department  ATTENTION: Please call with any questions or concerns to [phone] and carefully listen to the prompts so that you are directed to the right person. All voicemails are confidential.   For Discharge needs, contact Care Management DC Support Team at [phone] opt. [phone]  Send all requests for admission clinical reviews, approved or denied determinations and any other requests to dedicated fax number below belonging to the campus where the patient is receiving treatment. List of dedicated fax numbers for the Facilities:  FACILITY NAME UR FAX NUMBER   ADMISSION DENIALS (Administrative/Medical Necessity) [phone]   DISCHARGE SUPPORT TEAM (NETWORK) [phone]   PARENT CHILD HEALTH (Maternity/NICU/Pediatrics) [phone]   [facility] [phone]   [facility] [phone]   [facility] [phone]   [facility] [phone]   [facility] [phone]   [facility] [phone]   [facility] [phone]   [facility] [phone]   [facility] [phone]   Critical access hospital [phone]   General acute hospital [phone]   [facility] ([place], [place]) [phone]

## 2025-03-06 NOTE — CONSULTS
Consultation - Cardiology   Name: Sona Valenzuela 89 y.o. female I MRN: [id number]  Unit/Bed#: -01 I Date of Admission: 3/4/2025   Date of Service: 3/6/2025 I Hospital Day: 2   Inpatient consult to Cardiology  Consult performed by: Jes Vasquez PA-C  Consult ordered by: Rodrigo SALOMON MD        Physician Requesting Evaluation: Rodrigo SALOMON MD   Reason for Evaluation / Principal Problem: +troponins    Assessment & Plan  Elevated troponin  Peak troponin 740  Nonischemic myocardial injury dated 2 ANGIE, elevated lactic acidosis, anemia  Echocardiogram done EF 55%, systolic flattening of the IVS, septum bows into the left atrium suggesting increased right atrial pressure, moderate MAC, moderate to severe TR with severe pulmonary hypertension with RVSP at 65  Patient currently without anginal symptoms  NO further cardiac workup planned at this time  Seizure-like activity (HCC)  Mgmt per SLIM/Neuro  Chronic atrial fibrillation (HCC)  Rates in the 80s to 90s  Not on anticoagulation per prior records  No aspirin given anemia  Chronic congestive heart failure (HCC)  Wt Readings from Last 3 Encounters:   03/06/25 66.9 kg (147 lb 7.8 oz)   01/17/25 64.7 kg (142 lb 9.6 oz)   12/31/24 62.6 kg (138 lb)   Appears euvolemic  Continue with current dose of Lasix 20 twice daily (home dose)  Vascular dementia, uncomplicated (HCC)  Mgmt per SLIM  Continue Remeron, Seroquel  Primary hypertension  Stable 148/73  Continue Lasix    Stage 3b chronic kidney disease (HCC)  Lab Results   Component Value Date    EGFR 34 03/06/2025    EGFR 31 03/05/2025    EGFR 33 03/04/2025    CREATININE 1.27 03/06/2025    CREATININE 1.37 (H) 03/05/2025    CREATININE 1.32 (H) 03/04/2025   Creatinine 1.37  Management per Slim    I have discussed the above management plan in detail with the primary service.   Cardiology service signing off.    History of Present Illness   Sona Valenzuela is a 89 y.o. female who presents with seizure-like activity.   Patient has baseline dementia.  History obtained from H&P done by hospitalist service.  Was having low abdominal pressure and discomfort and was going to message her PCP then developed an episode of slurred speech, slowness, body stiffness, unresponsiveness that lasted about 3 minutes then recovered fully.  Patient currently sitting in bed appears comfortable in no acute distress reading the newspaper.  Denies chest pain, chest pressure, chest heaviness.    PMH: CHF, A-fib not on anticoagulation, CKD, dementia, hypertension, lung cancer    Review of Systems   Unable to perform ROS: Dementia     Medical History Review: I have reviewed the patient's PMH, PSH, Social History, Family History, Meds, and Allergies     Objective :  Temp:  [96.4 °F (35.8 °C)-98.9 °F (37.2 °C)] 97.5 °F (36.4 °C)  HR:  [80-95] 86  BP: (106-160)/(69-90) 106/69  Resp:  [16-19] 17  SpO2:  [84 %-100 %] 100 %  O2 Device: Nasal cannula  Orthostatic Blood Pressures      Flowsheet Row Most Recent Value   Blood Pressure 106/69 filed at 03/06/2025 1516   Patient Position - Orthostatic VS Sitting filed at 03/06/2025 1516          First Weight: Weight - Scale: 70.3 kg (154 lb 15.7 oz) (03/04/25 1140)  Vitals:    03/06/25 0559 03/06/25 1301   Weight: 66.9 kg (147 lb 7.8 oz) 66.9 kg (147 lb 7.8 oz)       Physical Exam  Vitals and nursing note reviewed.   Constitutional:       Appearance: Normal appearance.   Cardiovascular:      Rate and Rhythm: Normal rate and regular rhythm.      Pulses: Normal pulses.      Heart sounds: Normal heart sounds.   Pulmonary:      Effort: Pulmonary effort is normal.      Breath sounds: Normal breath sounds.   Musculoskeletal:         General: Normal range of motion.      Cervical back: Normal range of motion and neck supple.   Skin:     General: Skin is warm and dry.   Neurological:      Mental Status: She is alert. Mental status is at baseline.      Comments: +has dementia   Psychiatric:         Mood and Affect: Mood normal.  "        Behavior: Behavior normal.           Lab Results: I have reviewed the following results:CBC/BMP:   .     03/06/25  0504   WBC 8.00   HGB 8.8*   HCT 30.1*      SODIUM 139   K 4.1   *   CO2 21   BUN 32*   CREATININE 1.27   GLUC 127    , Creatinine Clearance: Estimated Creatinine Clearance: 20.6 mL/min (by C-G formula based on SCr of 1.27 mg/dL)., LFTs: No new results in last 24 hours. , PTT/INR:No new results in last 24 hours. , Troponin,BNP:No new results in last 24 hours. , Lactic Acid: No new results in last 24 hours. , Procalcitonin: No results found for: \"PROCALCITONI\", ABG: No new results in last 24 hours.   Results from last 7 days   Lab Units 03/06/25  0504 03/05/25  0507 03/04/25  1108   WBC Thousand/uL 8.00 8.12 7.92   HEMOGLOBIN g/dL 8.8* 8.9* 9.1*   HEMATOCRIT % 30.1* 29.8* 30.4*   PLATELETS Thousands/uL 275 286 302     Results from last 7 days   Lab Units 03/06/25  0504 03/05/25  0507 03/04/25  1108   POTASSIUM mmol/L 4.1 3.8 4.0   CHLORIDE mmol/L 109* 106 109*   CO2 mmol/L 21 23 25   BUN mg/dL 32* 34* 34*   CREATININE mg/dL 1.27 1.37* 1.32*   CALCIUM mg/dL 8.4 8.6 8.3*     Results from last 7 days   Lab Units 03/04/25  1108   INR  1.20*   PTT seconds 28     Lab Results   Component Value Date    HGBA1C 6.0 (H) 08/30/2022     No results found for: \"CKTOTAL\", \"CKMB\", \"CKMBINDEX\", \"TROPONINI\"    Imaging Results Review: I reviewed radiology reports from this admission including: chest xray, CT chest, and CT head.  Other Study Results Review: EKG was reviewed.     "

## 2025-03-06 NOTE — CASE MANAGEMENT
Case Management Progress Note    Patient name Sona Valenzuela  Location /-01 MRN [id number]  : 11/3/1924 Date 3/6/2025       LOS (days): 2  Geometric Mean LOS (GMLOS) (days): 2.7  Days to GMLOS:0.8        OBJECTIVE:        Current admission status: Inpatient  Preferred Pharmacy:   [facility]/pharmacy #[id number] - [place] - [address]  [place] [address]  Phone: [phone] Fax: [phone]    Primary Care Provider: FARZANA Stanley    Primary Insurance: [employer]  Secondary Insurance: [facility]    PROGRESS NOTE:  As per SLIM rounds, pt is anticipated for dc within 24-48hrs. CM continues to follow.

## 2025-03-06 NOTE — ASSESSMENT & PLAN NOTE
Wt Readings from Last 3 Encounters:   03/06/25 66.9 kg (147 lb 7.8 oz)   01/17/25 64.7 kg (142 lb 9.6 oz)   12/31/24 62.6 kg (138 lb)   Appears euvolemic  Continue with current dose of Lasix 20 twice daily (home dose)

## 2025-03-06 NOTE — ASSESSMENT & PLAN NOTE
"Patient presented on 3/4/3035 with 1 episode of slurred speech, slowness followed by body stiffening, not responding, unable to talk for 3 minutes.  No documented prior episode or seizures.  Found to have a UTI but no other evidence of infection or neck stiffness.  Neurological examination without focal neurological deficits.  EEG resulted on 3/5/2025 showed \"Background activities and posteriorly dominant rhythm are too slow suggesting mild diffuse cerebral dysfunction of nonspecific etiology.\"    Plan:  MRI brain  "

## 2025-03-06 NOTE — ASSESSMENT & PLAN NOTE
Wt Readings from Last 3 Encounters:   03/06/25 66.9 kg (147 lb 7.8 oz)   01/17/25 64.7 kg (142 lb 9.6 oz)   12/31/24 62.6 kg (138 lb)       Euvolemic on examination.  Some crackles noted in lungs    Plan  Restarted Lasix  SCOTT, daily weight  Fluid restriction

## 2025-03-06 NOTE — ASSESSMENT & PLAN NOTE
Lab Results   Component Value Date    EGFR 34 03/06/2025    EGFR 31 03/05/2025    EGFR 33 03/04/2025    CREATININE 1.27 03/06/2025    CREATININE 1.37 (H) 03/05/2025    CREATININE 1.32 (H) 03/04/2025   Creatinine 1.37  Management per Slim

## 2025-03-07 LAB
ANION GAP SERPL CALCULATED.3IONS-SCNC: 7 MMOL/L (ref 4–13)
BACTERIA UR CULT: ABNORMAL
BUN SERPL-MCNC: 22 MG/DL (ref 5–25)
CALCIUM SERPL-MCNC: 8.4 MG/DL (ref 8.4–10.2)
CHLORIDE SERPL-SCNC: 110 MMOL/L (ref 96–108)
CO2 SERPL-SCNC: 25 MMOL/L (ref 21–32)
CREAT SERPL-MCNC: 1.17 MG/DL (ref 0.6–1.3)
ERYTHROCYTE [DISTWIDTH] IN BLOOD BY AUTOMATED COUNT: 18.6 % (ref 11.6–15.1)
GFR SERPL CREATININE-BSD FRML MDRD: 38 ML/MIN/1.73SQ M
GLUCOSE SERPL-MCNC: 101 MG/DL (ref 65–140)
HCT VFR BLD AUTO: 31.2 % (ref 34.8–46.1)
HGB BLD-MCNC: 9 G/DL (ref 11.5–15.4)
MCH RBC QN AUTO: 22.1 PG (ref 26.8–34.3)
MCHC RBC AUTO-ENTMCNC: 28.8 G/DL (ref 31.4–37.4)
MCV RBC AUTO: 77 FL (ref 82–98)
PLATELET # BLD AUTO: 278 THOUSANDS/UL (ref 149–390)
PMV BLD AUTO: 9.8 FL (ref 8.9–12.7)
POTASSIUM SERPL-SCNC: 3.8 MMOL/L (ref 3.5–5.3)
RBC # BLD AUTO: 4.08 MILLION/UL (ref 3.81–5.12)
SODIUM SERPL-SCNC: 142 MMOL/L (ref 135–147)
WBC # BLD AUTO: 8.09 THOUSAND/UL (ref 4.31–10.16)

## 2025-03-07 PROCEDURE — 99232 SBSQ HOSP IP/OBS MODERATE 35: CPT | Performed by: STUDENT IN AN ORGANIZED HEALTH CARE EDUCATION/TRAINING PROGRAM

## 2025-03-07 PROCEDURE — 85027 COMPLETE CBC AUTOMATED: CPT | Performed by: INTERNAL MEDICINE

## 2025-03-07 PROCEDURE — 80048 BASIC METABOLIC PNL TOTAL CA: CPT | Performed by: INTERNAL MEDICINE

## 2025-03-07 RX ORDER — FUROSEMIDE 10 MG/ML
20 INJECTION INTRAMUSCULAR; INTRAVENOUS ONCE
Status: COMPLETED | OUTPATIENT
Start: 2025-03-07 | End: 2025-03-07

## 2025-03-07 RX ADMIN — FUROSEMIDE 20 MG: 10 INJECTION, SOLUTION INTRAVENOUS at 14:37

## 2025-03-07 RX ADMIN — HEPARIN SODIUM 5000 UNITS: 5000 INJECTION INTRAVENOUS; SUBCUTANEOUS at 17:34

## 2025-03-07 RX ADMIN — GABAPENTIN 100 MG: 100 CAPSULE ORAL at 08:55

## 2025-03-07 RX ADMIN — ERTAPENEM SODIUM 500 MG: 1 INJECTION, POWDER, LYOPHILIZED, FOR SOLUTION INTRAMUSCULAR; INTRAVENOUS at 11:19

## 2025-03-07 RX ADMIN — HEPARIN SODIUM 5000 UNITS: 5000 INJECTION INTRAVENOUS; SUBCUTANEOUS at 21:50

## 2025-03-07 RX ADMIN — ACETAMINOPHEN 650 MG: 325 TABLET, FILM COATED ORAL at 22:49

## 2025-03-07 RX ADMIN — HEPARIN SODIUM 5000 UNITS: 5000 INJECTION INTRAVENOUS; SUBCUTANEOUS at 06:29

## 2025-03-07 RX ADMIN — GABAPENTIN 100 MG: 100 CAPSULE ORAL at 21:50

## 2025-03-07 RX ADMIN — GABAPENTIN 100 MG: 100 CAPSULE ORAL at 17:34

## 2025-03-07 RX ADMIN — FUROSEMIDE 20 MG: 20 TABLET ORAL at 21:50

## 2025-03-07 RX ADMIN — QUETIAPINE FUMARATE 25 MG: 25 TABLET ORAL at 21:50

## 2025-03-07 RX ADMIN — FUROSEMIDE 20 MG: 20 TABLET ORAL at 08:55

## 2025-03-07 RX ADMIN — MIRTAZAPINE 7.5 MG: 15 TABLET, FILM COATED ORAL at 21:50

## 2025-03-07 NOTE — PLAN OF CARE
Problem: Potential for Falls  Goal: Patient will remain free of falls  Description: INTERVENTIONS:  - Educate patient/family on patient safety including physical limitations  - Instruct patient to call for assistance with activity   - Consult OT/PT to assist with strengthening/mobility   - Keep Call bell within reach  - Keep bed low and locked with side rails adjusted as appropriate  - Keep care items and personal belongings within reach  - Initiate and maintain comfort rounds  - Make Fall Risk Sign visible to staff  - Offer Toileting every 2 Hours, in advance of need  - Initiate/Maintain bed alarm  - Obtain necessary fall risk management equipment: call bell within reach   - Apply yellow socks and bracelet for high fall risk patients  - Consider moving patient to room near nurses station  Outcome: Progressing     Problem: PAIN - ADULT  Goal: Verbalizes/displays adequate comfort level or baseline comfort level  Description: Interventions:  - Encourage patient to monitor pain and request assistance  - Assess pain using appropriate pain scale  - Administer analgesics based on type and severity of pain and evaluate response  - Implement non-pharmacological measures as appropriate and evaluate response  - Consider cultural and social influences on pain and pain management  - Notify physician/advanced practitioner if interventions unsuccessful or patient reports new pain  Outcome: Progressing     Problem: SAFETY ADULT  Goal: Patient will remain free of falls  Description: INTERVENTIONS:  - Educate patient/family on patient safety including physical limitations  - Instruct patient to call for assistance with activity   - Consult OT/PT to assist with strengthening/mobility   - Keep Call bell within reach  - Keep bed low and locked with side rails adjusted as appropriate  - Keep care items and personal belongings within reach  - Initiate and maintain comfort rounds  - Make Fall Risk Sign visible to staff  - Offer  Toileting every 2 Hours, in advance of need  - Initiate/Maintain bed alarm  - Obtain necessary fall risk management equipment: call bell within reach   - Apply yellow socks and bracelet for high fall risk patients  - Consider moving patient to room near nurses station  Outcome: Progressing  Goal: Maintain or return to baseline ADL function  Description: INTERVENTIONS:  -  Assess patient's ability to carry out ADLs; assess patient's baseline for ADL function and identify physical deficits which impact ability to perform ADLs (bathing, care of mouth/teeth, toileting, grooming, dressing, etc.)  - Assess/evaluate cause of self-care deficits   - Assess range of motion  - Assess patient's mobility; develop plan if impaired  - Assess patient's need for assistive devices and provide as appropriate  - Encourage maximum independence but intervene and supervise when necessary  - Involve family in performance of ADLs  - Assess for home care needs following discharge   - Consider OT consult to assist with ADL evaluation and planning for discharge  - Provide patient education as appropriate  Outcome: Progressing  Goal: Maintains/Returns to pre admission functional level  Description: INTERVENTIONS:  - Perform AM-PAC 6 Click Basic Mobility/ Daily Activity assessment daily.  - Set and communicate daily mobility goal to care team and patient/family/caregiver.   - Collaborate with rehabilitation services on mobility goals if consulted  - Perform Range of Motion 3 times a day.  - Reposition patient every 2 hours.  - Dangle patient 3 times a day  - Stand patient 3 times a day  - Ambulate patient 3 times a day  - Out of bed to chair 3 times a day   - Out of bed for meals 3 times a day  - Out of bed for toileting  - Record patient progress and toleration of activity level   Outcome: Progressing     Problem: DISCHARGE PLANNING  Goal: Discharge to home or other facility with appropriate resources  Description: INTERVENTIONS:  - Identify  barriers to discharge w/patient and caregiver  - Arrange for needed discharge resources and transportation as appropriate  - Identify discharge learning needs (meds, wound care, etc.)  - Arrange for interpretive services to assist at discharge as needed  - Refer to Case Management Department for coordinating discharge planning if the patient needs post-hospital services based on physician/advanced practitioner order or complex needs related to functional status, cognitive ability, or social support system  Outcome: Progressing     Problem: Knowledge Deficit  Goal: Patient/family/caregiver demonstrates understanding of disease process, treatment plan, medications, and discharge instructions  Description: Complete learning assessment and assess knowledge base.  Interventions:  - Provide teaching at level of understanding  - Provide teaching via preferred learning methods  Outcome: Progressing     Problem: Prexisting or High Potential for Compromised Skin Integrity  Goal: Skin integrity is maintained or improved  Description: INTERVENTIONS:  - Identify patients at risk for skin breakdown  - Assess and monitor skin integrity  - Assess and monitor nutrition and hydration status  - Monitor labs   - Assess for incontinence   - Turn and reposition patient  - Assist with mobility/ambulation  - Relieve pressure over bony prominences  - Avoid friction and shearing  - Provide appropriate hygiene as needed including keeping skin clean and dry  - Evaluate need for skin moisturizer/barrier cream  - Collaborate with interdisciplinary team   - Patient/family teaching  - Consider wound care consult   Outcome: Progressing

## 2025-03-07 NOTE — ASSESSMENT & PLAN NOTE
Lab Results   Component Value Date    EGFR 38 03/07/2025    EGFR 34 03/06/2025    EGFR 31 03/05/2025    CREATININE 1.17 03/07/2025    CREATININE 1.27 03/06/2025    CREATININE 1.37 (H) 03/05/2025   Assessment  Creatinine and BUN are at baseline of 1.3-1.4 and 31-34 respectively    Plan  Monitor creatinine  Avoid nephrotoxins

## 2025-03-07 NOTE — CASE MANAGEMENT
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, psychotic disturbance, mood disturbance, or anxiety ([facility]) 2024     Chronic pain of left wrist 10/28/2024     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 ([facility]) 2024     Acute on chronic HFpEF with severe pulmonary hypertension ([facility]) 2024     Primary hypertension 2024     Paroxysmal A-fib ([facility]) 2024     Compression fracture of lumbar spine, non-traumatic ([facility]) 2024     Mild protein-calorie malnutrition ([facility]) 2024     Vascular dementia, uncomplicated ([facility]) 2023     Does mobilize using walker 2023     Chronic congestive heart failure ([facility]) 2022     Chronic atrial fibrillation ([facility]) 10/11/2022     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[facility]) 2022     Stage 3b chronic kidney disease ([facility]) 2022     Vitamin D deficiency 2020     Primary insomnia 2019     UTI (urinary tract infection) 10/20/2019     Degeneration of lumbar intervertebral disc 2019     Lumbar radiculopathy 2019     Arthropathy of lumbar facet joint 2019     Restless legs syndrome 2019       LOS (days): 3  Geometric Mean LOS (GMLOS) (days): 2.7  Days to GMLOS:-0.2     OBJECTIVE:    Risk of Unplanned Readmission Score: 16.02         Current admission status: Inpatient        Preferred Pharmacy:   [facility]/pharmacy #[id number] - [place] - [address]  [address]  [place] [address]  Phone: [phone] Fax: [phone]    Primary Care Provider: FARZANA Stanley    Primary Insurance: [facility] RE[facility]  Secondary Insurance[facility]    ASSESSMENT:  Active Health Care Proxies       xi rodriguez Health Care Representative - [place]   Primary Phone: [phone] (Mobile)                                          Current Home Health Care  Home Health Agency Name:: [facility]                         DISCHARGE DETAILS:    Discharge planning discussed with:: Pt and POA  Freedom of Choice: Yes  Comments - Freedom of Choice: CM met with pt and her POA Xi at bedside. CM reviewed pt choice for HHC. Pt choice was [facility] as she had them in the past. POA is also requested PT/OT evals as pt was walking prior to admission and has not seen her ambulate since being admitted. SLIM notified for PT/OT evals. CM continues to follow and assist with pt dc plans.  CM contacted family/caregiver?: Yes  Were Treatment Team discharge recommendations reviewed with patient/caregiver?: Yes  Did patient/caregiver verbalize understanding of patient care needs?: Yes  Were patient/caregiver advised of the risks associated with not following Treatment Team discharge recommendations?: Yes    Contacts  Patient Contacts: Xi (niece/POA)  Relationship to Patient:: Family  Contact Method: In Person  Reason/Outcome: Continuity of Care, Emergency Contact, Discharge Planning    Requested Home Health Care         Is the patient interested in HHC at discharge?: Yes  Home Health Discipline requested:: Nursing, Occupational Therapy, Physical Therapy  [facility] Health Agency [facility] External Referral Reason (only applicable if external HHA name selected): Patient has established relationship with provider  Home Health Follow-Up Provider:: PCP  Home Health Services Needed:: Administration  of IV, IM or SC Medications, Evaluate Functional Status and Safety, Gait/ADL Training, Strengthening/Theraputic Exercises to Improve Function  Homebound Criteria Met:: Requires the Assistance of Another Person for Safe Ambulation or to Leave the Home, Uses an Assist Device (i.e. cane, walker, etc)  Supporting Clincal Findings:: Limited Endurance, Fatigues Easliy in Short Distances    DME Referral Provided  Referral made for DME?: No    Other Referral/Resources/Interventions Provided:  Interventions: [facility]    Would you like to participate in our [facility] service program?  : No - Declined    Treatment Team Recommendation: Home with Home Health Care  Discharge Destination Plan:: Home with Home Health Care  Transport at Discharge : Family                             IMM Given (Date):: 03/07/25  IMM Given to:: Patient  Family notified:: Pt and POA  Additional Comments: CM reviewed IMM with pt and her POA at bedside. Both expressed full and complete understanding. Verbal consent obtained, copy given. Original in MR

## 2025-03-07 NOTE — ASSESSMENT & PLAN NOTE
Troponin 740/699/659  Patient without any chest pain or shortness of breath  EKG without acute ischemic changes.  CXR 3/6 results: Pulmonary vascular congestion with unchanged left lung opacities.   Patient O2 between 85-93% without oxygen, observed while sleeping, resting comfortably  ECHO results 3/6: normal ejection fraction with moderate to severe TR and pulmonary hypertension with estimated RV systolic pressure of 65 mmHg  Euvolemic; not likely acute CHF exacerbation  Negative viral panel; less likely myocarditis  Cardio consult: Elevated troponin could be secondary to seizure activity in the setting of CKD. Doubt primary cardiac issue. Patient could very well have underlying CAD but no recommendations to consider any aggressive invasive evaluation given her advanced age and comorbidities. Signing off.    Plan  Discontinue telemetry  Continue Lasix

## 2025-03-07 NOTE — ASSESSMENT & PLAN NOTE
Patient had lower abdominal pressure and discomfort.  UA significant for any minimal WBC and bacteria.  Urine culture: E Coli, resistant to ceftriaxone    Plan  Discontinue ceftriaxone  Start IV ertapenem, requiring 3 more days antibiotics

## 2025-03-07 NOTE — ASSESSMENT & PLAN NOTE
"Patient presents with 1 episode of slurred speech, slowness followed by body stiffening, not responding, unable to talk for 3 minutes.  No documented prior episode or seizures.  Patient is afebrile.  A and O x 1 secondary to dementia.  Has a UTI but no other evidence of infection or neck stiffness.  Neurological examination without focal neurological deficits.  EEG results 3/5: \"Background activities and posteriorly dominant rhythm are too slow suggesting mild diffuse cerebral dysfunction of nonspecific etiology.\" Transient alteration of awareness   ECHO results 3/6: normal ejection fraction with moderate to severe TR and pulmonary hypertension with estimated RV systolic pressure of 65 mmHg    Plan  Discontinue telemetry  Check orthostatic vitals  MRI brain  Seizure precautions  Fall precautions  "

## 2025-03-07 NOTE — ASSESSMENT & PLAN NOTE
Wt Readings from Last 3 Encounters:   03/07/25 65.4 kg (144 lb 2.9 oz)   01/17/25 64.7 kg (142 lb 9.6 oz)   12/31/24 62.6 kg (138 lb)       Euvolemic on examination.  Crackles in lungs    Plan  IV 20mg lasix bolus once  Continue scheduled Lasix  SCOTT, daily weight  Fluid restriction

## 2025-03-07 NOTE — PROGRESS NOTES
Patient:  ARABELLA KATE    MRN:  [id number]    Aidin Request ID:  [id number]    Level of care reserved:  Home Health Agency    Partner Reserved:  [facility], [place] [address] [phone]    Clinical needs requested:    Geography searched:  [id number]    Start of Service:    Request sent:  2:54pm EST on 3/5/2025 by Kiki Mccormack    Partner reserved:  12:42pm EST on 3/7/2025 by Kiki Mccormack    Choice list shared:  12:38pm EST on 3/7/2025 by Kiki Mccormack

## 2025-03-07 NOTE — PROGRESS NOTES
"Progress Note - Hospitalist   Name: Sona Valenzuela 89 y.o. female I MRN: [id number]  Unit/Bed#: -01 I Date of Admission: 3/4/2025   Date of Service: 3/7/2025 I Hospital Day: 3    Assessment & Plan  Seizure-like activity (HCC)  Patient presents with 1 episode of slurred speech, slowness followed by body stiffening, not responding, unable to talk for 3 minutes.  No documented prior episode or seizures.  Patient is afebrile.  A and O x 1 secondary to dementia.  Has a UTI but no other evidence of infection or neck stiffness.  Neurological examination without focal neurological deficits.  EEG results 3/5: \"Background activities and posteriorly dominant rhythm are too slow suggesting mild diffuse cerebral dysfunction of nonspecific etiology.\" Transient alteration of awareness   ECHO results 3/6: normal ejection fraction with moderate to severe TR and pulmonary hypertension with estimated RV systolic pressure of 65 mmHg    Plan  Discontinue telemetry  Check orthostatic vitals  MRI brain  Seizure precautions  Fall precautions  Transient alteration of awareness  Patient presented on 3/4/3035 with 1 episode of slurred speech, slowness followed by body stiffening, not responding, unable to talk for 3 minutes.  No documented prior episode or seizures.  Found to have a UTI but no other evidence of infection or neck stiffness.  Neurological examination without focal neurological deficits.  EEG resulted on 3/5/2025 showed \"Background activities and posteriorly dominant rhythm are too slow suggesting mild diffuse cerebral dysfunction of nonspecific etiology.\"    Plan:  MRI brain  UTI (urinary tract infection)  Patient had lower abdominal pressure and discomfort.  UA significant for any minimal WBC and bacteria.  Urine culture: E Coli, resistant to ceftriaxone    Plan  Discontinue ceftriaxone  Start IV ertapenem, requiring 3 more days antibiotics  Elevated troponin  Troponin 740/699/659  Patient without any chest pain or " shortness of breath  EKG without acute ischemic changes.  CXR 3/6 results: Pulmonary vascular congestion with unchanged left lung opacities.   Patient O2 between 85-93% without oxygen, observed while sleeping, resting comfortably  ECHO results 3/6: normal ejection fraction with moderate to severe TR and pulmonary hypertension with estimated RV systolic pressure of 65 mmHg  Euvolemic; not likely acute CHF exacerbation  Negative viral panel; less likely myocarditis  Cardio consult: Elevated troponin could be secondary to seizure activity in the setting of CKD. Doubt primary cardiac issue. Patient could very well have underlying CAD but no recommendations to consider any aggressive invasive evaluation given her advanced age and comorbidities. Signing off.    Plan  Discontinue telemetry  Continue Lasix      Chronic atrial fibrillation (HCC)  Patient has chronic A-fib.  Rate controlled.  Not on any blood thinners due to dementia and fall risk.  Primary hypertension  Plan  Continue Lasix   Chronic congestive heart failure (HCC)  Wt Readings from Last 3 Encounters:   03/07/25 65.4 kg (144 lb 2.9 oz)   01/17/25 64.7 kg (142 lb 9.6 oz)   12/31/24 62.6 kg (138 lb)       Euvolemic on examination.  Crackles in lungs    Plan  IV 20mg lasix bolus once  Continue scheduled Lasix  SCOTT, daily weight  Fluid restriction    Stage 3b chronic kidney disease (HCC)  Lab Results   Component Value Date    EGFR 38 03/07/2025    EGFR 34 03/06/2025    EGFR 31 03/05/2025    CREATININE 1.17 03/07/2025    CREATININE 1.27 03/06/2025    CREATININE 1.37 (H) 03/05/2025   Assessment  Creatinine and BUN are at baseline of 1.3-1.4 and 31-34 respectively    Plan  Monitor creatinine  Avoid nephrotoxins  Vascular dementia, uncomplicated (HCC)  Patient is A and O x 1.    Plan  Delirium precautions  Continue home mirtazapine 7.5mg daily  Continue home quetiapine 25mg daily    VTE Pharmacologic Prophylaxis:   High Risk (Score >/= 5) - Pharmacological DVT  Prophylaxis Contraindicated. Sequential Compression Devices Ordered.    Mobility:   Basic Mobility Inpatient Raw Score: 12  JH-HLM Goal: 4: Move to chair/commode  JH-HLM Achieved: 2: Bed activities/Dependent transfer  JH-HLM Goal achieved. Continue to encourage appropriate mobility.    Patient Centered Rounds: I performed bedside rounds with nursing staff today.   Discussions with Specialists or Other Care Team Provider: cardiology    Education and Discussions with Family / Patient: Updated  (niece) at bedside.     Current Length of Stay: 3 day(s)  Current Patient Status: Inpatient   Certification Statement: The patient will continue to require additional inpatient hospital stay due to needing IV antibiotics and MRI pending  Discharge Plan: Anticipate discharge in 48-72 hrs to home with home services.    Code Status: Level 3 - DNAR and DNI    Subjective   Patient reports feeling fine, denies pain,  CP or SOB. Only oriented to self.    Objective :  Temp:  [97.4 °F (36.3 °C)-97.5 °F (36.4 °C)] 97.4 °F (36.3 °C)  HR:  [86] 86  BP: (106-143)/(68-85) 131/68  Resp:  [17-20] 18  SpO2:  [100 %] 100 %  O2 Device: Nasal cannula  Nasal Cannula O2 Flow Rate (L/min):  [2 L/min] 2 L/min    Body mass index is 27.24 kg/m².     Input and Output Summary (last 24 hours):     Intake/Output Summary (Last 24 hours) at 3/7/2025 1310  Last data filed at 3/7/2025 1236  Gross per 24 hour   Intake 820 ml   Output 2 ml   Net 818 ml       Physical Exam  Constitutional:       General: She is not in acute distress.     Appearance: Normal appearance.   HENT:      Nose: No congestion.      Mouth/Throat:      Mouth: Mucous membranes are moist.      Pharynx: Oropharynx is clear.   Cardiovascular:      Rate and Rhythm: Normal rate. Rhythm irregular.      Pulses: Normal pulses.      Heart sounds: Normal heart sounds. No murmur heard.  Pulmonary:      Effort: Pulmonary effort is normal.      Breath sounds: Rales present. No wheezing.    Abdominal:      General: Abdomen is flat. There is no distension.      Palpations: Abdomen is soft. There is no mass.      Tenderness: There is no abdominal tenderness. There is no guarding or rebound.   Musculoskeletal:         General: No swelling.      Right lower leg: No edema.      Left lower leg: No edema.   Skin:     General: Skin is warm and dry.   Neurological:      Mental Status: She is alert. Mental status is at baseline. She is disoriented.      Comments: A and O x1          Lines/Drains:        Telemetry:  Telemetry Orders (From admission, onward)               24 Hour Telemetry Monitoring  Continuous x 24 Hours (Telem)        Expiring   Question:  Reason for 24 Hour Telemetry  Answer:  PCI/EP study (including pacer and ICD implementation), Cardiac surgery, MI, abnormal cardiac cath, and chest pain- rule out MI                     Telemetry Reviewed: Atrial fibrillation. HR averaging 80s  Indication for Continued Telemetry Use: No indication for continued use. Will discontinue.                Lab Results: I have reviewed the following results:   Results from last 7 days   Lab Units 03/07/25  0521 03/05/25  0507 03/04/25  1108   WBC Thousand/uL 8.09   < > 7.92   HEMOGLOBIN g/dL 9.0*   < > 9.1*   HEMATOCRIT % 31.2*   < > 30.4*   PLATELETS Thousands/uL 278   < > 302   SEGS PCT %  --   --  81*   LYMPHO PCT %  --   --  7*   MONO PCT %  --   --  11   EOS PCT %  --   --  1    < > = values in this interval not displayed.     Results from last 7 days   Lab Units 03/07/25  0521 03/06/25  0504 03/05/25  0507   SODIUM mmol/L 142   < > 138   POTASSIUM mmol/L 3.8   < > 3.8   CHLORIDE mmol/L 110*   < > 106   CO2 mmol/L 25   < > 23   BUN mg/dL 22   < > 34*   CREATININE mg/dL 1.17   < > 1.37*   ANION GAP mmol/L 7   < > 9   CALCIUM mg/dL 8.4   < > 8.6   ALBUMIN g/dL  --   --  3.8   TOTAL BILIRUBIN mg/dL  --   --  0.89   ALK PHOS U/L  --   --  127*   ALT U/L  --   --  23   AST U/L  --   --  34   GLUCOSE RANDOM mg/dL 101    < > 123    < > = values in this interval not displayed.     Results from last 7 days   Lab Units 03/04/25  1108   INR  1.20*             Results from last 7 days   Lab Units 03/04/25  1254 03/04/25  1108   LACTIC ACID mmol/L 1.8 2.1*   PROCALCITONIN ng/ml  --  0.46*       Recent Cultures (last 7 days):   Results from last 7 days   Lab Units 03/04/25  1549 03/04/25  1109 03/04/25  1108   BLOOD CULTURE   --  No Growth at 48 hrs. No Growth at 48 hrs.   URINE CULTURE  >100,000 cfu/ml Escherichia coli ESBL*  <10,000 cfu/ml Alpha Hemolytic Streptococcus*  <10,000 cfu/ml Gamma Hemolytic Streptococcus  --   --        Imaging Results Review: I reviewed radiology reports from this admission including: chest xray and Echocardiogram.  Other Study Results Review: Other studies reviewed include: EEG    Last 24 Hours Medication List:     Current Facility-Administered Medications:     acetaminophen (TYLENOL) tablet 650 mg, Q6H PRN    ertapenem (INVanz) 500 mg in sodium chloride 0.9 % 50 mL IVPB, Q24H, Last Rate: 500 mg (03/07/25 1119)    furosemide (LASIX) injection 20 mg, Once    furosemide (LASIX) tablet 20 mg, BID    gabapentin (NEURONTIN) capsule 100 mg, TID    heparin (porcine) subcutaneous injection 5,000 Units, Q8H HESHAM **AND** [CANCELED] Platelet count, Once    lidocaine (LMX) 4 % cream, BID PRN    mirtazapine (REMERON) tablet 7.5 mg, HS    QUEtiapine (SEROquel) tablet 25 mg, HS    Administrative Statements   Today, Patient Was Seen By: Sara Thomas MD  I have spent a total time of 25 minutes in caring for this patient on the day of the visit/encounter including Diagnostic results, Patient and family education, Impressions, Counseling / Coordination of care, Documenting in the medical record, Reviewing/placing orders in the medical record (including tests, medications, and/or procedures), Obtaining or reviewing history  , and Communicating with other healthcare professionals .    **Please Note: This note may have  been constructed using a voice recognition system.**

## 2025-03-08 ENCOUNTER — APPOINTMENT (INPATIENT)
Dept: RADIOLOGY | Facility: HOSPITAL | Age: OVER 89
DRG: 101 | End: 2025-03-08
Payer: COMMERCIAL

## 2025-03-08 LAB
ANION GAP SERPL CALCULATED.3IONS-SCNC: 6 MMOL/L (ref 4–13)
BUN SERPL-MCNC: 21 MG/DL (ref 5–25)
CALCIUM SERPL-MCNC: 8.2 MG/DL (ref 8.4–10.2)
CHLORIDE SERPL-SCNC: 107 MMOL/L (ref 96–108)
CO2 SERPL-SCNC: 27 MMOL/L (ref 21–32)
CREAT SERPL-MCNC: 1.37 MG/DL (ref 0.6–1.3)
GFR SERPL CREATININE-BSD FRML MDRD: 31 ML/MIN/1.73SQ M
GLUCOSE SERPL-MCNC: 152 MG/DL (ref 65–140)
POTASSIUM SERPL-SCNC: 3.5 MMOL/L (ref 3.5–5.3)
SODIUM SERPL-SCNC: 140 MMOL/L (ref 135–147)

## 2025-03-08 PROCEDURE — 71045 X-RAY EXAM CHEST 1 VIEW: CPT

## 2025-03-08 PROCEDURE — 80048 BASIC METABOLIC PNL TOTAL CA: CPT | Performed by: INTERNAL MEDICINE

## 2025-03-08 PROCEDURE — 99232 SBSQ HOSP IP/OBS MODERATE 35: CPT | Performed by: STUDENT IN AN ORGANIZED HEALTH CARE EDUCATION/TRAINING PROGRAM

## 2025-03-08 RX ADMIN — ERTAPENEM SODIUM 500 MG: 1 INJECTION, POWDER, LYOPHILIZED, FOR SOLUTION INTRAMUSCULAR; INTRAVENOUS at 11:14

## 2025-03-08 RX ADMIN — QUETIAPINE FUMARATE 25 MG: 25 TABLET ORAL at 23:28

## 2025-03-08 RX ADMIN — FUROSEMIDE 20 MG: 20 TABLET ORAL at 11:14

## 2025-03-08 RX ADMIN — HEPARIN SODIUM 5000 UNITS: 5000 INJECTION INTRAVENOUS; SUBCUTANEOUS at 18:49

## 2025-03-08 RX ADMIN — MIRTAZAPINE 7.5 MG: 15 TABLET, FILM COATED ORAL at 23:28

## 2025-03-08 RX ADMIN — GABAPENTIN 100 MG: 100 CAPSULE ORAL at 11:14

## 2025-03-08 RX ADMIN — HEPARIN SODIUM 5000 UNITS: 5000 INJECTION INTRAVENOUS; SUBCUTANEOUS at 23:28

## 2025-03-08 RX ADMIN — GABAPENTIN 100 MG: 100 CAPSULE ORAL at 23:28

## 2025-03-08 RX ADMIN — GABAPENTIN 100 MG: 100 CAPSULE ORAL at 18:49

## 2025-03-08 RX ADMIN — FUROSEMIDE 20 MG: 20 TABLET ORAL at 23:28

## 2025-03-08 RX ADMIN — HEPARIN SODIUM 5000 UNITS: 5000 INJECTION INTRAVENOUS; SUBCUTANEOUS at 05:06

## 2025-03-08 NOTE — ASSESSMENT & PLAN NOTE
Patient had lower abdominal pressure and discomfort.  UA significant for any minimal WBC and bacteria.  Urine culture: E Coli, resistant to ceftriaxone  PCN allergy.    Plan  Discontinue ceftriaxone  Start IV ertapenem, requiring 3 more days antibiotics

## 2025-03-08 NOTE — PLAN OF CARE
Problem: SAFETY ADULT  Goal: Patient will remain free of falls  Description: INTERVENTIONS:  - Educate patient/family on patient safety including physical limitations  - Instruct patient to call for assistance with activity   - Consult OT/PT to assist with strengthening/mobility   - Keep Call bell within reach  - Keep bed low and locked with side rails adjusted as appropriate  - Keep care items and personal belongings within reach  - Initiate and maintain comfort rounds  - Make Fall Risk Sign visible to staff  - Offer Toileting every 2 Hours, in advance of need  - Initiate/Maintain bed alarm  - Obtain necessary fall risk management equipment: walker  - Apply yellow socks and bracelet for high fall risk patients  - Consider moving patient to room near nurses station  Outcome: Progressing     Problem: Prexisting or High Potential for Compromised Skin Integrity  Goal: Skin integrity is maintained or improved  Description: INTERVENTIONS:  - Identify patients at risk for skin breakdown  - Assess and monitor skin integrity  - Assess and monitor nutrition and hydration status  - Monitor labs   - Assess for incontinence   - Turn and reposition patient  - Assist with mobility/ambulation  - Relieve pressure over bony prominences  - Avoid friction and shearing  - Provide appropriate hygiene as needed including keeping skin clean and dry  - Evaluate need for skin moisturizer/barrier cream  - Collaborate with interdisciplinary team   - Patient/family teaching  - Consider wound care consult   Outcome: Progressing     Problem: Decreased Cardiac Output  Goal: Cardiac output adequate for individual needs  Description: INTERVENTIONS: Monitor for signs and symptoms of decreased cardiac output   - Monitor for dyspnea with exertion and at rest  - Monitor for orthopnea  - Monitor for signs of tachycardia. Place patient on telemetry monitoring.  - Assess patient for jugular vein distention  - Assess patient for lower extremity edema and  poor peripheral perfusion   - Auscultate lung sound for Fine bibasilar crackles   - Monitor for cardiac arrythmias   - Administer beta blockers, antiarrhythmic, and blood pressure medications as ordered    Outcome: Progressing     Problem: Impaired Gas Exchange  Goal: Optimize oxygenation and ensure adequate ventilation  Description: INTERVENTIONS: Monitor for signs and symptoms of respiratory distress                - Elevate HOB or use high fowlers to promote lung expansion                - Administer oxygen as ordered to maintain adequate oxygenation                - Encourage use of IS to promote lung expansion and prevent PN                - Monitor ABGs to assess oxygenation status                - Monitor blood oxygen level to maintain adequate oxygenation                - Encourage cough and deep breathing exercises to promote lung expansion                - Monitor patient's mental status for increased confusion    Outcome: Progressing     Problem: Excess Fluid Volume  Goal: Patient is able to achieve and maintain homeostasis  Description: INTERVENTIONS: Monitor for sign and symptoms of fluid overload  - Evaluate LE edema every shift  - Elevate LE to prevent dependent edema  - Apply FRANCISCO stockings as ordered   - Monitor ankle circumference daily  - Assess for jugular vein distention  - Evaluate provider orders for the CHF diuretic algorithm. Administer diuretics as ordered  - Weigh the patient daily at 0600 and report a weight gain of five pounds or more   - Strict intake and output  - Monitor fluid intake and adhere to fluid restrictions  - Assess lung sounds every shift and as needed  - Monitor vital signs and lab values (CBC, chem, BUN, BNP)  - Measure and document urine output    Outcome: Progressing     Problem: Activity Intolerance  Goal: Patient is able to perform activities within their limitations  Description: INTERVENTIONS:                       -   Alternate periods of activity with periods of  rest                 -   Patients is able to maintain normal vitals heart rhythm during activity                 -   Gradually increase activity and exercise as patient can tolerate                 -   Monitor blood pressure and heart before and after exercise                  -   Monitor blood oxygen saturation during activity and apply oxygen as needed    Outcome: Progressing     Problem: NEUROSENSORY - ADULT  Goal: Achieves stable or improved neurological status  Description: INTERVENTIONS  - Monitor and report changes in neurological status  - Monitor vital signs such as temperature, blood pressure, glucose, and any other labs ordered   - Initiate measures to prevent increased intracranial pressure  - Monitor for seizure activity and implement precautions if appropriate      Outcome: Progressing     Problem: Knowledge Deficit  Goal: Patient is able to verbalize understanding of Heart Failure after education  Description: INTERVENTIONS:  - Educate the patient and family on signs and symptoms of HF  - Provide the patient with HF education and HF zone tool  - Educate on the importance of daily weight in the AM and reporting a weight gain               of 3 or more pounds to their primary care physician  - Monitor for SOB  - Maintain and sodium and fluid restriction  - Educate the patient on the importance of medications such as: diuretics, betablockers,               antiarrhythmics and their purpose, dose, route, side effects and labs               if they are needed    Outcome: Progressing     Problem: NEUROSENSORY - ADULT  Goal: Remains free of injury related to seizures activity  Description: INTERVENTIONS  - Maintain airway, patient safety  and administer oxygen as ordered  - Monitor patient for seizure activity, document and report duration and description of seizure to physician/advanced practitioner  - If seizure occurs,  ensure patient safety during seizure  - Reorient patient post seizure  - Seizure pads  on all 4 side rails  - Instruct patient/family to notify RN of any seizure activity including if an aura is experienced  - Instruct patient/family to call for assistance with activity based on nursing assessment  - Administer anti-seizure medications if ordered    Outcome: Progressing     Problem: NEUROSENSORY - ADULT  Goal: Achieves maximal functionality and self care  Description: INTERVENTIONS  - Monitor swallowing and airway patency with patient fatigue and changes in neurological status  - Encourage and assist patient to increase activity and self care.   - Encourage visually impaired, hearing impaired and aphasic patients to use assistive/communication devices  Outcome: Progressing

## 2025-03-08 NOTE — PROGRESS NOTES
"Progress Note - Hospitalist   Name: Sona Valenzuela 89 y.o. female I MRN: [id number]  Unit/Bed#: -01 I Date of Admission: 3/4/2025   Date of Service: 3/8/2025 I Hospital Day: 4    Assessment & Plan  Seizure-like activity (HCC)  Patient presents with 1 episode of slurred speech, slowness followed by body stiffening, not responding, unable to talk for 3 minutes.  No documented prior episode or seizures.  Patient is afebrile.  A and O x 1 secondary to dementia.  Has a UTI but no other evidence of infection or neck stiffness.  Neurological examination without focal neurological deficits.  EEG results 3/5: \"Background activities and posteriorly dominant rhythm are too slow suggesting mild diffuse cerebral dysfunction of nonspecific etiology.\" Transient alteration of awareness   ECHO results 3/6: normal ejection fraction with moderate to severe TR and pulmonary hypertension with estimated RV systolic pressure of 65 mmHg    Currently afebrile, hemodynamically stable.  Brain MRI pending.    No seizure-like activity noted since inpatient.  Continue supportive care.    Transient alteration of awareness  Patient presented on 3/4/3035 with 1 episode of slurred speech, slowness followed by body stiffening, not responding, unable to talk for 3 minutes.  No documented prior episode or seizures.  Found to have a UTI but no other evidence of infection or neck stiffness.  Neurological examination without focal neurological deficits.  EEG resulted on 3/5/2025 showed \"Background activities and posteriorly dominant rhythm are too slow suggesting mild diffuse cerebral dysfunction of nonspecific etiology.\"    Plan:  MRI brain  UTI (urinary tract infection)  Patient had lower abdominal pressure and discomfort.  UA significant for any minimal WBC and bacteria.  Urine culture: E Coli, resistant to ceftriaxone  PCN allergy.    Plan  Discontinue ceftriaxone  Start IV ertapenem, requiring 3 more days antibiotics  Elevated " troponin  Troponin 740/699/659  Patient without any chest pain or shortness of breath  EKG without acute ischemic changes.  CXR 3/6 results: Pulmonary vascular congestion with unchanged left lung opacities.   Patient O2 between 85-93% without oxygen, observed while sleeping, resting comfortably  ECHO results 3/6: normal ejection fraction with moderate to severe TR and pulmonary hypertension with estimated RV systolic pressure of 65 mmHg  Euvolemic; not likely acute CHF exacerbation  Negative viral panel; less likely myocarditis  Cardio consult: Elevated troponin could be secondary to seizure activity in the setting of CKD. Doubt primary cardiac issue. Patient could very well have underlying CAD but no recommendations to consider any aggressive invasive evaluation given her advanced age and comorbidities. Signing off.    Plan  Discontinue telemetry  Continue Lasix      Chronic atrial fibrillation (HCC)  Patient has chronic A-fib.  Rate controlled.  Not on any blood thinners due to dementia and fall risk.  Primary hypertension  Plan  Continue Lasix   Chronic congestive heart failure (HCC)  Wt Readings from Last 3 Encounters:   03/08/25 64 kg (141 lb 1.5 oz)   01/17/25 64.7 kg (142 lb 9.6 oz)   12/31/24 62.6 kg (138 lb)       Euvolemic on examination.  Crackles in lungs    Plan  Continue scheduled Lasix 20 milligram twice daily.  SCOTT, daily weight  Follow-up with repeat chest x-ray.  Fluid restriction    Stage 3b chronic kidney disease (HCC)  Lab Results   Component Value Date    EGFR 31 03/08/2025    EGFR 38 03/07/2025    EGFR 34 03/06/2025    CREATININE 1.37 (H) 03/08/2025    CREATININE 1.17 03/07/2025    CREATININE 1.27 03/06/2025   Assessment  Creatinine and BUN are at baseline of 1.3-1.4 and 31-34 respectively    Plan  Monitor creatinine  Avoid nephrotoxins  Vascular dementia, uncomplicated (HCC)  Patient is A and O x 1.    Plan  Delirium precautions  Continue home mirtazapine 7.5mg daily  Continue home quetiapine  25mg daily    VTE Pharmacologic Prophylaxis:   Moderate Risk (Score 3-4) - Pharmacological DVT Prophylaxis Ordered: heparin.    Mobility:   Basic Mobility Inpatient Raw Score: 12  -HLM Goal: 4: Move to chair/commode  -HLM Achieved: 4: Move to chair/commode      Patient Centered Rounds: I performed bedside rounds with nursing staff today.     Education and Discussions with Family / Patient: Attempted to update  (Sonia Layne) via phone. Left voicemail.  At 1:57 pm.     Current Length of Stay: 4 day(s)  Current Patient Status: Inpatient   Certification Statement: The patient will continue to require additional inpatient hospital stay due to monitoring renal function, working on safe dispo planning  Discharge Plan: Anticipate discharge in 24-48 hrs to discharge location to be determined pending rehab evaluations.    Code Status: Level 3 - DNAR and DNI    Subjective   Seen during a.m. rounds.  Patient has been up all night as per report.  Current encounter patient is awake, alert, disoriented and pleasantly confused appears to be stable at her baseline.  O2 saturation around 94% on room air.  Not in distress.  Does not offer any complaints at this time.    Objective :  Temp:  [97.5 °F (36.4 °C)-99.1 °F (37.3 °C)] 97.5 °F (36.4 °C)  BP: (126-138)/(69-88) 138/86  Resp:  [17-18] 18  O2 Device: Nasal cannula  Nasal Cannula O2 Flow Rate (L/min):  [2 L/min] 2 L/min    Body mass index is 26.66 kg/m².     Input and Output Summary (last 24 hours):     Intake/Output Summary (Last 24 hours) at 3/8/2025 1105  Last data filed at 3/8/2025 0507  Gross per 24 hour   Intake 640 ml   Output --   Net 640 ml       Physical Exam  Constitutional:       General: She is not in acute distress.     Appearance: She is not ill-appearing, toxic-appearing or diaphoretic.      Comments: Elderly female patient, deconditioned, acutely nontoxic appearing.   Cardiovascular:      Rate and Rhythm: Normal rate.      Pulses: Normal pulses.    Pulmonary:      Effort: Pulmonary effort is normal. No respiratory distress.      Breath sounds: No wheezing.      Comments: O2 saturation 94 to 95% room air.  Abdominal:      General: Bowel sounds are normal. There is no distension.      Palpations: Abdomen is soft.      Tenderness: There is no abdominal tenderness.   Musculoskeletal:      Right lower leg: No edema.      Left lower leg: No edema.   Neurological:      Mental Status: She is alert. Mental status is at baseline. She is disoriented.      Comments: Pleasantly disoriented.  Cooperative during exam.   Psychiatric:         Mood and Affect: Mood normal.         Behavior: Behavior normal.           Lines/Drains:              Lab Results: I have reviewed the following results:   Results from last 7 days   Lab Units 03/07/25  0521 03/05/25  0507 03/04/25  1108   WBC Thousand/uL 8.09   < > 7.92   HEMOGLOBIN g/dL 9.0*   < > 9.1*   HEMATOCRIT % 31.2*   < > 30.4*   PLATELETS Thousands/uL 278   < > 302   SEGS PCT %  --   --  81*   LYMPHO PCT %  --   --  7*   MONO PCT %  --   --  11   EOS PCT %  --   --  1    < > = values in this interval not displayed.     Results from last 7 days   Lab Units 03/08/25  0456 03/06/25  0504 03/05/25  0507   SODIUM mmol/L 140   < > 138   POTASSIUM mmol/L 3.5   < > 3.8   CHLORIDE mmol/L 107   < > 106   CO2 mmol/L 27   < > 23   BUN mg/dL 21   < > 34*   CREATININE mg/dL 1.37*   < > 1.37*   ANION GAP mmol/L 6   < > 9   CALCIUM mg/dL 8.2*   < > 8.6   ALBUMIN g/dL  --   --  3.8   TOTAL BILIRUBIN mg/dL  --   --  0.89   ALK PHOS U/L  --   --  127*   ALT U/L  --   --  23   AST U/L  --   --  34   GLUCOSE RANDOM mg/dL 152*   < > 123    < > = values in this interval not displayed.     Results from last 7 days   Lab Units 03/04/25  1108   INR  1.20*             Results from last 7 days   Lab Units 03/04/25  1254 03/04/25  1108   LACTIC ACID mmol/L 1.8 2.1*   PROCALCITONIN ng/ml  --  0.46*       Recent Cultures (last 7 days):   Results from last 7  days   Lab Units 03/04/25  1549 03/04/25  1109 03/04/25  1108   BLOOD CULTURE   --  No Growth at 72 hrs. No Growth at 72 hrs.   URINE CULTURE  >100,000 cfu/ml Escherichia coli ESBL*  <10,000 cfu/ml Alpha Hemolytic Streptococcus*  <10,000 cfu/ml Gamma Hemolytic Streptococcus  --   --          Last 24 Hours Medication List:     Current Facility-Administered Medications:     acetaminophen (TYLENOL) tablet 650 mg, Q6H PRN    ertapenem (INVanz) 500 mg in sodium chloride 0.9 % 50 mL IVPB, Q24H    furosemide (LASIX) tablet 20 mg, BID    gabapentin (NEURONTIN) capsule 100 mg, TID    heparin (porcine) subcutaneous injection 5,000 Units, Q8H HESHAM **AND** [CANCELED] Platelet count, Once    lidocaine (LMX) 4 % cream, BID PRN    mirtazapine (REMERON) tablet 7.5 mg, HS    QUEtiapine (SEROquel) tablet 25 mg, HS    Administrative Statements   Today, Patient Was Seen By: Rodrigo Wills MD  I have spent a total time of 35 minutes in caring for this patient on the day of the visit/encounter including Diagnostic results, Risks and benefits of tx options, Patient and family education, Impressions, Counseling / Coordination of care, Documenting in the medical record, Reviewing/placing orders in the medical record (including tests, medications, and/or procedures), and Obtaining or reviewing history  .    **Please Note: This note may have been constructed using a voice recognition system.**

## 2025-03-08 NOTE — ASSESSMENT & PLAN NOTE
"Patient presents with 1 episode of slurred speech, slowness followed by body stiffening, not responding, unable to talk for 3 minutes.  No documented prior episode or seizures.  Patient is afebrile.  A and O x 1 secondary to dementia.  Has a UTI but no other evidence of infection or neck stiffness.  Neurological examination without focal neurological deficits.  EEG results 3/5: \"Background activities and posteriorly dominant rhythm are too slow suggesting mild diffuse cerebral dysfunction of nonspecific etiology.\" Transient alteration of awareness   ECHO results 3/6: normal ejection fraction with moderate to severe TR and pulmonary hypertension with estimated RV systolic pressure of 65 mmHg    Currently afebrile, hemodynamically stable.  Brain MRI pending.    No seizure-like activity noted since inpatient.  Continue supportive care.    "

## 2025-03-08 NOTE — ASSESSMENT & PLAN NOTE
Lab Results   Component Value Date    EGFR 31 03/08/2025    EGFR 38 03/07/2025    EGFR 34 03/06/2025    CREATININE 1.37 (H) 03/08/2025    CREATININE 1.17 03/07/2025    CREATININE 1.27 03/06/2025   Assessment  Creatinine and BUN are at baseline of 1.3-1.4 and 31-34 respectively    Plan  Monitor creatinine  Avoid nephrotoxins

## 2025-03-08 NOTE — ASSESSMENT & PLAN NOTE
Wt Readings from Last 3 Encounters:   03/08/25 64 kg (141 lb 1.5 oz)   01/17/25 64.7 kg (142 lb 9.6 oz)   12/31/24 62.6 kg (138 lb)       Euvolemic on examination.  Crackles in lungs    Plan  Continue scheduled Lasix 20 milligram twice daily.  SCOTT, daily weight  Follow-up with repeat chest x-ray.  Fluid restriction

## 2025-03-09 LAB
ANION GAP SERPL CALCULATED.3IONS-SCNC: 6 MMOL/L (ref 4–13)
BACTERIA BLD CULT: NORMAL
BACTERIA BLD CULT: NORMAL
BUN SERPL-MCNC: 19 MG/DL (ref 5–25)
CALCIUM SERPL-MCNC: 8.4 MG/DL (ref 8.4–10.2)
CHLORIDE SERPL-SCNC: 107 MMOL/L (ref 96–108)
CO2 SERPL-SCNC: 28 MMOL/L (ref 21–32)
CREAT SERPL-MCNC: 1.25 MG/DL (ref 0.6–1.3)
ERYTHROCYTE [DISTWIDTH] IN BLOOD BY AUTOMATED COUNT: 20.4 % (ref 11.6–15.1)
GFR SERPL CREATININE-BSD FRML MDRD: 35 ML/MIN/1.73SQ M
GLUCOSE SERPL-MCNC: 122 MG/DL (ref 65–140)
HCT VFR BLD AUTO: 30.3 % (ref 34.8–46.1)
HGB BLD-MCNC: 8.9 G/DL (ref 11.5–15.4)
MCH RBC QN AUTO: 23.1 PG (ref 26.8–34.3)
MCHC RBC AUTO-ENTMCNC: 29.4 G/DL (ref 31.4–37.4)
MCV RBC AUTO: 79 FL (ref 82–98)
PLATELET # BLD AUTO: 260 THOUSANDS/UL (ref 149–390)
PMV BLD AUTO: 10.2 FL (ref 8.9–12.7)
POTASSIUM SERPL-SCNC: 4.1 MMOL/L (ref 3.5–5.3)
RBC # BLD AUTO: 3.86 MILLION/UL (ref 3.81–5.12)
SODIUM SERPL-SCNC: 141 MMOL/L (ref 135–147)
WBC # BLD AUTO: 8.75 THOUSAND/UL (ref 4.31–10.16)

## 2025-03-09 PROCEDURE — 85027 COMPLETE CBC AUTOMATED: CPT | Performed by: STUDENT IN AN ORGANIZED HEALTH CARE EDUCATION/TRAINING PROGRAM

## 2025-03-09 PROCEDURE — 97163 PT EVAL HIGH COMPLEX 45 MIN: CPT

## 2025-03-09 PROCEDURE — 80048 BASIC METABOLIC PNL TOTAL CA: CPT | Performed by: STUDENT IN AN ORGANIZED HEALTH CARE EDUCATION/TRAINING PROGRAM

## 2025-03-09 PROCEDURE — 97167 OT EVAL HIGH COMPLEX 60 MIN: CPT

## 2025-03-09 PROCEDURE — 99232 SBSQ HOSP IP/OBS MODERATE 35: CPT | Performed by: STUDENT IN AN ORGANIZED HEALTH CARE EDUCATION/TRAINING PROGRAM

## 2025-03-09 RX ADMIN — MIRTAZAPINE 7.5 MG: 15 TABLET, FILM COATED ORAL at 22:00

## 2025-03-09 RX ADMIN — GABAPENTIN 100 MG: 100 CAPSULE ORAL at 22:00

## 2025-03-09 RX ADMIN — FUROSEMIDE 20 MG: 20 TABLET ORAL at 14:07

## 2025-03-09 RX ADMIN — GABAPENTIN 100 MG: 100 CAPSULE ORAL at 18:01

## 2025-03-09 RX ADMIN — ERTAPENEM SODIUM 500 MG: 1 INJECTION, POWDER, LYOPHILIZED, FOR SOLUTION INTRAMUSCULAR; INTRAVENOUS at 14:06

## 2025-03-09 RX ADMIN — HEPARIN SODIUM 5000 UNITS: 5000 INJECTION INTRAVENOUS; SUBCUTANEOUS at 22:00

## 2025-03-09 RX ADMIN — QUETIAPINE FUMARATE 25 MG: 25 TABLET ORAL at 22:00

## 2025-03-09 RX ADMIN — GABAPENTIN 100 MG: 100 CAPSULE ORAL at 14:07

## 2025-03-09 RX ADMIN — FUROSEMIDE 20 MG: 20 TABLET ORAL at 22:00

## 2025-03-09 NOTE — PLAN OF CARE
Problem: PHYSICAL THERAPY ADULT  Goal: Performs mobility at highest level of function for planned discharge setting.  See evaluation for individualized goals.  Description: Treatment/Interventions: Functional transfer training, LE strengthening/ROM, Therapeutic exercise, Endurance training, Patient/family training, Equipment eval/education, Bed mobility, Gait training, OT          See flowsheet documentation for full assessment, interventions and recommendations.  3/9/2025 1352 by Sofy Valentino PT  Note: Prognosis: Good  Problem List: Decreased strength, Decreased endurance, Impaired balance, Decreased mobility, Decreased cognition  Assessment: Pt is 89 y.o. female seen for PT evaluation on 3/9/2025 s/p admit to [facility] on 3/4/2025 w/ Seizure-like activity (HCC). PT was consulted to assess pt's functional mobility and d/c needs. Order placed for PT eval and tx. Pt is a poor historian due to cognitive impairments. Home setup and PLOF gathered via chart review.  At time of eval, pt requiring mod assist x2 for bed mobility, transfers, short gait trial from EOB to recliner with use of RW. Upon evaluation, pt presenting with impaired functional mobility d/t decreased strength, decreased endurance, impaired balance, decreased mobility, decreased cognition, and activity intolerance. Pertinent PMHx and current co-morbidities affecting pt's physical performance at time of assessment include: seizure like actiivty, UTI, chronic a fib, CHF, vascular dementia, primary HTN, stage 3b CKD, elevated troponin, transient alteration of awareness, lumbar radiculopathy, arthropathy of lumbar facet joint, mild protein-calorie malnutrition, ambulatory dysfunction, moderate alzheimer's, vascular dementia. Personal factors affecting pt at time of eval include: ambulating w/ assistive device, inability to ambulate household distances, and inability to navigate level surfaces w/o external assistance. The following objective  measures performed on IE also reveal limitations: Barthel Index: 20/100, Modified [place]: 4 (moderate/severe disability), and AM-PAC 6-Clicks: 7/24. Pt's clinical presentation is currently unstable/unpredictable seen in pt's presentation of advanced age, abnormal lab value(s), and ongoing medical assessment. Overall, pt's rehab potential and prognosis to return to PLOF is good as impacted by objective findings, warranting pt to receive further skilled PT interventions to address identified impairments, activity limitation(s), and participation restriction(s). Pt to benefit from continued PT tx to address deficits as defined above and maximize level of functional independent mobility. From PT/mobility standpoint, recommend level 2, moderate resource intensity in order to facilitate return to PLOF.  Barriers to Discharge: Inaccessible home environment     Rehab Resource Intensity Level, PT: II (Moderate Resource Intensity)    See flowsheet documentation for full assessment.     3/9/2025 1352 by Sofy Valentino PT  Note: Prognosis: Good  Problem List: Decreased strength, Decreased endurance, Impaired balance, Decreased mobility, Decreased cognition  Assessment: Pt is 89 y.o. female seen for PT evaluation on 3/9/2025 s/p admit to [facility] on 3/4/2025 w/ Seizure-like activity (HCC). PT was consulted to assess pt's functional mobility and d/c needs. Order placed for PT eval and tx. Pt is a poor historian due to cognitive impairments. Home setup and PLOF gathered via chart review.  At time of eval, pt requiring mod assist x2 for bed mobility, transfers, short gait trial from EOB to recliner with use of RW. Upon evaluation, pt presenting with impaired functional mobility d/t decreased strength, decreased endurance, impaired balance, decreased mobility, decreased cognition, and activity intolerance. Pertinent PMHx and current co-morbidities affecting pt's physical performance at time of assessment include: seizure  like actiivty, UTI, chronic a fib, CHF, vascular dementia, primary HTN, stage 3b CKD, elevated troponin, transient alteration of awareness, lumbar radiculopathy, arthropathy of lumbar facet joint, mild protein-calorie malnutrition, ambulatory dysfunction, moderate alzheimer's, vascular dementia. Personal factors affecting pt at time of eval include: ambulating w/ assistive device, inability to ambulate household distances, and inability to navigate level surfaces w/o external assistance. The following objective measures performed on IE also reveal limitations: Barthel Index: 20/100, Modified [place]: 4 (moderate/severe disability), and AM-PAC 6-Clicks: 7/24. Pt's clinical presentation is currently unstable/unpredictable seen in pt's presentation of advanced age, abnormal lab value(s), and ongoing medical assessment. Overall, pt's rehab potential and prognosis to return to PLOF is good as impacted by objective findings, warranting pt to receive further skilled PT interventions to address identified impairments, activity limitation(s), and participation restriction(s). Pt to benefit from continued PT tx to address deficits as defined above and maximize level of functional independent mobility. From PT/mobility standpoint, recommend level 2, moderate resource intensity in order to facilitate return to PLOF.  Barriers to Discharge: Inaccessible home environment     Rehab Resource Intensity Level, PT: II (Moderate Resource Intensity)    See flowsheet documentation for full assessment.

## 2025-03-09 NOTE — PLAN OF CARE
Problem: OCCUPATIONAL THERAPY ADULT  Goal: Performs self-care activities at highest level of function for planned discharge setting.  See evaluation for individualized goals.  Description: Treatment Interventions: ADL retraining, Functional transfer training, UE strengthening/ROM, Cognitive reorientation, Endurance training, Patient/family training, Equipment evaluation/education, Compensatory technique education, Energy conservation, Activityengagement          See flowsheet documentation for full assessment, interventions and recommendations.   Note: Limitation: Decreased UE strength, Decreased ADL status, Decreased Safe judgement during ADL, Decreased cognition, Decreased endurance, Decreased self-care trans, Decreased high-level ADLs  Prognosis: Fair  Assessment: Pt is a 89 y.o. female seen for OT evaluation s/p admit to [facility] on 3/4/2025 w/ Seizure-like activity (HCC).  Comorbidities affecting pt's functional performance at time of assessment include: transient alteration of awareness, UTI, elevated troponin, chronic atrial fibrillation, HTN, CHF, CKD III, dementia. Personal factors affecting pt at time of IE include:limited home support, difficulty performing ADLS, difficulty performing IADLS , limited insight into deficits, and decreased initiation and engagement . Prior to admission, pt was living w/ family and per chart review pt w/ assist ADLs, supervision to assist functional transfers and mobility w/ RW, assist IADLs. Upon evaluation: Pt requires MOD assist x2 supine>sit bed mobility, MOD assist x2 sit<>Stand (retropulsive in stance and saturated in urine and max assist hygiene cleanup), MOD assist UB ADLs, MAX Assist LB ADLS, MAX assist toileting 2* the following deficits impacting occupational performance: decreased strength and endurance, impaired balance, impaired activity tolerance, fall risk, impaired functional reach, impaired standing tolerance, multiple lines, increased pain. Pt to benefit  from continued skilled OT tx while in the hospital to address deficits as defined above and maximize level of functional independence w ADL's and functional mobility. Occupational Performance areas to address include: grooming, bathing/shower, toilet hygiene, dressing, health maintenance, functional mobility, community mobility, clothing management, cleaning, and meal prep. From OT standpoint, recommendation at time of d/c would be level II moderate resources.   The patient's raw score on the AM-PAC Daily Activity Inpatient Short Form is 11. A raw score of less than 19 suggests the patient may benefit from discharge to post-acute rehabilitation services. Please refer to the recommendation of the Occupational Therapist for safe discharge planning.  Recommendation: Geriatric Consult  Rehab Resource Intensity Level, OT: II (Moderate Resource Intensity)

## 2025-03-09 NOTE — PHYSICAL THERAPY NOTE
Physical Therapy Evaluation     Patient's Name: Sona Valenzuela    Admitting Diagnosis  Seizures (HCC) [R56.9]  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) [I13.0]  Chronic atrial fibrillation (HCC) [I48.20]  Elevated troponin [R79.89]  Seizure-like activity (HCC) [R56.9]    Problem List  Patient Active Problem List   Diagnosis    Degeneration of lumbar intervertebral disc    Lumbar radiculopathy    Arthropathy of lumbar facet joint    Restless legs syndrome    UTI (urinary tract infection)    Primary insomnia    Vitamin D deficiency    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HCC)    Chronic atrial fibrillation (HCC)    Chronic congestive heart failure (HCC)    Does mobilize using walker    Vascular dementia, uncomplicated (HCC)    Mild protein-calorie malnutrition (HCC)    Acute on chronic HFpEF with severe pulmonary hypertension (HCC)    Primary hypertension    Paroxysmal A-fib (HCC)    Stage 3b chronic kidney disease (HCC)    Compression fracture of lumbar spine, non-traumatic (HCC)    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)    Chronic pain of left wrist    Ambulatory dysfunction    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, psychotic disturbance, mood disturbance, or anxiety (HCC)    Congestion of throat    Vascular dementia with behavioral disturbance (HCC)    Seizure-like activity (HCC)    Elevated troponin    Transient alteration of awareness       Past Medical History  Past Medical History:   Diagnosis Date    Ankle fracture     Arthritis     left hip    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HCC)     with left ureter    Bronchitis     Cancer (HCC)     Carcinoma, lung (HCC)     Chronic kidney disease, stage 4 (severe) (HCC) 01/17/2025    Dementia (HCC)     Dependent edema     Depression     Diabetes mellitus (HCC)     Dizziness 12/13/2021    Hypercholesterolemia     Hypertension     Kidney stone     Oth abn and inconclusive findings on dx  imaging of breast     Pes planus     Renal calculus     Restless leg syndrome     Rib pain     Sprain, neck     Varicose veins of left lower extremity        Past Surgical History  Past Surgical History:   Procedure Laterality Date    APPENDECTOMY      LUNG CANCER SURGERY      US BREAST CYST ASPIRATION LEFT INITIAL Left 3/23/2018          03/09/25 0934   PT Last Visit   PT Visit Date 25   Note Type   Note type Evaluation   Pain Assessment   Pain Assessment Tool Elena-Baker FACES   Elena-Baker FACES Pain Rating 0   Restrictions/Precautions   Weight Bearing Precautions Per Order No   Other Precautions Contact/isolation;Cognitive;Chair Alarm;Bed Alarm;Fall Risk;Seizure   Home Living   Type of Home House   Home Layout Two level;Bed/bath upstairs  (No KAY; flight of stairs to 2nd floor)   Home Equipment Walker;Wheelchair-manual   Additional Comments pt ambulates with walker at baseline   Prior Function   Level of [place] Needs assistance with ADLs;Needs assistance with functional mobility;Needs assistance with IADLS   Lives With Family   Receives Help From Family   IADLs Family/Friend/Other provides transportation;Family/Friend/Other provides meals;Family/Friend/Other provides medication management   Falls in the last 6 months   (unknown)   Comments Pt is a poor historian due to cognitive impairments. Home setup and PLOF gathered via chart review.   General   Family/Caregiver Present No   Cognition   Overall Cognitive Status Impaired   Arousal/Participation Alert   Orientation Level Oriented to person;Disoriented to place;Disoriented to time;Disoriented to situation  (did not provide , able to recognize she is 89 y/o)   Memory Decreased recall of biographical information;Decreased recall of precautions;Decreased recall of recent events;Decreased short term memory   Following Commands Follows one step commands with increased time or repetition   Comments pt agreeable to PT session   RLE Assessment   RLE  Assessment   (grossly 3+/5 observed with functional mobility)   LLE Assessment   LLE Assessment   (grossly 3+/5 observed with functional mobility)   Bed Mobility   Supine to Sit 3  Moderate assistance   Additional items Assist x 2;HOB elevated;Bedrails;Increased time required;Verbal cues;LE management   Transfers   Sit to Stand 3  Moderate assistance   Additional items Assist x 2;Armrests;Increased time required;Verbal cues   Stand to Sit 3  Moderate assistance   Additional items Assist x 2;Armrests;Increased time required;Verbal cues   Additional Comments retropulsive upon stance, requiring tactile and verbal cues to correct posture   Ambulation/Elevation   Gait pattern Retropulsion;Decreased foot clearance;Wide PORFIRIO;Short stride;Step to;Knees flexed   Gait Assistance 3  Moderate assist   Additional items Assist x 2;Verbal cues;Tactile cues   Assistive Device Rolling walker   Distance 3' from EOB to recliner   Ambulation/Elevation Additional Comments external assistance to maneuver RW   Balance   Static Sitting Fair   Dynamic Sitting Fair -   Static Standing Poor +   Dynamic Standing Poor   Ambulatory Poor   Endurance Deficit   Endurance Deficit Yes   Activity Tolerance   Activity Tolerance Patient limited by fatigue   Medical Staff Made Aware Pt seen as a co-eval with OT due to the patient's co-morbidities, clinically unstable presentation, and present impairments which are a regression from the patient's baseline.   Nurse Made Aware RN Monica   Assessment   Prognosis Good   Problem List Decreased strength;Decreased endurance;Impaired balance;Decreased mobility;Decreased cognition   Assessment Pt is 89 y.o. female seen for PT evaluation on 3/9/2025 s/p admit to [facility] on 3/4/2025 w/ Seizure-like activity (HCC). PT was consulted to assess pt's functional mobility and d/c needs. Order placed for PT eval and tx. Pt is a poor historian due to cognitive impairments. Home setup and PLOF gathered via chart  review.  At time of eval, pt requiring mod assist x2 for bed mobility, transfers, short gait trial from EOB to recliner with use of RW. Upon evaluation, pt presenting with impaired functional mobility d/t decreased strength, decreased endurance, impaired balance, decreased mobility, decreased cognition, and activity intolerance. Pertinent PMHx and current co-morbidities affecting pt's physical performance at time of assessment include: seizure like actiivty, UTI, chronic a fib, CHF, vascular dementia, primary HTN, stage 3b CKD, elevated troponin, transient alteration of awareness, lumbar radiculopathy, arthropathy of lumbar facet joint, mild protein-calorie malnutrition, ambulatory dysfunction, moderate alzheimer's, vascular dementia. Personal factors affecting pt at time of eval include: ambulating w/ assistive device, inability to ambulate household distances, and inability to navigate level surfaces w/o external assistance. The following objective measures performed on IE also reveal limitations: Barthel Index: 20/100, Modified [place]: 4 (moderate/severe disability), and AM-PAC 6-Clicks: 7/24. Pt's clinical presentation is currently unstable/unpredictable seen in pt's presentation of advanced age, abnormal lab value(s), and ongoing medical assessment. Overall, pt's rehab potential and prognosis to return to PLOF is good as impacted by objective findings, warranting pt to receive further skilled PT interventions to address identified impairments, activity limitation(s), and participation restriction(s). Pt to benefit from continued PT tx to address deficits as defined above and maximize level of functional independent mobility. From PT/mobility standpoint, recommend level 2, moderate resource intensity in order to facilitate return to PLOF.   Barriers to Discharge Inaccessible home environment   Goals   Patient Goals none expressed   STG Expiration Date 03/19/25   Short Term Goal #1 In 7-10 days: Increase  bilateral LE strength 1/2 grade to facilitate independent mobility, Perform all bed mobility tasks with min A of 1 to decrease caregiver burden, Perform all transfers with min A of 1 to improve independence, Ambulate > 25 ft. with least restrictive assistive device with min A of 1 w/o LOB and w/ normalized gait pattern 100% of the time, Increase all balance 1/2 grade to decrease risk for falls, and PT provider will perform functional balance assessment to determine fall risk   PT Treatment Day 0   Plan   Treatment/Interventions Functional transfer training;LE strengthening/ROM;Therapeutic exercise;Endurance training;Patient/family training;Equipment eval/education;Bed mobility;Gait training;OT   PT Frequency 3-5x/wk   Discharge Recommendation   Rehab Resource Intensity Level, PT II (Moderate Resource Intensity)   AM-PAC Basic Mobility Inpatient   Turning in Flat Bed Without Bedrails 2   Lying on Back to Sitting on Edge of Flat Bed Without Bedrails 1   Moving Bed to Chair 1   Standing Up From Chair Using Arms 1   Walk in Room 1   Climb 3-5 Stairs With Railing 1   Basic Mobility Inpatient Raw Score 7   Turning Head Towards Sound 3   Follow Simple Instructions 3   Low Function Basic Mobility Raw Score  13   Low Function Basic Mobility Standardized Score  20.14   [facility] Highest Level Of Mobility   -HL Goal 2: Bed activities/Dependent transfer   -HL Achieved 4: Move to chair/commode   Modified Christine Scale   Modified [place] Scale 4   Barthel Index   Feeding 5   Bathing 0   Grooming Score 0   Dressing Score 5   Bladder Score 0   Bowels Score 0   Toilet Use Score 5   Transfers (Bed/Chair) Score 5   Mobility (Level Surface) Score 0   Stairs Score 0   Barthel Index Score 20         Sofy Valentino, PT

## 2025-03-09 NOTE — ASSESSMENT & PLAN NOTE
Wt Readings from Last 3 Encounters:   03/09/25 63.5 kg (139 lb 15.9 oz)   01/17/25 64.7 kg (142 lb 9.6 oz)   12/31/24 62.6 kg (138 lb)       Euvolemic on examination.      Plan  Continue scheduled po Lasix 20 milligram twice daily.  SCOTT, daily weight  Follow-up with repeat chest x-ray.  Fluid restriction

## 2025-03-09 NOTE — ASSESSMENT & PLAN NOTE
Lab Results   Component Value Date    EGFR 35 03/09/2025    EGFR 31 03/08/2025    EGFR 38 03/07/2025    CREATININE 1.25 03/09/2025    CREATININE 1.37 (H) 03/08/2025    CREATININE 1.17 03/07/2025   Assessment  Creatinine and BUN are at baseline of 1.3-1.4 and 31-34 respectively    Plan  Monitor creatinine  Avoid nephrotoxins

## 2025-03-09 NOTE — ASSESSMENT & PLAN NOTE
Patient had lower abdominal pressure and discomfort.  UA significant for any minimal WBC and bacteria.  Urine culture: E Coli, resistant to ceftriaxone  PCN allergy.    Plan  On IV Ertapenem day #3.

## 2025-03-09 NOTE — PROGRESS NOTES
"Progress Note - Hospitalist   Name: Sona Valenzuela 89 y.o. female I MRN: [id number]  Unit/Bed#: -01 I Date of Admission: 3/4/2025   Date of Service: 3/9/2025 I Hospital Day: 5    Assessment & Plan  Seizure-like activity (HCC)  Patient presents with 1 episode of slurred speech, slowness followed by body stiffening, not responding, unable to talk for 3 minutes.  No documented prior episode or seizures.  Patient is afebrile.  A and O x 1 secondary to dementia.  Has a UTI but no other evidence of infection or neck stiffness.  Neurological examination without focal neurological deficits.  EEG results 3/5: \"Background activities and posteriorly dominant rhythm are too slow suggesting mild diffuse cerebral dysfunction of nonspecific etiology.\" Transient alteration of awareness   ECHO results 3/6: normal ejection fraction with moderate to severe TR and pulmonary hypertension with estimated RV systolic pressure of 65 mmHg    Currently afebrile, hemodynamically stable.  Brain MRI pending.    No seizure-like activity noted since inpatient.  Continue supportive care.    Transient alteration of awareness  Patient presented on 3/4/3035 with 1 episode of slurred speech, slowness followed by body stiffening, not responding, unable to talk for 3 minutes.  No documented prior episode or seizures.  Found to have a UTI but no other evidence of infection or neck stiffness.  Neurological examination without focal neurological deficits.  EEG resulted on 3/5/2025 showed \"Background activities and posteriorly dominant rhythm are too slow suggesting mild diffuse cerebral dysfunction of nonspecific etiology.\"    Plan:  MRI brain  UTI (urinary tract infection)  Patient had lower abdominal pressure and discomfort.  UA significant for any minimal WBC and bacteria.  Urine culture: E Coli, resistant to ceftriaxone  PCN allergy.    Plan  On IV Ertapenem day #3.   Elevated troponin  Troponin 740/699/659  Patient without any chest pain or " shortness of breath  EKG without acute ischemic changes.  CXR 3/6 results: Pulmonary vascular congestion with unchanged left lung opacities.   Patient O2 between 85-93% without oxygen, observed while sleeping, resting comfortably  ECHO results 3/6: normal ejection fraction with moderate to severe TR and pulmonary hypertension with estimated RV systolic pressure of 65 mmHg  Euvolemic; not likely acute CHF exacerbation  Negative viral panel; less likely myocarditis  Cardio consult: Elevated troponin could be secondary to seizure activity in the setting of CKD. Doubt primary cardiac issue. Patient could very well have underlying CAD but no recommendations to consider any aggressive invasive evaluation given her advanced age and comorbidities. Signing off.    Plan  Discontinue telemetry  Continue Lasix      Chronic atrial fibrillation (HCC)  Patient has chronic A-fib.  Rate controlled.  Not on any blood thinners due to dementia and fall risk.  Primary hypertension  Plan  Continue Lasix   Chronic congestive heart failure (HCC)  Wt Readings from Last 3 Encounters:   03/09/25 63.5 kg (139 lb 15.9 oz)   01/17/25 64.7 kg (142 lb 9.6 oz)   12/31/24 62.6 kg (138 lb)       Euvolemic on examination.      Plan  Continue scheduled po Lasix 20 milligram twice daily.  SCOTT, daily weight  Follow-up with repeat chest x-ray.  Fluid restriction    Stage 3b chronic kidney disease (HCC)  Lab Results   Component Value Date    EGFR 35 03/09/2025    EGFR 31 03/08/2025    EGFR 38 03/07/2025    CREATININE 1.25 03/09/2025    CREATININE 1.37 (H) 03/08/2025    CREATININE 1.17 03/07/2025   Assessment  Creatinine and BUN are at baseline of 1.3-1.4 and 31-34 respectively    Plan  Monitor creatinine  Avoid nephrotoxins  Vascular dementia, uncomplicated (HCC)  Patient is A and O x 1.    Plan  Delirium precautions  Continue home mirtazapine 7.5mg daily  Continue home quetiapine 25mg daily    VTE Pharmacologic Prophylaxis:   Moderate Risk (Score 3-4) -  Pharmacological DVT Prophylaxis Ordered: heparin.    Mobility:   Basic Mobility Inpatient Raw Score: 7  -[facility] Goal: 2: Bed activities/Dependent transfer  -HL Achieved: 4: Move to chair/commode      Patient Centered Rounds: I performed bedside rounds with nursing staff today.       Education and Discussions with Family / Patient: Attempted to update  (niece) via phone. Unable to contact.    Current Length of Stay: 5 day(s)  Current Patient Status: Inpatient   Certification Statement: The patient will continue to require additional inpatient hospital stay due to working on safe dispo planning  Discharge Plan: Anticipate discharge in 24-48 hrs to discharge location to be determined pending rehab evaluations.    Code Status: Level 3 - DNAR and DNI    Subjective   Seen during a.m. rounds.  Patient appears comfortable not in distress.  She is seen sitting in a chair.  Pleasantly disoriented.  Denies any complaints at this time.  No other events reported.    Objective :  Temp:  [96.3 °F (35.7 °C)] 96.3 °F (35.7 °C)  HR:  [89-92] 92  BP: (114-133)/(62-65) 133/65  SpO2:  [91 %] 91 %  O2 Device: Nasal cannula  Nasal Cannula O2 Flow Rate (L/min):  [2 L/min] 2 L/min    Body mass index is 26.45 kg/m².     Input and Output Summary (last 24 hours):     Intake/Output Summary (Last 24 hours) at 3/9/2025 1355  Last data filed at 3/9/2025 0934  Gross per 24 hour   Intake 420 ml   Output --   Net 420 ml       Physical Exam  Constitutional:       General: She is not in acute distress.     Appearance: She is not ill-appearing, toxic-appearing or diaphoretic.      Comments: Elderly female patient, deconditioned, acutely nontoxic appearing.   Cardiovascular:      Rate and Rhythm: Normal rate.      Pulses: Normal pulses.   Pulmonary:      Effort: Pulmonary effort is normal. No respiratory distress.      Breath sounds: No wheezing.      Comments: O2 saturation 94 to 95% room air.  Abdominal:      General: Bowel sounds are  normal. There is no distension.      Palpations: Abdomen is soft.      Tenderness: There is no abdominal tenderness.   Musculoskeletal:      Right lower leg: No edema.      Left lower leg: No edema.   Neurological:      Mental Status: She is alert. She is disoriented.      Comments: Pleasantly disoriented.  Cooperative during exam.   Psychiatric:         Mood and Affect: Mood normal.         Behavior: Behavior normal.           Lines/Drains:              Lab Results: I have reviewed the following results:   Results from last 7 days   Lab Units 03/09/25  0603 03/05/25  0507 03/04/25  1108   WBC Thousand/uL 8.75   < > 7.92   HEMOGLOBIN g/dL 8.9*   < > 9.1*   HEMATOCRIT % 30.3*   < > 30.4*   PLATELETS Thousands/uL 260   < > 302   SEGS PCT %  --   --  81*   LYMPHO PCT %  --   --  7*   MONO PCT %  --   --  11   EOS PCT %  --   --  1    < > = values in this interval not displayed.     Results from last 7 days   Lab Units 03/09/25  0603 03/06/25  0504 03/05/25  0507   SODIUM mmol/L 141   < > 138   POTASSIUM mmol/L 4.1   < > 3.8   CHLORIDE mmol/L 107   < > 106   CO2 mmol/L 28   < > 23   BUN mg/dL 19   < > 34*   CREATININE mg/dL 1.25   < > 1.37*   ANION GAP mmol/L 6   < > 9   CALCIUM mg/dL 8.4   < > 8.6   ALBUMIN g/dL  --   --  3.8   TOTAL BILIRUBIN mg/dL  --   --  0.89   ALK PHOS U/L  --   --  127*   ALT U/L  --   --  23   AST U/L  --   --  34   GLUCOSE RANDOM mg/dL 122   < > 123    < > = values in this interval not displayed.     Results from last 7 days   Lab Units 03/04/25  1108   INR  1.20*             Results from last 7 days   Lab Units 03/04/25  1254 03/04/25  1108   LACTIC ACID mmol/L 1.8 2.1*   PROCALCITONIN ng/ml  --  0.46*       Recent Cultures (last 7 days):   Results from last 7 days   Lab Units 03/04/25  1549 03/04/25  1109 03/04/25  1108   BLOOD CULTURE   --  No Growth After 4 Days. No Growth After 4 Days.   URINE CULTURE  >100,000 cfu/ml Escherichia coli ESBL*  <10,000 cfu/ml Alpha Hemolytic Streptococcus*   <10,000 cfu/ml Gamma Hemolytic Streptococcus  --   --              Last 24 Hours Medication List:     Current Facility-Administered Medications:     acetaminophen (TYLENOL) tablet 650 mg, Q6H PRN    ertapenem (INVanz) 500 mg in sodium chloride 0.9 % 50 mL IVPB, Q24H, Last Rate: 500 mg (03/08/25 1114)    furosemide (LASIX) tablet 20 mg, BID    gabapentin (NEURONTIN) capsule 100 mg, TID    heparin (porcine) subcutaneous injection 5,000 Units, Q8H HESHAM **AND** [CANCELED] Platelet count, Once    lidocaine (LMX) 4 % cream, BID PRN    mirtazapine (REMERON) tablet 7.5 mg, HS    QUEtiapine (SEROquel) tablet 25 mg, HS    Administrative Statements   Today, Patient Was Seen By: Rodrigo Wills MD  I have spent a total time of 35 minutes in caring for this patient on the day of the visit/encounter including Diagnostic results, Patient and family education, Impressions, Counseling / Coordination of care, Documenting in the medical record, and Reviewing/placing orders in the medical record (including tests, medications, and/or procedures).    **Please Note: This note may have been constructed using a voice recognition system.**

## 2025-03-10 PROBLEM — D50.9 IRON DEFICIENCY ANEMIA: Status: ACTIVE | Noted: 2025-03-10

## 2025-03-10 LAB
ERYTHROCYTE [DISTWIDTH] IN BLOOD BY AUTOMATED COUNT: 21.6 % (ref 11.6–15.1)
HCT VFR BLD AUTO: 32.1 % (ref 34.8–46.1)
HGB BLD-MCNC: 9.4 G/DL (ref 11.5–15.4)
MCH RBC QN AUTO: 23.2 PG (ref 26.8–34.3)
MCHC RBC AUTO-ENTMCNC: 29.3 G/DL (ref 31.4–37.4)
MCV RBC AUTO: 79 FL (ref 82–98)
PLATELET # BLD AUTO: 252 THOUSANDS/UL (ref 149–390)
PMV BLD AUTO: 10.1 FL (ref 8.9–12.7)
RBC # BLD AUTO: 4.06 MILLION/UL (ref 3.81–5.12)
WBC # BLD AUTO: 7.59 THOUSAND/UL (ref 4.31–10.16)

## 2025-03-10 PROCEDURE — 99232 SBSQ HOSP IP/OBS MODERATE 35: CPT | Performed by: STUDENT IN AN ORGANIZED HEALTH CARE EDUCATION/TRAINING PROGRAM

## 2025-03-10 PROCEDURE — 85027 COMPLETE CBC AUTOMATED: CPT | Performed by: STUDENT IN AN ORGANIZED HEALTH CARE EDUCATION/TRAINING PROGRAM

## 2025-03-10 RX ORDER — LORAZEPAM 2 MG/ML
0.5 INJECTION INTRAMUSCULAR ONCE
Status: DISCONTINUED | OUTPATIENT
Start: 2025-03-10 | End: 2025-03-12

## 2025-03-10 RX ADMIN — HEPARIN SODIUM 5000 UNITS: 5000 INJECTION INTRAVENOUS; SUBCUTANEOUS at 06:36

## 2025-03-10 RX ADMIN — GABAPENTIN 100 MG: 100 CAPSULE ORAL at 21:50

## 2025-03-10 RX ADMIN — HEPARIN SODIUM 5000 UNITS: 5000 INJECTION INTRAVENOUS; SUBCUTANEOUS at 13:42

## 2025-03-10 RX ADMIN — FUROSEMIDE 20 MG: 20 TABLET ORAL at 21:50

## 2025-03-10 RX ADMIN — HEPARIN SODIUM 5000 UNITS: 5000 INJECTION INTRAVENOUS; SUBCUTANEOUS at 21:50

## 2025-03-10 RX ADMIN — FUROSEMIDE 20 MG: 20 TABLET ORAL at 10:33

## 2025-03-10 RX ADMIN — QUETIAPINE FUMARATE 25 MG: 25 TABLET ORAL at 22:00

## 2025-03-10 RX ADMIN — MIRTAZAPINE 7.5 MG: 15 TABLET, FILM COATED ORAL at 21:50

## 2025-03-10 RX ADMIN — GABAPENTIN 100 MG: 100 CAPSULE ORAL at 10:33

## 2025-03-10 RX ADMIN — GABAPENTIN 100 MG: 100 CAPSULE ORAL at 15:56

## 2025-03-10 NOTE — ASSESSMENT & PLAN NOTE
Presented with hemoglobin 9.1 which downtrended to 8.8.  Patient has received 2 doses of IV Venofer 200 mg each.  Current hemoglobin stable 9.4.  No signs of active overt bleeding noted.  Trend CBC tomorrow AM.

## 2025-03-10 NOTE — ASSESSMENT & PLAN NOTE
Wt Readings from Last 3 Encounters:   03/10/25 65.2 kg (143 lb 11.8 oz)   01/17/25 64.7 kg (142 lb 9.6 oz)   12/31/24 62.6 kg (138 lb)       Euvolemic on examination.  Chest x-ray report noted with bibasilar subsegmental atelectasis.      Plan  Continue scheduled po Lasix 20 milligram twice daily.  SCOTT, daily weight  Ordered incentive spirometry use however unclear patient's compliance due to dementia.

## 2025-03-10 NOTE — ASSESSMENT & PLAN NOTE
Patient had lower abdominal pressure and discomfort.  UA significant for any minimal WBC and bacteria.  Urine culture: E Coli, resistant to ceftriaxone  PCN allergy.    Plan  Completed 3 days of IV Invanz.

## 2025-03-10 NOTE — PROGRESS NOTES
"Progress Note - Hospitalist   Name: Sona Valenzuela 89 y.o. female I MRN: [id number]  Unit/Bed#: -01 I Date of Admission: 3/4/2025   Date of Service: 3/10/2025 I Hospital Day: 6    Assessment & Plan  Seizure-like activity (HCC)  Patient presents with 1 episode of slurred speech, slowness followed by body stiffening, not responding, unable to talk for 3 minutes.  No documented prior episode or seizures.  Patient is afebrile.  A and O x 1 secondary to dementia.  Has a UTI but no other evidence of infection or neck stiffness.  Neurological examination without focal neurological deficits.  EEG results 3/5: \"Background activities and posteriorly dominant rhythm are too slow suggesting mild diffuse cerebral dysfunction of nonspecific etiology.\" Transient alteration of awareness   ECHO results 3/6: normal ejection fraction with moderate to severe TR and pulmonary hypertension with estimated RV systolic pressure of 65 mmHg    No episode of seizure-like activity noted since hospitalized  Symptoms suspected secondary to UTI in the settings of dementia and currently she has completed 3 days of Invanz.  Currently afebrile, hemodynamically stable.  Brain MRI pending.    No seizure-like activity noted since inpatient.  Continue supportive care.    Transient alteration of awareness  Patient presented on 3/4/3035 with 1 episode of slurred speech, slowness followed by body stiffening, not responding, unable to talk for 3 minutes.  No documented prior episode or seizures.  Found to have a UTI but no other evidence of infection or neck stiffness.  Neurological examination without focal neurological deficits.  EEG resulted on 3/5/2025 showed \"Background activities and posteriorly dominant rhythm are too slow suggesting mild diffuse cerebral dysfunction of nonspecific etiology.\"    Plan:  MRI brain  UTI (urinary tract infection)  Patient had lower abdominal pressure and discomfort.  UA significant for any minimal WBC and " bacteria.  Urine culture: E Coli, resistant to ceftriaxone  PCN allergy.    Plan  Completed 3 days of IV Invanz.  Elevated troponin  Troponin 740/699/659  Patient without any chest pain or shortness of breath  EKG without acute ischemic changes.  CXR 3/6 results: Pulmonary vascular congestion with unchanged left lung opacities.   Patient O2 between 85-93% without oxygen, observed while sleeping, resting comfortably  ECHO results 3/6: normal ejection fraction with moderate to severe TR and pulmonary hypertension with estimated RV systolic pressure of 65 mmHg  Euvolemic; not likely acute CHF exacerbation  Negative viral panel; less likely myocarditis  Cardio consult: Elevated troponin could be secondary to seizure activity in the setting of CKD. Doubt primary cardiac issue. Patient could very well have underlying CAD but no recommendations to consider any aggressive invasive evaluation given her advanced age and comorbidities. Signing off.    Plan  Discontinue telemetry  Continue Lasix      Chronic atrial fibrillation (HCC)  Patient has chronic A-fib.  Rate controlled.  Not on any blood thinners due to dementia and fall risk.  Primary hypertension  Plan  Continue Lasix   Chronic congestive heart failure (HCC)  Wt Readings from Last 3 Encounters:   03/10/25 65.2 kg (143 lb 11.8 oz)   01/17/25 64.7 kg (142 lb 9.6 oz)   12/31/24 62.6 kg (138 lb)       Euvolemic on examination.  Chest x-ray report noted with bibasilar subsegmental atelectasis.      Plan  Continue scheduled po Lasix 20 milligram twice daily.  SCOTT, daily weight  Ordered incentive spirometry use however unclear patient's compliance due to dementia.    Stage 3b chronic kidney disease (HCC)  Lab Results   Component Value Date    EGFR 35 03/09/2025    EGFR 31 03/08/2025    EGFR 38 03/07/2025    CREATININE 1.25 03/09/2025    CREATININE 1.37 (H) 03/08/2025    CREATININE 1.17 03/07/2025   Assessment  Creatinine and BUN are at baseline of 1.3-1.4 and 31-34  respectively    Plan  Monitor creatinine  Avoid nephrotoxins  Vascular dementia, uncomplicated (HCC)  Patient is A and O x 1.    Plan  Delirium precautions  Continue home mirtazapine 7.5mg daily  Continue home quetiapine 25mg daily  Iron deficiency anemia    Presented with hemoglobin 9.1 which downtrended to 8.8.  Patient has received 2 doses of IV Venofer 200 mg each.  Current hemoglobin stable 9.4.  No signs of active overt bleeding noted.  Trend CBC tomorrow AM.    VTE Pharmacologic Prophylaxis:   Moderate Risk (Score 3-4) - Pharmacological DVT Prophylaxis Ordered: heparin.    Mobility:   Basic Mobility Inpatient Raw Score: 7  -[facility] Goal: 2: Bed activities/Dependent transfer  -HLM Achieved: 4: Move to chair/commode      Patient Centered Rounds: I performed bedside rounds with nursing staff today.       Education and Discussions with Family / Patient: Attempted to update  (niece) via phone. Unable to contact.    Current Length of Stay: 6 day(s)  Current Patient Status: Inpatient   Certification Statement: The patient will continue to require additional inpatient hospital stay due to pending brain MRI, CM working with family for safe dispo planning  Discharge Plan: Anticipate discharge in 24-48 hrs to discharge location to be determined pending rehab evaluations.    Code Status: Level 3 - DNAR and DNI    Subjective   Seen during a.m. rounds.  Patient is awake, alert, pleasantly disoriented.  Denies chest pain, dyspnea, fever, chills, cough, any other new complaints.  O2 saturation noted 95% on room air.  No other events reported.  Brain MRI currently pending.  Case management working with family for safe dispo planning.    Objective :  Temp:  [96.1 °F (35.6 °C)-97.3 °F (36.3 °C)] 97.3 °F (36.3 °C)  HR:  [82] 82  BP: (120-152)/(54-83) 144/81  Resp:  [19] 19  SpO2:  [97 %] 97 %  O2 Device: Nasal cannula  Nasal Cannula O2 Flow Rate (L/min):  [2 L/min] 2 L/min    Body mass index is 27.16 kg/m².      Input and Output Summary (last 24 hours):     Intake/Output Summary (Last 24 hours) at 3/10/2025 1150  Last data filed at 3/10/2025 0900  Gross per 24 hour   Intake 0 ml   Output --   Net 0 ml       Physical Exam  Constitutional:       General: She is not in acute distress.     Appearance: She is not ill-appearing, toxic-appearing or diaphoretic.      Comments: Elderly female patient, deconditioned, acutely nontoxic appearing.   Cardiovascular:      Rate and Rhythm: Normal rate.      Pulses: Normal pulses.   Pulmonary:      Effort: Pulmonary effort is normal. No respiratory distress.      Comments: O2 saturation 94 to 95% room air.  Abdominal:      General: Bowel sounds are normal. There is no distension.      Palpations: Abdomen is soft.      Tenderness: There is no abdominal tenderness.   Musculoskeletal:      Right lower leg: No edema.      Left lower leg: No edema.   Neurological:      Mental Status: She is alert. She is disoriented.      Comments: Pleasantly disoriented.  Cooperative during exam.   Psychiatric:         Mood and Affect: Mood normal.         Behavior: Behavior normal.         Lines/Drains:              Lab Results: I have reviewed the following results:   Results from last 7 days   Lab Units 03/10/25  0540 03/05/25  0507 03/04/25  1108   WBC Thousand/uL 7.59   < > 7.92   HEMOGLOBIN g/dL 9.4*   < > 9.1*   HEMATOCRIT % 32.1*   < > 30.4*   PLATELETS Thousands/uL 252   < > 302   SEGS PCT %  --   --  81*   LYMPHO PCT %  --   --  7*   MONO PCT %  --   --  11   EOS PCT %  --   --  1    < > = values in this interval not displayed.     Results from last 7 days   Lab Units 03/09/25  0603 03/06/25  0504 03/05/25  0507   SODIUM mmol/L 141   < > 138   POTASSIUM mmol/L 4.1   < > 3.8   CHLORIDE mmol/L 107   < > 106   CO2 mmol/L 28   < > 23   BUN mg/dL 19   < > 34*   CREATININE mg/dL 1.25   < > 1.37*   ANION GAP mmol/L 6   < > 9   CALCIUM mg/dL 8.4   < > 8.6   ALBUMIN g/dL  --   --  3.8   TOTAL BILIRUBIN  mg/dL  --   --  0.89   ALK PHOS U/L  --   --  127*   ALT U/L  --   --  23   AST U/L  --   --  34   GLUCOSE RANDOM mg/dL 122   < > 123    < > = values in this interval not displayed.     Results from last 7 days   Lab Units 03/04/25  1108   INR  1.20*             Results from last 7 days   Lab Units 03/04/25  1254 03/04/25  1108   LACTIC ACID mmol/L 1.8 2.1*   PROCALCITONIN ng/ml  --  0.46*       Recent Cultures (last 7 days):   Results from last 7 days   Lab Units 03/04/25  1549 03/04/25  1109 03/04/25  1108   BLOOD CULTURE   --  No Growth After 5 Days. No Growth After 5 Days.   URINE CULTURE  >100,000 cfu/ml Escherichia coli ESBL*  <10,000 cfu/ml Alpha Hemolytic Streptococcus*  <10,000 cfu/ml Gamma Hemolytic Streptococcus  --   --              Last 24 Hours Medication List:     Current Facility-Administered Medications:     acetaminophen (TYLENOL) tablet 650 mg, Q6H PRN    furosemide (LASIX) tablet 20 mg, BID    gabapentin (NEURONTIN) capsule 100 mg, TID    heparin (porcine) subcutaneous injection 5,000 Units, Q8H HESHAM **AND** [CANCELED] Platelet count, Once    lidocaine (LMX) 4 % cream, BID PRN    mirtazapine (REMERON) tablet 7.5 mg, HS    QUEtiapine (SEROquel) tablet 25 mg, HS    Administrative Statements   Today, Patient Was Seen By: Rodrigo Wills MD  I have spent a total time of 36 minutes in caring for this patient on the day of the visit/encounter including Diagnostic results, Patient and family education, Impressions, Counseling / Coordination of care, Documenting in the medical record, and Reviewing/placing orders in the medical record (including tests, medications, and/or procedures).    **Please Note: This note may have been constructed using a voice recognition system.**

## 2025-03-10 NOTE — ASSESSMENT & PLAN NOTE
"Patient presents with 1 episode of slurred speech, slowness followed by body stiffening, not responding, unable to talk for 3 minutes.  No documented prior episode or seizures.  Patient is afebrile.  A and O x 1 secondary to dementia.  Has a UTI but no other evidence of infection or neck stiffness.  Neurological examination without focal neurological deficits.  EEG results 3/5: \"Background activities and posteriorly dominant rhythm are too slow suggesting mild diffuse cerebral dysfunction of nonspecific etiology.\" Transient alteration of awareness   ECHO results 3/6: normal ejection fraction with moderate to severe TR and pulmonary hypertension with estimated RV systolic pressure of 65 mmHg    No episode of seizure-like activity noted since hospitalized  Symptoms suspected secondary to UTI in the settings of dementia and currently she has completed 3 days of Invanz.  Currently afebrile, hemodynamically stable.  Brain MRI pending.    No seizure-like activity noted since inpatient.  Continue supportive care.    "

## 2025-03-10 NOTE — CASE MANAGEMENT
Case Management Progress Note    Patient name Sona Valenzuela  Location /-01 MRN [id number]  : 11/3/1924 Date 3/10/2025       LOS (days): 6  Geometric Mean LOS (GMLOS) (days): 2.7  Days to GMLOS:-3.2        OBJECTIVE:        Current admission status: Inpatient  Preferred Pharmacy:   [facility]/pharmacy #[id number] - [place] - [address]  [place] [address]  Phone: [phone] Fax: [phone]    Primary Care Provider: FARZANA Stanley    Primary Insurance: [employer]  Secondary Insurance: [facility]    PROGRESS NOTE:  CM called pt POA and left a detailed message. CM to reattempt all at a later time and discuss discharge plans.

## 2025-03-10 NOTE — PLAN OF CARE
Problem: Activity Intolerance  Goal: Patient is able to perform activities within their limitations  Description: INTERVENTIONS:                       -   Alternate periods of activity with periods of rest                 -   Patients is able to maintain normal vitals heart rhythm during activity                 -   Gradually increase activity and exercise as patient can tolerate                 -   Monitor blood pressure and heart before and after exercise                  -   Monitor blood oxygen saturation during activity and apply oxygen as needed    Outcome: Progressing     Problem: Knowledge Deficit  Goal: Patient/family/caregiver demonstrates understanding of disease process, treatment plan, medications, and discharge instructions  Description: Complete learning assessment and assess knowledge base.  Interventions:  - Provide teaching at level of understanding  - Provide teaching via preferred learning methods  Outcome: Progressing

## 2025-03-11 LAB
ANION GAP SERPL CALCULATED.3IONS-SCNC: 3 MMOL/L (ref 4–13)
BUN SERPL-MCNC: 19 MG/DL (ref 5–25)
CALCIUM SERPL-MCNC: 8.2 MG/DL (ref 8.4–10.2)
CHLORIDE SERPL-SCNC: 104 MMOL/L (ref 96–108)
CO2 SERPL-SCNC: 32 MMOL/L (ref 21–32)
CREAT SERPL-MCNC: 1.13 MG/DL (ref 0.6–1.3)
ERYTHROCYTE [DISTWIDTH] IN BLOOD BY AUTOMATED COUNT: 22 % (ref 11.6–15.1)
GFR SERPL CREATININE-BSD FRML MDRD: 39 ML/MIN/1.73SQ M
GLUCOSE SERPL-MCNC: 95 MG/DL (ref 65–140)
HCT VFR BLD AUTO: 31.3 % (ref 34.8–46.1)
HGB BLD-MCNC: 9.3 G/DL (ref 11.5–15.4)
MCH RBC QN AUTO: 23.4 PG (ref 26.8–34.3)
MCHC RBC AUTO-ENTMCNC: 29.7 G/DL (ref 31.4–37.4)
MCV RBC AUTO: 79 FL (ref 82–98)
PLATELET # BLD AUTO: 226 THOUSANDS/UL (ref 149–390)
PMV BLD AUTO: 10 FL (ref 8.9–12.7)
POTASSIUM SERPL-SCNC: 4.1 MMOL/L (ref 3.5–5.3)
RBC # BLD AUTO: 3.98 MILLION/UL (ref 3.81–5.12)
SODIUM SERPL-SCNC: 139 MMOL/L (ref 135–147)
WBC # BLD AUTO: 7.2 THOUSAND/UL (ref 4.31–10.16)

## 2025-03-11 PROCEDURE — 97535 SELF CARE MNGMENT TRAINING: CPT

## 2025-03-11 PROCEDURE — 85027 COMPLETE CBC AUTOMATED: CPT | Performed by: STUDENT IN AN ORGANIZED HEALTH CARE EDUCATION/TRAINING PROGRAM

## 2025-03-11 PROCEDURE — 97116 GAIT TRAINING THERAPY: CPT

## 2025-03-11 PROCEDURE — 97530 THERAPEUTIC ACTIVITIES: CPT

## 2025-03-11 PROCEDURE — 99232 SBSQ HOSP IP/OBS MODERATE 35: CPT

## 2025-03-11 PROCEDURE — 80048 BASIC METABOLIC PNL TOTAL CA: CPT | Performed by: STUDENT IN AN ORGANIZED HEALTH CARE EDUCATION/TRAINING PROGRAM

## 2025-03-11 RX ADMIN — HEPARIN SODIUM 5000 UNITS: 5000 INJECTION INTRAVENOUS; SUBCUTANEOUS at 22:41

## 2025-03-11 RX ADMIN — QUETIAPINE FUMARATE 25 MG: 25 TABLET ORAL at 22:42

## 2025-03-11 RX ADMIN — GABAPENTIN 100 MG: 100 CAPSULE ORAL at 08:57

## 2025-03-11 RX ADMIN — HEPARIN SODIUM 5000 UNITS: 5000 INJECTION INTRAVENOUS; SUBCUTANEOUS at 05:58

## 2025-03-11 RX ADMIN — MIRTAZAPINE 7.5 MG: 15 TABLET, FILM COATED ORAL at 22:41

## 2025-03-11 RX ADMIN — FUROSEMIDE 20 MG: 20 TABLET ORAL at 22:42

## 2025-03-11 RX ADMIN — FUROSEMIDE 20 MG: 20 TABLET ORAL at 08:57

## 2025-03-11 RX ADMIN — GABAPENTIN 100 MG: 100 CAPSULE ORAL at 22:42

## 2025-03-11 RX ADMIN — HEPARIN SODIUM 5000 UNITS: 5000 INJECTION INTRAVENOUS; SUBCUTANEOUS at 13:52

## 2025-03-11 RX ADMIN — GABAPENTIN 100 MG: 100 CAPSULE ORAL at 17:35

## 2025-03-11 NOTE — ASSESSMENT & PLAN NOTE
Lab Results   Component Value Date    EGFR 39 03/11/2025    EGFR 35 03/09/2025    EGFR 31 03/08/2025    CREATININE 1.13 03/11/2025    CREATININE 1.25 03/09/2025    CREATININE 1.37 (H) 03/08/2025   Assessment  Creatinine and BUN are at baseline of 1.3-1.4 and 31-34 respectively    Plan  Monitor creatinine  Avoid nephrotoxins

## 2025-03-11 NOTE — PROGRESS NOTES
"Progress Note - Hospitalist   Name: Sona Valenzuela 89 y.o. female I MRN: [id number]  Unit/Bed#: -01 I Date of Admission: 3/4/2025   Date of Service: 3/11/2025 I Hospital Day: 7    Assessment & Plan  Seizure-like activity (HCC)  Patient presents with 1 episode of slurred speech, slowness followed by body stiffening, not responding, unable to talk for 3 minutes.  No documented prior episode or seizures.  Patient is afebrile.  A and O x 1 secondary to dementia.  Has a UTI but no other evidence of infection or neck stiffness.  Neurological examination without focal neurological deficits.  EEG results 3/5: \"Background activities and posteriorly dominant rhythm are too slow suggesting mild diffuse cerebral dysfunction of nonspecific etiology.\" Transient alteration of awareness   ECHO results 3/6: normal ejection fraction with moderate to severe TR and pulmonary hypertension with estimated RV systolic pressure of 65 mmHg  Plan:  No episode of seizure-like activity noted since hospitalized  Symptoms suspected secondary to UTI in the settings of dementia and currently she has completed 3 days of Invanz.  Currently afebrile, hemodynamically stable.  Brain MRI pending.    No seizure-like activity noted since inpatient.  Continue supportive care.    Transient alteration of awareness  Patient presented on 3/4/3035 with 1 episode of slurred speech, slowness followed by body stiffening, not responding, unable to talk for 3 minutes.  No documented prior episode or seizures.  Found to have a UTI but no other evidence of infection or neck stiffness.  Neurological examination without focal neurological deficits.  EEG resulted on 3/5/2025 showed \"Background activities and posteriorly dominant rhythm are too slow suggesting mild diffuse cerebral dysfunction of nonspecific etiology.\"    Plan:  MRI brain  UTI (urinary tract infection)  Patient had lower abdominal pressure and discomfort.  UA significant for any minimal WBC and " bacteria.  Urine culture: E Coli, resistant to ceftriaxone  PCN allergy.    Plan  Completed 3 days of IV Invanz.  Elevated troponin  Troponin 740/699/659  Patient without any chest pain or shortness of breath  EKG without acute ischemic changes.  CXR 3/6 results: Pulmonary vascular congestion with unchanged left lung opacities.   Patient O2 between 85-93% without oxygen, observed while sleeping, resting comfortably  ECHO results 3/6: normal ejection fraction with moderate to severe TR and pulmonary hypertension with estimated RV systolic pressure of 65 mmHg  Euvolemic; not likely acute CHF exacerbation  Negative viral panel; less likely myocarditis  Cardio consult: Elevated troponin could be secondary to seizure activity in the setting of CKD. Doubt primary cardiac issue. Patient could very well have underlying CAD but no recommendations to consider any aggressive invasive evaluation given her advanced age and comorbidities. Signing off.    Plan  Discontinue telemetry  Continue Lasix      Chronic atrial fibrillation (HCC)  Patient has chronic A-fib.  Rate controlled.  Not on any blood thinners due to dementia and fall risk.  Primary hypertension  Plan  Continue Lasix   Chronic congestive heart failure (HCC)  Wt Readings from Last 3 Encounters:   03/11/25 65.6 kg (144 lb 10 oz)   01/17/25 64.7 kg (142 lb 9.6 oz)   12/31/24 62.6 kg (138 lb)       Euvolemic on examination.  Chest x-ray report noted with bibasilar subsegmental atelectasis.      Plan  Continue scheduled po Lasix 20 milligram twice daily.  SCOTT, daily weight  Ordered incentive spirometry use however unclear patient's compliance due to dementia.    Stage 3b chronic kidney disease (HCC)  Lab Results   Component Value Date    EGFR 39 03/11/2025    EGFR 35 03/09/2025    EGFR 31 03/08/2025    CREATININE 1.13 03/11/2025    CREATININE 1.25 03/09/2025    CREATININE 1.37 (H) 03/08/2025   Assessment  Creatinine and BUN are at baseline of 1.3-1.4 and 31-34  respectively    Plan  Monitor creatinine  Avoid nephrotoxins  Vascular dementia, uncomplicated (HCC)  Patient is A and O x 1.    Plan  Delirium precautions  Continue home mirtazapine 7.5mg daily  Continue home quetiapine 25mg daily  Iron deficiency anemia    Presented with hemoglobin 9.1 which downtrended to 8.8.  Patient has received 2 doses of IV Venofer 200 mg each.  Current hemoglobin stable 9.4.  No signs of active overt bleeding noted.  Trend CBC tomorrow AM.    VTE Pharmacologic Prophylaxis:   High Risk (Score >/= 5) - Pharmacological DVT Prophylaxis Ordered: heparin. Sequential Compression Devices Ordered.    Mobility:   Basic Mobility Inpatient Raw Score: 7  JH-HLM Goal: 2: Bed activities/Dependent transfer  JH-HLM Achieved: 6: Walk 10 steps or more  JH-HLM Goal achieved. Continue to encourage appropriate mobility.    Patient Centered Rounds: I performed bedside rounds with nursing staff today.   Discussions with Specialists or Other Care Team Provider: none    Education and Discussions with Family / Patient: Attempted to update  (niece) via phone. Left voicemail.     Current Length of Stay: 7 day(s)  Current Patient Status: Inpatient   Certification Statement: The patient will continue to require additional inpatient hospital stay due to Pending MRI brain to rule out stroke  Discharge Plan: Anticipate discharge in 24-48 hrs to rehab facility.    Code Status: Level 3 - DNAR and DNI    Subjective   Patient awake and alert however not oriented on my exam.  Patient known for disorientation in the setting of dementia. patient was sitting in the chair and finished her lunch.  Patient appears comfortable.    Objective :  Temp:  [97 °F (36.1 °C)] 97 °F (36.1 °C)  BP: (107-138)/(55-73) 138/73  SpO2:  [94 %] 94 %  O2 Device: Nasal cannula  Nasal Cannula O2 Flow Rate (L/min):  [1 L/min-2 L/min] 1 L/min    Body mass index is 27.33 kg/m².     Input and Output Summary (last 24 hours):     Intake/Output  Summary (Last 24 hours) at 3/11/2025 1530  Last data filed at 3/11/2025 1230  Gross per 24 hour   Intake 540 ml   Output 0 ml   Net 540 ml       Physical Exam  Vitals and nursing note reviewed.   Constitutional:       General: She is not in acute distress.     Appearance: She is well-developed.   HENT:      Head: Normocephalic and atraumatic.   Eyes:      Conjunctiva/sclera: Conjunctivae normal.   Cardiovascular:      Rate and Rhythm: Normal rate and regular rhythm.      Heart sounds: No murmur heard.  Pulmonary:      Effort: Pulmonary effort is normal. No respiratory distress.      Breath sounds: Normal breath sounds.   Abdominal:      Palpations: Abdomen is soft.      Tenderness: There is no abdominal tenderness.   Musculoskeletal:         General: No swelling.      Cervical back: Neck supple.      Right lower leg: No edema.      Left lower leg: No edema.   Skin:     General: Skin is warm and dry.      Capillary Refill: Capillary refill takes less than 2 seconds.   Neurological:      Mental Status: She is alert.   Psychiatric:         Mood and Affect: Mood normal.           Lines/Drains:              Lab Results: I have reviewed the following results:   Results from last 7 days   Lab Units 03/11/25  0544   WBC Thousand/uL 7.20   HEMOGLOBIN g/dL 9.3*   HEMATOCRIT % 31.3*   PLATELETS Thousands/uL 226     Results from last 7 days   Lab Units 03/11/25  0544 03/06/25  0504 03/05/25  0507   SODIUM mmol/L 139   < > 138   POTASSIUM mmol/L 4.1   < > 3.8   CHLORIDE mmol/L 104   < > 106   CO2 mmol/L 32   < > 23   BUN mg/dL 19   < > 34*   CREATININE mg/dL 1.13   < > 1.37*   ANION GAP mmol/L 3*   < > 9   CALCIUM mg/dL 8.2*   < > 8.6   ALBUMIN g/dL  --   --  3.8   TOTAL BILIRUBIN mg/dL  --   --  0.89   ALK PHOS U/L  --   --  127*   ALT U/L  --   --  23   AST U/L  --   --  34   GLUCOSE RANDOM mg/dL 95   < > 123    < > = values in this interval not displayed.                       Recent Cultures (last 7 days):   Results from  last 7 days   Lab Units 03/04/25  1549   URINE CULTURE  >100,000 cfu/ml Escherichia coli ESBL*  <10,000 cfu/ml Alpha Hemolytic Streptococcus*  <10,000 cfu/ml Gamma Hemolytic Streptococcus       Imaging Results Review: I reviewed radiology reports from this admission including: chest xray.  Other Study Results Review: No additional pertinent studies reviewed.    Last 24 Hours Medication List:     Current Facility-Administered Medications:     acetaminophen (TYLENOL) tablet 650 mg, Q6H PRN    furosemide (LASIX) tablet 20 mg, BID    gabapentin (NEURONTIN) capsule 100 mg, TID    heparin (porcine) subcutaneous injection 5,000 Units, Q8H HESHAM **AND** [CANCELED] Platelet count, Once    lidocaine (LMX) 4 % cream, BID PRN    LORazepam (ATIVAN) injection 0.5 mg, Once    mirtazapine (REMERON) tablet 7.5 mg, HS    QUEtiapine (SEROquel) tablet 25 mg, HS    Administrative Statements   Today, Patient Was Seen By: Sil Guerra MD      **Please Note: This note may have been constructed using a voice recognition system.**

## 2025-03-11 NOTE — PLAN OF CARE
Problem: OCCUPATIONAL THERAPY ADULT  Goal: Performs self-care activities at highest level of function for planned discharge setting.  See evaluation for individualized goals.  Description: Treatment Interventions: ADL retraining, Functional transfer training, Endurance training, Cognitive reorientation, Patient/family training          See flowsheet documentation for full assessment, interventions and recommendations.   Outcome: Progressing  Note: Limitation: Decreased UE strength, Decreased ADL status, Decreased Safe judgement during ADL, Decreased cognition, Decreased endurance, Decreased self-care trans, Decreased high-level ADLs  Prognosis: Fair  Assessment: Pt seen this date for skilled OT session focused on ADLs, functional transfers and mobility, safety education. The patient was received supine in bed, NAD, PIV access, on 1L O2 NC, masimo. She participated in functional bed mobility with Moderate Assistance x2, STS transfers and functional ambulation in the room with Moderate Assistance x2 with RW. Toileting with Maximal Assistance and UB/LB dressing with Maximal Assistance. At end of session the patient was located seated in bed side chair with call bell in reach and all needs met. Overall the patient remains below her functional baseline, and is primarily limited at this time due to impaired cognition, generalized deconditioning, and impaired balance. OT will continue to follow while acute to address POC. At this time, recommend Level 2 Moderate Resource Intensity upon d/c.  Recommendation: Geriatric Consult  Rehab Resource Intensity Level, OT: II (Moderate Resource Intensity)

## 2025-03-11 NOTE — ASSESSMENT & PLAN NOTE
"Patient presents with 1 episode of slurred speech, slowness followed by body stiffening, not responding, unable to talk for 3 minutes.  No documented prior episode or seizures.  Patient is afebrile.  A and O x 1 secondary to dementia.  Has a UTI but no other evidence of infection or neck stiffness.  Neurological examination without focal neurological deficits.  EEG results 3/5: \"Background activities and posteriorly dominant rhythm are too slow suggesting mild diffuse cerebral dysfunction of nonspecific etiology.\" Transient alteration of awareness   ECHO results 3/6: normal ejection fraction with moderate to severe TR and pulmonary hypertension with estimated RV systolic pressure of 65 mmHg  Plan:  No episode of seizure-like activity noted since hospitalized  Symptoms suspected secondary to UTI in the settings of dementia and currently she has completed 3 days of Invanz.  Currently afebrile, hemodynamically stable.  Brain MRI pending.    No seizure-like activity noted since inpatient.  Continue supportive care.    "

## 2025-03-11 NOTE — OCCUPATIONAL THERAPY NOTE
Occupational Therapy Progress Note     Patient Name: Sona Valenzuela  Today's Date: 3/11/2025  Problem List  Principal Problem:    Seizure-like activity (HCC)  Active Problems:    UTI (urinary tract infection)    Chronic atrial fibrillation (HCC)    Chronic congestive heart failure (HCC)    Vascular dementia, uncomplicated (HCC)    Primary hypertension    Stage 3b chronic kidney disease (HCC)    Elevated troponin    Transient alteration of awareness    Iron deficiency anemia          03/11/25 1057   OT Last Visit   OT Visit Date 03/11/25   Note Type   Note Type Treatment for insurance authorization   Pain Assessment   Pain Assessment Tool Elena-Baker FACES   Pain Score No Pain   Elena-Baker FACES Pain Rating 0   Restrictions/Precautions   Weight Bearing Precautions Per Order No   Other Precautions Contact/isolation;Cognitive;Chair Alarm;Bed Alarm;O2;Fall Risk   ADL   Where Assessed Other (Comment)  (Assist levels for some self care tasks are based on functional assessment of performance skills and deficits observed during session.)   Eating Assistance 4  Minimal Assistance   Eating Deficit Setup;Verbal cueing;Supervision/safety;Increased time to complete  (assist to cut up and manage some food items)   Grooming Assistance 3  Moderate Assistance   Grooming Deficit Setup;Verbal cueing;Supervision/safety;Increased time to complete  (seated)   UB Dressing Assistance 2  Maximal Assistance   UB Dressing Deficit Setup;Supervision/safety;Increased time to complete   LB Dressing Assistance 1  Total Assistance   LB Dressing Deficit Setup;Supervision/safety;Increased time to complete   Toileting Assistance  1  Total Assistance   Bed Mobility   Supine to Sit 3  Moderate assistance   Additional items Assist x 2;HOB elevated;Bedrails;Increased time required;Verbal cues   Transfers   Sit to Stand 3  Moderate assistance   Additional items Assist x 2;Increased time required;Verbal cues   Stand to Sit 3  Moderate assistance  "  Additional items Assist x 2;Increased time required;Verbal cues   Stand pivot 3  Moderate assistance   Additional items Assist x 2;Increased time required;Verbal cues   Toilet transfer 3  Moderate assistance   Additional items Assist x 2;Commode  (raised height)   Additional Comments with RW   Subjective   Subjective \"I'd like to get up.\" Pt agreeable to therapy session   Cognition   Overall Cognitive Status Impaired  (dementia at baseline)   Arousal/Participation Alert;Responsive   Attention Attends with cues to redirect   Orientation Level Oriented to person;Disoriented to place;Disoriented to time;Disoriented to situation   Memory Decreased recall of recent events;Decreased recall of biographical information;Decreased short term memory   Following Commands Follows one step commands with increased time or repetition   Activity Tolerance   Activity Tolerance Patient limited by fatigue   Medical Staff Made Aware Fartun PT  (Pt seen for co-treatment with Physical Therapist due to pt's medical complexity, functional limitations and limited activity tolerance.)   Assessment   Assessment Pt seen this date for skilled OT session focused on ADLs, functional transfers and mobility, safety education. The patient was received supine in bed, NAD, PIV access, on 1L O2 NC, masimo. She participated in functional bed mobility with Moderate Assistance x2, STS transfers and functional ambulation in the room with Moderate Assistance x2 with RW. Toileting with Maximal Assistance and UB/LB dressing with Maximal Assistance. At end of session the patient was located seated in bed side chair with call bell in reach and all needs met. Overall the patient remains below her functional baseline, and is primarily limited at this time due to impaired cognition, generalized deconditioning, and impaired balance. OT will continue to follow while acute to address POC. At this time, recommend Level 2 Moderate Resource Intensity upon d/c.   Plan "   Treatment Interventions ADL retraining;Functional transfer training;Endurance training;Cognitive reorientation;Patient/family training   Goal Expiration Date 03/23/25   OT Treatment Day 1   OT Frequency 3-5x/wk   Discharge Recommendation   Rehab Resource Intensity Level, OT II (Moderate Resource Intensity)   AM-PAC Daily Activity Inpatient   Lower Body Dressing 1   Bathing 2   Toileting 1   Upper Body Dressing 2   Grooming 2   Eating 3   Daily Activity Raw Score 11   Daily Activity Standardized Score (Calc for Raw Score >=11) 29.04   AM-PAC Applied Cognition Inpatient   Following a Speech/Presentation 2   Understanding Ordinary Conversation 3   Taking Medications 1   Remembering Where Things Are Placed or Put Away 1   Remembering List of 4-5 Errands 1   Taking Care of Complicated Tasks 1   Applied Cognition Raw Score 9   Applied Cognition Standardized Score 22.48         Giuseppe Mohan, OTR/L

## 2025-03-11 NOTE — CASE MANAGEMENT
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, psychotic disturbance, mood disturbance, or anxiety ([facility]) 2024     Chronic pain of left wrist 10/28/2024     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 ([facility]) 2024     Acute on chronic HFpEF with severe pulmonary hypertension ([facility]) 2024     Primary hypertension 2024     Paroxysmal A-fib ([facility]) 2024     Compression fracture of lumbar spine, non-traumatic ([facility]) 2024     Mild protein-calorie malnutrition ([facility]) 2024     Vascular dementia, uncomplicated ([facility]) 2023     Does mobilize using walker 2023     Chronic congestive heart failure ([facility]) 2022     Chronic atrial fibrillation ([facility]) 10/11/2022     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[facility]) 2022     Stage 3b chronic kidney disease ([facility]) 2022     Vitamin D deficiency 2020     Primary insomnia 2019     UTI (urinary tract infection) 10/20/2019     Degeneration of lumbar intervertebral disc 2019     Lumbar radiculopathy 2019     Arthropathy of lumbar facet joint 2019     Restless legs syndrome 2019       LOS (days): 7  Geometric Mean LOS (GMLOS) (days): 2.7  Days to GMLOS:-4.2     OBJECTIVE:  Risk of Unplanned Readmission Score: 20.77         Current admission  status: Inpatient   Preferred Pharmacy:   [id number] - [place], [place] - [address]  [address]  [place] [address]  Phone: [phone] Fax: [phone]    Primary Care Provider: FARZANA Stanley    Primary Insurance: [facility] RE[facility]  Secondary Insurance: [facility]    DISCHARGE DETAILS:    Discharge planning discussed with:: POA  Freedom of Choice: Yes  Comments - Freedom of Choice: As per SLIM rounds, pt is anticiapted for dc witin 24hrs pending MRI of the brain. POA is  aware and pt choice was reviewed for STR. Pt dtr choice was [place] which was reserved. PASSR provided. Pt will require auth. CM continues to follow and assist with pt dc plans.  CM contacted family/caregiver?: Yes  Were Treatment Team discharge recommendations reviewed with patient/caregiver?: Yes  Did patient/caregiver verbalize understanding of patient care needs?: Yes  Were patient/caregiver advised of the risks associated with not following Treatment Team discharge recommendations?: Yes    Contacts  Patient Contacts: Xi (niece/POA)  Relationship to Patient:: Family  Contact Method: Phone  Phone Number: [phone]  Reason/Outcome: Continuity of Care, Emergency Contact, Discharge Planning    Requested Home Health Care         Is the patient interested in HHC at discharge?: No         Other Referral/Resources/Interventions Provided:  Interventions: Short Term Rehab    Would you like to participate in our [facility] service program?  : No - Declined    Treatment Team Recommendation: Short Term Rehab  Discharge Destination Plan:: Short Term Rehab  Transport at Discharge : Alejandro brandon                             IMM Given (Date):: 03/11/25  IMM Given to:: Patient  Family notified:: POA  Additional Comments: CM reviewed IMM with  POA over the phone. POA expressed full and complete understanding. Verbal consent obtained, copy given. Original in MR    Accepting Facility Name, City & State :  [address] [place] [address]  Receiving Facility/Agency Phone Number: Phone: [phone]  Facility/Agency Fax Number: Fax: [phone]

## 2025-03-11 NOTE — PLAN OF CARE
Problem: PHYSICAL THERAPY ADULT  Goal: Performs mobility at highest level of function for planned discharge setting.  See evaluation for individualized goals.  Description: Treatment/Interventions: Functional transfer training, LE strengthening/ROM, Therapeutic exercise, Endurance training, Patient/family training, Equipment eval/education, Bed mobility, Gait training, OT          See flowsheet documentation for full assessment, interventions and recommendations.  Outcome: Progressing  Note: Prognosis: Good  Problem List: Decreased strength, Decreased endurance, Impaired balance, Decreased mobility, Decreased cognition  Assessment: Chart reviewed. Patient was received supine in bed in NAD and agreeable to PT session. Today's PT treatment session consisted of therapeutic activity for facilitation of transitional movements and safe performance of correct technique for bed mobility and sit to stand transfers and gait training to promote safe and functional ambulation on level surfaces. In comparison to the previous session the patient has made progress as evident by ability to ambulate an increased distance and increased gait trials. Pt required a seated rest break between each ambulation trial. When ambulating pt exhibits a step to pattern, with decreased stride length, and decreased heel strike. Pt requires occasional assist for walker management. Overall, patient tolerated today's session well and continues to be making progress towards achieving her STG's. Patient's prognosis for achieving their STG's is good. Co-treatment was performed with OT secondary to complex medical condition of pt. Pt requires assist of two to achieve and maintain transitional movements; requiring the need of skilled therapeutic intervention of two therapists to achieve the delivery of services. PT/OT goals were addressed separately. PT intervention continues to be appropriate as the patient continues to be limited by decreased lower  extremity strength, impaired balance, decreased endurance, gait deviations, and decreased functional mobility. Continue to recommend level 2, moderate resource intensity. PT to continue to see patient in order to address the deficits listed above and provide interventions consistent with the POC in order to achieve STG's and optimize the patient's independence with functional mobility.    Barriers to Discharge: Inaccessible home environment, Other (Comment) (decline in functional mobility)     Rehab Resource Intensity Level, PT: II (Moderate Resource Intensity)    See flowsheet documentation for full assessment.

## 2025-03-11 NOTE — PHYSICAL THERAPY NOTE
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) [I13.0]  Chronic atrial fibrillation (HCC) [I48.20]  Elevated troponin [R79.89]  Seizure-like activity (HCC) [R56.9]     03/11/25 1125   PT Last Visit   PT Visit Date 03/11/25   Note Type   Note Type Treatment for insurance authorization   Pain Assessment   Pain Assessment Tool 0-10   Pain Score No Pain   Restrictions/Precautions   Weight Bearing Precautions Per Order No   Other Precautions Contact/isolation;Cognitive;Chair Alarm;Bed Alarm;Seizure;Fall Risk;O2  (+1L O2 via NC)   General   Chart Reviewed Yes   Response to Previous Treatment Patient unable to report, no changes reported from family or staff   Family/Caregiver Present No   Cognition   Overall Cognitive Status Impaired   Arousal/Participation Alert;Responsive;Cooperative   Attention Attends with cues to redirect   Orientation Level Oriented to person;Disoriented to place;Disoriented to time;Disoriented to situation   Memory Decreased recall of recent events;Decreased short term memory   Following Commands Follows one step commands with increased time or repetition   Comments Pt agreeable to PT.   Subjective   Subjective \"I can do that.\"   Bed Mobility   Supine to Sit 3  Moderate assistance   Additional items Assist x 2;HOB elevated;Bedrails;Increased time required;Verbal cues;LE management   Transfers   Sit to Stand 3  Moderate assistance   Additional items Assist x 2;Increased time required;Verbal cues   Stand to Sit 3  Moderate assistance   Additional items Assist x 2;Armrests;Increased time required;Verbal cues   Ambulation/Elevation   Gait pattern Decreased toe off;Decreased heel strike;Decreased hip extension;Excessively slow;Step to;Short stride;R Knee Aj;L Knee Aj   Gait Assistance 3  Moderate assist   Additional items Assist x 2;Verbal cues   Assistive Device " Rolling walker   Distance 8 feet x 2 trials; seated rest break between each trial   Balance   Static Sitting Fair   Dynamic Sitting Fair -   Static Standing Poor   Dynamic Standing Poor -   Ambulatory Poor -   Endurance Deficit   Endurance Deficit Yes   Endurance Deficit Description decreased activity tolerance; pt requiring supplemental oxygen; pt was received on 1L O2 via NC   Activity Tolerance   Activity Tolerance Patient tolerated treatment well;Patient limited by fatigue   Medical Staff Made Aware OT RoseAna   Assessment   Prognosis Good   Problem List Decreased strength;Decreased endurance;Impaired balance;Decreased mobility;Decreased cognition   Assessment Chart reviewed. Patient was received supine in bed in NAD and agreeable to PT session. Today's PT treatment session consisted of therapeutic activity for facilitation of transitional movements and safe performance of correct technique for bed mobility and sit to stand transfers and gait training to promote safe and functional ambulation on level surfaces. In comparison to the previous session the patient has made progress as evident by ability to ambulate an increased distance and increased gait trials. Pt required a seated rest break between each ambulation trial. When ambulating pt exhibits a step to pattern, with decreased stride length, and decreased heel strike. Pt requires occasional assist for walker management. Overall, patient tolerated today's session well and continues to be making progress towards achieving her STG's. Patient's prognosis for achieving their STG's is good. Co-treatment was performed with OT secondary to complex medical condition of pt. Pt requires assist of two to achieve and maintain transitional movements; requiring the need of skilled therapeutic intervention of two therapists to achieve the delivery of services. PT/OT goals were addressed separately. PT intervention continues to be appropriate as the patient continues to be  limited by decreased lower extremity strength, impaired balance, decreased endurance, gait deviations, and decreased functional mobility. Continue to recommend level 2, moderate resource intensity. PT to continue to see patient in order to address the deficits listed above and provide interventions consistent with the POC in order to achieve STG's and optimize the patient's independence with functional mobility.   Barriers to Discharge Inaccessible home environment;Other (Comment)  (decline in functional mobility)   Goals   STG Expiration Date 03/19/25   PT Treatment Day 1   Plan   Treatment/Interventions Functional transfer training;LE strengthening/ROM;Therapeutic exercise;Endurance training;Patient/family training;Bed mobility;Gait training;OT   Progress Progressing toward goals   PT Frequency 3-5x/wk   Discharge Recommendation   Rehab Resource Intensity Level, PT II (Moderate Resource Intensity)   AM-PAC Basic Mobility Inpatient   Turning in Flat Bed Without Bedrails 2   Lying on Back to Sitting on Edge of Flat Bed Without Bedrails 1   Moving Bed to Chair 1   Standing Up From Chair Using Arms 1   Walk in Room 1   Climb 3-5 Stairs With Railing 1   Basic Mobility Inpatient Raw Score 7   Turning Head Towards Sound 3   Follow Simple Instructions 3   Low Function Basic Mobility Raw Score  13   Low Function Basic Mobility Standardized Score  20.14   [facility] Highest Level Of Mobility   -HL Goal 2: Bed activities/Dependent transfer   -HLM Achieved 6: Walk 10 steps or more   Education   Education Provided Mobility training;Assistive device   Patient Demonstrates acceptance/verbal understanding;Reinforcement needed   End of Consult   Patient Position at End of Consult Bedside chair;Bed/Chair alarm activated;All needs within reach     Fartun Velarde, PT, DPT    Time of PT treatment session: 8971-4738  26 minutes

## 2025-03-11 NOTE — ASSESSMENT & PLAN NOTE
Wt Readings from Last 3 Encounters:   03/11/25 65.6 kg (144 lb 10 oz)   01/17/25 64.7 kg (142 lb 9.6 oz)   12/31/24 62.6 kg (138 lb)       Euvolemic on examination.  Chest x-ray report noted with bibasilar subsegmental atelectasis.      Plan  Continue scheduled po Lasix 20 milligram twice daily.  SCOTT, daily weight  Ordered incentive spirometry use however unclear patient's compliance due to dementia.

## 2025-03-12 PROCEDURE — 99232 SBSQ HOSP IP/OBS MODERATE 35: CPT

## 2025-03-12 RX ORDER — LORAZEPAM 2 MG/ML
0.5 INJECTION INTRAMUSCULAR ONCE
Status: DISCONTINUED | OUTPATIENT
Start: 2025-03-12 | End: 2025-03-13

## 2025-03-12 RX ADMIN — GABAPENTIN 100 MG: 100 CAPSULE ORAL at 08:56

## 2025-03-12 RX ADMIN — FUROSEMIDE 20 MG: 20 TABLET ORAL at 08:56

## 2025-03-12 RX ADMIN — FUROSEMIDE 20 MG: 20 TABLET ORAL at 23:11

## 2025-03-12 RX ADMIN — HEPARIN SODIUM 5000 UNITS: 5000 INJECTION INTRAVENOUS; SUBCUTANEOUS at 06:07

## 2025-03-12 RX ADMIN — GABAPENTIN 100 MG: 100 CAPSULE ORAL at 17:46

## 2025-03-12 RX ADMIN — GABAPENTIN 100 MG: 100 CAPSULE ORAL at 23:11

## 2025-03-12 RX ADMIN — HEPARIN SODIUM 5000 UNITS: 5000 INJECTION INTRAVENOUS; SUBCUTANEOUS at 23:11

## 2025-03-12 RX ADMIN — QUETIAPINE FUMARATE 25 MG: 25 TABLET ORAL at 23:11

## 2025-03-12 RX ADMIN — HEPARIN SODIUM 5000 UNITS: 5000 INJECTION INTRAVENOUS; SUBCUTANEOUS at 13:30

## 2025-03-12 RX ADMIN — MIRTAZAPINE 7.5 MG: 15 TABLET, FILM COATED ORAL at 23:11

## 2025-03-12 NOTE — DISCHARGE SUPPORT
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, psychotic disturbance, mood disturbance, or anxiety ([facility]) 2024     Chronic pain of left wrist 10/28/2024     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 ([facility]) 2024     Acute on chronic HFpEF with severe pulmonary hypertension ([facility]) 2024     Primary hypertension 2024     Paroxysmal A-fib ([facility]) 2024     Compression fracture of lumbar spine, non-traumatic ([facility]) 2024     Mild protein-calorie malnutrition ([facility]) 2024     Vascular dementia, uncomplicated ([facility]) 2023     Does mobilize using walker 2023     Chronic congestive heart failure ([facility]) 2022     Chronic atrial fibrillation ([facility]) 10/11/2022     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[facility]) 2022     Stage 3b chronic kidney disease ([facility]) 2022     Vitamin D deficiency 2020     Primary insomnia 2019     UTI (urinary tract infection) 10/20/2019     Degeneration of lumbar intervertebral disc 2019     Lumbar radiculopathy 2019     Arthropathy of lumbar facet joint 2019     Restless legs syndrome 2019       LOS (days): 8  Geometric Mean LOS (GMLOS) (days): 2.7  Days to GMLOS:-5   Facility Authorization Initiated  [facility] received request for  auth from : Kiki Mccormack  Authorization Request Submitted for: SNF  Requested Start of Care Date: 03/12/25  Facility Name: [facility]  Facility NPI: [id number]  Facility MD: Kathleen Archuleta  [facility] MD NPI: [id number]  Via: [employer] Portal  Clinicals submitted via: Portal Attachment  Pending reference #: [id number]   notified: Kiki Mccormack     Updates to authorization status will be noted in chart.    Please reach out to CM for updates on any clinical information.

## 2025-03-12 NOTE — ASSESSMENT & PLAN NOTE
Wt Readings from Last 3 Encounters:   03/12/25 64.8 kg (142 lb 13.7 oz)   01/17/25 64.7 kg (142 lb 9.6 oz)   12/31/24 62.6 kg (138 lb)       Euvolemic on examination.  Chest x-ray report noted with bibasilar subsegmental atelectasis.      Plan  Continue scheduled po Lasix 20 milligram twice daily.  SCOTT, daily weight  Ordered incentive spirometry use however unclear patient's compliance due to dementia.

## 2025-03-12 NOTE — PROGRESS NOTES
"Progress Note - Hospitalist   Name: Sona Valenzuela 89 y.o. female I MRN: [id number]  Unit/Bed#: -01 I Date of Admission: 3/4/2025   Date of Service: 3/12/2025 I Hospital Day: 8    Assessment & Plan  Seizure-like activity (HCC)  Patient presents with 1 episode of slurred speech, slowness followed by body stiffening, not responding, unable to talk for 3 minutes.  No documented prior episode or seizures.  Patient is afebrile.  A and O x 1 secondary to dementia.  Has a UTI but no other evidence of infection or neck stiffness.  Neurological examination without focal neurological deficits.  EEG results 3/5: \"Background activities and posteriorly dominant rhythm are too slow suggesting mild diffuse cerebral dysfunction of nonspecific etiology.\" Transient alteration of awareness   ECHO results 3/6: normal ejection fraction with moderate to severe TR and pulmonary hypertension with estimated RV systolic pressure of 65 mmHg  Plan:  No episode of seizure-like activity noted since hospitalized  Symptoms suspected secondary to UTI in the settings of dementia and currently she has completed 3 days of Invanz.  Currently afebrile, hemodynamically stable.  MRI brain was delayed due to history of lung surgery that required clips.was questionable if those clips are MRI compatible however after evaluated by radiologist was given the permission to proceed with MRI.  MRI pending likely today.    Discussed with case management and pending authorization for placement.  Transient alteration of awareness  Patient presented on 3/4/3035 with 1 episode of slurred speech, slowness followed by body stiffening, not responding, unable to talk for 3 minutes.  No documented prior episode or seizures.  Found to have a UTI but no other evidence of infection or neck stiffness.  Neurological examination without focal neurological deficits.  EEG resulted on 3/5/2025 showed \"Background activities and posteriorly dominant rhythm are too slow " "suggesting mild diffuse cerebral dysfunction of nonspecific etiology.\"    Plan:  MRI brain  UTI (urinary tract infection)  Patient had lower abdominal pressure and discomfort.  UA significant for any minimal WBC and bacteria.  Urine culture: E Coli, resistant to ceftriaxone  PCN allergy.    Plan  Completed 3 days of IV Invanz.  Elevated troponin  Troponin 740/699/659  Patient without any chest pain or shortness of breath  EKG without acute ischemic changes.  CXR 3/6 results: Pulmonary vascular congestion with unchanged left lung opacities.   Patient O2 between 85-93% without oxygen, observed while sleeping, resting comfortably  ECHO results 3/6: normal ejection fraction with moderate to severe TR and pulmonary hypertension with estimated RV systolic pressure of 65 mmHg  Euvolemic; not likely acute CHF exacerbation  Negative viral panel; less likely myocarditis  Cardio consult: Elevated troponin could be secondary to seizure activity in the setting of CKD. Doubt primary cardiac issue. Patient could very well have underlying CAD but no recommendations to consider any aggressive invasive evaluation given her advanced age and comorbidities. Signing off.    Plan  Discontinue telemetry  Continue Lasix    Chronic atrial fibrillation (HCC)  Patient has chronic A-fib.  Rate controlled.  Not on any blood thinners due to dementia and fall risk.  Primary hypertension  Plan  Continue Lasix   Chronic congestive heart failure (HCC)  Wt Readings from Last 3 Encounters:   03/12/25 64.8 kg (142 lb 13.7 oz)   01/17/25 64.7 kg (142 lb 9.6 oz)   12/31/24 62.6 kg (138 lb)       Euvolemic on examination.  Chest x-ray report noted with bibasilar subsegmental atelectasis.      Plan  Continue scheduled po Lasix 20 milligram twice daily.  SCOTT, daily weight  Ordered incentive spirometry use however unclear patient's compliance due to dementia.    Stage 3b chronic kidney disease (HCC)  Lab Results   Component Value Date    EGFR 39 03/11/2025    " EGFR 35 03/09/2025    EGFR 31 03/08/2025    CREATININE 1.13 03/11/2025    CREATININE 1.25 03/09/2025    CREATININE 1.37 (H) 03/08/2025   Assessment  Creatinine and BUN are at baseline of 1.3-1.4 and 31-34 respectively    Plan  Monitor creatinine  Avoid nephrotoxins  Vascular dementia, uncomplicated (HCC)  Patient is A and O x 1.    Plan  Delirium precautions  Continue home mirtazapine 7.5mg daily  Continue home quetiapine 25mg daily  Iron deficiency anemia  Presented with hemoglobin 9.1 which downtrended to 8.8.  Patient has received 2 doses of IV Venofer 200 mg each.  Current hemoglobin stable in the range of 9.  No signs of active overt bleeding noted.  Trend CBC tomorrow AM.    VTE Pharmacologic Prophylaxis:   High Risk (Score >/= 5) - Pharmacological DVT Prophylaxis Ordered: heparin. Sequential Compression Devices Ordered.    Mobility:   Basic Mobility Inpatient Raw Score: 7  JH-HLM Goal: 2: Bed activities/Dependent transfer  JH-HLM Achieved: 2: Bed activities/Dependent transfer  JH-HLM Goal achieved. Continue to encourage appropriate mobility.    Patient Centered Rounds: I performed bedside rounds with nursing staff today.   Discussions with Specialists or Other Care Team Provider: none    Education and Discussions with Family / Patient: Attempted to update  (niece) via phone. Left voicemail.     Current Length of Stay: 8 day(s)  Current Patient Status: Inpatient   Certification Statement: The patient will continue to require additional inpatient hospital stay due to Pending MRI brain to rule out stroke  Discharge Plan: Anticipate discharge in 24-48 hrs to rehab facility.    Code Status: Level 3 - DNAR and DNI    Subjective   Patient awake and alert however not oriented on my exam.  Patient known for disorientation in the setting of dementia..  Patient appears comfortable.  Discussed with nurse and patient appears to tolerate diet well with good appetite.    Objective :  Temp:  [98.7 °F (37.1 °C)]  98.7 °F (37.1 °C)  BP: (127-140)/(69-77) 140/69  Resp:  [18] 18  SpO2:  [96 %-98 %] 96 %  O2 Device: Nasal cannula  Nasal Cannula O2 Flow Rate (L/min):  [1 L/min] 1 L/min    Body mass index is 26.99 kg/m².     Input and Output Summary (last 24 hours):     Intake/Output Summary (Last 24 hours) at 3/12/2025 1536  Last data filed at 3/12/2025 1001  Gross per 24 hour   Intake 370 ml   Output 0 ml   Net 370 ml       Physical Exam  Vitals and nursing note reviewed.   Constitutional:       General: She is not in acute distress.     Appearance: She is well-developed.   HENT:      Head: Normocephalic and atraumatic.   Eyes:      Conjunctiva/sclera: Conjunctivae normal.   Cardiovascular:      Rate and Rhythm: Normal rate and regular rhythm.      Heart sounds: No murmur heard.  Pulmonary:      Effort: Pulmonary effort is normal. No respiratory distress.      Breath sounds: Normal breath sounds.   Abdominal:      Palpations: Abdomen is soft.      Tenderness: There is no abdominal tenderness.   Musculoskeletal:         General: No swelling.      Cervical back: Neck supple.      Right lower leg: No edema.      Left lower leg: No edema.   Skin:     General: Skin is warm and dry.      Capillary Refill: Capillary refill takes less than 2 seconds.   Neurological:      Mental Status: She is alert.   Psychiatric:         Mood and Affect: Mood normal.         Lines/Drains:              Lab Results: I have reviewed the following results:   Results from last 7 days   Lab Units 03/11/25  0544   WBC Thousand/uL 7.20   HEMOGLOBIN g/dL 9.3*   HEMATOCRIT % 31.3*   PLATELETS Thousands/uL 226     Results from last 7 days   Lab Units 03/11/25  0544   SODIUM mmol/L 139   POTASSIUM mmol/L 4.1   CHLORIDE mmol/L 104   CO2 mmol/L 32   BUN mg/dL 19   CREATININE mg/dL 1.13   ANION GAP mmol/L 3*   CALCIUM mg/dL 8.2*   GLUCOSE RANDOM mg/dL 95                       Recent Cultures (last 7 days):           Imaging Results Review: I reviewed radiology  reports from this admission including: chest xray.  Other Study Results Review: No additional pertinent studies reviewed.    Last 24 Hours Medication List:     Current Facility-Administered Medications:   •  acetaminophen (TYLENOL) tablet 650 mg, Q6H PRN  •  furosemide (LASIX) tablet 20 mg, BID  •  gabapentin (NEURONTIN) capsule 100 mg, TID  •  heparin (porcine) subcutaneous injection 5,000 Units, Q8H HESHAM **AND** [CANCELED] Platelet count, Once  •  lidocaine (LMX) 4 % cream, BID PRN  •  LORazepam (ATIVAN) injection 0.5 mg, Once  •  mirtazapine (REMERON) tablet 7.5 mg, HS  •  QUEtiapine (SEROquel) tablet 25 mg, HS    Administrative Statements   Today, Patient Was Seen By: Sil Guerra MD      **Please Note: This note may have been constructed using a voice recognition system.**

## 2025-03-12 NOTE — ASSESSMENT & PLAN NOTE
Presented with hemoglobin 9.1 which downtrended to 8.8.  Patient has received 2 doses of IV Venofer 200 mg each.  Current hemoglobin stable in the range of 9.  No signs of active overt bleeding noted.  Trend CBC tomorrow AM.

## 2025-03-12 NOTE — ASSESSMENT & PLAN NOTE
"Patient presents with 1 episode of slurred speech, slowness followed by body stiffening, not responding, unable to talk for 3 minutes.  No documented prior episode or seizures.  Patient is afebrile.  A and O x 1 secondary to dementia.  Has a UTI but no other evidence of infection or neck stiffness.  Neurological examination without focal neurological deficits.  EEG results 3/5: \"Background activities and posteriorly dominant rhythm are too slow suggesting mild diffuse cerebral dysfunction of nonspecific etiology.\" Transient alteration of awareness   ECHO results 3/6: normal ejection fraction with moderate to severe TR and pulmonary hypertension with estimated RV systolic pressure of 65 mmHg  Plan:  No episode of seizure-like activity noted since hospitalized  Symptoms suspected secondary to UTI in the settings of dementia and currently she has completed 3 days of Invanz.  Currently afebrile, hemodynamically stable.  MRI brain was delayed due to history of lung surgery that required clips.was questionable if those clips are MRI compatible however after evaluated by radiologist was given the permission to proceed with MRI.  MRI pending likely today.    Discussed with case management and pending authorization for placement.  "

## 2025-03-12 NOTE — CASE MANAGEMENT
Case Management Progress Note    Patient name Sona Valenzuela  Location /-01 MRN [id number]  : 11/3/1924 Date 3/12/2025       LOS (days): 8  Geometric Mean LOS (GMLOS) (days): 2.7  Days to GMLOS:-5.1        OBJECTIVE:        Current admission status: Inpatient  Preferred Pharmacy:   [facility]/pharmacy #[id number] - [place] - [address]  [place] [address]  Phone: [phone] Fax: [phone]    Primary Care Provider: FARZANA Stanley    Primary Insurance: [employer] REP  Secondary Insurance: [facility]    PROGRESS NOTE:  AS per SLIM rounds, pt is anticipated for dc today vs tmr pending MRI and Auth. CM continues to follow.

## 2025-03-12 NOTE — DISCHARGE SUPPORT
Case Management Assessment & Discharge Planning Note    Patient name Sona Valenzuela  Location /-01 MRN [id number]  : 11/3/1924 Date 3/12/2025       Current Admission Date: 3/4/2025  Current Admission Diagnosis:Seizure-like activity (HCC)   Patient Active Problem List    Diagnosis Date Noted Date Diagnosed    Iron deficiency anemia 03/10/2025     Transient alteration of awareness 2025     Seizure-like activity (HCC) 2025     Elevated troponin 2025     Congestion of throat 2025     Vascular dementia with behavioral disturbance (HCC) 2025     Ambulatory dysfunction 2024     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, psychotic disturbance, mood disturbance, or anxiety ([facility]) 2024     Chronic pain of left wrist 10/28/2024     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 ([facility]) 2024     Acute on chronic HFpEF with severe pulmonary hypertension ([facility]) 2024     Primary hypertension 2024     Paroxysmal A-fib ([facility]) 2024     Compression fracture of lumbar spine, non-traumatic ([facility]) 2024     Mild protein-calorie malnutrition ([facility]) 2024     Vascular dementia, uncomplicated ([facility]) 2023     Does mobilize using walker 2023     Chronic congestive heart failure ([facility]) 2022     Chronic atrial fibrillation ([facility]) 10/11/2022     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[facility]) 2022     Stage 3b chronic kidney disease ([facility]) 2022     Vitamin D deficiency 2020     Primary insomnia 2019     UTI (urinary tract infection) 10/20/2019     Degeneration of lumbar intervertebral disc 2019     Lumbar radiculopathy 2019     Arthropathy of lumbar facet joint 2019     Restless legs syndrome 2019       LOS (days): 8  Geometric Mean LOS (GMLOS) (days): 2.7  Days to GMLOS:-5.3   Facility Authorization Approved  [facility] received approved  auth for: SNF  Insurance: [employer]  Authorization Obtained Via: Phone  Name/Phone # of Rep who called in determination (if applicable): Jessica ([phone])  Facility Name: [facility]  Approved Authorization Number: [id number]  Start of Care Date: 03/12/25  Next Review Date: 03/17/25  Continued Stay Care Coordinator Name: Nani  Continued Stay Care Coordinator Phone #: [phone]  Submit Next Review to: ISAI#: [phone]   notified: Kiki GREENBERG     Updates to authorization status will be noted in chart.    Please reach out to CM for updates on any clinical information.

## 2025-03-12 NOTE — PLAN OF CARE
Problem: SAFETY ADULT  Goal: Patient will remain free of falls  Description: INTERVENTIONS:  - Educate patient/family on patient safety including physical limitations  - Instruct patient to call for assistance with activity   - Consult OT/PT to assist with strengthening/mobility   - Keep Call bell within reach  - Keep bed low and locked with side rails adjusted as appropriate  - Keep care items and personal belongings within reach  - Initiate and maintain comfort rounds  - Make Fall Risk Sign visible to staff  - Offer Toileting every 2 Hours, in advance of need  - Initiate/Maintain bed/chair alarm  - Obtain necessary fall risk management equipment: as needed  - Apply yellow socks and bracelet for high fall risk patients  - Consider moving patient to room near nurses station  Outcome: Progressing

## 2025-03-13 ENCOUNTER — APPOINTMENT (INPATIENT)
Dept: MRI IMAGING | Facility: HOSPITAL | Age: OVER 89
DRG: 101 | End: 2025-03-13
Payer: COMMERCIAL

## 2025-03-13 VITALS
DIASTOLIC BLOOD PRESSURE: 67 MMHG | HEIGHT: 61 IN | BODY MASS INDEX: 26.01 KG/M2 | HEART RATE: 72 BPM | RESPIRATION RATE: 16 BRPM | OXYGEN SATURATION: 98 % | SYSTOLIC BLOOD PRESSURE: 119 MMHG | WEIGHT: 137.79 LBS | TEMPERATURE: 98.1 F

## 2025-03-13 PROBLEM — R40.4 TRANSIENT ALTERATION OF AWARENESS: Status: RESOLVED | Noted: 2025-03-05 | Resolved: 2025-03-13

## 2025-03-13 LAB
FLUAV AG UPPER RESP QL IA.RAPID: NEGATIVE
FLUBV AG UPPER RESP QL IA.RAPID: NEGATIVE
SARS-COV+SARS-COV-2 AG RESP QL IA.RAPID: NEGATIVE

## 2025-03-13 PROCEDURE — 99239 HOSP IP/OBS DSCHRG MGMT >30: CPT

## 2025-03-13 PROCEDURE — 87811 SARS-COV-2 COVID19 W/OPTIC: CPT

## 2025-03-13 PROCEDURE — 87804 INFLUENZA ASSAY W/OPTIC: CPT

## 2025-03-13 PROCEDURE — 70551 MRI BRAIN STEM W/O DYE: CPT

## 2025-03-13 RX ORDER — LORAZEPAM 2 MG/ML
0.5 INJECTION INTRAMUSCULAR ONCE
Status: DISCONTINUED | OUTPATIENT
Start: 2025-03-13 | End: 2025-03-13 | Stop reason: HOSPADM

## 2025-03-13 RX ADMIN — HEPARIN SODIUM 5000 UNITS: 5000 INJECTION INTRAVENOUS; SUBCUTANEOUS at 06:05

## 2025-03-13 RX ADMIN — GABAPENTIN 100 MG: 100 CAPSULE ORAL at 16:08

## 2025-03-13 RX ADMIN — HEPARIN SODIUM 5000 UNITS: 5000 INJECTION INTRAVENOUS; SUBCUTANEOUS at 14:26

## 2025-03-13 RX ADMIN — GABAPENTIN 100 MG: 100 CAPSULE ORAL at 09:00

## 2025-03-13 NOTE — CASE MANAGEMENT
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, psychotic disturbance, mood disturbance, or anxiety ([facility]) 2024     Chronic pain of left wrist 10/28/2024     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 ([facility]) 2024     Acute on chronic HFpEF with severe pulmonary hypertension ([facility]) 2024     Primary hypertension 2024     Paroxysmal A-fib ([facility]) 2024     Compression fracture of lumbar spine, non-traumatic ([facility]) 2024     Mild protein-calorie malnutrition ([facility]) 2024     Vascular dementia, uncomplicated ([facility]) 2023     Does mobilize using walker 2023     Chronic congestive heart failure ([facility]) 2022     Chronic atrial fibrillation ([facility]) 10/11/2022     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[facility]) 2022     Stage 3b chronic kidney disease ([facility]) 2022     Vitamin D deficiency 2020     Primary insomnia 2019     UTI (urinary tract infection) 10/20/2019     Degeneration of lumbar intervertebral disc 2019     Lumbar radiculopathy 2019     Arthropathy of lumbar facet joint 2019     Restless legs syndrome 2019       LOS (days): 9  Geometric Mean LOS (GMLOS) (days): 2.7  Days to GMLOS:-6.3     OBJECTIVE:  Risk of Unplanned Readmission Score: 21.35         Current admission status: Inpatient   Preferred Pharmacy:    [facility]pharm[id number] - [place] - [address]  [address]  [place] [address]  Phone: [phone] Fax: [phone]    Primary Care Provider: FARZANA Stanley    [facility]  Secondary Insurance: [facility]    DISCHARGE DETAILS:  Patient medically cleared for discharge to [place] after MRI completed and read. SV reserved in Round Trip for a 6:00 p/u. Transportation and DNR forms placed in patient chart. CM spoke with elisa Gutierrez with updates on discharge and transport. SLIM and assigned CM informed of updates as well.

## 2025-03-13 NOTE — CASE MANAGEMENT
Case Management Progress Note    Patient name Sona Valenzuela  Location /-01 MRN [id number]  : 11/3/1924 Date 3/13/2025       LOS (days): 9  Geometric Mean LOS (GMLOS) (days): 2.7  Days to GMLOS:-6.1        OBJECTIVE:        Current admission status: Inpatient  Preferred Pharmacy:   [facility]/pharmacy #[id number] - [place] - [address]  [place] [address]  Phone: [phone] Fax: [phone]    Primary Care Provider: FARZANA Stanley    Primary Insurance: [employer] REP  Secondary Insurance: [facility]    PROGRESS NOTE:  Auth approved for pt to go to [facility]. Pt needs an MRI today which is scheduled for 1030am. Facility and pt POA notified. CM continues to follow.

## 2025-03-13 NOTE — ASSESSMENT & PLAN NOTE
"Patient presents with 1 episode of slurred speech, slowness followed by body stiffening, not responding, unable to talk for 3 minutes.  No documented prior episode or seizures.  Patient is afebrile.  A and O x 1 secondary to dementia.  Has a UTI but no other evidence of infection or neck stiffness.  Neurological examination without focal neurological deficits.  EEG results 3/5: \"Background activities and posteriorly dominant rhythm are too slow suggesting mild diffuse cerebral dysfunction of nonspecific etiology.\" Transient alteration of awareness   ECHO results 3/6: normal ejection fraction with moderate to severe TR and pulmonary hypertension with estimated RV systolic pressure of 65 mmHg  Plan:  No episode of seizure-like activity noted since hospitalized  Symptoms suspected secondary to UTI in the settings of dementia and currently she has completed 3 days of Invanz.  Currently afebrile, hemodynamically stable.  MRI was done and ruled out acute intracranial abnormalities. 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   "

## 2025-03-13 NOTE — PLAN OF CARE
Problem: Potential for Falls  Goal: Patient will remain free of falls  Description: INTERVENTIONS:  - Educate patient/family on patient safety including physical limitations  - Instruct patient to call for assistance with activity   - Consult OT/PT to assist with strengthening/mobility   - Keep Call bell within reach  - Keep bed low and locked with side rails adjusted as appropriate  - Keep care items and personal belongings within reach  - Initiate and maintain comfort rounds  - Make Fall Risk Sign visible to staff  - Apply yellow socks and bracelet for high fall risk patients  - Consider moving patient to room near nurses station  Outcome: Progressing     Problem: PAIN - ADULT  Goal: Verbalizes/displays adequate comfort level or baseline comfort level  Description: Interventions:  - Encourage patient to monitor pain and request assistance  - Assess pain using appropriate pain scale  - Administer analgesics based on type and severity of pain and evaluate response  - Implement non-pharmacological measures as appropriate and evaluate response  - Consider cultural and social influences on pain and pain management  - Notify physician/advanced practitioner if interventions unsuccessful or patient reports new pain  Outcome: Progressing     Problem: SAFETY ADULT  Goal: Patient will remain free of falls  Description: INTERVENTIONS:  - Educate patient/family on patient safety including physical limitations  - Instruct patient to call for assistance with activity   - Consult OT/PT to assist with strengthening/mobility   - Keep Call bell within reach  - Keep bed low and locked with side rails adjusted as appropriate  - Keep care items and personal belongings within reach  - Initiate and maintain comfort rounds  - Make Fall Risk Sign visible to staff  - Offer Toileting every 2 Hours, in advance of need  - Initiate/Maintain bed alarm  - Obtain necessary fall risk management equipment: bed alarm non skid socks call bell in  reach   - Apply yellow socks and bracelet for high fall risk patients  - Consider moving patient to room near nurses station  Outcome: Progressing  Goal: Maintain or return to baseline ADL function  Description: INTERVENTIONS:  -  Assess patient's ability to carry out ADLs; assess patient's baseline for ADL function and identify physical deficits which impact ability to perform ADLs (bathing, care of mouth/teeth, toileting, grooming, dressing, etc.)  - Assess/evaluate cause of self-care deficits   - Assess range of motion  - Assess patient's mobility; develop plan if impaired  - Assess patient's need for assistive devices and provide as appropriate  - Encourage maximum independence but intervene and supervise when necessary  - Involve family in performance of ADLs  - Assess for home care needs following discharge   - Consider OT consult to assist with ADL evaluation and planning for discharge  - Provide patient education as appropriate  Outcome: Progressing  Goal: Maintains/Returns to pre admission functional level  Description: INTERVENTIONS:  - Perform AM-PAC 6 Click Basic Mobility/ Daily Activity assessment daily.  - Set and communicate daily mobility goal to care team and patient/family/caregiver.   - Collaborate with rehabilitation services on mobility goals if consulted  - Perform Range of Motion 3 times a day.  - Reposition patient every 3 hours.  - Dangle patient 3 times a day  - Stand patient 3 times a day  - Ambulate patient 3 times a day  - Out of bed to chair 3 times a day   - Out of bed for meals 3 times a day  - Out of bed for toileting  - Record patient progress and toleration of activity level   Outcome: Progressing     Problem: DISCHARGE PLANNING  Goal: Discharge to home or other facility with appropriate resources  Description: INTERVENTIONS:  - Identify barriers to discharge w/patient and caregiver  - Arrange for needed discharge resources and transportation as appropriate  - Identify discharge  learning needs (meds, wound care, etc.)  - Arrange for interpretive services to assist at discharge as needed  - Refer to Case Management Department for coordinating discharge planning if the patient needs post-hospital services based on physician/advanced practitioner order or complex needs related to functional status, cognitive ability, or social support system  Outcome: Progressing     Problem: Knowledge Deficit  Goal: Patient/family/caregiver demonstrates understanding of disease process, treatment plan, medications, and discharge instructions  Description: Complete learning assessment and assess knowledge base.  Interventions:  - Provide teaching at level of understanding  - Provide teaching via preferred learning methods  Outcome: Progressing     Problem: Prexisting or High Potential for Compromised Skin Integrity  Goal: Skin integrity is maintained or improved  Description: INTERVENTIONS:  - Identify patients at risk for skin breakdown  - Assess and monitor skin integrity  - Assess and monitor nutrition and hydration status  - Monitor labs   - Assess for incontinence   - Turn and reposition patient  - Assist with mobility/ambulation  - Relieve pressure over bony prominences  - Avoid friction and shearing  - Provide appropriate hygiene as needed including keeping skin clean and dry  - Evaluate need for skin moisturizer/barrier cream  - Collaborate with interdisciplinary team   - Patient/family teaching  - Consider wound care consult   Outcome: Progressing     Problem: Decreased Cardiac Output  Goal: Cardiac output adequate for individual needs  Description: INTERVENTIONS: Monitor for signs and symptoms of decreased cardiac output   - Monitor for dyspnea with exertion and at rest  - Monitor for orthopnea  - Monitor for signs of tachycardia. Place patient on telemetry monitoring.  - Assess patient for jugular vein distention  - Assess patient for lower extremity edema and poor peripheral perfusion    - Auscultate lung sound for Fine bibasilar crackles   - Monitor for cardiac arrythmias   - Administer beta blockers, antiarrhythmic, and blood pressure medications as ordered    Outcome: Progressing     Problem: Impaired Gas Exchange  Goal: Optimize oxygenation and ensure adequate ventilation  Description: INTERVENTIONS: Monitor for signs and symptoms of respiratory distress                - Elevate HOB or use high fowlers to promote lung expansion                - Administer oxygen as ordered to maintain adequate oxygenation                - Encourage use of IS to promote lung expansion and prevent PN                - Monitor ABGs to assess oxygenation status                - Monitor blood oxygen level to maintain adequate oxygenation                - Encourage cough and deep breathing exercises to promote lung expansion                - Monitor patient's mental status for increased confusion    Outcome: Progressing     Problem: Excess Fluid Volume  Goal: Patient is able to achieve and maintain homeostasis  Description: INTERVENTIONS: Monitor for sign and symptoms of fluid overload  - Evaluate LE edema every shift  - Elevate LE to prevent dependent edema  - Apply FRANCISCO stockings as ordered   - Monitor ankle circumference daily  - Assess for jugular vein distention  - Evaluate provider orders for the CHF diuretic algorithm. Administer diuretics as ordered  - Weigh the patient daily at 0600 and report a weight gain of five pounds or more   - Strict intake and output  - Monitor fluid intake and adhere to fluid restrictions  - Assess lung sounds every shift and as needed  - Monitor vital signs and lab values (CBC, chem, BUN, BNP)  - Measure and document urine output    Outcome: Progressing     Problem: Activity Intolerance  Goal: Patient is able to perform activities within their limitations  Description: INTERVENTIONS:                       -   Alternate periods of activity with periods of rest                 -    Patients is able to maintain normal vitals heart rhythm during activity                 -   Gradually increase activity and exercise as patient can tolerate                 -   Monitor blood pressure and heart before and after exercise                  -   Monitor blood oxygen saturation during activity and apply oxygen as needed    Outcome: Progressing     Problem: Knowledge Deficit  Goal: Patient is able to verbalize understanding of Heart Failure after education  Description: INTERVENTIONS:  - Educate the patient and family on signs and symptoms of HF  - Provide the patient with HF education and HF zone tool  - Educate on the importance of daily weight in the AM and reporting a weight gain               of 3 or more pounds to their primary care physician  - Monitor for SOB  - Maintain and sodium and fluid restriction  - Educate the patient on the importance of medications such as: diuretics, betablockers,               antiarrhythmics and their purpose, dose, route, side effects and labs               if they are needed    Outcome: Progressing     Problem: NEUROSENSORY - ADULT  Goal: Achieves stable or improved neurological status  Description: INTERVENTIONS  - Monitor and report changes in neurological status  - Monitor vital signs such as temperature, blood pressure, glucose, and any other labs ordered   - Initiate measures to prevent increased intracranial pressure  - Monitor for seizure activity and implement precautions if appropriate      Outcome: Progressing  Goal: Remains free of injury related to seizures activity  Description: INTERVENTIONS  - Maintain airway, patient safety  and administer oxygen as ordered  - Monitor patient for seizure activity, document and report duration and description of seizure to physician/advanced practitioner  - If seizure occurs,  ensure patient safety during seizure  - Reorient patient post seizure  - Seizure pads on all 4 side rails  - Instruct patient/family to notify RN  of any seizure activity including if an aura is experienced  - Instruct patient/family to call for assistance with activity based on nursing assessment  - Administer anti-seizure medications if ordered    Outcome: Progressing  Goal: Achieves maximal functionality and self care  Description: INTERVENTIONS  - Monitor swallowing and airway patency with patient fatigue and changes in neurological status  - Encourage and assist patient to increase activity and self care.   - Encourage visually impaired, hearing impaired and aphasic patients to use assistive/communication devices  Outcome: Progressing     Problem: Nutrition/Hydration-ADULT  Goal: Nutrient/Hydration intake appropriate for improving, restoring or maintaining nutritional needs  Description: Monitor and assess patient's nutrition/hydration status for malnutrition. Collaborate with interdisciplinary team and initiate plan and interventions as ordered.  Monitor patient's weight and dietary intake as ordered or per policy. Utilize nutrition screening tool and intervene as necessary. Determine patient's food preferences and provide high-protein, high-caloric foods as appropriate.     INTERVENTIONS:  - Monitor oral intake, urinary output, labs, and treatment plans  - Assess nutrition and hydration status and recommend course of action  - Evaluate amount of meals eaten  - Assist patient with eating if necessary   - Allow adequate time for meals  - Recommend/ encourage appropriate diets, oral nutritional supplements, and vitamin/mineral supplements  - Order, calculate, and assess calorie counts as needed  - Recommend, monitor, and adjust tube feedings and TPN/PPN based on assessed needs  - Assess need for intravenous fluids  - Provide specific nutrition/hydration education as appropriate  - Include patient/family/caregiver in decisions related to nutrition  Outcome: Progressing

## 2025-03-13 NOTE — DISCHARGE SUMMARY
"Discharge Summary - Hospitalist   Name: Sona Valenzuela 89 y.o. female I MRN: [id number]  Unit/Bed#: -01 I Date of Admission: 3/4/2025   Date of Service: 3/13/2025 I Hospital Day: 9     Assessment & Plan  Seizure-like activity (HCC)  Patient presents with 1 episode of slurred speech, slowness followed by body stiffening, not responding, unable to talk for 3 minutes.  No documented prior episode or seizures.  Patient is afebrile.  A and O x 1 secondary to dementia.  Has a UTI but no other evidence of infection or neck stiffness.  Neurological examination without focal neurological deficits.  EEG results 3/5: \"Background activities and posteriorly dominant rhythm are too slow suggesting mild diffuse cerebral dysfunction of nonspecific etiology.\" Transient alteration of awareness   ECHO results 3/6: normal ejection fraction with moderate to severe TR and pulmonary hypertension with estimated RV systolic pressure of 65 mmHg  Plan:  No episode of seizure-like activity noted since hospitalized  Symptoms suspected secondary to UTI in the settings of dementia and currently she has completed 3 days of Invanz.  Currently afebrile, hemodynamically stable.  MRI was done and ruled out acute intracranial abnormalities. 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   Transient alteration of awareness (Resolved: 3/13/2025)  Patient presented on 3/4/3035 with 1 episode of slurred speech, slowness followed by body stiffening, not responding, unable to talk for 3 minutes.  No documented prior episode or seizures.  Found to have a UTI but no other evidence of infection or neck stiffness.  Neurological examination without focal neurological deficits.  EEG resulted on 3/5/2025 showed \"Background activities and posteriorly dominant rhythm are too slow suggesting mild diffuse cerebral dysfunction of nonspecific etiology.\"  MRI was done and ruled out acute intracranial abnormalities. Chronic " microangiopathic changes and chronic right PCA territory infarction.   UTI (urinary tract infection)  Patient had lower abdominal pressure and discomfort.  UA significant for any minimal WBC and bacteria.  Urine culture: E Coli, resistant to ceftriaxone  PCN allergy.    Plan  Completed 3 days of IV Invanz.  Elevated troponin  Troponin 740/699/659  Patient without any chest pain or shortness of breath  EKG without acute ischemic changes.  CXR 3/6 results: Pulmonary vascular congestion with unchanged left lung opacities.   Patient O2 between 85-93% without oxygen, observed while sleeping, resting comfortably  ECHO results 3/6: normal ejection fraction with moderate to severe TR and pulmonary hypertension with estimated RV systolic pressure of 65 mmHg  Euvolemic; not likely acute CHF exacerbation  Negative viral panel; less likely myocarditis  Cardio consult: Elevated troponin could be secondary to seizure activity in the setting of CKD. Doubt primary cardiac issue. Patient could very well have underlying CAD but no recommendations to consider any aggressive invasive evaluation given her advanced age and comorbidities. Signing off.    Plan  Discontinue telemetry  Continue Lasix    Chronic atrial fibrillation (HCC)  Patient has chronic A-fib.  Rate controlled.  Not on any blood thinners due to dementia and fall risk.  Primary hypertension  Plan  Continue Lasix   Chronic congestive heart failure (HCC)  Wt Readings from Last 3 Encounters:   03/13/25 62.5 kg (137 lb 12.6 oz)   01/17/25 64.7 kg (142 lb 9.6 oz)   12/31/24 62.6 kg (138 lb)       Euvolemic on examination.  Chest x-ray report noted with bibasilar subsegmental atelectasis.      Plan  Continue scheduled po Lasix 20 milligram twice daily.  SCOTT, daily weight  Ordered incentive spirometry use however unclear patient's compliance due to dementia.    Stage 3b chronic kidney disease (HCC)  Lab Results   Component Value Date    EGFR 39 03/11/2025    EGFR 35 03/09/2025     EGFR 31 03/08/2025    CREATININE 1.13 03/11/2025    CREATININE 1.25 03/09/2025    CREATININE 1.37 (H) 03/08/2025   Assessment  Creatinine and BUN are at baseline of 1.3-1.4 and 31-34 respectively    Plan  Monitor creatinine  Avoid nephrotoxins  Vascular dementia, uncomplicated (HCC)  Patient is A and O x 1.  Noted on MRI chronic microangiopathic changes and chronic right PCA territory infarction.    Plan  Delirium precautions  Continue home mirtazapine 7.5mg daily  Continue home quetiapine 25mg daily  Iron deficiency anemia  Presented with hemoglobin 9.1 which downtrended to 8.8.  Patient has received 2 doses of IV Venofer 200 mg each.  Current hemoglobin stable in the range of 9.  No signs of active overt bleeding noted.  Trend CBC tomorrow AM.     Medical Problems       Resolved Problems  Date Reviewed: 12/5/2024   None       Discharging Physician / Practitioner: Sil Guerra MD  PCP: FARZANA Stanley  Admission Date:   Admission Orders (From admission, onward)       Ordered        03/04/25 1534  INPATIENT ADMISSION  Once                          Discharge Date: 03/13/25    Consultations During Hospital Stay:  Cardiology    Procedures Performed:   none    Significant Findings / Test Results:   MRI brain wo contrast   Final Result by Peter Ratliff MD (03/13 1429)      No acute infarction. 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      Workstation performed: [id number]         XR chest portable   Final Result by Andrew Fulton MD (03/10 0855)      Bibasilar subsegmental atelectasis. Underlying pneumonia is not excluded.            Workstation performed: [id number]         XR chest portable   Final Result by Pérez Cedeño MD (03/06 1622)      Pulmonary vascular congestion with unchanged left lung opacities.            Workstation performed: [id number]         CTA chest pe study   Final Result by Jabier Pop MD (03/04 1429)      1.  No evidence  of a central pulmonary embolus. However, the peripheral pulmonary arteries cannot be adequately assessed, due to respiratory motion artifact.   2.  There are patchy opacities within the left upper lobe and both lung bases, which favor atelectasis. Entirely excluded.   3.  Moderate cardiomegaly, with disproportionate enlargement of the right atrium. Given the small pleural effusions and prominent reflux of contrast into the hepatic veins, this is consistent with right heart dysfunction.                  Workstation performed: [id number]         CT head without contrast   Final Result by Peter Ratliff MD (03/04 1131)      No acute intracranial abnormality.                  Workstation performed: [id number]         XR chest portable   ED Interpretation by Sissy Gillespie DO (03/04 1149)   No acute cardiopulmonary abnormalities.      Final Result by Babatunde Ulloa MD (03/04 1221)      Limited visualization of the left diaphragm suggesting some left basilar atelectasis or infiltrate as well as possible small left pleural effusion. Consider lateral view for further assessment.      The study was marked in EPIC for immediate notification.            Workstation performed: [id number]           Results Reviewed       Procedure Component Value Units Date/Time    Blood culture #1 [[id number]] Collected: 03/04/25 1108    Lab Status: Final result Specimen: Blood from Arm, Right Updated: 03/09/25 1701     Blood Culture No Growth After 5 Days.    Blood culture #2 [[id number]] Collected: 03/04/25 1109    Lab Status: Final result Specimen: Blood from Arm, Left Updated: 03/09/25 1701     Blood Culture No Growth After 5 Days.    Urine culture [[id number]]  (Abnormal)  (Susceptibility) Collected: 03/04/25 1549    Lab Status: Final result Specimen: Urine, Straight Cath Updated: 03/07/25 0850     Urine Culture >100,000 cfu/ml Escherichia coli ESBL      <10,000 cfu/ml Alpha Hemolytic Streptococcus      <10,000 cfu/ml Gamma  Hemolytic Streptococcus    Susceptibility       Escherichia coli ESBL (1)       Antibiotic Interpretation Microscan   Method Status    ZID Performed  Yes  JOSE RAUL Final    Amoxicillin + Clavulanate Susceptible <=8/4 ug/ml JOSE RAUL Final    Ampicillin ($$) Resistant >16.00 ug/ml JOSE RAUL Final    Ampicillin + Sulbactam ($) Resistant >16/8 ug/ml JOSE RAUL Final    Aztreonam ($$$)  Resistant >16 ug/ml JOSE RAUL Final    Cefazolin ($) Resistant >16.00 ug/ml JOSE RAUL Final    Cefepime ($) Resistant >16.00 ug/ml JOSE RAUL Final    Ceftazidime ($$) Resistant <=1 ug/ml JOSE RAUL Final    Ceftriaxone ($$) Resistant >32.00 ug/ml JOSE RAUL Final    Cefuroxime ($$) Resistant >16 ug/ml JOSE RAUL Final    Ciprofloxacin ($)  Resistant >2.00 ug/ml JOSE RAUL Final    Ertapenem ($$$) Susceptible <=0.5 ug/ml JOSE RAUL Final    Gentamicin ($$) Resistant >8 ug/ml JOSE RAUL Final    Levofloxacin ($) Resistant >4.00 ug/ml JOSE RAUL Final    Minocycline Susceptible <=4 ug/ml JOSE RAUL Final    Nitrofurantoin Susceptible <=32 ug/ml JOSE RAUL Final    Piperacillin + Tazobactam ($$$) Susceptible <=8 ug/ml JOSE RAUL Final    Tetracycline Resistant >8 ug/ml JOSE RAUL Final    Trimethoprim + Sulfamethoxazole ($$$) Resistant >2/38 ug/ml JOSE RAUL Final    Fosfomycin   Susceptible 0.500 ug/ml JOSE RAUL Final                       Comprehensive metabolic panel [[id number]]  (Abnormal) Collected: 03/05/25 0508    Lab Status: Final result Specimen: Blood from Arm, Right Updated: 03/05/25 0614     Sodium 138 mmol/L      Potassium 3.8 mmol/L      Chloride 106 mmol/L      CO2 23 mmol/L      ANION GAP 9 mmol/L      BUN 34 mg/dL      Creatinine 1.37 mg/dL      Glucose 123 mg/dL      Calcium 8.6 mg/dL      AST 34 U/L      ALT 23 U/L      Alkaline Phosphatase 127 U/L      Total Protein 6.9 g/dL      Albumin 3.8 g/dL      Total Bilirubin 0.89 mg/dL      eGFR 31 ml/min/1.73sq m     Narrative:      National Kidney Disease Foundation guidelines for Chronic Kidney Disease (CKD):     Stage 1 with normal or high GFR (GFR > 90 mL/min/1.73 square meters)    Stage 2 Mild CKD (GFR  = 60-89 mL/min/1.73 square meters)    Stage 3A Moderate CKD (GFR = 45-59 mL/min/1.73 square meters)    Stage 3B Moderate CKD (GFR = 30-44 mL/min/1.73 square meters)    Stage 4 Severe CKD (GFR = 15-29 mL/min/1.73 square meters)    Stage 5 End Stage CKD (GFR <15 mL/min/1.73 square meters)  Note: GFR calculation is accurate only with a steady state creatinine    CBC and Platelet [[id number]]  (Abnormal) Collected: 03/05/25 0507    Lab Status: Final result Specimen: Blood from Arm, Right Updated: 03/05/25 0556     WBC 8.12 Thousand/uL      RBC 3.99 Million/uL      Hemoglobin 8.9 g/dL      Hematocrit 29.8 %      MCV 75 fL      MCH 22.3 pg      MCHC 29.9 g/dL      RDW 18.0 %      Platelets 286 Thousands/uL      MPV 10.1 fL     TIBC Panel (incl. Iron, TIBC, % Iron Saturation) [[id number]]  (Abnormal) Collected: 03/04/25 1524    Lab Status: Final result Specimen: Blood from Arm, Right Updated: 03/04/25 2333     Iron Saturation 3 %      TIBC 450.8 ug/dL      Iron 13 ug/dL      Transferrin 322 mg/dL      UIBC 438 ug/dL     Ferritin [[id number]]  (Abnormal) Collected: 03/04/25 1524    Lab Status: Final result Specimen: Blood from Arm, Right Updated: 03/04/25 2333     Ferritin 5 ng/mL     HS Troponin I 4hr [[id number]]  (Abnormal) Collected: 03/04/25 1524    Lab Status: Final result Specimen: Blood from Arm, Right Updated: 03/04/25 1614     hs TnI 4hr 659 ng/L      Delta 4hr hsTnI -81 ng/L     Urine Microscopic [[id number]]  (Abnormal) Collected: 03/04/25 1549    Lab Status: Final result Specimen: Urine, Straight Cath Updated: 03/04/25 1614     RBC, UA 1-2 /hpf      WBC, UA Innumerable /hpf      Epithelial Cells Occasional /hpf      Bacteria, UA Innumerable /hpf     UA w Reflex to Microscopic w Reflex to Culture [[id number]]  (Abnormal) Collected: 03/04/25 1549    Lab Status: Final result Specimen: Urine, Straight Cath Updated: 03/04/25 1608     Color, UA Yellow     Clarity, UA Clear     Specific Gravity, UA 1.023     pH, UA 6.0      Leukocytes, UA Moderate     Nitrite, UA Positive     Protein, UA 50 (1+) mg/dl      Glucose, UA Negative mg/dl      Ketones, UA Trace mg/dl      Urobilinogen, UA <2.0 mg/dl      Bilirubin, UA Negative     Occult Blood, UA Negative    Comprehensive metabolic panel [[id number]]  (Abnormal) Collected: 03/04/25 1108    Lab Status: Final result Specimen: Blood from Arm, Right Updated: 03/04/25 1349     Sodium 142 mmol/L      Potassium 4.0 mmol/L      Chloride 109 mmol/L      CO2 25 mmol/L      ANION GAP 8 mmol/L      BUN 34 mg/dL      Creatinine 1.32 mg/dL      Glucose 139 mg/dL      Calcium 8.3 mg/dL      AST 30 U/L      ALT 19 U/L      Alkaline Phosphatase 123 U/L      Total Protein 6.8 g/dL      Albumin 3.7 g/dL      Total Bilirubin 0.82 mg/dL      eGFR 33 ml/min/1.73sq m     Narrative:      National Kidney Disease Foundation guidelines for Chronic Kidney Disease (CKD):     Stage 1 with normal or high GFR (GFR > 90 mL/min/1.73 square meters)    Stage 2 Mild CKD (GFR = 60-89 mL/min/1.73 square meters)    Stage 3A Moderate CKD (GFR = 45-59 mL/min/1.73 square meters)    Stage 3B Moderate CKD (GFR = 30-44 mL/min/1.73 square meters)    Stage 4 Severe CKD (GFR = 15-29 mL/min/1.73 square meters)    Stage 5 End Stage CKD (GFR <15 mL/min/1.73 square meters)  Note: GFR calculation is accurate only with a steady state creatinine    Lactic acid 2 Hours [[id number]]  (Normal) Collected: 03/04/25 1254    Lab Status: Final result Specimen: Blood from Arm, Right Updated: 03/04/25 1348     LACTIC ACID 1.8 mmol/L     Narrative:      Result may be elevated if tourniquet was used during collection.    HS Troponin I 2hr [[id number]]  (Abnormal) Collected: 03/04/25 1254    Lab Status: Final result Specimen: Blood from Arm, Right Updated: 03/04/25 1330     hs TnI 2hr 699 ng/L      Delta 2hr hsTnI -41 ng/L     Procalcitonin [[id number]]  (Abnormal) Collected: 03/04/25 1108    Lab Status: Final result Specimen: Blood from Arm, Right Updated:  03/04/25 1154     Procalcitonin 0.46 ng/ml     HS Troponin 0hr (reflex protocol) [[id number]]  (Abnormal) Collected: 03/04/25 1108    Lab Status: Final result Specimen: Blood from Arm, Right Updated: 03/04/25 1152     hs TnI 0hr 740 ng/L     B-Type Natriuretic Peptide(BNP) [[id number]]  (Abnormal) Collected: 03/04/25 1108    Lab Status: Final result Specimen: Blood from Arm, Right Updated: 03/04/25 1151      pg/mL     Protime-INR [[id number]]  (Abnormal) Collected: 03/04/25 1108    Lab Status: Final result Specimen: Blood from Arm, Right Updated: 03/04/25 1144     Protime 16.0 seconds      INR 1.20    Narrative:      INR Therapeutic Range    Indication                                             INR Range      Atrial Fibrillation                                               2.0-3.0  Hypercoagulable State                                    2.0.2.3  Left Ventricular Asist Device                            2.0-3.0  Mechanical Heart Valve                                  -    Aortic(with afib, MI, embolism, HF, LA enlargement,    and/or coagulopathy)                                     2.0-3.0 (2.5-3.5)     Mitral                                                             2.5-3.5  Prosthetic/Bioprosthetic Heart Valve               2.0-3.0  Venous thromboembolism (VTE: VT, PE        2.0-3.0    APTT [[id number]]  (Normal) Collected: 03/04/25 1108    Lab Status: Final result Specimen: Blood from Arm, Right Updated: 03/04/25 1144     PTT 28 seconds     Lactic acid [[id number]]  (Abnormal) Collected: 03/04/25 1108    Lab Status: Final result Specimen: Blood from Arm, Right Updated: 03/04/25 1138     LACTIC ACID 2.1 mmol/L     Narrative:      Result may be elevated if tourniquet was used during collection.    Ammonia [[id number]]  (Normal) Collected: 03/04/25 1108    Lab Status: Final result Specimen: Blood from Arm, Right Updated: 03/04/25 1138     Ammonia 19 umol/L     CBC and differential [[id number]]  (Abnormal)  Collected: 03/04/25 1108    Lab Status: Final result Specimen: Blood from Arm, Right Updated: 03/04/25 1124     WBC 7.92 Thousand/uL      RBC 4.03 Million/uL      Hemoglobin 9.1 g/dL      Hematocrit 30.4 %      MCV 75 fL      MCH 22.6 pg      MCHC 29.9 g/dL      RDW 18.1 %      MPV 10.0 fL      Platelets 302 Thousands/uL      nRBC 0 /100 WBCs      Segmented % 81 %      Immature Grans % 0 %      Lymphocytes % 7 %      Monocytes % 11 %      Eosinophils Relative 1 %      Basophils Relative 0 %      Absolute Neutrophils 6.41 Thousands/µL      Absolute Immature Grans 0.03 Thousand/uL      Absolute Lymphocytes 0.54 Thousands/µL      Absolute Monocytes 0.83 Thousand/µL      Eosinophils Absolute 0.08 Thousand/µL      Basophils Absolute 0.03 Thousands/µL              Incidental Findings:   none       Test Results Pending at Discharge (will require follow up):   none     Outpatient Tests Requested:  none    Complications:  none    Reason for Admission: Concern for seizure-like activity due to slurred speech, body stiffness, unresponsiveness    Hospital Course:   Sona Valenzuela is a 89 y.o. female patient who originally presented to the hospital on 3/4/2025 due to seizure-like activity with slurred speech, body stiffness, unresponsiveness.  Patient at baseline with dementia and a poor historian.  Patient was ordered EEG, MRI brain, echo.  All the test unremarkable.  Patient was noted to have abnormal UA and was treated for a UTI with IV antibiotics.  Patient was assessed by PT/OT and was recommended rehab, patient was accepted at [facility] liver unfortunate patient had prolonged hospitalization for the delay in MRI brain.          Please see above list of diagnoses and related plan for additional information.     Condition at Discharge: good    Discharge Day Visit / Exam:   Subjective: Patient awake and alert and at baseline mental status.  Patient known for dementia.  Denies pain.  Vitals: Blood Pressure: 119/67  "(03/13/25 1458)  Pulse: 72 (03/13/25 1458)  Temperature: 98.1 °F (36.7 °C) (03/13/25 1458)  Temp Source: Oral (03/13/25 1458)  Respirations: 16 (03/13/25 1458)  Height: 5' 1\" (154.9 cm) (03/06/25 1301)  Weight - Scale: 62.5 kg (137 lb 12.6 oz) (03/13/25 0550)  SpO2: 98 % (03/13/25 1458)  Physical Exam  Vitals and nursing note reviewed.   Constitutional:       General: She is not in acute distress.     Appearance: She is well-developed.   HENT:      Head: Normocephalic and atraumatic.   Eyes:      Conjunctiva/sclera: Conjunctivae normal.   Cardiovascular:      Rate and Rhythm: Normal rate and regular rhythm.      Heart sounds: No murmur heard.  Pulmonary:      Effort: Pulmonary effort is normal. No respiratory distress.      Breath sounds: Normal breath sounds. No wheezing or rales.   Abdominal:      Palpations: Abdomen is soft.      Tenderness: There is no abdominal tenderness.   Musculoskeletal:         General: No swelling.      Cervical back: Neck supple.      Right lower leg: No edema.      Left lower leg: No edema.   Skin:     General: Skin is warm and dry.      Capillary Refill: Capillary refill takes less than 2 seconds.   Neurological:      Mental Status: She is alert. Mental status is at baseline. She is disoriented.      Cranial Nerves: No cranial nerve deficit.      Motor: No weakness.      Gait: Gait normal.   Psychiatric:         Mood and Affect: Mood normal.         Discussion with Family: Updated  (niece) via phone.    Discharge instructions/Information to patient and family:   See after visit summary for information provided to patient and family.      Provisions for Follow-Up Care:  See after visit summary for information related to follow-up care and any pertinent home health orders.      Mobility at time of Discharge:   Basic Mobility Inpatient Raw Score: 7  JH-HLM Goal: 2: Bed activities/Dependent transfer  JH-HLM Achieved: 4: Move to chair/commode  HLM Goal achieved. Continue to " encourage appropriate mobility.     Disposition:   Acute Rehab at [place]    Planned Readmission: none    Discharge Medications:  See after visit summary for reconciled discharge medications provided to patient and/or family.      Administrative Statements   Discharge Statement:  I have spent a total time of 35 minutes in caring for this patient on the day of the visit/encounter. >30 minutes of time was spent on: Diagnostic results, Prognosis, Risks and benefits of tx options, Instructions for management, Patient and family education, Importance of tx compliance, Risk factor reductions, Impressions, Counseling / Coordination of care, Documenting in the medical record, Reviewing / ordering tests, medicine, procedures  , and Communicating with other healthcare professionals .    **Please Note: This note may have been constructed using a voice recognition system**

## 2025-03-13 NOTE — ASSESSMENT & PLAN NOTE
Wt Readings from Last 3 Encounters:   03/13/25 62.5 kg (137 lb 12.6 oz)   01/17/25 64.7 kg (142 lb 9.6 oz)   12/31/24 62.6 kg (138 lb)       Euvolemic on examination.  Chest x-ray report noted with bibasilar subsegmental atelectasis.      Plan  Continue scheduled po Lasix 20 milligram twice daily.  SCOTT, daily weight  Ordered incentive spirometry use however unclear patient's compliance due to dementia.

## 2025-03-13 NOTE — ASSESSMENT & PLAN NOTE
"Patient presented on 3/4/3035 with 1 episode of slurred speech, slowness followed by body stiffening, not responding, unable to talk for 3 minutes.  No documented prior episode or seizures.  Found to have a UTI but no other evidence of infection or neck stiffness.  Neurological examination without focal neurological deficits.  EEG resulted on 3/5/2025 showed \"Background activities and posteriorly dominant rhythm are too slow suggesting mild diffuse cerebral dysfunction of nonspecific etiology.\"  MRI was done and ruled out acute intracranial abnormalities. 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   "

## 2025-03-13 NOTE — PROGRESS NOTES
Patient:  ARABELLA KATE    MRN:  [id number]    Aidin Request ID:  [id number]    Level of care reserved:  Skilled Nursing Facility    Partner Reserved:  [facility], [place] [address] [phone]    Clinical needs requested:    Geography searched:  45 miles around [id number]    Start of Service:    Request sent:  9:12am EDT on 3/10/2025 by Kiki Mccormack    Partner reserved:  9:36am EDT on 3/11/2025 by Kiki Mccormack    Choice list shared:

## 2025-03-13 NOTE — ASSESSMENT & PLAN NOTE
Patient is A and O x 1.  Noted on MRI chronic microangiopathic changes and chronic right PCA territory infarction.    Plan  Delirium precautions  Continue home mirtazapine 7.5mg daily  Continue home quetiapine 25mg daily

## 2025-03-14 ENCOUNTER — TRANSITIONAL CARE MANAGEMENT (OUTPATIENT)
Dept: FAMILY MEDICINE CLINIC | Facility: CLINIC | Age: OVER 89
End: 2025-03-14

## 2025-03-31 ENCOUNTER — TELEPHONE (OUTPATIENT)
Age: OVER 89
End: 2025-03-31

## 2025-03-31 NOTE — TELEPHONE ENCOUNTER
[facility] called to advise Kiki Allen that the patient will be starting with home health for PT and OT

## 2025-03-31 NOTE — TELEPHONE ENCOUNTER
Patient's niece Xi arambula , patient was discharge for rehab on 3/29/25, she was waking fine with her walker on saturday & Sunday   but now today she can't walk ,wanted to bring her in for an appointment   but cannot manage her on her own & they do not have a wheelchair..    Would like advice on what to do .

## 2025-04-02 ENCOUNTER — NURSE TRIAGE (OUTPATIENT)
Age: OVER 89
End: 2025-04-02

## 2025-04-02 NOTE — TELEPHONE ENCOUNTER
"FOLLOW UP: n/a    REASON FOR CONVERSATION: Nasal Congestion and Urinary Symptoms    SYMPTOMS: Runny nose for couple weeks, elisa states that its running down like water.  States the color is clear and not yellow.  Denies patient having fever, sore throat, cough, shortness of breath, or headache. Elisa states that this morning she also noticed that her urine had a strong smell and looked dark (concentrated) states patient uses depends.  Denied patient expressing pain in the pelvic area or flank area.     OTHER: nikorina Layne, called asking advise if she could give patient benadryl as patient has been having persistent runny nose. States that patient was discharged from rehab on 3/29. Yesterday she had physical therapy and patient is walking now. For the runny nose Xi was advise to otc non drowsy claritin or zyrtec instead. States that she doesn't want to give patient anything that would interfere w/ her current medications. Patient was schedule for an appt tomorrow at 1:40 w/ Joselito and Xi stated she would wait till tomorrow to see what she is advise to give her. In the meantime she was advise to saline spray and stated she would do that.     DISPOSITION: See Today or Tomorrow in Office      Reason for Disposition   Nasal discharge present > 10 days    Answer Assessment - Initial Assessment Questions  1. SYMPTOM: \"What's the main symptom you're concerned about?\" (e.g., frequency, incontinence)      Strong smell to urine   2. ONSET: \"When did the  symptom  start?\"      Per niece she noticed it this morning   3. PAIN: \"Is there any pain?\" If Yes, ask: \"How bad is it?\" (Scale: 1-10; mild, moderate, severe)      Denies   4. CAUSE: \"What do you think is causing the symptoms?\"      Unsure. Elisa states that she tries to keep her hydrated   5. OTHER SYMPTOMS: \"Do you have any other symptoms?\" (e.g., blood in urine, fever, flank pain, pain with urination)      Denies . But patient has been having a runny nose as " "well    Answer Assessment - Initial Assessment Questions  1. SYMPTOM: \"What's the main symptom you're concerned about?\" (e.g., runny nose, stuffiness, sneezing, itching)      Runny nose. Per niece it has been persistent for couple weeks  2. SEVERITY: \"How bad is it?\" \"What does it keep you from doing?\" (e.g., sleeping, working)       States that its \"running down like water\"  3. EYES: \"Are the eyes also red, watery, and itchy?\"       Denies   4. TRIGGER: \"What pollen or other allergic substance do you think is causing the symptoms?\"       Unsure   5. TREATMENT: \"What medicine are you using?\" \"What medicine worked best in the past?\"      States that it hasn't been this bad in the past and has not used a medication before   6. OTHER SYMPTOMS: \"Do you have any other symptoms?\" (e.g., coughing, difficulty breathing, wheezing)      Denies    Protocols used: Urinary Symptoms-Adult-OH, Nasal Allergies (Hay Fever)-Adult-OH    "

## 2025-04-03 ENCOUNTER — OFFICE VISIT (OUTPATIENT)
Dept: FAMILY MEDICINE CLINIC | Facility: CLINIC | Age: OVER 89
End: 2025-04-03
Payer: COMMERCIAL

## 2025-04-03 VITALS
SYSTOLIC BLOOD PRESSURE: 110 MMHG | HEIGHT: 61 IN | BODY MASS INDEX: 25.68 KG/M2 | WEIGHT: 136 LBS | DIASTOLIC BLOOD PRESSURE: 80 MMHG | TEMPERATURE: 96.9 F

## 2025-04-03 DIAGNOSIS — I48.0 PAROXYSMAL A-FIB (HCC): ICD-10-CM

## 2025-04-03 DIAGNOSIS — R56.9 SEIZURE-LIKE ACTIVITY (HCC): Primary | ICD-10-CM

## 2025-04-03 DIAGNOSIS — R82.90 MALODOROUS URINE: ICD-10-CM

## 2025-04-03 DIAGNOSIS — C67.9 PRIMARY MALIGNANT NEOPLASM OF BLADDER (HCC): ICD-10-CM

## 2025-04-03 DIAGNOSIS — J34.89 RHINORRHEA: ICD-10-CM

## 2025-04-03 PROBLEM — N39.0 UTI (URINARY TRACT INFECTION): Status: RESOLVED | Noted: 2019-10-20 | Resolved: 2025-04-03

## 2025-04-03 PROCEDURE — 99495 TRANSJ CARE MGMT MOD F2F 14D: CPT

## 2025-04-03 RX ORDER — FEXOFENADINE HCL 60 MG/1
60 TABLET, FILM COATED ORAL DAILY
Qty: 30 TABLET | Refills: 0 | Status: SHIPPED | OUTPATIENT
Start: 2025-04-03 | End: 2025-05-03

## 2025-04-03 NOTE — ASSESSMENT & PLAN NOTE
Presented to ED for episode of slurred speech, slowness followed by body stiffening, not responding, unable to talk for 3 minutes.  No documented prior episode or seizures  EEG overall nonspecific and echo normal ejection fraction  MRI r/o for intracranial contributory cause  Niece in room recalls that pt was to see PCP for URI but pt then appeared like she was having a seizure  [facility] for rehab 2 weeks d/c Saturday    Visiting nurse Sunday, reassuring per family  Today patient and niece in room deny having any seizure like activity since initial episode  Examination today is unremarkable for active signs of infection or neurologic abnormality  Suspect AMS in the setting of UTI which was treated with ertapenem  Continue to monitor at this time, follow-up as needed for recurrence of seizure-like activity or infection, fever

## 2025-04-03 NOTE — ASSESSMENT & PLAN NOTE
HCC code, pt and niece recount no Hx of this  Recent CT imaging 12/24 notes unremarkable urinary bladder  Pt not following with urology

## 2025-04-03 NOTE — PROGRESS NOTES
Transition of Care Visit:  Name: Sona Valenzuela      : 11/3/1924      MRN: [id number]  Encounter Provider: Joselito Trejo PA-C  Encounter Date: 4/3/2025   Encounter department: [facility]    Assessment & Plan  Seizure-like activity (HCC)  Presented to ED for episode of slurred speech, slowness followed by body stiffening, not responding, unable to talk for 3 minutes.  No documented prior episode or seizures  EEG overall nonspecific and echo normal ejection fraction  MRI r/o for intracranial contributory cause  Niece in room recalls that pt was to see PCP for URI but pt then appeared like she was having a seizure  [facility] for rehab 2 weeks d/c Saturday    Visiting nurse , reassuring per family  Today patient and niece in room deny having any seizure like activity since initial episode  Examination today is unremarkable for active signs of infection or neurologic abnormality  Suspect AMS in the setting of UTI which was treated with ertapenem  Continue to monitor at this time, follow-up as needed for recurrence of seizure-like activity or infection, fever       Malodorous urine  Niece notes smell of urine was stronger and more concentrated since discharge  Incontinent of urine and wears depends  Patient actively denies difficulty with urination, burning with urination, blood in her urine, abdominal pain, back pain that is new  Multiple attempts to asked patient to obtain urine sample in office results with her send she cannot produce urine sample at this time  She appears grossly alert and oriented, she does take time to respond but niece states that some degree at this is her baseline  Examination unremarkable, no suprapubic pressure, no back pain, lungs CTA, heart regular in rate  At this time shared decision making to continue to monitor, patient was recently placed on ertapenem regimen with improvement of symptoms, so we will withhold recurrent antibiotic treatment at this time  due to lack of other symptoms besides concentrated urine  Advise increasing hydration, rest, strict monitoring of mental status and additional symptoms  Will also order outpatient urinalysis and urine culture for patient to complete at earliest convenience  Follow-up pending urine testing and follow-up if new onset symptoms or recurrence of altered mental status  Orders:    Urinalysis with microscopic; Future    Urine culture; Future    Rhinorrhea  Pt experiencing increased rhinorrhea over the last few weeks, niece states it is clear that this happened before does not to this extent  Patient niece deny any symptoms of upper respiratory or systemic illness besides recent admission for UTI  Examination unremarkable/reassuring, lungs are CTA, vital stable, no evidence of bacterial or viral infection  MRI brain personally reviewed with no acute intracranial pathology or mass   Will treat conservatively at this time with Allegra taken once every 24 hours for symptoms  They are to follow-up for any worsening or nonimprovement  Orders:    fexofenadine (ALLEGRA) 60 MG tablet; Take 1 tablet (60 mg total) by mouth daily Do not exceed one dose in 24 hr period    Primary malignant neoplasm of bladder (HCC)  HCC code, pt and niece recount no Hx of this  Recent CT imaging 12/24 notes unremarkable urinary bladder  Pt not following with urology       Paroxysmal A-fib (HCC)  Denies chest pain shortness of breath difficulty breathing  Heart regular rate and rhythm on exam            History of Present Illness     Transitional Care Management Review:   Sona Valenzuela is a 89 y.o. female here for TCM follow up.     During the TCM phone call patient stated:  TCM Call (since 3/20/2025)       None          TCM Call (since 3/20/2025)       None          Sona Valenzuela is a 89 y.o. female  presenting for TCM, admission from 3/4/2025 - 3/13/2025 (9 days) [facility]. No discharge summary on chart review. In short,  "pt found to have AMS and seizure-like activity, ER was called and patient was taken to the ED where she was subsequently evaluated and found to have no consistent evidence of underlying seizure activity, it was found that patient had evidence of UTI and was treated with ertapenem with improvement    relevant lab work and imaging, reviewed at this visit  She presents today with improvement of her symptoms and seizure-like activity has not came back since this initial episode.  Their concern today centers around increasing rhinorrhea as well as malodorous and more concentrated urine, but a lack of other UTI symptoms or altered mental status today.  Otherwise well.    **Note: Portions of the record may have been created with voice recognition software.  Occasional wrong word or \"sound alike\" substitutions may have occurred due to the inherent limitations of voice recognition software.  Please read the chart carefully and recognize, using context, where substitutions have occurred. Please contact for further clarification, when necessary.       Review of Systems   Constitutional:  Negative for chills and fever.   HENT:  Positive for rhinorrhea. Negative for ear pain and sore throat.    Eyes:  Negative for pain and visual disturbance.   Respiratory:  Negative for cough and shortness of breath.    Cardiovascular:  Negative for chest pain and palpitations.   Gastrointestinal:  Negative for abdominal pain and vomiting.   Genitourinary:  Positive for decreased urine volume. Negative for difficulty urinating, dysuria, enuresis, flank pain, frequency, hematuria, pelvic pain, urgency and vaginal pain.   Musculoskeletal:  Negative for arthralgias and back pain.   Skin:  Negative for color change and rash.   Neurological:  Negative for seizures and syncope.   All other systems reviewed and are negative.    Objective   /80 (BP Location: Left arm, Patient Position: Sitting, Cuff Size: Large)   Temp (!) 96.9 °F (36.1 °C)   " "Ht 5' 1\" (1.549 m)   Wt 61.7 kg (136 lb)   BMI 25.70 kg/m²     Physical Exam  Vitals and nursing note reviewed.   Constitutional:       General: She is not in acute distress.     Appearance: She is well-developed.   HENT:      Head: Normocephalic and atraumatic.      Right Ear: Tympanic membrane normal.      Left Ear: Tympanic membrane normal.      Nose: Rhinorrhea present. No congestion.      Comments: Nonpurulent scant rhinorrhea uncomplicated     Mouth/Throat:      Pharynx: Oropharynx is clear. No oropharyngeal exudate or posterior oropharyngeal erythema.   Eyes:      Conjunctiva/sclera: Conjunctivae normal.      Pupils: Pupils are equal, round, and reactive to light.   Cardiovascular:      Rate and Rhythm: Normal rate and regular rhythm.      Heart sounds: Normal heart sounds. No murmur heard.  Pulmonary:      Effort: Pulmonary effort is normal. No respiratory distress.      Breath sounds: Normal breath sounds. No stridor. No wheezing, rhonchi or rales.   Abdominal:      General: Abdomen is flat.   Musculoskeletal:         General: No swelling or deformity.      Cervical back: Neck supple.   Skin:     General: Skin is warm and dry.   Neurological:      General: No focal deficit present.      Mental Status: She is alert. Mental status is at baseline.   Psychiatric:         Mood and Affect: Mood normal.       Medications have been reviewed by provider in current encounter      "

## 2025-04-03 NOTE — PROGRESS NOTES
"Name: Sona Valenzuela      : 11/3/1924      MRN: [id number]  Encounter Provider: Joselito Trejo PA-C  Encounter Date: 4/3/2025   Encounter department: [facility]    Assessment & Plan  Rhinorrhea         Malodorous urine              History of Present Illness   {?Quick Links Encounters * My Last Note * Last Note in Specialty * Snapshot * Since Last Visit * History :63472}  oSna Valenzuela is a 89 y.o. female {with Hx of (Optional):13602} presenting with concerns of rhinorrhea and malodorous urine.  {Ask PMH/FHx/relevant Hx r/o if relevant to acute complaint:69418}  {vaccine/labs/caregaps/screenings/imagiing/encounters (Optional):51619}    **Note: Portions of the record may have been created with voice recognition software.  Occasional wrong word or \"sound alike\" substitutions may have occurred due to the inherent limitations of voice recognition software.  Please read the chart carefully and recognize, using context, where substitutions have occurred. Please contact for further clarification, when necessary.   Review of Systems  Past Medical History:   Diagnosis Date    Ankle fracture     Arthritis     left hip    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HCC)     with left ureter    Bronchitis     Cancer (HCC)     Carcinoma, lung (HCC)     Chronic kidney disease, stage 4 (severe) (HCC) 2025    Dementia (HCC)     Dependent edema     Depression     Diabetes mellitus (HCC)     Dizziness 2021    Hypercholesterolemia     Hypertension     Kidney stone     Oth abn and inconclusive findings on dx imaging of breast     Pes planus     Renal calculus     Restless leg syndrome     Rib pain     Sprain, neck     Varicose veins of left lower extremity      Past Surgical History:   Procedure Laterality Date    APPENDECTOMY      LUNG CANCER SURGERY      US BREAST CYST ASPIRATION LEFT INITIAL Left 3/23/2018     Family History   Problem Relation Age of Onset    No Known Problems Mother     No Known Problems Father     " Dementia Brother     Breast cancer Neg Hx     Colon cancer Neg Hx     Ovarian cancer Neg Hx     Uterine cancer Neg Hx     Cervical cancer Neg Hx      Social History     Tobacco Use    Smoking status: Former     Current packs/day: 0.50     Average packs/day: 0.5 packs/day for 20.0 years (10.0 ttl pk-yrs)     Types: Cigarettes    Smokeless tobacco: Never   Vaping Use    Vaping status: Never Used   Substance and Sexual Activity    Alcohol use: Never    Drug use: Never    Sexual activity: Not Currently     Birth control/protection: Post-menopausal     Current Outpatient Medications on File Prior to Visit   Medication Sig    Diclofenac Sodium (VOLTAREN) 1 % Apply 2 g topically 2 (two) times a day    ergocalciferol (VITAMIN D2) 50,000 units Take 1 capsule (50,000 Units total) by mouth once a week    furosemide (LASIX) 20 mg tablet Take 1 tablet (20 mg total) by mouth 2 (two) times a day    gabapentin (NEURONTIN) 100 mg capsule Take 1 capsule (100 mg total) by mouth 3 (three) times a day    mirtazapine (REMERON) 7.5 MG tablet Take 1 tablet (7.5 mg total) by mouth daily at bedtime    Misc. Devices (Mattress Cover) MISC Use daily (Patient not taking: Reported on 3/5/2025)    QUEtiapine (SEROquel) 25 mg tablet Take 1 tablet (25 mg total) by mouth daily at bedtime     Allergies   Allergen Reactions    Albuterol     Aldactone [Spironolactone]     Aspirin     Atenolol     Bactrim [Sulfamethoxazole-Trimethoprim]     Codeine     Hydrocodone     Ibuprofen     Lisinopril     Mevacor [Lovastatin]     Mirapex [Pramipexole]     Morphine And Codeine     Other      novacaine    Penicillins     Procaine     Salicylates     Ultram [Tramadol]     Zoloft [Sertraline]      Night sweats     Immunization History   Administered Date(s) Administered    COVID-19 PFIZER VACCINE 0.3 ML IM 03/18/2021, 04/08/2021    INFLUENZA 10/23/2014, 11/22/2015, 02/14/2018, 11/06/2018, 09/29/2022    Influenza Split High Dose Preservative Free IM 12/31/2024     Influenza, high dose seasonal 0.7 mL 09/25/2020, 10/07/2021, 09/29/2022    Influenza, recombinant, quadrivalent,injectable, preservative free 10/11/2019    Pneumococcal Conjugate Vaccine 20-valent (Pcv20), Polysace 12/31/2024    Tuberculin Skin Test 10/22/2019, 11/01/2019, 07/06/2023, 12/09/2024     Objective {?Quick Links Trend Vitals * Enter New Vitals * Results Review * Timeline (Adult) * Labs * Imaging * Cardiology * Procedures * Lung Cancer Screening * Surgical eConsent :05434}  There were no vitals taken for this visit.    Physical Exam  {Administrative / Billing Section (Optional):64892}

## 2025-04-04 NOTE — ASSESSMENT & PLAN NOTE
Denies chest pain shortness of breath difficulty breathing  Heart regular rate and rhythm on exam        Length To Time In Minutes Device Was In Place: 10

## 2025-04-07 NOTE — ASSESSMENT & PLAN NOTE
Continue mirtazapine, Seroquel at bedtime  Delirium precautions   Nura Corrales MD [facility]   6/2/2025  8:30 AM Dwight Jackson PA-C [facility]        (If no appt send self scheduling link. .REFILLAPPT)  Scheduling request sent?     [] Yes  [x] No    Does patient need updated?  [] Yes  [x] No    DISPLAY PLAN FREE TEXT

## 2025-05-27 ENCOUNTER — TELEPHONE (OUTPATIENT)
Age: OVER 89
End: 2025-05-27

## 2025-05-27 DIAGNOSIS — R30.0 DYSURIA: Primary | ICD-10-CM

## 2025-05-27 NOTE — TELEPHONE ENCOUNTER
Xi, niece of pt, called in requesting for Kiki to please put in an order for pt to have a Urine Test?    Xi reports she believe pt may have a UTI. Pt has been acting different the past few days. Pt has been aggressive, physically slapping people and up all night.     Please advise & call Xi back with update. Thank you!    Xi: [phone]

## 2025-05-28 ENCOUNTER — APPOINTMENT (OUTPATIENT)
Dept: LAB | Facility: HOSPITAL | Age: OVER 89
End: 2025-05-28
Payer: COMMERCIAL

## 2025-05-28 ENCOUNTER — TELEPHONE (OUTPATIENT)
Age: OVER 89
End: 2025-05-28

## 2025-05-28 DIAGNOSIS — R30.0 DYSURIA: ICD-10-CM

## 2025-05-28 LAB
BACTERIA UR QL AUTO: ABNORMAL /HPF
BILIRUB UR QL STRIP: NEGATIVE
CLARITY UR: ABNORMAL
COLOR UR: YELLOW
GLUCOSE UR STRIP-MCNC: NEGATIVE MG/DL
HGB UR QL STRIP.AUTO: ABNORMAL
HYALINE CASTS #/AREA URNS LPF: ABNORMAL /LPF
KETONES UR STRIP-MCNC: NEGATIVE MG/DL
LEUKOCYTE ESTERASE UR QL STRIP: ABNORMAL
MUCOUS THREADS UR QL AUTO: ABNORMAL
NITRITE UR QL STRIP: POSITIVE
NON-SQ EPI CELLS URNS QL MICRO: ABNORMAL /HPF
PH UR STRIP.AUTO: 6 [PH]
PROT UR STRIP-MCNC: ABNORMAL MG/DL
RBC #/AREA URNS AUTO: ABNORMAL /HPF
SP GR UR STRIP.AUTO: 1.02 (ref 1–1.03)
UROBILINOGEN UR STRIP-ACNC: <2 MG/DL
WBC #/AREA URNS AUTO: ABNORMAL /HPF
WBC CLUMPS # UR AUTO: PRESENT /UL

## 2025-05-28 PROCEDURE — 87186 SC STD MICRODIL/AGAR DIL: CPT

## 2025-05-28 PROCEDURE — 81001 URINALYSIS AUTO W/SCOPE: CPT

## 2025-05-28 PROCEDURE — 87086 URINE CULTURE/COLONY COUNT: CPT

## 2025-05-28 PROCEDURE — 87181 SC STD AGAR DILUTION PER AGT: CPT

## 2025-05-28 PROCEDURE — 87077 CULTURE AEROBIC IDENTIFY: CPT

## 2025-05-28 NOTE — TELEPHONE ENCOUNTER
Patient's niece called in regard to seeking services for patient. Writer explained wait list. Niece stated patient was seen before. Writer explained if it has been over a year patient will need to be placed back on [facility]. Niece will check and call back.

## 2025-05-29 ENCOUNTER — RESULTS FOLLOW-UP (OUTPATIENT)
Dept: FAMILY MEDICINE CLINIC | Facility: CLINIC | Age: OVER 89
End: 2025-05-29

## 2025-05-29 DIAGNOSIS — R39.9 UTI SYMPTOMS: Primary | ICD-10-CM

## 2025-05-29 RX ORDER — NITROFURANTOIN 25; 75 MG/1; MG/1
100 CAPSULE ORAL 2 TIMES DAILY
Qty: 10 CAPSULE | Refills: 0 | Status: SHIPPED | OUTPATIENT
Start: 2025-05-29 | End: 2025-06-03

## 2025-05-31 LAB
BACTERIA UR CULT: ABNORMAL
BACTERIA UR CULT: ABNORMAL

## 2025-06-02 DIAGNOSIS — I48.0 PAROXYSMAL A-FIB (HCC): ICD-10-CM

## 2025-06-02 DIAGNOSIS — I50.42 CHRONIC COMBINED SYSTOLIC AND DIASTOLIC CONGESTIVE HEART FAILURE (HCC): ICD-10-CM

## 2025-06-03 RX ORDER — FUROSEMIDE 20 MG/1
20 TABLET ORAL 2 TIMES DAILY
Qty: 180 TABLET | Refills: 1 | Status: SHIPPED | OUTPATIENT
Start: 2025-06-03

## 2025-06-13 ENCOUNTER — OFFICE VISIT (OUTPATIENT)
Dept: FAMILY MEDICINE CLINIC | Facility: CLINIC | Age: OVER 89
End: 2025-06-13
Payer: COMMERCIAL

## 2025-06-13 VITALS
HEART RATE: 70 BPM | OXYGEN SATURATION: 96 % | WEIGHT: 140 LBS | HEIGHT: 61 IN | BODY MASS INDEX: 26.43 KG/M2 | SYSTOLIC BLOOD PRESSURE: 112 MMHG | DIASTOLIC BLOOD PRESSURE: 64 MMHG | TEMPERATURE: 97.3 F

## 2025-06-13 DIAGNOSIS — I50.42 CHRONIC COMBINED SYSTOLIC AND DIASTOLIC CONGESTIVE HEART FAILURE (HCC): ICD-10-CM

## 2025-06-13 DIAGNOSIS — Z00.00 MEDICARE ANNUAL WELLNESS VISIT, SUBSEQUENT: Primary | ICD-10-CM

## 2025-06-13 DIAGNOSIS — F05 SUNDOWNING: ICD-10-CM

## 2025-06-13 DIAGNOSIS — I48.0 PAROXYSMAL A-FIB (HCC): ICD-10-CM

## 2025-06-13 DIAGNOSIS — D64.9 ANEMIA, UNSPECIFIED TYPE: ICD-10-CM

## 2025-06-13 DIAGNOSIS — I10 PRIMARY HYPERTENSION: ICD-10-CM

## 2025-06-13 DIAGNOSIS — F01.518 VASCULAR DEMENTIA WITH BEHAVIORAL DISTURBANCE (HCC): ICD-10-CM

## 2025-06-13 DIAGNOSIS — R26.2 AMBULATORY DYSFUNCTION: ICD-10-CM

## 2025-06-13 PROCEDURE — 99214 OFFICE O/P EST MOD 30 MIN: CPT

## 2025-06-13 PROCEDURE — G0439 PPPS, SUBSEQ VISIT: HCPCS

## 2025-06-13 PROCEDURE — G2211 COMPLEX E/M VISIT ADD ON: HCPCS

## 2025-06-13 NOTE — ASSESSMENT & PLAN NOTE
Wt Readings from Last 3 Encounters:   06/13/25 63.5 kg (140 lb)   04/03/25 61.7 kg (136 lb)   03/13/25 62.5 kg (137 lb 12.6 oz)   Weight overall stable, denies chest pain shortness of breath difficulty breathing or leg swelling  Exam is reassuring, lungs are CTA, no leg swelling noted  As above, patient does note intermittent shortness of breath with exertion, would recommend repeated cardiology evaluation if continuing or worsening  Orders:    Ambulatory Referral to Cardiology; Future

## 2025-06-13 NOTE — PATIENT INSTRUCTIONS

## 2025-06-13 NOTE — ASSESSMENT & PLAN NOTE
BP today in office is 112/64 Well controlled    Recommend at-home BP monitoring to ensure control   continue furosemide

## 2025-06-13 NOTE — ASSESSMENT & PLAN NOTE
Denies chest pain shortness of breath difficulty breathing, but patient and caretaker do note an increase in ambulatory dysfunction secondary to some degree by shortness of breath with exertion  Heart is irregular in rate but stable in rhythm upon presentation today  Patient is not taking any medication in regards to rate control  I did advise continued monitoring, if shortness of breath continues I would recommend a repeat and Cardiologic evaluation, referral placed  Orders:    Ambulatory Referral to Cardiology; Future

## 2025-06-13 NOTE — PROGRESS NOTES
Name: Sona Valenzuela      : 11/3/1924      MRN: [id number]  Encounter Provider: Joselito Trejo PA-C  Encounter Date: 2025   Encounter department: [facility]  :  Assessment & Plan  Medicare annual wellness visit, subsequent         Sundowning  Caretaker recounts that pt has been up late and vocalizing randomly   She has struggled with this in the past, but caretaker notes that there has been some increased worsening  We discussed increasing Seroquel versus low-dose melatonin trial, after discussing risk and benefits and using shared decision making caretaker and patient have a preference for trialing melatonin 3 mg before increasing Seroquel  I advised follow-up as needed for worsening or non-improvement, we can then cautiously increase Seroquel if not response to melatonin  Caretaker and patient voiced agreement with plan  Orders:    melatonin 3 mg; Take 1 tablet (3 mg total) by mouth daily at bedtime    Vascular dementia with behavioral disturbance (HCC)  Caretaker notes increased disorientation in the setting of worsening sleep schedule and sundowning as above  She does note her baseline is disoriented, she is able to hold conversation but is poorly oriented  On my assessment today she is alert and oriented x 1 she is aware of name and date of birth but is not able to articulate what type of building she is in or the year/month  They are not currently following with neurology, as she has been overall stable outside of sleep disturbance as above  We will trial melatonin as above, I recommended follow-up we will consider reevaluation if not improvement       Primary hypertension  BP today in office is 112/64 Well controlled    Recommend at-home BP monitoring to ensure control   continue furosemide        Paroxysmal A-fib (HCC)  Denies chest pain shortness of breath difficulty breathing, but patient and caretaker do note an increase in ambulatory dysfunction secondary to some degree  by shortness of breath with exertion  Heart is irregular in rate but stable in rhythm upon presentation today  Patient is not taking any medication in regards to rate control  I did advise continued monitoring, if shortness of breath continues I would recommend a repeat and Cardiologic evaluation, referral placed  Orders:    Ambulatory Referral to Cardiology; Future    Chronic combined systolic and diastolic congestive heart failure (HCC)  Wt Readings from Last 3 Encounters:   06/13/25 63.5 kg (140 lb)   04/03/25 61.7 kg (136 lb)   03/13/25 62.5 kg (137 lb 12.6 oz)   Weight overall stable, denies chest pain shortness of breath difficulty breathing or leg swelling  Exam is reassuring, lungs are CTA, no leg swelling noted  As above, patient does note intermittent shortness of breath with exertion, would recommend repeated cardiology evaluation if continuing or worsening  Orders:    Ambulatory Referral to Cardiology; Future    Anemia, unspecified type    Upon review of recent blood work, it appears patient has overall stable anemia trended from 3/4 to 3/11  Will recheck to ensure continued stability, in the setting of increased shortness of breath and lethargy  Orders:    CBC and differential; Future    Ambulatory dysfunction  Patient caretaker no continue difficulty with ambulating outside of wheelchair or walker  At one point patient was receiving skilled home health and physical therapy, they would like to reinstate this  Referral placed  Orders:    EXTERNAL Referral to Home Health; Future       Preventive health issues were discussed with patient, and age appropriate screening tests were ordered as noted in patient's After Visit Summary. Personalized health advice and appropriate referrals for health education or preventive services given if needed, as noted in patient's After Visit Summary.    History of Present Illness     Sona Valenzuela is a 89 y.o. female  presenting for sundowning with worsening sleep and is  "also due for annual med well    HCC, relevant lab work and imaging, reviewed at this visit    **Note: Portions of the record may have been created with voice recognition software.  Occasional wrong word or \"sound alike\" substitutions may have occurred due to the inherent limitations of voice recognition software.  Please read the chart carefully and recognize, using context, where substitutions have occurred. Please contact for further clarification, when necessary.        Patient Care Team:  FARZANA Stanley as PCP - General (Family Medicine)  Elliott Klein MD as PCP - Attending    Review of Systems   Constitutional:  Negative for chills and fever.   HENT:  Negative for ear pain and sore throat.    Eyes:  Negative for pain and visual disturbance.   Respiratory:  Negative for cough and shortness of breath.    Cardiovascular:  Negative for chest pain and palpitations.   Gastrointestinal:  Negative for abdominal pain and vomiting.   Genitourinary:  Negative for dysuria and hematuria.   Musculoskeletal:  Negative for arthralgias and back pain.   Skin:  Negative for color change and rash.   Neurological:  Negative for seizures and syncope.   Psychiatric/Behavioral:  Positive for agitation, confusion and sleep disturbance.    All other systems reviewed and are negative.    Medical History Reviewed by provider this encounter:  Tobacco  Allergies  Meds  Problems  Med Hx  Surg Hx  Fam Hx       Annual Wellness Visit Questionnaire   Last Medicare Wellness visit information reviewed, patient interviewed and updates made to the record today.      Health Risk Assessment:   Patient rates overall health as very good. Patient feels that their physical health rating is same. Patient is satisfied with their life. Eyesight was rated as same. Hearing was rated as same. Patient feels that their emotional and mental health rating is same. Patients states they are never, rarely angry. Patient states they are sometimes " unusually tired/fatigued. Pain experienced in the last 7 days has been none. Patient states that she has experienced no weight loss or gain in last 6 months.     Depression Screening:   PHQ-9 Score: 2      Fall Risk Screening:   In the past year, patient has experienced: no history of falling in past year      Urinary Incontinence Screening:   Patient has leaked urine accidently in the last six months.     Home Safety:  Patient has trouble with stairs inside or outside of their home. Patient has working smoke alarms and has working carbon monoxide detector. Home safety hazards include: none.     Nutrition:   Current diet is Regular.     Medications:   Patient is not currently taking any over-the-counter supplements. Patient is not able to manage medications.     Activities of Daily Living (ADLs)/Instrumental Activities of Daily Living (IADLs):   Walk and transfer into and out of bed and chair?: Yes  Dress and groom yourself?: Yes    Bathe or shower yourself?: Yes    Feed yourself? Yes  Do your laundry/housekeeping?: No  Manage your money, pay your bills and track your expenses?: No  Make your own meals?: No    Do your own shopping?: No    Previous Hospitalizations:   Any hospitalizations or ED visits within the last 12 months?: Yes    How many hospitalizations have you had in the last year?: 1-2    Advance Care Planning:   Living will: Yes    Durable POA for healthcare: Yes    Advanced directive: Yes    Advanced directive counseling given: Yes    End of Life Decisions reviewed with patient: Yes    Provider agrees with end of life decisions: Yes      Cognitive Screening:   Provider or family/friend/caregiver concerned regarding cognition?: No    Preventive Screenings      Cardiovascular Screening:    General: Risks and Benefits Discussed and Screening Not Indicated      Diabetes Screening:     General: Screening Current      Colorectal Cancer Screening:     General: Screening Not Indicated      Breast Cancer  Screening:     General: Risks and Benefits Discussed and Screening Not Indicated      Cervical Cancer Screening:    General: Screening Not Indicated      Osteoporosis Screening:    General: Risks and Benefits Discussed and Screening Not Indicated      Abdominal Aortic Aneurysm (AAA) Screening:        General: Risks and Benefits Discussed and Screening Not Indicated      Lung Cancer Screening:     General: Screening Not Indicated and History Lung Cancer      Hepatitis C Screening:    General: Risks and Benefits Discussed    Immunizations:  - Immunizations due: Zoster (Shingrix)    Screening, Brief Intervention, and Referral to Treatment (SBIRT)     Screening  Typical number of drinks in a day: 0  Typical number of drinks in a week: 0  Interpretation: Low risk drinking behavior.    Single Item Drug Screening:  How often have you used an illegal drug (including marijuana) or a prescription medication for non-medical reasons in the past year? never    Single Item Drug Screen Score: 0  Interpretation: Negative screen for possible drug use disorder    Social Drivers of Health     Financial Resource Strain: Low Risk  (10/11/2022)    Overall Financial Resource Strain (CARDIA)     Difficulty of Paying Living Expenses: Not very hard   Food Insecurity: No Food Insecurity (6/13/2025)    Nursing - Inadequate Food Risk Classification     Worried About Running Out of Food in the Last Year: Never true     Ran Out of Food in the Last Year: Never true     Ran Out of Food in the Last Year: Never true   Transportation Needs: No Transportation Needs (6/13/2025)    PRAPARE - Transportation     Lack of Transportation (Medical): No     Lack of Transportation (Non-Medical): No   Housing Stability: Low Risk  (6/13/2025)    Housing Stability Vital Sign     Unable to Pay for Housing in the Last Year: No     Number of Times Moved in the Last Year: 0     Homeless in the Last Year: No   Utilities: Not At Risk (6/13/2025)    [facility] Utilities      "Threatened with loss of utilities: No     No results found.    Objective   /64   Pulse 70   Temp (!) 97.3 °F (36.3 °C)   Ht 5' 1\" (1.549 m)   Wt 63.5 kg (140 lb)   SpO2 96%   BMI 26.45 kg/m²     Physical Exam  Vitals and nursing note reviewed.   Constitutional:       General: She is not in acute distress.     Appearance: She is well-developed.      Comments: Wheelchair in office     HENT:      Head: Normocephalic and atraumatic.      Right Ear: Tympanic membrane normal.      Left Ear: Tympanic membrane normal.      Nose: Nose normal.      Mouth/Throat:      Pharynx: Oropharynx is clear.     Eyes:      Conjunctiva/sclera: Conjunctivae normal.      Pupils: Pupils are equal, round, and reactive to light.       Cardiovascular:      Rate and Rhythm: Normal rate. Rhythm irregular.      Heart sounds: No murmur heard.     No friction rub. No gallop.   Pulmonary:      Effort: Pulmonary effort is normal. No respiratory distress.      Breath sounds: Normal breath sounds. No stridor. No wheezing, rhonchi or rales.   Abdominal:      General: Abdomen is flat. There is no distension.      Tenderness: There is no abdominal tenderness.     Musculoskeletal:         General: No swelling.      Cervical back: Neck supple.     Skin:     General: Skin is warm and dry.     Neurological:      Mental Status: She is alert.      Motor: Weakness present.      Comments: AAAmber   Psychiatric:         Mood and Affect: Mood normal.         "

## 2025-06-13 NOTE — ASSESSMENT & PLAN NOTE
Caretaker notes increased disorientation in the setting of worsening sleep schedule and sundowning as above  She does note her baseline is disoriented, she is able to hold conversation but is poorly oriented  On my assessment today she is alert and oriented x 1 she is aware of name and date of birth but is not able to articulate what type of building she is in or the year/month  They are not currently following with neurology, as she has been overall stable outside of sleep disturbance as above  We will trial melatonin as above, I recommended follow-up we will consider reevaluation if not improvement

## 2025-06-13 NOTE — PROGRESS NOTES
"Name: Sona Valenzuela      : 11/3/1924      MRN: [id number]  Encounter Provider: Joselito Trejo PA-C  Encounter Date: 2025   Encounter department: [facility]    Assessment & Plan  Primary insomnia              History of Present Illness   {?Quick Links Encounters * My Last Note * Last Note in Specialty * Snapshot * Since Last Visit * History :24261}  Sona Valenzuela is a 89 y.o. female {with Hx of (Optional):60739} presenting with concerns of difficulty sleeping.   {Ask PMH/FHx/relevant Hx r/o if relevant to acute complaint:82665}  {vaccine/labs/caregaps/screenings/imagiing/encounters (Optional):98041}    **Note: Portions of the record may have been created with voice recognition software.  Occasional wrong word or \"sound alike\" substitutions may have occurred due to the inherent limitations of voice recognition software.  Please read the chart carefully and recognize, using context, where substitutions have occurred. Please contact for further clarification, when necessary.   Review of Systems  Past Medical History[1]  Past Surgical History[2]  Family History[3]  Social History[4]  Medications[5]  Allergies   Allergen Reactions   • Albuterol    • Aldactone [Spironolactone]    • Aspirin    • Atenolol    • Bactrim [Sulfamethoxazole-Trimethoprim]    • Codeine    • Hydrocodone    • Ibuprofen    • Lisinopril    • Mevacor [Lovastatin]    • Mirapex [Pramipexole]    • Morphine And Codeine    • Other      novacaine   • Penicillins    • Procaine    • Salicylates    • Ultram [Tramadol]    • Zoloft [Sertraline]      Night sweats     Immunization History   Administered Date(s) Administered   • COVID-19 PFIZER VACCINE 0.3 ML IM 2021, 2021   • INFLUENZA 10/23/2014, 2015, 2018, 2018, 2022   • Influenza Split High Dose Preservative Free IM 2024   • Influenza, high dose seasonal 0.7 mL 2020, 10/07/2021, 2022   • Influenza, recombinant, " quadrivalent,injectable, preservative free 10/11/2019   • Pneumococcal Conjugate Vaccine 20-valent (Pcv20), Polysace 12/31/2024   • Tuberculin Skin Test 10/22/2019, 11/01/2019, 07/06/2023, 12/09/2024     Objective {?Quick Links Trend Vitals * Enter New Vitals * Results Review * Timeline (Adult) * Labs * Imaging * Cardiology * Procedures * Lung Cancer Screening * Surgical eConsent :59396}  There were no vitals taken for this visit.    Physical Exam  {Administrative / Billing Section (Optional):14385}         [1]  Past Medical History:  Diagnosis Date   • Ankle fracture    • Arthritis     left hip   • 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HCC)     with left ureter   • Bronchitis    • Cancer (HCC)    • Carcinoma, lung (HCC)    • Chronic kidney disease, stage 4 (severe) (HCC) 01/17/2025   • Dementia (HCC)    • Dependent edema    • Depression    • Diabetes mellitus (HCC)    • Dizziness 12/13/2021   • Hypercholesterolemia    • Hypertension    • Kidney stone    • Oth abn and inconclusive findings on dx imaging of breast    • Pes planus    • Renal calculus    • Restless leg syndrome    • Rib pain    • Sprain, neck    • Varicose veins of left lower extremity    [2]  Past Surgical History:  Procedure Laterality Date   • APPENDECTOMY     • LUNG CANCER SURGERY     • US BREAST CYST ASPIRATION LEFT INITIAL Left 3/23/2018   [3]  Family History  Problem Relation Name Age of Onset   • No Known Problems Mother     • No Known Problems Father     • Dementia Brother     • Breast cancer Neg Hx     • Colon cancer Neg Hx     • Ovarian cancer Neg Hx     • Uterine cancer Neg Hx     • Cervical cancer Neg Hx     [4]  Social History  Tobacco Use   • Smoking status: Former     Current packs/day: 0.50     Average packs/day: 0.5 packs/day for 20.0 years (10.0 ttl pk-yrs)     Types: Cigarettes   • Smokeless tobacco: Never   Vaping Use   • Vaping status: Never Used   Substance and Sexual Activity   • Alcohol use: Never   • Drug use: Never   • Sexual activity: Not  Currently     Birth control/protection: Post-menopausal   [5]  Current Outpatient Medications on File Prior to Visit   Medication Sig   • Diclofenac Sodium (VOLTAREN) 1 % Apply 2 g topically 2 (two) times a day   • ergocalciferol (VITAMIN D2) 50,000 units Take 1 capsule (50,000 Units total) by mouth once a week   • fexofenadine (ALLEGRA) 60 MG tablet Take 1 tablet (60 mg total) by mouth daily Do not exceed one dose in 24 hr period   • furosemide (LASIX) 20 mg tablet TAKE 1 TABLET BY MOUTH TWICE A DAY   • gabapentin (NEURONTIN) 100 mg capsule Take 1 capsule (100 mg total) by mouth 3 (three) times a day   • mirtazapine (REMERON) 7.5 MG tablet Take 1 tablet (7.5 mg total) by mouth daily at bedtime   • Misc. Devices (Mattress Cover) MISC Use daily (Patient not taking: Reported on 3/5/2025)   • QUEtiapine (SEROquel) 25 mg tablet Take 1 tablet (25 mg total) by mouth daily at bedtime

## 2025-06-13 NOTE — ASSESSMENT & PLAN NOTE
Patient caretaker no continue difficulty with ambulating outside of wheelchair or walker  At one point patient was receiving skilled home health and physical therapy, they would like to reinstate this  Referral placed  Orders:    EXTERNAL Referral to Home Health; Future

## 2025-06-19 ENCOUNTER — RA CDI HCC (OUTPATIENT)
Dept: OTHER | Facility: HOSPITAL | Age: OVER 89
End: 2025-06-19

## 2025-06-19 ENCOUNTER — HOSPITAL ENCOUNTER (INPATIENT)
Facility: HOSPITAL | Age: OVER 89
LOS: 14 days | Discharge: NURSING, REHAB OR LONG-TERM CARE | DRG: 683 | End: 2025-07-03
Attending: EMERGENCY MEDICINE | Admitting: INTERNAL MEDICINE
Payer: COMMERCIAL

## 2025-06-19 ENCOUNTER — TELEPHONE (OUTPATIENT)
Age: OVER 89
End: 2025-06-19

## 2025-06-19 DIAGNOSIS — M25.532 CHRONIC PAIN OF LEFT WRIST: ICD-10-CM

## 2025-06-19 DIAGNOSIS — G89.29 CHRONIC PAIN OF LEFT WRIST: ICD-10-CM

## 2025-06-19 DIAGNOSIS — Z09 NEED FOR CASE MANAGEMENT FOLLOW-UP: ICD-10-CM

## 2025-06-19 DIAGNOSIS — N17.9 ACUTE KIDNEY INJURY SUPERIMPOSED ON STAGE 3B CHRONIC KIDNEY DISEASE (HCC): ICD-10-CM

## 2025-06-19 DIAGNOSIS — E87.5 HYPERKALEMIA: ICD-10-CM

## 2025-06-19 DIAGNOSIS — N18.32 ACUTE KIDNEY INJURY SUPERIMPOSED ON STAGE 3B CHRONIC KIDNEY DISEASE (HCC): ICD-10-CM

## 2025-06-19 DIAGNOSIS — R26.2 AMBULATORY DYSFUNCTION: ICD-10-CM

## 2025-06-19 DIAGNOSIS — K59.01 SLOW TRANSIT CONSTIPATION: ICD-10-CM

## 2025-06-19 DIAGNOSIS — F01.50 VASCULAR DEMENTIA, UNCOMPLICATED (HCC): ICD-10-CM

## 2025-06-19 DIAGNOSIS — F01.518 VASCULAR DEMENTIA WITH BEHAVIORAL DISTURBANCE (HCC): Primary | ICD-10-CM

## 2025-06-19 PROBLEM — F33.40 RECURRENT MAJOR DEPRESSIVE DISORDER, IN REMISSION (HCC): Status: ACTIVE | Noted: 2022-08-23

## 2025-06-19 LAB
ALBUMIN SERPL BCG-MCNC: 3.6 G/DL (ref 3.5–5)
ALP SERPL-CCNC: 129 U/L (ref 34–104)
ALT SERPL W P-5'-P-CCNC: 26 U/L (ref 7–52)
AMMONIA PLAS-SCNC: 27 UMOL/L (ref 18–72)
ANION GAP SERPL CALCULATED.3IONS-SCNC: 10 MMOL/L (ref 4–13)
AST SERPL W P-5'-P-CCNC: 36 U/L (ref 13–39)
BASOPHILS # BLD AUTO: 0.04 THOUSANDS/ÂΜL (ref 0–0.1)
BASOPHILS NFR BLD AUTO: 1 % (ref 0–1)
BILIRUB SERPL-MCNC: 1.29 MG/DL (ref 0.2–1)
BUN SERPL-MCNC: 39 MG/DL (ref 5–25)
CALCIUM SERPL-MCNC: 9.2 MG/DL (ref 8.4–10.2)
CHLORIDE SERPL-SCNC: 104 MMOL/L (ref 96–108)
CO2 SERPL-SCNC: 25 MMOL/L (ref 21–32)
CREAT SERPL-MCNC: 1.41 MG/DL (ref 0.6–1.3)
EOSINOPHIL # BLD AUTO: 0.12 THOUSAND/ÂΜL (ref 0–0.61)
EOSINOPHIL NFR BLD AUTO: 2 % (ref 0–6)
ERYTHROCYTE [DISTWIDTH] IN BLOOD BY AUTOMATED COUNT: 20.1 % (ref 11.6–15.1)
GFR SERPL CREATININE-BSD FRML MDRD: 30 ML/MIN/1.73SQ M
GLUCOSE SERPL-MCNC: 119 MG/DL (ref 65–140)
GLUCOSE SERPL-MCNC: 95 MG/DL (ref 65–140)
HCT VFR BLD AUTO: 31.6 % (ref 34.8–46.1)
HGB BLD-MCNC: 9.4 G/DL (ref 11.5–15.4)
IMM GRANULOCYTES # BLD AUTO: 0.01 THOUSAND/UL (ref 0–0.2)
IMM GRANULOCYTES NFR BLD AUTO: 0 % (ref 0–2)
LYMPHOCYTES # BLD AUTO: 0.75 THOUSANDS/ÂΜL (ref 0.6–4.47)
LYMPHOCYTES NFR BLD AUTO: 13 % (ref 14–44)
MCH RBC QN AUTO: 22.3 PG (ref 26.8–34.3)
MCHC RBC AUTO-ENTMCNC: 29.7 G/DL (ref 31.4–37.4)
MCV RBC AUTO: 75 FL (ref 82–98)
MONOCYTES # BLD AUTO: 0.99 THOUSAND/ÂΜL (ref 0.17–1.22)
MONOCYTES NFR BLD AUTO: 17 % (ref 4–12)
NEUTROPHILS # BLD AUTO: 3.79 THOUSANDS/ÂΜL (ref 1.85–7.62)
NEUTS SEG NFR BLD AUTO: 67 % (ref 43–75)
NRBC BLD AUTO-RTO: 0 /100 WBCS
PLATELET # BLD AUTO: 213 THOUSANDS/UL (ref 149–390)
PLATELET # BLD AUTO: 241 THOUSANDS/UL (ref 149–390)
PMV BLD AUTO: 10.4 FL (ref 8.9–12.7)
PMV BLD AUTO: 10.7 FL (ref 8.9–12.7)
POTASSIUM SERPL-SCNC: 4.3 MMOL/L (ref 3.5–5.3)
PROT SERPL-MCNC: 7.1 G/DL (ref 6.4–8.4)
RBC # BLD AUTO: 4.21 MILLION/UL (ref 3.81–5.12)
SODIUM SERPL-SCNC: 139 MMOL/L (ref 135–147)
TSH SERPL DL<=0.05 MIU/L-ACNC: 3.8 UIU/ML (ref 0.45–4.5)
WBC # BLD AUTO: 5.7 THOUSAND/UL (ref 4.31–10.16)

## 2025-06-19 PROCEDURE — 87186 SC STD MICRODIL/AGAR DIL: CPT | Performed by: INTERNAL MEDICINE

## 2025-06-19 PROCEDURE — 87086 URINE CULTURE/COLONY COUNT: CPT | Performed by: INTERNAL MEDICINE

## 2025-06-19 PROCEDURE — 99223 1ST HOSP IP/OBS HIGH 75: CPT | Performed by: INTERNAL MEDICINE

## 2025-06-19 PROCEDURE — 99285 EMERGENCY DEPT VISIT HI MDM: CPT

## 2025-06-19 PROCEDURE — 82948 REAGENT STRIP/BLOOD GLUCOSE: CPT

## 2025-06-19 PROCEDURE — 85025 COMPLETE CBC W/AUTO DIFF WBC: CPT | Performed by: EMERGENCY MEDICINE

## 2025-06-19 PROCEDURE — 93005 ELECTROCARDIOGRAM TRACING: CPT

## 2025-06-19 PROCEDURE — 87181 SC STD AGAR DILUTION PER AGT: CPT | Performed by: INTERNAL MEDICINE

## 2025-06-19 PROCEDURE — 84443 ASSAY THYROID STIM HORMONE: CPT | Performed by: EMERGENCY MEDICINE

## 2025-06-19 PROCEDURE — 81001 URINALYSIS AUTO W/SCOPE: CPT | Performed by: INTERNAL MEDICINE

## 2025-06-19 PROCEDURE — 87077 CULTURE AEROBIC IDENTIFY: CPT | Performed by: INTERNAL MEDICINE

## 2025-06-19 PROCEDURE — 36415 COLL VENOUS BLD VENIPUNCTURE: CPT | Performed by: EMERGENCY MEDICINE

## 2025-06-19 PROCEDURE — 99285 EMERGENCY DEPT VISIT HI MDM: CPT | Performed by: EMERGENCY MEDICINE

## 2025-06-19 PROCEDURE — 82140 ASSAY OF AMMONIA: CPT | Performed by: EMERGENCY MEDICINE

## 2025-06-19 PROCEDURE — 80053 COMPREHEN METABOLIC PANEL: CPT | Performed by: EMERGENCY MEDICINE

## 2025-06-19 PROCEDURE — 85049 AUTOMATED PLATELET COUNT: CPT | Performed by: INTERNAL MEDICINE

## 2025-06-19 RX ORDER — SODIUM CHLORIDE, SODIUM GLUCONATE, SODIUM ACETATE, POTASSIUM CHLORIDE, MAGNESIUM CHLORIDE, SODIUM PHOSPHATE, DIBASIC, AND POTASSIUM PHOSPHATE .53; .5; .37; .037; .03; .012; .00082 G/100ML; G/100ML; G/100ML; G/100ML; G/100ML; G/100ML; G/100ML
75 INJECTION, SOLUTION INTRAVENOUS CONTINUOUS
Status: DISCONTINUED | OUTPATIENT
Start: 2025-06-19 | End: 2025-06-20

## 2025-06-19 RX ORDER — FUROSEMIDE 20 MG/1
20 TABLET ORAL 2 TIMES DAILY
Status: DISCONTINUED | OUTPATIENT
Start: 2025-06-19 | End: 2025-07-03 | Stop reason: HOSPADM

## 2025-06-19 RX ORDER — GABAPENTIN 100 MG/1
100 CAPSULE ORAL 3 TIMES DAILY
Status: DISCONTINUED | OUTPATIENT
Start: 2025-06-19 | End: 2025-06-20

## 2025-06-19 RX ORDER — ONDANSETRON 2 MG/ML
4 INJECTION INTRAMUSCULAR; INTRAVENOUS EVERY 6 HOURS PRN
Status: DISCONTINUED | OUTPATIENT
Start: 2025-06-19 | End: 2025-07-03 | Stop reason: HOSPADM

## 2025-06-19 RX ORDER — QUETIAPINE FUMARATE 25 MG/1
25 TABLET, FILM COATED ORAL
Status: DISCONTINUED | OUTPATIENT
Start: 2025-06-19 | End: 2025-06-20

## 2025-06-19 RX ORDER — SIMETHICONE 80 MG
80 TABLET,CHEWABLE ORAL 4 TIMES DAILY PRN
Status: DISCONTINUED | OUTPATIENT
Start: 2025-06-19 | End: 2025-07-03 | Stop reason: HOSPADM

## 2025-06-19 RX ORDER — MIRTAZAPINE 15 MG/1
7.5 TABLET, FILM COATED ORAL
Status: DISCONTINUED | OUTPATIENT
Start: 2025-06-19 | End: 2025-06-20

## 2025-06-19 RX ORDER — DIVALPROEX SODIUM 250 MG/1
250 TABLET, DELAYED RELEASE ORAL EVERY 8 HOURS SCHEDULED
Status: DISCONTINUED | OUTPATIENT
Start: 2025-06-19 | End: 2025-06-19

## 2025-06-19 RX ORDER — ACETAMINOPHEN 325 MG/1
650 TABLET ORAL EVERY 8 HOURS PRN
Status: DISCONTINUED | OUTPATIENT
Start: 2025-06-19 | End: 2025-07-03 | Stop reason: HOSPADM

## 2025-06-19 RX ORDER — HEPARIN SODIUM 5000 [USP'U]/ML
5000 INJECTION, SOLUTION INTRAVENOUS; SUBCUTANEOUS EVERY 8 HOURS SCHEDULED
Status: DISCONTINUED | OUTPATIENT
Start: 2025-06-19 | End: 2025-07-03 | Stop reason: HOSPADM

## 2025-06-19 RX ORDER — OLANZAPINE 10 MG/2ML
10 INJECTION, POWDER, FOR SOLUTION INTRAMUSCULAR ONCE AS NEEDED
Status: COMPLETED | OUTPATIENT
Start: 2025-06-19 | End: 2025-06-21

## 2025-06-19 RX ORDER — DOCUSATE SODIUM 100 MG/1
100 CAPSULE, LIQUID FILLED ORAL 2 TIMES DAILY
Status: DISCONTINUED | OUTPATIENT
Start: 2025-06-19 | End: 2025-07-03 | Stop reason: HOSPADM

## 2025-06-19 RX ADMIN — SODIUM CHLORIDE, SODIUM GLUCONATE, SODIUM ACETATE, POTASSIUM CHLORIDE, MAGNESIUM CHLORIDE, SODIUM PHOSPHATE, DIBASIC, AND POTASSIUM PHOSPHATE 75 ML/HR: .53; .5; .37; .037; .03; .012; .00082 INJECTION, SOLUTION INTRAVENOUS at 19:50

## 2025-06-19 RX ADMIN — HEPARIN SODIUM 5000 UNITS: 5000 INJECTION INTRAVENOUS; SUBCUTANEOUS at 21:09

## 2025-06-19 RX ADMIN — VALPROATE SODIUM 250 MG: 100 INJECTION, SOLUTION INTRAVENOUS at 22:19

## 2025-06-19 NOTE — ASSESSMENT & PLAN NOTE
Wt Readings from Last 3 Encounters:   06/19/25 63.5 kg (139 lb 15.9 oz)   06/13/25 63.5 kg (140 lb)   04/03/25 61.7 kg (136 lb)     Lab Results   Component Value Date     (H) 03/04/2025     (H) 06/04/2024    LVEF 55 03/06/2025     Home regimen of Lasix  Currently held  Monitor volume status while providing gentle IV fluid hydration  Goals of care discussion

## 2025-06-19 NOTE — H&P
H&P - Hospitalist   Name: Sona Valenzuela 89 y.o. female I MRN: [id number]  Unit/Bed#: -01 I Date of Admission: 6/19/2025   Date of Service: 6/19/2025 I Hospital Day: 0     Assessment & Plan  Vascular dementia with behavioral disturbance (HCC)  Per niece, reported to have increased bouts of confusion, combativeness more freuqently over the past few weeks  Recently was treated for UTI in May and completed nitrofurantoin regimen at that time; prior MDRO on urine clx    Recheck UA with scope/culture; if concerning for infection, empirically start Invanz based on prior sensitivities from 5/28/22025  Continue home regimen of Remeron and Seroquel  Consult gerontology  Case management consulted for assistance with placement as well  Depakote 250 mg started this admission  PRN zyprexa for agitation  Acute kidney injury superimposed on stage 3b chronic kidney disease (HCC)  Lab Results   Component Value Date    EGFR 30 06/19/2025    EGFR  03/17/2025     Not performed on patients less than 18 years of age or greater than 97 years of age    EGFR 39 03/11/2025    CREATININE 1.41 (H) 06/19/2025    CREATININE 1.29 (H) 03/17/2025    CREATININE 1.13 03/11/2025     Gentle IV fluid hydration  Trend creatinine tomorrow  Recurrent major depressive disorder, in remission (HCC)  Continue home regimen  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)  Wt Readings from Last 3 Encounters:   06/19/25 63.5 kg (139 lb 15.9 oz)   06/13/25 63.5 kg (140 lb)   04/03/25 61.7 kg (136 lb)     Lab Results   Component Value Date     (H) 03/04/2025     (H) 06/04/2024    LVEF 55 03/06/2025     Home regimen of Lasix  Currently held  Monitor volume status while providing gentle IV fluid hydration  Goals of care discussion  Ambulatory dysfunction  PT/OT Eval and treat  Trend Mobility Scores  Encourage appropriate mobility to achieve and improve upon HLM Goals as noted      VTE Pharmacologic Prophylaxis: VTE  "Score: 4 Moderate Risk (Score 3-4) - Pharmacological DVT Prophylaxis Ordered: enoxaparin (Lovenox).  Code Status: Level 3 - DNAR and DNI   Discussion with family: Attempted to update  (niece) via phone. Unable to contact.    Anticipated Length of Stay: Patient will be admitted on an inpatient basis with an anticipated length of stay of greater than 2 midnights secondary to placement pending.    History of Present Illness   Chief Complaint:   Chief Complaint   Patient presents with    Difficulty Walking     PT BIBA. Per EMS, caretaker worried of PT not able to ambulate w/ walker, lack of will to get out of bed, and increased confusion. PT confused at baseline w/ hx of dementia. PT denies pain or any of c/o at this time. Family denies loss of appetite.       Sona Valenzuela is a 89 y.o. female with a PMH of dementia and multiple chronic medical comorbidities presents with progressive functional and cognitive decline. She was brought in by family (niece and niece's spouse, both in their 80s) with whom patient has stayed for over 30 years. Family brought patient in due to worsening confusion, increased agitation.    Symptoms have been escalating over the past week. Patient has become more verbally aggressive, shouting throughout the night, calling out for her  parents, and referring to her niece as \"mommy.\" She has also been throwing objects from her bed and wandering during the night. Earlier today, she became physically combative with her niece. When prompted to walk, she would resist by planting her feet and refusing to move. These behavioral changes represent a significant departure from her baseline, where she was forgetful but able to hold a conversation and ambulate with some encouragement.   Patient was evaluated at [facility] last week for behavioral outbursts, and melatonin was recommended. She recently completed treatment for a UTI approximately 4 weeks ago.    She " was receiving physical therapy until 2-3 weeks ago, but has since declined in mobility.    Labs obtained in the ED reveal a mild ANGIE. No acute infectious source identified at this time.    Her niece and spouse, both in their 80s, report being overwhelmed and no longer able to safely manage her care at home given these behavioural issues but would prefer for patient to receive home health rather than go to rehab.    Admitted for evaluation of behavioral changes and functional decline in the setting of dementia. Family would prefer for patient to return home with home services rather than rehab, which will be coordinated during this admission.      Review of Systems   Unable to perform ROS: Dementia       Historical Information   Past Medical History[1]  Past Surgical History[2]  Social History[3]  E-Cigarette/Vaping    E-Cigarette Use Never User      E-Cigarette/Vaping Substances    Nicotine No     THC No     CBD No     Flavoring No     Other No     Unknown No      Family History[4]  Social History:  Marital Status:    Patient Pre-hospital Living Situation: Home  Patient Pre-hospital Level of Mobility: unable to be assessed at time of evaluation  Patient Pre-hospital Diet Restrictions: none    Meds/Allergies   I have reviewed home medications using recent Epic encounter.  Prior to Admission medications    Medication Sig Start Date End Date Taking? Authorizing Provider   Diclofenac Sodium (VOLTAREN) 1 % Apply 2 g topically 2 (two) times a day 12/31/24 1/30/25  FARZANA Stanley   ergocalciferol (VITAMIN D2) 50,000 units Take 1 capsule (50,000 Units total) by mouth once a week 12/31/24   FARZANA Stanley   fexofenadine (ALLEGRA) 60 MG tablet Take 1 tablet (60 mg total) by mouth daily Do not exceed one dose in 24 hr period 4/3/25 5/3/25  Joselito Trejo PA-C   furosemide (LASIX) 20 mg tablet TAKE 1 TABLET BY MOUTH TWICE A DAY 6/3/25   FARZANA Stanley   gabapentin (NEURONTIN) 100 mg  capsule Take 1 capsule (100 mg total) by mouth 3 (three) times a day 12/31/24 6/29/25  FARZANA Stanley   melatonin 3 mg Take 1 tablet (3 mg total) by mouth daily at bedtime 6/13/25   Joselito Trejo PA-C   mirtazapine (REMERON) 7.5 MG tablet Take 1 tablet (7.5 mg total) by mouth daily at bedtime 12/31/24 6/29/25  FARZANA Stanley   Misc. Devices (Mattress Cover) MISC Use daily  Patient not taking: Reported on 3/5/2025 3/22/22   FARZANA Stanley   QUEtiapine (SEROquel) 25 mg tablet Take 1 tablet (25 mg total) by mouth daily at bedtime 12/31/24   FARZANA Stanley     Allergies   Allergen Reactions    Albuterol     Aldactone [Spironolactone]     Aspirin     Atenolol     Bactrim [Sulfamethoxazole-Trimethoprim]     Codeine     Hydrocodone     Ibuprofen     Lisinopril     Mevacor [Lovastatin]     Mirapex [Pramipexole]     Morphine And Codeine     Other      novacaine    Penicillins     Procaine     Salicylates     Ultram [Tramadol]     Zoloft [Sertraline]      Night sweats       Objective :  Temp:  [97.3 °F (36.3 °C)-97.5 °F (36.4 °C)] 97.5 °F (36.4 °C)  HR:  [77-90] 82  BP: (115-153)/(59-97) 130/79  Resp:  [16-22] 18  SpO2:  [90 %-97 %] 97 %  O2 Device: Nasal cannula  Nasal Cannula O2 Flow Rate (L/min):  [2 L/min] 2 L/min    Physical Exam  Vitals and nursing note reviewed.   Constitutional:       General: She is not in acute distress.     Appearance: She is ill-appearing. She is not toxic-appearing.   HENT:      Head: Normocephalic.      Nose: Nose normal.      Mouth/Throat:      Mouth: Mucous membranes are dry.     Eyes:      General: No scleral icterus.     Conjunctiva/sclera: Conjunctivae normal.       Cardiovascular:      Rate and Rhythm: Normal rate.      Pulses: Normal pulses.           Radial pulses are 2+ on the right side and 2+ on the left side.      Heart sounds: Normal heart sounds.   Pulmonary:      Effort: Pulmonary effort is normal.      Breath sounds: Normal  breath sounds.   Abdominal:      General: Bowel sounds are normal. There is no distension.      Palpations: Abdomen is soft.      Tenderness: There is no abdominal tenderness.     Musculoskeletal:         General: No tenderness.     Skin:     General: Skin is dry.      Coloration: Skin is not jaundiced.     Neurological:      Mental Status: She is alert. Mental status is at baseline. She is disoriented.     Psychiatric:         Behavior: Behavior is cooperative.         Cognition and Memory: Cognition is impaired. Memory is impaired.         Judgment: Judgment is impulsive.          Lines/Drains:            Lab Results: I have reviewed the following results:  Results from last 7 days   Lab Units 06/19/25  1624   WBC Thousand/uL 5.70   HEMOGLOBIN g/dL 9.4*   HEMATOCRIT % 31.6*   PLATELETS Thousands/uL 241   SEGS PCT % 67   LYMPHO PCT % 13*   MONO PCT % 17*   EOS PCT % 2     Results from last 7 days   Lab Units 06/19/25  1624   SODIUM mmol/L 139   POTASSIUM mmol/L 4.3   CHLORIDE mmol/L 104   CO2 mmol/L 25   BUN mg/dL 39*   CREATININE mg/dL 1.41*   ANION GAP mmol/L 10   CALCIUM mg/dL 9.2   ALBUMIN g/dL 3.6   TOTAL BILIRUBIN mg/dL 1.29*   ALK PHOS U/L 129*   ALT U/L 26   AST U/L 36   GLUCOSE RANDOM mg/dL 119             Lab Results   Component Value Date    HGBA1C 6.0 (H) 08/30/2022           Imaging Results Review: No pertinent imaging studies reviewed.  Other Study Results Review: No additional pertinent studies reviewed.    Administrative Statements   I have spent a total time of 81 minutes in caring for this patient on the day of the visit/encounter including Diagnostic results, Prognosis, Risks and benefits of tx options, Instructions for management, Patient and family education, Importance of tx compliance, Risk factor reductions, Impressions, Counseling / Coordination of care, Documenting in the medical record, Reviewing/placing orders in the medical record (including tests, medications, and/or procedures),  Obtaining or reviewing history  , and Communicating with other healthcare professionals .    ** Please Note: This note has been constructed using a voice recognition system. **         [1]   Past Medical History:  Diagnosis Date    Ankle fracture     Arthritis     left hip    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HCC)     with left ureter    Bronchitis     Cancer (HCC)     Carcinoma, lung (HCC)     Chronic kidney disease, stage 4 (severe) (HCC) 01/17/2025    Dementia (HCC)     Dependent edema     Depression     Diabetes mellitus (HCC)     Dizziness 12/13/2021    Hypercholesterolemia     Hypertension     Kidney stone     Oth abn and inconclusive findings on dx imaging of breast     Pes planus     Renal calculus     Restless leg syndrome     Rib pain     Sprain, neck     Varicose veins of left lower extremity    [2]   Past Surgical History:  Procedure Laterality Date    APPENDECTOMY      LUNG CANCER SURGERY      US BREAST CYST ASPIRATION LEFT INITIAL Left 3/23/2018   [3]   Social History  Tobacco Use    Smoking status: Former     Current packs/day: 0.50     Average packs/day: 0.5 packs/day for 20.0 years (10.0 ttl pk-yrs)     Types: Cigarettes    Smokeless tobacco: Never   Vaping Use    Vaping status: Never Used   Substance and Sexual Activity    Alcohol use: Never    Drug use: Never    Sexual activity: Not Currently     Birth control/protection: Post-menopausal   [4]   Family History  Problem Relation Name Age of Onset    No Known Problems Mother      No Known Problems Father      Dementia Brother      Breast cancer Neg Hx      Colon cancer Neg Hx      Ovarian cancer Neg Hx      Uterine cancer Neg Hx      Cervical cancer Neg Hx

## 2025-06-19 NOTE — ED PROVIDER NOTES
Time reflects when diagnosis was documented in both MDM as applicable and the Disposition within this note       Time User Action Codes Description Comment    6/19/2025  6:18 PM Sissy Gillespie Add [R26.2] Ambulatory dysfunction     6/19/2025  6:19 PM Sissy Gillespie Add [F01.518] Vascular dementia with behavioral disturbance (HCC)     6/19/2025  6:19 PM Sissy Glilespie Modify [R26.2] Ambulatory dysfunction     6/19/2025  6:19 PM Sissy Gillespie Modify [F01.518] Vascular dementia with behavioral disturbance (HCC)     6/19/2025  6:19 PM Sissy Gillespie Add [Z09] Need for case management follow-up           ED Disposition       ED Disposition   Admit    Condition   Stable    Date/Time   u Jun 19, 2025  6:19 PM    Comment   Case was discussed with TONY and the patient's admission status was agreed to be Admission Status: inpatient status to the service of Dr. Sher.               Assessment & Plan       Medical Decision Making  89 y.o. female brought in for evaluation with worsening confusion and ambulatory difficulty. On exam, patient with normal vitals, in no acute distress. She is disoriented but appears to be at her mental baseline. Differential diagnosis includes, but is not limited to, chronic deconditioning secondary to patient age and dementia, UTI, acute electrolyte abnormalities, dehydration, worsening kidney function, or other acute metabolic pathology. Will evaluate with CBC, CMP, ammonia, UA, EKG.     Amount and/or Complexity of Data Reviewed  Labs: ordered. Decision-making details documented in ED Course.    Risk  Decision regarding hospitalization.        ED Course as of 06/19/25 1822   Thu Jun 19, 2025   1633 Procedure Note: EKG  Date/Time: 06/19/25 4:33 PM   Interpreted by: Sissy Gillespie D.O.  Indications / Diagnosis: weakness  ECG reviewed by me, the ED Provider: yes   The EKG demonstrates:  Rhythm: a-fib at 77 bpm  Intervals: normal intervals  Axis: normal axis  QRS/Blocks: incomplete RBBB  ST Changes: No acute  ST Changes, no STD/KAY.   1634 Hemoglobin(!): 9.4  Similar to previous.    1807 Comprehensive metabolic panel(!)  Unremarkable.    1821 Given that patient will require case management consult and placement, message sent to hospitalist for admission for further evaluation and management. Patient admitted in stable condition.        Medications - No data to display    ED Risk Strat Scores                    No data recorded        SBIRT 20yo+      Flowsheet Row Most Recent Value   Initial Alcohol Screen: US AUDIT-C     1. How often do you have a drink containing alcohol? 0 Filed at: 06/19/2025 1616   2. How many drinks containing alcohol do you have on a typical day you are drinking?  0 Filed at: 06/19/2025 1616   3a. Male UNDER 65: How often do you have five or more drinks on one occasion? 0 Filed at: 06/19/2025 1616   3b. FEMALE Any Age, or MALE 65+: How often do you have 4 or more drinks on one occassion? 0 Filed at: 06/19/2025 1616   Audit-C Score 0 Filed at: 06/19/2025 1616   ARNULFO: How many times in the past year have you...    Used an illegal drug or used a prescription medication for non-medical reasons? Never Filed at: 06/19/2025 1616                            History of Present Illness       Chief Complaint   Patient presents with    Difficulty Walking     PT BIBA. Per EMS, caretaker worried of PT not able to ambulate w/ walker, lack of will to get out of bed, and increased confusion. PT confused at baseline w/ hx of dementia. PT denies pain or any of c/o at this time. Family denies loss of appetite.         Past Medical History[1]   Past Surgical History[2]   Family History[3]   Social History[4]   E-Cigarette/Vaping    E-Cigarette Use Never User       E-Cigarette/Vaping Substances    Nicotine No     THC No     CBD No     Flavoring No     Other No     Unknown No       I have reviewed and agree with the history as documented.     89 year old female with a past medical history of HTN, HLD, DM, CKD, and  dementia brought in by EMS after caretaker stated concerns about patient's worsening confusion and ambulatory difficulties. Patient reportedly no longer able to ambulate, even with the use of a walker. She also seems more confused recently. Patient offers no complaints at this time, unable to obtain further details secondary to patient's dementia.         Review of Systems   Unable to perform ROS: Dementia           Objective       ED Triage Vitals [06/19/25 1606]   Temperature Pulse Blood Pressure Respirations SpO2 Patient Position - Orthostatic VS   (!) 97.3 °F (36.3 °C) 77 125/97 16 93 % Lying      Temp Source Heart Rate Source BP Location FiO2 (%) Pain Score    Oral Monitor Right arm -- No Pain      Vitals      Date and Time Temp Pulse SpO2 Resp BP Pain Score FACES Pain Rating User   06/19/25 1731 -- 83 92 % 22 119/63 -- -- LM   06/19/25 1606 97.3 °F (36.3 °C) 77 93 % 16 125/97 No Pain -- SG            Physical Exam  Vitals and nursing note reviewed.   Constitutional:       General: She is awake. She is not in acute distress.     Appearance: She is not toxic-appearing.   HENT:      Head: Normocephalic and atraumatic.     Eyes:      General: Vision grossly intact. Gaze aligned appropriately.       Cardiovascular:      Rate and Rhythm: Normal rate. Rhythm irregular.      Heart sounds: Normal heart sounds.   Pulmonary:      Effort: Pulmonary effort is normal. No respiratory distress.      Breath sounds: Normal breath sounds.   Abdominal:      General: There is no distension.      Palpations: Abdomen is soft.      Tenderness: There is no abdominal tenderness.     Musculoskeletal:      Cervical back: Full passive range of motion without pain and neck supple.     Skin:     General: Skin is warm and dry.     Neurological:      General: No focal deficit present.      Mental Status: She is alert. Mental status is at baseline.         Results Reviewed       Procedure Component Value Units Date/Time    Comprehensive  metabolic panel [[id number]]  (Abnormal) Collected: 06/19/25 1624    Lab Status: Final result Specimen: Blood from Arm, Left Updated: 06/19/25 1807     Sodium 139 mmol/L      Potassium 4.3 mmol/L      Chloride 104 mmol/L      CO2 25 mmol/L      ANION GAP 10 mmol/L      BUN 39 mg/dL      Creatinine 1.41 mg/dL      Glucose 119 mg/dL      Calcium 9.2 mg/dL      AST 36 U/L      ALT 26 U/L      Alkaline Phosphatase 129 U/L      Total Protein 7.1 g/dL      Albumin 3.6 g/dL      Total Bilirubin 1.29 mg/dL      eGFR 30 ml/min/1.73sq m     Narrative:      National Kidney Disease Foundation guidelines for Chronic Kidney Disease (CKD):     Stage 1 with normal or high GFR (GFR > 90 mL/min/1.73 square meters)    Stage 2 Mild CKD (GFR = 60-89 mL/min/1.73 square meters)    Stage 3A Moderate CKD (GFR = 45-59 mL/min/1.73 square meters)    Stage 3B Moderate CKD (GFR = 30-44 mL/min/1.73 square meters)    Stage 4 Severe CKD (GFR = 15-29 mL/min/1.73 square meters)    Stage 5 End Stage CKD (GFR <15 mL/min/1.73 square meters)  Note: GFR calculation is accurate only with a steady state creatinine    Ammonia [[id number]]  (Normal) Collected: 06/19/25 1624    Lab Status: Final result Specimen: Blood from Arm, Left Updated: 06/19/25 1645     Ammonia 27 umol/L     CBC and differential [[id number]]  (Abnormal) Collected: 06/19/25 1624    Lab Status: Final result Specimen: Blood from Arm, Left Updated: 06/19/25 1632     WBC 5.70 Thousand/uL      RBC 4.21 Million/uL      Hemoglobin 9.4 g/dL      Hematocrit 31.6 %      MCV 75 fL      MCH 22.3 pg      MCHC 29.7 g/dL      RDW 20.1 %      MPV 10.4 fL      Platelets 241 Thousands/uL      nRBC 0 /100 WBCs      Segmented % 67 %      Immature Grans % 0 %      Lymphocytes % 13 %      Monocytes % 17 %      Eosinophils Relative 2 %      Basophils Relative 1 %      Absolute Neutrophils 3.79 Thousands/µL      Absolute Immature Grans 0.01 Thousand/uL      Absolute Lymphocytes 0.75 Thousands/µL      Absolute  Monocytes 0.99 Thousand/µL      Eosinophils Absolute 0.12 Thousand/µL      Basophils Absolute 0.04 Thousands/µL     TSH, 3rd generation with Free T4 reflex [[id number]] Collected: 06/19/25 1624    Lab Status: In process Specimen: Blood from Arm, Left Updated: 06/19/25 1628    UA w Reflex to Microscopic w Reflex to Culture [[id number]]     Lab Status: No result Specimen: Urine             No orders to display       Procedures    ED Medication and Procedure Management   Prior to Admission Medications   Prescriptions Last Dose Informant Patient Reported? Taking?   Diclofenac Sodium (VOLTAREN) 1 %   No No   Sig: Apply 2 g topically 2 (two) times a day   Misc. Devices (Mattress Cover) MISC  Self No No   Sig: Use daily   Patient not taking: Reported on 3/5/2025   QUEtiapine (SEROquel) 25 mg tablet   No No   Sig: Take 1 tablet (25 mg total) by mouth daily at bedtime   ergocalciferol (VITAMIN D2) 50,000 units   No No   Sig: Take 1 capsule (50,000 Units total) by mouth once a week   fexofenadine (ALLEGRA) 60 MG tablet   No No   Sig: Take 1 tablet (60 mg total) by mouth daily Do not exceed one dose in 24 hr period   furosemide (LASIX) 20 mg tablet   No No   Sig: TAKE 1 TABLET BY MOUTH TWICE A DAY   gabapentin (NEURONTIN) 100 mg capsule   No No   Sig: Take 1 capsule (100 mg total) by mouth 3 (three) times a day   melatonin 3 mg   No No   Sig: Take 1 tablet (3 mg total) by mouth daily at bedtime   mirtazapine (REMERON) 7.5 MG tablet   No No   Sig: Take 1 tablet (7.5 mg total) by mouth daily at bedtime      Facility-Administered Medications: None     Patient's Medications   Discharge Prescriptions    No medications on file     No discharge procedures on file.  ED SEPSIS DOCUMENTATION   Time reflects when diagnosis was documented in both MDM as applicable and the Disposition within this note       Time User Action Codes Description Comment    6/19/2025  6:18 PM Sissy Gillespie Add [R26.2] Ambulatory dysfunction     6/19/2025  6:19 PM  Sissy Gillespie Add [F01.518] Vascular dementia with behavioral disturbance (HCC)     6/19/2025  6:19 PM Sissy Gillespie Modify [R26.2] Ambulatory dysfunction     6/19/2025  6:19 PM Sissy Gillespie Modify [F01.518] Vascular dementia with behavioral disturbance (HCC)     6/19/2025  6:19 PM Sissy Gillespie [Z09] Need for case management follow-up                    [1]   Past Medical History:  Diagnosis Date    Ankle fracture     Arthritis     left hip    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HCC)     with left ureter    Bronchitis     Cancer (HCC)     Carcinoma, lung (HCC)     Chronic kidney disease, stage 4 (severe) (HCC) 01/17/2025    Dementia (HCC)     Dependent edema     Depression     Diabetes mellitus (HCC)     Dizziness 12/13/2021    Hypercholesterolemia     Hypertension     Kidney stone     Oth abn and inconclusive findings on dx imaging of breast     Pes planus     Renal calculus     Restless leg syndrome     Rib pain     Sprain, neck     Varicose veins of left lower extremity    [2]   Past Surgical History:  Procedure Laterality Date    APPENDECTOMY      LUNG CANCER SURGERY      US BREAST CYST ASPIRATION LEFT INITIAL Left 3/23/2018   [3]   Family History  Problem Relation Name Age of Onset    No Known Problems Mother      No Known Problems Father      Dementia Brother      Breast cancer Neg Hx      Colon cancer Neg Hx      Ovarian cancer Neg Hx      Uterine cancer Neg Hx      Cervical cancer Neg Hx     [4]   Social History  Tobacco Use    Smoking status: Former     Current packs/day: 0.50     Average packs/day: 0.5 packs/day for 20.0 years (10.0 ttl pk-yrs)     Types: Cigarettes    Smokeless tobacco: Never   Vaping Use    Vaping status: Never Used   Substance Use Topics    Alcohol use: Never    Drug use: Never        Sissy Gillespie DO  06/19/25 1822     Male

## 2025-06-19 NOTE — ASSESSMENT & PLAN NOTE
Per niece, reported to have increased bouts of confusion, combativeness more freuqently over the past few weeks  Recently was treated for UTI in May and completed nitrofurantoin regimen at that time; prior MDRO on urine clx    Recheck UA with scope/culture; if concerning for infection, empirically start Invanz based on prior sensitivities from 5/28/22025  Continue home regimen of Remeron and Seroquel  Consult gerontology  Case management consulted for assistance with placement as well  Depakote 250 mg started this admission  PRN zyprexa for agitation

## 2025-06-19 NOTE — ASSESSMENT & PLAN NOTE
Lab Results   Component Value Date    EGFR 30 06/19/2025    EGFR  03/17/2025     Not performed on patients less than 18 years of age or greater than 97 years of age    EGFR 39 03/11/2025    CREATININE 1.41 (H) 06/19/2025    CREATININE 1.29 (H) 03/17/2025    CREATININE 1.13 03/11/2025     Gentle IV fluid hydration  Trend creatinine tomorrow

## 2025-06-19 NOTE — TELEPHONE ENCOUNTER
Patients family called in requesting if patient can have medication for her restless legs. PCP please call patients family back to further advise @[phone].

## 2025-06-19 NOTE — TELEPHONE ENCOUNTER
Patient called back stated patient has now stopped walking and are requesting further recommendations regarding caring for patient. Pt niece is 80 years old and patient is 89 and has become aggressive and combative.        Please advise.  Thank you

## 2025-06-20 ENCOUNTER — APPOINTMENT (INPATIENT)
Dept: RADIOLOGY | Facility: HOSPITAL | Age: OVER 89
DRG: 683 | End: 2025-06-20
Payer: COMMERCIAL

## 2025-06-20 PROBLEM — R54 FRAILTY SYNDROME IN GERIATRIC PATIENT: Status: ACTIVE | Noted: 2025-06-20

## 2025-06-20 PROBLEM — Z51.5 PALLIATIVE CARE ENCOUNTER: Status: ACTIVE | Noted: 2025-06-20

## 2025-06-20 PROBLEM — Z71.89 GOALS OF CARE, COUNSELING/DISCUSSION: Status: ACTIVE | Noted: 2025-06-20

## 2025-06-20 LAB
ALBUMIN SERPL BCG-MCNC: 3.3 G/DL (ref 3.5–5)
ALP SERPL-CCNC: 110 U/L (ref 34–104)
ALT SERPL W P-5'-P-CCNC: 23 U/L (ref 7–52)
AMMONIA PLAS-SCNC: 31 UMOL/L (ref 18–72)
ANION GAP SERPL CALCULATED.3IONS-SCNC: 7 MMOL/L (ref 4–13)
AST SERPL W P-5'-P-CCNC: 33 U/L (ref 13–39)
BACTERIA UR QL AUTO: ABNORMAL /HPF
BASOPHILS # BLD AUTO: 0.04 THOUSANDS/ÂΜL (ref 0–0.1)
BASOPHILS NFR BLD AUTO: 1 % (ref 0–1)
BILIRUB SERPL-MCNC: 1.18 MG/DL (ref 0.2–1)
BILIRUB UR QL STRIP: NEGATIVE
BUN SERPL-MCNC: 33 MG/DL (ref 5–25)
CALCIUM ALBUM COR SERPL-MCNC: 9.2 MG/DL (ref 8.3–10.1)
CALCIUM SERPL-MCNC: 8.6 MG/DL (ref 8.4–10.2)
CHLORIDE SERPL-SCNC: 107 MMOL/L (ref 96–108)
CLARITY UR: CLEAR
CO2 SERPL-SCNC: 27 MMOL/L (ref 21–32)
COLOR UR: ABNORMAL
CREAT SERPL-MCNC: 1.25 MG/DL (ref 0.6–1.3)
EOSINOPHIL # BLD AUTO: 0.14 THOUSAND/ÂΜL (ref 0–0.61)
EOSINOPHIL NFR BLD AUTO: 3 % (ref 0–6)
ERYTHROCYTE [DISTWIDTH] IN BLOOD BY AUTOMATED COUNT: 19.5 % (ref 11.6–15.1)
FOLATE SERPL-MCNC: 20.8 NG/ML
GFR SERPL CREATININE-BSD FRML MDRD: 35 ML/MIN/1.73SQ M
GLUCOSE SERPL-MCNC: 101 MG/DL (ref 65–140)
GLUCOSE SERPL-MCNC: 111 MG/DL (ref 65–140)
GLUCOSE UR STRIP-MCNC: NEGATIVE MG/DL
HCT VFR BLD AUTO: 29.8 % (ref 34.8–46.1)
HGB BLD-MCNC: 8.8 G/DL (ref 11.5–15.4)
HGB UR QL STRIP.AUTO: NEGATIVE
IMM GRANULOCYTES # BLD AUTO: 0.02 THOUSAND/UL (ref 0–0.2)
IMM GRANULOCYTES NFR BLD AUTO: 0 % (ref 0–2)
KETONES UR STRIP-MCNC: NEGATIVE MG/DL
LEUKOCYTE ESTERASE UR QL STRIP: ABNORMAL
LYMPHOCYTES # BLD AUTO: 0.84 THOUSANDS/ÂΜL (ref 0.6–4.47)
LYMPHOCYTES NFR BLD AUTO: 16 % (ref 14–44)
MAGNESIUM SERPL-MCNC: 2.4 MG/DL (ref 1.9–2.7)
MCH RBC QN AUTO: 22.4 PG (ref 26.8–34.3)
MCHC RBC AUTO-ENTMCNC: 29.5 G/DL (ref 31.4–37.4)
MCV RBC AUTO: 76 FL (ref 82–98)
MONOCYTES # BLD AUTO: 0.92 THOUSAND/ÂΜL (ref 0.17–1.22)
MONOCYTES NFR BLD AUTO: 17 % (ref 4–12)
NEUTROPHILS # BLD AUTO: 3.44 THOUSANDS/ÂΜL (ref 1.85–7.62)
NEUTS SEG NFR BLD AUTO: 63 % (ref 43–75)
NITRITE UR QL STRIP: NEGATIVE
NON-SQ EPI CELLS URNS QL MICRO: ABNORMAL /HPF
NRBC BLD AUTO-RTO: 0 /100 WBCS
PH UR STRIP.AUTO: 6 [PH]
PLATELET # BLD AUTO: 224 THOUSANDS/UL (ref 149–390)
PMV BLD AUTO: 10.4 FL (ref 8.9–12.7)
POTASSIUM SERPL-SCNC: 4.3 MMOL/L (ref 3.5–5.3)
PROT SERPL-MCNC: 6.4 G/DL (ref 6.4–8.4)
PROT UR STRIP-MCNC: NEGATIVE MG/DL
RBC # BLD AUTO: 3.92 MILLION/UL (ref 3.81–5.12)
RBC #/AREA URNS AUTO: ABNORMAL /HPF
SODIUM SERPL-SCNC: 141 MMOL/L (ref 135–147)
SP GR UR STRIP.AUTO: 1.01 (ref 1–1.03)
UROBILINOGEN UR STRIP-ACNC: <2 MG/DL
VIT B12 SERPL-MCNC: 644 PG/ML (ref 180–914)
WBC # BLD AUTO: 5.4 THOUSAND/UL (ref 4.31–10.16)
WBC #/AREA URNS AUTO: ABNORMAL /HPF

## 2025-06-20 PROCEDURE — 71045 X-RAY EXAM CHEST 1 VIEW: CPT

## 2025-06-20 PROCEDURE — 80053 COMPREHEN METABOLIC PANEL: CPT | Performed by: INTERNAL MEDICINE

## 2025-06-20 PROCEDURE — 99223 1ST HOSP IP/OBS HIGH 75: CPT | Performed by: NURSE PRACTITIONER

## 2025-06-20 PROCEDURE — 83735 ASSAY OF MAGNESIUM: CPT | Performed by: INTERNAL MEDICINE

## 2025-06-20 PROCEDURE — 82140 ASSAY OF AMMONIA: CPT | Performed by: STUDENT IN AN ORGANIZED HEALTH CARE EDUCATION/TRAINING PROGRAM

## 2025-06-20 PROCEDURE — 99233 SBSQ HOSP IP/OBS HIGH 50: CPT | Performed by: STUDENT IN AN ORGANIZED HEALTH CARE EDUCATION/TRAINING PROGRAM

## 2025-06-20 PROCEDURE — 97163 PT EVAL HIGH COMPLEX 45 MIN: CPT

## 2025-06-20 PROCEDURE — 85025 COMPLETE CBC W/AUTO DIFF WBC: CPT | Performed by: INTERNAL MEDICINE

## 2025-06-20 PROCEDURE — 82948 REAGENT STRIP/BLOOD GLUCOSE: CPT

## 2025-06-20 PROCEDURE — 82746 ASSAY OF FOLIC ACID SERUM: CPT | Performed by: STUDENT IN AN ORGANIZED HEALTH CARE EDUCATION/TRAINING PROGRAM

## 2025-06-20 PROCEDURE — 97167 OT EVAL HIGH COMPLEX 60 MIN: CPT

## 2025-06-20 PROCEDURE — 82607 VITAMIN B-12: CPT | Performed by: STUDENT IN AN ORGANIZED HEALTH CARE EDUCATION/TRAINING PROGRAM

## 2025-06-20 PROCEDURE — 99223 1ST HOSP IP/OBS HIGH 75: CPT | Performed by: INTERNAL MEDICINE

## 2025-06-20 RX ORDER — DIVALPROEX SODIUM 125 MG/1
125 CAPSULE, COATED PELLETS ORAL EVERY 12 HOURS SCHEDULED
Status: DISCONTINUED | OUTPATIENT
Start: 2025-06-20 | End: 2025-07-03 | Stop reason: HOSPADM

## 2025-06-20 RX ORDER — GUAIFENESIN/DEXTROMETHORPHAN 100-10MG/5
10 SYRUP ORAL EVERY 4 HOURS PRN
Status: DISCONTINUED | OUTPATIENT
Start: 2025-06-20 | End: 2025-07-03 | Stop reason: HOSPADM

## 2025-06-20 RX ORDER — QUETIAPINE FUMARATE 25 MG/1
25 TABLET, FILM COATED ORAL
Status: DISCONTINUED | OUTPATIENT
Start: 2025-06-20 | End: 2025-06-23

## 2025-06-20 RX ORDER — MIRTAZAPINE 15 MG/1
7.5 TABLET, FILM COATED ORAL
Status: DISCONTINUED | OUTPATIENT
Start: 2025-06-20 | End: 2025-07-03 | Stop reason: HOSPADM

## 2025-06-20 RX ADMIN — DIVALPROEX SODIUM 125 MG: 125 CAPSULE, COATED PELLETS ORAL at 23:26

## 2025-06-20 RX ADMIN — QUETIAPINE FUMARATE 25 MG: 25 TABLET ORAL at 20:30

## 2025-06-20 RX ADMIN — HEPARIN SODIUM 5000 UNITS: 5000 INJECTION INTRAVENOUS; SUBCUTANEOUS at 05:01

## 2025-06-20 RX ADMIN — GUAIFENESIN AND DEXTROMETHORPHAN 10 ML: 100; 10 SYRUP ORAL at 21:44

## 2025-06-20 RX ADMIN — DOCUSATE SODIUM 100 MG: 100 CAPSULE, LIQUID FILLED ORAL at 18:00

## 2025-06-20 RX ADMIN — FUROSEMIDE 20 MG: 20 TABLET ORAL at 20:30

## 2025-06-20 RX ADMIN — Medication 3 MG: at 20:33

## 2025-06-20 RX ADMIN — HEPARIN SODIUM 5000 UNITS: 5000 INJECTION INTRAVENOUS; SUBCUTANEOUS at 21:20

## 2025-06-20 RX ADMIN — DOCUSATE SODIUM 100 MG: 100 CAPSULE, LIQUID FILLED ORAL at 09:09

## 2025-06-20 RX ADMIN — GABAPENTIN 100 MG: 100 CAPSULE ORAL at 09:09

## 2025-06-20 RX ADMIN — ACETAMINOPHEN 650 MG: 325 TABLET ORAL at 23:10

## 2025-06-20 RX ADMIN — HEPARIN SODIUM 5000 UNITS: 5000 INJECTION INTRAVENOUS; SUBCUTANEOUS at 16:07

## 2025-06-20 RX ADMIN — MIRTAZAPINE 7.5 MG: 15 TABLET, FILM COATED ORAL at 20:28

## 2025-06-20 RX ADMIN — VALPROATE SODIUM 250 MG: 100 INJECTION, SOLUTION INTRAVENOUS at 16:07

## 2025-06-20 RX ADMIN — VALPROATE SODIUM 250 MG: 100 INJECTION, SOLUTION INTRAVENOUS at 05:01

## 2025-06-20 NOTE — UTILIZATION REVIEW
Initial Clinical Review    Admission: Date/Time/Statement:   Admission Orders (From admission, onward)       Ordered        06/19/25 1820  INPATIENT ADMISSION  Once                          Orders Placed This Encounter   Procedures    INPATIENT ADMISSION     Standing Status:   Standing     Number of Occurrences:   1     Level of Care:   Med Surg [16]     Estimated length of stay:   More than 2 Midnights     Certification:   I certify that inpatient services are medically necessary for this patient for a duration of greater than two midnights. See H&P and MD Progress Notes for additional information about the patient's course of treatment.     ED Arrival Information       Expected   -    Arrival   6/19/2025 16:00    Acuity   Urgent              Means of arrival   Ambulance    Escorted by   [facility]   Hospitalist    Admission type   Emergency              Arrival complaint   AMBULATORY DYSFUNCTION             Chief Complaint   Patient presents with    Difficulty Walking     PT BIBA. Per EMS, caretaker worried of PT not able to ambulate w/ walker, lack of will to get out of bed, and increased confusion. PT confused at baseline w/ hx of dementia. PT denies pain or any of c/o at this time. Family denies loss of appetite.         Initial Presentation: 89 y.o. female to ED via EMS from home.    Admitted to inpatient with Dx:   *Vascular dementia, uncomplicated (HCC) [F01.50]   Ambulatory Dysfunction/Difficulty walking [R26.2]   Vascular dementia with behavioral disturbance (HCC) [F01.518]   Acute kidney injury superimposed on stage 3b chronic kidney disease (HCC) [N17.9, N18.32]   Presented to ED for evaluation with worsening confusion and ambulatory difficulty.     PMHx:  HTN, HLD, DM, CKD, and dementia .   Date: 06/19/2025   On exam:  She is awake. Disoriented.  Cognition is impaired. Memory is impaired.    Judgment is impulsive.  She is not in acute distress.  She is ill-appearing. Mucus  membranes-dry.    Imaging shows: Chest X:  Pending.  ED treatment:  Labs.  Radiology.   EC, A.Fib.   Plan includes:  Admit.   Recheck UA with scope/culture; if concerning for infection, empirically start Invanz based on prior sensitivities from .  Continue home regimen of Remeron and Seroquel.  Consult Gerontology.  Consult Case Management for assistance with placement as well.  Depakote 250 mg started this admission.  PRN zyprexa for agitation.  Gentle IV fld hydration.  PT/OT.    VTE Pharmacologic Prophylaxis: VTE Score: 4 Moderate Risk (Score 3-4) - Pharmacological DVT Prophylaxis Ordered: enoxaparin (Lovenox) / Fran SCDs.   Anticipated Length of Stay/Certification Statement:  Patient will be admitted on an inpatient basis with an anticipated length of stay of greater than 2 midnights secondary to placement pending.     Day 2: 2025  Regular Diet.  OOB to Chair/Commode at bedside.  Turn q2h.  I&O q8h.  Chest X - Still pending.  PT/OT.      2025  Consult Palliative Care  Vascular dementia with behavioral disturbance (HCC)  Pleasantly confused at baseline, ambulatory.  Weight has been consistent for past 6 months.   Presented with departure from baseline.  Verbally agressive, throwing items.   Per chart review patient has has increased confusion and agression at night since May   Gerontology consulted.  Xi Scott closest relative has been caretaker for past 30 years.  Family confirms continued life prolonging cares.  They are not currently interested in hospice care.     2025  Consult Geriatric Med  Vascular dementia with behavioral disturbance (HCC)  History of prior memory issues and cognitive impairment   Presented to hospital for increased confusion, combativeness  Patient has lived with her niece and nephew for the past 30 years  Dependent with ADLs/IADLs.   Alert and oriented x 1 on exam today (self), pleasant  Good appetite as she is finishing her full breakfast, drinking  all her juice and coffee  Previously followed with [facility]               Last seen on 09/29/23  Appears patient has occurences of sundowning, recently managed by PCP               PCP speaking with family of trial of increase dose of seroquel vs adding melatonin, family had opted to trial of melatonin, started on 06/13  Has history of hallucinations.   Most recent TSH on 06/19/25 noted to be 3.797  Most recent vitamin B12 level on 01/24/23 noted to be 407  MRI brain on 03/13/25 revealed mild microangiopathic change  Patient scored 4/30 on most recent MOCA on 12/13/21  Keep physically, mentally, and socially active   PTA medication: Seroquel 25 mg po daily at bedtime  Was started on IV depakote 250 mg Q8 hours by primary team               Recommend decreasing depakote dose to 125 mg Q12 hours, continue to monitor moods, increase dosage slowly if needed, can change to depakote sprinkles if patient is agreeable to taking medications   LFT's are stable  Would recommend EKG to monitor QTc in setting of taking seroquel 25 mg daily at bedtime  Last EKG on 03/04 was 484  If prolonged QTc would hold seroquel  Recurrent major depressive disorder, in remission (HCC)  Patient has a history of anxiety/depression   Mood appears stable on exam today   Continue supportive care   PTA medication: mirtazapine 7.5 mg daily at bedtime  Acute kidney injury superimposed on stage 3b chronic kidney disease (HCC)         Lab Results   Component Value Date     EGFR 35 06/20/2025     EGFR 30 06/19/2025     EGFR   03/17/2025       Not performed on patients less than 18 years of age or greater than 97 years of age     CREATININE 1.25 06/20/2025     CREATININE 1.41 (H) 06/19/2025     CREATININE 1.29 (H) 03/17/2025   Baseline creatinine: 1.3-1.4  Continue to monitor kidney function  Monitor I/O's  Encourage PO hydration   Avoid hypotension  Avoid nephrotoxins, IV contrast  Received gentle IV fluids overnight  Hypertensive heart and  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)      Wt Readings from Last 3 Encounters:   06/19/25 63.5 kg (139 lb 15.9 oz)   06/13/25 63.5 kg (140 lb)   04/03/25 61.7 kg (136 lb)   Weight stable  Euvolemic  Does not appear short of breath, no leg swelling  Family noted to PCP on 06/13 of intermittent shortness of breath with exertion   ECHO on 03/06/25 showing EF 55%  Chest XR pending  PTA medication: furosemide 20 mg BID.   Ambulatory dysfunction  Recently was having home health/physical therapy, re-ordered by PCP  Assess patient frequently for physical needs, encourage use of assistant devices as needed and directed by PT/OT  Identify cognitive and physical deficits and behaviors that affect risk of falls  Consider moving patient closer to nursing station to monitor more closely for impulsive behavior which may increase risk of falls  [place] fall and safety precautions   Educate patient/family on patient safety including physical limitations and importance of using call bell for assistance   Modify environment to reduce risk of injury including disconnecting from pole when not in use, ensuring adequate lighting in room and restroom, ensuring that path to restroom is clear and free of trip hazards  PT/OT consulted to assist with strengthening/mobility and assist with discharge planning to appropriate level of care  Out of bed as tolerated  Family interested in PT/OT services  Goals of care, counseling/discussion  DNR/DNI  Palliative Care consulted, family not interested in hospice at this time, expressed interest in PT/OT services  Palliative care encounter  Palliative Care consulted, having goals of care discussion with family.   Primary insomnia  First line is behavioral therapy   Avoid sedative hypnotics including benzodiazepines and benadryl  Encourage staying awake during the day   Encourage daytime activities and morning exercise   Decrease or eliminate daytime naps   Avoid caffeine especially during  late afternoon and evening hours  Establish a nighttime routine  Implement sleep hygiene and limit nighttime interruptions  Continue melatonin 3 mg daily at bedtime and mirtazapine 7.5 mg daily at bedtime for sleep   Frailty syndrome in geriatric patient  Clinical Frail Scale: 6- Moderately Frail  Need help with all outside activities  Need help with stairs and bathing  May need assistance with dressing  Resides with family who assist with ADLs/IADLs  Appears to have good appetite  Albumin level on 06/20/25 was 3.3  Consider adding dietary supplements, Ensure or magic cup to meal trays  Continue to optimize co-morbidities   Fall precautions.    ED Treatment-Medication Administration from 06/19/2025 1600 to 06/19/2025 1927       None         Scheduled Medications:  docusate sodium, 100 mg, Oral, BID  [Held by provider] furosemide, 20 mg, Oral, BID  gabapentin, 100 mg, Oral, TID  heparin (porcine), 5,000 Units, Subcutaneous, Q8H HESHAM  melatonin, 3 mg, Oral, HS  mirtazapine, 7.5 mg, Oral, HS  QUEtiapine, 25 mg, Oral, HS  valproate sodium, 250 mg, Intravenous, Q8H HESHAM    Continuous IV Infusions:  multi-electrolyte (Plasmalyte-A/Isolyte-S PH 7.4/Normosol-R) IV solution  Rate: 75 mL/hr Dose: 75 mL/hr  Freq: Continuous Route: IV  Last Dose: Stopped (06/20/25 0920)  Start: 06/19/25 1900 End: 06/20/25 0917     PRN Meds:  acetaminophen, 650 mg, Oral, Q8H PRN  OLANZapine, 10 mg, Intramuscular, Once PRN  ondansetron, 4 mg, Intravenous, Q6H PRN  simethicone, 80 mg, Oral, 4x Daily PRN      ED Triage Vitals [06/19/25 1606]   Temperature Pulse Respirations Blood Pressure SpO2 Pain Score   (!) 97.3 °F (36.3 °C) 77 16 125/97 93 % No Pain     Weight (last 2 days)       Date/Time Weight    06/1935 63.5 (139.99)            Vital Signs (last 3 days)       Date/Time Temp Pulse Resp BP MAP (mmHg) SpO2 Calculated FIO2 (%) - Nasal Cannula Nasal Cannula O2 Flow Rate (L/min) O2 Device Patient Position - Orthostatic VS [place] Coma Scale  Score Pain    06/20/25 0730 -- -- -- -- -- 95 % -- -- None (Room air) -- 13 No Pain    06/20/25 0700 97.5 °F (36.4 °C) 102 19 118/66 78 95 % -- -- None (Room air) Lying -- --    06/19/25 22:02:46 97.7 °F (36.5 °C) 89 17 128/77 94 97 % -- -- None (Room air) Lying -- --    06/19/25 2027 -- -- -- -- -- -- -- -- -- -- 13 --    06/1935 97.5 °F (36.4 °C) 88 18 130/79 96 97 % -- -- None (Room air) Lying -- No Pain    06/19/25 1900 -- 82 21 115/59 81 97 % -- -- -- -- -- --    06/19/25 1815 -- 86 17 -- -- 92 % 28 2 L/min Nasal cannula -- -- --    06/19/25 1800 -- 90 17 153/69 99 90 % -- -- None (Room air) Lying -- --    06/19/25 1731 -- 83 22 119/63 83 92 % -- -- None (Room air) Lying -- --    06/19/25 1608 -- -- -- -- -- -- -- -- -- -- 14 --    06/19/25 1606 97.3 °F (36.3 °C) 77 16 125/97 -- 93 % -- -- None (Room air) Lying -- No Pain              Pertinent Labs/Diagnostic Test Results:   Radiology:  XR chest portable    (Results Pending)     Cardiology:  No orders to display     GI:  No orders to display     Results from last 7 days   Lab Units 06/20/25 0439 06/19/25 2014 06/19/25  1624   WBC Thousand/uL 5.40  --  5.70   HEMOGLOBIN g/dL 8.8*  --  9.4*   HEMATOCRIT % 29.8*  --  31.6*   PLATELETS Thousands/uL 224 213 241   TOTAL NEUT ABS Thousands/µL 3.44  --  3.79     Results from last 7 days   Lab Units 06/20/25 0439 06/19/25  1624   SODIUM mmol/L 141 139   POTASSIUM mmol/L 4.3 4.3   CHLORIDE mmol/L 107 104   CO2 mmol/L 27 25   ANION GAP mmol/L 7 10   BUN mg/dL 33* 39*   CREATININE mg/dL 1.25 1.41*   EGFR ml/min/1.73sq m 35 30   CALCIUM mg/dL 8.6 9.2   MAGNESIUM mg/dL 2.4  --      Results from last 7 days   Lab Units 06/20/25  0439 06/19/25  1624   AST U/L 33 36   ALT U/L 23 26   ALK PHOS U/L 110* 129*   TOTAL PROTEIN g/dL 6.4 7.1   ALBUMIN g/dL 3.3* 3.6   TOTAL BILIRUBIN mg/dL 1.18* 1.29*   AMMONIA umol/L  --  27     Results from last 7 days   Lab Units 06/19/25  2117   POC GLUCOSE mg/dl 95     Results from last  7 days   Lab Units 06/20/25  0439 06/19/25  1624   GLUCOSE RANDOM mg/dL 101 119     Results from last 7 days   Lab Units 06/19/25  1624   TSH 3RD GENERATON uIU/mL 3.797     Results from last 7 days   Lab Units 06/19/25  2352   CLARITY UA  Clear   COLOR UA  Light Yellow   SPEC GRAV UA  1.013   PH UA  6.0   GLUCOSE UA mg/dl Negative   KETONES UA mg/dl Negative   BLOOD UA  Negative   PROTEIN UA mg/dl Negative   NITRITE UA  Negative   BILIRUBIN UA  Negative   UROBILINOGEN UA (BE) mg/dl <2.0   LEUKOCYTES UA  Large*   WBC UA /hpf 10-20*   RBC UA /hpf 1-2   BACTERIA UA /hpf Moderate*   EPITHELIAL CELLS WET PREP /hpf Occasional     Past Medical History[1]  Present on Admission:   Vascular dementia with behavioral disturbance (HCC)   Acute kidney injury superimposed on stage 3b chronic kidney disease (HCC)   Recurrent major depressive disorder, in remission (HCC)   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)   Ambulatory dysfunction      Admitting Diagnosis: Vascular dementia, uncomplicated (HCC) [F01.50]  Difficulty walking [R26.2]  Vascular dementia with behavioral disturbance (HCC) [F01.518]  Ambulatory dysfunction [R26.2]  Need for case management follow-up [Z09]  Acute kidney injury superimposed on stage 3b chronic kidney disease (HCC) [N17.9, N18.32]  Age/Sex: 89 y.o. female    Network Utilization Review Department  ATTENTION: Please call with any questions or concerns to [phone] and carefully listen to the prompts so that you are directed to the right person. All voicemails are confidential.   For Discharge needs, contact Care Management DC Support Team at [phone] opt. 2  Send all requests for admission clinical reviews, approved or denied determinations and any other requests to dedicated fax number below belonging to the campus where the patient is receiving treatment. List of dedicated fax numbers for the Facilities:  FACILITY NAME UR FAX NUMBER   ADMISSION DENIALS  (Administrative/Medical Necessity) [phone]   DISCHARGE SUPPORT TEAM (NETWORK) [phone]   PARENT CHILD HEALTH (Maternity/NICU/Pediatrics) [phone]   [facility] [phone]   [facility] [phone]   [facility] [phone]   [facility] [phone]   [facility] [phone]   [facility] [phone]   [facility] [phone]   [facility] [phone]   [facility] [phone]   [facility] [phone]   [facility] [phone]   [facility] [place] [phone]              [1]   Past Medical History:  Diagnosis Date    Ankle fracture     Arthritis     left hip    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HCC)     with left ureter    Bronchitis     Cancer (HCC)     Carcinoma, lung (HCC)     Chronic kidney disease, stage 4 (severe) (HCC) 01/17/2025    Dementia (HCC)     Dependent edema     Depression     Diabetes mellitus (HCC)     Dizziness 12/13/2021    Hypercholesterolemia     Hypertension     Kidney stone     Oth abn and inconclusive findings on dx imaging of breast     Pes planus     Renal calculus     Restless leg syndrome     Rib pain     Sprain, neck     Varicose veins of left lower extremity

## 2025-06-20 NOTE — ASSESSMENT & PLAN NOTE
Lab Results   Component Value Date    EGFR 35 06/20/2025    EGFR 30 06/19/2025    EGFR  03/17/2025     Not performed on patients less than 18 years of age or greater than 97 years of age    CREATININE 1.25 06/20/2025    CREATININE 1.41 (H) 06/19/2025    CREATININE 1.29 (H) 03/17/2025     Baseline creatinine: 1.3-1.4  Continue to monitor kidney function  Monitor I/O's  Encourage PO hydration   Avoid hypotension  Avoid nephrotoxins, IV contrast  Received gentle IV fluids overnight

## 2025-06-20 NOTE — ASSESSMENT & PLAN NOTE
Wt Readings from Last 3 Encounters:   06/19/25 63.5 kg (139 lb 15.9 oz)   06/13/25 63.5 kg (140 lb)   04/03/25 61.7 kg (136 lb)     Weight stable  Euvolemic  Does not appear short of breath, no leg swelling  Family noted to PCP on 06/13 of intermittent shortness of breath with exertion   ECHO on 03/06/25 showing EF 55%  Chest XR pending  PTA medication: furosemide 20 mg BID

## 2025-06-20 NOTE — PLAN OF CARE
Problem: Potential for Falls  Goal: Patient will remain free of falls  Description: INTERVENTIONS:  - Educate patient/family on patient safety including physical limitations  - Instruct patient to call for assistance with activity   - Consider consulting OT/PT to assist with strengthening/mobility based on AM PAC & JH-HLM score  - Consult OT/PT to assist with strengthening/mobility   - Keep Call bell within reach  - Keep bed low and locked with side rails adjusted as appropriate  - Keep care items and personal belongings within reach  - Initiate and maintain comfort rounds  - Make Fall Risk Sign visible to staff  - Offer Toileting every 2  Hours, in advance of need  - Initiate/Maintain bed alarm  - Obtain necessary fall risk management equipment:   - Apply yellow socks and bracelet for high fall risk patients  - Consider moving patient to room near nurses station  Outcome: Progressing     Problem: Nutrition/Hydration-ADULT  Goal: Nutrient/Hydration intake appropriate for improving, restoring or maintaining nutritional needs  Description: Monitor and assess patient's nutrition/hydration status for malnutrition. Collaborate with interdisciplinary team and initiate plan and interventions as ordered.  Monitor patient's weight and dietary intake as ordered or per policy. Utilize nutrition screening tool and intervene as necessary. Determine patient's food preferences and provide high-protein, high-caloric foods as appropriate.     INTERVENTIONS:  - Monitor oral intake, urinary output, labs, and treatment plans  - Assess nutrition and hydration status and recommend course of action  - Evaluate amount of meals eaten  - Assist patient with eating if necessary   - Allow adequate time for meals  - Recommend/ encourage appropriate diets, oral nutritional supplements, and vitamin/mineral supplements  - Order, calculate, and assess calorie counts as needed  - Recommend, monitor, and adjust tube feedings and TPN/PPN based on  assessed needs  - Assess need for intravenous fluids  - Provide specific nutrition/hydration education as appropriate  - Include patient/family/caregiver in decisions related to nutrition  Outcome: Progressing

## 2025-06-20 NOTE — TELEPHONE ENCOUNTER
Patient family called to follow up/add to messages for review on Monday upon provider return, patient is currently admitted, they called an ambulance after pt stop walking yesterday.

## 2025-06-20 NOTE — CONSULTS
Consultation - Geriatric Medicine   Name: Sona Valenzuela 89 y.o. female I MRN: [id number]  Unit/Bed#: -01 I Date of Admission: 6/19/2025   Date of Service: 6/20/2025 I Hospital Day: 1   Inpatient consult to Gerontology  Consult performed by: FARZANA Conklin  Consult ordered by: Tomás Sher DO        Physician Requesting Evaluation: Marshall Betancourt MD   Reason for Evaluation / Principal Problem: Vascular Dementia    Assessment & Plan  Vascular dementia with behavioral disturbance (HCC)  History of prior memory issues and cognitive impairment   Presented to hospital for increased confusion, combativeness  Patient has lived with her niece and nephew for the past 30 years  Dependent with ADLs/IADLs    Alert and oriented x 1 on exam today (self), pleasant  Good appetite as she is finishing her full breakfast, drinking all her juice and coffee  Previously followed with [facility]   Last seen on 09/29/23  Appears patient has occurences of sundowning, recently managed by PCP   PCP speaking with family of trial of increase dose of seroquel vs adding melatonin, family had opted to trial of melatonin, started on 06/13  Has history of hallucinations    Most recent TSH on 06/19/25 noted to be 3.797  Most recent vitamin B12 level on 01/24/23 noted to be 407  MRI brain on 03/13/25 revealed mild microangiopathic change  Patient scored 4/30 on most recent MOCA on 12/13/21  Keep physically, mentally, and socially active   PTA medication: Seroquel 25 mg po daily at bedtime  Was started on IV depakote 250 mg Q8 hours by primary team   Recommend decreasing depakote dose to 125 mg Q12 hours, continue to monitor moods, increase dosage slowly if needed, can change to depakote sprinkles if patient is agreeable to taking medications   LFT's are stable  Would recommend EKG to monitor QTc in setting of taking seroquel 25 mg daily at bedtime  Last EKG on 03/04 was 484  If prolonged QTc would hold  seroquel  Recurrent major depressive disorder, in remission (HCC)  Patient has a history of anxiety/depression   Mood appears stable on exam today   Continue supportive care   PTA medication: mirtazapine 7.5 mg daily at bedtime  Acute kidney injury superimposed on stage 3b chronic kidney disease (HCC)  Lab Results   Component Value Date    EGFR 35 06/20/2025    EGFR 30 06/19/2025    EGFR  03/17/2025     Not performed on patients less than 18 years of age or greater than 97 years of age    CREATININE 1.25 06/20/2025    CREATININE 1.41 (H) 06/19/2025    CREATININE 1.29 (H) 03/17/2025     Baseline creatinine: 1.3-1.4  Continue to monitor kidney function  Monitor I/O's  Encourage PO hydration   Avoid hypotension  Avoid nephrotoxins, IV contrast  Received gentle IV fluids overnight  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)  Wt Readings from Last 3 Encounters:   06/19/25 63.5 kg (139 lb 15.9 oz)   06/13/25 63.5 kg (140 lb)   04/03/25 61.7 kg (136 lb)     Weight stable  Euvolemic  Does not appear short of breath, no leg swelling  Family noted to PCP on 06/13 of intermittent shortness of breath with exertion   ECHO on 03/06/25 showing EF 55%  Chest XR pending  PTA medication: furosemide 20 mg BID    Ambulatory dysfunction  Recently was having home health/physical therapy, re-ordered by PCP  Assess patient frequently for physical needs, encourage use of assistant devices as needed and directed by PT/OT  Identify cognitive and physical deficits and behaviors that affect risk of falls  Consider moving patient closer to nursing station to monitor more closely for impulsive behavior which may increase risk of falls  [place] fall and safety precautions   Educate patient/family on patient safety including physical limitations and importance of using call bell for assistance   Modify environment to reduce risk of injury including disconnecting from pole when not in use, ensuring adequate lighting  in room and restroom, ensuring that path to restroom is clear and free of trip hazards  PT/OT consulted to assist with strengthening/mobility and assist with discharge planning to appropriate level of care  Out of bed as tolerated  Family interested in PT/OT services  Goals of care, counseling/discussion  DNR/DNI  Palliative Care consulted, family not interested in hospice at this time, expressed interest in PT/OT services  Palliative care encounter  Palliative Care consulted, having goals of care discussion with family    Primary insomnia  First line is behavioral therapy   Avoid sedative hypnotics including benzodiazepines and benadryl  Encourage staying awake during the day   Encourage daytime activities and morning exercise   Decrease or eliminate daytime naps   Avoid caffeine especially during late afternoon and evening hours  Establish a nighttime routine  Implement sleep hygiene and limit nighttime interruptions  Continue melatonin 3 mg daily at bedtime and mirtazapine 7.5 mg daily at bedtime for sleep   Frailty syndrome in geriatric patient  Clinical Frail Scale: 6- Moderately Frail  Need help with all outside activities  Need help with stairs and bathing  May need assistance with dressing  Resides with family who assist with ADLs/IADLs  Appears to have good appetite  Albumin level on 06/20/25 was 3.3  Consider adding dietary supplements, Ensure or magic cup to meal trays  Continue to optimize co-morbidities   Fall precautions       History of Present Illness   Hx and PE limited by: dementia   HPI: Sona Valenzuela is a 89 y.o. year old female who presents with medical problems including, not limited to, MDD, Vascular Dementia, insomnia, Atrial fibrillation, ambulatory dysfunction, CHF, and hypertension.    Sona presents to the hospital with increased confusion and increased agitation at home.  She has lived with her niece and nephew for the past 30 years who help to care for her.    Upon entering the room,  Sona is sitting up out of bed, in the recliner.  She is alert and oriented x 1 (self).  Difficult maintaining concentration. Pleasantly confused. She is finishing up her breakfast.  Has a good appetite as she is finishing everything on her tray.  Currently denies pain.      Attempted to speak with niece, Xi, to obtain more detailed HPI.  Called with no answer.      Reviewed patient with RN.  States patient has been calm.      Review of Systems   Unable to perform ROS: Dementia           Medical History Review: I have reviewed the patient's PMH, PSH, Social History, Family History, Meds, and Allergies   Historical Information   Past Medical History[1]  Past Surgical History[2]  Social History[3]  E-Cigarette/Vaping    E-Cigarette Use Never User      E-Cigarette/Vaping Substances    Nicotine No     THC No     CBD No     Flavoring No     Other No     Unknown No      Family History[4]  Social History[5]    Current Facility-Administered Medications:     acetaminophen (TYLENOL) tablet 650 mg, Q8H PRN    docusate sodium (COLACE) capsule 100 mg, BID    [Held by provider] furosemide (LASIX) tablet 20 mg, BID    gabapentin (NEURONTIN) capsule 100 mg, TID    heparin (porcine) subcutaneous injection 5,000 Units, Q8H HESHAM **AND** [COMPLETED] Platelet count, Once    melatonin tablet 3 mg, HS    mirtazapine (REMERON) tablet 7.5 mg, HS    OLANZapine (ZyPREXA) IM injection 10 mg, Once PRN    ondansetron (ZOFRAN) injection 4 mg, Q6H PRN    QUEtiapine (SEROquel) tablet 25 mg, HS    simethicone (MYLICON) chewable tablet 80 mg, 4x Daily PRN    valproate (DEPACON) 250 mg in sodium chloride 0.9 % 50 mL IVPB, Q8H HESHAM, Last Rate: 250 mg (06/20/25 0501)  Albuterol, Aldactone [spironolactone], Aspirin, Atenolol, Bactrim [sulfamethoxazole-trimethoprim], Codeine, Hydrocodone, Ibuprofen, Lisinopril, Mevacor [lovastatin], Mirapex [pramipexole], Morphine and codeine, Other, Penicillins, Procaine, Salicylates, Ultram [tramadol], and Zoloft  [sertraline]    Meds/Allergies   Home medication review  -Melatonin 3 mg daily at bedtime  -Furosemide 20 mg tablet BID  Allegra 60 mg tablet daily  -Vitamin D2 50,000 units capsule weekly  Gabapentin 100 mg capsule TID (not filled since 12/31/24)  -Seroquel 25 mg tablet daily at bedtime  -Mirtazapine 7.5 mg tablet daily at bedtime    Personally confirmed with [facility], [place], [phone]     Objective :  Temp:  [97.3 °F (36.3 °C)-97.7 °F (36.5 °C)] 97.5 °F (36.4 °C)  HR:  [] 102  BP: (115-153)/(59-97) 118/66  Resp:  [16-22] 19  SpO2:  [90 %-97 %] 95 %  O2 Device: None (Room air)  Nasal Cannula O2 Flow Rate (L/min):  [2 L/min] 2 L/min    Physical Exam  Vitals and nursing note reviewed.   Constitutional:       General: She is not in acute distress.     Appearance: She is well-developed.      Comments: frail   HENT:      Head: Normocephalic and atraumatic.      Ears:      Comments: Hard of hearing     Mouth/Throat:      Mouth: Mucous membranes are moist.      Pharynx: Oropharynx is clear.      Comments: Missing teeth    Eyes:      Conjunctiva/sclera: Conjunctivae normal.      Comments: eyeglasses     Cardiovascular:      Rate and Rhythm: Normal rate and regular rhythm.      Heart sounds: No murmur heard.  Pulmonary:      Effort: Pulmonary effort is normal. No respiratory distress.      Breath sounds: Normal breath sounds.   Abdominal:      Palpations: Abdomen is soft.      Tenderness: There is no abdominal tenderness.     Musculoskeletal:         General: No swelling.      Cervical back: Neck supple.     Skin:     General: Skin is warm and dry.      Capillary Refill: Capillary refill takes less than 2 seconds.     Neurological:      Mental Status: She is alert. She is disoriented.     Psychiatric:         Mood and Affect: Mood normal.           Lab Results: I have reviewed the following results:CBC/BMP:   .     06/20/25  0439   WBC 5.40   HGB 8.8*   HCT 29.8*      SODIUM 141   K 4.3       CO2 27   BUN 33*   CREATININE 1.25   GLUC 101   MG 2.4    , Vitamins B1, B6, B12, A, and D:   Lab Results   Component Value Date    WZOFRTMR89 644 06/20/2025   , TSH:   Results from last 7 days   Lab Units 06/19/25  1624   TSH 3RD GENERATON uIU/mL 3.797   , Urinalysis:   Lab Results   Component Value Date    COLORU Light Yellow 06/19/2025    CLARITYU Clear 06/19/2025    SPECGRAV 1.013 06/19/2025    PHUR 6.0 06/19/2025    LEUKOCYTESUR Large (A) 06/19/2025    NITRITE Negative 06/19/2025    GLUCOSEU Negative 06/19/2025    KETONESU Negative 06/19/2025    BILIRUBINUR Negative 06/19/2025    BLOODU Negative 06/19/2025       Imaging Results Review: I reviewed radiology reports from this admission including: MRI brain.  Other Study Results Review: EKG was reviewed.     Therapies:   Basic Mobility Inpatient Raw Score: 6  [facility] Goal: 2: Bed activities/Dependent transfer  [facility] Achieved: 4: Move to chair/commode      VTE Prophylaxis: VTE covered by:  heparin (porcine), Subcutaneous, 5,000 Units at 06/20/25 0501     and Sequential compression device (Venodyne)     Code Status: Level 3 - DNAR and DNI      Family and Social Support: Niece: Xi, Nephew: Juancarlos      Goals of Care: TBD    I have spent a total time of 75 minutes in caring for this patient on the day of the visit/encounter including Diagnostic results, Prognosis, Risks and benefits of tx options, Instructions for management, Patient and family education, Importance of tx compliance, Risk factor reductions, Impressions, Counseling / Coordination of care, Documenting in the medical record, Reviewing/placing orders in the medical record (including tests, medications, and/or procedures), Obtaining or reviewing history  , and Communicating with other healthcare professionals .         [1]   Past Medical History:  Diagnosis Date    Ankle fracture     Arthritis     left hip    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HCC)     with left ureter    Bronchitis     Cancer (HCC)     Carcinoma, lung  (HCC)     Chronic kidney disease, stage 4 (severe) (HCC) 01/17/2025    Dementia (HCC)     Dependent edema     Depression     Diabetes mellitus (HCC)     Dizziness 12/13/2021    Hypercholesterolemia     Hypertension     Kidney stone     Oth abn and inconclusive findings on dx imaging of breast     Pes planus     Renal calculus     Restless leg syndrome     Rib pain     Sprain, neck     Varicose veins of left lower extremity    [2]   Past Surgical History:  Procedure Laterality Date    APPENDECTOMY      LUNG CANCER SURGERY      US BREAST CYST ASPIRATION LEFT INITIAL Left 3/23/2018   [3]   Social History  Tobacco Use    Smoking status: Former     Current packs/day: 0.50     Average packs/day: 0.5 packs/day for 20.0 years (10.0 ttl pk-yrs)     Types: Cigarettes    Smokeless tobacco: Never   Vaping Use    Vaping status: Never Used   Substance and Sexual Activity    Alcohol use: Never    Drug use: Never    Sexual activity: Not Currently     Birth control/protection: Post-menopausal   [4]   Family History  Problem Relation Name Age of Onset    No Known Problems Mother      No Known Problems Father      Dementia Brother      Breast cancer Neg Hx      Colon cancer Neg Hx      Ovarian cancer Neg Hx      Uterine cancer Neg Hx      Cervical cancer Neg Hx     [5]   Social History  Tobacco Use    Smoking status: Former     Current packs/day: 0.50     Average packs/day: 0.5 packs/day for 20.0 years (10.0 ttl pk-yrs)     Types: Cigarettes    Smokeless tobacco: Never   Vaping Use    Vaping status: Never Used   Substance and Sexual Activity    Alcohol use: Never    Drug use: Never    Sexual activity: Not Currently     Birth control/protection: Post-menopausal

## 2025-06-20 NOTE — ASSESSMENT & PLAN NOTE
Lab Results   Component Value Date    EGFR 35 06/20/2025    EGFR 30 06/19/2025    EGFR  03/17/2025     Not performed on patients less than 18 years of age or greater than 97 years of age    CREATININE 1.25 06/20/2025    CREATININE 1.41 (H) 06/19/2025    CREATININE 1.29 (H) 03/17/2025   Creatinine now near baseline

## 2025-06-20 NOTE — CONSULTS
Consultation - Palliative Care   Name: Sona Valenzuela 89 y.o. female I MRN: [id number]  Unit/Bed#: -01 I Date of Admission: 6/19/2025   Date of Service: 6/20/2025 I Hospital Day: 1   Inpatient consult to Palliative Care  Consult performed by: FARZANA Martini  Consult ordered by: Marshall Betancourt MD        Physician Requesting Evaluation: Marshall Betancourt MD   Reason for Evaluation / Principal Problem: Goals of care     Assessment & Plan  Acute kidney injury superimposed on stage 3b chronic kidney disease (HCC)  Lab Results   Component Value Date    EGFR 35 06/20/2025    EGFR 30 06/19/2025    EGFR  03/17/2025     Not performed on patients less than 18 years of age or greater than 97 years of age    CREATININE 1.25 06/20/2025    CREATININE 1.41 (H) 06/19/2025    CREATININE 1.29 (H) 03/17/2025   Creatinine now near baseline  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)  Wt Readings from Last 3 Encounters:   06/19/25 63.5 kg (139 lb 15.9 oz)   06/13/25 63.5 kg (140 lb)   04/03/25 61.7 kg (136 lb)             Ambulatory dysfunction    Vascular dementia with behavioral disturbance (HCC)  Pleasantly confused at baseline, ambulatory.  Weight has been consistent for past 6 months.   Presented with departure from baseline.  Verbally agressive, throwing items.   Per chart review patient has has increased confusion and agression at night since May   Gerontology consulted.  Goals of care, counseling/discussion  Limits of DNR DNI   Previously engage in hospice conversations with PCP.   Xi Scott closest relative has been caretaker for past 30 years.  Family confirms continued life prolonging cares.  They are not currently interested in hospice care.   Palliative care encounter  Palliative Diagnosis: advancing dementia, advanced age, CHF     Goals:   Limits of DNR DNI   Palliative will follow for ongoing goals of care discussions as situation evolves.    Social Support:  Patient's support  system: nikorina Layne and nephew Juancarlos  Supportive listening provided  Normalized experience of patient    Care Coordination  Case discussed with hospice, TONY Betancourt    Follow-up  We appreciate the opportunity to participate in this patient's care.   We will continue to follow while admitted.    Please do not hesitate to contact our on-call provider through [employer] Secure Chat or contact [phone] should there be an acute change or other symptom control concerns.    Palliative Care service will follow.    Decisional apparatus: Patient is not competent on my exam today. If competence is lost, patient's substitute decision maker would default to elisa Layne  by [place] Act 169.     PDMP Review: I have reviewed the patient's controlled substance dispensing history in the Prescription Drug Monitoring Program in compliance with the [employer] regulations before prescribing any controlled substances.    History of Present Illness   HPI: Sona Valenzuela is a 89 y.o. year old female with a past medical history of vascular dementia, CKD who presents with verbal aggression and cognitive decline.    The patient lives with her niece and nephew who are identified as only available family.  Spoke with elisa Layne via phone who confirms ongoing life prolonging cares.  They are interested in pursuing PT/OT.  They are aware of hospice.    Review of Systems   Unable to perform ROS: Dementia     Medical History Review: I have reviewed the patient's PMH, PSH, Social History, Family History, Meds, and Allergies     Objective :  Temp:  [97.3 °F (36.3 °C)-97.7 °F (36.5 °C)] 97.5 °F (36.4 °C)  HR:  [] 102  BP: (115-153)/(59-97) 118/66  Resp:  [16-22] 19  SpO2:  [90 %-97 %] 95 %  O2 Device: None (Room air)  Nasal Cannula O2 Flow Rate (L/min):  [2 L/min] 2 L/min    Physical Exam  Vitals and nursing note reviewed.     Cardiovascular:      Rate and Rhythm: Rhythm irregular.     Skin:     General: Skin is warm and dry.     Neurological:       Mental Status: She is alert. Mental status is at baseline.     Psychiatric:         Attention and Perception: She is inattentive.         Behavior: Behavior is cooperative.         Cognition and Memory: Cognition is impaired. Memory is impaired.            Lab Results: I have reviewed the following results:  Lab Results   Component Value Date/Time    SODIUM 141 06/20/2025 04:39 AM    SODIUM 142 03/17/2025 05:28 AM    K 4.3 06/20/2025 04:39 AM    K 4.3 03/17/2025 05:28 AM    BUN 33 (H) 06/20/2025 04:39 AM    BUN 32 (H) 03/17/2025 05:28 AM    CREATININE 1.25 06/20/2025 04:39 AM    CREATININE 1.29 (H) 03/17/2025 05:28 AM    GLUC 101 06/20/2025 04:39 AM    GLUC 91 03/17/2025 05:28 AM    GLUC 96 04/02/2018 03:13 PM    CALCIUM 8.6 06/20/2025 04:39 AM    CALCIUM 8.4 (L) 03/17/2025 05:28 AM    AST 33 06/20/2025 04:39 AM    ALT 23 06/20/2025 04:39 AM    ALB 3.3 (L) 06/20/2025 04:39 AM    TP 6.4 06/20/2025 04:39 AM    EGFR 35 06/20/2025 04:39 AM    EGFR  03/17/2025 05:28 AM     Not performed on patients less than 18 years of age or greater than 97 years of age     Lab Results   Component Value Date/Time    HGB 8.8 (L) 06/20/2025 04:39 AM    WBC 5.40 06/20/2025 04:39 AM     06/20/2025 04:39 AM    INR 1.20 (H) 03/04/2025 11:08 AM    PTT 28 03/04/2025 11:08 AM     Lab Results   Component Value Date/Time    [id number] 3.797 06/19/2025 04:24 PM       Imaging Results Review: I reviewed radiology reports from this admission including: chest xray.  Other Study Results Review: No additional pertinent studies reviewed.    Code Status: Level 3 - DNAR and DNI  Advance Directive and Living Will:      Power of :    POLST:      Administrative Statements   I have spent a total time of 45+  minutes in caring for this patient on the day of the visit/encounter including Diagnostic results, Prognosis, Risks and benefits of tx options, Instructions for management, Patient and family education, Risk factor reductions, Impressions,  Counseling / Coordination of care, Documenting in the medical record, Reviewing/placing orders in the medical record (including tests, medications, and/or procedures), Obtaining or reviewing history  , and Communicating with other healthcare professionals .

## 2025-06-20 NOTE — ASSESSMENT & PLAN NOTE
Patient has a history of anxiety/depression   Mood appears stable on exam today   Continue supportive care   PTA medication: mirtazapine 7.5 mg daily at bedtime

## 2025-06-20 NOTE — ASSESSMENT & PLAN NOTE
Wt Readings from Last 3 Encounters:   06/19/25 63.5 kg (139 lb 15.9 oz)   06/13/25 63.5 kg (140 lb)   04/03/25 61.7 kg (136 lb)     Lab Results   Component Value Date     (H) 03/04/2025     (H) 06/04/2024    LVEF 55 03/06/2025     Will resume Lasix

## 2025-06-20 NOTE — ASSESSMENT & PLAN NOTE
First line is behavioral therapy   Avoid sedative hypnotics including benzodiazepines and benadryl  Encourage staying awake during the day   Encourage daytime activities and morning exercise   Decrease or eliminate daytime naps   Avoid caffeine especially during late afternoon and evening hours  Establish a nighttime routine  Implement sleep hygiene and limit nighttime interruptions  Continue melatonin 3 mg daily at bedtime and mirtazapine 7.5 mg daily at bedtime for sleep

## 2025-06-20 NOTE — ASSESSMENT & PLAN NOTE
Lab Results   Component Value Date    EGFR 35 06/20/2025    EGFR 30 06/19/2025    EGFR  03/17/2025     Not performed on patients less than 18 years of age or greater than 97 years of age    CREATININE 1.25 06/20/2025    CREATININE 1.41 (H) 06/19/2025    CREATININE 1.29 (H) 03/17/2025     Baseline creatinine is 1.2-1.3  Current is 1.2  Discontinue IV fluids

## 2025-06-20 NOTE — PROGRESS NOTES
Progress Note - Hospitalist   Name: Sona Valenzuela 89 y.o. female I MRN: [id number]  Unit/Bed#: [facility] I Date of Admission: 6/19/2025   Date of Service: 6/20/2025 I Hospital Day: 1    Assessment & Plan  Vascular dementia with behavioral disturbance (HCC)  Per niece, reported to have increased bouts of confusion, combativeness more freuqently over the past few weeks  Recently was treated for UTI in May and completed nitrofurantoin regimen at that time; prior MDRO on urine clx    Recheck UA with scope/culture; if concerning for infection, empirically start Invanz based on prior sensitivities from 5/28/22025  Continue home regimen of Remeron and Seroquel  Case management consulted for assistance with placement as well  Depakote 250 mg started this admission  PRN zyprexa for agitation  Today's update and plan:  During my encounter, patient is sitting comfortably in the chair eating her lunch.  Pleasantly confused.  Did not report any pain anywhere in the body.  Continue with IV antibiotic until urine culture results  Repeat B12, folate and ammonia levels were insignificant  Acute kidney injury superimposed on stage 3b chronic kidney disease (HCC)  Lab Results   Component Value Date    EGFR 35 06/20/2025    EGFR 30 06/19/2025    EGFR  03/17/2025     Not performed on patients less than 18 years of age or greater than 97 years of age    CREATININE 1.25 06/20/2025    CREATININE 1.41 (H) 06/19/2025    CREATININE 1.29 (H) 03/17/2025     Baseline creatinine is 1.2-1.3  Current is 1.2  Discontinue IV fluids  Recurrent major depressive disorder, in remission (HCC)  Continue home regimen Seroquel and mirtazapine  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)  Wt Readings from Last 3 Encounters:   06/19/25 63.5 kg (139 lb 15.9 oz)   06/13/25 63.5 kg (140 lb)   04/03/25 61.7 kg (136 lb)     Lab Results   Component Value Date     (H) 03/04/2025     (H) 06/04/2024    LVEF 55 03/06/2025      Will resume Lasix  Ambulatory dysfunction  PT/OT Eval and treat  Trend Mobility Scores  Encourage appropriate mobility to achieve and improve upon HLM Goals as noted  Primary insomnia  Mirtazapine  Goals of care, counseling/discussion  Palliative consulted  Frailty syndrome in geriatric patient  Appreciate geriatrics    VTE Pharmacologic Prophylaxis: VTE Score: 4 Moderate Risk (Score 3-4) - Pharmacological DVT Prophylaxis Ordered: heparin.    Mobility:   Basic Mobility Inpatient Raw Score: 6  JH-HLM Goal: 2: Bed activities/Dependent transfer  JH-HLM Achieved: 4: Move to chair/commode  JH-HLM Goal NOT achieved. Continue with multidisciplinary rounding and encourage appropriate mobility to improve upon JH-HLM goals.    Patient Centered Rounds: I performed bedside rounds with nursing staff today.   Discussions with Specialists or Other Care Team Provider: Palliative care    Education and Discussions with Family / Patient: Will reach out to family.     Current Length of Stay: 1 day(s)  Current Patient Status: Inpatient   Certification Statement: The patient will continue to require additional inpatient hospital stay due to assessment and plan  Discharge Plan: Anticipate discharge in 48-72 hrs to discharge location to be determined pending rehab evaluations.    Code Status: Level 3 - DNAR and DNI    Subjective   Patient seen and examined at bedside.  She is pleasantly confused  Did not report any complaint    Objective :  Temp:  [97.5 °F (36.4 °C)-97.7 °F (36.5 °C)] 97.6 °F (36.4 °C)  HR:  [] 75  BP: (115-153)/(59-79) 119/66  Resp:  [17-21] 19  SpO2:  [90 %-97 %] 96 %  O2 Device: None (Room air)  Nasal Cannula O2 Flow Rate (L/min):  [2 L/min] 2 L/min    Body mass index is 26.45 kg/m².     Input and Output Summary (last 24 hours):     Intake/Output Summary (Last 24 hours) at 6/20/2025 3580  Last data filed at 6/20/2025 0001  Gross per 24 hour   Intake --   Output 350 ml   Net -350 ml       Physical  Exam  Pleasantly confused  S1 plus S2  Normal vascular breath  No pedal edema  Spontaneously moving upper and lower limbs    Lines/Drains:              Lab Results: I have reviewed the following results:   Results from last 7 days   Lab Units 06/20/25  0439   WBC Thousand/uL 5.40   HEMOGLOBIN g/dL 8.8*   HEMATOCRIT % 29.8*   PLATELETS Thousands/uL 224   SEGS PCT % 63   LYMPHO PCT % 16   MONO PCT % 17*   EOS PCT % 3     Results from last 7 days   Lab Units 06/20/25  0439   SODIUM mmol/L 141   POTASSIUM mmol/L 4.3   CHLORIDE mmol/L 107   CO2 mmol/L 27   BUN mg/dL 33*   CREATININE mg/dL 1.25   ANION GAP mmol/L 7   CALCIUM mg/dL 8.6   ALBUMIN g/dL 3.3*   TOTAL BILIRUBIN mg/dL 1.18*   ALK PHOS U/L 110*   ALT U/L 23   AST U/L 33   GLUCOSE RANDOM mg/dL 101         Results from last 7 days   Lab Units 06/20/25  1630 06/19/25  2117   POC GLUCOSE mg/dl 111 95               Recent Cultures (last 7 days):         Imaging Results Review: I reviewed radiology reports from this admission including: chest xray.  Other Study Results Review: Other studies reviewed include: CBC and BMP    Last 24 Hours Medication List:     Current Facility-Administered Medications:     acetaminophen (TYLENOL) tablet 650 mg, Q8H PRN    [DISCONTINUED] valproate (DEPACON) 125 mg in sodium chloride 0.9 % 50 mL IVPB, Q12H HESHAM **OR** divalproex sodium (DEPAKOTE SPRINKLE) capsule 125 mg, Q12H HESHAM    docusate sodium (COLACE) capsule 100 mg, BID    [Held by provider] furosemide (LASIX) tablet 20 mg, BID    heparin (porcine) subcutaneous injection 5,000 Units, Q8H HESHAM **AND** [COMPLETED] Platelet count, Once    melatonin tablet 3 mg, HS    mirtazapine (REMERON) tablet 7.5 mg, HS    OLANZapine (ZyPREXA) IM injection 10 mg, Once PRN    ondansetron (ZOFRAN) injection 4 mg, Q6H PRN    QUEtiapine (SEROquel) tablet 25 mg, HS    simethicone (MYLICON) chewable tablet 80 mg, 4x Daily PRN    Administrative Statements   Today, Patient Was Seen By: Marshall Betancourt,  MD      **Please Note: This note may have been constructed using a voice recognition system.**

## 2025-06-20 NOTE — ASSESSMENT & PLAN NOTE
DNR/DNI  Palliative Care consulted, family not interested in hospice at this time, expressed interest in PT/OT services

## 2025-06-20 NOTE — ASSESSMENT & PLAN NOTE
History of prior memory issues and cognitive impairment   Presented to hospital for increased confusion, combativeness  Patient has lived with her niece and nephew for the past 30 years  Dependent with ADLs/IADLs    Alert and oriented x 1 on exam today (self), pleasant  Good appetite as she is finishing her full breakfast, drinking all her juice and coffee  Previously followed with [facility]   Last seen on 09/29/23  Appears patient has occurences of sundowning, recently managed by PCP   PCP speaking with family of trial of increase dose of seroquel vs adding melatonin, family had opted to trial of melatonin, started on 06/13  Has history of hallucinations    Most recent TSH on 06/19/25 noted to be 3.797  Most recent vitamin B12 level on 01/24/23 noted to be 407  MRI brain on 03/13/25 revealed mild microangiopathic change  Patient scored 4/30 on most recent MOCA on 12/13/21  Keep physically, mentally, and socially active   PTA medication: Seroquel 25 mg po daily at bedtime  Was started on IV depakote 250 mg Q8 hours by primary team   Recommend decreasing depakote dose to 125 mg Q12 hours, continue to monitor moods, increase dosage slowly if needed, can change to depakote sprinkles if patient is agreeable to taking medications   LFT's are stable  Would recommend EKG to monitor QTc in setting of taking seroquel 25 mg daily at bedtime  Last EKG on 03/04 was 484  If prolonged QTc would hold seroquel

## 2025-06-20 NOTE — OCCUPATIONAL THERAPY NOTE
Patient is a 89 y.o. female seen for OT evaluation s/p admit to [facility] on 6/19/2025. Commorbidities affecting patient's functional performance at time of assessment include: Vascular Dementia, with behavioral disturbance, Acute Kidney, Stage 3 CKD, Recurrent major depression Orders placed for OT evaluation and treatment.  Performed at least two patient identifiers during session including name and wristband.  Prior to admission,  Patient unable to provide information secondary to cognitive deficits. Chart review indicates patient lives with family in a 2 story house, no KAY with full flight to second floor bedroom. Patient required assistance with ADLs/ IADLs and was ambulatory without an AD.  Personal factors affecting patient at time of initial evaluation include: steps to enter, limited insight into deficits, decreased initiation and engagement, difficulty performing ADLs, and difficulty performing IADLs. Upon evaluation, patient requires  assist for UB ADLs, moderate assist for LB ADLs, transfers and functional ambulation in room and bathroom with maximal assist, with the use of Rolling Walker.  Occupational performance is affected by the following deficits: orientation, decreased functional use of BUEs, degenerative arthritic joint changes, impaired gross motor coordination, dynamic sit/ stand balance deficit with poor standing tolerance time for self care and functional mobility, decreased activity tolerance, impaired memory, impaired problem solving, decreased safety awareness, increased pain, and postural control and postural alignment deficit, requiring external assistance to complete transitional movements.  Patient to benefit from continued Occupational Therapy treatment while in the hospital to address deficits as defined above and maximize level of functional independence with ADLs and functional mobility. Occupational Performance areas to address include: bathing/ shower, dressing,  toilet hygiene, transfer to all surfaces, functional mobility, emergency response, IADLs: safety procedures, and Leisure Participation. From OT standpoint, recommendation at time of d/c would be Level 2.        ADL levels based on functional performance during OT eval         Pt seen as a co-eval with PT due to the patient's co-morbidities, clinically unstable presentation, and present impairments which are a regression from the patient's baseline.    No

## 2025-06-20 NOTE — ASSESSMENT & PLAN NOTE
Limits of DNR DNI   Previously engage in hospice conversations with PCP.   Niece, Xi closest relative has been caretaker for past 30 years.  Family confirms continued life prolonging cares.  They are not currently interested in hospice care.

## 2025-06-20 NOTE — ASSESSMENT & PLAN NOTE
Clinical Frail Scale: 6- Moderately Frail  Need help with all outside activities  Need help with stairs and bathing  May need assistance with dressing  Resides with family who assist with ADLs/IADLs  Appears to have good appetite  Albumin level on 06/20/25 was 3.3  Consider adding dietary supplements, Ensure or magic cup to meal trays  Continue to optimize co-morbidities   Fall precautions

## 2025-06-20 NOTE — PLAN OF CARE
Problem: PHYSICAL THERAPY ADULT  Goal: Performs mobility at highest level of function for planned discharge setting.  See evaluation for individualized goals.  Description: Treatment/Interventions: Functional transfer training, LE strengthening/ROM, Therapeutic exercise, Endurance training, Cognitive reorientation, Patient/family training, Equipment eval/education, Bed mobility, Gait training, OT          See flowsheet documentation for full assessment, interventions and recommendations.  Note: Prognosis: Fair  Problem List: Decreased strength, Decreased endurance, Impaired balance, Decreased mobility, Decreased cognition, Decreased safety awareness  Assessment: Sona Valenzuela is a 89 y.o. female admitted to [facility] on 6/19/2025. Pt  has a past medical history of Ankle fracture, Arthritis, Bladder cancer, Bronchitis, Cancer, Carcinoma, lung, Chronic kidney disease, stage 4 (severe), Dementia, Dependent edema, Depression, Diabetes mellitus, Dizziness, Hypercholesterolemia, Hypertension, Kidney stone, Pes planus, Renal calculus, Restless leg syndrome, and Varicose veins of left lower extremity. Order was placed for PT eval and tx. Pt was seen on 6/20/2025 for an assessment of functional mobility, strength, balance and d/c planning. Chart review and two person identifiers were completed. The pt presents with decreased strength , decreased static sitting balance , decreased dynamic sitting balance , decreased static standing balance, decreased dynamic standing balance , decreased gait speed, decreased step length , decreased cognition, decreased safety awareness , and decreased muscular endurance . Due to these deficits and functional limitations, the pt will require assistance to perform bed mobility, sit to stand , ambulation, stair negotiation, and transfers. Pt is currently functioning at a moderate assistance x2 level for bed mobility, maximum assistance x2 level for transfers, and maximum assistance x2 level for  ambulation with Rolling Walker. Due to present impairments and functional limitations, pt participation in previous roles and responsibilities at home or in the community  is significantly reduced. The patient's AM-PAC Basic Mobility Inpatient Short Form Raw Score is 6. PT recommends level II moderate resource intensity as a result of the pt's current presentation, rehab potential, and level of social support. The pt will benefit from skilled physical therapy to address to reduce the risk of falls, to allow for safe ambulation, to decrease care giver burden , to maximize functional potential, and to facilitate safe discharge .  Barriers to Discharge: Other (Comment), Decreased caregiver support (decline in functional mobility)     Rehab Resource Intensity Level, PT: II (Moderate Resource Intensity)    See flowsheet documentation for full assessment.      Lula Bailon, SPT

## 2025-06-20 NOTE — ASSESSMENT & PLAN NOTE
Recently was having home health/physical therapy, re-ordered by PCP  Assess patient frequently for physical needs, encourage use of assistant devices as needed and directed by PT/OT  Identify cognitive and physical deficits and behaviors that affect risk of falls  Consider moving patient closer to nursing station to monitor more closely for impulsive behavior which may increase risk of falls  [place] fall and safety precautions   Educate patient/family on patient safety including physical limitations and importance of using call bell for assistance   Modify environment to reduce risk of injury including disconnecting from pole when not in use, ensuring adequate lighting in room and restroom, ensuring that path to restroom is clear and free of trip hazards  PT/OT consulted to assist with strengthening/mobility and assist with discharge planning to appropriate level of care  Out of bed as tolerated  Family interested in PT/OT services

## 2025-06-20 NOTE — ASSESSMENT & PLAN NOTE
Wt Readings from Last 3 Encounters:   06/19/25 63.5 kg (139 lb 15.9 oz)   06/13/25 63.5 kg (140 lb)   04/03/25 61.7 kg (136 lb)

## 2025-06-20 NOTE — ASSESSMENT & PLAN NOTE
Per niece, reported to have increased bouts of confusion, combativeness more freuqently over the past few weeks  Recently was treated for UTI in May and completed nitrofurantoin regimen at that time; prior MDRO on urine clx    Recheck UA with scope/culture; if concerning for infection, empirically start Invanz based on prior sensitivities from 5/28/22025  Continue home regimen of Remeron and Seroquel  Case management consulted for assistance with placement as well  Depakote 250 mg started this admission  PRN zyprexa for agitation  Today's update and plan:  During my encounter, patient is sitting comfortably in the chair eating her lunch.  Pleasantly confused.  Did not report any pain anywhere in the body.  Continue with IV antibiotic until urine culture results  Repeat B12, folate and ammonia levels were insignificant

## 2025-06-20 NOTE — OCCUPATIONAL THERAPY NOTE
Occupational Therapy Evaluation        Patient Name: Sona Valenzuela  Today's Date: 6/20/2025 06/20/25 0957   OT Last Visit   OT Visit Date 06/20/25   Note Type   Note type Evaluation   Pain Assessment   Pain Assessment Tool 0-10   Pain Score No Pain   Restrictions/Precautions   Weight Bearing Precautions Per Order No   Other Precautions Cognitive;Chair Alarm;Bed Alarm;Fall Risk   Home Living   Type of Home House   Home Layout Two level;Bed/bath upstairs;Performs ADLs on one level  (no KAY/ full flight to second floor)   Home Equipment Walker;Wheelchair-manual;Wheelchair-electric   Additional Comments ambulatory with a walker at baseline   Prior Function   Level of [place] Needs assistance with ADLs;Needs assistance with functional mobility;Needs assistance with IADLS   Lives With Family   Receives Help From Family   IADLs Family/Friend/Other provides transportation;Family/Friend/Other provides meals;Family/Friend/Other provides medication management   Falls in the last 6 months   (unknown)   Lifestyle   Autonomy Patient unable to provide information secondary to cognitive deficits. Chart review indicates patient lives with family in a 2 story house, no KAY with full flight to second floor bedroom. Patient required assistance with ADLs/ IADLs and was ambulatory without an AD.   Reciprocal Relationships Supportive Family   General   Family/Caregiver Present No   ADL   Where Assessed Edge of bed   Eating Assistance 5  Supervision/Setup   Grooming Assistance 4  Minimal Assistance   UB Bathing Assistance 3  Moderate Assistance   LB Bathing Assistance 2  Maximal Assistance   UB Dressing Assistance 3  Moderate Assistance   LB Dressing Assistance 2  Maximal Assistance   Toileting Assistance  2  Maximal Assistance   Functional Assistance 2  Maximal Assistance   Bed Mobility   Supine to Sit 3  Moderate assistance   Additional items Assist x 2;HOB elevated;Increased time required;Verbal cues;LE  "management  (log rolling technique for back safety)   Transfers   Sit to Stand 2  Maximal assistance   Additional items Assist x 2;Increased time required;Verbal cues  (tendency to retropulse requiring assistance to correct posture)   Stand to Sit 2  Maximal assistance   Additional items Assist x 2;Increased time required;Verbal cues   Functional Mobility   Functional Mobility 2  Maximal assistance   Additional Comments assist of 2 with RW/ verbal and physical cues to correct posture/ \"scisorring gait\".   Balance   Static Sitting Fair   Dynamic Sitting Fair -   Static Standing Poor -   Dynamic Standing Poor -   Activity Tolerance   Activity Tolerance Patient limited by fatigue   Medical Staff Made Aware Pt seen as a co-eval with PT due to the patient's co-morbidities, clinically unstable presentation, and present impairments which are a regression from the patient's baseline.   RUE Assessment   RUE Assessment X   RUE Strength   RUE Overall Strength Deficits  (3/5)   LUE Assessment   LUE Assessment X   LUE Strength   LUE Overall Strength Deficits  (3/5)   Hand Function   Gross Motor Coordination Impaired   Fine Motor Coordination Impaired   Sensation   Light Touch No apparent deficits  (BUEs)   Vision-Basic Assessment   Current Vision Does not wear glasses   Cognition   Overall Cognitive Status Impaired   Arousal/Participation Alert;Responsive;Cooperative   Attention Attends with cues to redirect   Orientation Level Oriented to person;Disoriented to place;Disoriented to time;Disoriented to situation   Memory Decreased recall of biographical information;Decreased short term memory   Following Commands Follows one step commands with increased time or repetition   Assessment   Limitation Decreased ADL status;Decreased UE strength;Decreased Safe judgement during ADL;Decreased cognition;Decreased endurance;Decreased self-care trans;Decreased fine motor control;Decreased high-level ADLs   Prognosis Good   Assessment " Patient is a 89 y.o. female seen for OT evaluation s/p admit to [facility] on 6/19/2025. Commorbidities affecting patient's functional performance at time of assessment include: Vascular Dementia, with behavioral disturbance, Acute Kidney, Stage 3 CKD, Recurrent major depression Orders placed for OT evaluation and treatment.  Performed at least two patient identifiers during session including name and wristband.  Prior to admission,  Patient unable to provide information secondary to cognitive deficits. Chart review indicates patient lives with family in a 2 story house, no KAY with full flight to second floor bedroom. Patient required assistance with ADLs/ IADLs and was ambulatory without an AD.  Personal factors affecting patient at time of initial evaluation include: steps to enter, limited insight into deficits, decreased initiation and engagement, difficulty performing ADLs, and difficulty performing IADLs. Upon evaluation, patient requires  assist for UB ADLs, moderate assist for LB ADLs, transfers and functional ambulation in room and bathroom with maximal assist, with the use of Rolling Walker.  Occupational performance is affected by the following deficits: orientation, decreased functional use of BUEs, degenerative arthritic joint changes, impaired gross motor coordination, dynamic sit/ stand balance deficit with poor standing tolerance time for self care and functional mobility, decreased activity tolerance, impaired memory, impaired problem solving, decreased safety awareness, increased pain, and postural control and postural alignment deficit, requiring external assistance to complete transitional movements.  Patient to benefit from continued Occupational Therapy treatment while in the hospital to address deficits as defined above and maximize level of functional independence with ADLs and functional mobility. Occupational Performance areas to address include: bathing/ shower, dressing,  toilet hygiene, transfer to all surfaces, functional mobility, emergency response, IADLs: safety procedures, and Leisure Participation. From OT standpoint, recommendation at time of d/c would be Level 2.   Plan   Treatment Interventions ADL retraining;Functional transfer training;Endurance training;Cognitive reorientation;Patient/family training;Equipment evaluation/education;Compensatory technique education;Continued evaluation;Energy conservation;Activityengagement   Goal Expiration Date 07/04/25   OT Frequency 3-5x/wk   Discharge Recommendation   Rehab Resource Intensity Level, OT II (Moderate Resource Intensity)   AM-PAC Daily Activity Inpatient   Lower Body Dressing 1   Bathing 1   Toileting 1   Upper Body Dressing 2   Grooming 2   Eating 3   Daily Activity Raw Score 10   AM-PAC Applied Cognition Inpatient   Following a Speech/Presentation 3   Understanding Ordinary Conversation 2   Taking Medications 1   Remembering Where Things Are Placed or Put Away 1   Remembering List of 4-5 Errands 1   Taking Care of Complicated Tasks 1   Applied Cognition Raw Score 9   Applied Cognition Standardized Score 22.48       Occupational Therapy goals:  1- Patient will complete self feeding task at Mod I level  2- Patient will complete rolling to R/L with use of side rail and min assist x1.   3- Patient will increase OOB/ sitting tolerance to 2-4 hours per day for increased participation in self care and leisure tasks with no s/s of exertion.  4- Patient will verbalize and demonstrate weight shifting technique in bed and sitting position with 10% verbal cues  5- Patient will complete grooming task with set up assist.   6-Patient/ Family  will demonstrate competency with UE Home Exercise Program.  7- Patient will complete Interest checklist to explore participation in play/ leisure tasks for increased satisfaction and quality of life.

## 2025-06-20 NOTE — PHYSICAL THERAPY NOTE
Physical Therapy Evaluation    Patient's Name: Sona Valenzuela    Admitting Diagnosis  Vascular dementia, uncomplicated (HCC) [F01.50]  Difficulty walking [R26.2]  Vascular dementia with behavioral disturbance (HCC) [F01.518]  Ambulatory dysfunction [R26.2]  Need for case management follow-up [Z09]  Acute kidney injury superimposed on stage 3b chronic kidney disease (HCC) [N17.9, N18.32]    Problem List  Problem List[1]    Past Medical History  Past Medical History[2]    Past Surgical History  Past Surgical History[3]    Recent Imaging  XR chest portable    (Results Pending)       Recent Vital Signs  Vitals:    06/19/25 2202 06/20/25 0700 06/20/25 0730 06/20/25 1514   BP: 128/77 118/66  119/66   BP Location: Right arm Right arm  Right arm   Pulse: 89 102  75   Resp: 17 19     Temp: 97.7 °F (36.5 °C) 97.5 °F (36.4 °C)  97.6 °F (36.4 °C)   TempSrc: Oral Oral  Oral   SpO2: 97% 95% 95% 96%   Weight:       Height:                06/20/25 0931   PT Last Visit   PT Visit Date 06/20/25   Note Type   Note type Evaluation   Pain Assessment   Pain Assessment Tool 0-10   Pain Score No Pain   Restrictions/Precautions   Weight Bearing Precautions Per Order No   Other Precautions Cognitive;Chair Alarm;Bed Alarm;Fall Risk   Home Living   Type of Home House   Home Layout Two level;Bed/bath upstairs;Performs ADLs on one level  (no KAY/ full flight to second floor)   Home Equipment Walker;Wheelchair-manual;Wheelchair-electric   Additional Comments ambulatory w/ RW at baseline; pt is a poor historian, home setup taken from chart review   Prior Function   Level of [place] Needs assistance with ADLs;Needs assistance with functional mobility;Needs assistance with IADLS   Lives With Family   Receives Help From Family   IADLs Family/Friend/Other provides transportation;Family/Friend/Other provides meals;Family/Friend/Other provides medication management   Falls in the last 6 months   (unknown)   Comments pt is a poor historian, PLOF  taken from chart review   General   Family/Caregiver Present No   Cognition   Overall Cognitive Status Impaired   Arousal/Participation Arousable   Attention Attends with cues to redirect   Orientation Level Oriented to person;Disoriented to place;Disoriented to time;Disoriented to situation   Memory Decreased recall of biographical information;Decreased short term memory   Following Commands Follows one step commands with increased time or repetition   Comments pt agreeable to PT eval   RLE Assessment   RLE Assessment   (strength grossly assessed as 3+/5 during functional mobility)   LLE Assessment   LLE Assessment   (strength grossly assessed as 3+/5 during functional mobility)   Vision-Basic Assessment   Current Vision Does not wear glasses   Bed Mobility   Supine to Sit 3  Moderate assistance   Additional items Assist x 2;HOB elevated;Increased time required;Verbal cues;LE management   Transfers   Sit to Stand 2  Maximal assistance   Additional items Assist x 2;Increased time required;Verbal cues;HOB elevated   Stand to Sit 2  Maximal assistance   Additional items Assist x 2;Increased time required;Verbal cues;Armrests   Additional Comments w/ RW   Ambulation/Elevation   Gait pattern Retropulsion;Poor UE support;Decreased foot clearance;Short stride;Step to;Excessively slow;Knees flexed;Decreased hip extension;Shuffling   Gait Assistance 2  Maximal assist   Additional items Assist x 2;Verbal cues;Tactile cues   Assistive Device Rolling walker   Distance 5'   Balance   Static Sitting Fair   Dynamic Sitting Fair -   Static Standing Poor -   Dynamic Standing Poor -   Ambulatory Poor -   Endurance Deficit   Endurance Deficit Yes   Activity Tolerance   Activity Tolerance Patient limited by fatigue   Medical Staff Made Aware PT Sofy, MITESH Mayes  (Pt seen as a co-eval with OT due to the patient's co-morbidities, clinically unstable presentation, and present impairments which are a regression from the  patient's baseline. PT/OT goals addressed separately.)   Assessment   Prognosis Fair   Problem List Decreased strength;Decreased endurance;Impaired balance;Decreased mobility;Decreased cognition;Decreased safety awareness   Assessment Sona Valenzuela is a 89 y.o. female admitted to [facility] on 6/19/2025. Pt  has a past medical history of Ankle fracture, Arthritis, Bladder cancer, Bronchitis, Cancer, Carcinoma, lung, Chronic kidney disease, stage 4 (severe), Dementia, Dependent edema, Depression, Diabetes mellitus, Dizziness, Hypercholesterolemia, Hypertension, Kidney stone, Pes planus, Renal calculus, Restless leg syndrome, and Varicose veins of left lower extremity. Order was placed for PT eval and tx. Pt was seen on 6/20/2025 for an assessment of functional mobility, strength, balance and d/c planning. Chart review and two person identifiers were completed. The pt presents with decreased strength , decreased static sitting balance , decreased dynamic sitting balance , decreased static standing balance, decreased dynamic standing balance , decreased gait speed, decreased step length , decreased cognition, decreased safety awareness , and decreased muscular endurance . Due to these deficits and functional limitations, the pt will require assistance to perform bed mobility, sit to stand , ambulation, stair negotiation, and transfers. Pt is currently functioning at a moderate assistance x2 level for bed mobility, maximum assistance x2 level for transfers, and maximum assistance x2 level for ambulation with Rolling Walker. Due to present impairments and functional limitations, pt participation in previous roles and responsibilities at home or in the community  is significantly reduced. The patient's -[facility] Basic Mobility Inpatient Short Form Raw Score is 6. PT recommends level II moderate resource intensity as a result of the pt's current presentation, rehab potential, and level of social support. The pt will benefit from  skilled physical therapy to address to reduce the risk of falls, to allow for safe ambulation, to decrease care giver burden , to maximize functional potential, and to facilitate safe discharge .   Barriers to Discharge Other (Comment);Decreased caregiver support  (decline in functional mobility)   Goals   STG Expiration Date 06/30/25   Short Term Goal #1 Within 10 days patient will complete:   1) Bed mobility skills with moderate assistance x1 to improve functional independence and improve ability to return to previous living environment  2) Functional transfers with moderate assistance x1 to improve tolerance to ADLs and improve safe return to previous living environment  3) Ambulate 10' with moderate assistance x1 with the least restrictive AD without onset of symptoms or LOB and with stable vitals for safe ambulation at home/community distances  4) Improve static standing tolerance to 1 minute w/ RW and no excessive sway present to improve tolerance to ADLs.  5) Improve balance by 1 grade to improve dynamic stability and reduce risk of falls.   6) Improve LE MMT strength grades by 1 to improve mobility and improve standing/ambulation tolerance.  7) PT for ongoing pt and family education; DME needs and D/C planning to promote highest level of function in least restrictive environment.   Plan   Treatment/Interventions Functional transfer training;LE strengthening/ROM;Therapeutic exercise;Endurance training;Cognitive reorientation;Patient/family training;Equipment eval/education;Bed mobility;Gait training;OT   PT Frequency 3-5x/wk   Discharge Recommendation   Rehab Resource Intensity Level, PT II (Moderate Resource Intensity)   AM-PAC Basic Mobility Inpatient   Turning in Flat Bed Without Bedrails 1   Lying on Back to Sitting on Edge of Flat Bed Without Bedrails 1   Moving Bed to Chair 1   Standing Up From Chair Using Arms 1   Walk in Room 1   Climb 3-5 Stairs With Railing 1   Basic Mobility Inpatient Raw Score 6    Turning Head Towards Sound 3   Follow Simple Instructions 3   Low Function Basic Mobility Raw Score  12   Low Function Basic Mobility Standardized Score  18.33   [facility] Highest Level Of Mobility   [facility] Goal 2: Bed activities/Dependent transfer   -[facility] Achieved 5: Stand (1 or more minutes)   End of Consult   Patient Position at End of Consult Bedside chair;Bed/Chair alarm activated;All needs within reach       Recommendations                                                                                                              Pt will benefit from continued skilled IP PT to address the deficits mentioned above to improve functional mobility, improve tolerance to ADLs/IADLs, improve independence with transfers/ambulation, and maximize rehab potential.See flow sheet for goals and POC.     PT Evaluation Time: 09:31-09:57      Lula Bailon, SPT         [1]   Patient Active Problem List  Diagnosis    Degeneration of lumbar intervertebral disc    Lumbar radiculopathy    Arthropathy of lumbar facet joint    Restless legs syndrome    Primary insomnia    Vitamin D deficiency    Recurrent major depressive disorder, in remission (HCC)    Chronic atrial fibrillation (HCC)    Chronic congestive heart failure (HCC)    Does mobilize using walker    Vascular dementia, uncomplicated (HCC)    Mild protein-calorie malnutrition (HCC)    Acute on chronic HFpEF with severe pulmonary hypertension (HCC)    Primary hypertension    Paroxysmal A-fib (HCC)    Acute kidney injury superimposed on stage 3b chronic kidney disease (HCC)    Compression fracture of lumbar spine, non-traumatic (HCC)    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)    Chronic pain of left wrist    Ambulatory dysfunction    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, psychotic disturbance, mood disturbance, or anxiety (HCC)    Congestion of throat    Vascular dementia with behavioral disturbance (HCC)     Seizure-like activity (HCC)    Elevated troponin    Iron deficiency anemia    Primary malignant neoplasm of bladder (HCC)    Goals of care, counseling/discussion    Palliative care encounter    Frailty syndrome in geriatric patient   [2]   Past Medical History:  Diagnosis Date    Ankle fracture     Arthritis     left hip    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HCC)     with left ureter    Bronchitis     Cancer (HCC)     Carcinoma, lung (HCC)     Chronic kidney disease, stage 4 (severe) (HCC) 01/17/2025    Dementia (HCC)     Dependent edema     Depression     Diabetes mellitus (HCC)     Dizziness 12/13/2021    Hypercholesterolemia     Hypertension     Kidney stone     Oth abn and inconclusive findings on dx imaging of breast     Pes planus     Renal calculus     Restless leg syndrome     Rib pain     Sprain, neck     Varicose veins of left lower extremity    [3]   Past Surgical History:  Procedure Laterality Date    APPENDECTOMY      LUNG CANCER SURGERY      US BREAST CYST ASPIRATION LEFT INITIAL Left 3/23/2018

## 2025-06-20 NOTE — PLAN OF CARE
Problem: OCCUPATIONAL THERAPY ADULT  Goal: Performs self-care activities at highest level of function for planned discharge setting.  See evaluation for individualized goals.  Description: Treatment Interventions: ADL retraining, Functional transfer training, Endurance training, Cognitive reorientation, Patient/family training, Equipment evaluation/education, Compensatory technique education, Continued evaluation, Energy conservation, Activityengagement          See flowsheet documentation for full assessment, interventions and recommendations.   Note: Limitation: Decreased ADL status, Decreased UE strength, Decreased Safe judgement during ADL, Decreased cognition, Decreased endurance, Decreased self-care trans, Decreased fine motor control, Decreased high-level ADLs  Prognosis: Good  Assessment: Patient is a 89 y.o. female seen for OT evaluation s/p admit [facility] on [facility]19/2025. Commorbidities affecting patient's functional performance at time of assessment include: Vascular Dementia, with behavioral disturbance, Acute Kidney, Stage 3 CKD, Recurrent major depression Orders placed for OT evaluation and treatment.  Performed at least two patient identifiers during session including name and wristband.  Prior to admission,  Patient unable to provide information secondary to cognitive deficits. Chart review indicates patient lives with family in a 2 story house, no KAY with full flight to second floor bedroom. Patient required assistance with ADLs/ IADLs and was ambulatory without an AD.  Personal factors affecting patient at time of initial evaluation include: steps to enter, limited insight into deficits, decreased initiation and engagement, difficulty performing ADLs, and difficulty performing IADLs. Upon evaluation, patient requires  assist for UB ADLs, moderate assist for LB ADLs, transfers and functional ambulation in room and bathroom with maximal assist, with the use of Rolling Walker.   Occupational performance is affected by the following deficits: orientation, decreased functional use of BUEs, degenerative arthritic joint changes, impaired gross motor coordination, dynamic sit/ stand balance deficit with poor standing tolerance time for self care and functional mobility, decreased activity tolerance, impaired memory, impaired problem solving, decreased safety awareness, increased pain, and postural control and postural alignment deficit, requiring external assistance to complete transitional movements.  Patient to benefit from continued Occupational Therapy treatment while in the hospital to address deficits as defined above and maximize level of functional independence with ADLs and functional mobility. Occupational Performance areas to address include: bathing/ shower, dressing, toilet hygiene, transfer to all surfaces, functional mobility, emergency response, IADLs: safety procedures, and Leisure Participation. From OT standpoint, recommendation at time of d/c would be Level 2.     Rehab Resource Intensity Level, OT: II (Moderate Resource Intensity)

## 2025-06-20 NOTE — ASSESSMENT & PLAN NOTE
Palliative Diagnosis: advancing dementia, advanced age, CHF     Goals:   Limits of DNR DNI   Palliative will follow for ongoing goals of care discussions as situation evolves.    Social Support:  Patient's support system: niece Xi and nephew Juancarlos  Supportive listening provided  Normalized experience of patient    Care Coordination  Case discussed with hospice, TONY Betancourt    Follow-up  We appreciate the opportunity to participate in this patient's care.   We will continue to follow while admitted.    Please do not hesitate to contact our on-call provider through [employer] Secure Chat or contact [phone] should there be an acute change or other symptom control concerns.

## 2025-06-20 NOTE — ASSESSMENT & PLAN NOTE
Pleasantly confused at baseline, ambulatory.  Weight has been consistent for past 6 months.   Presented with departure from baseline.  Verbally agressive, throwing items.   Per chart review patient has has increased confusion and agression at night since May   Gerontology consulted.

## 2025-06-21 LAB
ANION GAP SERPL CALCULATED.3IONS-SCNC: 7 MMOL/L (ref 4–13)
BUN SERPL-MCNC: 36 MG/DL (ref 5–25)
CALCIUM SERPL-MCNC: 8.8 MG/DL (ref 8.4–10.2)
CHLORIDE SERPL-SCNC: 105 MMOL/L (ref 96–108)
CO2 SERPL-SCNC: 26 MMOL/L (ref 21–32)
CREAT SERPL-MCNC: 1.33 MG/DL (ref 0.6–1.3)
ERYTHROCYTE [DISTWIDTH] IN BLOOD BY AUTOMATED COUNT: 19.3 % (ref 11.6–15.1)
FLUAV RNA RESP QL NAA+PROBE: NEGATIVE
FLUBV RNA RESP QL NAA+PROBE: NEGATIVE
GFR SERPL CREATININE-BSD FRML MDRD: 32 ML/MIN/1.73SQ M
GLUCOSE SERPL-MCNC: 132 MG/DL (ref 65–140)
HCT VFR BLD AUTO: 29.6 % (ref 34.8–46.1)
HGB BLD-MCNC: 9.1 G/DL (ref 11.5–15.4)
MCH RBC QN AUTO: 22.7 PG (ref 26.8–34.3)
MCHC RBC AUTO-ENTMCNC: 30.7 G/DL (ref 31.4–37.4)
MCV RBC AUTO: 74 FL (ref 82–98)
PLATELET # BLD AUTO: 204 THOUSANDS/UL (ref 149–390)
PMV BLD AUTO: 10.2 FL (ref 8.9–12.7)
POTASSIUM SERPL-SCNC: 4.5 MMOL/L (ref 3.5–5.3)
RBC # BLD AUTO: 4.01 MILLION/UL (ref 3.81–5.12)
RSV RNA RESP QL NAA+PROBE: NEGATIVE
SARS-COV-2 RNA RESP QL NAA+PROBE: NEGATIVE
SODIUM SERPL-SCNC: 138 MMOL/L (ref 135–147)
WBC # BLD AUTO: 6.83 THOUSAND/UL (ref 4.31–10.16)

## 2025-06-21 PROCEDURE — 0241U HB NFCT DS VIR RESP RNA 4 TRGT: CPT

## 2025-06-21 PROCEDURE — 80048 BASIC METABOLIC PNL TOTAL CA: CPT | Performed by: STUDENT IN AN ORGANIZED HEALTH CARE EDUCATION/TRAINING PROGRAM

## 2025-06-21 PROCEDURE — 85027 COMPLETE CBC AUTOMATED: CPT | Performed by: STUDENT IN AN ORGANIZED HEALTH CARE EDUCATION/TRAINING PROGRAM

## 2025-06-21 PROCEDURE — 99233 SBSQ HOSP IP/OBS HIGH 50: CPT | Performed by: STUDENT IN AN ORGANIZED HEALTH CARE EDUCATION/TRAINING PROGRAM

## 2025-06-21 RX ORDER — OLANZAPINE 10 MG/2ML
2.5 INJECTION, POWDER, FOR SOLUTION INTRAMUSCULAR ONCE
Status: CANCELLED | OUTPATIENT
Start: 2025-06-21 | End: 2025-06-21

## 2025-06-21 RX ADMIN — ERTAPENEM SODIUM 500 MG: 1 INJECTION, POWDER, LYOPHILIZED, FOR SOLUTION INTRAMUSCULAR; INTRAVENOUS at 11:20

## 2025-06-21 RX ADMIN — HEPARIN SODIUM 5000 UNITS: 5000 INJECTION INTRAVENOUS; SUBCUTANEOUS at 17:49

## 2025-06-21 RX ADMIN — GUAIFENESIN AND DEXTROMETHORPHAN 10 ML: 100; 10 SYRUP ORAL at 21:06

## 2025-06-21 RX ADMIN — HEPARIN SODIUM 5000 UNITS: 5000 INJECTION INTRAVENOUS; SUBCUTANEOUS at 05:04

## 2025-06-21 RX ADMIN — DIVALPROEX SODIUM 125 MG: 125 CAPSULE, COATED PELLETS ORAL at 08:13

## 2025-06-21 RX ADMIN — QUETIAPINE FUMARATE 25 MG: 25 TABLET ORAL at 21:09

## 2025-06-21 RX ADMIN — Medication 3 MG: at 21:09

## 2025-06-21 RX ADMIN — DOCUSATE SODIUM 100 MG: 100 CAPSULE, LIQUID FILLED ORAL at 08:13

## 2025-06-21 RX ADMIN — DIVALPROEX SODIUM 125 MG: 125 CAPSULE, COATED PELLETS ORAL at 21:07

## 2025-06-21 RX ADMIN — FUROSEMIDE 20 MG: 20 TABLET ORAL at 08:13

## 2025-06-21 RX ADMIN — FUROSEMIDE 20 MG: 20 TABLET ORAL at 21:08

## 2025-06-21 RX ADMIN — HEPARIN SODIUM 5000 UNITS: 5000 INJECTION INTRAVENOUS; SUBCUTANEOUS at 21:07

## 2025-06-21 RX ADMIN — MIRTAZAPINE 7.5 MG: 15 TABLET, FILM COATED ORAL at 21:09

## 2025-06-21 RX ADMIN — DOCUSATE SODIUM 100 MG: 100 CAPSULE, LIQUID FILLED ORAL at 17:50

## 2025-06-21 RX ADMIN — OLANZAPINE 10 MG: 10 INJECTION, POWDER, FOR SOLUTION INTRAMUSCULAR at 00:35

## 2025-06-21 NOTE — DISCHARGE SUPPORT
Case Management Assessment & Discharge Planning Note    Patient name Sona Valenzuela  Location /-01 MRN [id number]  : 11/3/1924 Date 2025       Current Admission Date: 2025  Current Admission Diagnosis:Vascular dementia with behavioral disturbance (HCC)   Patient Active Problem List    Diagnosis Date Noted    Goals of care, counseling/discussion 2025    Palliative care encounter 2025    Frailty syndrome in geriatric patient 2025    Primary malignant neoplasm of bladder (HCC) 2025    Iron deficiency anemia 03/10/2025    Seizure-like activity ([facility]) 2025    Elevated troponin 2025    Congestion of throat 2025    Vascular dementia with behavioral disturbance ([facility])     Ambulatory dysfunction 2024    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, psychotic disturbance, mood disturbance, or anxiety ([facility]) 2024    Chronic pain of left wrist 10/28/2024    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 ([facility])     Acute on chronic HFpEF with severe pulmonary hypertension ([facility]) 2024    Primary hypertension 2024    Paroxysmal A-fib ([facility]) 2024    Compression fracture of lumbar spine, non-traumatic ([facility]) 2024    Mild protein-calorie malnutrition ([facility]) 2024    Vascular dementia, uncomplicated ([facility]) 2023    Does mobilize using walker 2023    Chronic congestive heart failure ([facility]) 2022    Chronic atrial fibrillation ([facility]) 10/11/2022    Recurrent major depressive disorder, in remission ([facility]) 2022    Acute kidney injury superimposed on stage 3b chronic kidney disease ([facility]) 2022    Vitamin D deficiency 2020    Primary insomnia 2019    Degeneration of lumbar intervertebral disc 2019    Lumbar radiculopathy 2019    Arthropathy of lumbar facet joint 2019    Restless legs syndrome 2019      LOS (days): 2  Geometric Mean LOS (GMLOS)  (days): 3  Days to GMLOS:1.3   Facility Authorization Initiated  [facility] received request for auth from : Zunilda ROLLINS  Authorization Request Submitted for: SNF  Requested Start of Care Date: 06/21/25  Facility Name: [facility] NPI: [id number]  Facility MD: Kathleen Berry  [facility] MD NPI: [id number]  Authorization initiated by contacting insurance: [employer]  Via: [employer] Portal  Clinicals submitted via: Portal Attachment  Pending reference #: [id number]   notified: Zunilda ROLLINS     Updates to authorization status will be noted in chart.    Please reach out to CM for updates on any clinical information.

## 2025-06-21 NOTE — ASSESSMENT & PLAN NOTE
Lab Results   Component Value Date    EGFR 32 06/21/2025    EGFR 35 06/20/2025    EGFR 30 06/19/2025    CREATININE 1.33 (H) 06/21/2025    CREATININE 1.25 06/20/2025    CREATININE 1.41 (H) 06/19/2025     Baseline creatinine is 1.2-1.3  Current is 1.2  Discontinue IV fluids

## 2025-06-21 NOTE — PROGRESS NOTES
Progress Note - Hospitalist   Name: Sona Valenzuela 89 y.o. female I MRN: [id number]  Unit/Bed#: [facility] I Date of Admission: 6/19/2025   Date of Service: 6/21/2025 I Hospital Day: 2    Assessment & Plan  Vascular dementia with behavioral disturbance (HCC)  Per niece, reported to have increased bouts of confusion, combativeness more freuqently over the past few weeks  Recently was treated for UTI in May and completed nitrofurantoin regimen at that time; prior MDRO on urine clx    Recheck UA with scope/culture; if concerning for infection, empirically start Invanz based on prior sensitivities from 5/28/22025  Continue home regimen of Remeron and Seroquel  Case management consulted for assistance with placement as well  Depakote 250 mg started this admission  PRN zyprexa for agitation  Today update and plan  Continue with IV antibiotic urine culture showed E. Coli  For total of 5 days  Patient got agitated last night given 10 mg of IM Zyprexa  During my encounter she is.  Confused and trying to get up from the bed.  Following all commands  Acute kidney injury superimposed on stage 3b chronic kidney disease (HCC)  Lab Results   Component Value Date    EGFR 32 06/21/2025    EGFR 35 06/20/2025    EGFR 30 06/19/2025    CREATININE 1.33 (H) 06/21/2025    CREATININE 1.25 06/20/2025    CREATININE 1.41 (H) 06/19/2025     Baseline creatinine is 1.2-1.3  Current is 1.2  Discontinue IV fluids  Recurrent major depressive disorder, in remission (HCC)  Continue home regimen Seroquel and mirtazapine  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)  Wt Readings from Last 3 Encounters:   06/19/25 63.5 kg (139 lb 15.9 oz)   06/13/25 63.5 kg (140 lb)   04/03/25 61.7 kg (136 lb)     Lab Results   Component Value Date     (H) 03/04/2025     (H) 06/04/2024    LVEF 55 03/06/2025     Lasix  Ambulatory dysfunction  PT/OT Eval and treat  Trend Mobility Scores  Encourage appropriate mobility to achieve  and improve upon HLM Goals as noted  Primary insomnia  Mirtazapine  Goals of care, counseling/discussion  Palliative consulted  Frailty syndrome in geriatric patient  Appreciate geriatrics    VTE Pharmacologic Prophylaxis: VTE Score: 4 Moderate Risk (Score 3-4) - Pharmacological DVT Prophylaxis Ordered: heparin.    Mobility:   Basic Mobility Inpatient Raw Score: 6  JH-HLM Goal: 2: Bed activities/Dependent transfer  JH-HLM Achieved: 2: Bed activities/Dependent transfer  JH-HLM Goal NOT achieved. Continue with multidisciplinary rounding and encourage appropriate mobility to improve upon JH-HLM goals.    Patient Centered Rounds: I performed bedside rounds with nursing staff today.   Discussions with Specialists or Other Care Team Provider: Palliative care    Education and Discussions with Family / Patient: Will reach out to family.     Current Length of Stay: 2 day(s)  Current Patient Status: Inpatient   Certification Statement: The patient will continue to require additional inpatient hospital stay due to assessment and plan  Discharge Plan: Anticipate discharge in 48-72 hrs to discharge location to be determined pending rehab evaluations.    Code Status: Level 3 - DNAR and DNI    Subjective   Patient seen and examined at bedside.  Patient got agitated last night.  She was given 10 mg of IM Zyprexa.  During my encounter, she told me her name following all commands but trying to get up from the bed    Objective :  Temp:  [97.1 °F (36.2 °C)-97.6 °F (36.4 °C)] 97.5 °F (36.4 °C)  HR:  [71-86] 86  BP: (113-135)/(66-81) 135/68  Resp:  [20-21] 21  SpO2:  [92 %-96 %] 92 %  O2 Device: None (Room air)    Body mass index is 26.45 kg/m².     Input and Output Summary (last 24 hours):     Intake/Output Summary (Last 24 hours) at 6/21/2025 1139  Last data filed at 6/21/2025 0405  Gross per 24 hour   Intake 250 ml   Output 400 ml   Net -150 ml       Physical Exam  Pleasantly confused  S1 plus S2  Normal vascular breath  No pedal  edema  Spontaneously moving upper and lower limbs    Lines/Drains:              Lab Results: I have reviewed the following results:   Results from last 7 days   Lab Units 06/21/25  1012 06/20/25  0439   WBC Thousand/uL 6.83 5.40   HEMOGLOBIN g/dL 9.1* 8.8*   HEMATOCRIT % 29.6* 29.8*   PLATELETS Thousands/uL 204 224   SEGS PCT %  --  63   LYMPHO PCT %  --  16   MONO PCT %  --  17*   EOS PCT %  --  3     Results from last 7 days   Lab Units 06/21/25  1012 06/20/25  0439   SODIUM mmol/L 138 141   POTASSIUM mmol/L 4.5 4.3   CHLORIDE mmol/L 105 107   CO2 mmol/L 26 27   BUN mg/dL 36* 33*   CREATININE mg/dL 1.33* 1.25   ANION GAP mmol/L 7 7   CALCIUM mg/dL 8.8 8.6   ALBUMIN g/dL  --  3.3*   TOTAL BILIRUBIN mg/dL  --  1.18*   ALK PHOS U/L  --  110*   ALT U/L  --  23   AST U/L  --  33   GLUCOSE RANDOM mg/dL 132 101         Results from last 7 days   Lab Units 06/20/25  1630 06/19/25  2117   POC GLUCOSE mg/dl 111 95               Recent Cultures (last 7 days):   Results from last 7 days   Lab Units 06/19/25  2352   URINE CULTURE  >100,000 cfu/ml Gram Negative Rustam*       Imaging Results Review: I reviewed radiology reports from this admission including: chest xray.  Other Study Results Review: Other studies reviewed include: CBC and BMP    Last 24 Hours Medication List:     Current Facility-Administered Medications:     acetaminophen (TYLENOL) tablet 650 mg, Q8H PRN    dextromethorphan-guaiFENesin (ROBITUSSIN DM) oral syrup 10 mL, Q4H PRN    [DISCONTINUED] valproate (DEPACON) 125 mg in sodium chloride 0.9 % 50 mL IVPB, Q12H HESHAM **OR** divalproex sodium (DEPAKOTE SPRINKLE) capsule 125 mg, Q12H HESHAM    docusate sodium (COLACE) capsule 100 mg, BID    ertapenem (INVanz) 500 mg in sodium chloride 0.9 % 50 mL IVPB, Q24H, Last Rate: 500 mg (06/21/25 1120)    furosemide (LASIX) tablet 20 mg, BID    heparin (porcine) subcutaneous injection 5,000 Units, Q8H HESHAM **AND** [COMPLETED] Platelet count, Once    melatonin tablet 3 mg, HS     mirtazapine (REMERON) tablet 7.5 mg, HS    ondansetron (ZOFRAN) injection 4 mg, Q6H PRN    [Held by provider] QUEtiapine (SEROquel) tablet 25 mg, HS    simethicone (MYLICON) chewable tablet 80 mg, 4x Daily PRN    Administrative Statements   Today, Patient Was Seen By: Marshall Betancourt MD      **Please Note: This note may have been constructed using a voice recognition system.**

## 2025-06-21 NOTE — ASSESSMENT & PLAN NOTE
Wt Readings from Last 3 Encounters:   06/19/25 63.5 kg (139 lb 15.9 oz)   06/13/25 63.5 kg (140 lb)   04/03/25 61.7 kg (136 lb)     Lab Results   Component Value Date     (H) 03/04/2025     (H) 06/04/2024    LVEF 55 03/06/2025     Lasix

## 2025-06-21 NOTE — ASSESSMENT & PLAN NOTE
Per niece, reported to have increased bouts of confusion, combativeness more freuqently over the past few weeks  Recently was treated for UTI in May and completed nitrofurantoin regimen at that time; prior MDRO on urine clx    Recheck UA with scope/culture; if concerning for infection, empirically start Invanz based on prior sensitivities from 5/28/22025  Continue home regimen of Remeron and Seroquel  Case management consulted for assistance with placement as well  Depakote 250 mg started this admission  PRN zyprexa for agitation  Today update and plan  Continue with IV antibiotic urine culture showed E. Coli  For total of 5 days  Patient got agitated last night given 10 mg of IM Zyprexa  During my encounter she is.  Confused and trying to get up from the bed.  Following all commands

## 2025-06-21 NOTE — PLAN OF CARE
Problem: Nutrition/Hydration-ADULT  Goal: Nutrient/Hydration intake appropriate for improving, restoring or maintaining nutritional needs  Description: Monitor and assess patient's nutrition/hydration status for malnutrition. Collaborate with interdisciplinary team and initiate plan and interventions as ordered.  Monitor patient's weight and dietary intake as ordered or per policy. Utilize nutrition screening tool and intervene as necessary. Determine patient's food preferences and provide high-protein, high-caloric foods as appropriate.     INTERVENTIONS:  - Monitor oral intake, urinary output, labs, and treatment plans  - Assess nutrition and hydration status and recommend course of action  - Evaluate amount of meals eaten  - Assist patient with eating if necessary   - Allow adequate time for meals  - Recommend/ encourage appropriate diets, oral nutritional supplements, and vitamin/mineral supplements  - Order, calculate, and assess calorie counts as needed  - Recommend, monitor, and adjust tube feedings and TPN/PPN based on assessed needs  - Assess need for intravenous fluids  - Provide specific nutrition/hydration education as appropriate  - Include patient/family/caregiver in decisions related to nutrition  6/20/2025 2006 by Catalina Fierro RN  Outcome: Progressing  6/20/2025 2005 by Catalina Fierro RN  Outcome: Progressing

## 2025-06-21 NOTE — UTILIZATION REVIEW
Continued Stay Review    SEE INITIAL REVIEW AT BOTTOM    Date: 6/21  Day 3: Has surpassed a 2nd midnight with active treatments and services.    Current Patient Class: Inpatient  Current Level of Care: med/surg    HPI:89 y.o. female initially admitted on 6/19  Current Diagnosis: Vascular dementia with behavioral disturbance     Assessment/Plan:   Patient got agitated last night.  She was given 10 mg of IM Zyprexa. Alert to name only on exam this AM, attempting to get OOB. UCx showed E. Coli. Continue IV ertapenem for total of 5 days. Continue po meds, supportive care.       Medications:   Scheduled Medications:  divalproex sodium, 125 mg, Oral, Q12H HESHAM  docusate sodium, 100 mg, Oral, BID  ertapenem, 500 mg, Intravenous, Q24H  furosemide, 20 mg, Oral, BID  heparin (porcine), 5,000 Units, Subcutaneous, Q8H HESHAM  melatonin, 3 mg, Oral, HS  mirtazapine, 7.5 mg, Oral, HS  QUEtiapine, 25 mg, Oral, HS    PRN Meds:  acetaminophen, 650 mg, Oral, Q8H PRN  dextromethorphan-guaiFENesin, 10 mL, Oral, Q4H PRN  ondansetron, 4 mg, Intravenous, Q6H PRN  simethicone, 80 mg, Oral, 4x Daily PRN      Discharge Plan: TBD    Vital Signs (last 3 days)       Date/Time Temp Pulse Resp BP MAP (mmHg) SpO2 Calculated FIO2 (%) - Nasal Cannula Nasal Cannula O2 Flow Rate (L/min) O2 Device Patient Position - Orthostatic VS Pam Coma Scale Score Pain    06/21/25 0715 -- -- -- -- -- 92 % -- -- None (Room air) -- 13 No Pain    06/21/25 0709 -- 86 21 -- -- 92 % -- -- None (Room air) Lying -- --    06/21/25 0010 -- -- -- -- -- -- -- -- -- -- 13 --    06/20/25 2310 -- -- -- -- -- -- -- -- -- -- -- 4    06/20/25 2217 97.5 °F (36.4 °C) 71 20 135/68 87 94 % -- -- -- -- -- --    06/20/25 20:32:39 -- -- -- 113/81 92 -- -- -- -- -- -- --    06/20/25 2003 -- -- -- -- -- -- -- -- -- -- 14 --    06/20/25 1900 97.1 °F (36.2 °C) 85 -- 118/70 79 93 % -- -- -- -- -- --    06/20/25 15:14:47 97.6 °F (36.4 °C) 75 -- 119/66 84 96 % -- -- -- Lying -- --    06/20/25  0957 -- -- -- -- -- -- -- -- -- -- -- No Pain    06/20/25 0931 -- -- -- -- -- -- -- -- -- -- -- No Pain     Pertinent Labs/Diagnostic Results:   Radiology:  XR chest portable    (Results Pending)     Results from last 7 days   Lab Units 06/21/25  0045   SARS-COV-2  Negative     Results from last 7 days   Lab Units 06/21/25  1012 06/20/25  0439 06/19/25 2014 06/19/25  1624   WBC Thousand/uL 6.83 5.40  --  5.70   HEMOGLOBIN g/dL 9.1* 8.8*  --  9.4*   HEMATOCRIT % 29.6* 29.8*  --  31.6*   PLATELETS Thousands/uL 204 224 213 241   TOTAL NEUT ABS Thousands/µL  --  3.44  --  3.79       Results from last 7 days   Lab Units 06/21/25  1012 06/20/25  0439 06/19/25  1624   SODIUM mmol/L 138 141 139   POTASSIUM mmol/L 4.5 4.3 4.3   CHLORIDE mmol/L 105 107 104   CO2 mmol/L 26 27 25   ANION GAP mmol/L 7 7 10   BUN mg/dL 36* 33* 39*   CREATININE mg/dL 1.33* 1.25 1.41*   EGFR ml/min/1.73sq m 32 35 30   CALCIUM mg/dL 8.8 8.6 9.2   MAGNESIUM mg/dL  --  2.4  --      Results from last 7 days   Lab Units 06/20/25  1211 06/20/25 0439 06/19/25  1624   AST U/L  --  33 36   ALT U/L  --  23 26   ALK PHOS U/L  --  110* 129*   TOTAL PROTEIN g/dL  --  6.4 7.1   ALBUMIN g/dL  --  3.3* 3.6   TOTAL BILIRUBIN mg/dL  --  1.18* 1.29*   AMMONIA umol/L 31  --  27     Results from last 7 days   Lab Units 06/20/25  1630 06/19/25  2117   POC GLUCOSE mg/dl 111 95     Results from last 7 days   Lab Units 06/21/25  1012 06/20/25 0439 06/19/25  1624   GLUCOSE RANDOM mg/dL 132 101 119       Results from last 7 days   Lab Units 06/21/25  0045   INFLUENZA A PCR  Negative   INFLUENZA B PCR  Negative   RSV PCR  Negative           Results from last 7 days   Lab Units 06/19/25  2352   URINE CULTURE  >100,000 cfu/ml Escherichia coli*         Network Utilization Review Department  ATTENTION: Please call with any questions or concerns to [phone] and carefully listen to the prompts so that you are directed to the right person. All voicemails are confidential.    For Discharge needs, contact Care Management DC Support Team at [phone] opt. 2  Send all requests for admission clinical reviews, approved or denied determinations and any other requests to dedicated fax number below belonging to the campus where the patient is receiving treatment. List of dedicated fax numbers for the Facilities:  FACILITY NAME UR FAX NUMBER   ADMISSION DENIALS (Administrative/Medical Necessity) [phone]   DISCHARGE SUPPORT TEAM (NETWORK) [phone]   PARENT CHILD HEALTH (Maternity/NICU/Pediatrics) [phone]   [facility] [phone]   [facility] [phone]   [facility] [phone]   [facility] [phone]   [facility] [phone]   [facility] [phone]   [facility] [phone]   [facility] [phone]   [facility] [phone]   [facility] [phone]   [facility] [phone]   [facility] [phone]

## 2025-06-21 NOTE — CASE MANAGEMENT
Case Management Discharge Planning Note    Patient name Sona Valenzuela  Location /-01 MRN [id number]  : 11/3/1924 Date 2025       Current Admission Date: 2025  Current Admission Diagnosis:Vascular dementia with behavioral disturbance (HCC)   Patient Active Problem List    Diagnosis Date Noted    Goals of care, counseling/discussion 2025    Palliative care encounter 2025    Frailty syndrome in geriatric patient 2025    Primary malignant neoplasm of bladder ([facility]) 2025    Iron deficiency anemia 03/10/2025    Seizure-like activity ([facility]) 2025    Elevated troponin 2025    Congestion of throat 2025    Vascular dementia with behavioral disturbance ([facility])     Ambulatory dysfunction 2024    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, psychotic disturbance, mood disturbance, or anxiety ([facility]) 2024    Chronic pain of left wrist 10/28/2024    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 ([facility])     Acute on chronic HFpEF with severe pulmonary hypertension ([facility]) 2024    Primary hypertension 2024    Paroxysmal A-fib ([facility]) 2024    Compression fracture of lumbar spine, non-traumatic ([facility]) 2024    Mild protein-calorie malnutrition ([facility]) 2024    Vascular dementia, uncomplicated ([facility]) 2023    Does mobilize using walker 2023    Chronic congestive heart failure ([facility]) 2022    Chronic atrial fibrillation ([facility]) 10/11/2022    Recurrent major depressive disorder, in remission ([facility]) 2022    Acute kidney injury superimposed on stage 3b chronic kidney disease ([facility]) 2022    Vitamin D deficiency 2020    Primary insomnia 2019    Degeneration of lumbar intervertebral disc 2019    Lumbar radiculopathy 2019    Arthropathy of lumbar facet joint 2019    Restless legs syndrome 2019      LOS (days): 2  Geometric Mean LOS (GMLOS) (days): 3  Days  to GMLOS:1.3     OBJECTIVE:  Risk of Unplanned Readmission Score: 25.16         Current admission status: Inpatient   Preferred Pharmacy:   [id number] [place] - [address]  [address]  [place] [address]  Phone: [phone] Fax: [phone]    Primary Care Provider: FARZANA Stanley    Primary Insurance: [employer]  Secondary Insurance: [facility]    DISCHARGE DETAILS:                           Contacts  Patient Contacts: Xi (niece/POA)  Relationship to Patient:: Family  Contact Method: Phone  Phone Number: [phone]-CM returning elisa Layne's phone call, but had to leave message  Reason/Outcome: Discharge Planning    Requested Home Health Care         Is the patient interested in HHC at discharge?: No    DME Referral Provided  Referral made for DME?: No    Other Referral/Resources/Interventions Provided:  Interventions: Short Term Rehab  Referral Comments: [facility] is first choice.  LIANE contacted Lakeshia from [employer] and she gives permission to initiate auth pending no behaviors.  Per nursing there are no behaviors-pt not on 1:1 or restraints.  CM left message with elisa Layne.    Would you like to participate in our [facility] service program?  : No - Declined    Treatment Team Recommendation: Facility Return

## 2025-06-21 NOTE — ASSESSMENT & PLAN NOTE
PT/OT Eval and treat  Trend Mobility Scores  Encourage appropriate mobility to achieve and improve upon HLM Goals as noted   RN spoke with pt's spouse, verified pt's name and . Pt is going on trip and is unable to change his Dexcom CGM on his own. Current sensor will  on Wednesday, and he will be gone. RN told spouse it's okay for pt to leave sensor on and check BG with glucometer after sensor expires. Spouse verbalized understanding.

## 2025-06-21 NOTE — CASE MANAGEMENT
Case Management Assessment & Discharge Planning Note    Patient name Sona Valenzuela  Location /-01 MRN [id number]  : 11/3/1924 Date 2025       Current Admission Date: 2025  Current Admission Diagnosis:Vascular dementia with behavioral disturbance (HCC)   Patient Active Problem List    Diagnosis Date Noted    Goals of care, counseling/discussion 2025    Palliative care encounter 2025    Frailty syndrome in geriatric patient 2025    Primary malignant neoplasm of bladder (HCC) 2025    Iron deficiency anemia 03/10/2025    Seizure-like activity ([facility]) 2025    Elevated troponin 2025    Congestion of throat 2025    Vascular dementia with behavioral disturbance ([facility])     Ambulatory dysfunction 2024    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, psychotic disturbance, mood disturbance, or anxiety ([facility]) 2024    Chronic pain of left wrist 10/28/2024    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 ([facility])     Acute on chronic HFpEF with severe pulmonary hypertension ([facility]) 2024    Primary hypertension 2024    Paroxysmal A-fib ([facility]) 2024    Compression fracture of lumbar spine, non-traumatic ([facility]) 2024    Mild protein-calorie malnutrition ([facility]) 2024    Vascular dementia, uncomplicated ([facility]) 2023    Does mobilize using walker 2023    Chronic congestive heart failure ([facility]) 2022    Chronic atrial fibrillation ([facility]) 10/11/2022    Recurrent major depressive disorder, in remission ([facility]) 2022    Acute kidney injury superimposed on stage 3b chronic kidney disease ([facility]) 2022    Vitamin D deficiency 2020    Primary insomnia 2019    Degeneration of lumbar intervertebral disc 2019    Lumbar radiculopathy 2019    Arthropathy of lumbar facet joint 2019    Restless legs syndrome 2019      LOS (days): 1  Geometric Mean LOS (GMLOS)  (days): 3  Days to GMLOS:1.9     OBJECTIVE:    Risk of Unplanned Readmission Score: 24.76         Current admission status: Inpatient       Preferred Pharmacy:   [id number] [place] [address]  [address]  [place] [address]  Phone: [phone] Fax: [phone]    Primary Care Provider: FARZANA Stanley    Primary Insurance: [employer]  Secondary Insurance: [facility]    ASSESSMENT:  Active Health Care Proxies       xi rodriguez Health Care Representative - [place]   Primary Phone: [phone] (Mobile)                 Advance Directives  Does patient have a Health Care POA?: Yes  Does patient have Advance Directives?: Yes  Advance Directives: Power of  for health care, POLST  Primary Contact: Patient Contacts (2/2)  Xi Scott  [phone]  Health Care Representative         Readmission Root Cause  30 Day Readmission: No    Patient Information  Admitted from:: Home  Mental Status: Alert, Confused  During Assessment patient was accompanied by: Other-Comment (nikorina)  Assessment information provided by:: Other - please comment (nikorina)  Support Systems: Family members  County of Residence: [place]  What [facility] do you live in?: [place]  Home entry access options. Select all that apply.: No steps to enter home  Type of Current Residence: 2 story home  Upon entering residence, is there a bedroom on the main floor (no further steps)?: No  A bedroom is located on the following floor levels of residence (select all that apply):: 2nd Floor  Upon entering residence, is there a bathroom on the main floor (no further steps)?: No  Indicate which floors of current residence have a bathroom (select all the apply):: 2nd Floor  Number of steps to 2nd floor from main floor: One Flight  Living Arrangements: Lives w/ Extended Family  Is patient a ?: No    Activities of Daily Living Prior to Admission  Functional Status: Assistance  Completes ADLs  independently?: No  Level of ADL dependence: Assistance  Ambulates independently?: No  Level of ambulatory dependence: Assistance  Does patient use assisted devices?: Yes  Assisted Devices (DME) used: Walker, Wheelchair (electric and manual w/c)  Does patient currently own DME?: Yes  What DME does the patient currently own?: Walker, Wheelchair  Does patient have a history of Outpatient Therapy (PT/OT)?: Yes  Does the patient have a history of Short-Term Rehab?: Yes ([facility])  Does patient have a history of HHC?: No  Does patient currently have HHC?: No         Patient Information Continued  Income Source: Pension/residential  Does patient have prescription coverage?: Yes  Can the patient afford their medications and any related supplies (such as glucometers or test strips)?: Yes  Does patient receive dialysis treatments?: No  Does patient have a history of substance abuse?: No  Does patient have a history of Mental Health Diagnosis?: No         Means of Transportation  Means of Transport to [facility]:: Family transport      Social Determinants of Health (SDOH)      Flowsheet Row Most Recent Value   Housing Stability    In the last 12 months, was there a time when you were not able to pay the mortgage or rent on time? N   In the past 12 months, how many times have you moved where you were living? 0   At any time in the past 12 months, were you homeless or living in a shelter (including now)? N   Transportation Needs    In the past 12 months, has lack of transportation kept you from medical appointments or from getting medications? no   In the past 12 months, has lack of transportation kept you from meetings, work, or from getting things needed for daily living? No   Food Insecurity    Within the past 12 months, you worried that your food would run out before you got the money to buy more. Never true   Within the past 12 months, the food you bought just didn't last and you didn't have money to  get more. Never true   Utilities    In the past 12 months has the electric, gas, oil, or water company threatened to shut off services in your home? No            DISCHARGE DETAILS:    Discharge planning discussed with:: patient's elisa  Freedom of Choice: Yes  Comments - Freedom of Choice: Per SLIM rounds-  Patient is a 89 y.o. year old female with a past medical history of vascular dementia, CKD who presents with verbal aggression and cognitive decline.     CM introduced self and role to elisa when she returned call late in the day from a vm left earlier ref: D/C planning.  Elisa shared that she has beenthe  patient's caretaker for the past 30 years.  She does have ongoing conversations with PCP and did speak with Palliative today, but they are not interested Hospice at this time.  She states the patient responds well to therapy and they would like STR again- either at [place] or [place], but are agreeable to a blanket referral.  CM made a blanket refrral in [facility] and will need auth , once choice is made for an accepting facility.  CM contacted family/caregiver?: Yes  Were Treatment Team discharge recommendations reviewed with patient/caregiver?: Yes  Did patient/caregiver verbalize understanding of patient care needs?: Yes  Were patient/caregiver advised of the risks associated with not following Treatment Team discharge recommendations?: Yes    Contacts  Patient Contacts: Xi (nikorina/POA)  Relationship to Patient:: Family  Contact Method: Phone  Reason/Outcome: Continuity of Care, Emergency Contact, Discharge Planning    Requested Home Health Care         Is the patient interested in HHC at discharge?: No    DME Referral Provided  Referral made for DME?: No    Other Referral/Resources/Interventions Provided:  Interventions: Short Term Rehab  Referral Comments: See FOC comments for D/C planning status    Would you like to participate in our [facility] service program?  : No - Declined    Treatment  Team Recommendation: Short Term Rehab  Expected Discharge Disposition: Skilled Nursing Facility  Additional Discharge Dispositions: Skilled Nursing Facility

## 2025-06-22 PROCEDURE — 99233 SBSQ HOSP IP/OBS HIGH 50: CPT | Performed by: STUDENT IN AN ORGANIZED HEALTH CARE EDUCATION/TRAINING PROGRAM

## 2025-06-22 RX ORDER — SODIUM CHLORIDE 9 MG/ML
50 INJECTION, SOLUTION INTRAVENOUS CONTINUOUS
Status: DISPENSED | OUTPATIENT
Start: 2025-06-22 | End: 2025-06-22

## 2025-06-22 RX ADMIN — DIVALPROEX SODIUM 125 MG: 125 CAPSULE, COATED PELLETS ORAL at 08:02

## 2025-06-22 RX ADMIN — DOCUSATE SODIUM 100 MG: 100 CAPSULE, LIQUID FILLED ORAL at 17:38

## 2025-06-22 RX ADMIN — HEPARIN SODIUM 5000 UNITS: 5000 INJECTION INTRAVENOUS; SUBCUTANEOUS at 14:33

## 2025-06-22 RX ADMIN — GUAIFENESIN AND DEXTROMETHORPHAN 10 ML: 100; 10 SYRUP ORAL at 04:04

## 2025-06-22 RX ADMIN — ERTAPENEM SODIUM 500 MG: 1 INJECTION, POWDER, LYOPHILIZED, FOR SOLUTION INTRAMUSCULAR; INTRAVENOUS at 09:53

## 2025-06-22 RX ADMIN — HEPARIN SODIUM 5000 UNITS: 5000 INJECTION INTRAVENOUS; SUBCUTANEOUS at 05:05

## 2025-06-22 RX ADMIN — SODIUM CHLORIDE 50 ML/HR: 0.9 INJECTION, SOLUTION INTRAVENOUS at 07:58

## 2025-06-22 RX ADMIN — DOCUSATE SODIUM 100 MG: 100 CAPSULE, LIQUID FILLED ORAL at 08:04

## 2025-06-22 NOTE — ASSESSMENT & PLAN NOTE
Per niece, reported to have increased bouts of confusion, combativeness more freuqently over the past few weeks  Recently was treated for UTI in May and completed nitrofurantoin regimen at that time; prior MDRO on urine clx    Recheck UA with scope/culture; if concerning for infection, empirically start Invanz based on prior sensitivities from 5/28/22025  Continue home regimen of Remeron and Seroquel  Case management consulted for assistance with placement as well  Depakote 250 mg started this admission  PRN zyprexa for agitation  Today update and plan  Continue with IV antibiotic urine culture showed E. Coli  For total of 5 days  Patient is on virtual observation  Following all commands

## 2025-06-22 NOTE — ASSESSMENT & PLAN NOTE
Lab Results   Component Value Date    EGFR 32 06/21/2025    EGFR 35 06/20/2025    EGFR 30 06/19/2025    CREATININE 1.33 (H) 06/21/2025    CREATININE 1.25 06/20/2025    CREATININE 1.41 (H) 06/19/2025     Baseline creatinine is 1.2-1.3  Current is 1.3  Discontinue IV fluids

## 2025-06-22 NOTE — PLAN OF CARE
Problem: SAFETY,RESTRAINT: NV/NON-SELF DESTRUCTIVE BEHAVIOR  Goal: Remains free of harm/injury (restraint for non violent/non self-detsructive behavior)  Description: INTERVENTIONS:  - Instruct patient/family regarding restraint use   - Assess and monitor physiologic and psychological status   - Provide interventions and comfort measures to meet assessed patient needs   - Identify and implement measures to help patient regain control  - Assess readiness for release of restraint   Outcome: Progressing     Problem: Nutrition/Hydration-ADULT  Goal: Nutrient/Hydration intake appropriate for improving, restoring or maintaining nutritional needs  Description: Monitor and assess patient's nutrition/hydration status for malnutrition. Collaborate with interdisciplinary team and initiate plan and interventions as ordered.  Monitor patient's weight and dietary intake as ordered or per policy. Utilize nutrition screening tool and intervene as necessary. Determine patient's food preferences and provide high-protein, high-caloric foods as appropriate.     INTERVENTIONS:  - Monitor oral intake, urinary output, labs, and treatment plans  - Assess nutrition and hydration status and recommend course of action  - Evaluate amount of meals eaten  - Assist patient with eating if necessary   - Allow adequate time for meals  - Recommend/ encourage appropriate diets, oral nutritional supplements, and vitamin/mineral supplements  - Order, calculate, and assess calorie counts as needed  - Recommend, monitor, and adjust tube feedings and TPN/PPN based on assessed needs  - Assess need for intravenous fluids  - Provide specific nutrition/hydration education as appropriate  - Include patient/family/caregiver in decisions related to nutrition  6/22/2025 0139 by Catalina Fierro, RN  Outcome: Progressing  6/22/2025 0043 by Catalina Fierro, RN  Outcome: Progressing     Problem: Nutrition/Hydration-ADULT  Goal: Nutrient/Hydration intake appropriate for  improving, restoring or maintaining nutritional needs  Description: Monitor and assess patient's nutrition/hydration status for malnutrition. Collaborate with interdisciplinary team and initiate plan and interventions as ordered.  Monitor patient's weight and dietary intake as ordered or per policy. Utilize nutrition screening tool and intervene as necessary. Determine patient's food preferences and provide high-protein, high-caloric foods as appropriate.     INTERVENTIONS:  - Monitor oral intake, urinary output, labs, and treatment plans  - Assess nutrition and hydration status and recommend course of action  - Evaluate amount of meals eaten  - Assist patient with eating if necessary   - Allow adequate time for meals  - Recommend/ encourage appropriate diets, oral nutritional supplements, and vitamin/mineral supplements  - Order, calculate, and assess calorie counts as needed  - Recommend, monitor, and adjust tube feedings and TPN/PPN based on assessed needs  - Assess need for intravenous fluids  - Provide specific nutrition/hydration education as appropriate  - Include patient/family/caregiver in decisions related to nutrition  6/22/2025 0139 by Catalina Fierro RN  Outcome: Progressing  6/22/2025 0043 by Catalina Fierro RN  Outcome: Progressing

## 2025-06-22 NOTE — PROGRESS NOTES
Progress Note - Hospitalist   Name: Sona Valenzuela 89 y.o. female I MRN: [id number]  Unit/Bed#: -01 I Date of Admission: 6/19/2025   Date of Service: 6/22/2025 I Hospital Day: 3    Assessment & Plan  Vascular dementia with behavioral disturbance (HCC)  Per niece, reported to have increased bouts of confusion, combativeness more freuqently over the past few weeks  Recently was treated for UTI in May and completed nitrofurantoin regimen at that time; prior MDRO on urine clx    Recheck UA with scope/culture; if concerning for infection, empirically start Invanz based on prior sensitivities from 5/28/22025  Continue home regimen of Remeron and Seroquel  Case management consulted for assistance with placement as well  Depakote 250 mg started this admission  PRN zyprexa for agitation  Today update and plan  Continue with IV antibiotic urine culture showed E. 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)  Wt Readings from Last 3 Encounters:   06/19/25 63.5 kg (139 lb 15.9 oz)   06/13/25 63.5 kg (140 lb)   04/03/25 61.7 kg (136 lb)     Lab Results   Component Value Date     (H) 03/04/2025     (H) 06/04/2024    LVEF 55 03/06/2025     Lasix  Ambulatory dysfunction  PT/OT Eval and treat  Trend Mobility Scores  Encourage appropriate mobility to achieve and improve upon HLM Goals as noted  Primary insomnia  Mirtazapine  Goals of care,  counseling/discussion  Palliative consulted  Frailty syndrome in geriatric patient  Appreciate geriatrics    VTE Pharmacologic Prophylaxis: VTE Score: 4 Moderate Risk (Score 3-4) - Pharmacological DVT Prophylaxis Ordered: heparin.    Mobility:   Basic Mobility Inpatient Raw Score: 6  JH-HLM Goal: 2: Bed activities/Dependent transfer  JH-HLM Achieved: 2: Bed activities/Dependent transfer  JH-HLM Goal NOT achieved. Continue with multidisciplinary rounding and encourage appropriate mobility to improve upon JH-HLM goals.    Patient Centered Rounds: I performed bedside rounds with nursing staff today.   Discussions with Specialists or Other Care Team Provider: Palliative care    Education and Discussions with Family / Patient: Will reach out to family.     Current Length of Stay: 3 day(s)  Current Patient Status: Inpatient   Certification Statement: The patient will continue to require additional inpatient hospital stay due to assessment and plan  Discharge Plan: Anticipate discharge in 48-72 hrs to discharge location to be determined pending rehab evaluations.    Code Status: Level 3 - DNAR and DNI    Subjective   Patient seen and examined at bedside.  Patient told me his name.  Following all commands.  Did not complain of any shortness of breath and chest pain    Objective :  Temp:  [98.4 °F (36.9 °C)] 98.4 °F (36.9 °C)  HR:  [84] 84  BP: (121-149)/(79-85) 147/85  Resp:  [19] 19  SpO2:  [96 %] 96 %  O2 Device: None (Room air)    Body mass index is 26.45 kg/m².     Input and Output Summary (last 24 hours):     Intake/Output Summary (Last 24 hours) at 6/22/2025 1248  Last data filed at 6/21/2025 2143  Gross per 24 hour   Intake 150 ml   Output --   Net 150 ml       Physical Exam  Pleasantly confused  S1 plus S2  Normal vascular breath  No pedal edema  Spontaneously moving upper and lower limbs    Lines/Drains:              Lab Results: I have reviewed the following results:   Results from last 7 days   Lab Units  06/21/25  1012 06/20/25  0439   WBC Thousand/uL 6.83 5.40   HEMOGLOBIN g/dL 9.1* 8.8*   HEMATOCRIT % 29.6* 29.8*   PLATELETS Thousands/uL 204 224   SEGS PCT %  --  63   LYMPHO PCT %  --  16   MONO PCT %  --  17*   EOS PCT %  --  3     Results from last 7 days   Lab Units 06/21/25  1012 06/20/25  0439   SODIUM mmol/L 138 141   POTASSIUM mmol/L 4.5 4.3   CHLORIDE mmol/L 105 107   CO2 mmol/L 26 27   BUN mg/dL 36* 33*   CREATININE mg/dL 1.33* 1.25   ANION GAP mmol/L 7 7   CALCIUM mg/dL 8.8 8.6   ALBUMIN g/dL  --  3.3*   TOTAL BILIRUBIN mg/dL  --  1.18*   ALK PHOS U/L  --  110*   ALT U/L  --  23   AST U/L  --  33   GLUCOSE RANDOM mg/dL 132 101         Results from last 7 days   Lab Units 06/20/25  1630 06/19/25  2117   POC GLUCOSE mg/dl 111 95               Recent Cultures (last 7 days):   Results from last 7 days   Lab Units 06/19/25  2352   URINE CULTURE  >100,000 cfu/ml Escherichia coli ESBL*       Imaging Results Review: I reviewed radiology reports from this admission including: chest xray.  Other Study Results Review: Other studies reviewed include: CBC and BMP    Last 24 Hours Medication List:     Current Facility-Administered Medications:     acetaminophen (TYLENOL) tablet 650 mg, Q8H PRN    dextromethorphan-guaiFENesin (ROBITUSSIN DM) oral syrup 10 mL, Q4H PRN    [DISCONTINUED] valproate (DEPACON) 125 mg in sodium chloride 0.9 % 50 mL IVPB, Q12H HESHAM **OR** divalproex sodium (DEPAKOTE SPRINKLE) capsule 125 mg, Q12H HESHAM    docusate sodium (COLACE) capsule 100 mg, BID    ertapenem (INVanz) 500 mg in sodium chloride 0.9 % 50 mL IVPB, Q24H, Last Rate: 500 mg (06/22/25 0953)    [Held by provider] furosemide (LASIX) tablet 20 mg, BID    heparin (porcine) subcutaneous injection 5,000 Units, Q8H HESHAM **AND** [COMPLETED] Platelet count, Once    melatonin tablet 3 mg, HS    mirtazapine (REMERON) tablet 7.5 mg, HS    ondansetron (ZOFRAN) injection 4 mg, Q6H PRN    QUEtiapine (SEROquel) tablet 25 mg, HS    simethicone  (MYLICON) chewable tablet 80 mg, 4x Daily PRN    sodium chloride 0.9 % infusion, Continuous, Last Rate: 50 mL/hr (06/22/25 8522)    Administrative Statements   Today, Patient Was Seen By: Marshall Betancourt MD      **Please Note: This note may have been constructed using a voice recognition system.**

## 2025-06-22 NOTE — UTILIZATION REVIEW
NOTIFICATION OF INPATIENT ADMISSION   AUTHORIZATION REQUEST   SERVICING FACILITY:   [place] [address]  Tax ID: [id number]  NPI: [id number] ATTENDING PROVIDER:  Attending Name and NPI#: Marshall Betancourt Md [[id number]]  Address: [address] [place] [address]  Phone: [phone]     ADMISSION INFORMATION:  Place of Service: Inpatient [facility]  Place of Service Code: 21  Inpatient Admission Date/Time: 6/19/25  6:20 PM  Discharge Date/Time: No discharge date for patient encounter.  Admitting Diagnosis Code/Description:  Vascular dementia, uncomplicated (HCC) [F01.50]  Difficulty walking [R26.2]  Vascular dementia with behavioral disturbance (HCC) [F01.518]  Ambulatory dysfunction [R26.2]  Need for case management follow-up [Z09]  Acute kidney injury superimposed on stage 3b chronic kidney disease (HCC) [N17.9, N18.32]     UTILIZATION REVIEW CONTACT:  Mallory Humphreys, Utilization   Network Utilization Review Department  Phone: [phone]  Fax [phone]  Email: [email or web address]  Contact for approvals/pending authorizations, clinical reviews, and discharge.     PHYSICIAN ADVISORY SERVICES:  Medical Necessity Denial & Ywes-eb-Dgsf Review  Phone: [phone]  Fax: [phone]  Email: [email or web address]     DISCHARGE SUPPORT TEAM:  For Patients Discharge Needs & Updates  Phone: [phone] opt. 2 Fax: [phone]  Email: [email or web address]

## 2025-06-22 NOTE — CASE MANAGEMENT
Case Management Discharge Planning Note    Patient name Sona Valenzuela  Location /-01 MRN [id number]  : 11/3/1924 Date 2025       Current Admission Date: 2025  Current Admission Diagnosis:Vascular dementia with behavioral disturbance (HCC)   Patient Active Problem List    Diagnosis Date Noted    Goals of care, counseling/discussion 2025    Palliative care encounter 2025    Frailty syndrome in geriatric patient 2025    Primary malignant neoplasm of bladder ([facility]) 2025    Iron deficiency anemia 03/10/2025    Seizure-like activity ([facility]) 2025    Elevated troponin 2025    Congestion of throat 2025    Vascular dementia with behavioral disturbance ([facility])     Ambulatory dysfunction 2024    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, psychotic disturbance, mood disturbance, or anxiety ([facility]) 2024    Chronic pain of left wrist 10/28/2024    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 ([facility])     Acute on chronic HFpEF with severe pulmonary hypertension ([facility]) 2024    Primary hypertension 2024    Paroxysmal A-fib ([facility]) 2024    Compression fracture of lumbar spine, non-traumatic ([facility]) 2024    Mild protein-calorie malnutrition ([facility]) 2024    Vascular dementia, uncomplicated ([facility]) 2023    Does mobilize using walker 2023    Chronic congestive heart failure ([facility]) 2022    Chronic atrial fibrillation ([facility]) 10/11/2022    Recurrent major depressive disorder, in remission ([facility]) 2022    Acute kidney injury superimposed on stage 3b chronic kidney disease ([facility]) 2022    Vitamin D deficiency 2020    Primary insomnia 2019    Degeneration of lumbar intervertebral disc 2019    Lumbar radiculopathy 2019    Arthropathy of lumbar facet joint 2019    Restless legs syndrome 2019      LOS (days): 3  Geometric Mean LOS (GMLOS) (days): 3  Days  to GMLOS:0.4     OBJECTIVE:  Risk of Unplanned Readmission Score: 27.24         Current admission status: Inpatient   Preferred Pharmacy:   [id number] [place] [address]  [address]  [place] [address]  Phone: [phone] Fax: [phone]    Primary Care Provider: FARZANA Stanley    Primary Insurance: "[facility]  Secondary Insurance: [facility]    DISCHARGE DETAILS:                                          Other Referral/Resources/Interventions Provided:  Interventions: Short Term Rehab  Referral Comments: Auth still pending for Jeremy per CM d/c support team.  CM will continue to follow    Would you like to participate in our [facility] service program?  : No - Declined    Treatment Team Recommendation: Short Term Rehab

## 2025-06-22 NOTE — PLAN OF CARE
Problem: Nutrition/Hydration-ADULT  Goal: Nutrient/Hydration intake appropriate for improving, restoring or maintaining nutritional needs  Description: Monitor and assess patient's nutrition/hydration status for malnutrition. Collaborate with interdisciplinary team and initiate plan and interventions as ordered.  Monitor patient's weight and dietary intake as ordered or per policy. Utilize nutrition screening tool and intervene as necessary. Determine patient's food preferences and provide high-protein, high-caloric foods as appropriate.     INTERVENTIONS:  - Monitor oral intake, urinary output, labs, and treatment plans  - Assess nutrition and hydration status and recommend course of action  - Evaluate amount of meals eaten  - Assist patient with eating if necessary   - Allow adequate time for meals  - Recommend/ encourage appropriate diets, oral nutritional supplements, and vitamin/mineral supplements  - Order, calculate, and assess calorie counts as needed  - Recommend, monitor, and adjust tube feedings and TPN/PPN based on assessed needs  - Assess need for intravenous fluids  - Provide specific nutrition/hydration education as appropriate  - Include patient/family/caregiver in decisions related to nutrition  Outcome: Progressing     Problem: Potential for Falls  Goal: Patient will remain free of falls  Description: INTERVENTIONS:  - Educate patient/family on patient safety including physical limitations  - Instruct patient to call for assistance with activity   - Consider consulting OT/PT to assist with strengthening/mobility based on AM PAC & JH-HLM score  - Consult OT/PT to assist with strengthening/mobility   - Keep Call bell within reach  - Keep bed low and locked with side rails adjusted as appropriate  - Keep care items and personal belongings within reach  - Initiate and maintain comfort rounds  - Make Fall Risk Sign visible to staff  - Offer Toileting every 2 Hours, in advance of need  -  Initiate/Maintain bed alarm  - Obtain necessary fall risk management equipment:  - Apply yellow socks and bracelet for high fall risk patients  - Consider moving patient to room near nurses station  Outcome: Progressing

## 2025-06-23 ENCOUNTER — APPOINTMENT (INPATIENT)
Dept: RADIOLOGY | Facility: HOSPITAL | Age: OVER 89
DRG: 683 | End: 2025-06-23
Payer: COMMERCIAL

## 2025-06-23 LAB
ANION GAP SERPL CALCULATED.3IONS-SCNC: 5 MMOL/L (ref 4–13)
ATRIAL RATE: 122 BPM
BACTERIA UR CULT: ABNORMAL
BNP SERPL-MCNC: 830 PG/ML (ref 0–100)
BUN SERPL-MCNC: 25 MG/DL (ref 5–25)
CALCIUM SERPL-MCNC: 9 MG/DL (ref 8.4–10.2)
CHLORIDE SERPL-SCNC: 107 MMOL/L (ref 96–108)
CO2 SERPL-SCNC: 29 MMOL/L (ref 21–32)
CREAT SERPL-MCNC: 1.2 MG/DL (ref 0.6–1.3)
ERYTHROCYTE [DISTWIDTH] IN BLOOD BY AUTOMATED COUNT: 19.6 % (ref 11.6–15.1)
GFR SERPL CREATININE-BSD FRML MDRD: 37 ML/MIN/1.73SQ M
GLUCOSE SERPL-MCNC: 144 MG/DL (ref 65–140)
HCT VFR BLD AUTO: 30.9 % (ref 34.8–46.1)
HGB BLD-MCNC: 9.3 G/DL (ref 11.5–15.4)
MCH RBC QN AUTO: 22.7 PG (ref 26.8–34.3)
MCHC RBC AUTO-ENTMCNC: 30.1 G/DL (ref 31.4–37.4)
MCV RBC AUTO: 75 FL (ref 82–98)
PLATELET # BLD AUTO: 257 THOUSANDS/UL (ref 149–390)
PMV BLD AUTO: 10.1 FL (ref 8.9–12.7)
POTASSIUM SERPL-SCNC: 4.2 MMOL/L (ref 3.5–5.3)
QRS AXIS: 41 DEGREES
QRSD INTERVAL: 116 MS
QT INTERVAL: 412 MS
QTC INTERVAL: 466 MS
RBC # BLD AUTO: 4.1 MILLION/UL (ref 3.81–5.12)
SODIUM SERPL-SCNC: 141 MMOL/L (ref 135–147)
T WAVE AXIS: 92 DEGREES
VENTRICULAR RATE: 77 BPM
WBC # BLD AUTO: 6.98 THOUSAND/UL (ref 4.31–10.16)

## 2025-06-23 PROCEDURE — 85027 COMPLETE CBC AUTOMATED: CPT | Performed by: STUDENT IN AN ORGANIZED HEALTH CARE EDUCATION/TRAINING PROGRAM

## 2025-06-23 PROCEDURE — 99233 SBSQ HOSP IP/OBS HIGH 50: CPT | Performed by: NURSE PRACTITIONER

## 2025-06-23 PROCEDURE — 71045 X-RAY EXAM CHEST 1 VIEW: CPT

## 2025-06-23 PROCEDURE — 99233 SBSQ HOSP IP/OBS HIGH 50: CPT

## 2025-06-23 PROCEDURE — 92610 EVALUATE SWALLOWING FUNCTION: CPT

## 2025-06-23 PROCEDURE — 80048 BASIC METABOLIC PNL TOTAL CA: CPT | Performed by: STUDENT IN AN ORGANIZED HEALTH CARE EDUCATION/TRAINING PROGRAM

## 2025-06-23 PROCEDURE — 93010 ELECTROCARDIOGRAM REPORT: CPT | Performed by: INTERNAL MEDICINE

## 2025-06-23 PROCEDURE — 99233 SBSQ HOSP IP/OBS HIGH 50: CPT | Performed by: STUDENT IN AN ORGANIZED HEALTH CARE EDUCATION/TRAINING PROGRAM

## 2025-06-23 PROCEDURE — 83880 ASSAY OF NATRIURETIC PEPTIDE: CPT | Performed by: STUDENT IN AN ORGANIZED HEALTH CARE EDUCATION/TRAINING PROGRAM

## 2025-06-23 RX ORDER — QUETIAPINE FUMARATE 25 MG/1
50 TABLET, FILM COATED ORAL
Status: DISCONTINUED | OUTPATIENT
Start: 2025-06-23 | End: 2025-06-26

## 2025-06-23 RX ADMIN — ACETAMINOPHEN 650 MG: 325 TABLET ORAL at 21:16

## 2025-06-23 RX ADMIN — HEPARIN SODIUM 5000 UNITS: 5000 INJECTION INTRAVENOUS; SUBCUTANEOUS at 15:02

## 2025-06-23 RX ADMIN — DOCUSATE SODIUM 100 MG: 100 CAPSULE, LIQUID FILLED ORAL at 19:44

## 2025-06-23 RX ADMIN — ERTAPENEM SODIUM 500 MG: 1 INJECTION, POWDER, LYOPHILIZED, FOR SOLUTION INTRAMUSCULAR; INTRAVENOUS at 09:48

## 2025-06-23 RX ADMIN — DIVALPROEX SODIUM 125 MG: 125 CAPSULE, COATED PELLETS ORAL at 19:46

## 2025-06-23 RX ADMIN — GUAIFENESIN AND DEXTROMETHORPHAN 10 ML: 100; 10 SYRUP ORAL at 05:09

## 2025-06-23 RX ADMIN — DOCUSATE SODIUM 100 MG: 100 CAPSULE, LIQUID FILLED ORAL at 09:48

## 2025-06-23 RX ADMIN — ONDANSETRON 4 MG: 2 INJECTION INTRAMUSCULAR; INTRAVENOUS at 20:40

## 2025-06-23 RX ADMIN — HEPARIN SODIUM 5000 UNITS: 5000 INJECTION INTRAVENOUS; SUBCUTANEOUS at 05:09

## 2025-06-23 RX ADMIN — QUETIAPINE FUMARATE 50 MG: 25 TABLET ORAL at 19:46

## 2025-06-23 RX ADMIN — Medication 6 MG: at 19:45

## 2025-06-23 RX ADMIN — GUAIFENESIN AND DEXTROMETHORPHAN 10 ML: 100; 10 SYRUP ORAL at 19:40

## 2025-06-23 RX ADMIN — DIVALPROEX SODIUM 125 MG: 125 CAPSULE, COATED PELLETS ORAL at 09:55

## 2025-06-23 RX ADMIN — HEPARIN SODIUM 5000 UNITS: 5000 INJECTION INTRAVENOUS; SUBCUTANEOUS at 21:21

## 2025-06-23 RX ADMIN — MIRTAZAPINE 7.5 MG: 15 TABLET, FILM COATED ORAL at 19:47

## 2025-06-23 RX ADMIN — SIMETHICONE 80 MG: 80 TABLET, CHEWABLE ORAL at 20:41

## 2025-06-23 NOTE — ASSESSMENT & PLAN NOTE
Pleasantly confused at baseline, ambulatory.  Weight has been consistent for past 6 months.   Presented with departure from baseline.  Verbally agressive, throwing items.   Per chart review patient has has increased confusion and agression at night since May   Patient requiring 1:1 observation and mitts for safety.   Gerontology consulted.

## 2025-06-23 NOTE — PLAN OF CARE
Recommendations:   Diet: puree/level 1 diet and nectar thick liquids   Meds: crushed with puree   Feeding assistance: 1:1 full   Frequent Oral care: 2-4x/day  Aspiration precautions and compensatory swallowing strategies: upright posture, only feed when fully alert, slow rate of feeding, small bites/sips, no straws, liquids by teaspoon only, and alternating bites and sips  Other Recommendations/ considerations: SLP will continue to follow to ensure adequate management of oral diet and adjust as clinically indicated x1-3

## 2025-06-23 NOTE — ASSESSMENT & PLAN NOTE
First line is behavioral therapy   Avoid sedative hypnotics including benzodiazepines and benadryl  Encourage staying awake during the day   Encourage daytime activities and morning exercise   Decrease or eliminate daytime naps   Avoid caffeine especially during late afternoon and evening hours  Establish a nighttime routine  Implement sleep hygiene and limit nighttime interruptions  Continue melatonin 6 mg daily at bedtime and mirtazapine 7.5 mg daily at bedtime for sleep

## 2025-06-23 NOTE — ASSESSMENT & PLAN NOTE
Clinical Frail Scale: 6- Moderately Frail  Need help with all outside activities  Need help with stairs and bathing  May need assistance with dressing  Resides with family who assist with ADLs/IADLs  Appears to have good appetite  Albumin level on 06/20/25 was 3.3   Noted coughing today during exam   CXR pending, ST consulted  Consider adding dietary supplements, Ensure or magic cup to meal trays  Continue to optimize co-morbidities   Fall precautions

## 2025-06-23 NOTE — SPEECH THERAPY NOTE
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  Unable to retrieve material from cup and straw.  Ant loss, ?posterior spill following same.  Improved w/ teaspoon presentation.  Mastication incomplete.  Prolonged oral processing with occasional bolus holding.  Requires cues to initiate swallow.  Diffuse oral retention w/ mech soft solids, incomplete bolus breakdown.  Overt coughing and s/s suggestive of aspiration with small quantities of thin liquids.   Current mental status appears to be large contributing factor to dysphagia symptoms outlined above.     Recommendations:   Diet: puree/level 1 diet and nectar thick liquids   Meds: crushed with puree   Feeding assistance: 1:1 full   Frequent Oral care: 2-4x/day  Aspiration precautions and compensatory swallowing strategies: upright posture, only feed when fully alert, slow rate of feeding, small bites/sips, no straws, liquids by teaspoon only, and alternating bites and sips  Other Recommendations/ considerations: SLP will continue to follow to ensure adequate management of oral diet and adjust as clinically indicated x1-3      Current Medical Status  Pt is a 89 y.o. female who presented to [facility] with medical problems including, not limited to, MDD, Vascular Dementia, insomnia, Atrial fibrillation, ambulatory dysfunction, CHF, and hypertension.  Sona presents to the hospital with increased confusion and increased agitation at home.  She has lived with her niece and nephew for the past 30 years who help to care for her.    Past medical history:  Please see H&P for details    Special Studies:  6/20/25 Chest XR:    1. Persistent left basilar opacification which is similar to radiograph of 3/8/2025 and favored to represent atelectasis. Please note pneumonia is not excluded in the appropriate clinical context.     Social/Education/Vocational Hx:  Pt lives with family    Swallow Information   Current Risks for Dysphagia & Aspiration: AMS  Current Symptoms/Concerns: AMS  Current Diet: regular diet and thin liquids   Baseline Diet: regular diet and thin liquids- presumed     Baseline Assessment   Behavior/Cognition: decreased attention and low alertness level  Speech/Language Status: able to follow commands inconsistently  Patient Positioning: upright in bed    Swallow Mechanism Exam   Facial: symmetrical  Labial: unable to test 2/2 limited command following  Lingual: unable to test 2/2 limited command following  Velum: unable to visualize  Mandible: adequate ROM  Dentition: limited dentition  Vocal quality:minimal verbal output    Volitional Cough: unable to initiate volitional cough   Respiratory: RA    Consistencies Assessed and Performance   Consistencies Administered: thin liquids, nectar thick, honey thick, puree, and mechanical soft solids    Oral Stage: Poor bolus retrieval, containment and reception.  Unable to extract liquids from straw or cup.  Bolus holding/pocketing noted.  Requires cues for transfer.  Transfer prolonged.  Incomplete clearance of mech soft which pt unaware.     Pharyngeal Stage: Laryngeal rise noted  upon palpation.  Swallow initiation appears delayed.  Overt coughing w/ thin liquids.  Cannot r/o aspiration.     Esophageal Concerns: none reported    Results Reviewed with: patient, RN, and MD     Plan  Will continue to follow for x1-3  Dysphagia Goals: pt will tolerate puree with nectar without s/s of aspiration x1-3  Pt will participate in trials for advancement across x1-3 diagnostic sessions.      Cathie Oliveira MS, CCC-SLP  Speech-Language Pathologist  [place] #[id number]  [place] #[id number]          [1]   Past Medical History:  Diagnosis Date    Ankle fracture     Arthritis     left hip    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HCC)     with left ureter    Bronchitis     Cancer (HCC)     Carcinoma, lung (HCC)     Chronic kidney disease, stage 4 (severe) (HCC) 01/17/2025    Dementia (HCC)     Dependent edema     Depression     Diabetes mellitus (HCC)     Dizziness 12/13/2021    Hypercholesterolemia     Hypertension     Kidney stone     Oth abn and inconclusive findings on dx imaging of breast     Pes planus     Renal calculus     Restless leg syndrome     Rib pain     Sprain, neck     Varicose veins of left lower extremity    [2]   Past Surgical History:  Procedure Laterality Date    APPENDECTOMY      LUNG CANCER SURGERY      US BREAST CYST ASPIRATION LEFT INITIAL Left 3/23/2018

## 2025-06-23 NOTE — ASSESSMENT & PLAN NOTE
Lab Results   Component Value Date    EGFR 37 06/23/2025    EGFR 32 06/21/2025    EGFR 35 06/20/2025    CREATININE 1.20 06/23/2025    CREATININE 1.33 (H) 06/21/2025    CREATININE 1.25 06/20/2025     Baseline creatinine is 1.2-1.3  Current is 1.3  Discontinue IV fluids

## 2025-06-23 NOTE — SOCIAL WORK
LSW attempted to see patient today. Pt was unable to meet with this writer as he was sleeping soundly. LSW will continue to follow.

## 2025-06-23 NOTE — ASSESSMENT & PLAN NOTE
Per niece, reported to have increased bouts of confusion, combativeness more freuqently over the past few weeks  Recently was treated for UTI in May and completed nitrofurantoin regimen at that time; prior MDRO on urine clx    Recheck UA with scope/culture; if concerning for infection, empirically start Invanz based on prior sensitivities from 5/28/22025  Continue home regimen of Remeron and Seroquel  Case management consulted for assistance with placement as well  Depakote 250 mg started this admission  PRN zyprexa for agitation  Today update and plan  Patient did not sleep last night.  She told me her name following commands.  Continue with IV antibiotics for total of 3 to 5 days for suspected ESBL UTI.  Geriatrics increase the dose of Seroquel QTc is less than 470.  Patient is currently one-to-one  Speech and swallow evaluated recommended puréed with nectar thick

## 2025-06-23 NOTE — DISCHARGE SUPPORT
Case Management Assessment & Discharge Planning Note    Patient name Sona Valenzuela  Location /-01 MRN [id number]  : 11/3/1924 Date 2025       Current Admission Date: 2025  Current Admission Diagnosis:Vascular dementia with behavioral disturbance (HCC)   Patient Active Problem List    Diagnosis Date Noted    Goals of care, counseling/discussion 2025    Palliative care encounter 2025    Frailty syndrome in geriatric patient 2025    Primary malignant neoplasm of bladder (HCC) 2025    Iron deficiency anemia 03/10/2025    Seizure-like activity ([facility]) 2025    Elevated troponin 2025    Congestion of throat 2025    Vascular dementia with behavioral disturbance ([facility])     Ambulatory dysfunction 2024    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, psychotic disturbance, mood disturbance, or anxiety ([facility]) 2024    Chronic pain of left wrist 10/28/2024    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 ([facility])     Acute on chronic HFpEF with severe pulmonary hypertension ([facility]) 2024    Primary hypertension 2024    Paroxysmal A-fib ([facility]) 2024    Compression fracture of lumbar spine, non-traumatic ([facility]) 2024    Mild protein-calorie malnutrition ([facility]) 2024    Vascular dementia, uncomplicated ([facility]) 2023    Does mobilize using walker 2023    Chronic congestive heart failure ([facility]) 2022    Chronic atrial fibrillation ([facility]) 10/11/2022    Recurrent major depressive disorder, in remission ([facility]) 2022    Acute kidney injury superimposed on stage 3b chronic kidney disease ([facility]) 2022    Vitamin D deficiency 2020    Primary insomnia 2019    Degeneration of lumbar intervertebral disc 2019    Lumbar radiculopathy 2019    Arthropathy of lumbar facet joint 2019    Restless legs syndrome 2019      LOS (days): 4  Geometric Mean LOS (GMLOS)  (days): 3  Days to GMLOS:-0.8   Facility Authorization Approved  [facility] received approved auth for: SNF  Insurance: [employer]  Determination made after peer to peer was completed?: Not applicable  Authorization Obtained Via: Fax  Facility Name: [facility]  Approved Authorization Number: [id number]  Start of Care Date: 06/23/25  Next Review Date: 06/26/25  Submit Next Review to: [phone]   notified: Kenyetta TORRES     Updates to authorization status will be noted in chart.    Please reach out to CM for updates on any clinical information.

## 2025-06-23 NOTE — PLAN OF CARE
Problem: Potential for Falls  Goal: Patient will remain free of falls  Description: INTERVENTIONS:  - Educate patient/family on patient safety including physical limitations  - Instruct patient to call for assistance with activity   - Consider consulting OT/PT to assist with strengthening/mobility based on AM PAC & -HLM score  - Consult OT/PT to assist with strengthening/mobility   - Keep Call bell within reach  - Keep bed low and locked with side rails adjusted as appropriate  - Keep care items and personal belongings within reach  - Initiate and maintain comfort rounds  - Make Fall Risk Sign visible to staff  - Offer Toileting every 2 Hours, in advance of need  - Initiate/Maintain bed/chair alarm  - Obtain necessary fall risk management equipment  - Apply yellow socks and bracelet for high fall risk patients  - Consider moving patient to room near nurses station  Outcome: Progressing     Problem: Prexisting or High Potential for Compromised Skin Integrity  Goal: Skin integrity is maintained or improved  Description: INTERVENTIONS:  - Identify patients at risk for skin breakdown  - Assess and monitor skin integrity including under and around medical devices   - Assess and monitor nutrition and hydration status  - Monitor labs  - Assess for incontinence   - Turn and reposition patient  - Assist with mobility/ambulation  - Relieve pressure over bob prominences   - Avoid friction and shearing  - Provide appropriate hygiene as needed including keeping skin clean and dry  - Evaluate need for skin moisturizer/barrier cream  - Collaborate with interdisciplinary team  - Patient/family teaching  - Consider wound care consult    Assess:  - Review Fabricio scale daily  - Clean and moisturize skin  - Inspect skin when repositioning, toileting, and assisting with ADLS  - Assess under medical devices  - Assess extremities for adequate circulation and sensation     Bed Management:  - Have minimal linens on bed & keep smooth,  unwrinkled  - Change linens as needed when moist or perspiring  - Toileting:  - Offer bedside commode  - Assess for incontinence   - Use incontinent care products after each incontinent episode     Activity:  - Mobilize patient 3 times a day  - Encourage activity and walks on unit  - Encourage or provide ROM exercises   - Turn and reposition patient every  Hours  - Use appropriate equipment to lift or move patient in bed  - Instruct/ Assist with weight shifting when out of bed in chair  - Consider limitation of chair time     Skin Care:  - Avoid use of baby powder, tape, friction and shearing, hot water or constrictive clothing  - Relieve pressure over bony prominences   - Do not massage red bony areas    Next Steps:  - Teach patient strategies to minimize risks   - Consider consults to  interdisciplinary teams   Outcome: Progressing     Problem: Nutrition/Hydration-ADULT  Goal: Nutrient/Hydration intake appropriate for improving, restoring or maintaining nutritional needs  Description: Monitor and assess patient's nutrition/hydration status for malnutrition. Collaborate with interdisciplinary team and initiate plan and interventions as ordered.  Monitor patient's weight and dietary intake as ordered or per policy. Utilize nutrition screening tool and intervene as necessary. Determine patient's food preferences and provide high-protein, high-caloric foods as appropriate.     INTERVENTIONS:  - Monitor oral intake, urinary output, labs, and treatment plans  - Assess nutrition and hydration status and recommend course of action  - Evaluate amount of meals eaten  - Assist patient with eating if necessary   - Allow adequate time for meals  - Recommend/ encourage appropriate diets, oral nutritional supplements, and vitamin/mineral supplements  - Order, calculate, and assess calorie counts as needed  - Recommend, monitor, and adjust tube feedings and TPN/PPN based on assessed needs  - Assess need for intravenous fluids  -  Provide specific nutrition/hydration education as appropriate  - Include patient/family/caregiver in decisions related to nutrition  Outcome: Progressing     Problem: SAFETY,RESTRAINT: NV/NON-SELF DESTRUCTIVE BEHAVIOR  Goal: Remains free of harm/injury (restraint for non violent/non self-detsructive behavior)  Description: INTERVENTIONS:  - Instruct patient/family regarding restraint use   - Assess and monitor physiologic and psychological status   - Provide interventions and comfort measures to meet assessed patient needs   - Identify and implement measures to help patient regain control  - Assess readiness for release of restraint   Outcome: Progressing  Goal: Returns to optimal restraint-free functioning  Description: INTERVENTIONS:  - Assess the patient's behavior and symptoms that indicate continued need for restraint  - Identify and implement measures to help patient regain control  - Assess readiness for release of restraint   Outcome: Progressing

## 2025-06-23 NOTE — PROGRESS NOTES
Progress Note - Palliative Care   Name: Sona Valenzuela 89 y.o. female I MRN: [id number]  Unit/Bed#: -01 I Date of Admission: 6/19/2025   Date of Service: 6/23/2025 I Hospital Day: 4    Assessment & Plan  Acute kidney injury superimposed on stage 3b chronic kidney disease (HCC)  Lab Results   Component Value Date    EGFR 37 06/23/2025    EGFR 32 06/21/2025    EGFR 35 06/20/2025    CREATININE 1.20 06/23/2025    CREATININE 1.33 (H) 06/21/2025    CREATININE 1.25 06/20/2025   Creatinine now near baseline  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)  Wt Readings from Last 3 Encounters:   06/19/25 63.5 kg (139 lb 15.9 oz)   06/13/25 63.5 kg (140 lb)   04/03/25 61.7 kg (136 lb)             Ambulatory dysfunction    Vascular dementia with behavioral disturbance (HCC)  Pleasantly confused at baseline, ambulatory.  Weight has been consistent for past 6 months.   Presented with departure from baseline.  Verbally agressive, throwing items.   Per chart review patient has has increased confusion and agression at night since May   Patient requiring 1:1 observation and mitts for safety.   Gerontology consulted.  Goals of care, counseling/discussion  Limits of DNR DNI   Previously engage in hospice conversations with PCP.   Xi Scott closest relative has been caretaker for past 30 years.  Family confirmed continued life prolonging cares last week.  They are not currently interested in hospice care.   Palliative care encounter  Palliative Diagnosis: advancing dementia, advanced age, CHF     Goals:   Limits of DNR DNI   Discussed potential  for patient not to return to baseline mental status and have ongoing cognitive decline.   Palliative will follow for ongoing goals of care discussions as situation evolves.    Social Support:  Patient's support system: elisa Layne and nephew Juancarlos  Supportive listening provided  Normalized experience of patient    Care Coordination  Case discussed with [facility]  Dr. Betancourt, Kadie Díaz.     Follow-up  We appreciate the opportunity to participate in this patient's care.   We will continue to follow while admitted.    Please do not hesitate to contact our on-call provider through [facility] Secure Chat or contact [phone] should there be an acute change or other symptom control concerns.    Primary insomnia    Frailty syndrome in geriatric patient      Decisional apparatus: Patient is competent on my exam today. If competence is lost, patient's substitute decision maker would default to niece by [place] Act 169.     PDMP Review: I have reviewed the patient's controlled substance dispensing history in the Prescription Drug Monitoring Program in compliance with the [facility] regulations before prescribing any controlled substances.    Subjective   None communicative today.     Objective :  Temp:  [97.8 °F (36.6 °C)] 97.8 °F (36.6 °C)  HR:  [73-96] 73  BP: (112-119)/(73-76) 115/76  Resp:  [18-20] 18  SpO2:  [90 %-96 %] 93 %  O2 Device: None (Room air)    Physical Exam  Vitals and nursing note reviewed.   Constitutional:       General: She is sleeping.      Appearance: She is ill-appearing.      Interventions: She is restrained.   HENT:      Head: Normocephalic and atraumatic.     Eyes:      General: Lids are normal.     Pulmonary:      Effort: No prolonged expiration or respiratory distress.     Skin:     General: Skin is warm and dry.     Neurological:      Mental Status: Mental status is at baseline. She is confused.     Psychiatric:         Attention and Perception: She is inattentive.         Speech: She is noncommunicative.         Cognition and Memory: Cognition is impaired. Memory is impaired.            Lab Results: I have reviewed the following results:  Lab Results   Component Value Date/Time    SODIUM 141 06/23/2025 09:45 AM    SODIUM 142 03/17/2025 05:28 AM    K 4.2 06/23/2025 09:45 AM    K 4.3 03/17/2025 05:28 AM    BUN 25 06/23/2025 09:45 AM    BUN 32 (H) 03/17/2025 05:28 AM     CREATININE 1.20 06/23/2025 09:45 AM    CREATININE 1.29 (H) 03/17/2025 05:28 AM    GLUC 144 (H) 06/23/2025 09:45 AM    GLUC 91 03/17/2025 05:28 AM    GLUC 96 04/02/2018 03:13 PM    CALCIUM 9.0 06/23/2025 09:45 AM    CALCIUM 8.4 (L) 03/17/2025 05:28 AM    AST 33 06/20/2025 04:39 AM    ALT 23 06/20/2025 04:39 AM    ALB 3.3 (L) 06/20/2025 04:39 AM    TP 6.4 06/20/2025 04:39 AM    EGFR 37 06/23/2025 09:45 AM    EGFR  03/17/2025 05:28 AM     Not performed on patients less than 18 years of age or greater than 97 years of age     Lab Results   Component Value Date/Time    HGB 9.3 (L) 06/23/2025 11:37 AM    WBC 6.98 06/23/2025 11:37 AM     06/23/2025 11:37 AM    INR 1.20 (H) 03/04/2025 11:08 AM    PTT 28 03/04/2025 11:08 AM     Lab Results   Component Value Date/Time    [id number] 3.797 06/19/2025 04:24 PM       Code Status: Level 3 - DNAR and DNI  Advance Directive and Living Will:      Power of :    POLST:      Administrative Statements   I have spent a total time of 25+  minutes in caring for this patient on the day of the visit/encounter including Diagnostic results, Prognosis, Risks and benefits of tx options, Importance of tx compliance, Risk factor reductions, Impressions, Counseling / Coordination of care, Documenting in the medical record, Reviewing/placing orders in the medical record (including tests, medications, and/or procedures), Obtaining or reviewing history  , and Communicating with other healthcare professionals .

## 2025-06-23 NOTE — QUICK NOTE
Spoke with niece, Xi over the phone. She reports increased anger and impulsivity over the past 2 weeks. She noted worsening sundowning and altered sleep cycle, the patient would be awake during the night and sleeping during the day. Xi wants to keep her home, but also notes that the patient is no longer able to ambulate with the walker. When discussing her dementia diagnosis, she also notes a decline in conversation over the past 2-3 weeks, gibberish sentences and being unable to form words. Also notes coughing while drinking water and eating food. Discussed increasing Seroquel to help with sleep and in agreement at this time. She states she will try to be at the hospital to visit around 5 pm.

## 2025-06-23 NOTE — DISCHARGE SUPPORT
Rec'd voicemail from Jessica @ [facility] requesting an updated MD progress note to attach to pending SNF auth. Jessica P# [phone].    Uploaded progress notes as requested to [employer] portal, as requested.     LIANE notified: Kenyetta TORRES

## 2025-06-23 NOTE — ASSESSMENT & PLAN NOTE
Lab Results   Component Value Date    EGFR 32 06/21/2025    EGFR 35 06/20/2025    EGFR 30 06/19/2025    CREATININE 1.33 (H) 06/21/2025    CREATININE 1.25 06/20/2025    CREATININE 1.41 (H) 06/19/2025     Baseline creatinine: 1.3-1.4  Continue to monitor kidney function  Monitor I/O's  Encourage PO hydration   Avoid hypotension  Avoid nephrotoxins, IV contrast  Received gentle IV fluids overnight

## 2025-06-23 NOTE — ASSESSMENT & PLAN NOTE
History of prior memory issues and cognitive impairment   Presented to hospital for increased confusion, combativeness  Patient has lived with her niece and nephew for the past 30 years  Dependent with ADLs/IADLs    Alert and oriented x 1 on exam today (self), pleasant  Previously followed with [facility]   Last seen on 09/29/23  Appears patient has occurences of sundowning, recently managed by PCP   PCP speaking with family of trial of increase dose of seroquel vs adding melatonin, family had opted to trial of melatonin, started on 06/13   Increased Melatonin to 6 mg HS, spoke with PCA at bedside and patient had sleep disturbance  Has history of hallucinations    Most recent TSH on 06/19/25 noted to be 3.797  Most recent vitamin B12 level on 01/24/23 noted to be 407  MRI brain on 03/13/25 revealed mild microangiopathic change  Patient scored 4/30 on most recent MOCA on 12/13/21  Keep physically, mentally, and socially active   PTA medication: Seroquel 25 mg po daily at bedtime  Was decreased to Depakote 250 mg every 12 hours on 6/20   Per chart review, did not receive medication last night   LFT's previously stable  Ordered EKG to monitor QTc  Last EKG on 03/04 was 484  If prolonged QTc would recommend holding seroquel    At risk for delirium secondary to age, cognitive decline, hospitalization, insomnia, immobility  Provide frequent redirection, reorientation, distraction techniques  Avoid deliriogenic medications such as tramadol, benzodiazepines, anticholinergics,  Benadryl  Treat pain, See geriatric pain protocol  Monitor for constipation and urinary retention  Encourage early and frequent moblization, OOB  Encourage Hydration/ Nutrition  Implement sleep hygiene, limit night time interuptions, group activities  Avoid physical restraints as able  Use chemical restraint only when other efforts have failed, recommend zyprexa 2.5mg IM q8h prn  Monitor Qtc, if greater than 500 do not use antipsychotic  medication  If QTc greater than 460, monitor and replete and deficiency of  K and Mg, recheck EKG

## 2025-06-23 NOTE — ASSESSMENT & PLAN NOTE
Recently was having home health/physical therapy, re-ordered by PCP  Assess patient frequently for physical needs, encourage use of assistant devices as needed and directed by PT/OT  Identify cognitive and physical deficits and behaviors that affect risk of falls  Consider moving patient closer to nursing station to monitor more closely for impulsive behavior which may increase risk of falls  [place] fall and safety precautions   Educate patient/family on patient safety including physical limitations and importance of using call bell for assistance   Modify environment to reduce risk of injury including disconnecting from pole when not in use, ensuring adequate lighting in room and restroom, ensuring that path to restroom is clear and free of trip hazards  PT/OT consulted to assist with strengthening/mobility and assist with discharge planning to appropriate level of care  Out of bed as tolerated  Family interested in PT/OT services

## 2025-06-23 NOTE — ASSESSMENT & PLAN NOTE
Palliative Diagnosis: advancing dementia, advanced age, CHF     Goals:   Limits of DNR DNI   Discussed potential  for patient not to return to baseline mental status and have ongoing cognitive decline.   Palliative will follow for ongoing goals of care discussions as situation evolves.    Social Support:  Patient's support system: nikorina Layne and nephew Juancarlos  Supportive listening provided  Normalized experience of patient    Care Coordination  Case discussed with TONY Betancourt, Kadie Díaz.     Follow-up  We appreciate the opportunity to participate in this patient's care.   We will continue to follow while admitted.    Please do not hesitate to contact our on-call provider through [employer] Secure Chat or contact [phone] should there be an acute change or other symptom control concerns.

## 2025-06-23 NOTE — ASSESSMENT & PLAN NOTE
Lab Results   Component Value Date    EGFR 37 06/23/2025    EGFR 32 06/21/2025    EGFR 35 06/20/2025    CREATININE 1.20 06/23/2025    CREATININE 1.33 (H) 06/21/2025    CREATININE 1.25 06/20/2025   Creatinine now near baseline

## 2025-06-23 NOTE — QUICK NOTE
Patient refused to take night meds by showing agitation and refusing to open mouth. However, even though she didn't sleep, this was the calmest I've seen her since caring for her for the past (4) nights. She was put on a 1:1 for safety since she kept pulling at IV and wasn't fully able to be redirected by virtual monitoring.   Vitals WNL    06/23/2025 @ 0523  Patient responding well to redirection by in person 1:1.

## 2025-06-23 NOTE — PROGRESS NOTES
Progress Note - Hospitalist   Name: Sona Valenzuela 89 y.o. female I MRN: [id number]  Unit/Bed#: -01 I Date of Admission: 6/19/2025   Date of Service: 6/23/2025 I Hospital Day: 4    Assessment & Plan  Vascular dementia with behavioral disturbance (HCC)  Per niece, reported to have increased bouts of confusion, combativeness more freuqently over the past few weeks  Recently was treated for UTI in May and completed nitrofurantoin regimen at that time; prior MDRO on urine clx    Recheck UA with scope/culture; if concerning for infection, empirically start Invanz based on prior sensitivities from 5/28/22025  Continue home regimen of Remeron and Seroquel  Case management consulted for assistance with placement as well  Depakote 250 mg started this admission  PRN zyprexa for agitation  Today update and plan  Patient did not sleep last night.  She told me her name following commands.  Continue with IV antibiotics for total of 3 to 5 days for suspected ESBL UTI.  Geriatrics increase the dose of Seroquel QTc is less than 470.  Patient is currently one-to-one  Speech and swallow evaluated recommended puréed with nectar thick  Acute kidney injury superimposed on stage 3b chronic kidney disease (HCC)  Lab Results   Component Value Date    EGFR 37 06/23/2025    EGFR 32 06/21/2025    EGFR 35 06/20/2025    CREATININE 1.20 06/23/2025    CREATININE 1.33 (H) 06/21/2025    CREATININE 1.25 06/20/2025     Baseline creatinine is 1.2-1.3  Current is 1.3  Discontinue IV fluids  Recurrent major depressive disorder, in remission (HCC)  Continue home regimen Seroquel and mirtazapine  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)  Wt Readings from Last 3 Encounters:   06/19/25 63.5 kg (139 lb 15.9 oz)   06/13/25 63.5 kg (140 lb)   04/03/25 61.7 kg (136 lb)     Lab Results   Component Value Date     (H) 03/04/2025     (H) 06/04/2024    LVEF 55 03/06/2025     Lasix  Ambulatory dysfunction  PT/OT  Eval and treat  Trend Mobility Scores  Encourage appropriate mobility to achieve and improve upon HLM Goals as noted  Primary insomnia  Mirtazapine  Goals of care, counseling/discussion  Palliative consulted  Frailty syndrome in geriatric patient  Appreciate geriatrics    VTE Pharmacologic Prophylaxis: VTE Score: 4 Moderate Risk (Score 3-4) - Pharmacological DVT Prophylaxis Ordered: heparin.    Mobility:   Basic Mobility Inpatient Raw Score: 6  JH-HLM Goal: 2: Bed activities/Dependent transfer  JH-HLM Achieved: 2: Bed activities/Dependent transfer  JH-HLM Goal NOT achieved. Continue with multidisciplinary rounding and encourage appropriate mobility to improve upon JH-HLM goals.    Patient Centered Rounds: I performed bedside rounds with nursing staff today.   Discussions with Specialists or Other Care Team Provider: Palliative care    Education and Discussions with Family / Patient: Will reach out to family.     Current Length of Stay: 4 day(s)  Current Patient Status: Inpatient   Certification Statement: The patient will continue to require additional inpatient hospital stay due to assessment and plan  Discharge Plan: Anticipate discharge in 48-72 hrs to discharge location to be determined pending rehab evaluations.    Code Status: Level 3 - DNAR and DNI    Subjective   Patient seen and examined at bedside.  Patient did not sleep last night.  She looks tired and fatigue.  She told me her name but confused.    Objective :  Temp:  [97.8 °F (36.6 °C)] 97.8 °F (36.6 °C)  HR:  [73-96] 73  BP: (112-119)/(73-76) 115/76  Resp:  [18-20] 18  SpO2:  [90 %-96 %] 93 %  O2 Device: None (Room air)    Body mass index is 26.45 kg/m².     Input and Output Summary (last 24 hours):     Intake/Output Summary (Last 24 hours) at 6/23/2025 1506  Last data filed at 6/23/2025 1254  Gross per 24 hour   Intake 120 ml   Output --   Net 120 ml       Physical Exam   confused, tired and fatigued  S1 plus S2  Normal vascular breath  No pedal  edema  Spontaneously moving upper and lower limbs    Lines/Drains:              Lab Results: I have reviewed the following results:   Results from last 7 days   Lab Units 06/23/25  1137 06/21/25  1012 06/20/25  0439   WBC Thousand/uL 6.98   < > 5.40   HEMOGLOBIN g/dL 9.3*   < > 8.8*   HEMATOCRIT % 30.9*   < > 29.8*   PLATELETS Thousands/uL 257   < > 224   SEGS PCT %  --   --  63   LYMPHO PCT %  --   --  16   MONO PCT %  --   --  17*   EOS PCT %  --   --  3    < > = values in this interval not displayed.     Results from last 7 days   Lab Units 06/23/25  0945 06/21/25  1012 06/20/25  0439   SODIUM mmol/L 141   < > 141   POTASSIUM mmol/L 4.2   < > 4.3   CHLORIDE mmol/L 107   < > 107   CO2 mmol/L 29   < > 27   BUN mg/dL 25   < > 33*   CREATININE mg/dL 1.20   < > 1.25   ANION GAP mmol/L 5   < > 7   CALCIUM mg/dL 9.0   < > 8.6   ALBUMIN g/dL  --   --  3.3*   TOTAL BILIRUBIN mg/dL  --   --  1.18*   ALK PHOS U/L  --   --  110*   ALT U/L  --   --  23   AST U/L  --   --  33   GLUCOSE RANDOM mg/dL 144*   < > 101    < > = values in this interval not displayed.         Results from last 7 days   Lab Units 06/20/25  1630 06/19/25  2117   POC GLUCOSE mg/dl 111 95               Recent Cultures (last 7 days):   Results from last 7 days   Lab Units 06/19/25  2352   URINE CULTURE  >100,000 cfu/ml Escherichia coli ESBL*       Imaging Results Review: I reviewed radiology reports from this admission including: chest xray.  Other Study Results Review: Other studies reviewed include: CBC and BMP    Last 24 Hours Medication List:     Current Facility-Administered Medications:     acetaminophen (TYLENOL) tablet 650 mg, Q8H PRN    dextromethorphan-guaiFENesin (ROBITUSSIN DM) oral syrup 10 mL, Q4H PRN    [DISCONTINUED] valproate (DEPACON) 125 mg in sodium chloride 0.9 % 50 mL IVPB, Q12H HESHAM **OR** divalproex sodium (DEPAKOTE SPRINKLE) capsule 125 mg, Q12H HESHAM    docusate sodium (COLACE) capsule 100 mg, BID    ertapenem (INVanz) 500 mg in  sodium chloride 0.9 % 50 mL IVPB, Q24H, Last Rate: 500 mg (06/23/25 0948)    [Held by provider] furosemide (LASIX) tablet 20 mg, BID    heparin (porcine) subcutaneous injection 5,000 Units, Q8H HESHAM **AND** [COMPLETED] Platelet count, Once    melatonin tablet 6 mg, HS    mirtazapine (REMERON) tablet 7.5 mg, HS    ondansetron (ZOFRAN) injection 4 mg, Q6H PRN    QUEtiapine (SEROquel) tablet 25 mg, HS    simethicone (MYLICON) chewable tablet 80 mg, 4x Daily PRN    Administrative Statements   Today, Patient Was Seen By: Marshall Betancourt MD      **Please Note: This note may have been constructed using a voice recognition system.**

## 2025-06-23 NOTE — PROGRESS NOTES
Progress Note - Geriatric Medicine   Name: Sona Valenzuela 89 y.o. female I MRN: [id number]  Unit/Bed#: -01 I Date of Admission: 6/19/2025   Date of Service: 6/23/2025 I Hospital Day: 4    Assessment & Plan  Vascular dementia with behavioral disturbance (HCC)  History of prior memory issues and cognitive impairment   Presented to hospital for increased confusion, combativeness  Patient has lived with her niece and nephew for the past 30 years  Dependent with ADLs/IADLs    Alert and oriented x 1 on exam today (self), pleasant  Previously followed with [facility]   Last seen on 09/29/23  Appears patient has occurences of sundowning, recently managed by PCP   PCP speaking with family of trial of increase dose of seroquel vs adding melatonin, family had opted to trial of melatonin, started on 06/13   Increased Melatonin to 6 mg HS, spoke with PCA at bedside and patient had sleep disturbance  Has history of hallucinations    Most recent TSH on 06/19/25 noted to be 3.797  Most recent vitamin B12 level on 01/24/23 noted to be 407  MRI brain on 03/13/25 revealed mild microangiopathic change  Patient scored 4/30 on most recent MOCA on 12/13/21  Keep physically, mentally, and socially active   PTA medication: Seroquel 25 mg po daily at bedtime  Was decreased to Depakote 250 mg every 12 hours on 6/20   Per chart review, did not receive medication last night   LFT's previously stable  Ordered EKG to monitor QTc  Last EKG on 03/04 was 484  If prolonged QTc would recommend holding seroquel    At risk for delirium secondary to age, cognitive decline, hospitalization, insomnia, immobility  Provide frequent redirection, reorientation, distraction techniques  Avoid deliriogenic medications such as tramadol, benzodiazepines, anticholinergics,  Benadryl  Treat pain, See geriatric pain protocol  Monitor for constipation and urinary retention  Encourage early and frequent moblization, OOB  Encourage Hydration/  Nutrition  Implement sleep hygiene, limit night time interuptions, group activities  Avoid physical restraints as able  Use chemical restraint only when other efforts have failed, recommend zyprexa 2.5mg IM q8h prn  Monitor Qtc, if greater than 500 do not use antipsychotic medication  If QTc greater than 460, monitor and replete and deficiency of  K and Mg, recheck EKG    Recurrent major depressive disorder, in remission ([facility])  Patient has a history of anxiety/depression   Mood appears stable on exam today   Continue supportive care   PTA medication: mirtazapine 7.5 mg daily at bedtime  Acute kidney injury superimposed on stage 3b chronic kidney disease ([facility])  Lab Results   Component Value Date    EGFR 32 06/21/2025    EGFR 35 06/20/2025    EGFR 30 06/19/2025    CREATININE 1.33 (H) 06/21/2025    CREATININE 1.25 06/20/2025    CREATININE 1.41 (H) 06/19/2025     Baseline creatinine: 1.3-1.4  Continue to monitor kidney function  Monitor I/O's  Encourage PO hydration   Avoid hypotension  Avoid nephrotoxins, IV contrast  Received gentle IV fluids overnight  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 ([facility])  Wt Readings from Last 3 Encounters:   06/19/25 63.5 kg (139 lb 15.9 oz)   06/13/25 63.5 kg (140 lb)   04/03/25 61.7 kg (136 lb)     Weight stable  Euvolemic  Does not appear short of breath, no leg swelling  Family noted to PCP on 06/13 of intermittent shortness of breath with exertion   ECHO on 03/06/25 showing EF 55%  Chest XR pending  PTA medication: furosemide 20 mg BID    Ambulatory dysfunction  Recently was having home health/physical therapy, re-ordered by PCP  Assess patient frequently for physical needs, encourage use of assistant devices as needed and directed by PT/OT  Identify cognitive and physical deficits and behaviors that affect risk of falls  Consider moving patient closer to nursing station to monitor more closely for impulsive behavior which may increase risk of  falls  [place] fall and safety precautions   Educate patient/family on patient safety including physical limitations and importance of using call bell for assistance   Modify environment to reduce risk of injury including disconnecting from pole when not in use, ensuring adequate lighting in room and restroom, ensuring that path to restroom is clear and free of trip hazards  PT/OT consulted to assist with strengthening/mobility and assist with discharge planning to appropriate level of care  Out of bed as tolerated  Family interested in PT/OT services  Goals of care, counseling/discussion  DNR/DNI  Palliative Care consulted, family not interested in hospice at this time, expressed interest in PT/OT services  Palliative care encounter  Palliative Care consulted, having goals of care discussion with family    Primary insomnia  First line is behavioral therapy   Avoid sedative hypnotics including benzodiazepines and benadryl  Encourage staying awake during the day   Encourage daytime activities and morning exercise   Decrease or eliminate daytime naps   Avoid caffeine especially during late afternoon and evening hours  Establish a nighttime routine  Implement sleep hygiene and limit nighttime interruptions  Continue melatonin 6 mg daily at bedtime and mirtazapine 7.5 mg daily at bedtime for sleep   Frailty syndrome in geriatric patient  Clinical Frail Scale: 6- Moderately Frail  Need help with all outside activities  Need help with stairs and bathing  May need assistance with dressing  Resides with family who assist with ADLs/IADLs  Appears to have good appetite  Albumin level on 06/20/25 was 3.3   Noted coughing today during exam   CXR pending, ST consulted  Consider adding dietary supplements, Ensure or magic cup to meal trays  Continue to optimize co-morbidities   Fall precautions     Uziel Magallanes is seen today for follow up. Upon exam today, she is laying in bed, calm and pleasantly confused. She is alert  and oriented to self only. She currently denies pain, is not yet hungry for lunch. 1:1 bedside. Discussed sleep last night and PCA reported that she did not sleep well. Per chart review, refused Depakote sprinkles last evening. Noted coughing today upon exam, pending CXR results. ST consulted.    Attempted to call elisa Layne, however no answer.     Objective :  Temp:  [97.8 °F (36.6 °C)] 97.8 °F (36.6 °C)  HR:  [73-96] 73  BP: (112-119)/(73-76) 115/76  Resp:  [18-20] 18  SpO2:  [90 %-96 %] 93 %  O2 Device: None (Room air)    Physical Exam  Vitals and nursing note reviewed.     Cardiovascular:      Rate and Rhythm: Normal rate and regular rhythm.      Pulses: Normal pulses.      Heart sounds: Normal heart sounds.   Pulmonary:      Effort: Pulmonary effort is normal. No respiratory distress.      Breath sounds: Normal breath sounds.   Abdominal:      General: Abdomen is flat. Bowel sounds are normal.      Palpations: Abdomen is soft.     Musculoskeletal:      Right lower leg: No edema.      Left lower leg: No edema.     Skin:     General: Skin is warm and dry.      Capillary Refill: Capillary refill takes less than 2 seconds.     Neurological:      Mental Status: She is alert. She is disoriented.      Comments: AOx1, to self only   Psychiatric:         Mood and Affect: Mood normal.           Lab Results: I have reviewed the following results:CBC/BMP:   .     06/23/25  0945 06/23/25  1137   WBC  --  6.98   HGB  --  9.3*   HCT  --  30.9*   PLT  --  257   SODIUM 141  --    K 4.2  --      --    CO2 29  --    BUN 25  --    CREATININE 1.20  --    GLUC 144*  --       Imaging Results Review: No pertinent imaging studies reviewed.  Other Study Results Review: No additional pertinent studies reviewed.    Therapies:   Basic Mobility Inpatient Raw Score: 6  JH-HLM Goal: 2: Bed activities/Dependent transfer  JH-HLM Achieved: 2: Bed activities/Dependent transfer  PT: Following  OT: Following  ST: Consulted    VTE  Prophylaxis: VTE covered by:  heparin (porcine), Subcutaneous, 5,000 Units at 06/23/25 0509    and Sequential compression device (Venodyne)     Code Status: Level 3 - DNAR and DNI  Advance Directive and Living Will:      Power of :    POLST:      Family and Social Support: Xi, niece, Juancarlos, Nephew      Goals of Care: Pending    Administrative Statements   I have spent a total time of 50 minutes in caring for this patient on the day of the visit/encounter including Diagnostic results, Prognosis, Risks and benefits of tx options, Risk factor reductions, Impressions, Documenting in the medical record, Reviewing/placing orders in the medical record (including tests, medications, and/or procedures), Obtaining or reviewing history  , and Communicating with other healthcare professionals .

## 2025-06-23 NOTE — CASE MANAGEMENT
Case Management Discharge Planning Note    Patient name Sona Valenzuela  Location /-01 MRN [id number]  : 11/3/1924 Date 2025       Current Admission Date: 2025  Current Admission Diagnosis:Vascular dementia with behavioral disturbance (HCC)   Patient Active Problem List    Diagnosis Date Noted    Goals of care, counseling/discussion 2025    Palliative care encounter 2025    Frailty syndrome in geriatric patient 2025    Primary malignant neoplasm of bladder ([facility]) 2025    Iron deficiency anemia 03/10/2025    Seizure-like activity ([facility]) 2025    Elevated troponin 2025    Congestion of throat 2025    Vascular dementia with behavioral disturbance ([facility])     Ambulatory dysfunction 2024    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, psychotic disturbance, mood disturbance, or anxiety ([facility]) 2024    Chronic pain of left wrist 10/28/2024    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 ([facility])     Acute on chronic HFpEF with severe pulmonary hypertension ([facility]) 2024    Primary hypertension 2024    Paroxysmal A-fib ([facility]) 2024    Compression fracture of lumbar spine, non-traumatic ([facility]) 2024    Mild protein-calorie malnutrition ([facility]) 2024    Vascular dementia, uncomplicated ([facility]) 2023    Does mobilize using walker 2023    Chronic congestive heart failure ([facility]) 2022    Chronic atrial fibrillation ([facility]) 10/11/2022    Recurrent major depressive disorder, in remission ([facility]) 2022    Acute kidney injury superimposed on stage 3b chronic kidney disease ([facility]) 2022    Vitamin D deficiency 2020    Primary insomnia 2019    Degeneration of lumbar intervertebral disc 2019    Lumbar radiculopathy 2019    Arthropathy of lumbar facet joint 2019    Restless legs syndrome 2019      LOS (days): 4  Geometric Mean LOS (GMLOS) (days): 3  Days  to GMLOS:-0.9     OBJECTIVE:  Risk of Unplanned Readmission Score: 23.45         Current admission status: Inpatient   Preferred Pharmacy:   [id number] [place] - [address]  [address]  [place] [address]  Phone: [phone] Fax: [phone]    Primary Care Provider: FARZANA Stanley    Primary Insurance: [employer]  Secondary Insurance: [facility]    DISCHARGE DETAILS:                                          Other Referral/Resources/Interventions Provided:  Referral Comments: Auth obtained from CM D/C Support, however, pt remains on 1:1.  Jeremy reports pt has to be off 1:1 and/or restraints x 48 hrs.  Secure chat sent to Dr. Betnacourt re: same.  Chart reviewed and case discussed at AM SLIM rounds;  d/c anticipated in 48 hrs.  CM D/C Support made aware.  Pt remains on IV abx;  imaging suspicious for aspiration.  CM will continue to follow pt throughout hospital stay.         Treatment Team Recommendation: Home with Home Health Care, Short Term Rehab, SNF  Expected Discharge Disposition: Skilled Nursing Facility  Additional Discharge Dispositions: Skilled Nursing Facility  Transport at Discharge : Other (Comment) (TBD)

## 2025-06-23 NOTE — ASSESSMENT & PLAN NOTE
Limits of DNR DNI   Previously engage in hospice conversations with PCP.   Niece, Xi closest relative has been caretaker for past 30 years.  Family confirmed continued life prolonging cares last week.  They are not currently interested in hospice care.

## 2025-06-23 NOTE — DISCHARGE SUPPORT
Per [facility] Portal, pts plan not serviced through [facility]. Per Availity portal, prior auth still pending at this time. [employer] closed over the weekend. CM notified: Heidi CHANG

## 2025-06-24 LAB
ERYTHROCYTE [DISTWIDTH] IN BLOOD BY AUTOMATED COUNT: 19.9 % (ref 11.6–15.1)
HCT VFR BLD AUTO: 31.8 % (ref 34.8–46.1)
HGB BLD-MCNC: 9.2 G/DL (ref 11.5–15.4)
MCH RBC QN AUTO: 22.3 PG (ref 26.8–34.3)
MCHC RBC AUTO-ENTMCNC: 28.9 G/DL (ref 31.4–37.4)
MCV RBC AUTO: 77 FL (ref 82–98)
PLATELET # BLD AUTO: 239 THOUSANDS/UL (ref 149–390)
PMV BLD AUTO: 10.7 FL (ref 8.9–12.7)
RBC # BLD AUTO: 4.12 MILLION/UL (ref 3.81–5.12)
WBC # BLD AUTO: 6.54 THOUSAND/UL (ref 4.31–10.16)

## 2025-06-24 PROCEDURE — 99232 SBSQ HOSP IP/OBS MODERATE 35: CPT | Performed by: STUDENT IN AN ORGANIZED HEALTH CARE EDUCATION/TRAINING PROGRAM

## 2025-06-24 PROCEDURE — 85027 COMPLETE CBC AUTOMATED: CPT | Performed by: STUDENT IN AN ORGANIZED HEALTH CARE EDUCATION/TRAINING PROGRAM

## 2025-06-24 PROCEDURE — 99233 SBSQ HOSP IP/OBS HIGH 50: CPT

## 2025-06-24 RX ADMIN — DIVALPROEX SODIUM 125 MG: 125 CAPSULE, COATED PELLETS ORAL at 08:18

## 2025-06-24 RX ADMIN — GUAIFENESIN AND DEXTROMETHORPHAN 10 ML: 100; 10 SYRUP ORAL at 05:16

## 2025-06-24 RX ADMIN — ACETAMINOPHEN 650 MG: 325 TABLET ORAL at 08:16

## 2025-06-24 RX ADMIN — HEPARIN SODIUM 5000 UNITS: 5000 INJECTION INTRAVENOUS; SUBCUTANEOUS at 05:16

## 2025-06-24 RX ADMIN — HEPARIN SODIUM 5000 UNITS: 5000 INJECTION INTRAVENOUS; SUBCUTANEOUS at 21:06

## 2025-06-24 RX ADMIN — DOCUSATE SODIUM 100 MG: 100 CAPSULE, LIQUID FILLED ORAL at 08:16

## 2025-06-24 RX ADMIN — HEPARIN SODIUM 5000 UNITS: 5000 INJECTION INTRAVENOUS; SUBCUTANEOUS at 14:21

## 2025-06-24 RX ADMIN — ERTAPENEM SODIUM 500 MG: 1 INJECTION, POWDER, LYOPHILIZED, FOR SOLUTION INTRAMUSCULAR; INTRAVENOUS at 08:31

## 2025-06-24 NOTE — ASSESSMENT & PLAN NOTE
Palliative Diagnosis: advancing dementia, advanced age, CHF     Goals:   Limits of DNR DNI   Discussed potential  for patient not to return to baseline mental status and have ongoing cognitive decline.   Palliative will follow for ongoing goals of care discussions as situation evolves.    Social Support:  Patient's support system: elisa Layne and nephew Juancarlos  Spoke to elisa 6/23/25   Supportive listening provided  Normalized experience of patient    Care Coordination  Case discussed with Kadie Madsen Dr..     Follow-up  We appreciate the opportunity to participate in this patient's care.   We will continue to follow while admitted.    Please do not hesitate to contact our on-call provider through [employer] Secure Chat or contact [phone] should there be an acute change or other symptom control concerns.

## 2025-06-24 NOTE — PLAN OF CARE
Problem: Potential for Falls  Goal: Patient will remain free of falls  Description: INTERVENTIONS:  - Educate patient/family on patient safety including physical limitations  - Instruct patient to call for assistance with activity   - Consider consulting OT/PT to assist with strengthening/mobility based on AM PAC & -HLM score  - Consult OT/PT to assist with strengthening/mobility   - Keep Call bell within reach  - Keep bed low and locked with side rails adjusted as appropriate  - Keep care items and personal belongings within reach  - Initiate and maintain comfort rounds  - Make Fall Risk Sign visible to staff  - Offer Toileting every 2 Hours, in advance of need  - Initiate/Maintain bed alarm  - Obtain necessary fall risk management equipment  - Apply yellow socks and bracelet for high fall risk patients  - Consider moving patient to room near nurses station  Outcome: Progressing     Problem: Prexisting or High Potential for Compromised Skin Integrity  Goal: Skin integrity is maintained or improved  Description: INTERVENTIONS:  - Identify patients at risk for skin breakdown  - Assess and monitor skin integrity including under and around medical devices   - Assess and monitor nutrition and hydration status  - Monitor labs  - Assess for incontinence   - Turn and reposition patient  - Assist with mobility/ambulation  - Relieve pressure over bob prominences   - Avoid friction and shearing  - Provide appropriate hygiene as needed including keeping skin clean and dry  - Evaluate need for skin moisturizer/barrier cream  - Collaborate with interdisciplinary team  - Patient/family teaching  - Consider wound care consult    Assess:  - Review Fabricio scale daily  - Clean and moisturize skin every shift  - Inspect skin when repositioning, toileting, and assisting with ADLS  - Assess extremities for adequate circulation and sensation     Bed Management:  - Have minimal linens on bed & keep smooth, unwrinkled  - Change  linens as needed when moist or perspiring  - Avoid sitting or lying in one position for more than 2 hours while in bed?Keep HOB at 45 degrees   - Toileting:  - Offer bedside commode  - Assess for incontinence every 2 hours  - Use incontinent care products after each incontinent episode    Activity:  - Mobilize patient 2 times a day  - Encourage activity and walks on unit  - Encourage or provide ROM exercises   - Turn and reposition patient every 2 Hours  - Use appropriate equipment to lift or move patient in bed  - Instruct/ Assist with weight shifting every 2 hours when out of bed in chair  - Consider limitation of chair time 2 hour intervals    Skin Care:  - Avoid use of baby powder, tape, friction and shearing, hot water or constrictive clothing  - Relieve pressure over bony prominences  - Do not massage red bony areas    Next Steps:  - Teach patient strategies to minimize risks   - Consider consults to  interdisciplinary teams such as PT/OT  Outcome: Progressing     Problem: Nutrition/Hydration-ADULT  Goal: Nutrient/Hydration intake appropriate for improving, restoring or maintaining nutritional needs  Description: Monitor and assess patient's nutrition/hydration status for malnutrition. Collaborate with interdisciplinary team and initiate plan and interventions as ordered.  Monitor patient's weight and dietary intake as ordered or per policy. Utilize nutrition screening tool and intervene as necessary. Determine patient's food preferences and provide high-protein, high-caloric foods as appropriate.     INTERVENTIONS:  - Monitor oral intake, urinary output, labs, and treatment plans  - Assess nutrition and hydration status and recommend course of action  - Evaluate amount of meals eaten  - Assist patient with eating if necessary   - Allow adequate time for meals  - Recommend/ encourage appropriate diets, oral nutritional supplements, and vitamin/mineral supplements  - Order, calculate, and assess calorie counts  as needed  - Recommend, monitor, and adjust tube feedings and TPN/PPN based on assessed needs  - Assess need for intravenous fluids  - Provide specific nutrition/hydration education as appropriate  - Include patient/family/caregiver in decisions related to nutrition  Outcome: Progressing     Problem: SAFETY,RESTRAINT: NV/NON-SELF DESTRUCTIVE BEHAVIOR  Goal: Remains free of harm/injury (restraint for non violent/non self-detsructive behavior)  Description: INTERVENTIONS:  - Instruct patient/family regarding restraint use   - Assess and monitor physiologic and psychological status   - Provide interventions and comfort measures to meet assessed patient needs   - Identify and implement measures to help patient regain control  - Assess readiness for release of restraint   Outcome: Progressing  Goal: Returns to optimal restraint-free functioning  Description: INTERVENTIONS:  - Assess the patient's behavior and symptoms that indicate continued need for restraint  - Identify and implement measures to help patient regain control  - Assess readiness for release of restraint   Outcome: Progressing

## 2025-06-24 NOTE — ASSESSMENT & PLAN NOTE
Pleasantly confused at baseline, ambulatory.  Weight has been consistent for past 6 months.   Presented with departure from baseline.  Verbally agressive, throwing items.   Per chart review patient has has increased confusion and agression at night since May   Patient requiring 1:1 observation   Gerontology consulted.

## 2025-06-24 NOTE — CASE MANAGEMENT
Case Management Discharge Planning Note    Patient name Sona Valenzuela  Location /-01 MRN [id number]  : 11/3/1924 Date 2025       Current Admission Date: 2025  Current Admission Diagnosis:Vascular dementia with behavioral disturbance (HCC)   Patient Active Problem List    Diagnosis Date Noted    Goals of care, counseling/discussion 2025    Palliative care encounter 2025    Frailty syndrome in geriatric patient 2025    Primary malignant neoplasm of bladder ([facility]) 2025    Iron deficiency anemia 03/10/2025    Seizure-like activity ([facility]) 2025    Elevated troponin 2025    Congestion of throat 2025    Vascular dementia with behavioral disturbance ([facility])     Ambulatory dysfunction 2024    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, psychotic disturbance, mood disturbance, or anxiety ([facility]) 2024    Chronic pain of left wrist 10/28/2024    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 ([facility])     Acute on chronic HFpEF with severe pulmonary hypertension ([facility]) 2024    Primary hypertension 2024    Paroxysmal A-fib ([facility]) 2024    Compression fracture of lumbar spine, non-traumatic ([facility]) 2024    Mild protein-calorie malnutrition ([facility]) 2024    Vascular dementia, uncomplicated ([facility]) 2023    Does mobilize using walker 2023    Chronic congestive heart failure ([facility]) 2022    Chronic atrial fibrillation ([facility]) 10/11/2022    Recurrent major depressive disorder, in remission ([facility]) 2022    Acute kidney injury superimposed on stage 3b chronic kidney disease ([facility]) 2022    Vitamin D deficiency 2020    Primary insomnia 2019    Degeneration of lumbar intervertebral disc 2019    Lumbar radiculopathy 2019    Arthropathy of lumbar facet joint 2019    Restless legs syndrome 2019      LOS (days): 5  Geometric Mean LOS (GMLOS) (days): 3  Days  to GMLOS:-1.8     OBJECTIVE:  Risk of Unplanned Readmission Score: 23.54         Current admission status: Inpatient   Preferred Pharmacy:   [id number] [place] - [address]  [address]  [place] [address]  Phone: [phone] Fax: [phone]    Primary Care Provider: FARZANA Stanley    Primary Insurance: [employer]  Secondary Insurance: [facility]    DISCHARGE DETAILS:                                          Other Referral/Resources/Interventions Provided:  Referral Comments: Pt needs 3 additional days of IV abx;  anticipate readiness for placement in 72 hrs.  Pt remains on virtual 1:1;  nursing staff skeptical that it might be successfully removed,  Pt on O2 2L.  CM will continue to follow throughout hospital stay.         Treatment Team Recommendation: Home with Home Health Care, Short Term Rehab, SNF  Expected Discharge Disposition: Skilled Nursing Facility  Additional Discharge Dispositions: Skilled Nursing Facility  Transport at Discharge : Other (Comment) (TBD)

## 2025-06-24 NOTE — PROGRESS NOTES
Progress Note - Hospitalist   Name: Sona Valenzuela 89 y.o. female I MRN: [id number]  Unit/Bed#: -01 I Date of Admission: 6/19/2025   Date of Service: 6/24/2025 I Hospital Day: 5    Assessment & Plan  Vascular dementia with behavioral disturbance (HCC)  Per niece, reported to have increased bouts of confusion, combativeness more freuqently over the past few weeks  Recently was treated for UTI in May and completed nitrofurantoin regimen at that time; prior MDRO on urine clx    Recheck UA with scope/culture; if concerning for infection, empirically start Invanz based on prior sensitivities from 5/28/22025  Continue home regimen of Remeron and Seroquel  Case management consulted for assistance with placement as well  Depakote 250 mg started this admission  PRN zyprexa for agitation  Today update and plan  Patient did not sleep last night.  She told me her name following commands.  Continue with IV antibiotics for total of 7 days for  ESBL UTI.  Geriatrics increase the dose of Seroquel QTc is less than 470.  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  Hold home dose of p.o.  Lasix.  Ambulatory dysfunction  PT/OT Eval and treat  Trend Mobility Scores  Encourage appropriate mobility to achieve and improve upon HLM Goals as noted  Primary insomnia  Mirtazapine  Goals of care, counseling/discussion  Palliative consulted  Frailty syndrome in geriatric patient  Appreciate geriatrics    VTE Pharmacologic Prophylaxis: VTE Score: 4 Moderate Risk (Score 3-4) - Pharmacological DVT Prophylaxis Ordered: heparin.    Mobility:   Basic Mobility Inpatient Raw Score: 8  -HLM Goal: 3: Sit at edge of bed  JH-HLM Achieved: 2: Bed activities/Dependent transfer      Patient Centered Rounds: I performed bedside rounds with nursing staff today.   Discussions with Specialists or Other Care Team Provider: Geriatrician    Education and Discussions with Family / Patient: Attempted to update  (niece) via phone. Unable to contact.    Current Length of Stay: 5 day(s)  Current Patient Status: Inpatient   Certification Statement: The patient will continue to require additional inpatient hospital stay due to IV antibiotics  Discharge Plan: Anticipate discharge in >72 hrs to rehab facility.    Code Status: Level 3 - DNAR and DNI    Subjective   Patient seen resting comfortably during a.m. rounds.  No other events reported.  Tolerating IV antibiotics well.    Objective :  HR:  [] 75  BP: (111-116)/(68-77) 116/68  Resp:  [16] 16  SpO2:  [88 %-100 %] 98 %  O2 Device: Nasal cannula  Nasal Cannula O2 Flow Rate (L/min):  [2 L/min] 2 L/min    Body mass index is 26.45 kg/m².     Input and Output Summary (last 24 hours):     Intake/Output Summary (Last 24 hours) at 6/24/2025 1317  Last data filed at 6/23/2025 1710  Gross per 24 hour   Intake 120 ml   Output --   Net 120 ml       Physical Exam  Constitutional:       General: She is not in acute distress.     Appearance: She is not ill-appearing.     Cardiovascular:      Rate and Rhythm: Normal rate.      Pulses: Normal pulses.   Pulmonary:      Effort:  Pulmonary effort is normal. No respiratory distress.      Breath sounds: No wheezing.   Abdominal:      General: There is no distension.      Tenderness: There is no abdominal tenderness.     Neurological:      Mental Status: She is disoriented.           Lines/Drains:              Lab Results: I have reviewed the following results:   Results from last 7 days   Lab Units 06/24/25  0504 06/21/25  1012 06/20/25  0439   WBC Thousand/uL 6.54   < > 5.40   HEMOGLOBIN g/dL 9.2*   < > 8.8*   HEMATOCRIT % 31.8*   < > 29.8*   PLATELETS Thousands/uL 239   < > 224   SEGS PCT %  --   --  63   LYMPHO PCT %  --   --  16   MONO PCT %  --   --  17*   EOS PCT %  --   --  3    < > = values in this interval not displayed.     Results from last 7 days   Lab Units 06/23/25  0945 06/21/25  1012 06/20/25  0439   SODIUM mmol/L 141   < > 141   POTASSIUM mmol/L 4.2   < > 4.3   CHLORIDE mmol/L 107   < > 107   CO2 mmol/L 29   < > 27   BUN mg/dL 25   < > 33*   CREATININE mg/dL 1.20   < > 1.25   ANION GAP mmol/L 5   < > 7   CALCIUM mg/dL 9.0   < > 8.6   ALBUMIN g/dL  --   --  3.3*   TOTAL BILIRUBIN mg/dL  --   --  1.18*   ALK PHOS U/L  --   --  110*   ALT U/L  --   --  23   AST U/L  --   --  33   GLUCOSE RANDOM mg/dL 144*   < > 101    < > = values in this interval not displayed.         Results from last 7 days   Lab Units 06/20/25  1630 06/19/25  2117   POC GLUCOSE mg/dl 111 95               Recent Cultures (last 7 days):   Results from last 7 days   Lab Units 06/19/25  2352   URINE CULTURE  >100,000 cfu/ml Escherichia coli ESBL*             Last 24 Hours Medication List:     Current Facility-Administered Medications:     acetaminophen (TYLENOL) tablet 650 mg, Q8H PRN    dextromethorphan-guaiFENesin (ROBITUSSIN DM) oral syrup 10 mL, Q4H PRN    [DISCONTINUED] valproate (DEPACON) 125 mg in sodium chloride 0.9 % 50 mL IVPB, Q12H HESHAM **OR** divalproex sodium (DEPAKOTE SPRINKLE) capsule 125 mg, Q12H HESHMA    docusate sodium (COLACE) capsule 100 mg,  BID    ertapenem (INVanz) 500 mg in sodium chloride 0.9 % 50 mL IVPB, Q24H, Last Rate: 500 mg (06/24/25 0831)    [Held by provider] furosemide (LASIX) tablet 20 mg, BID    heparin (porcine) subcutaneous injection 5,000 Units, Q8H HESHAM **AND** [COMPLETED] Platelet count, Once    melatonin tablet 6 mg, HS    mirtazapine (REMERON) tablet 7.5 mg, HS    ondansetron (ZOFRAN) injection 4 mg, Q6H PRN    QUEtiapine (SEROquel) tablet 50 mg, HS    simethicone (MYLICON) chewable tablet 80 mg, 4x Daily PRN    Administrative Statements   Today, Patient Was Seen By: Rodrigo Wills MD  I have spent a total time of 35 minutes in caring for this patient on the day of the visit/encounter including Diagnostic results, Prognosis, Impressions, Counseling / Coordination of care, Documenting in the medical record, Reviewing/placing orders in the medical record (including tests, medications, and/or procedures), and Obtaining or reviewing history  .    **Please Note: This note may have been constructed using a voice recognition system.**

## 2025-06-24 NOTE — ASSESSMENT & PLAN NOTE
Lab Results   Component Value Date    EGFR 37 06/23/2025    EGFR 32 06/21/2025    EGFR 35 06/20/2025    CREATININE 1.20 06/23/2025    CREATININE 1.33 (H) 06/21/2025    CREATININE 1.25 06/20/2025     Baseline creatinine is 1.2-1.3  Current is 1.20  Discontinue IV fluids

## 2025-06-24 NOTE — ASSESSMENT & PLAN NOTE
" Anesthesia Evaluation     Patient summary reviewed and Nursing notes reviewed   NPO Solid Status: > 8 hours  NPO Liquid Status: > 8 hours           Airway   Mallampati: I  TM distance: >3 FB  Neck ROM: full  No difficulty expected  Dental          Pulmonary     breath sounds clear to auscultation  Cardiovascular     Rhythm: regular  Rate: normal    (+) hypertension, dysrhythmias Atrial Fib,       Neuro/Psych  GI/Hepatic/Renal/Endo    (+) morbid obesity,      Musculoskeletal     Abdominal    Substance History      OB/GYN          Other   arthritis,      ROS/Med Hx Other: LVEF 45% with mild MR    New onset AF; \"cleared\" by cardiology.  No anticoagulants pending this surgery                  Anesthesia Plan    ASA 3     general     intravenous induction     Anesthetic plan, all risks, benefits, and alternatives have been provided, discussed and informed consent has been obtained with: patient.    Plan discussed with CRNA.        CODE STATUS:       " Per niece, reported to have increased bouts of confusion, combativeness more freuqently over the past few weeks  Recently was treated for UTI in May and completed nitrofurantoin regimen at that time; prior MDRO on urine clx    Recheck UA with scope/culture; if concerning for infection, empirically start Invanz based on prior sensitivities from 5/28/22025  Continue home regimen of Remeron and Seroquel  Case management consulted for assistance with placement as well  Depakote 250 mg started this admission  PRN zyprexa for agitation  Today update and plan  Patient did not sleep last night.  She told me her name following commands.  Continue with IV antibiotics for total of 7 days for  ESBL UTI.  Geriatrics increase the dose of Seroquel QTc is less than 470.  Patient is currently one-to-one  Speech and swallow evaluated recommended puréed with nectar thick

## 2025-06-24 NOTE — PLAN OF CARE
Problem: Potential for Falls  Goal: Patient will remain free of falls  Description: INTERVENTIONS:  - Educate patient/family on patient safety including physical limitations  - Instruct patient to call for assistance with activity   - Consider consulting OT/PT to assist with strengthening/mobility based on AM PAC & -HLM score  - Consult OT/PT to assist with strengthening/mobility   - Keep Call bell within reach  - Keep bed low and locked with side rails adjusted as appropriate  - Keep care items and personal belongings within reach  - Initiate and maintain comfort rounds  - Make Fall Risk Sign visible to staff  - Offer Toileting every 2 Hours, in advance of need  - Initiate/Maintain bed/chair alarm  - Obtain necessary fall risk management equipment  - Apply yellow socks and bracelet for high fall risk patients  - Consider moving patient to room near nurses station  Outcome: Progressing     Problem: Prexisting or High Potential for Compromised Skin Integrity  Goal: Skin integrity is maintained or improved  Description: INTERVENTIONS:  - Identify patients at risk for skin breakdown  - Assess and monitor skin integrity including under and around medical devices   - Assess and monitor nutrition and hydration status  - Monitor labs  - Assess for incontinence   - Turn and reposition patient  - Assist with mobility/ambulation  - Relieve pressure over bob prominences   - Avoid friction and shearing  - Provide appropriate hygiene as needed including keeping skin clean and dry  - Evaluate need for skin moisturizer/barrier cream  - Collaborate with interdisciplinary team  - Patient/family teaching  - Consider wound care consult    Assess:  - Review Fabricio scale daily  - Clean and moisturize skin   - Inspect skin when repositioning, toileting, and assisting with ADLS  - Assess under medical devices  - Assess extremities for adequate circulation and sensation     Bed Management:  - Have minimal linens on bed & keep smooth,  unwrinkled  - Change linens as needed when moist or perspiring  - Toileting:  - Offer bedside commode  - Assess for incontinence  - Use incontinent care products after each incontinent episode     Activity:  - Mobilize patient 3 times a day  - Encourage activity and walks on unit  - Encourage or provide ROM exercises   - Turn and reposition patient every 2 Hours  - Use appropriate equipment to lift or move patient in bed  - Instruct/ Assist with weight shifting when out of bed in chair  - Consider limitation of chair time    Skin Care:  - Avoid use of baby powder, tape, friction and shearing, hot water or constrictive clothing  - Relieve pressure over bony prominences   - Do not massage red bony areas    Next Steps:  - Teach patient strategies to minimize risks   - Consider consults to  interdisciplinary teams   Outcome: Progressing     Problem: Nutrition/Hydration-ADULT  Goal: Nutrient/Hydration intake appropriate for improving, restoring or maintaining nutritional needs  Description: Monitor and assess patient's nutrition/hydration status for malnutrition. Collaborate with interdisciplinary team and initiate plan and interventions as ordered.  Monitor patient's weight and dietary intake as ordered or per policy. Utilize nutrition screening tool and intervene as necessary. Determine patient's food preferences and provide high-protein, high-caloric foods as appropriate.     INTERVENTIONS:  - Monitor oral intake, urinary output, labs, and treatment plans  - Assess nutrition and hydration status and recommend course of action  - Evaluate amount of meals eaten  - Assist patient with eating if necessary   - Allow adequate time for meals  - Recommend/ encourage appropriate diets, oral nutritional supplements, and vitamin/mineral supplements  - Order, calculate, and assess calorie counts as needed  - Recommend, monitor, and adjust tube feedings and TPN/PPN based on assessed needs  - Assess need for intravenous fluids  -  Provide specific nutrition/hydration education as appropriate  - Include patient/family/caregiver in decisions related to nutrition  Outcome: Progressing     Problem: SAFETY,RESTRAINT: NV/NON-SELF DESTRUCTIVE BEHAVIOR  Goal: Remains free of harm/injury (restraint for non violent/non self-detsructive behavior)  Description: INTERVENTIONS:  - Instruct patient/family regarding restraint use   - Assess and monitor physiologic and psychological status   - Provide interventions and comfort measures to meet assessed patient needs   - Identify and implement measures to help patient regain control  - Assess readiness for release of restraint   Outcome: Progressing  Goal: Returns to optimal restraint-free functioning  Description: INTERVENTIONS:  - Assess the patient's behavior and symptoms that indicate continued need for restraint  - Identify and implement measures to help patient regain control  - Assess readiness for release of restraint   Outcome: Progressing

## 2025-06-24 NOTE — ASSESSMENT & PLAN NOTE
Lab Results   Component Value Date    EGFR 37 06/23/2025    EGFR 32 06/21/2025    EGFR 35 06/20/2025    CREATININE 1.20 06/23/2025    CREATININE 1.33 (H) 06/21/2025    CREATININE 1.25 06/20/2025     Baseline creatinine: 1.3-1.4  Continue to monitor kidney function  Monitor I/O's  Encourage PO hydration   Avoid hypotension  Avoid nephrotoxins, IV contrast  Received gentle IV fluids overnight

## 2025-06-24 NOTE — ASSESSMENT & PLAN NOTE
Recently was having home health/physical therapy, re-ordered by PCP  Assess patient frequently for physical needs, encourage use of assistant devices as needed and directed by PT/OT  Identify cognitive and physical deficits and behaviors that affect risk of falls  Consider moving patient closer to nursing station to monitor more closely for impulsive behavior which may increase risk of falls  [place] fall and safety precautions   Educate patient/family on patient safety including physical limitations and importance of using call bell for assistance   Modify environment to reduce risk of injury including disconnecting from pole when not in use, ensuring adequate lighting in room and restroom, ensuring that path to restroom is clear and free of trip hazards  PT/OT consulted to assist with strengthening/mobility and assist with discharge planning to appropriate level of care  Out of bed as tolerated  Family interested in PT/OT services

## 2025-06-24 NOTE — ASSESSMENT & PLAN NOTE
History of prior memory issues and cognitive impairment   Presented to hospital for increased confusion, combativeness  Patient has lived with her niece and nephew for the past 30 years  Dependent with ADLs/IADLs    Previously followed with [facility]   Last seen on 09/29/23  Appears patient has occurences of sundowning, recently managed by PCP   PCP speaking with family of trial of increase dose of seroquel vs adding melatonin, family had opted to trial of melatonin, started on 06/13   Increased Melatonin to 6 mg HS in addition to increasing Seroquel to 50 mg at bedtime  Has history of hallucinations    Most recent TSH on 06/19/25 noted to be 3.797  Most recent vitamin B12 level on 01/24/23 noted to be 407  MRI brain on 03/13/25 revealed mild microangiopathic change  Patient scored 4/30 on most recent MOCA on 12/13/21  Keep physically, mentally, and socially active   PTA medication: Seroquel 25 mg po daily at bedtime  Was decreased to Depakote 250 mg every 12 hours on 6/20   Per chart review, did not receive medication last night   LFT's previously stable  EKG 6/23 QTc 455   Discussed with Xi pérez, increased Seroquel to 50 mg HS    At risk for delirium secondary to age, cognitive decline, hospitalization, insomnia, immobility  Provide frequent redirection, reorientation, distraction techniques  Avoid deliriogenic medications such as tramadol, benzodiazepines, anticholinergics,  Benadryl  Treat pain, See geriatric pain protocol  Monitor for constipation and urinary retention  Encourage early and frequent moblization, OOB  Encourage Hydration/ Nutrition  Implement sleep hygiene, limit night time interuptions, group activities  Avoid physical restraints as able  Use chemical restraint only when other efforts have failed, recommend zyprexa 2.5mg IM q8h prn  Monitor Qtc, if greater than 500 do not use antipsychotic medication  If QTc greater than 460, monitor and replete and deficiency of  K and Mg,  recheck EKG

## 2025-06-24 NOTE — ASSESSMENT & PLAN NOTE
First line is behavioral therapy   Avoid sedative hypnotics including benzodiazepines and benadryl  Encourage staying awake during the day   Encourage daytime activities and morning exercise   Decrease or eliminate daytime naps   Avoid caffeine especially during late afternoon and evening hours  Establish a nighttime routine  Implement sleep hygiene and limit nighttime interruptions  Continue melatonin 6 mg daily at bedtime and mirtazapine 7.5 mg daily at bedtime for sleep    Reach out to me in one month to let me know how spiriva is doing.  I am not overly concerned about the genetic history for cancer, because even in the scenario where we are higher risk, if often does not  of the patient. Ordering the labwork can be extremely expensive, so insurance never pays for it. I did put in the referral to the genetic counselor; however, I am not optimistic that they would agree to evaluate the patient.

## 2025-06-24 NOTE — ASSESSMENT & PLAN NOTE
Limits of DNR DNI   Previously engage in hospice conversations with PCP.     SushilaeceXi closest relative has been caretaker for past 30 years.  Family confirmed continued life prolonging cares last week.  They are not currently interested in hospice care.   Would benefit from POLST completion prior to discharge.

## 2025-06-24 NOTE — ASSESSMENT & PLAN NOTE
Wt Readings from Last 3 Encounters:   06/19/25 63.5 kg (139 lb 15.9 oz)   06/13/25 63.5 kg (140 lb)   04/03/25 61.7 kg (136 lb)     Lab Results   Component Value Date     (H) 06/23/2025     (H) 03/04/2025    LVEF 55 03/06/2025     Appears euvolemic.  Hold home dose of p.o. Lasix.

## 2025-06-24 NOTE — PROGRESS NOTES
Progress Note - Geriatric Medicine   Name: Sona Valenzuela 89 y.o. female I MRN: [id number]  Unit/Bed#: -01 I Date of Admission: 6/19/2025   Date of Service: 6/24/2025 I Hospital Day: 5    Assessment & Plan  Vascular dementia with behavioral disturbance (HCC)  History of prior memory issues and cognitive impairment   Presented to hospital for increased confusion, combativeness  Patient has lived with her niece and nephew for the past 30 years  Dependent with ADLs/IADLs    Previously followed with [facility]   Last seen on 09/29/23  Appears patient has occurences of sundowning, recently managed by PCP   PCP speaking with family of trial of increase dose of seroquel vs adding melatonin, family had opted to trial of melatonin, started on 06/13   Increased Melatonin to 6 mg HS in addition to increasing Seroquel to 50 mg at bedtime  Has history of hallucinations    Most recent TSH on 06/19/25 noted to be 3.797  Most recent vitamin B12 level on 01/24/23 noted to be 407  MRI brain on 03/13/25 revealed mild microangiopathic change  Patient scored 4/30 on most recent MOCA on 12/13/21  Keep physically, mentally, and socially active   PTA medication: Seroquel 25 mg po daily at bedtime  Was decreased to Depakote 250 mg every 12 hours on 6/20   Per chart review, did not receive medication last night   LFT's previously stable  EKG 6/23 QTc 455   Discussed with nieceXi, increased Seroquel to 50 mg HS    At risk for delirium secondary to age, cognitive decline, hospitalization, insomnia, immobility  Provide frequent redirection, reorientation, distraction techniques  Avoid deliriogenic medications such as tramadol, benzodiazepines, anticholinergics,  Benadryl  Treat pain, See geriatric pain protocol  Monitor for constipation and urinary retention  Encourage early and frequent moblization, OOB  Encourage Hydration/ Nutrition  Implement sleep hygiene, limit night time interuptions, group activities  Avoid  physical restraints as able  Use chemical restraint only when other efforts have failed, recommend zyprexa 2.5mg IM q8h prn  Monitor Qtc, if greater than 500 do not use antipsychotic medication  If QTc greater than 460, monitor and replete and deficiency of  K and Mg, recheck EKG    Recurrent major depressive disorder, in remission (HCC)  Patient has a history of anxiety/depression   Mood appears stable on exam today   Continue supportive care   PTA medication: mirtazapine 7.5 mg daily at bedtime  Acute kidney injury superimposed on stage 3b chronic kidney disease (HCC)  Lab Results   Component Value Date    EGFR 37 06/23/2025    EGFR 32 06/21/2025    EGFR 35 06/20/2025    CREATININE 1.20 06/23/2025    CREATININE 1.33 (H) 06/21/2025    CREATININE 1.25 06/20/2025     Baseline creatinine: 1.3-1.4  Continue to monitor kidney function  Monitor I/O's  Encourage PO hydration   Avoid hypotension  Avoid nephrotoxins, IV contrast  Received gentle IV fluids overnight  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)  Wt Readings from Last 3 Encounters:   06/19/25 63.5 kg (139 lb 15.9 oz)   06/13/25 63.5 kg (140 lb)   04/03/25 61.7 kg (136 lb)     Weight stable  Euvolemic  Does not appear short of breath, no leg swelling  Family noted to PCP on 06/13 of intermittent shortness of breath with exertion   ECHO on 03/06/25 showing EF 55%  Chest XR pending  PTA medication: furosemide 20 mg BID    Ambulatory dysfunction  Recently was having home health/physical therapy, re-ordered by PCP  Assess patient frequently for physical needs, encourage use of assistant devices as needed and directed by PT/OT  Identify cognitive and physical deficits and behaviors that affect risk of falls  Consider moving patient closer to nursing station to monitor more closely for impulsive behavior which may increase risk of falls  [place] fall and safety precautions   Educate patient/family on patient safety including physical  limitations and importance of using call bell for assistance   Modify environment to reduce risk of injury including disconnecting from pole when not in use, ensuring adequate lighting in room and restroom, ensuring that path to restroom is clear and free of trip hazards  PT/OT consulted to assist with strengthening/mobility and assist with discharge planning to appropriate level of care  Out of bed as tolerated  Family interested in PT/OT services  Goals of care, counseling/discussion  DNR/DNI  Palliative Care consulted, family not interested in hospice at this time, expressed interest in PT/OT services  Palliative care encounter  Palliative Care consulted, having goals of care discussion with family    Primary insomnia  First line is behavioral therapy   Avoid sedative hypnotics including benzodiazepines and benadryl  Encourage staying awake during the day   Encourage daytime activities and morning exercise   Decrease or eliminate daytime naps   Avoid caffeine especially during late afternoon and evening hours  Establish a nighttime routine  Implement sleep hygiene and limit nighttime interruptions  Continue melatonin 6 mg daily at bedtime and mirtazapine 7.5 mg daily at bedtime for sleep   Frailty syndrome in geriatric patient  Clinical Frail Scale: 6- Moderately Frail  Need help with all outside activities  Need help with stairs and bathing  May need assistance with dressing  Resides with family who assist with ADLs/IADLs  Appears to have good appetite  Albumin level on 06/20/25 was 3.3   Noted coughing today during exam   CXR pending, ST consulted  Consider adding dietary supplements, Ensure or magic cup to meal trays  Continue to optimize co-morbidities   Fall precautions     Uziel Colbert is seen today for follow-up.  Upon exam she is sleeping and does not wake up on exam.  PCA is at bedside and states that she slept well last night and was sleeping for a couple of hours today.  She states she ate 100%  of her breakfast.  She states she was able to engage in conversation today.     Objective :  HR:  [] 75  BP: (111-116)/(68-77) 116/68  Resp:  [16] 16  SpO2:  [88 %-100 %] 88 %  O2 Device: None (Room air)  Nasal Cannula O2 Flow Rate (L/min):  [2 L/min] 2 L/min    Physical Exam  Vitals and nursing note reviewed.   Constitutional:       Appearance: Normal appearance.     Cardiovascular:      Rate and Rhythm: Normal rate and regular rhythm.      Pulses: Normal pulses.      Heart sounds: Normal heart sounds.   Pulmonary:      Effort: Pulmonary effort is normal. No respiratory distress.      Breath sounds: Normal breath sounds.   Abdominal:      General: Abdomen is flat. Bowel sounds are normal.      Palpations: Abdomen is soft.     Musculoskeletal:      Right lower leg: No edema.      Left lower leg: No edema.     Skin:     General: Skin is warm and dry.      Capillary Refill: Capillary refill takes less than 2 seconds.     Neurological:      Mental Status: She is alert.           Lab Results: I have reviewed the following results:CBC/BMP:   .     06/24/25  0504   WBC 6.54   HGB 9.2*   HCT 31.8*         Imaging Results Review: No pertinent imaging studies reviewed.  Other Study Results Review: No additional pertinent studies reviewed.    Therapies:   Basic Mobility Inpatient Raw Score: 6  -[facility] Goal: 2: Bed activities/Dependent transfer  -[facility] Achieved: 2: Bed activities/Dependent transfer  PT: Following  OT: Following  ST: Following    VTE Prophylaxis: VTE covered by:  heparin (porcine), Subcutaneous, 5,000 Units at 06/24/25 1421    and Sequential compression device (Venodyne)     Code Status: Level 3 - DNAR and DNI  Advance Directive and Living Will:      Power of :    POLST:      Family and Social Support: Niece, Xi, nephew    Goals of Care: SNF    Administrative Statements   I have spent a total time of 50 minutes in caring for this patient on the day of the visit/encounter including  Diagnostic results, Prognosis, Risks and benefits of tx options, Instructions for management, Patient and family education, Importance of tx compliance, Risk factor reductions, Impressions, Counseling / Coordination of care, Documenting in the medical record, and Reviewing/placing orders in the medical record (including tests, medications, and/or procedures).

## 2025-06-25 LAB
ERYTHROCYTE [DISTWIDTH] IN BLOOD BY AUTOMATED COUNT: 19.6 % (ref 11.6–15.1)
HCT VFR BLD AUTO: 30.7 % (ref 34.8–46.1)
HGB BLD-MCNC: 8.7 G/DL (ref 11.5–15.4)
MAGNESIUM SERPL-MCNC: 2.3 MG/DL (ref 1.9–2.7)
MCH RBC QN AUTO: 21.9 PG (ref 26.8–34.3)
MCHC RBC AUTO-ENTMCNC: 28.3 G/DL (ref 31.4–37.4)
MCV RBC AUTO: 77 FL (ref 82–98)
PLATELET # BLD AUTO: 222 THOUSANDS/UL (ref 149–390)
PMV BLD AUTO: 10.5 FL (ref 8.9–12.7)
RBC # BLD AUTO: 3.97 MILLION/UL (ref 3.81–5.12)
WBC # BLD AUTO: 5.4 THOUSAND/UL (ref 4.31–10.16)

## 2025-06-25 PROCEDURE — 99233 SBSQ HOSP IP/OBS HIGH 50: CPT | Performed by: INTERNAL MEDICINE

## 2025-06-25 PROCEDURE — 83735 ASSAY OF MAGNESIUM: CPT

## 2025-06-25 PROCEDURE — 85027 COMPLETE CBC AUTOMATED: CPT | Performed by: STUDENT IN AN ORGANIZED HEALTH CARE EDUCATION/TRAINING PROGRAM

## 2025-06-25 PROCEDURE — 99232 SBSQ HOSP IP/OBS MODERATE 35: CPT | Performed by: STUDENT IN AN ORGANIZED HEALTH CARE EDUCATION/TRAINING PROGRAM

## 2025-06-25 RX ADMIN — MIRTAZAPINE 7.5 MG: 15 TABLET, FILM COATED ORAL at 20:20

## 2025-06-25 RX ADMIN — ACETAMINOPHEN 650 MG: 325 TABLET ORAL at 20:57

## 2025-06-25 RX ADMIN — HEPARIN SODIUM 5000 UNITS: 5000 INJECTION INTRAVENOUS; SUBCUTANEOUS at 21:00

## 2025-06-25 RX ADMIN — DIVALPROEX SODIUM 125 MG: 125 CAPSULE, COATED PELLETS ORAL at 20:19

## 2025-06-25 RX ADMIN — ERTAPENEM SODIUM 500 MG: 1 INJECTION, POWDER, LYOPHILIZED, FOR SOLUTION INTRAMUSCULAR; INTRAVENOUS at 09:13

## 2025-06-25 RX ADMIN — HEPARIN SODIUM 5000 UNITS: 5000 INJECTION INTRAVENOUS; SUBCUTANEOUS at 05:09

## 2025-06-25 RX ADMIN — HEPARIN SODIUM 5000 UNITS: 5000 INJECTION INTRAVENOUS; SUBCUTANEOUS at 13:48

## 2025-06-25 RX ADMIN — DIVALPROEX SODIUM 125 MG: 125 CAPSULE, COATED PELLETS ORAL at 09:12

## 2025-06-25 RX ADMIN — Medication 6 MG: at 20:20

## 2025-06-25 RX ADMIN — QUETIAPINE FUMARATE 50 MG: 25 TABLET ORAL at 20:20

## 2025-06-25 NOTE — ASSESSMENT & PLAN NOTE
Per niece, reported to have increased bouts of confusion, combativeness more freuqently over the past few weeks  Recently was treated for UTI in May and completed nitrofurantoin regimen at that time; prior MDRO on urine clx    Recheck UA with scope/culture; if concerning for infection, empirically start Invanz based on prior sensitivities from 5/28/2025  Continue home regimen of Remeron and Seroquel  Case management consulted for assistance with placement as well  Depakote 250 mg started this admission  PRN zyprexa for agitation  Today update and plan  Patient did not sleep last night.  She told me her name following commands.  Completed IV ertapenem 5 days course..  Continue Seroquel 50 mg nightly.  Patient is currently one-to-one  Speech and swallow evaluated recommended puréed with nectar thick

## 2025-06-25 NOTE — PROGRESS NOTES
Progress Note - Hospitalist   Name: Sona Valenzuela 89 y.o. female I MRN: [id number]  Unit/Bed#: -01 I Date of Admission: 6/19/2025   Date of Service: 6/25/2025 I Hospital Day: 6    Assessment & Plan  Vascular dementia with behavioral disturbance (HCC)  Per niece, reported to have increased bouts of confusion, combativeness more freuqently over the past few weeks  Recently was treated for UTI in May and completed nitrofurantoin regimen at that time; prior MDRO on urine clx    Recheck UA with scope/culture; if concerning for infection, empirically start Invanz based on prior sensitivities from 5/28/2025  Continue home regimen of Remeron and Seroquel  Case management consulted for assistance with placement as well  Depakote 250 mg started this admission  PRN zyprexa for agitation  Today update and plan  Patient did not sleep last night.  She told me her name following commands.  Completed IV ertapenem 5 days course..  Continue Seroquel 50 mg nightly.  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  Hold home dose of p.o. Lasix.  Ambulatory dysfunction  PT/OT Eval and treat  Trend  Mobility Scores  Encourage appropriate mobility to achieve and improve upon HLM Goals as noted  Primary insomnia  Mirtazapine  Goals of care, counseling/discussion  Palliative consulted  Frailty syndrome in geriatric patient  Appreciate geriatrics    VTE Pharmacologic Prophylaxis: VTE Score: 4 Moderate Risk (Score 3-4) - Pharmacological DVT Prophylaxis Ordered: heparin.    Mobility:   Basic Mobility Inpatient Raw Score: 8  -HLM Goal: 3: Sit at edge of bed  -HLM Achieved: 2: Bed activities/Dependent transfer      Patient Centered Rounds: I performed bedside rounds with nursing staff today.     Education and Discussions with Family / Patient: Attempted to update  (niece) via phone. Unable to contact.    Current Length of Stay: 6 day(s)  Current Patient Status: Inpatient   Certification Statement: The patient will continue to require additional inpatient hospital stay due to placement  Discharge Plan: placement    Code Status: Level 3 - DNAR and DNI    Subjective   Completed 5 days of IV ertapenem.  Reportedly patient did have a lot of sleep last night however this morning she was agitated requiring mittens.  Currently on one-to-one observation.  No other events reported.  Family unable to continue to provide care for her at home.      Objective :  Temp:  [97.8 °F (36.6 °C)-98.6 °F (37 °C)] 98.6 °F (37 °C)  HR:  [] 98  BP: (100-114)/(56-67) 100/56  Resp:  [20] 20  SpO2:  [88 %-99 %] 97 %  O2 Device: Nasal cannula  Nasal Cannula O2 Flow Rate (L/min):  [2 L/min] 2 L/min    Body mass index is 26.45 kg/m².     Input and Output Summary (last 24 hours):     Intake/Output Summary (Last 24 hours) at 6/25/2025 1612  Last data filed at 6/25/2025 1257  Gross per 24 hour   Intake 140 ml   Output --   Net 140 ml       Physical Exam  Constitutional:       General: She is not in acute distress.     Appearance: She is not ill-appearing or toxic-appearing.     Cardiovascular:      Rate and Rhythm: Normal rate.       Pulses: Normal pulses.   Pulmonary:      Effort: Pulmonary effort is normal. No respiratory distress.      Breath sounds: No wheezing.   Abdominal:      General: There is no distension.      Tenderness: There is no abdominal tenderness.     Neurological:      Mental Status: She is alert. She is disoriented.           Lines/Drains:              Lab Results: I have reviewed the following results:   Results from last 7 days   Lab Units 06/25/25  0450 06/21/25  1012 06/20/25  0439   WBC Thousand/uL 5.40   < > 5.40   HEMOGLOBIN g/dL 8.7*   < > 8.8*   HEMATOCRIT % 30.7*   < > 29.8*   PLATELETS Thousands/uL 222   < > 224   SEGS PCT %  --   --  63   LYMPHO PCT %  --   --  16   MONO PCT %  --   --  17*   EOS PCT %  --   --  3    < > = values in this interval not displayed.     Results from last 7 days   Lab Units 06/23/25  0945 06/21/25  1012 06/20/25  0439   SODIUM mmol/L 141   < > 141   POTASSIUM mmol/L 4.2   < > 4.3   CHLORIDE mmol/L 107   < > 107   CO2 mmol/L 29   < > 27   BUN mg/dL 25   < > 33*   CREATININE mg/dL 1.20   < > 1.25   ANION GAP mmol/L 5   < > 7   CALCIUM mg/dL 9.0   < > 8.6   ALBUMIN g/dL  --   --  3.3*   TOTAL BILIRUBIN mg/dL  --   --  1.18*   ALK PHOS U/L  --   --  110*   ALT U/L  --   --  23   AST U/L  --   --  33   GLUCOSE RANDOM mg/dL 144*   < > 101    < > = values in this interval not displayed.         Results from last 7 days   Lab Units 06/20/25  1630 06/19/25  2117   POC GLUCOSE mg/dl 111 95               Recent Cultures (last 7 days):   Results from last 7 days   Lab Units 06/19/25  2352   URINE CULTURE  >100,000 cfu/ml Escherichia coli ESBL*             Last 24 Hours Medication List:     Current Facility-Administered Medications:     acetaminophen (TYLENOL) tablet 650 mg, Q8H PRN    dextromethorphan-guaiFENesin (ROBITUSSIN DM) oral syrup 10 mL, Q4H PRN    [DISCONTINUED] valproate (DEPACON) 125 mg in sodium chloride 0.9 % 50 mL IVPB, Q12H HESHAM **OR** divalproex sodium (DEPAKOTE SPRINKLE)  capsule 125 mg, Q12H HESHAM    docusate sodium (COLACE) capsule 100 mg, BID    [Held by provider] furosemide (LASIX) tablet 20 mg, BID    heparin (porcine) subcutaneous injection 5,000 Units, Q8H HESHAM **AND** [COMPLETED] Platelet count, Once    melatonin tablet 6 mg, HS    mirtazapine (REMERON) tablet 7.5 mg, HS    ondansetron (ZOFRAN) injection 4 mg, Q6H PRN    QUEtiapine (SEROquel) tablet 50 mg, HS    simethicone (MYLICON) chewable tablet 80 mg, 4x Daily PRN    Administrative Statements   Today, Patient Was Seen By: Rodrigo Wills MD  I have spent a total time of 35 minutes in caring for this patient on the day of the visit/encounter including Patient and family education, Impressions, Counseling / Coordination of care, and Documenting in the medical record.    **Please Note: This note may have been constructed using a voice recognition system.**

## 2025-06-25 NOTE — PLAN OF CARE
Problem: Potential for Falls  Goal: Patient will remain free of falls  Description: INTERVENTIONS:  - Educate patient/family on patient safety including physical limitations  - Instruct patient to call for assistance with activity   - Consider consulting OT/PT to assist with strengthening/mobility based on AM PAC & -HLM score  - Consult OT/PT to assist with strengthening/mobility   - Keep Call bell within reach  - Keep bed low and locked with side rails adjusted as appropriate  - Keep care items and personal belongings within reach  - Initiate and maintain comfort rounds  - Make Fall Risk Sign visible to staff  - Offer Toileting every 2 Hours, in advance of need  - Initiate/Maintain bed alarm  - Obtain necessary fall risk management equipment  - Apply yellow socks and bracelet for high fall risk patients  - Consider moving patient to room near nurses station  Outcome: Progressing     Problem: Prexisting or High Potential for Compromised Skin Integrity  Goal: Skin integrity is maintained or improved  Description: INTERVENTIONS:  - Identify patients at risk for skin breakdown  - Assess and monitor skin integrity including under and around medical devices   - Assess and monitor nutrition and hydration status  - Monitor labs  - Assess for incontinence   - Turn and reposition patient  - Assist with mobility/ambulation  - Relieve pressure over bob prominences   - Avoid friction and shearing  - Provide appropriate hygiene as needed including keeping skin clean and dry  - Evaluate need for skin moisturizer/barrier cream  - Collaborate with interdisciplinary team  - Patient/family teaching  - Consider wound care consult    Assess:  - Review Fabricio scale daily  - Clean and moisturize skin every shift  - Inspect skin when repositioning, toileting, and assisting with ADLS  - Assess extremities for adequate circulation and sensation     Bed Management:  - Have minimal linens on bed & keep smooth, unwrinkled  - Change  Bedside and verbal shift report given by Tamara Duran RN (off going nurse) to Harpreet Quiñonez RN (oncoming nurse). Report included the following information SBAR and ED Summary. linens as needed when moist or perspiring  - Avoid sitting or lying in one position for more than 2 hours while in bed Keep HOB at 45 degrees   - Toileting:  - Offer bedside commode  - Assess for incontinence every 2  hours  - Use incontinent care products after each incontinent episode    Activity:  - Encourage activity and walks on unit  - Encourage or provide ROM exercises   - Turn and reposition patient every 2 Hours  - Use appropriate equipment to lift or move patient in bed  - Instruct/ Assist with weight shifting every 2 hours when out of bed in chair  - Consider limitation of chair time 2 hour intervals    Skin Care:  - Avoid use of baby powder, tape, friction and shearing, hot water or constrictive clothing  - Relieve pressure over bony prominences   - Do not massage red bony areas    Next Steps:  - Teach patient strategies to minimize risks  - Consider consults to  interdisciplinary teams such as PT/OT  Outcome: Progressing     Problem: Nutrition/Hydration-ADULT  Goal: Nutrient/Hydration intake appropriate for improving, restoring or maintaining nutritional needs  Description: Monitor and assess patient's nutrition/hydration status for malnutrition. Collaborate with interdisciplinary team and initiate plan and interventions as ordered.  Monitor patient's weight and dietary intake as ordered or per policy. Utilize nutrition screening tool and intervene as necessary. Determine patient's food preferences and provide high-protein, high-caloric foods as appropriate.     INTERVENTIONS:  - Monitor oral intake, urinary output, labs, and treatment plans  - Assess nutrition and hydration status and recommend course of action  - Evaluate amount of meals eaten  - Assist patient with eating if necessary   - Allow adequate time for meals  - Recommend/ encourage appropriate diets, oral nutritional supplements, and vitamin/mineral supplements  - Order, calculate, and assess calorie counts as needed  - Recommend, monitor,  and adjust tube feedings and TPN/PPN based on assessed needs  - Assess need for intravenous fluids  - Provide specific nutrition/hydration education as appropriate  - Include patient/family/caregiver in decisions related to nutrition  Outcome: Progressing     Problem: SAFETY,RESTRAINT: NV/NON-SELF DESTRUCTIVE BEHAVIOR  Goal: Remains free of harm/injury (restraint for non violent/non self-detsructive behavior)  Description: INTERVENTIONS:  - Instruct patient/family regarding restraint use   - Assess and monitor physiologic and psychological status   - Provide interventions and comfort measures to meet assessed patient needs   - Identify and implement measures to help patient regain control  - Assess readiness for release of restraint   Outcome: Progressing  Goal: Returns to optimal restraint-free functioning  Description: INTERVENTIONS:  - Assess the patient's behavior and symptoms that indicate continued need for restraint  - Identify and implement measures to help patient regain control  - Assess readiness for release of restraint   Outcome: Progressing

## 2025-06-25 NOTE — NURSING NOTE
Pt refused to swallow Colace during med pass tonight. Multiple attempts were made to entice the pt to swallow medication without success.

## 2025-06-25 NOTE — PLAN OF CARE
Problem: Potential for Falls  Goal: Patient will remain free of falls  Description: INTERVENTIONS:  - Educate patient/family on patient safety including physical limitations  - Instruct patient to call for assistance with activity   - Consider consulting OT/PT to assist with strengthening/mobility based on AM PAC & -HLM score  - Consult OT/PT to assist with strengthening/mobility   - Keep Call bell within reach  - Keep bed low and locked with side rails adjusted as appropriate  - Keep care items and personal belongings within reach  - Initiate and maintain comfort rounds  - Make Fall Risk Sign visible to staff  - Offer Toileting every 2 Hours, in advance of need  - Initiate/Maintain bed alarm  - Obtain necessary fall risk management equipment  - Apply yellow socks and bracelet for high fall risk patients  - Consider moving patient to room near nurses station  Outcome: Progressing     Problem: Prexisting or High Potential for Compromised Skin Integrity  Goal: Skin integrity is maintained or improved  Description: INTERVENTIONS:  - Identify patients at risk for skin breakdown  - Assess and monitor skin integrity including under and around medical devices   - Assess and monitor nutrition and hydration status  - Monitor labs  - Assess for incontinence   - Turn and reposition patient  - Assist with mobility/ambulation  - Relieve pressure over bob prominences   - Avoid friction and shearing  - Provide appropriate hygiene as needed including keeping skin clean and dry  - Evaluate need for skin moisturizer/barrier cream  - Collaborate with interdisciplinary team  - Patient/family teaching  - Consider wound care consult    Assess:  - Review Fabricio scale daily  - Clean and moisturize skin   - Inspect skin when repositioning, toileting, and assisting with ADLS  - Assess under medical devices   - Assess extremities for adequate circulation and sensation     Bed Management:  - Have minimal linens on bed & keep smooth,  unwrinkled  - Change linens as needed when moist or perspiring    - Toileting:  - Offer bedside commode  - Assess for incontinence   - Use incontinent care products after each incontinent episode     Activity:  - Mobilize patient 3 times a day  - Encourage activity and walks on unit  - Encourage or provide ROM exercises   - Turn and reposition patient every 2 Hours  - Use appropriate equipment to lift or move patient in bed  - Instruct/ Assist with weight shifting when out of bed in chair  - Consider limitation of chair time    Skin Care:  - Avoid use of baby powder, tape, friction and shearing, hot water or constrictive clothing  - Relieve pressure over bony prominences   - Do not massage red bony areas    Next Steps:  - Teach patient strategies to minimize risks   - Consider consults to  interdisciplinary teams   Outcome: Progressing     Problem: Nutrition/Hydration-ADULT  Goal: Nutrient/Hydration intake appropriate for improving, restoring or maintaining nutritional needs  Description: Monitor and assess patient's nutrition/hydration status for malnutrition. Collaborate with interdisciplinary team and initiate plan and interventions as ordered.  Monitor patient's weight and dietary intake as ordered or per policy. Utilize nutrition screening tool and intervene as necessary. Determine patient's food preferences and provide high-protein, high-caloric foods as appropriate.     INTERVENTIONS:  - Monitor oral intake, urinary output, labs, and treatment plans  - Assess nutrition and hydration status and recommend course of action  - Evaluate amount of meals eaten  - Assist patient with eating if necessary   - Allow adequate time for meals  - Recommend/ encourage appropriate diets, oral nutritional supplements, and vitamin/mineral supplements  - Order, calculate, and assess calorie counts as needed  - Recommend, monitor, and adjust tube feedings and TPN/PPN based on assessed needs  - Assess need for intravenous  fluids  - Provide specific nutrition/hydration education as appropriate  - Include patient/family/caregiver in decisions related to nutrition  Outcome: Progressing     Problem: SAFETY,RESTRAINT: NV/NON-SELF DESTRUCTIVE BEHAVIOR  Goal: Remains free of harm/injury (restraint for non violent/non self-detsructive behavior)  Description: INTERVENTIONS:  - Instruct patient/family regarding restraint use   - Assess and monitor physiologic and psychological status   - Provide interventions and comfort measures to meet assessed patient needs   - Identify and implement measures to help patient regain control  - Assess readiness for release of restraint   Outcome: Progressing  Goal: Returns to optimal restraint-free functioning  Description: INTERVENTIONS:  - Assess the patient's behavior and symptoms that indicate continued need for restraint  - Identify and implement measures to help patient regain control  - Assess readiness for release of restraint   Outcome: Progressing

## 2025-06-25 NOTE — NURSING NOTE
Pt continues to be combative an uncooperative during medication administration and routine care. Pt refused all PO medications, however Depakote was sprinkled on her pudding and pt consumed all medication.     Pt does not permit nursing staff or PCA's to make physical contact while obtaining VS and during hygiene care. Pt has attempted to bite staff members when approached or touched. Safety measures have been implemented and staff are maintaining appropriate precautions to prevent harm to the patient or team members.     Pt is otherwise showing no signs of distress and VS are stable.

## 2025-06-25 NOTE — ASSESSMENT & PLAN NOTE
Recently was having home health/physical therapy, re-ordered by PCP  Assess patient frequently for physical needs, encourage use of assistant devices as needed and directed by PT/OT  Identify cognitive and physical deficits and behaviors that affect risk of falls  Consider moving patient closer to nursing station to monitor more closely for impulsive behavior which may increase risk of falls  [place] fall and safety precautions   Educate patient/family on patient safety including physical limitations and importance of using call bell for assistance   Modify environment to reduce risk of injury including disconnecting from pole when not in use, ensuring adequate lighting in room and restroom, ensuring that path to restroom is clear and free of trip hazards  PT/OT consulted to assist with strengthening/mobility and assist with discharge planning to appropriate level of care  Out of bed as tolerated  Family interested in PT/OT services

## 2025-06-25 NOTE — ASSESSMENT & PLAN NOTE
History of prior memory issues and cognitive impairment   Presented to hospital for increased confusion, combativeness  Patient has lived with her niece and nephew for the past 30 years  Dependent with ADLs/IADLs    Previously followed with [facility]   Last seen on 09/29/23  Appears patient has occurences of sundowning, recently managed by PCP   PCP speaking with family of trial of increase dose of seroquel vs adding melatonin, family had opted to trial of melatonin, started on 06/13   Increased Melatonin to 6 mg HS in addition to increasing Seroquel to 50 mg at bedtime  Has history of hallucinations    Most recent TSH on 06/19/25 noted to be 3.797  Most recent vitamin B12 level on 01/24/23 noted to be 407  MRI brain on 03/13/25 revealed mild microangiopathic change  Patient scored 4/30 on most recent MOCA on 12/13/21  Keep physically, mentally, and socially active   PTA medication: Seroquel 25 mg po daily at bedtime  Was decreased to Depakote 250 mg every 12 hours on 6/20   Per chart review, did not receive medication last night, patient refused   Can consider IV Depakote if not tolerating PO   LFT's previously stable  EKG 6/23 QTc 455   Seroquel 50 mg HS    At risk for delirium secondary to age, cognitive decline, hospitalization, insomnia, immobility  Provide frequent redirection, reorientation, distraction techniques  Avoid deliriogenic medications such as tramadol, benzodiazepines, anticholinergics,  Benadryl  Treat pain, See geriatric pain protocol  Monitor for constipation and urinary retention  Encourage early and frequent moblization, OOB  Encourage Hydration/ Nutrition  Implement sleep hygiene, limit night time interuptions, group activities  Avoid physical restraints as able  Use chemical restraint only when other efforts have failed, recommend zyprexa 2.5mg IM q8h prn  Monitor Qtc, if greater than 500 do not use antipsychotic medication  If QTc greater than 460, monitor and replete and  deficiency of  K and Mg, recheck EKG

## 2025-06-25 NOTE — PROGRESS NOTES
Progress Note - Geriatric Medicine   Name: Sona Valenzuela 89 y.o. female I MRN: [id number]  Unit/Bed#: -01 I Date of Admission: 6/19/2025   Date of Service: 6/25/2025 I Hospital Day: 6    Assessment & Plan  Vascular dementia with behavioral disturbance (HCC)  History of prior memory issues and cognitive impairment   Presented to hospital for increased confusion, combativeness  Patient has lived with her niece and nephew for the past 30 years  Dependent with ADLs/IADLs    Previously followed with [facility]   Last seen on 09/29/23  Appears patient has occurences of sundowning, recently managed by PCP   PCP speaking with family of trial of increase dose of seroquel vs adding melatonin, family had opted to trial of melatonin, started on 06/13   Increased Melatonin to 6 mg HS in addition to increasing Seroquel to 50 mg at bedtime  Has history of hallucinations    Most recent TSH on 06/19/25 noted to be 3.797  Most recent vitamin B12 level on 01/24/23 noted to be 407  MRI brain on 03/13/25 revealed mild microangiopathic change  Patient scored 4/30 on most recent MOCA on 12/13/21  Keep physically, mentally, and socially active   PTA medication: Seroquel 25 mg po daily at bedtime  Was decreased to Depakote 250 mg every 12 hours on 6/20   Per chart review, did not receive medication last night, patient refused   Can consider IV Depakote if not tolerating PO   LFT's previously stable  EKG 6/23 QTc 455   Seroquel 50 mg HS    At risk for delirium secondary to age, cognitive decline, hospitalization, insomnia, immobility  Provide frequent redirection, reorientation, distraction techniques  Avoid deliriogenic medications such as tramadol, benzodiazepines, anticholinergics,  Benadryl  Treat pain, See geriatric pain protocol  Monitor for constipation and urinary retention  Encourage early and frequent moblization, OOB  Encourage Hydration/ Nutrition  Implement sleep hygiene, limit night time interuptions, group  Self Exam: Abdomen soft, non-tender to palpatation, non-distended activities  Avoid physical restraints as able  Use chemical restraint only when other efforts have failed, recommend zyprexa 2.5mg IM q8h prn  Monitor Qtc, if greater than 500 do not use antipsychotic medication  If QTc greater than 460, monitor and replete and deficiency of  K and Mg, recheck EKG    Recurrent major depressive disorder, in remission (HCC)  Patient has a history of anxiety/depression   Mood appears stable on exam today   Continue supportive care   PTA medication: mirtazapine 7.5 mg daily at bedtime  Acute kidney injury superimposed on stage 3b chronic kidney disease (HCC)  Lab Results   Component Value Date    EGFR 37 06/23/2025    EGFR 32 06/21/2025    EGFR 35 06/20/2025    CREATININE 1.20 06/23/2025    CREATININE 1.33 (H) 06/21/2025    CREATININE 1.25 06/20/2025     Baseline creatinine: 1.3-1.4  Continue to monitor kidney function  Monitor I/O's  Encourage PO hydration   Avoid hypotension  Avoid nephrotoxins, IV contrast  Received gentle IV fluids overnight  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)  Wt Readings from Last 3 Encounters:   06/19/25 63.5 kg (139 lb 15.9 oz)   06/13/25 63.5 kg (140 lb)   04/03/25 61.7 kg (136 lb)     Weight stable  Euvolemic  Does not appear short of breath, no leg swelling  Family noted to PCP on 06/13 of intermittent shortness of breath with exertion   ECHO on 03/06/25 showing EF 55%  Chest XR pending  PTA medication: furosemide 20 mg BID    Ambulatory dysfunction  Recently was having home health/physical therapy, re-ordered by PCP  Assess patient frequently for physical needs, encourage use of assistant devices as needed and directed by PT/OT  Identify cognitive and physical deficits and behaviors that affect risk of falls  Consider moving patient closer to nursing station to monitor more closely for impulsive behavior which may increase risk of falls  [place] fall and safety precautions   Educate patient/family on patient safety  including physical limitations and importance of using call bell for assistance   Modify environment to reduce risk of injury including disconnecting from pole when not in use, ensuring adequate lighting in room and restroom, ensuring that path to restroom is clear and free of trip hazards  PT/OT consulted to assist with strengthening/mobility and assist with discharge planning to appropriate level of care  Out of bed as tolerated  Family interested in PT/OT services  Goals of care, counseling/discussion  DNR/DNI  Palliative Care consulted, family not interested in hospice at this time, expressed interest in PT/OT services  Palliative care encounter  Palliative Care consulted, having goals of care discussion with family    Primary insomnia  First line is behavioral therapy   Avoid sedative hypnotics including benzodiazepines and benadryl  Encourage staying awake during the day   Encourage daytime activities and morning exercise   Decrease or eliminate daytime naps   Avoid caffeine especially during late afternoon and evening hours  Establish a nighttime routine  Implement sleep hygiene and limit nighttime interruptions  Continue melatonin 6 mg daily at bedtime and mirtazapine 7.5 mg daily at bedtime for sleep   Frailty syndrome in geriatric patient  Clinical Frail Scale: 6- Moderately Frail  Need help with all outside activities  Need help with stairs and bathing  May need assistance with dressing  Resides with family who assist with ADLs/IADLs  Appears to have good appetite  Albumin level on 06/20/25 was 3.3   Noted coughing today during exam   CXR pending, ST consulted  Consider adding dietary supplements, Ensure or magic cup to meal trays  Continue to optimize co-morbidities   Fall precautions     Uziel Magallanes is seen today for follow up. Upon exam, she is laying in bed, calm. She is pleasantly confused. She is alert to self, can state birthday. She did not have much of an appetite today. Spoke with PCA at  bedside and ate banana and a couple bites of pudding. She refused all night time medications per chart review, however did sleep well per review with RN at bedside.     Objective :  Temp:  [97.8 °F (36.6 °C)-98 °F (36.7 °C)] 97.8 °F (36.6 °C)  HR:  [] 101  BP: (112-114)/(64-67) 112/64  Resp:  [20] 20  SpO2:  [88 %-99 %] 98 %  O2 Device: Nasal cannula  Nasal Cannula O2 Flow Rate (L/min):  [2 L/min] 2 L/min    Physical Exam  Vitals and nursing note reviewed.   Constitutional:       Comments: Frail appearing     Cardiovascular:      Rate and Rhythm: Normal rate and regular rhythm.      Pulses: Normal pulses.      Heart sounds: Normal heart sounds.   Pulmonary:      Effort: Pulmonary effort is normal. No respiratory distress.      Breath sounds: Normal breath sounds.   Abdominal:      General: Abdomen is flat. Bowel sounds are normal.      Palpations: Abdomen is soft.     Musculoskeletal:      Right lower leg: No edema.      Left lower leg: No edema.     Skin:     General: Skin is warm and dry.      Capillary Refill: Capillary refill takes less than 2 seconds.     Neurological:      Mental Status: She is alert. She is disoriented.      Motor: Weakness present.      Gait: Gait abnormal.      Comments: AOx1, to self only; baseline   Psychiatric:         Mood and Affect: Mood normal.           Lab Results: I have reviewed the following results:CBC/BMP:   .     06/25/25  0450   WBC 5.40   HGB 8.7*   HCT 30.7*      MG 2.3      Imaging Results Review: No pertinent imaging studies reviewed.  Other Study Results Review: No additional pertinent studies reviewed.    Therapies:   Basic Mobility Inpatient Raw Score: 8  -[facility] Goal: 3: Sit at edge of bed  -[facility] Achieved: 2: Bed activities/Dependent transfer  PT: Following  OT: Following  ST: Following    VTE Prophylaxis: VTE covered by:  heparin (porcine), Subcutaneous, 5,000 Units at 06/25/25 0509    and Sequential compression device (Venodyne)     Code Status: Level  3 - DNAR and DNI  Advance Directive and Living Will:      Power of :    POLST:      Family and Social Support: Niece, Xi; Nephew, Juancarlos      Goals of Care: SNF    Administrative Statements   I have spent a total time of 35 minutes in caring for this patient on the day of the visit/encounter including Diagnostic results, Prognosis, and Documenting in the medical record.

## 2025-06-26 PROCEDURE — 92526 ORAL FUNCTION THERAPY: CPT

## 2025-06-26 PROCEDURE — 99232 SBSQ HOSP IP/OBS MODERATE 35: CPT | Performed by: STUDENT IN AN ORGANIZED HEALTH CARE EDUCATION/TRAINING PROGRAM

## 2025-06-26 PROCEDURE — 99233 SBSQ HOSP IP/OBS HIGH 50: CPT | Performed by: INTERNAL MEDICINE

## 2025-06-26 RX ORDER — GABAPENTIN 100 MG/1
100 CAPSULE ORAL 2 TIMES DAILY
Status: DISCONTINUED | OUTPATIENT
Start: 2025-06-26 | End: 2025-07-03 | Stop reason: HOSPADM

## 2025-06-26 RX ORDER — OLANZAPINE 5 MG/1
5 TABLET, ORALLY DISINTEGRATING ORAL EVERY 8 HOURS PRN
Status: DISCONTINUED | OUTPATIENT
Start: 2025-06-26 | End: 2025-07-03 | Stop reason: HOSPADM

## 2025-06-26 RX ORDER — QUETIAPINE FUMARATE 25 MG/1
25 TABLET, FILM COATED ORAL 2 TIMES DAILY
Status: DISCONTINUED | OUTPATIENT
Start: 2025-06-26 | End: 2025-07-01

## 2025-06-26 RX ADMIN — DIVALPROEX SODIUM 125 MG: 125 CAPSULE, COATED PELLETS ORAL at 09:05

## 2025-06-26 RX ADMIN — OLANZAPINE 5 MG: 5 TABLET, ORALLY DISINTEGRATING ORAL at 15:39

## 2025-06-26 RX ADMIN — DOCUSATE SODIUM 100 MG: 100 CAPSULE, LIQUID FILLED ORAL at 09:04

## 2025-06-26 RX ADMIN — DIVALPROEX SODIUM 125 MG: 125 CAPSULE, COATED PELLETS ORAL at 21:00

## 2025-06-26 RX ADMIN — Medication 6 MG: at 21:00

## 2025-06-26 RX ADMIN — QUETIAPINE FUMARATE 25 MG: 25 TABLET ORAL at 17:12

## 2025-06-26 RX ADMIN — MIRTAZAPINE 7.5 MG: 15 TABLET, FILM COATED ORAL at 21:01

## 2025-06-26 RX ADMIN — HEPARIN SODIUM 5000 UNITS: 5000 INJECTION INTRAVENOUS; SUBCUTANEOUS at 06:08

## 2025-06-26 RX ADMIN — DOCUSATE SODIUM 100 MG: 100 CAPSULE, LIQUID FILLED ORAL at 17:12

## 2025-06-26 RX ADMIN — HEPARIN SODIUM 5000 UNITS: 5000 INJECTION INTRAVENOUS; SUBCUTANEOUS at 21:01

## 2025-06-26 RX ADMIN — ACETAMINOPHEN 650 MG: 325 TABLET ORAL at 06:08

## 2025-06-26 RX ADMIN — GABAPENTIN 100 MG: 100 CAPSULE ORAL at 17:12

## 2025-06-26 NOTE — PROGRESS NOTES
Progress Note - Hospitalist   Name: Sona Valenzuela 89 y.o. female I MRN: [id number]  Unit/Bed#: -01 I Date of Admission: 6/19/2025   Date of Service: 6/26/2025 I Hospital Day: 7    Assessment & Plan  Vascular dementia with behavioral disturbance (HCC)  Per niece, reported to have increased bouts of confusion, combativeness more freuqently over the past few weeks  Recently was treated for UTI in May and completed nitrofurantoin regimen at that time; prior MDRO on urine clx  Urine culture noted E. coli ESBL.  Completed 5 days of IV ertapenem.    Patient has been having waxing waning mentation at times.  Continue Depakote 125 mg twice daily  Continue Seroquel 25 mg twice daily.  Melatonin 6 mg nightly.  Remeron 7.5 mg nightly.  Continue p.o. Zyprexa 5 mg every 8 hours as needed in between.  Speech recommended dysphagia 1 purée diet with thin liquids  Currently on inpatient one-to-one observation  Care discussed with geriatrician  Continued patient hospitalization since adjusting above medication for behavior control and rehab placement.    Acute kidney injury superimposed on stage 3b chronic kidney disease (HCC)  Lab Results   Component Value Date    EGFR 37 06/23/2025    EGFR 32 06/21/2025    EGFR 35 06/20/2025    CREATININE 1.20 06/23/2025    CREATININE 1.33 (H) 06/21/2025    CREATININE 1.25 06/20/2025     Baseline creatinine is 1.2-1.3  Current is 1.20  Discontinue IV fluids  Recurrent major depressive disorder, in remission (HCC)  Continue home regimen Seroquel and mirtazapine  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)  Wt Readings from Last 3 Encounters:   06/19/25 63.5 kg (139 lb 15.9 oz)   06/13/25 63.5 kg (140 lb)   04/03/25 61.7 kg (136 lb)     Lab Results   Component Value Date     (H) 06/23/2025     (H) 03/04/2025    LVEF 55 03/06/2025     Appears euvolemic.  Hold home dose of p.o. Lasix.  Ambulatory dysfunction  PT/OT Eval and treat  Trend Mobility  Scores  Encourage appropriate mobility to achieve and improve upon HLM Goals as noted  Primary insomnia  Mirtazapine  Goals of care, counseling/discussion  Palliative consulted  Frailty syndrome in geriatric patient  Appreciate geriatrics    VTE Pharmacologic Prophylaxis: VTE Score: 4 Moderate Risk (Score 3-4) - Pharmacological DVT Prophylaxis Ordered: heparin.    Mobility:   Basic Mobility Inpatient Raw Score: 8  -[facility] Goal: 3: Sit at edge of bed  -[facility] Achieved: 2: Bed activities/Dependent transfer      Patient Centered Rounds: I performed bedside rounds with nursing staff today.   Discussions with Specialists or Other Care Team Provider: Geriatrician    Education and Discussions with Family / Patient: Updated  (niece) via phone.    Current Length of Stay: 7 day(s)  Current Patient Status: Inpatient   Certification Statement: The patient will continue to require additional inpatient hospital stay due to adjusting medications  Discharge Plan: Anticipate discharge in 24-48 hrs to rehab facility.    Code Status: Level 3 - DNAR and DNI    Subjective   As per RN report patient did not get good sleep last night.  Patient is resting during my encounter this morning however episodes of agitation reported by RN requiring mittens and continuation of one-to-one observation.  No other events reported.    Objective :  Temp:  [97.8 °F (36.6 °C)-98.6 °F (37 °C)] 97.8 °F (36.6 °C)  HR:  [] 89  BP: (100-120)/(56-63) 120/63  Resp:  [19-20] 19  SpO2:  [93 %-99 %] 94 %  O2 Device: Nasal cannula  Nasal Cannula O2 Flow Rate (L/min):  [2 L/min] 2 L/min    Body mass index is 26.45 kg/m².     Input and Output Summary (last 24 hours):     Intake/Output Summary (Last 24 hours) at 6/26/2025 1240  Last data filed at 6/26/2025 1235  Gross per 24 hour   Intake 700 ml   Output --   Net 700 ml       Physical Exam  Constitutional:       General: She is not in acute distress.     Appearance: She is not ill-appearing or  toxic-appearing.     Cardiovascular:      Rate and Rhythm: Normal rate.      Pulses: Normal pulses.   Pulmonary:      Effort: Pulmonary effort is normal.      Breath sounds: No wheezing.   Abdominal:      General: There is no distension.      Tenderness: There is no abdominal tenderness.     Neurological:      Mental Status: She is alert. She is disoriented.           Lines/Drains:              Lab Results: I have reviewed the following results:   Results from last 7 days   Lab Units 06/25/25  0450 06/21/25  1012 06/20/25  0439   WBC Thousand/uL 5.40   < > 5.40   HEMOGLOBIN g/dL 8.7*   < > 8.8*   HEMATOCRIT % 30.7*   < > 29.8*   PLATELETS Thousands/uL 222   < > 224   SEGS PCT %  --   --  63   LYMPHO PCT %  --   --  16   MONO PCT %  --   --  17*   EOS PCT %  --   --  3    < > = values in this interval not displayed.     Results from last 7 days   Lab Units 06/23/25  0945 06/21/25  1012 06/20/25  0439   SODIUM mmol/L 141   < > 141   POTASSIUM mmol/L 4.2   < > 4.3   CHLORIDE mmol/L 107   < > 107   CO2 mmol/L 29   < > 27   BUN mg/dL 25   < > 33*   CREATININE mg/dL 1.20   < > 1.25   ANION GAP mmol/L 5   < > 7   CALCIUM mg/dL 9.0   < > 8.6   ALBUMIN g/dL  --   --  3.3*   TOTAL BILIRUBIN mg/dL  --   --  1.18*   ALK PHOS U/L  --   --  110*   ALT U/L  --   --  23   AST U/L  --   --  33   GLUCOSE RANDOM mg/dL 144*   < > 101    < > = values in this interval not displayed.         Results from last 7 days   Lab Units 06/20/25  1630 06/19/25  2117   POC GLUCOSE mg/dl 111 95               Recent Cultures (last 7 days):   Results from last 7 days   Lab Units 06/19/25  2352   URINE CULTURE  >100,000 cfu/ml Escherichia coli ESBL*             Last 24 Hours Medication List:     Current Facility-Administered Medications:     acetaminophen (TYLENOL) tablet 650 mg, Q8H PRN    dextromethorphan-guaiFENesin (ROBITUSSIN DM) oral syrup 10 mL, Q4H PRN    [DISCONTINUED] valproate (DEPACON) 125 mg in sodium chloride 0.9 % 50 mL IVPB, Q12H HESHAM  **OR** divalproex sodium (DEPAKOTE SPRINKLE) capsule 125 mg, Q12H HESHAM    docusate sodium (COLACE) capsule 100 mg, BID    [Held by provider] furosemide (LASIX) tablet 20 mg, BID    gabapentin (NEURONTIN) capsule 100 mg, BID    heparin (porcine) subcutaneous injection 5,000 Units, Q8H HESHAM **AND** [COMPLETED] Platelet count, Once    melatonin tablet 6 mg, HS    mirtazapine (REMERON) tablet 7.5 mg, HS    OLANZapine (ZyPREXA ZYDIS) dispersible tablet 5 mg, Q8H PRN    ondansetron (ZOFRAN) injection 4 mg, Q6H PRN    QUEtiapine (SEROquel) tablet 25 mg, BID    simethicone (MYLICON) chewable tablet 80 mg, 4x Daily PRN    Administrative Statements   Today, Patient Was Seen By: Rodrigo Wills MD  I have spent a total time of   minutes in caring for this patient on the day of the visit/encounter including Patient and family education, Impressions, Counseling / Coordination of care, Documenting in the medical record, Reviewing/placing orders in the medical record (including tests, medications, and/or procedures), and Communicating with other healthcare professionals .    **Please Note: This note may have been constructed using a voice recognition system.**

## 2025-06-26 NOTE — PLAN OF CARE
Problem: Potential for Falls  Goal: Patient will remain free of falls  Description: INTERVENTIONS:  - Educate patient/family on patient safety including physical limitations  - Instruct patient to call for assistance with activity   - Consider consulting OT/PT to assist with strengthening/mobility based on AM PAC & -HLM score  - Consult OT/PT to assist with strengthening/mobility   - Keep Call bell within reach  - Keep bed low and locked with side rails adjusted as appropriate  - Keep care items and personal belongings within reach  - Initiate and maintain comfort rounds  - Make Fall Risk Sign visible to staff  - Offer Toileting every 2 Hours, in advance of need  - Initiate/Maintain alarm  - Obtain necessary fall risk management equipment:   - Apply yellow socks and bracelet for high fall risk patients  - Consider moving patient to room near nurses station  Outcome: Progressing     Problem: Prexisting or High Potential for Compromised Skin Integrity  Goal: Skin integrity is maintained or improved  Description: INTERVENTIONS:  - Identify patients at risk for skin breakdown  - Assess and monitor skin integrity including under and around medical devices   - Assess and monitor nutrition and hydration status  - Monitor labs  - Assess for incontinence   - Turn and reposition patient  - Assist with mobility/ambulation  - Relieve pressure over bob prominences   - Avoid friction and shearing  - Provide appropriate hygiene as needed including keeping skin clean and dry  - Evaluate need for skin moisturizer/barrier cream  - Collaborate with interdisciplinary team  - Patient/family teaching  - Consider wound care consult    Assess:  - Review Fabricio scale daily  - Clean and moisturize skin   - Inspect skin when repositioning, toileting, and assisting with ADLS  - Assess under medical devices   - Assess extremities for adequate circulation and sensation     Bed Management:  - Have minimal linens on bed & keep smooth,  unwrinkled  - Change linens as needed when moist or perspiring  - Avoid sitting or lying in one position for more than 2 hours while in bed?Keep HOB at 45degrees   - Toileting:  - Offer bedside commode  - Assess for incontinence  - Use incontinent care products after each incontinent episode     Activity:  - Encourage activity and walks on unit  - Encourage or provide ROM exercises   - Turn and reposition patient every 2 Hours  - Use appropriate equipment to lift or move patient in bed  - Instruct/ Assist with weight shifting  when out of bed in chair  - Consider limitation of chair time 2 hour intervals    Skin Care:  - Avoid use of baby powder, tape, friction and shearing, hot water or constrictive clothing  - Relieve pressure over bony prominences us  - Do not massage red bony areas    Next Steps:  - Teach patient strategies to minimize risks   - Consider consults to  interdisciplinary teams   Outcome: Progressing     Problem: Nutrition/Hydration-ADULT  Goal: Nutrient/Hydration intake appropriate for improving, restoring or maintaining nutritional needs  Description: Monitor and assess patient's nutrition/hydration status for malnutrition. Collaborate with interdisciplinary team and initiate plan and interventions as ordered.  Monitor patient's weight and dietary intake as ordered or per policy. Utilize nutrition screening tool and intervene as necessary. Determine patient's food preferences and provide high-protein, high-caloric foods as appropriate.     INTERVENTIONS:  - Monitor oral intake, urinary output, labs, and treatment plans  - Assess nutrition and hydration status and recommend course of action  - Evaluate amount of meals eaten  - Assist patient with eating if necessary   - Allow adequate time for meals  - Recommend/ encourage appropriate diets, oral nutritional supplements, and vitamin/mineral supplements  - Order, calculate, and assess calorie counts as needed  - Recommend, monitor, and adjust tube  feedings and TPN/PPN based on assessed needs  - Assess need for intravenous fluids  - Provide specific nutrition/hydration education as appropriate  - Include patient/family/caregiver in decisions related to nutrition  Outcome: Progressing     Problem: SAFETY,RESTRAINT: NV/NON-SELF DESTRUCTIVE BEHAVIOR  Goal: Remains free of harm/injury (restraint for non violent/non self-detsructive behavior)  Description: INTERVENTIONS:  - Instruct patient/family regarding restraint use   - Assess and monitor physiologic and psychological status   - Provide interventions and comfort measures to meet assessed patient needs   - Identify and implement measures to help patient regain control  - Assess readiness for release of restraint   Outcome: Progressing  Goal: Returns to optimal restraint-free functioning  Description: INTERVENTIONS:  - Assess the patient's behavior and symptoms that indicate continued need for restraint  - Identify and implement measures to help patient regain control  - Assess readiness for release of restraint   Outcome: Progressing

## 2025-06-26 NOTE — ASSESSMENT & PLAN NOTE
Recently was having home health/physical therapy, re-ordered by PCP  Assess patient frequently for physical needs, encourage use of assistant devices as needed and directed by PT/OT  Identify cognitive and physical deficits and behaviors that affect risk of falls  Consider moving patient closer to nursing station to monitor more closely for impulsive behavior which may increase risk of falls  [place] fall and safety precautions   Educate patient/family on patient safety including physical limitations and importance of using call bell for assistance   Modify environment to reduce risk of injury including disconnecting from pole when not in use, ensuring adequate lighting in room and restroom, ensuring that path to restroom is clear and free of trip hazards  PT/OT consulted to assist with strengthening/mobility and assist with discharge planning to appropriate level of care  Out of bed as tolerated  Family interested in PT/OT services

## 2025-06-26 NOTE — PROGRESS NOTES
Progress Note - Geriatric Medicine   Name: Sona Valenzuela 89 y.o. female I MRN: [id number]  Unit/Bed#: -01 I Date of Admission: 6/19/2025   Date of Service: 6/26/2025 I Hospital Day: 7    Assessment & Plan  Vascular dementia with behavioral disturbance (HCC)  History of prior memory issues and cognitive impairment   Presented to hospital for increased confusion, combativeness  Patient has lived with her niece and nephew for the past 30 years  Dependent with ADLs/IADLs    Previously followed with [facility]   Last seen on 09/29/23  Appears patient has occurences of sundowning, recently managed by PCP   PCP speaking with family of trial of increase dose of seroquel vs adding melatonin, family had opted to trial of melatonin, started on 06/13   Increased Melatonin to 6 mg HS in addition to increasing Seroquel to 50 mg at bedtime  Has history of hallucinations    Most recent TSH on 06/19/25 noted to be 3.797  Most recent vitamin B12 level on 01/24/23 noted to be 407  MRI brain on 03/13/25 revealed mild microangiopathic change  Patient scored 4/30 on most recent MOCA on 12/13/21  Keep physically, mentally, and socially active   PTA medication: Seroquel 25 mg po daily at bedtime   Increased to 50 mg at bedtime during current hospitalization, however modified dose to 25 mg twice daily   Was decreased to Depakote 250 mg every 12 hours on 6/20   Tolerated PO medication last night per chart review   LFT's previously stable  Ordered Gabapentin 100 mg twice daily, modified Seroquel 25 mg twice daily, continue Depakote 250 mg every 12 hours, Zyprexa 5 mg PO every 8 hours PRN for agitation  EKG 6/23 QTc 455    At risk for delirium secondary to age, cognitive decline, hospitalization, insomnia, immobility  Provide frequent redirection, reorientation, distraction techniques  Avoid deliriogenic medications such as tramadol, benzodiazepines, anticholinergics,  Benadryl  Treat pain, See geriatric pain  protocol  Monitor for constipation and urinary retention  Encourage early and frequent moblization, OOB  Encourage Hydration/ Nutrition  Implement sleep hygiene, limit night time interuptions, group activities  Avoid physical restraints as able  Use chemical restraint only when other efforts have failed, recommend zyprexa 2.5mg IM q8h prn  Monitor Qtc, if greater than 500 do not use antipsychotic medication  If QTc greater than 460, monitor and replete and deficiency of  K and Mg, recheck EKG    Recurrent major depressive disorder, in remission (HCC)  Patient has a history of anxiety/depression   Mood appears stable on exam today   Continue supportive care   PTA medication: mirtazapine 7.5 mg daily at bedtime  Acute kidney injury superimposed on stage 3b chronic kidney disease ([facility])  Lab Results   Component Value Date    EGFR 37 06/23/2025    EGFR 32 06/21/2025    EGFR 35 06/20/2025    CREATININE 1.20 06/23/2025    CREATININE 1.33 (H) 06/21/2025    CREATININE 1.25 06/20/2025     Baseline creatinine: 1.3-1.4  Continue to monitor kidney function  Monitor I/O's  Encourage PO hydration   Avoid hypotension  Avoid nephrotoxins, IV contrast  Received gentle IV fluids overnight  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)  Wt Readings from Last 3 Encounters:   06/19/25 63.5 kg (139 lb 15.9 oz)   06/13/25 63.5 kg (140 lb)   04/03/25 61.7 kg (136 lb)     Weight stable  Euvolemic  Does not appear short of breath, no leg swelling  Family noted to PCP on 06/13 of intermittent shortness of breath with exertion   ECHO on 03/06/25 showing EF 55%  Chest XR pending  PTA medication: furosemide 20 mg BID    Ambulatory dysfunction  Recently was having home health/physical therapy, re-ordered by PCP  Assess patient frequently for physical needs, encourage use of assistant devices as needed and directed by PT/OT  Identify cognitive and physical deficits and behaviors that affect risk of falls  Consider moving  patient closer to nursing station to monitor more closely for impulsive behavior which may increase risk of falls  [place] fall and safety precautions   Educate patient/family on patient safety including physical limitations and importance of using call bell for assistance   Modify environment to reduce risk of injury including disconnecting from pole when not in use, ensuring adequate lighting in room and restroom, ensuring that path to restroom is clear and free of trip hazards  PT/OT consulted to assist with strengthening/mobility and assist with discharge planning to appropriate level of care  Out of bed as tolerated  Family interested in PT/OT services  Goals of care, counseling/discussion  DNR/DNI  Palliative Care consulted, family not interested in hospice at this time, expressed interest in PT/OT services  Palliative care encounter  Palliative Care consulted, having goals of care discussion with family    Primary insomnia  First line is behavioral therapy   Avoid sedative hypnotics including benzodiazepines and benadryl  Encourage staying awake during the day   Encourage daytime activities and morning exercise   Decrease or eliminate daytime naps   Avoid caffeine especially during late afternoon and evening hours  Establish a nighttime routine  Implement sleep hygiene and limit nighttime interruptions  Continue melatonin 6 mg daily at bedtime and mirtazapine 7.5 mg daily at bedtime for sleep   Frailty syndrome in geriatric patient  Clinical Frail Scale: 6- Moderately Frail  Need help with all outside activities  Need help with stairs and bathing  May need assistance with dressing  Resides with family who assist with ADLs/IADLs  Appears to have good appetite  Albumin level on 06/20/25 was 3.3  Continue nutritional supplementation  Continue to optimize co-morbidities   Fall precautions     Uziel Magallanes is seen today for follow up. Upon exam today, she is laying in bed, going in and out of sleep. She  is able to state birthday.     Objective :  Temp:  [97.8 °F (36.6 °C)-98.6 °F (37 °C)] 97.8 °F (36.6 °C)  HR:  [] 89  BP: (100-120)/(56-64) 120/63  Resp:  [19-20] 19  SpO2:  [88 %-99 %] 94 %  O2 Device: Nasal cannula  Nasal Cannula O2 Flow Rate (L/min):  [2 L/min] 2 L/min    Physical Exam  Vitals and nursing note reviewed.   Constitutional:       Comments: Frail appearing     Cardiovascular:      Rate and Rhythm: Normal rate and regular rhythm.      Pulses: Normal pulses.      Heart sounds: Normal heart sounds.   Pulmonary:      Effort: Pulmonary effort is normal. No respiratory distress.      Breath sounds: Normal breath sounds.   Abdominal:      General: Abdomen is flat. Bowel sounds are normal.      Palpations: Abdomen is soft.     Musculoskeletal:      Right lower leg: No edema.      Left lower leg: No edema.     Skin:     General: Skin is warm and dry.      Capillary Refill: Capillary refill takes less than 2 seconds.     Neurological:      Mental Status: She is alert. She is disoriented.      Motor: Weakness present.      Gait: Gait abnormal.      Comments: ~AOx1   Psychiatric:         Mood and Affect: Mood normal.           Lab Results: I have reviewed the following results:CBC/BMP:   No new results in last 24 hours.     Imaging Results Review: No pertinent imaging studies reviewed.  Other Study Results Review: No additional pertinent studies reviewed.    Therapies:   Basic Mobility Inpatient Raw Score: 8  -[facility] Goal: 3: Sit at edge of bed  -[facility] Achieved: 2: Bed activities/Dependent transfer  PT: Following  OT: Following  ST: Following    VTE Prophylaxis: VTE covered by:  heparin (porcine), Subcutaneous, 5,000 Units at 06/26/25 0608     and Sequential compression device (Venodyne)     Code Status: Level 3 - DNAR and DNI  Advance Directive and Living Will:      Power of :    POLST:      Family and Social Support: Niece, Xi; Nephew, Juancarlos      Goals of Care: SNF    Administrative Statements    I have spent a total time of 50 minutes in caring for this patient on the day of the visit/encounter including Diagnostic results, Prognosis, and Documenting in the medical record.

## 2025-06-26 NOTE — ASSESSMENT & PLAN NOTE
Per niece, reported to have increased bouts of confusion, combativeness more freuqently over the past few weeks  Recently was treated for UTI in May and completed nitrofurantoin regimen at that time; prior MDRO on urine clx  Urine culture noted E. coli ESBL.  Completed 5 days of IV ertapenem.    Patient has been having waxing waning mentation at times.  Continue Depakote 125 mg twice daily  Continue Seroquel 25 mg twice daily.  Melatonin 6 mg nightly.  Remeron 7.5 mg nightly.  Continue p.o. Zyprexa 5 mg every 8 hours as needed in between.  Speech recommended dysphagia 1 purée diet with thin liquids  Currently on inpatient one-to-one observation  Care discussed with geriatrician  Continued patient hospitalization since adjusting above medication for behavior control and rehab placement.

## 2025-06-26 NOTE — PLAN OF CARE
Problem: Potential for Falls  Goal: Patient will remain free of falls  Description: INTERVENTIONS:  - Educate patient/family on patient safety including physical limitations  - Instruct patient to call for assistance with activity   - Consider consulting OT/PT to assist with strengthening/mobility based on AM PAC & -HLM score  - Consult OT/PT to assist with strengthening/mobility   - Keep Call bell within reach  - Keep bed low and locked with side rails adjusted as appropriate  - Keep care items and personal belongings within reach  - Initiate and maintain comfort rounds  - Make Fall Risk Sign visible to staff  - Offer Toileting every 2 Hours, in advance of need  - Initiate/Maintain alarm  - Obtain necessary fall risk management equipment  - Apply yellow socks and bracelet for high fall risk patients  - Consider moving patient to room near nurses station  Outcome: Progressing     Problem: Prexisting or High Potential for Compromised Skin Integrity  Goal: Skin integrity is maintained or improved  Description: INTERVENTIONS:  - Identify patients at risk for skin breakdown  - Assess and monitor skin integrity including under and around medical devices   - Assess and monitor nutrition and hydration status  - Monitor labs  - Assess for incontinence   - Turn and reposition patient  - Assist with mobility/ambulation  - Relieve pressure over bob prominences   - Avoid friction and shearing  - Provide appropriate hygiene as needed including keeping skin clean and dry  - Evaluate need for skin moisturizer/barrier cream  - Collaborate with interdisciplinary team  - Patient/family teaching  - Consider wound care consult    Assess:  - Review Fabricio scale daily  - Clean and moisturize skin   - Inspect skin when repositioning, toileting, and assisting with ADLS  - Assess under medical devices   - Assess extremities for adequate circulation and sensation     Bed Management:  - Have minimal linens on bed & keep smooth,  unwrinkled  - Change linens as needed when moist or perspiring  - Avoid sitting or lying in one position  while in bed?Keep HOB at 30 degrees   - Toileting:  - Offer bedside commode  - Assess for incontinence   - Use incontinent care products after each incontinent episode     Activity:  - Mobilize patient 3 times a day  - Encourage activity and walks on unit  - Encourage or provide ROM exercises   - Turn and reposition patient every 2 Hours  - Use appropriate equipment to lift or move patient in bed  - Instruct/ Assist with weight shifting   - Consider limitation of chair     Skin Care:  - Avoid use of baby powder, tape, friction and shearing, hot water or constrictive clothing  - Relieve pressure over bony prominences   - Do not massage red bony areas    Next Steps:  - Teach patient strategies to minimize risks   - Consider consults to  interdisciplinary teams   Outcome: Progressing     Problem: Nutrition/Hydration-ADULT  Goal: Nutrient/Hydration intake appropriate for improving, restoring or maintaining nutritional needs  Description: Monitor and assess patient's nutrition/hydration status for malnutrition. Collaborate with interdisciplinary team and initiate plan and interventions as ordered.  Monitor patient's weight and dietary intake as ordered or per policy. Utilize nutrition screening tool and intervene as necessary. Determine patient's food preferences and provide high-protein, high-caloric foods as appropriate.     INTERVENTIONS:  - Monitor oral intake, urinary output, labs, and treatment plans  - Assess nutrition and hydration status and recommend course of action  - Evaluate amount of meals eaten  - Assist patient with eating if necessary   - Allow adequate time for meals  - Recommend/ encourage appropriate diets, oral nutritional supplements, and vitamin/mineral supplements  - Order, calculate, and assess calorie counts as needed  - Recommend, monitor, and adjust tube feedings and TPN/PPN based on  assessed needs  - Assess need for intravenous fluids  - Provide specific nutrition/hydration education as appropriate  - Include patient/family/caregiver in decisions related to nutrition  Outcome: Progressing     Problem: SAFETY,RESTRAINT: NV/NON-SELF DESTRUCTIVE BEHAVIOR  Goal: Remains free of harm/injury (restraint for non violent/non self-detsructive behavior)  Description: INTERVENTIONS:  - Instruct patient/family regarding restraint use   - Assess and monitor physiologic and psychological status   - Provide interventions and comfort measures to meet assessed patient needs   - Identify and implement measures to help patient regain control  - Assess readiness for release of restraint   Outcome: Progressing  Goal: Returns to optimal restraint-free functioning  Description: INTERVENTIONS:  - Assess the patient's behavior and symptoms that indicate continued need for restraint  - Identify and implement measures to help patient regain control  - Assess readiness for release of restraint   Outcome: Progressing

## 2025-06-26 NOTE — QUICK NOTE
6/26/2025 9:03 AM -  Sona Valenzuela's chart and case were reviewed by FARZANA Martini.  Mode of review included electronic chart check.    GOC clear, POLST completed with niece.     Palliative will  sign off at this time.      Patient is not appropriate for outpatient [facility] follow-up.  We will make arrangements through our office.        For urgent issues or any questions/concerns, please notify on-call provider via [employer] Secure Chat.  You may also call our answering service 24/7 at [phone].    FARZANA Martini  Palliative and Supportive Care  Clinic/Answering Service: [phone]  You can find me on [employer] Chat!

## 2025-06-26 NOTE — SPEECH THERAPY NOTE
Speech Language/Pathology     Speech/Language Pathology Progress Note     Patient Name: Sona Valenzuela    Today's Date: 6/26/2025     Problem List  Principal Problem:    Vascular dementia with behavioral disturbance (HCC)  Active Problems:    Primary insomnia    Recurrent major depressive disorder, in remission (HCC)    Acute kidney injury superimposed on stage 3b chronic kidney disease (HCC)    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)    Ambulatory dysfunction    Goals of care, counseling/discussion    Palliative care encounter    Frailty syndrome in geriatric patient      Subjective:  Patient received awake, upright in bed.  1:1 present.      Previous/current diet: puree/nectar    Objective:  The following consistencies were tested puree solids, mech soft, nectar thick, thin liquids   Pt extracts successive sips of nectar and thin liquids from straw.  Noted to initiate timely /functional swallows upon palpation.  No s/s of aspiration or distress.  Prolonged, ?incomplete mastication of moistened solid. Prolonged transfer, mild retention.  Overt cough following same.  Cannot r/o pharyngeal dysphagia.  No difficulties w/ puree solids.  Intake is good w/ puree diet.      Assessment:  Suspect oropharyngeal dysphagia; improved mgmt of thin liquids noted today.  OK to advance.  Continue puree's      Plan:  Puree/thin  Meds crushed c puree   1:1 full feeding assist  ST follow-up x1-2      Cathie Oliveira MS, CCC-SLP  Speech-Language Pathologist  [place] #[id number]  [place] #[id number]

## 2025-06-26 NOTE — CASE MANAGEMENT
Case Management Discharge Planning Note    Patient name Sona Valenzuela  Location /-01 MRN [id number]  : 11/3/1924 Date 2025       Current Admission Date: 2025  Current Admission Diagnosis:Vascular dementia with behavioral disturbance (HCC)   Patient Active Problem List    Diagnosis Date Noted    Goals of care, counseling/discussion 2025    Palliative care encounter 2025    Frailty syndrome in geriatric patient 2025    Primary malignant neoplasm of bladder ([facility]) 2025    Iron deficiency anemia 03/10/2025    Seizure-like activity ([facility]) 2025    Elevated troponin 2025    Congestion of throat 2025    Vascular dementia with behavioral disturbance ([facility])     Ambulatory dysfunction 2024    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, psychotic disturbance, mood disturbance, or anxiety ([facility]) 2024    Chronic pain of left wrist 10/28/2024    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 ([facility])     Acute on chronic HFpEF with severe pulmonary hypertension ([facility]) 2024    Primary hypertension 2024    Paroxysmal A-fib ([facility]) 2024    Compression fracture of lumbar spine, non-traumatic ([facility]) 2024    Mild protein-calorie malnutrition ([facility]) 2024    Vascular dementia, uncomplicated ([facility]) 2023    Does mobilize using walker 2023    Chronic congestive heart failure ([facility]) 2022    Chronic atrial fibrillation ([facility]) 10/11/2022    Recurrent major depressive disorder, in remission ([facility]) 2022    Acute kidney injury superimposed on stage 3b chronic kidney disease ([facility]) 2022    Vitamin D deficiency 2020    Primary insomnia 2019    Degeneration of lumbar intervertebral disc 2019    Lumbar radiculopathy 2019    Arthropathy of lumbar facet joint 2019    Restless legs syndrome 2019      LOS (days): 7  Geometric Mean LOS (GMLOS) (days): 3  Days  to GMLOS:-3.8     OBJECTIVE:  Risk of Unplanned Readmission Score: 26.43         Current admission status: Inpatient   Preferred Pharmacy:   [id number] - [place] - [address]  [address]  [place] [address]  Phone: [phone] Fax: [phone]    Primary Care Provider: FARZANA Stanley    Primary Insurance: [employer]  Secondary Insurance: [facility]    DISCHARGE DETAILS:                                          Other Referral/Resources/Interventions Provided:  Referral Comments: Chart reviewed and pt discussed at [facility] AM rounds.  Pt medically ready but remains on bedside 1:1 for behaviours, ie, biting and pulling off oxygen.  Sent additional referrals to SNFs w dementia units, specifically [facility], [facility], [facility], [facility], [facility], [facility].  Awaiting responses.         Treatment Team Recommendation: Home with Home Health Care, Short Term Rehab, SNF  Expected Discharge Disposition: Skilled Nursing Facility  Additional Discharge Dispositions: Skilled Nursing Facility  Transport at Discharge : BLS Ambulance

## 2025-06-26 NOTE — ASSESSMENT & PLAN NOTE
Clinical Frail Scale: 6- Moderately Frail  Need help with all outside activities  Need help with stairs and bathing  May need assistance with dressing  Resides with family who assist with ADLs/IADLs  Appears to have good appetite  Albumin level on 06/20/25 was 3.3  Continue nutritional supplementation  Continue to optimize co-morbidities   Fall precautions

## 2025-06-26 NOTE — ASSESSMENT & PLAN NOTE
History of prior memory issues and cognitive impairment   Presented to hospital for increased confusion, combativeness  Patient has lived with her niece and nephew for the past 30 years  Dependent with ADLs/IADLs    [facility]   Last seen on 09/29/23  Appears patient has occurences of sundowning, recently managed by PCP   PCP speaking with family of trial of increase dose of seroquel vs adding melatonin, family had opted to trial of melatonin, started on 06/13   Increased Melatonin to 6 mg HS in addition to increasing Seroquel to 50 mg at bedtime  Has history of hallucinations    Most recent TSH on 06/19/25 noted to be 3.797  Most recent vitamin B12 level on 01/24/23 noted to be 407  MRI brain on 03/13/25 revealed mild microangiopathic change  Patient scored 4/30 on most recent MOCA on 12/13/21  Keep physically, mentally, and socially active   PTA medication: Seroquel 25 mg po daily at bedtime   Increased to 50 mg at bedtime during current hospitalization, however modified dose to 25 mg twice daily   Was decreased to Depakote 250 mg every 12 hours on 6/20   Tolerated PO medication last night per chart review   LFT's previously stable  Ordered Gabapentin 100 mg twice daily, modified Seroquel 25 mg twice daily, continue Depakote 250 mg every 12 hours, Zyprexa 5 mg PO every 8 hours PRN for agitation  EKG 6/23 QTc 455    At risk for delirium secondary to age, cognitive decline, hospitalization, insomnia, immobility  Provide frequent redirection, reorientation, distraction techniques  Avoid deliriogenic medications such as tramadol, benzodiazepines, anticholinergics,  Benadryl  Treat pain, See geriatric pain protocol  Monitor for constipation and urinary retention  Encourage early and frequent moblization, OOB  Encourage Hydration/ Nutrition  Implement sleep hygiene, limit night time interuptions, group activities  Avoid physical restraints as able  Use chemical restraint only when  other efforts have failed, recommend zyprexa 2.5mg IM q8h prn  Monitor Qtc, if greater than 500 do not use antipsychotic medication  If QTc greater than 460, monitor and replete and deficiency of  K and Mg, recheck EKG

## 2025-06-27 PROCEDURE — 97530 THERAPEUTIC ACTIVITIES: CPT

## 2025-06-27 PROCEDURE — 99232 SBSQ HOSP IP/OBS MODERATE 35: CPT | Performed by: STUDENT IN AN ORGANIZED HEALTH CARE EDUCATION/TRAINING PROGRAM

## 2025-06-27 PROCEDURE — 99233 SBSQ HOSP IP/OBS HIGH 50: CPT | Performed by: INTERNAL MEDICINE

## 2025-06-27 PROCEDURE — 97535 SELF CARE MNGMENT TRAINING: CPT

## 2025-06-27 PROCEDURE — 97110 THERAPEUTIC EXERCISES: CPT

## 2025-06-27 RX ADMIN — HEPARIN SODIUM 5000 UNITS: 5000 INJECTION INTRAVENOUS; SUBCUTANEOUS at 21:25

## 2025-06-27 RX ADMIN — DIVALPROEX SODIUM 125 MG: 125 CAPSULE, COATED PELLETS ORAL at 08:29

## 2025-06-27 RX ADMIN — IRON SUCROSE 200 MG: 20 INJECTION, SOLUTION INTRAVENOUS at 16:12

## 2025-06-27 RX ADMIN — Medication 6 MG: at 21:25

## 2025-06-27 RX ADMIN — GABAPENTIN 100 MG: 100 CAPSULE ORAL at 17:30

## 2025-06-27 RX ADMIN — DOCUSATE SODIUM 100 MG: 100 CAPSULE, LIQUID FILLED ORAL at 08:30

## 2025-06-27 RX ADMIN — QUETIAPINE FUMARATE 25 MG: 25 TABLET ORAL at 17:29

## 2025-06-27 RX ADMIN — DIVALPROEX SODIUM 125 MG: 125 CAPSULE, COATED PELLETS ORAL at 21:26

## 2025-06-27 RX ADMIN — QUETIAPINE FUMARATE 25 MG: 25 TABLET ORAL at 08:30

## 2025-06-27 RX ADMIN — GABAPENTIN 100 MG: 100 CAPSULE ORAL at 08:30

## 2025-06-27 RX ADMIN — GUAIFENESIN AND DEXTROMETHORPHAN 10 ML: 100; 10 SYRUP ORAL at 02:34

## 2025-06-27 RX ADMIN — HEPARIN SODIUM 5000 UNITS: 5000 INJECTION INTRAVENOUS; SUBCUTANEOUS at 14:12

## 2025-06-27 RX ADMIN — DOCUSATE SODIUM 100 MG: 100 CAPSULE, LIQUID FILLED ORAL at 17:29

## 2025-06-27 RX ADMIN — MIRTAZAPINE 7.5 MG: 15 TABLET, FILM COATED ORAL at 21:25

## 2025-06-27 RX ADMIN — HEPARIN SODIUM 5000 UNITS: 5000 INJECTION INTRAVENOUS; SUBCUTANEOUS at 05:18

## 2025-06-27 NOTE — ASSESSMENT & PLAN NOTE
Recently was having home health/physical therapy, re-ordered by PCP  Assess patient frequently for physical needs, encourage use of assistant devices as needed and directed by PT/OT  Identify cognitive and physical deficits and behaviors that affect risk of falls  Consider moving patient closer to nursing station to monitor more closely for impulsive behavior which may increase risk of falls  [place] fall and safety precautions   Educate patient/family on patient safety including physical limitations and importance of using call bell for assistance   Modify environment to reduce risk of injury including disconnecting from pole when not in use, ensuring adequate lighting in room and restroom, ensuring that path to restroom is clear and free of trip hazards  PT/OT consulted to assist with strengthening/mobility and assist with discharge planning to appropriate level of care  Out of bed as tolerated  Family interested in PT/OT services

## 2025-06-27 NOTE — PLAN OF CARE
Problem: Potential for Falls  Goal: Patient will remain free of falls  Description: INTERVENTIONS:  - Educate patient/family on patient safety including physical limitations  - Instruct patient to call for assistance with activity   - Consider consulting OT/PT to assist with strengthening/mobility based on AM PAC & -HLM score  - Consult OT/PT to assist with strengthening/mobility   - Keep Call bell within reach  - Keep bed low and locked with side rails adjusted as appropriate  - Keep care items and personal belongings within reach  - Initiate and maintain comfort rounds  - Make Fall Risk Sign visible to staff  - Offer Toileting every  Hours, in advance of need  - Initiate/Maintain alarm  - Obtain necessary fall risk management equipment:   - Apply yellow socks and bracelet for high fall risk patients  - Consider moving patient to room near nurses station  Outcome: Progressing     Problem: Prexisting or High Potential for Compromised Skin Integrity  Goal: Skin integrity is maintained or improved  Description: INTERVENTIONS:  - Identify patients at risk for skin breakdown  - Assess and monitor skin integrity including under and around medical devices   - Assess and monitor nutrition and hydration status  - Monitor labs  - Assess for incontinence   - Turn and reposition patient  - Assist with mobility/ambulation  - Relieve pressure over bob prominences   - Avoid friction and shearing  - Provide appropriate hygiene as needed including keeping skin clean and dry  - Evaluate need for skin moisturizer/barrier cream  - Collaborate with interdisciplinary team  - Patient/family teaching  - Consider wound care consult    Assess:  - Review Fabricio scale daily  - Clean and moisturize skin every   - Inspect skin when repositioning, toileting, and assisting with ADLS  - Assess under medical devices such as  every   - Assess extremities for adequate circulation and sensation     Bed Management:  - Have minimal linens on bed &  keep smooth, unwrinkled  - Change linens as needed when moist or perspiring  - Avoid sitting or lying in one position for more than  hours while in bed?Keep HOB at degrees   - Toileting:  - Offer bedside commode  - Assess for incontinence every   - Use incontinent care products after each incontinent episode such as     Activity:  - Mobilize patient  times a day  - Encourage activity and walks on unit  - Encourage or provide ROM exercises   - Turn and reposition patient every  Hours  - Use appropriate equipment to lift or move patient in bed  - Instruct/ Assist with weight shifting every  when out of bed in chair  - Consider limitation of chair time  hour intervals    Skin Care:  - Avoid use of baby powder, tape, friction and shearing, hot water or constrictive clothing  - Relieve pressure over bony prominences using   - Do not massage red bony areas    Next Steps:  - Teach patient strategies to minimize risks such as   - Consider consults to  interdisciplinary teams such as   Outcome: Progressing     Problem: Nutrition/Hydration-ADULT  Goal: Nutrient/Hydration intake appropriate for improving, restoring or maintaining nutritional needs  Description: Monitor and assess patient's nutrition/hydration status for malnutrition. Collaborate with interdisciplinary team and initiate plan and interventions as ordered.  Monitor patient's weight and dietary intake as ordered or per policy. Utilize nutrition screening tool and intervene as necessary. Determine patient's food preferences and provide high-protein, high-caloric foods as appropriate.     INTERVENTIONS:  - Monitor oral intake, urinary output, labs, and treatment plans  - Assess nutrition and hydration status and recommend course of action  - Evaluate amount of meals eaten  - Assist patient with eating if necessary   - Allow adequate time for meals  - Recommend/ encourage appropriate diets, oral nutritional supplements, and vitamin/mineral supplements  - Order,  calculate, and assess calorie counts as needed  - Recommend, monitor, and adjust tube feedings and TPN/PPN based on assessed needs  - Assess need for intravenous fluids  - Provide specific nutrition/hydration education as appropriate  - Include patient/family/caregiver in decisions related to nutrition  Outcome: Progressing     Problem: SAFETY,RESTRAINT: NV/NON-SELF DESTRUCTIVE BEHAVIOR  Goal: Remains free of harm/injury (restraint for non violent/non self-detsructive behavior)  Description: INTERVENTIONS:  - Instruct patient/family regarding restraint use   - Assess and monitor physiologic and psychological status   - Provide interventions and comfort measures to meet assessed patient needs   - Identify and implement measures to help patient regain control  - Assess readiness for release of restraint   Outcome: Progressing  Goal: Returns to optimal restraint-free functioning  Description: INTERVENTIONS:  - Assess the patient's behavior and symptoms that indicate continued need for restraint  - Identify and implement measures to help patient regain control  - Assess readiness for release of restraint   Outcome: Progressing     Problem: SAFETY,RESTRAINT: NV/NON-SELF DESTRUCTIVE BEHAVIOR  Goal: Returns to optimal restraint-free functioning  Description: INTERVENTIONS:  - Assess the patient's behavior and symptoms that indicate continued need for restraint  - Identify and implement measures to help patient regain control  - Assess readiness for release of restraint   Outcome: Progressing

## 2025-06-27 NOTE — PLAN OF CARE
Problem: PHYSICAL THERAPY ADULT  Goal: Performs mobility at highest level of function for planned discharge setting.  See evaluation for individualized goals.  Description: Treatment/Interventions: Functional transfer training, LE strengthening/ROM, Therapeutic exercise, Endurance training, Cognitive reorientation, Patient/family training, Equipment eval/education, Bed mobility, Gait training, OT          See flowsheet documentation for full assessment, interventions and recommendations.  Outcome: Progressing  Note: Prognosis: Fair  Problem List: Decreased strength, Decreased endurance, Impaired balance, Decreased mobility, Decreased cognition, Decreased safety awareness  Assessment: Chart review and two person identifiers were completed. Pt seen for PT treatment session this date, consisting of ther act focused on sitting balance, sitting endurence, transfers, standing balance, standing endurence and ther ex focused on strengthening.  Pt greeted supine in bed and agreeable to PT session. Pt completed rolls bilaterally with max A x1.Pt completed supine to sit with mod A x2. Pt required A x1 to maintain sitting balance. Pt completed STS with max A x2 and RW. Pt maintained standing for ~1 minute with no overt LOB. Pt completed sit to supine with max A x2. Pt completed rolls bilaterally for positioning. Pt completed supine AAROM LE exercises.  Throughout session pt responded to VC but would not open eyes despite max encouragement. Pt ended session in chair position in bed  with alarm activated and all needs within reach. Spoke to RN about session outcomes. Pertinent barriers during this session include cognitive status and fatigue . Current goals and POC established on IE remain appropriate. Pt prognosis for achieving goals is good, pending pt progress with hospitalization/medical status improvements, and indicated by ability to follow directions, responsive to cues/strategies, and previous response to intervention.  Pt limited d/t medical instability and cognitive status. Pt continues to be functioning below baseline level, and remains limited due to factors listed above. PT will continue to see pt during current hospitalization in order to address the deficits listed above and provide interventions consistent w/ POC in effort to achieve STGs. PT recommends level 2, moderate resource intensity upon discharge.  Barriers to Discharge: Other (Comment), Decreased caregiver support (decline in functional mobility)     Rehab Resource Intensity Level, PT: II (Moderate Resource Intensity)    See flowsheet documentation for full assessment.

## 2025-06-27 NOTE — PHYSICAL THERAPY NOTE
PHYSICAL THERAPY NOTE          Patient Name: Sona Valenzuela  Today's Date: 6/27/2025 06/27/25 1310   PT Last Visit   PT Visit Date 06/27/25   Note Type   Note Type Treatment for insurance authorization   Pain Assessment   Pain Assessment Tool FLACC   Pain Rating: FLACC (Rest) - Face 0   Pain Rating: FLACC (Rest) - Legs 0   Pain Rating: FLACC (Rest) - Activity 0   Pain Rating: FLACC (Rest) - Cry 0   Pain Rating: FLACC (Rest) - Consolability 0   Score: FLACC (Rest) 0   Pain Rating: FLACC (Activity) - Face 1   Pain Rating: FLACC (Activity) - Legs 1   Pain Rating: FLACC (Activity) - Activity 0   Pain Rating: FLACC (Activity) - Cry 0   Pain Rating: FLACC (Activity) - Consolability 1   Score: FLACC (Activity) 3   Restrictions/Precautions   Weight Bearing Precautions Per Order No   Other Precautions Cognitive;Bed Alarm;Fall Risk;Pain   General   Chart Reviewed Yes   Family/Caregiver Present No   Cognition   Overall Cognitive Status Impaired   Arousal/Participation Alert;Responsive;Cooperative   Attention Attends with cues to redirect   Orientation Level Unable to assess   Following Commands Follows one step commands with increased time or repetition   Comments Pt agreeable to PT session   Bed Mobility   Rolling R 2  Maximal assistance   Additional items Assist x 1;Increased time required;Verbal cues;LE management;Bedrails;HOB elevated   Rolling L 2  Maximal assistance   Additional items Assist x 1;Increased time required;Verbal cues;LE management;Bedrails;HOB elevated   Supine to Sit 3  Moderate assistance   Additional items Assist x 2;Increased time required;LE management;Verbal cues;Bedrails;HOB elevated   Sit to Supine 3  Moderate assistance   Additional items Assist x 2;Increased time required;Verbal cues;LE management;Bedrails;HOB elevated   Transfers   Sit to Stand 2  Maximal assistance   Additional items Assist x 2;Increased time  required;Verbal cues;Bedrails   Stand to Sit 2  Maximal assistance   Additional items Assist x 2;Increased time required;Verbal cues   Additional Comments with RW   Ambulation/Elevation   Gait pattern Not tested;Not appropriate   Balance   Static Sitting Fair -   Dynamic Sitting Poor   Static Standing Poor -   Endurance Deficit   Endurance Deficit Yes   Activity Tolerance   Activity Tolerance Patient limited by fatigue;Patient limited by pain   Medical Staff Made Aware OT Tiffanie  (Pt seen as co-treatment with OT due to pt's present impairments, decreased acitivity tolerance, and possible assist of 2 for functional mobility)   Exercises   Hip Flexion Supine;20 reps;AAROM;Bilateral   Hip Abduction Supine;20 reps;AAROM;Bilateral   Knee PROM Extension Supine;15 reps;Bilateral;AAROM   Ankle Pumps Supine;20 reps;AAROM;Bilateral   Assessment   Prognosis Fair   Problem List Decreased strength;Decreased endurance;Impaired balance;Decreased mobility;Decreased cognition;Decreased safety awareness   Assessment Chart review and two person identifiers were completed. Pt seen for PT treatment session this date, consisting of ther act focused on sitting balance, sitting endurence, transfers, standing balance, standing endurence and ther ex focused on strengthening.  Pt greeted supine in bed and agreeable to PT session. Pt completed rolls bilaterally with max A x1.Pt completed supine to sit with mod A x2. Pt required A x1 to maintain sitting balance. Pt completed STS with max A x2 and RW. Pt maintained standing for ~1 minute with no overt LOB. Pt completed sit to supine with max A x2. Pt completed rolls bilaterally for positioning. Pt completed supine AAROM LE exercises.  Throughout session pt responded to VC but would not open eyes despite max encouragement. Pt ended session in chair position in bed  with alarm activated and all needs within reach. Spoke to RN about session outcomes. Pertinent barriers during this session include  cognitive status and fatigue . Current goals and POC established on IE remain appropriate. Pt prognosis for achieving goals is good, pending pt progress with hospitalization/medical status improvements, and indicated by ability to follow directions, responsive to cues/strategies, and previous response to intervention. Pt limited d/t medical instability and cognitive status. Pt continues to be functioning below baseline level, and remains limited due to factors listed above. PT will continue to see pt during current hospitalization in order to address the deficits listed above and provide interventions consistent w/ POC in effort to achieve STGs. PT recommends level 2, moderate resource intensity upon discharge.   Barriers to Discharge Other (Comment);Decreased caregiver support  (decline in functional mobility)   Goals   STG Expiration Date 06/30/25   PT Treatment Day 1   Plan   Treatment/Interventions Functional transfer training;LE strengthening/ROM;Therapeutic exercise;Endurance training;Patient/family training;Equipment eval/education;Bed mobility;Gait training;Continued evaluation;Spoke to nursing;OT   Progress Progressing toward goals   PT Frequency 3-5x/wk   Discharge Recommendation   Rehab Resource Intensity Level, PT II (Moderate Resource Intensity)   AM-PAC Basic Mobility Inpatient   Turning in Flat Bed Without Bedrails 1   Lying on Back to Sitting on Edge of Flat Bed Without Bedrails 1   Moving Bed to Chair 1   Standing Up From Chair Using Arms 1   Walk in Room 1   Climb 3-5 Stairs With Railing 1   Basic Mobility Inpatient Raw Score 6   Turning Head Towards Sound 2   Follow Simple Instructions 2   Low Function Basic Mobility Raw Score  10   Low Function Basic Mobility Standardized Score  14.65   [facility] Highest Level Of Mobility   -HLM Goal 2: Bed activities/Dependent transfer   -HLM Achieved 5: Stand (1 or more minutes)   Education   Education Provided Mobility training;Home exercise  program;Assistive device   Patient Demonstrates acceptance/verbal understanding   End of Consult   Patient Position at End of Consult Supine;Bed/Chair alarm activated;All needs within reach     Fredy Brambila PT, DPT

## 2025-06-27 NOTE — PROGRESS NOTES
[facility] Note - Geriatric Medicine   Name: Sona Valenzuela 89 y.o. female I MRN: [id number]  Unit/Bed#: -01 I Date of Admission: 6/19/2025   Date of Service: 6/27/2025 I Hospital Day: 8    Assessment & Plan  Vascular dementia with behavioral disturbance (HCC)  History of prior memory issues and cognitive impairment   Presented to hospital for increased confusion, combativeness  Patient has lived with her niece and nephew for the past 30 years  Dependent with ADLs/IADLs    Previously followed with [facility]   Last seen on 09/29/23  Appears patient has occurences of sundowning, recently managed by PCP   PCP speaking with family of trial of increase dose of seroquel vs adding melatonin, family had opted to trial of melatonin, started on 06/13   Increased Melatonin to 6 mg HS in addition to increasing Seroquel to 50 mg at bedtime  Has history of hallucinations    Most recent TSH on 06/19/25 noted to be 3.797  Most recent vitamin B12 level on 01/24/23 noted to be 407  MRI brain on 03/13/25 revealed mild microangiopathic change  Patient scored 4/30 on most recent MOCA on 12/13/21  Keep physically, mentally, and socially active   PTA medication: Seroquel 25 mg po daily at bedtime   Increased to 50 mg at bedtime during current hospitalization, however modified dose to 25 mg twice daily   Was decreased to Depakote 250 mg every 12 hours on 6/20   Continues to tolerate PO medication per chart review   LFT's previously stable  Continue Gabapentin 100 mg twice daily, modified Seroquel 25 mg twice daily, continue Depakote 250 mg every 12 hours, Zyprexa 5 mg PO every 8 hours PRN for agitation  EKG 6/23 QTc 455    At risk for delirium secondary to age, cognitive decline, hospitalization, insomnia, immobility  Provide frequent redirection, reorientation, distraction techniques  Avoid deliriogenic medications such as tramadol, benzodiazepines, anticholinergics,  Benadryl  Treat pain, See geriatric pain  protocol  Monitor for constipation and urinary retention  Encourage early and frequent moblization, OOB  Encourage Hydration/ Nutrition  Implement sleep hygiene, limit night time interuptions, group activities  Avoid physical restraints as able  Use chemical restraint only when other efforts have failed, recommend zyprexa 2.5mg IM q8h prn  Monitor Qtc, if greater than 500 do not use antipsychotic medication  If QTc greater than 460, monitor and replete and deficiency of  K and Mg, recheck EKG    Recurrent major depressive disorder, in remission (HCC)  Patient has a history of anxiety/depression   Mood appears stable on exam today   Continue supportive care   PTA medication: mirtazapine 7.5 mg daily at bedtime  Acute kidney injury superimposed on stage 3b chronic kidney disease ([facility])  Lab Results   Component Value Date    EGFR 37 06/23/2025    EGFR 32 06/21/2025    EGFR 35 06/20/2025    CREATININE 1.20 06/23/2025    CREATININE 1.33 (H) 06/21/2025    CREATININE 1.25 06/20/2025     Baseline creatinine: 1.3-1.4  Continue to monitor kidney function  Monitor I/O's  Encourage PO hydration   Avoid hypotension  Avoid nephrotoxins, IV contrast  Received gentle IV fluids overnight  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)  Wt Readings from Last 3 Encounters:   06/19/25 63.5 kg (139 lb 15.9 oz)   06/13/25 63.5 kg (140 lb)   04/03/25 61.7 kg (136 lb)     Weight stable  Euvolemic  Does not appear short of breath, no leg swelling  Family noted to PCP on 06/13 of intermittent shortness of breath with exertion   ECHO on 03/06/25 showing EF 55%  Chest XR pending  PTA medication: furosemide 20 mg BID    Ambulatory dysfunction  Recently was having home health/physical therapy, re-ordered by PCP  Assess patient frequently for physical needs, encourage use of assistant devices as needed and directed by PT/OT  Identify cognitive and physical deficits and behaviors that affect risk of falls  Consider moving  patient closer to nursing station to monitor more closely for impulsive behavior which may increase risk of falls  [place] fall and safety precautions   Educate patient/family on patient safety including physical limitations and importance of using call bell for assistance   Modify environment to reduce risk of injury including disconnecting from pole when not in use, ensuring adequate lighting in room and restroom, ensuring that path to restroom is clear and free of trip hazards  PT/OT consulted to assist with strengthening/mobility and assist with discharge planning to appropriate level of care  Out of bed as tolerated  Family interested in PT/OT services  Goals of care, counseling/discussion  DNR/DNI  Palliative Care consulted, family not interested in hospice at this time, expressed interest in PT/OT services  Palliative care encounter  Palliative Care consulted, having goals of care discussion with family    Primary insomnia  First line is behavioral therapy   Avoid sedative hypnotics including benzodiazepines and benadryl  Encourage staying awake during the day   Encourage daytime activities and morning exercise   Decrease or eliminate daytime naps   Avoid caffeine especially during late afternoon and evening hours  Establish a nighttime routine  Implement sleep hygiene and limit nighttime interruptions  Continue melatonin 6 mg daily at bedtime and mirtazapine 7.5 mg daily at bedtime for sleep   Frailty syndrome in geriatric patient  Clinical Frail Scale: 6- Moderately Frail  Need help with all outside activities  Need help with stairs and bathing  May need assistance with dressing  Resides with family who assist with ADLs/IADLs  Appears to have good appetite  Albumin level on 06/20/25 was 3.3  Continue nutritional supplementation  Continue to optimize co-morbidities   Fall precautions     Uziel Magallanes is seen today for follow up. Upon exam today, she is laying in bed, resting. No events overnight  after review with staff. She Is calm and cooperative.     Objective :  Temp:  [96.2 °F (35.7 °C)-97.6 °F (36.4 °C)] 97.6 °F (36.4 °C)  HR:  [80-83] 83  BP: (100)/(53-61) 100/53  Resp:  [19-20] 20  SpO2:  [95 %-98 %] 95 %  O2 Device: Nasal cannula    Physical Exam  Vitals and nursing note reviewed.   Constitutional:       Comments: Frail appearing     Cardiovascular:      Rate and Rhythm: Normal rate and regular rhythm.      Pulses: Normal pulses.      Heart sounds: Normal heart sounds.   Pulmonary:      Effort: Pulmonary effort is normal. No respiratory distress.      Breath sounds: Normal breath sounds.   Abdominal:      General: Abdomen is flat. Bowel sounds are normal.      Palpations: Abdomen is soft.     Musculoskeletal:      Right lower leg: No edema.      Left lower leg: No edema.     Skin:     General: Skin is warm and dry.      Capillary Refill: Capillary refill takes less than 2 seconds.     Neurological:      Mental Status: She is alert. She is disoriented.      Motor: Weakness present.      Gait: Gait abnormal.      Comments: ~AOx1   Psychiatric:         Mood and Affect: Mood normal.           Lab Results: I have reviewed the following results:CBC/BMP:   No new results in last 24 hours.     Imaging Results Review: No pertinent imaging studies reviewed.  Other Study Results Review: No additional pertinent studies reviewed.    Therapies:   Basic Mobility Inpatient Raw Score: 8  -[facility] Goal: 3: Sit at edge of bed  -HL Achieved: 2: Bed activities/Dependent transfer  PT: Following  OT: Following  ST: Following    VTE Prophylaxis: VTE covered by:  heparin (porcine), Subcutaneous, 5,000 Units at 06/27/25 0518     and Sequential compression device (Venodyne)     Code Status: Level 3 - DNAR and DNI  Advance Directive and Living Will:      Power of :    POLST:      Family and Social Support: Niece, Xi; Nephew, Juancarlos      Goals of Care: SNF    Administrative Statements   I have spent a total time of  50 minutes in caring for this patient on the day of the visit/encounter including Diagnostic results, Prognosis, and Documenting in the medical record.

## 2025-06-27 NOTE — CASE MANAGEMENT
Case Management Progress Note    Patient name Sona Valenzuela  Location /-01 MRN [id number]  : 11/3/1924 Date 2025       LOS (days): 8  Geometric Mean LOS (GMLOS) (days): 3  Days to GMLOS:-4.8        OBJECTIVE:        Current admission status: Inpatient  Preferred Pharmacy:   [facility]pharm[facility] #[id number] - [place] - [id number]  [place] [address]  Phone: [phone] Fax: [phone]    Primary Care Provider: FARZANA Stanley    Primary Insurance: [employer]  Secondary Insurance: [facility]    PROGRESS NOTE:    Per rounding with RN, the restraints and mits were removed yesterday at 3 pm. The 1:1 was removed at 9:30 AM today. Patient is calm and cooperative today.     LIANE sent an Epic Secure Chat to PT/OT asking for patient to be prioritized today for insurance auth. LIANE then sent an [employer] Secure Chat to Jeremy's liaison informing her of the above and asked if they can accept tomorrow pending insurance auth. Response pending at this time.

## 2025-06-27 NOTE — ASSESSMENT & PLAN NOTE
Per niece, reported to have increased bouts of confusion, combativeness more freuqently over the past few weeks  Recently was treated for UTI in May and completed nitrofurantoin regimen at that time; prior MDRO on urine clx  Urine culture noted E. coli ESBL.  Completed 5 days of IV ertapenem.    Patient has been having waxing waning mentation at times.  Continue Depakote 125 mg twice daily  Continue Seroquel 25 mg twice daily.  Melatonin 6 mg nightly.  Remeron 7.5 mg nightly.  Continue p.o. Zyprexa 5 mg every 8 hours as needed in between.  Speech recommended dysphagia 1 purée diet with thin liquids  Currently patient is off of  mittens, off of one-to-one.  Pending placement.

## 2025-06-27 NOTE — PROGRESS NOTES
Progress Note - Hospitalist   Name: Sona Valenzuela 89 y.o. female I MRN: [id number]  Unit/Bed#: [facility] I Date of Admission: 6/19/2025   Date of Service: 6/27/2025 I Hospital Day: 8    Assessment & Plan  Vascular dementia with behavioral disturbance (HCC)  Per niece, reported to have increased bouts of confusion, combativeness more freuqently over the past few weeks  Recently was treated for UTI in May and completed nitrofurantoin regimen at that time; prior MDRO on urine clx  Urine culture noted E. coli ESBL.  Completed 5 days of IV ertapenem.    Patient has been having waxing waning mentation at times.  Continue Depakote 125 mg twice daily  Continue Seroquel 25 mg twice daily.  Melatonin 6 mg nightly.  Remeron 7.5 mg nightly.  Continue p.o. Zyprexa 5 mg every 8 hours as needed in between.  Speech recommended dysphagia 1 purée diet with thin liquids  Currently patient is off of  mittens, off of one-to-one.  Pending placement.    Acute kidney injury superimposed on stage 3b chronic kidney disease (HCC)  Lab Results   Component Value Date    EGFR 37 06/23/2025    EGFR 32 06/21/2025    EGFR 35 06/20/2025    CREATININE 1.20 06/23/2025    CREATININE 1.33 (H) 06/21/2025    CREATININE 1.25 06/20/2025     Baseline creatinine is 1.2-1.3  Current is 1.20  Discontinue IV fluids  Recurrent major depressive disorder, in remission (HCC)  Continue home regimen Seroquel and mirtazapine  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)  Wt Readings from Last 3 Encounters:   06/19/25 63.5 kg (139 lb 15.9 oz)   06/13/25 63.5 kg (140 lb)   04/03/25 61.7 kg (136 lb)     Lab Results   Component Value Date     (H) 06/23/2025     (H) 03/04/2025    LVEF 55 03/06/2025     Appears euvolemic.  Hold home dose of p.o. Lasix.  Ambulatory dysfunction  PT/OT Eval and treat  Trend Mobility Scores  Encourage appropriate mobility to achieve and improve upon HLM Goals as noted  Primary  insomnia  Mirtazapine  Goals of care, counseling/discussion  Palliative consulted  Frailty syndrome in geriatric patient  Appreciate geriatrics    VTE Pharmacologic Prophylaxis: VTE Score: 4 Moderate Risk (Score 3-4) - Pharmacological DVT Prophylaxis Ordered: heparin.    Mobility:   Basic Mobility Inpatient Raw Score: 6  -[facility] Goal: 2: Bed activities/Dependent transfer  -HLM Achieved: 5: Stand (1 or more minutes)      Patient Centered Rounds: I performed bedside rounds with nursing staff today.       Current Length of Stay: 8 day(s)  Current Patient Status: Inpatient   Certification Statement: The patient will continue to require additional inpatient hospital stay due to adjusting medication and monitoring of one-to-one  Discharge Plan: Anticipate discharge in 24-48 hrs to rehab facility.    Code Status: Level 3 - DNAR and DNI    Subjective   Appears comfortable nondistressed.  Wakes up with verbal stimuli.  Patient is disoriented, cooperative during my exam.  As per RN report patient had good night restful sleep and has not been agitated and cooperative during exam.    Objective :  Temp:  [96.2 °F (35.7 °C)-97.6 °F (36.4 °C)] 96.2 °F (35.7 °C)  HR:  [83] 83  BP: (100-119)/(53-67) 119/67  Resp:  [20] 20  SpO2:  [95 %] 95 %    Body mass index is 26.45 kg/m².     Input and Output Summary (last 24 hours):     Intake/Output Summary (Last 24 hours) at 6/27/2025 1526  Last data filed at 6/27/2025 0900  Gross per 24 hour   Intake 120 ml   Output --   Net 120 ml       Physical Exam  Constitutional:       General: She is not in acute distress.     Appearance: She is not ill-appearing or toxic-appearing.     Cardiovascular:      Rate and Rhythm: Normal rate.      Pulses: Normal pulses.   Pulmonary:      Effort: Pulmonary effort is normal. No respiratory distress.      Breath sounds: No wheezing.   Abdominal:      General: There is no distension.      Tenderness: There is no abdominal tenderness.     Neurological:       Mental Status: She is alert. She is disoriented.           Lines/Drains:              Lab Results: I have reviewed the following results:   Results from last 7 days   Lab Units 06/25/25  0450   WBC Thousand/uL 5.40   HEMOGLOBIN g/dL 8.7*   HEMATOCRIT % 30.7*   PLATELETS Thousands/uL 222     Results from last 7 days   Lab Units 06/23/25  0945   SODIUM mmol/L 141   POTASSIUM mmol/L 4.2   CHLORIDE mmol/L 107   CO2 mmol/L 29   BUN mg/dL 25   CREATININE mg/dL 1.20   ANION GAP mmol/L 5   CALCIUM mg/dL 9.0   GLUCOSE RANDOM mg/dL 144*         Results from last 7 days   Lab Units 06/20/25  1630   POC GLUCOSE mg/dl 111               Recent Cultures (last 7 days):               Last 24 Hours Medication List:     Current Facility-Administered Medications:     acetaminophen (TYLENOL) tablet 650 mg, Q8H PRN    dextromethorphan-guaiFENesin (ROBITUSSIN DM) oral syrup 10 mL, Q4H PRN    [DISCONTINUED] valproate (DEPACON) 125 mg in sodium chloride 0.9 % 50 mL IVPB, Q12H HESHAM **OR** divalproex sodium (DEPAKOTE SPRINKLE) capsule 125 mg, Q12H HESHAM    docusate sodium (COLACE) capsule 100 mg, BID    [Held by provider] furosemide (LASIX) tablet 20 mg, BID    gabapentin (NEURONTIN) capsule 100 mg, BID    heparin (porcine) subcutaneous injection 5,000 Units, Q8H HESHAM **AND** [COMPLETED] Platelet count, Once    melatonin tablet 6 mg, HS    mirtazapine (REMERON) tablet 7.5 mg, HS    OLANZapine (ZyPREXA ZYDIS) dispersible tablet 5 mg, Q8H PRN    ondansetron (ZOFRAN) injection 4 mg, Q6H PRN    QUEtiapine (SEROquel) tablet 25 mg, BID    simethicone (MYLICON) chewable tablet 80 mg, 4x Daily PRN    Administrative Statements   Today, Patient Was Seen By: Rodrigo Wills MD  I have spent a total time of   minutes in caring for this patient on the day of the visit/encounter including Patient and family education, Impressions, Documenting in the medical record, Reviewing/placing orders in the medical record (including tests, medications, and/or  procedures), and Communicating with other healthcare professionals .    **Please Note: This note may have been constructed using a voice recognition system.**

## 2025-06-27 NOTE — OCCUPATIONAL THERAPY NOTE
Occupational Therapy Treatment Note        Patient Name: Sona Valenzuela  Today's Date: 6/27/2025 06/27/25 1336   OT Last Visit   OT Visit Date 06/27/25   Note Type   Note Type Treatment for insurance authorization   Pain Assessment   Pain Assessment Tool FLACC   Pain Rating: FLACC (Rest) - Face 0   Pain Rating: FLACC (Rest) - Legs 0   Pain Rating: FLACC (Rest) - Activity 0   Pain Rating: FLACC (Rest) - Cry 0   Pain Rating: FLACC (Rest) - Consolability 0   Score: FLACC (Rest) 0   Pain Rating: FLACC (Activity) - Face 1   Pain Rating: FLACC (Activity) - Legs 1   Pain Rating: FLACC (Activity) - Activity 0   Pain Rating: FLACC (Activity) - Cry 0   Pain Rating: FLACC (Activity) - Consolability 1   Score: FLACC (Activity) 3   Restrictions/Precautions   Weight Bearing Precautions Per Order No   Other Precautions Cognitive;Bed Alarm;Fall Risk;Pain   ADL   Where Assessed Edge of bed   Eating Assistance 1  Total Assistance   Eating Deficit Increased time to complete   Grooming Assistance 2  Maximal Assistance   Grooming Deficit Setup;Supervision/safety;Increased time to complete  (hand over hand assistance)   UB Bathing Assistance 2  Maximal Assistance   UB Bathing Deficit Setup;Steadying;Increased time to complete   LB Bathing Assistance 1  Total Assistance   UB Dressing Assistance 2  Maximal Assistance   UB Dressing Deficit Setup;Steadying;Increased time to complete   LB Dressing Assistance 1  Total Assistance   Toileting Assistance  1  Total Assistance   Bed Mobility   Rolling R 2  Maximal assistance   Additional items Assist x 1;Increased time required;Verbal cues;LE management;Bedrails;HOB elevated   Rolling L 2  Maximal assistance   Additional items Assist x 1;Increased time required;Verbal cues;LE management;Bedrails;HOB elevated   Supine to Sit 3  Moderate assistance   Additional items Assist x 2;Increased time required;LE management;Verbal cues;Bedrails;HOB elevated   Sit to Supine 3  Moderate  assistance   Additional items Assist x 2;Increased time required;Verbal cues;LE management;Bedrails;HOB elevated   Transfers   Sit to Stand 2  Maximal assistance   Additional items Assist x 2;Increased time required;Verbal cues;Bedrails   Stand to Sit 2  Maximal assistance   Additional items Assist x 2;Increased time required;Verbal cues   Functional Mobility   Functional Mobility 2  Maximal assistance   Additional Comments assist of 2 for standing/ and maintain supported standing/ able to hold RW and follow directions for standing   Additional items Rolling walker   Cognition   Overall Cognitive Status Impaired   Arousal/Participation Alert;Responsive;Cooperative   Attention Attends with cues to redirect   Orientation Level Unable to assess   Following Commands Follows one step commands with increased time or repetition   Activity Tolerance   Activity Tolerance Patient limited by fatigue   Assessment   Assessment Patient participated in Skilled OT session this date with interventions consisting of ADL re training with the use of correct body mechnaics, Energy Conservation techniques, safety awareness and fall prevention techniques,  therapeutic activities to: increase activity tolerance, increase standing tolerance time with unilateral UE support to complete sink level ADLs, increase cardiovascular endurance , elicit righting and equilibrium reactions for improved postural control and alignment during transitional movements, increase dynamic sit/ stand balance during functional activity , increase postural control, and increase OOB/ sitting tolerance . Patient agreeable to OT treatment session, upon arrival patient was found supine in bed, alert, responsive , and in no apparent distress.  In comparison to previous session, patient with improvements in sitting tolerance at edge of bed, requires hand over hand assistance to engage in self care tasks, demonstrated ability to follow 1 step directives. Patient requires  max assist for UB ADLs, total assist fro LB ADLs. Patient requiring verbal cues for safety, verbal cues for correct technique, verbal cues for pacing thru activity steps, cognitive assistance to anticipate next step, and frequent rest periods. Patient continues to be functioning below baseline level, occupational performance remains limited secondary to factors listed above and increased risk for falls and injury.   From OT standpoint, recommendation at time of d/c would be Level 2.   Patient to benefit from continued Occupational Therapy treatment while in the hospital to address deficits as defined above and maximize level of functional independence with ADLs and functional mobility.   Plan   Treatment Interventions ADL retraining;Functional transfer training;UE strengthening/ROM;Endurance training;Patient/family training;Equipment evaluation/education;Compensatory technique education;Continued evaluation;Energy conservation;Activityengagement   Goal Expiration Date 07/04/25   OT Treatment Day 1   OT Frequency 3-5x/wk   Discharge Recommendation   Rehab Resource Intensity Level, OT II (Moderate Resource Intensity)   AM-PAC Daily Activity Inpatient   Lower Body Dressing 1   Bathing 1   Toileting 1   Upper Body Dressing 2   Grooming 2   Eating 2   Daily Activity Raw Score 9   AM-PAC Applied Cognition Inpatient   Following a Speech/Presentation 2   Understanding Ordinary Conversation 2   Taking Medications 1   Remembering Where Things Are Placed or Put Away 1   Remembering List of 4-5 Errands 1   Taking Care of Complicated Tasks 1   Applied Cognition Raw Score 8   Applied Cognition Standardized Score 19.32

## 2025-06-27 NOTE — PROGRESS NOTES
Patient:  ARABELLA KATE    MRN:  [id number]    Aidin Request ID:  [id number]    Level of care reserved:  Skilled Nursing Facility    Partner Reserved:  [facility] [place] [address] [phone]    Clinical needs requested:    Geography searched:  30 miles around [id number]    Start of Service:    Request sent:  8:21pm EDT on 6/20/2025 by Charito Vasquez    Partner reserved:  2:30pm EDT on 6/27/2025 by Mirta Hernandez    Choice list shared:  2:30pm EDT on 6/27/2025 by Mirta Hernandez

## 2025-06-27 NOTE — PLAN OF CARE
Problem: OCCUPATIONAL THERAPY ADULT  Goal: Performs self-care activities at highest level of function for planned discharge setting.  See evaluation for individualized goals.  Description: Treatment Interventions: ADL retraining, Functional transfer training, UE strengthening/ROM, Endurance training, Patient/family training, Equipment evaluation/education, Compensatory technique education, Continued evaluation, Energy conservation, Activityengagement          See flowsheet documentation for full assessment, interventions and recommendations.   Note: Patient participated in Skilled OT session this date with interventions consisting of ADL re training with the use of correct body mechnaics, Energy Conservation techniques, safety awareness and fall prevention techniques,  therapeutic activities to: increase activity tolerance, increase standing tolerance time with unilateral UE support to complete sink level ADLs, increase cardiovascular endurance , elicit righting and equilibrium reactions for improved postural control and alignment during transitional movements, increase dynamic sit/ stand balance during functional activity , increase postural control, and increase OOB/ sitting tolerance . Patient agreeable to OT treatment session, upon arrival patient was found supine in bed, alert, responsive , and in no apparent distress.  In comparison to previous session, patient with improvements in sitting tolerance at edge of bed, requires hand over hand assistance to engage in self care tasks, demonstrated ability to follow 1 step directives. Patient requires max assist for UB ADLs, total assist for LB ADLs. Patient requiring verbal cues for safety, verbal cues for correct technique, verbal cues for pacing thru activity steps, cognitive assistance to anticipate next step, and frequent rest periods. Patient continues to be functioning below baseline level, occupational performance remains limited secondary to factors listed  above and increased risk for falls and injury.   From OT standpoint, recommendation at time of d/c would be Level 2.   Patient to benefit from continued Occupational Therapy treatment while in the hospital to address deficits as defined above and maximize level of functional independence with ADLs and functional mobility.

## 2025-06-27 NOTE — CASE MANAGEMENT
Case Management Discharge Planning Note    Patient name Sona Valenzuela  Location /-01 MRN [id number]  : 11/3/1924 Date 2025       Current Admission Date: 2025  Current Admission Diagnosis:Vascular dementia with behavioral disturbance (HCC)   Patient Active Problem List    Diagnosis Date Noted    Goals of care, counseling/discussion 2025    Palliative care encounter 2025    Frailty syndrome in geriatric patient 2025    Primary malignant neoplasm of bladder ([facility]) 2025    Iron deficiency anemia 03/10/2025    Seizure-like activity ([facility]) 2025    Elevated troponin 2025    Congestion of throat 2025    Vascular dementia with behavioral disturbance ([facility])     Ambulatory dysfunction 2024    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, psychotic disturbance, mood disturbance, or anxiety ([facility]) 2024    Chronic pain of left wrist 10/28/2024    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 ([facility])     Acute on chronic HFpEF with severe pulmonary hypertension ([facility]) 2024    Primary hypertension 2024    Paroxysmal A-fib ([facility]) 2024    Compression fracture of lumbar spine, non-traumatic ([facility]) 2024    Mild protein-calorie malnutrition ([facility]) 2024    Vascular dementia, uncomplicated ([facility]) 2023    Does mobilize using walker 2023    Chronic congestive heart failure ([facility]) 2022    Chronic atrial fibrillation ([facility]) 10/11/2022    Recurrent major depressive disorder, in remission ([facility]) 2022    Acute kidney injury superimposed on stage 3b chronic kidney disease ([facility]) 2022    Vitamin D deficiency 2020    Primary insomnia 2019    Degeneration of lumbar intervertebral disc 2019    Lumbar radiculopathy 2019    Arthropathy of lumbar facet joint 2019    Restless legs syndrome 2019      LOS (days): 8  Geometric Mean LOS (GMLOS) (days): 3  Days  to GMLOS:-4.8     OBJECTIVE:  Risk of Unplanned Readmission Score: 27.27         Current admission status: Inpatient   Preferred Pharmacy:   [id number] - [place] - [address]  [address]  [place] [address]  Phone: [phone] Fax: [phone]    Primary Care Provider: FARZANA Stanley    Primary Insurance: [employer]  Secondary Insurance: [facility]    DISCHARGE DETAILS:    Discharge planning discussed with:: Patient's Niece  Freedom of Choice: Yes  Comments - Freedom of Choice: CM called patient's niece to review DC plan and IMM. CM reported that patient has been taken off of restraints and has been calm and cooperative. CM confirmed that [facility] is still the DC plan, and noted that auth has been submitted. CM to call patient's niece back once a determination is made. Patient's niece appreciated the update and denied any additional questions or concerns at this time.  CM contacted family/caregiver?: Yes  Were Treatment Team discharge recommendations reviewed with patient/caregiver?: Yes  Did patient/caregiver verbalize understanding of patient care needs?: N/A- going to facility  Were patient/caregiver advised of the risks associated with not following Treatment Team discharge recommendations?: Yes    Contacts  Patient Contacts: Xi  Relationship to Patient:: Family  Contact Method: Phone  Phone Number: [phone]  Reason/Outcome: Continuity of Care, Discharge Planning    Requested Home Health Care         Is the patient interested in HHC at discharge?: No    DME Referral Provided  Referral made for DME?: No    Other Referral/Resources/Interventions Provided:  Interventions: Short Term Rehab  Referral Comments: LIANE reserved [facility] in [facility] and updated patient's Facility Contacts to reflect the same for RN report. LIANE sent a task to the DCS team requesting insurance auth. LIANE then received confirmation from [facility] at [facility] that they can accept  patient pending insurance auth, even if it is before the 48 hours of being off restraints. However, she will continue to follow.    Would you like to participate in our [facility] service program?  : No - Declined    Treatment Team Recommendation: SNF  Expected Discharge Disposition: Skilled Nursing Facility  Additional Discharge Dispositions: Skilled Nursing Facility  Transport at Discharge : BLS Ambulance    IMM Given (Date):: 06/27/25  IMM Given to:: Family (CM reviewed IMM with patient's niece/POA via phone. She reported understanding of these rights and denied any questions or concerns. IMM placed in medical records.)    Accepting Facility Name, City & State : [facility]: [address] [place] [id number]  Receiving Facility/Agency Phone Number: [phone]  Facility/Agency Fax Number: [phone]

## 2025-06-27 NOTE — DISCHARGE SUPPORT
Case Management Assessment & Discharge Planning Note    Patient name Sona Valenzuela  Location /-01 MRN [id number]  : 11/3/1924 Date 2025       Current Admission Date: 2025  Current Admission Diagnosis:Vascular dementia with behavioral disturbance (HCC)   Patient Active Problem List    Diagnosis Date Noted    Goals of care, counseling/discussion 2025    Palliative care encounter 2025    Frailty syndrome in geriatric patient 2025    Primary malignant neoplasm of bladder ([facility]) 2025    Iron deficiency anemia 03/10/2025    Seizure-like activity ([facility]) 2025    Elevated troponin 2025    Congestion of throat 2025    Vascular dementia with behavioral disturbance ([facility])     Ambulatory dysfunction 2024    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, psychotic disturbance, mood disturbance, or anxiety ([facility]) 2024    Chronic pain of left wrist 10/28/2024    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 ([facility])     Acute on chronic HFpEF with severe pulmonary hypertension ([facility]) 2024    Primary hypertension 2024    Paroxysmal A-fib ([facility]) 2024    Compression fracture of lumbar spine, non-traumatic ([facility]) 2024    Mild protein-calorie malnutrition ([facility]) 2024    Vascular dementia, uncomplicated ([facility]) 2023    Does mobilize using walker 2023    Chronic congestive heart failure ([facility]) 2022    Chronic atrial fibrillation ([facility]) 10/11/2022    Recurrent major depressive disorder, in remission ([facility]) 2022    Acute kidney injury superimposed on stage 3b chronic kidney disease ([facility]) 2022    Vitamin D deficiency 2020    Primary insomnia 2019    Degeneration of lumbar intervertebral disc 2019    Lumbar radiculopathy 2019    Arthropathy of lumbar facet joint 2019    Restless legs syndrome 2019      LOS (days): 8  Geometric Mean LOS (GMLOS)  (days): 3  Days to GMLOS:-4.8   Facility Authorization Initiated  DC Support Center received request for auth from : Mirta MOORE  Authorization Request Submitted for: SNF  Requested Start of Care Date: 06/27/25  Facility [facility]: [facility]  Facility NPI: [id number]  Facility MD: Andrew Tomas  Facility MD NPI: [id number]  Authorization initiated by contacting insurance: [employer]  Via: [employer] Portal  Clinicals submitted via: Portal Attachment  Pending reference #: [id number]   notified: Mirta MOORE     Updates to authorization status will be noted in chart.    Please reach out to CM for updates on any clinical information.

## 2025-06-27 NOTE — ASSESSMENT & PLAN NOTE
History of prior memory issues and cognitive impairment   Presented to hospital for increased confusion, combativeness  Patient has lived with her niece and nephew for the past 30 years  Dependent with ADLs/IADLs    Previously followed with [facility]   Last seen on 09/29/23  Appears patient has occurences of sundowning, recently managed by PCP   PCP speaking with family of trial of increase dose of seroquel vs adding melatonin, family had opted to trial of melatonin, started on 06/13   Increased Melatonin to 6 mg HS in addition to increasing Seroquel to 50 mg at bedtime  Has history of hallucinations    Most recent TSH on 06/19/25 noted to be 3.797  Most recent vitamin B12 level on 01/24/23 noted to be 407  MRI brain on 03/13/25 revealed mild microangiopathic change  Patient scored 4/30 on most recent MOCA on 12/13/21  Keep physically, mentally, and socially active   PTA medication: Seroquel 25 mg po daily at bedtime   Increased to 50 mg at bedtime during current hospitalization, however modified dose to 25 mg twice daily   Was decreased to Depakote 250 mg every 12 hours on 6/20   Continues to tolerate PO medication per chart review   LFT's previously stable  Continue Gabapentin 100 mg twice daily, modified Seroquel 25 mg twice daily, continue Depakote 250 mg every 12 hours, Zyprexa 5 mg PO every 8 hours PRN for agitation  EKG 6/23 QTc 455    At risk for delirium secondary to age, cognitive decline, hospitalization, insomnia, immobility  Provide frequent redirection, reorientation, distraction techniques  Avoid deliriogenic medications such as tramadol, benzodiazepines, anticholinergics,  Benadryl  Treat pain, See geriatric pain protocol  Monitor for constipation and urinary retention  Encourage early and frequent moblization, OOB  Encourage Hydration/ Nutrition  Implement sleep hygiene, limit night time interuptions, group activities  Avoid physical restraints as able  Use chemical restraint only when  other efforts have failed, recommend zyprexa 2.5mg IM q8h prn  Monitor Qtc, if greater than 500 do not use antipsychotic medication  If QTc greater than 460, monitor and replete and deficiency of  K and Mg, recheck EKG

## 2025-06-28 LAB
ALBUMIN SERPL BCG-MCNC: 3.4 G/DL (ref 3.5–5)
ALP SERPL-CCNC: 113 U/L (ref 34–104)
ALT SERPL W P-5'-P-CCNC: 21 U/L (ref 7–52)
ANION GAP SERPL CALCULATED.3IONS-SCNC: 5 MMOL/L (ref 4–13)
AST SERPL W P-5'-P-CCNC: 28 U/L (ref 13–39)
BILIRUB SERPL-MCNC: 1.03 MG/DL (ref 0.2–1)
BUN SERPL-MCNC: 36 MG/DL (ref 5–25)
CALCIUM ALBUM COR SERPL-MCNC: 9.7 MG/DL (ref 8.3–10.1)
CALCIUM SERPL-MCNC: 9.2 MG/DL (ref 8.4–10.2)
CHLORIDE SERPL-SCNC: 108 MMOL/L (ref 96–108)
CO2 SERPL-SCNC: 29 MMOL/L (ref 21–32)
CREAT SERPL-MCNC: 1.45 MG/DL (ref 0.6–1.3)
ERYTHROCYTE [DISTWIDTH] IN BLOOD BY AUTOMATED COUNT: 20.1 % (ref 11.6–15.1)
GFR SERPL CREATININE-BSD FRML MDRD: 29 ML/MIN/1.73SQ M
GLUCOSE SERPL-MCNC: 89 MG/DL (ref 65–140)
HCT VFR BLD AUTO: 30.9 % (ref 34.8–46.1)
HGB BLD-MCNC: 8.9 G/DL (ref 11.5–15.4)
MCH RBC QN AUTO: 22 PG (ref 26.8–34.3)
MCHC RBC AUTO-ENTMCNC: 28.8 G/DL (ref 31.4–37.4)
MCV RBC AUTO: 77 FL (ref 82–98)
PLATELET # BLD AUTO: 239 THOUSANDS/UL (ref 149–390)
PMV BLD AUTO: 10.7 FL (ref 8.9–12.7)
POTASSIUM SERPL-SCNC: 5.3 MMOL/L (ref 3.5–5.3)
PROT SERPL-MCNC: 6.8 G/DL (ref 6.4–8.4)
RBC # BLD AUTO: 4.04 MILLION/UL (ref 3.81–5.12)
SODIUM SERPL-SCNC: 142 MMOL/L (ref 135–147)
WBC # BLD AUTO: 6.67 THOUSAND/UL (ref 4.31–10.16)

## 2025-06-28 PROCEDURE — 85027 COMPLETE CBC AUTOMATED: CPT | Performed by: STUDENT IN AN ORGANIZED HEALTH CARE EDUCATION/TRAINING PROGRAM

## 2025-06-28 PROCEDURE — 99232 SBSQ HOSP IP/OBS MODERATE 35: CPT | Performed by: STUDENT IN AN ORGANIZED HEALTH CARE EDUCATION/TRAINING PROGRAM

## 2025-06-28 PROCEDURE — 80053 COMPREHEN METABOLIC PANEL: CPT | Performed by: STUDENT IN AN ORGANIZED HEALTH CARE EDUCATION/TRAINING PROGRAM

## 2025-06-28 RX ADMIN — HEPARIN SODIUM 5000 UNITS: 5000 INJECTION INTRAVENOUS; SUBCUTANEOUS at 05:07

## 2025-06-28 RX ADMIN — DIVALPROEX SODIUM 125 MG: 125 CAPSULE, COATED PELLETS ORAL at 08:35

## 2025-06-28 RX ADMIN — GABAPENTIN 100 MG: 100 CAPSULE ORAL at 17:00

## 2025-06-28 RX ADMIN — HEPARIN SODIUM 5000 UNITS: 5000 INJECTION INTRAVENOUS; SUBCUTANEOUS at 14:20

## 2025-06-28 RX ADMIN — GUAIFENESIN AND DEXTROMETHORPHAN 10 ML: 100; 10 SYRUP ORAL at 05:33

## 2025-06-28 RX ADMIN — DOCUSATE SODIUM 100 MG: 100 CAPSULE, LIQUID FILLED ORAL at 17:00

## 2025-06-28 RX ADMIN — QUETIAPINE FUMARATE 25 MG: 25 TABLET ORAL at 17:00

## 2025-06-28 RX ADMIN — GUAIFENESIN AND DEXTROMETHORPHAN 10 ML: 100; 10 SYRUP ORAL at 20:56

## 2025-06-28 RX ADMIN — QUETIAPINE FUMARATE 25 MG: 25 TABLET ORAL at 08:31

## 2025-06-28 RX ADMIN — GABAPENTIN 100 MG: 100 CAPSULE ORAL at 08:31

## 2025-06-28 RX ADMIN — MIRTAZAPINE 7.5 MG: 15 TABLET, FILM COATED ORAL at 20:56

## 2025-06-28 RX ADMIN — DIVALPROEX SODIUM 125 MG: 125 CAPSULE, COATED PELLETS ORAL at 20:57

## 2025-06-28 RX ADMIN — HEPARIN SODIUM 5000 UNITS: 5000 INJECTION INTRAVENOUS; SUBCUTANEOUS at 21:04

## 2025-06-28 RX ADMIN — Medication 6 MG: at 20:56

## 2025-06-28 RX ADMIN — DOCUSATE SODIUM 100 MG: 100 CAPSULE, LIQUID FILLED ORAL at 08:31

## 2025-06-28 NOTE — ASSESSMENT & PLAN NOTE
Lab Results   Component Value Date    EGFR 29 06/28/2025    EGFR 37 06/23/2025    EGFR 32 06/21/2025    CREATININE 1.45 (H) 06/28/2025    CREATININE 1.20 06/23/2025    CREATININE 1.33 (H) 06/21/2025     Baseline creatinine is 1.2-1.3  Current is 1.45

## 2025-06-28 NOTE — PLAN OF CARE
Problem: Potential for Falls  Goal: Patient will remain free of falls  Description: INTERVENTIONS:  - Educate patient/family on patient safety including physical limitations  - Instruct patient to call for assistance with activity   - Consider consulting OT/PT to assist with strengthening/mobility based on AM PAC & -HLM score  - Consult OT/PT to assist with strengthening/mobility   - Keep Call bell within reach  - Keep bed low and locked with side rails adjusted as appropriate  - Keep care items and personal belongings within reach  - Initiate and maintain comfort rounds  - Make Fall Risk Sign visible to staff  - Offer Toileting every  Hours, in advance of need  - Initiate/Maintain alarm  - Obtain necessary fall risk management equipment:   - Apply yellow socks and bracelet for high fall risk patients  - Consider moving patient to room near nurses station  Outcome: Progressing     Problem: Prexisting or High Potential for Compromised Skin Integrity  Goal: Skin integrity is maintained or improved  Description: INTERVENTIONS:  - Identify patients at risk for skin breakdown  - Assess and monitor skin integrity including under and around medical devices   - Assess and monitor nutrition and hydration status  - Monitor labs  - Assess for incontinence   - Turn and reposition patient  - Assist with mobility/ambulation  - Relieve pressure over bob prominences   - Avoid friction and shearing  - Provide appropriate hygiene as needed including keeping skin clean and dry  - Evaluate need for skin moisturizer/barrier cream  - Collaborate with interdisciplinary team  - Patient/family teaching  - Consider wound care consult    Assess:  - Review Fabricio scale daily  - Clean and moisturize skin every   - Inspect skin when repositioning, toileting, and assisting with ADLS  - Assess under medical devices such as  every   - Assess extremities for adequate circulation and sensation     Bed Management:  - Have minimal linens on bed &  keep smooth, unwrinkled  - Change linens as needed when moist or perspiring  - Avoid sitting or lying in one position for more than  hours while in bed?Keep HOB at degrees   - Toileting:  - Offer bedside commode  - Assess for incontinence every   - Use incontinent care products after each incontinent episode such as     Activity:  - Mobilize patient  times a day  - Encourage activity and walks on unit  - Encourage or provide ROM exercises   - Turn and reposition patient every  Hours  - Use appropriate equipment to lift or move patient in bed  - Instruct/ Assist with weight shifting every  when out of bed in chair  - Consider limitation of chair time  hour intervals    Skin Care:  - Avoid use of baby powder, tape, friction and shearing, hot water or constrictive clothing  - Relieve pressure over bony prominences using   - Do not massage red bony areas    Next Steps:  - Teach patient strategies to minimize risks such as   - Consider consults to  interdisciplinary teams such as   Outcome: Progressing     Problem: Nutrition/Hydration-ADULT  Goal: Nutrient/Hydration intake appropriate for improving, restoring or maintaining nutritional needs  Description: Monitor and assess patient's nutrition/hydration status for malnutrition. Collaborate with interdisciplinary team and initiate plan and interventions as ordered.  Monitor patient's weight and dietary intake as ordered or per policy. Utilize nutrition screening tool and intervene as necessary. Determine patient's food preferences and provide high-protein, high-caloric foods as appropriate.     INTERVENTIONS:  - Monitor oral intake, urinary output, labs, and treatment plans  - Assess nutrition and hydration status and recommend course of action  - Evaluate amount of meals eaten  - Assist patient with eating if necessary   - Allow adequate time for meals  - Recommend/ encourage appropriate diets, oral nutritional supplements, and vitamin/mineral supplements  - Order,  calculate, and assess calorie counts as needed  - Recommend, monitor, and adjust tube feedings and TPN/PPN based on assessed needs  - Assess need for intravenous fluids  - Provide specific nutrition/hydration education as appropriate  - Include patient/family/caregiver in decisions related to nutrition  Outcome: Progressing     Problem: SAFETY,RESTRAINT: NV/NON-SELF DESTRUCTIVE BEHAVIOR  Goal: Remains free of harm/injury (restraint for non violent/non self-detsructive behavior)  Description: INTERVENTIONS:  - Instruct patient/family regarding restraint use   - Assess and monitor physiologic and psychological status   - Provide interventions and comfort measures to meet assessed patient needs   - Identify and implement measures to help patient regain control  - Assess readiness for release of restraint   Outcome: Progressing  Goal: Returns to optimal restraint-free functioning  Description: INTERVENTIONS:  - Assess the patient's behavior and symptoms that indicate continued need for restraint  - Identify and implement measures to help patient regain control  - Assess readiness for release of restraint   Outcome: Progressing

## 2025-06-28 NOTE — PROGRESS NOTES
Progress Note - Hospitalist   Name: Sona Valenzuela 89 y.o. female I MRN: [id number]  Unit/Bed#: [facility] I Date of Admission: 6/19/2025   Date of Service: 6/28/2025 I Hospital Day: 9    Assessment & Plan  Vascular dementia with behavioral disturbance (HCC)  Per niece, reported to have increased bouts of confusion, combativeness more freuqently over the past few weeks  Recently was treated for UTI in May and completed nitrofurantoin regimen at that time; prior MDRO on urine clx  Urine culture noted E. coli ESBL.  Completed 5 days of IV ertapenem.    Patient has been having waxing waning mentation at times.  Patient has been pleasant during my encounter  No RN reports of agitation or aggressive behavior.  Continue Depakote 125 mg twice daily  Continue Seroquel 25 mg twice daily.  Melatonin 6 mg nightly.  Remeron 7.5 mg nightly.  Continue p.o. Zyprexa 5 mg every 8 hours as needed in between.  Speech recommended dysphagia 1 purée diet with thin liquids  Currently patient is off of  mittens, off of one-to-one.  Pending placement.    Acute kidney injury superimposed on stage 3b chronic kidney disease (HCC)  Lab Results   Component Value Date    EGFR 29 06/28/2025    EGFR 37 06/23/2025    EGFR 32 06/21/2025    CREATININE 1.45 (H) 06/28/2025    CREATININE 1.20 06/23/2025    CREATININE 1.33 (H) 06/21/2025     Baseline creatinine is 1.2-1.3  Current is 1.45    Recurrent major depressive disorder, in remission (HCC)  Continue home regimen Seroquel and mirtazapine  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)  Wt Readings from Last 3 Encounters:   06/19/25 63.5 kg (139 lb 15.9 oz)   06/13/25 63.5 kg (140 lb)   04/03/25 61.7 kg (136 lb)     Lab Results   Component Value Date     (H) 06/23/2025     (H) 03/04/2025    LVEF 55 03/06/2025     Appears euvolemic.  Hold home dose of p.o. Lasix.  Ambulatory dysfunction  PT/OT Eval and treat  Trend Mobility Scores  Encourage appropriate  mobility to achieve and improve upon HLM Goals as noted  Primary insomnia  Mirtazapine  Goals of care, counseling/discussion  Palliative consulted  Frailty syndrome in geriatric patient  Appreciate geriatrics    VTE Pharmacologic Prophylaxis: VTE Score: 4 Moderate Risk (Score 3-4) - Pharmacological DVT Prophylaxis Ordered: heparin.    Mobility:   Basic Mobility Inpatient Raw Score: 6  JH-HLM Goal: 2: Bed activities/Dependent transfer  JH-HLM Achieved: 2: Bed activities/Dependent transfer      Patient Centered Rounds: I performed bedside rounds with nursing staff today.       Current Length of Stay: 9 day(s)  Current Patient Status: Inpatient   Certification Statement: The patient will continue to require additional inpatient hospital stay due to pending insurance Auth and placement  Discharge Plan: Pending insurance Auth and placement    Code Status: Level 3 - DNAR and DNI    Subjective   Seen during a.m. rounds.  Patient wakes up with verbal stimuli.  Pleasantly disoriented and cooperative during exam.  No aggressive or agitated behavior reported by RN.  Tolerating p.o. intake.  No other events reported.      Objective :  Temp:  [96.2 °F (35.7 °C)-97.6 °F (36.4 °C)] 97.6 °F (36.4 °C)  HR:  [] 100  BP: (113-119)/(67) 113/67  Resp:  [15] 15  SpO2:  [91 %] 91 %    Body mass index is 26.45 kg/m².     Input and Output Summary (last 24 hours):     Intake/Output Summary (Last 24 hours) at 6/28/2025 0935  Last data filed at 6/27/2025 1522  Gross per 24 hour   Intake 0 ml   Output --   Net 0 ml       Physical Exam  Constitutional:       General: She is not in acute distress.     Appearance: She is not ill-appearing or toxic-appearing.     Cardiovascular:      Rate and Rhythm: Normal rate.      Pulses: Normal pulses.   Pulmonary:      Effort: Pulmonary effort is normal. No respiratory distress.      Breath sounds: No wheezing.   Abdominal:      General: There is no distension.      Palpations: Abdomen is soft.       Tenderness: There is no abdominal tenderness.     Neurological:      Mental Status: She is alert. She is disoriented.     Psychiatric:         Mood and Affect: Mood normal.         Behavior: Behavior normal.           Lines/Drains:              Lab Results: I have reviewed the following results:   Results from last 7 days   Lab Units 06/28/25  0539   WBC Thousand/uL 6.67   HEMOGLOBIN g/dL 8.9*   HEMATOCRIT % 30.9*   PLATELETS Thousands/uL 239     Results from last 7 days   Lab Units 06/28/25  0539   SODIUM mmol/L 142   POTASSIUM mmol/L 5.3   CHLORIDE mmol/L 108   CO2 mmol/L 29   BUN mg/dL 36*   CREATININE mg/dL 1.45*   ANION GAP mmol/L 5   CALCIUM mg/dL 9.2   ALBUMIN g/dL 3.4*   TOTAL BILIRUBIN mg/dL 1.03*   ALK PHOS U/L 113*   ALT U/L 21   AST U/L 28   GLUCOSE RANDOM mg/dL 89                       Recent Cultures (last 7 days):               Last 24 Hours Medication List:     Current Facility-Administered Medications:     acetaminophen (TYLENOL) tablet 650 mg, Q8H PRN    dextromethorphan-guaiFENesin (ROBITUSSIN DM) oral syrup 10 mL, Q4H PRN    [DISCONTINUED] valproate (DEPACON) 125 mg in sodium chloride 0.9 % 50 mL IVPB, Q12H HESHAM **OR** divalproex sodium (DEPAKOTE SPRINKLE) capsule 125 mg, Q12H HESHAM    docusate sodium (COLACE) capsule 100 mg, BID    [Held by provider] furosemide (LASIX) tablet 20 mg, BID    gabapentin (NEURONTIN) capsule 100 mg, BID    heparin (porcine) subcutaneous injection 5,000 Units, Q8H HESHAM **AND** [COMPLETED] Platelet count, Once    melatonin tablet 6 mg, HS    mirtazapine (REMERON) tablet 7.5 mg, HS    OLANZapine (ZyPREXA ZYDIS) dispersible tablet 5 mg, Q8H PRN    ondansetron (ZOFRAN) injection 4 mg, Q6H PRN    QUEtiapine (SEROquel) tablet 25 mg, BID    simethicone (MYLICON) chewable tablet 80 mg, 4x Daily PRN    Administrative Statements   Today, Patient Was Seen By: Rodrigo Wills MD  I have spent a total time of   minutes in caring for this patient on the day of the visit/encounter  including Impressions, Counseling / Coordination of care, Documenting in the medical record, Reviewing/placing orders in the medical record (including tests, medications, and/or procedures), and Obtaining or reviewing history  .    **Please Note: This note may have been constructed using a voice recognition system.**

## 2025-06-28 NOTE — ASSESSMENT & PLAN NOTE
Per niece, reported to have increased bouts of confusion, combativeness more freuqently over the past few weeks  Recently was treated for UTI in May and completed nitrofurantoin regimen at that time; prior MDRO on urine clx  Urine culture noted E. coli ESBL.  Completed 5 days of IV ertapenem.    Patient has been having waxing waning mentation at times.  Patient has been pleasant during my encounter  No RN reports of agitation or aggressive behavior.  Continue Depakote 125 mg twice daily  Continue Seroquel 25 mg twice daily.  Melatonin 6 mg nightly.  Remeron 7.5 mg nightly.  Continue p.o. Zyprexa 5 mg every 8 hours as needed in between.  Speech recommended dysphagia 1 purée diet with thin liquids  Currently patient is off of  mittens, off of one-to-one.  Pending placement.

## 2025-06-28 NOTE — PLAN OF CARE
Problem: Potential for Falls  Goal: Patient will remain free of falls  Description: INTERVENTIONS:  - Educate patient/family on patient safety including physical limitations  - Instruct patient to call for assistance with activity   - Consider consulting OT/PT to assist with strengthening/mobility based on AM PAC & JH-HLM score  - Consult OT/PT to assist with strengthening/mobility   - Keep Call bell within reach  - Keep bed low and locked with side rails adjusted as appropriate  - Keep care items and personal belongings within reach  - Initiate and maintain comfort rounds  - Make Fall Risk Sign visible to staff  - Offer Toileting  - Apply yellow socks and bracelet for high fall risk patients  - Consider moving patient to room near nurses station  Outcome: Progressing

## 2025-06-29 LAB
ALBUMIN SERPL BCG-MCNC: 3.3 G/DL (ref 3.5–5)
ALP SERPL-CCNC: 107 U/L (ref 34–104)
ALT SERPL W P-5'-P-CCNC: 19 U/L (ref 7–52)
ANION GAP SERPL CALCULATED.3IONS-SCNC: 3 MMOL/L (ref 4–13)
ANION GAP SERPL CALCULATED.3IONS-SCNC: 5 MMOL/L (ref 4–13)
AST SERPL W P-5'-P-CCNC: 25 U/L (ref 13–39)
BILIRUB SERPL-MCNC: 0.89 MG/DL (ref 0.2–1)
BUN SERPL-MCNC: 35 MG/DL (ref 5–25)
BUN SERPL-MCNC: 36 MG/DL (ref 5–25)
CALCIUM ALBUM COR SERPL-MCNC: 9.7 MG/DL (ref 8.3–10.1)
CALCIUM SERPL-MCNC: 8.7 MG/DL (ref 8.4–10.2)
CALCIUM SERPL-MCNC: 9.1 MG/DL (ref 8.4–10.2)
CHLORIDE SERPL-SCNC: 108 MMOL/L (ref 96–108)
CHLORIDE SERPL-SCNC: 110 MMOL/L (ref 96–108)
CO2 SERPL-SCNC: 29 MMOL/L (ref 21–32)
CO2 SERPL-SCNC: 29 MMOL/L (ref 21–32)
CREAT SERPL-MCNC: 1.4 MG/DL (ref 0.6–1.3)
CREAT SERPL-MCNC: 1.47 MG/DL (ref 0.6–1.3)
ERYTHROCYTE [DISTWIDTH] IN BLOOD BY AUTOMATED COUNT: 19.9 % (ref 11.6–15.1)
GFR SERPL CREATININE-BSD FRML MDRD: 29 ML/MIN/1.73SQ M
GFR SERPL CREATININE-BSD FRML MDRD: 30 ML/MIN/1.73SQ M
GLUCOSE SERPL-MCNC: 117 MG/DL (ref 65–140)
GLUCOSE SERPL-MCNC: 85 MG/DL (ref 65–140)
HCT VFR BLD AUTO: 29.4 % (ref 34.8–46.1)
HGB BLD-MCNC: 8.5 G/DL (ref 11.5–15.4)
MCH RBC QN AUTO: 22.1 PG (ref 26.8–34.3)
MCHC RBC AUTO-ENTMCNC: 28.9 G/DL (ref 31.4–37.4)
MCV RBC AUTO: 76 FL (ref 82–98)
PLATELET # BLD AUTO: 241 THOUSANDS/UL (ref 149–390)
PMV BLD AUTO: 11 FL (ref 8.9–12.7)
POTASSIUM SERPL-SCNC: 4.9 MMOL/L (ref 3.5–5.3)
POTASSIUM SERPL-SCNC: 5.5 MMOL/L (ref 3.5–5.3)
PROT SERPL-MCNC: 6.5 G/DL (ref 6.4–8.4)
RBC # BLD AUTO: 3.85 MILLION/UL (ref 3.81–5.12)
SODIUM SERPL-SCNC: 142 MMOL/L (ref 135–147)
SODIUM SERPL-SCNC: 142 MMOL/L (ref 135–147)
WBC # BLD AUTO: 7.39 THOUSAND/UL (ref 4.31–10.16)

## 2025-06-29 PROCEDURE — 80048 BASIC METABOLIC PNL TOTAL CA: CPT | Performed by: STUDENT IN AN ORGANIZED HEALTH CARE EDUCATION/TRAINING PROGRAM

## 2025-06-29 PROCEDURE — 80053 COMPREHEN METABOLIC PANEL: CPT | Performed by: STUDENT IN AN ORGANIZED HEALTH CARE EDUCATION/TRAINING PROGRAM

## 2025-06-29 PROCEDURE — 99232 SBSQ HOSP IP/OBS MODERATE 35: CPT | Performed by: STUDENT IN AN ORGANIZED HEALTH CARE EDUCATION/TRAINING PROGRAM

## 2025-06-29 PROCEDURE — 85027 COMPLETE CBC AUTOMATED: CPT | Performed by: STUDENT IN AN ORGANIZED HEALTH CARE EDUCATION/TRAINING PROGRAM

## 2025-06-29 RX ORDER — SODIUM CHLORIDE 9 MG/ML
75 INJECTION, SOLUTION INTRAVENOUS CONTINUOUS
Status: DISPENSED | OUTPATIENT
Start: 2025-06-29 | End: 2025-06-29

## 2025-06-29 RX ADMIN — QUETIAPINE FUMARATE 25 MG: 25 TABLET ORAL at 10:55

## 2025-06-29 RX ADMIN — HEPARIN SODIUM 5000 UNITS: 5000 INJECTION INTRAVENOUS; SUBCUTANEOUS at 05:38

## 2025-06-29 RX ADMIN — QUETIAPINE FUMARATE 25 MG: 25 TABLET ORAL at 18:52

## 2025-06-29 RX ADMIN — GABAPENTIN 100 MG: 100 CAPSULE ORAL at 10:55

## 2025-06-29 RX ADMIN — Medication 6 MG: at 21:00

## 2025-06-29 RX ADMIN — DIVALPROEX SODIUM 125 MG: 125 CAPSULE, COATED PELLETS ORAL at 10:55

## 2025-06-29 RX ADMIN — HEPARIN SODIUM 5000 UNITS: 5000 INJECTION INTRAVENOUS; SUBCUTANEOUS at 22:12

## 2025-06-29 RX ADMIN — GUAIFENESIN AND DEXTROMETHORPHAN 10 ML: 100; 10 SYRUP ORAL at 10:55

## 2025-06-29 RX ADMIN — SODIUM CHLORIDE 75 ML/HR: 0.9 INJECTION, SOLUTION INTRAVENOUS at 14:12

## 2025-06-29 RX ADMIN — DIVALPROEX SODIUM 125 MG: 125 CAPSULE, COATED PELLETS ORAL at 21:00

## 2025-06-29 RX ADMIN — GABAPENTIN 100 MG: 100 CAPSULE ORAL at 18:52

## 2025-06-29 RX ADMIN — SODIUM ZIRCONIUM CYCLOSILICATE 10 G: 10 POWDER, FOR SUSPENSION ORAL at 15:42

## 2025-06-29 RX ADMIN — MIRTAZAPINE 7.5 MG: 15 TABLET, FILM COATED ORAL at 21:00

## 2025-06-29 RX ADMIN — HEPARIN SODIUM 5000 UNITS: 5000 INJECTION INTRAVENOUS; SUBCUTANEOUS at 15:43

## 2025-06-29 NOTE — ASSESSMENT & PLAN NOTE
Lab Results   Component Value Date    EGFR 29 06/29/2025    EGFR 29 06/28/2025    EGFR 37 06/23/2025    CREATININE 1.47 (H) 06/29/2025    CREATININE 1.45 (H) 06/28/2025    CREATININE 1.20 06/23/2025     Baseline creatinine is 1.2-1.4  K 5.5  Current is 1.47  NS IV 75cc/hr for 8 hrs  Monitor BMP q12 hr for now  Ordered Lokelma x 1.

## 2025-06-29 NOTE — PROGRESS NOTES
Progress Note - Hospitalist   Name: Sona Valenzuela 89 y.o. female I MRN: [id number]  Unit/Bed#: [facility] I Date of Admission: 6/19/2025   Date of Service: 6/29/2025 I Hospital Day: 10    Assessment & Plan  Vascular dementia with behavioral disturbance (HCC)  Per niece, reported to have increased bouts of confusion, combativeness more freuqently over the past few weeks  Recently was treated for UTI in May and completed nitrofurantoin regimen at that time; prior MDRO on urine clx  Urine culture noted E. coli ESBL.  Completed 5 days of IV ertapenem.    Patient has been having waxing waning mentation at times.  Patient has been pleasant during my encounter  No RN reports of agitation or aggressive behavior.  Continue Depakote 125 mg twice daily  Continue Seroquel 25 mg twice daily.  Melatonin 6 mg nightly.  Remeron 7.5 mg nightly.  Continue p.o. Zyprexa 5 mg every 8 hours as needed in between.  Speech recommended dysphagia 1 purée diet with thin liquids  Currently patient is off of  mittens, off of one-to-one.  Pending insurance Auth and rehab placement.    Acute kidney injury superimposed on stage 3b chronic kidney disease (HCC)  Lab Results   Component Value Date    EGFR 29 06/29/2025    EGFR 29 06/28/2025    EGFR 37 06/23/2025    CREATININE 1.47 (H) 06/29/2025    CREATININE 1.45 (H) 06/28/2025    CREATININE 1.20 06/23/2025     Baseline creatinine is 1.2-1.4  K 5.5  Current is 1.47  NS IV 75cc/hr for 8 hrs  Monitor BMP q12 hr for now  Ordered Lokelma x 1.    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  Hold home dose of  p.o. Lasix.  Ambulatory dysfunction  PT/OT Eval and treat  Trend Mobility Scores  Encourage appropriate mobility to achieve and improve upon HLM Goals as noted  Primary insomnia  Mirtazapine  Goals of care, counseling/discussion  Palliative consulted  Frailty syndrome in geriatric patient  Appreciate geriatrics    VTE Pharmacologic Prophylaxis: VTE Score: 4 Moderate Risk (Score 3-4) - Pharmacological DVT Prophylaxis Ordered: heparin.    Mobility:   Basic Mobility Inpatient Raw Score: 6  -HLM Goal: 2: Bed activities/Dependent transfer  JH-HLM Achieved: 2: Bed activities/Dependent transfer      Patient Centered Rounds: I performed bedside rounds with nursing staff today.   Education and Discussions with Family / Patient: Attempted to update  (niece) via phone. Unable to contact.    Current Length of Stay: 10 day(s)  Current Patient Status: Inpatient   Certification Statement: The patient will continue to require additional inpatient hospital stay due to pending insurance Auth, placement.  Discharge Plan: Pending insurance Auth and placement.    Code Status: Level 3 - DNAR and DNI    Subjective   Seen during a.m. rounds.  Patient appears comfortable not in distress.  Does noted with episode of hypoxia since patient sleeps with mouth open.  Not in distress.  Keeping nasal cannula to keep O2 saturation more than 92%.  No other events reported.    Objective :  Temp:  [97.5 °F (36.4 °C)] 97.5 °F (36.4 °C)  HR:  [60-95] 91  BP: (110-117)/(63-68) 117/63  SpO2:  [85 %-100 %] 97 %    Body mass index is 26.45 kg/m².     Input and Output Summary (last 24 hours):     Intake/Output Summary (Last 24 hours) at 6/29/2025 1216  Last data filed at 6/28/2025 1933  Gross per 24 hour   Intake 240 ml   Output --   Net 240 ml       Physical Exam  Constitutional:       General: She is not in acute distress.     Appearance: She is not ill-appearing or toxic-appearing.     Cardiovascular:      Rate and Rhythm: Normal rate.       Pulses: Normal pulses.   Pulmonary:      Effort: Pulmonary effort is normal. No respiratory distress.      Breath sounds: No wheezing.   Abdominal:      General: There is no distension.      Palpations: Abdomen is soft.      Tenderness: There is no abdominal tenderness.     Neurological:      Mental Status: She is alert. She is disoriented.     Psychiatric:         Mood and Affect: Mood normal.         Behavior: Behavior normal.           Lines/Drains:              Lab Results: I have reviewed the following results:   Results from last 7 days   Lab Units 06/29/25  0633   WBC Thousand/uL 7.39   HEMOGLOBIN g/dL 8.5*   HEMATOCRIT % 29.4*   PLATELETS Thousands/uL 241     Results from last 7 days   Lab Units 06/29/25  0633   SODIUM mmol/L 142   POTASSIUM mmol/L 5.5*   CHLORIDE mmol/L 108   CO2 mmol/L 29   BUN mg/dL 36*   CREATININE mg/dL 1.47*   ANION GAP mmol/L 5   CALCIUM mg/dL 9.1   ALBUMIN g/dL 3.3*   TOTAL BILIRUBIN mg/dL 0.89   ALK PHOS U/L 107*   ALT U/L 19   AST U/L 25   GLUCOSE RANDOM mg/dL 85                       Recent Cultures (last 7 days):               Last 24 Hours Medication List:     Current Facility-Administered Medications:     acetaminophen (TYLENOL) tablet 650 mg, Q8H PRN    dextromethorphan-guaiFENesin (ROBITUSSIN DM) oral syrup 10 mL, Q4H PRN    [DISCONTINUED] valproate (DEPACON) 125 mg in sodium chloride 0.9 % 50 mL IVPB, Q12H HESHAM **OR** divalproex sodium (DEPAKOTE SPRINKLE) capsule 125 mg, Q12H HESHAM    docusate sodium (COLACE) capsule 100 mg, BID    [Held by provider] furosemide (LASIX) tablet 20 mg, BID    gabapentin (NEURONTIN) capsule 100 mg, BID    heparin (porcine) subcutaneous injection 5,000 Units, Q8H HESHAM **AND** [COMPLETED] Platelet count, Once    melatonin tablet 6 mg, HS    mirtazapine (REMERON) tablet 7.5 mg, HS    OLANZapine (ZyPREXA ZYDIS) dispersible tablet 5 mg, Q8H PRN    ondansetron (ZOFRAN) injection 4 mg, Q6H PRN    QUEtiapine (SEROquel) tablet 25 mg, BID    simethicone  (MYLICON) chewable tablet 80 mg, 4x Daily PRN    Administrative Statements   Today, Patient Was Seen By: Rodrigo Wills MD  I have spent a total time of   minutes in caring for this patient on the day of the visit/encounter including Patient and family education, Impressions, Counseling / Coordination of care, Documenting in the medical record, and Reviewing/placing orders in the medical record (including tests, medications, and/or procedures).    **Please Note: This note may have been constructed using a voice recognition system.**

## 2025-06-29 NOTE — CASE MANAGEMENT
Case Management Discharge Planning Note    Patient name Sona Valenzuela  Location /-01 MRN [id number]  : 11/3/1924 Date 2025       Current Admission Date: 2025  Current Admission Diagnosis:Vascular dementia with behavioral disturbance (HCC)   Patient Active Problem List    Diagnosis Date Noted    Goals of care, counseling/discussion 2025    Palliative care encounter 2025    Frailty syndrome in geriatric patient 2025    Primary malignant neoplasm of bladder (HCC) 2025    Iron deficiency anemia 03/10/2025    Seizure-like activity ([facility]) 2025    Elevated troponin 2025    Congestion of throat 2025    Vascular dementia with behavioral disturbance ([facility])     Ambulatory dysfunction 2024    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, psychotic disturbance, mood disturbance, or anxiety ([facility]) 2024    Chronic pain of left wrist 10/28/2024    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 ([facility])     Acute on chronic HFpEF with severe pulmonary hypertension ([facility]) 2024    Primary hypertension 2024    Paroxysmal A-fib ([facility]) 2024    Compression fracture of lumbar spine, non-traumatic ([facility]) 2024    Mild protein-calorie malnutrition ([facility]) 2024    Vascular dementia, uncomplicated ([facility]) 2023    Does mobilize using walker 2023    Chronic congestive heart failure ([facility]) 2022    Chronic atrial fibrillation ([facility]) 10/11/2022    Recurrent major depressive disorder, in remission ([facility]) 2022    Acute kidney injury superimposed on stage 3b chronic kidney disease ([facility]) 2022    Vitamin D deficiency 2020    Primary insomnia 2019    Degeneration of lumbar intervertebral disc 2019    Lumbar radiculopathy 2019    Arthropathy of lumbar facet joint 2019    Restless legs syndrome 2019      LOS (days): 10  Geometric Mean LOS (GMLOS) (days):  3  Days to GMLOS:-6.8     OBJECTIVE:  Risk of Unplanned Readmission Score: 32.63         Current admission status: Inpatient   Preferred Pharmacy:   [id number] [place] - [address]  [address]  [place] [address]  Phone: [phone] Fax: [phone]    Primary Care Provider: FARZANA Stanley    Primary Insurance: [employer]  Secondary Insurance: [facility]    DISCHARGE DETAILS:                                          Other Referral/Resources/Interventions Provided:  Interventions: Short Term Rehab       CM notified by DC support team that prior auth for [facility] for STR is still pending.    CM sent update to [facility] via [facility] referral.    CM department will continue to follow to assist with discharge coordination.

## 2025-06-29 NOTE — ASSESSMENT & PLAN NOTE
Per niece, reported to have increased bouts of confusion, combativeness more freuqently over the past few weeks  Recently was treated for UTI in May and completed nitrofurantoin regimen at that time; prior MDRO on urine clx  Urine culture noted E. coli ESBL.  Completed 5 days of IV ertapenem.    Patient has been having waxing waning mentation at times.  Patient has been pleasant during my encounter  No RN reports of agitation or aggressive behavior.  Continue Depakote 125 mg twice daily  Continue Seroquel 25 mg twice daily.  Melatonin 6 mg nightly.  Remeron 7.5 mg nightly.  Continue p.o. Zyprexa 5 mg every 8 hours as needed in between.  Speech recommended dysphagia 1 purée diet with thin liquids  Currently patient is off of  mittens, off of one-to-one.  Pending insurance Auth and rehab placement.

## 2025-06-29 NOTE — PLAN OF CARE
Problem: Potential for Falls  Goal: Patient will remain free of falls  Description: INTERVENTIONS:  - Educate patient/family on patient safety including physical limitations  - Instruct patient to call for assistance with activity   - Consider consulting OT/PT to assist with strengthening/mobility based on AM PAC & JH-HLM score  - Consult OT/PT to assist with strengthening/mobility   - Keep Call bell within reach  - Keep bed low and locked with side rails adjusted as appropriate  - Keep care items and personal belongings within reach  - Initiate and maintain comfort rounds  - Make Fall Risk Sign visible to staff  - Offer Toileting every 2 Hours, in advance of need  - Initiate/Maintain bed/chair alarm  - Obtain necessary fall risk management equipment  - Apply yellow socks and bracelet for high fall risk patients  - Consider moving patient to room near nurses station  6/29/2025 1235 by Margaret Winslow RN  Outcome: Progressing  6/29/2025 1234 by Margaret Winslow RN  Outcome: Progressing     Problem: Prexisting or High Potential for Compromised Skin Integrity  Goal: Skin integrity is maintained or improved  Description: INTERVENTIONS:  - Identify patients at risk for skin breakdown  - Assess and monitor skin integrity including under and around medical devices   - Assess and monitor nutrition and hydration status  - Monitor labs  - Assess for incontinence   - Turn and reposition patient  - Assist with mobility/ambulation  - Relieve pressure over bob prominences   - Avoid friction and shearing  - Provide appropriate hygiene as needed including keeping skin clean and dry  - Evaluate need for skin moisturizer/barrier cream  - Collaborate with interdisciplinary team  - Patient/family teaching  - Consider wound care consult    Assess:  - Review Fabricio scale daily  - Clean and moisturize skin   - Inspect skin when repositioning, toileting, and assisting with ADLS  - Assess under medical devices   - Assess extremities for  adequate circulation and sensation     Bed Management:  - Have minimal linens on bed & keep smooth, unwrinkled  - Change linens as needed when moist or perspiring  - Toileting:  - Offer bedside commode  - Assess for incontinence   - Use incontinent care products after each incontinent episode     Activity:  - Mobilize patient 3 times a day  - Encourage activity and walks on unit  - Encourage or provide ROM exercises   - Turn and reposition patient every 2 Hours  - Use appropriate equipment to lift or move patient in bed  - Instruct/ Assist with weight shifting when out of bed in chair  - Consider limitation of chair time     Skin Care:  - Avoid use of baby powder, tape, friction and shearing, hot water or constrictive clothing  - Relieve pressure over bony prominences   - Do not massage red bony areas    Next Steps:  - Teach patient strategies to minimize   - Consider consults to  interdisciplinary teams   6/29/2025 1235 by Margaret Winslow RN  Outcome: Progressing  6/29/2025 1234 by Margaret Winslow RN  Outcome: Progressing     Problem: Nutrition/Hydration-ADULT  Goal: Nutrient/Hydration intake appropriate for improving, restoring or maintaining nutritional needs  Description: Monitor and assess patient's nutrition/hydration status for malnutrition. Collaborate with interdisciplinary team and initiate plan and interventions as ordered.  Monitor patient's weight and dietary intake as ordered or per policy. Utilize nutrition screening tool and intervene as necessary. Determine patient's food preferences and provide high-protein, high-caloric foods as appropriate.     INTERVENTIONS:  - Monitor oral intake, urinary output, labs, and treatment plans  - Assess nutrition and hydration status and recommend course of action  - Evaluate amount of meals eaten  - Assist patient with eating if necessary   - Allow adequate time for meals  - Recommend/ encourage appropriate diets, oral nutritional supplements, and vitamin/mineral  supplements  - Order, calculate, and assess calorie counts as needed  - Recommend, monitor, and adjust tube feedings and TPN/PPN based on assessed needs  - Assess need for intravenous fluids  - Provide specific nutrition/hydration education as appropriate  - Include patient/family/caregiver in decisions related to nutrition  6/29/2025 1235 by Margaret Winslow RN  Outcome: Progressing  6/29/2025 1234 by Margaret Winslow RN  Outcome: Progressing     Problem: SAFETY,RESTRAINT: NV/NON-SELF DESTRUCTIVE BEHAVIOR  Goal: Remains free of harm/injury (restraint for non violent/non self-detsructive behavior)  Description: INTERVENTIONS:  - Instruct patient/family regarding restraint use   - Assess and monitor physiologic and psychological status   - Provide interventions and comfort measures to meet assessed patient needs   - Identify and implement measures to help patient regain control  - Assess readiness for release of restraint   6/29/2025 1235 by Margaret Winslow RN  Outcome: Progressing  6/29/2025 1234 by Margaret Winslow RN  Outcome: Progressing  Goal: Returns to optimal restraint-free functioning  Description: INTERVENTIONS:  - Assess the patient's behavior and symptoms that indicate continued need for restraint  - Identify and implement measures to help patient regain control  - Assess readiness for release of restraint   6/29/2025 1235 by Margaret Winslow RN  Outcome: Progressing  6/29/2025 1234 by Margaret Winslow RN  Outcome: Progressing

## 2025-06-30 LAB
ANION GAP SERPL CALCULATED.3IONS-SCNC: 4 MMOL/L (ref 4–13)
ANION GAP SERPL CALCULATED.3IONS-SCNC: 4 MMOL/L (ref 4–13)
BUN SERPL-MCNC: 30 MG/DL (ref 5–25)
BUN SERPL-MCNC: 33 MG/DL (ref 5–25)
CALCIUM SERPL-MCNC: 8.8 MG/DL (ref 8.4–10.2)
CALCIUM SERPL-MCNC: 9 MG/DL (ref 8.4–10.2)
CHLORIDE SERPL-SCNC: 109 MMOL/L (ref 96–108)
CHLORIDE SERPL-SCNC: 110 MMOL/L (ref 96–108)
CO2 SERPL-SCNC: 29 MMOL/L (ref 21–32)
CO2 SERPL-SCNC: 30 MMOL/L (ref 21–32)
CREAT SERPL-MCNC: 1.19 MG/DL (ref 0.6–1.3)
CREAT SERPL-MCNC: 1.27 MG/DL (ref 0.6–1.3)
ERYTHROCYTE [DISTWIDTH] IN BLOOD BY AUTOMATED COUNT: 20.1 % (ref 11.6–15.1)
GFR SERPL CREATININE-BSD FRML MDRD: 34 ML/MIN/1.73SQ M
GFR SERPL CREATININE-BSD FRML MDRD: 37 ML/MIN/1.73SQ M
GLUCOSE SERPL-MCNC: 120 MG/DL (ref 65–140)
GLUCOSE SERPL-MCNC: 93 MG/DL (ref 65–140)
HCT VFR BLD AUTO: 30.7 % (ref 34.8–46.1)
HGB BLD-MCNC: 8.8 G/DL (ref 11.5–15.4)
MCH RBC QN AUTO: 22.2 PG (ref 26.8–34.3)
MCHC RBC AUTO-ENTMCNC: 28.7 G/DL (ref 31.4–37.4)
MCV RBC AUTO: 77 FL (ref 82–98)
PLATELET # BLD AUTO: 216 THOUSANDS/UL (ref 149–390)
PMV BLD AUTO: 10.4 FL (ref 8.9–12.7)
POTASSIUM SERPL-SCNC: 4.3 MMOL/L (ref 3.5–5.3)
POTASSIUM SERPL-SCNC: 4.8 MMOL/L (ref 3.5–5.3)
RBC # BLD AUTO: 3.97 MILLION/UL (ref 3.81–5.12)
SODIUM SERPL-SCNC: 143 MMOL/L (ref 135–147)
SODIUM SERPL-SCNC: 143 MMOL/L (ref 135–147)
WBC # BLD AUTO: 6.18 THOUSAND/UL (ref 4.31–10.16)

## 2025-06-30 PROCEDURE — 80048 BASIC METABOLIC PNL TOTAL CA: CPT | Performed by: STUDENT IN AN ORGANIZED HEALTH CARE EDUCATION/TRAINING PROGRAM

## 2025-06-30 PROCEDURE — 97530 THERAPEUTIC ACTIVITIES: CPT

## 2025-06-30 PROCEDURE — 85027 COMPLETE CBC AUTOMATED: CPT | Performed by: STUDENT IN AN ORGANIZED HEALTH CARE EDUCATION/TRAINING PROGRAM

## 2025-06-30 PROCEDURE — 97535 SELF CARE MNGMENT TRAINING: CPT

## 2025-06-30 PROCEDURE — 99233 SBSQ HOSP IP/OBS HIGH 50: CPT

## 2025-06-30 PROCEDURE — 99232 SBSQ HOSP IP/OBS MODERATE 35: CPT | Performed by: STUDENT IN AN ORGANIZED HEALTH CARE EDUCATION/TRAINING PROGRAM

## 2025-06-30 RX ADMIN — HEPARIN SODIUM 5000 UNITS: 5000 INJECTION INTRAVENOUS; SUBCUTANEOUS at 21:33

## 2025-06-30 RX ADMIN — HEPARIN SODIUM 5000 UNITS: 5000 INJECTION INTRAVENOUS; SUBCUTANEOUS at 13:46

## 2025-06-30 RX ADMIN — QUETIAPINE FUMARATE 25 MG: 25 TABLET ORAL at 17:54

## 2025-06-30 RX ADMIN — GABAPENTIN 100 MG: 100 CAPSULE ORAL at 17:53

## 2025-06-30 RX ADMIN — DIVALPROEX SODIUM 125 MG: 125 CAPSULE, COATED PELLETS ORAL at 20:42

## 2025-06-30 RX ADMIN — DIVALPROEX SODIUM 125 MG: 125 CAPSULE, COATED PELLETS ORAL at 09:30

## 2025-06-30 RX ADMIN — Medication 6 MG: at 20:35

## 2025-06-30 RX ADMIN — SODIUM ZIRCONIUM CYCLOSILICATE 10 G: 10 POWDER, FOR SUSPENSION ORAL at 09:30

## 2025-06-30 RX ADMIN — GABAPENTIN 100 MG: 100 CAPSULE ORAL at 09:30

## 2025-06-30 RX ADMIN — HEPARIN SODIUM 5000 UNITS: 5000 INJECTION INTRAVENOUS; SUBCUTANEOUS at 05:29

## 2025-06-30 RX ADMIN — MIRTAZAPINE 7.5 MG: 15 TABLET, FILM COATED ORAL at 20:35

## 2025-06-30 RX ADMIN — QUETIAPINE FUMARATE 25 MG: 25 TABLET ORAL at 09:30

## 2025-06-30 NOTE — ASSESSMENT & PLAN NOTE
History of prior memory issues and cognitive impairment   Presented to hospital for increased confusion, combativeness  Patient has lived with her niece and nephew for the past 30 years  Dependent with ADLs/IADLs    Previously followed with [facility]   Last seen on 09/29/23  Appears patient has occurences of sundowning, recently managed by PCP   PCP speaking with family of trial of increase dose of seroquel vs adding melatonin, family had opted to trial of melatonin, started on 06/13   Increased Melatonin to 6 mg HS in addition to increasing Seroquel to 50 mg at bedtime  Has history of hallucinations    Most recent TSH on 06/19/25 noted to be 3.797  Most recent vitamin B12 level on 01/24/23 noted to be 407  MRI brain on 03/13/25 revealed mild microangiopathic change  Patient scored 4/30 on most recent MOCA on 12/13/21  Keep physically, mentally, and socially active   PTA medication: Seroquel 25 mg po daily at bedtime   Increased to 50 mg at bedtime during current hospitalization, however modified dose to 25 mg twice daily   Was decreased to Depakote 250 mg every 12 hours on 6/20   Continues to tolerate PO medication per chart review   LFT's previously stable  Continue Gabapentin 100 mg twice daily, modified Seroquel 25 mg twice daily, continue Depakote 250 mg every 12 hours, Zyprexa 5 mg PO every 8 hours PRN for agitation  EKG 6/23 QTc 455    At risk for delirium secondary to age, cognitive decline, hospitalization, insomnia, immobility  Provide frequent redirection, reorientation, distraction techniques  Avoid deliriogenic medications such as tramadol, benzodiazepines, anticholinergics,  Benadryl  Treat pain, See geriatric pain protocol  Monitor for constipation and urinary retention  Encourage early and frequent moblization, OOB  Encourage Hydration/ Nutrition  Implement sleep hygiene, limit night time interuptions, group activities  Avoid physical restraints as able  Use chemical restraint only when  other efforts have failed, recommend zyprexa 2.5mg IM q8h prn  Monitor Qtc, if greater than 500 do not use antipsychotic medication  If QTc greater than 460, monitor and replete and deficiency of  K and Mg, recheck EKG

## 2025-06-30 NOTE — PROGRESS NOTES
Progress Note - Hospitalist   Name: Sona Valenzuela 89 y.o. female I MRN: [id number]  Unit/Bed#: [facility] I Date of Admission: 6/19/2025   Date of Service: 6/30/2025 I Hospital Day: 11    Assessment & Plan  Vascular dementia with behavioral disturbance (HCC)  Per niece, reported to have increased bouts of confusion, combativeness more freuqently over the past few weeks  Recently was treated for UTI in May and completed nitrofurantoin regimen at that time; prior MDRO on urine clx  Urine culture noted E. coli ESBL.  Completed 5 days of IV ertapenem.    Patient has been having waxing waning mentation at times.  Patient has been pleasant during my encounter  No RN reports of agitation or aggressive behavior.  Continue Depakote 125 mg twice daily  Continue Seroquel 25 mg twice daily.  Melatonin 6 mg nightly.  Remeron 7.5 mg nightly.  Continue p.o. Zyprexa 5 mg every 8 hours as needed .  Speech recommended dysphagia 1 purée diet with thin liquids  Currently patient is off of  mittens, off of one-to-one.  Pending insurance Auth and rehab placement.    Acute kidney injury superimposed on stage 3b chronic kidney disease (HCC)  Lab Results   Component Value Date    EGFR 34 06/30/2025    EGFR 30 06/29/2025    EGFR 29 06/29/2025    CREATININE 1.27 06/30/2025    CREATININE 1.40 (H) 06/29/2025    CREATININE 1.47 (H) 06/29/2025     Baseline creatinine is 1.2-1.4  K 4.8  Current is 1.27  Now off of IVF  Monitor BMP in AM    Recurrent major depressive disorder, in remission (HCC)  Continue home regimen Seroquel and mirtazapine  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)  Wt Readings from Last 3 Encounters:   06/19/25 63.5 kg (139 lb 15.9 oz)   06/13/25 63.5 kg (140 lb)   04/03/25 61.7 kg (136 lb)     Lab Results   Component Value Date     (H) 06/23/2025     (H) 03/04/2025    LVEF 55 03/06/2025     Appears euvolemic.  Hold home dose of p.o. Lasix.  Ambulatory dysfunction  PT/OT Eval and  treat  Trend Mobility Scores  Encourage appropriate mobility to achieve and improve upon HLM Goals as noted  Primary insomnia  Mirtazapine  Goals of care, counseling/discussion  Palliative consulted  Frailty syndrome in geriatric patient  Appreciate geriatrics    VTE Pharmacologic Prophylaxis: VTE Score: 4 Moderate Risk (Score 3-4) - Pharmacological DVT Prophylaxis Ordered: heparin.    Mobility:   Basic Mobility Inpatient Raw Score: 8  -HLM Goal: 3: Sit at edge of bed  -HLM Achieved: 2: Bed activities/Dependent transfer      Patient Centered Rounds: I performed bedside rounds with nursing staff today.     Education and Discussions with Family / Patient: Attempted to update  (niece) via phone. Unable to contact.    Current Length of Stay: 11 day(s)  Current Patient Status: Inpatient   Certification Statement: The patient will continue to require additional inpatient hospital stay due to pending insurance Auth, placement.  Discharge Plan: CM working on insurance Auth and placement.    Code Status: Level 3 - DNAR and DNI    Subjective   Patient is awake, alert, alert to self, cooperative during exam.  She has been off of mittens, one-to-one observation.  tolerating dysphagia diet well per RN report.  Reportedly had a bowel movement yesterday.  No other events reported.      Objective :  Temp:  [97.2 °F (36.2 °C)-97.6 °F (36.4 °C)] 97.6 °F (36.4 °C)  HR:  [82-97] 85  BP: (117-118)/(64-66) 118/66  Resp:  [17] 17  SpO2:  [84 %-98 %] 96 %  O2 Device: Nasal cannula  Nasal Cannula O2 Flow Rate (L/min):  [1 L/min-2 L/min] 1 L/min    Body mass index is 26.45 kg/m².     Input and Output Summary (last 24 hours):     Intake/Output Summary (Last 24 hours) at 6/30/2025 1246  Last data filed at 6/29/2025 1900  Gross per 24 hour   Intake 240 ml   Output 1 ml   Net 239 ml       Physical Exam  Constitutional:       General: She is not in acute distress.     Appearance: She is not ill-appearing or toxic-appearing.      Cardiovascular:      Rate and Rhythm: Normal rate.      Pulses: Normal pulses.   Pulmonary:      Effort: Pulmonary effort is normal. No respiratory distress.      Breath sounds: No wheezing.   Abdominal:      Palpations: Abdomen is soft.      Tenderness: There is no abdominal tenderness.     Neurological:      Mental Status: She is alert. She is disoriented.      Comments: Pleasantly disoriented.  Cooperative during exam.   Psychiatric:         Mood and Affect: Mood normal.         Behavior: Behavior normal.           Lines/Drains:              Lab Results: I have reviewed the following results:   Results from last 7 days   Lab Units 06/30/25  0808   WBC Thousand/uL 6.18   HEMOGLOBIN g/dL 8.8*   HEMATOCRIT % 30.7*   PLATELETS Thousands/uL 216     Results from last 7 days   Lab Units 06/30/25  0808 06/29/25  2058 06/29/25  0633   SODIUM mmol/L 143   < > 142   POTASSIUM mmol/L 4.8   < > 5.5*   CHLORIDE mmol/L 109*   < > 108   CO2 mmol/L 30   < > 29   BUN mg/dL 33*   < > 36*   CREATININE mg/dL 1.27   < > 1.47*   ANION GAP mmol/L 4   < > 5   CALCIUM mg/dL 9.0   < > 9.1   ALBUMIN g/dL  --   --  3.3*   TOTAL BILIRUBIN mg/dL  --   --  0.89   ALK PHOS U/L  --   --  107*   ALT U/L  --   --  19   AST U/L  --   --  25   GLUCOSE RANDOM mg/dL 93   < > 85    < > = values in this interval not displayed.                       Recent Cultures (last 7 days):               Last 24 Hours Medication List:     Current Facility-Administered Medications:     acetaminophen (TYLENOL) tablet 650 mg, Q8H PRN    dextromethorphan-guaiFENesin (ROBITUSSIN DM) oral syrup 10 mL, Q4H PRN    [DISCONTINUED] valproate (DEPACON) 125 mg in sodium chloride 0.9 % 50 mL IVPB, Q12H HESHAM **OR** divalproex sodium (DEPAKOTE SPRINKLE) capsule 125 mg, Q12H HESHAM    docusate sodium (COLACE) capsule 100 mg, BID    [Held by provider] furosemide (LASIX) tablet 20 mg, BID    gabapentin (NEURONTIN) capsule 100 mg, BID    heparin (porcine) subcutaneous injection 5,000  Units, Q8H HESHAM **AND** [COMPLETED] Platelet count, Once    melatonin tablet 6 mg, HS    mirtazapine (REMERON) tablet 7.5 mg, HS    OLANZapine (ZyPREXA ZYDIS) dispersible tablet 5 mg, Q8H PRN    ondansetron (ZOFRAN) injection 4 mg, Q6H PRN    QUEtiapine (SEROquel) tablet 25 mg, BID    simethicone (MYLICON) chewable tablet 80 mg, 4x Daily PRN    Sodium Zirconium Cyclosilicate (Lokelma) 10 g, Daily    Administrative Statements   Today, Patient Was Seen By: Rodrigo Wills MD  I have spent a total time of   minutes in caring for this patient on the day of the visit/encounter including Diagnostic results, Risks and benefits of tx options, Patient and family education, Impressions, Counseling / Coordination of care, Documenting in the medical record, Reviewing/placing orders in the medical record (including tests, medications, and/or procedures), and Obtaining or reviewing history  .    **Please Note: This note may have been constructed using a voice recognition system.**

## 2025-06-30 NOTE — PHYSICAL THERAPY NOTE
PHYSICAL THERAPY NOTE          Patient Name: Sona Valenzuela  Today's Date: 6/30/2025 06/30/25 1423   PT Last Visit   PT Visit Date 06/30/25   Note Type   Note Type Treatment for insurance authorization   Pain Assessment   Pain Assessment Tool FLACC   Pain Rating: FLACC (Rest) - Face 0   Pain Rating: FLACC (Rest) - Legs 0   Pain Rating: FLACC (Rest) - Activity 0   Pain Rating: FLACC (Rest) - Cry 0   Pain Rating: FLACC (Rest) - Consolability 0   Score: FLACC (Rest) 0   Pain Rating: FLACC (Activity) - Face 0   Pain Rating: FLACC (Activity) - Legs 0   Pain Rating: FLACC (Activity) - Activity 0   Pain Rating: FLACC (Activity) - Cry 0   Pain Rating: FLACC (Activity) - Consolability 0   Score: FLACC (Activity) 0   Restrictions/Precautions   Weight Bearing Precautions Per Order No   Other Precautions Cognitive;Chair Alarm;Bed Alarm;Fall Risk;Pain   General   Chart Reviewed Yes   Family/Caregiver Present No   Cognition   Overall Cognitive Status Impaired   Arousal/Participation Alert;Responsive;Cooperative   Attention Attends with cues to redirect   Orientation Level Oriented to person;Disoriented to place;Disoriented to time;Disoriented to situation   Memory Decreased recall of biographical information;Decreased short term memory   Following Commands Follows one step commands with increased time or repetition   Comments Pt agreeable to PT session   Bed Mobility   Supine to Sit 3  Moderate assistance   Additional items Assist x 2;Increased time required;Verbal cues;LE management;HOB elevated   Transfers   Sit to Stand 2  Maximal assistance   Additional items Assist x 2;Increased time required;Verbal cues;Bedrails   Stand to Sit 2  Maximal assistance   Additional items Assist x 2;Increased time required;Verbal cues;Bedrails   Additional Comments with GUS and liborio roa  (2 STS with RW and max A x2; 1 STS with liborio roa)    Ambulation/Elevation   Gait pattern Not tested;Not appropriate   Balance   Static Sitting Fair -   Dynamic Sitting Poor   Static Standing Poor -   Dynamic Standing Poor -   Endurance Deficit   Endurance Deficit Yes   Activity Tolerance   Activity Tolerance Patient limited by fatigue   Medical Staff Made Aware OT Tiffanie  (Pt seen as co-treatment with OT due to pt's present impairments, decreased acitivity tolerance, and possible assist of 2 for functional mobility)   Assessment   Prognosis Fair   Problem List Decreased strength;Decreased endurance;Impaired balance;Decreased mobility;Decreased cognition;Decreased safety awareness   Assessment Chart review and two person identifiers were completed. Pt seen for PT treatment session this date, consisting of ther act focused on bed mobility ,sitting balance, sitting endurence, transfers, standing balance, standing endurence . Since previous session, pt has made good progress in terms of ability to complete 3 STS, ability to sit in recliner, increased tolerance to sitting EOB.  Pt greeted supine in bed and agreeable to PT session. Pt completed supine to sit with mod A x2. Pt remained sitting EOB for ~10 minutes with UE support on bed rails and min A x1. Pt completed 2 STS with RW and b/l foot blocks. Pt able to maintain standing for ~20s before requiring seated rest break. Pt completed STS with liborio stedy and max A x2. Pt maintained supported standing for ~2 minutes with liborio stedy before requiring seated rest. Pt ended session seated in recliner with alarm activated and all needs within reach. During today's session pt required max VC to open eyes; however she was able to open them more often during last half of session. This is improvement compared to last session where pt refused to open eyes. Spoke to RN about session outcomes. Pertinent barriers during this session include cognitive status. Current goals and POC established on IE remain appropriate. Pt prognosis for  achieving goals is good, pending pt progress with hospitalization/medical status improvements, and indicated by ability to follow directions, responsive to cues/strategies, and previous response to intervention. Pt limited d/t medical instability. Pt continues to be functioning below baseline level, and remains limited due to factors listed above. PT will continue to see pt during current hospitalization in order to address the deficits listed above and provide interventions consistent w/ POC in effort to achieve STGs. PT recommends level 2, moderate resource intensity upon discharge.   Barriers to Discharge Other (Comment);Decreased caregiver support  (decline in functional mobility)   Goals   STG Expiration Date 07/10/25   Short Term Goal #1 Within 10 days patient will complete:   1) Bed mobility skills with moderate assistance x1 to decrease care giver burden   2) Functional transfers with moderate assistance x1 to decrease care giver burden   3) Maintain sitting EOB with min A x1 and no LOB to decrease care giver burden  4) Maintain standing for 2 minutes with no LOB and min A x2 and least restrictive AD to progress functional mobility   5) Improve static and dynamic sitting balance by 1 grade to reduce risk of falls.   6) Sit in recliner for 3 hours to improve upright tolerance  7) Improve LE strength grades by 1 to increase independence w/ transfers.  8) PT for ongoing pt and family education; DME needs and D/C planning to promote highest level of function in least restrictive environment.   PT Treatment Day 2   Plan   Treatment/Interventions Functional transfer training;LE strengthening/ROM;Therapeutic exercise;Endurance training;Patient/family training;Equipment eval/education;Bed mobility;Continued evaluation;Spoke to nursing;OT   Progress Improving as expected   PT Frequency 3-5x/wk   Discharge Recommendation   Rehab Resource Intensity Level, PT II (Moderate Resource Intensity)   AM-PAC Basic Mobility  Inpatient   Turning in Flat Bed Without Bedrails 1   Lying on Back to Sitting on Edge of Flat Bed Without Bedrails 1   Moving Bed to Chair 1   Standing Up From Chair Using Arms 1   Walk in Room 1   Climb 3-5 Stairs With Railing 1   Basic Mobility Inpatient Raw Score 6   Turning Head Towards Sound 2   Follow Simple Instructions 2   Low Function Basic Mobility Raw Score  10   Low Function Basic Mobility Standardized Score  14.65   [facility] Highest Level Of Mobility   [facility] Goal 2: Bed activities/Dependent transfer   -[facility] Achieved 5: Stand (1 or more minutes)   Education   Education Provided Home exercise program;Assistive device   Patient Reinforcement needed   End of Consult   Patient Position at End of Consult Bedside chair;Bed/Chair alarm activated;All needs within reach     Fredy Brambila, PT, DPT

## 2025-06-30 NOTE — CASE MANAGEMENT
Case Management Discharge Planning Note    Patient name Sona Valenzuela  Location /-01 MRN [id number]  : 11/3/1924 Date 2025       Current Admission Date: 2025  Current Admission Diagnosis:Vascular dementia with behavioral disturbance (HCC)   Patient Active Problem List    Diagnosis Date Noted    Goals of care, counseling/discussion 2025    Palliative care encounter 2025    Frailty syndrome in geriatric patient 2025    Primary malignant neoplasm of bladder (HCC) 2025    Iron deficiency anemia 03/10/2025    Seizure-like activity ([facility]) 2025    Elevated troponin 2025    Congestion of throat 2025    Vascular dementia with behavioral disturbance ([facility])     Ambulatory dysfunction 2024    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, psychotic disturbance, mood disturbance, or anxiety ([facility]) 2024    Chronic pain of left wrist 10/28/2024    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 ([facility])     Acute on chronic HFpEF with severe pulmonary hypertension ([facility]) 2024    Primary hypertension 2024    Paroxysmal A-fib ([facility]) 2024    Compression fracture of lumbar spine, non-traumatic ([facility]) 2024    Mild protein-calorie malnutrition ([facility]) 2024    Vascular dementia, uncomplicated ([facility]) 2023    Does mobilize using walker 2023    Chronic congestive heart failure ([facility]) 2022    Chronic atrial fibrillation ([facility]) 10/11/2022    Recurrent major depressive disorder, in remission ([facility]) 2022    Acute kidney injury superimposed on stage 3b chronic kidney disease ([facility]) 2022    Vitamin D deficiency 2020    Primary insomnia 2019    Degeneration of lumbar intervertebral disc 2019    Lumbar radiculopathy 2019    Arthropathy of lumbar facet joint 2019    Restless legs syndrome 2019      LOS (days): 11  Geometric Mean LOS (GMLOS) (days):  3  Days to GMLOS:-7.8     OBJECTIVE:  Risk of Unplanned Readmission Score: 31.86         Current admission status: Inpatient   Preferred Pharmacy:   [id number] [place] - [address]  [address]  [place] [address]  Phone: [phone] Fax: [phone]    Primary Care Provider: FARZANA Stanley    Primary Insurance: [employer]  Secondary Insurance: [facility]    DISCHARGE DETAILS:                                          Other Referral/Resources/Interventions Provided:  Referral Comments: Awaiting insurance auth.  Insurance is requesting updated PT/OT notes and they were informed of priority    Would you like to participate in our [facility] service program?  : No - Declined       Expected Discharge Disposition: Short Term Rehab  Additional Discharge Dispositions: Short Term Rehab  Transport at Discharge : [facility]

## 2025-06-30 NOTE — ASSESSMENT & PLAN NOTE
Per niece, reported to have increased bouts of confusion, combativeness more freuqently over the past few weeks  Recently was treated for UTI in May and completed nitrofurantoin regimen at that time; prior MDRO on urine clx  Urine culture noted E. coli ESBL.  Completed 5 days of IV ertapenem.    Patient has been having waxing waning mentation at times.  Patient has been pleasant during my encounter  No RN reports of agitation or aggressive behavior.  Continue Depakote 125 mg twice daily  Continue Seroquel 25 mg twice daily.  Melatonin 6 mg nightly.  Remeron 7.5 mg nightly.  Continue p.o. Zyprexa 5 mg every 8 hours as needed .  Speech recommended dysphagia 1 purée diet with thin liquids  Currently patient is off of  mittens, off of one-to-one.  Pending insurance Auth and rehab placement.

## 2025-06-30 NOTE — ASSESSMENT & PLAN NOTE
Recently was having home health/physical therapy, re-ordered by PCP  Assess patient frequently for physical needs, encourage use of assistant devices as needed and directed by PT/OT  Identify cognitive and physical deficits and behaviors that affect risk of falls  Consider moving patient closer to nursing station to monitor more closely for impulsive behavior which may increase risk of falls  [place] fall and safety precautions   Educate patient/family on patient safety including physical limitations and importance of using call bell for assistance   Modify environment to reduce risk of injury including disconnecting from pole when not in use, ensuring adequate lighting in room and restroom, ensuring that path to restroom is clear and free of trip hazards  PT/OT consulted to assist with strengthening/mobility and assist with discharge planning to appropriate level of care  Out of bed as tolerated  Family interested in PT/OT services

## 2025-06-30 NOTE — ASSESSMENT & PLAN NOTE
Lab Results   Component Value Date    EGFR 34 06/30/2025    EGFR 30 06/29/2025    EGFR 29 06/29/2025    CREATININE 1.27 06/30/2025    CREATININE 1.40 (H) 06/29/2025    CREATININE 1.47 (H) 06/29/2025     Baseline creatinine is 1.2-1.4  K 4.8  Current is 1.27  Now off of IVF  Monitor BMP in AM

## 2025-06-30 NOTE — PLAN OF CARE
Problem: PHYSICAL THERAPY ADULT  Goal: Performs mobility at highest level of function for planned discharge setting.  See evaluation for individualized goals.  Description: Treatment/Interventions: Functional transfer training, LE strengthening/ROM, Therapeutic exercise, Endurance training, Cognitive reorientation, Patient/family training, Equipment eval/education, Bed mobility, Gait training, OT          See flowsheet documentation for full assessment, interventions and recommendations.  6/30/2025 1533 by Fredy Brambila PT  Note: Prognosis: Fair  Problem List: Decreased strength, Decreased endurance, Impaired balance, Decreased mobility, Decreased cognition, Decreased safety awareness  Assessment: Chart review and two person identifiers were completed. Pt seen for PT treatment session this date, consisting of ther act focused on bed mobility ,sitting balance, sitting endurence, transfers, standing balance, standing endurence . Since previous session, pt has made good progress in terms of ability to complete 3 STS, ability to sit in recliner, increased tolerance to sitting EOB.  Pt greeted supine in bed and agreeable to PT session. Pt completed supine to sit with mod A x2. Pt remained sitting EOB for ~10 minutes with UE support on bed rails and min A x1. Pt completed 2 STS with RW and b/l foot blocks. Pt able to maintain standing for ~20s before requiring seated rest break. Pt completed STS with liborio stedy and max A x2. Pt maintained supported standing for ~2 minutes with liborio stedy before requiring seated rest. Pt ended session seated in recliner with alarm activated and all needs within reach. During today's session pt required max VC to open eyes; however she was able to open them more often during last half of session. This is improvement compared to last session where pt refused to open eyes. Spoke to RN about session outcomes. Pertinent barriers during this session include cognitive status. Current goals  and POC established on IE remain appropriate. Pt prognosis for achieving goals is good, pending pt progress with hospitalization/medical status improvements, and indicated by ability to follow directions, responsive to cues/strategies, and previous response to intervention. Pt limited d/t medical instability. Pt continues to be functioning below baseline level, and remains limited due to factors listed above. PT will continue to see pt during current hospitalization in order to address the deficits listed above and provide interventions consistent w/ POC in effort to achieve STGs. PT recommends level 2, moderate resource intensity upon discharge.  Barriers to Discharge: Other (Comment), Decreased caregiver support (decline in functional mobility)     Rehab Resource Intensity Level, PT: II (Moderate Resource Intensity)    See flowsheet documentation for full assessment.     6/30/2025 1533 by Fredy Brambila PT  Outcome: Progressing  Note: Prognosis: Fair  Problem List: Decreased strength, Decreased endurance, Impaired balance, Decreased mobility, Decreased cognition, Decreased safety awareness  Assessment: Chart review and two person identifiers were completed. Pt seen for PT treatment session this date, consisting of ther act focused on bed mobility ,sitting balance, sitting endurence, transfers, standing balance, standing endurence . Since previous session, pt has made good progress in terms of ability to complete 3 STS, ability to sit in recliner, increased tolerance to sitting EOB.  Pt greeted supine in bed and agreeable to PT session. Pt completed supine to sit with mod A x2. Pt remained sitting EOB for ~10 minutes with UE support on bed rails and min A x1. Pt completed 2 STS with RW and b/l foot blocks. Pt able to maintain standing for ~20s before requiring seated rest break. Pt completed STS with liborio stedy and max A x2. Pt maintained supported standing for ~2 minutes with liborio stedy before requiring seated  rest. Pt ended session seated in recliner with alarm activated and all needs within reach. During today's session pt required max VC to open eyes; however she was able to open them more often during last half of session. This is improvement compared to last session where pt refused to open eyes. Spoke to RN about session outcomes. Pertinent barriers during this session include cognitive status. Current goals and POC established on IE remain appropriate. Pt prognosis for achieving goals is good, pending pt progress with hospitalization/medical status improvements, and indicated by ability to follow directions, responsive to cues/strategies, and previous response to intervention. Pt limited d/t medical instability. Pt continues to be functioning below baseline level, and remains limited due to factors listed above. PT will continue to see pt during current hospitalization in order to address the deficits listed above and provide interventions consistent w/ POC in effort to achieve STGs. PT recommends level 2, moderate resource intensity upon discharge.  Barriers to Discharge: Other (Comment), Decreased caregiver support (decline in functional mobility)     Rehab Resource Intensity Level, PT: II (Moderate Resource Intensity)    See flowsheet documentation for full assessment.

## 2025-06-30 NOTE — PLAN OF CARE
Patient participated in Skilled OT session this date with interventions consisting of ADL re training with the use of correct body mechnaics, Energy Conservation techniques, deep breathing technique, safety awareness and fall prevention techniques,  therapeutic activities to: increase activity tolerance, increase dynamic sit/ stand balance during functional activity , and increase OOB/ sitting tolerance . Patient agreeable to OT treatment session, upon arrival patient was found supine in bed and in no apparent distress.  In comparison to previous session, patient with improvements in activity engagement. Patient required max assist for UB ADLs, total assist fro LB ADLs. Patient requiring verbal cues for correct technique, verbal cues for pacing thru activity steps, cognitive assistance to anticipate next step, and frequent rest periods. Patient continues to be functioning below baseline level, occupational performance remains limited secondary to factors listed above and increased risk for falls and injury.   From OT standpoint, recommendation at time of d/c would be level 2.   Patient to benefit from continued Occupational Therapy treatment while in the hospital to address deficits as defined above and maximize level of functional independence with ADLs and functional mobility.

## 2025-06-30 NOTE — PLAN OF CARE
Problem: Potential for Falls  Goal: Patient will remain free of falls  Description: INTERVENTIONS:  - Educate patient/family on patient safety including physical limitations  - Instruct patient to call for assistance with activity   - Consider consulting OT/PT to assist with strengthening/mobility based on AM PAC & JH-HLM score  - Consult OT/PT to assist with strengthening/mobility   - Keep Call bell within reach  - Keep bed low and locked with side rails adjusted as appropriate  - Keep care items and personal belongings within reach  - Initiate and maintain comfort rounds  - Make Fall Risk Sign visible to staff  - Offer Toileting every 2 Hours, in advance of need  - Initiate/Maintain bed alarm  - Obtain necessary fall risk management equipment  - Apply yellow socks and bracelet for high fall risk patients  - Consider moving patient to room near nurses station  6/30/2025 1103 by Margaret Winslow RN  Outcome: Progressing  6/30/2025 1101 by Margaret Winslow RN  Outcome: Progressing     Problem: Prexisting or High Potential for Compromised Skin Integrity  Goal: Skin integrity is maintained or improved  Description: INTERVENTIONS:  - Identify patients at risk for skin breakdown  - Assess and monitor skin integrity including under and around medical devices   - Assess and monitor nutrition and hydration status  - Monitor labs  - Assess for incontinence   - Turn and reposition patient  - Assist with mobility/ambulation  - Relieve pressure over bob prominences   - Avoid friction and shearing  - Provide appropriate hygiene as needed including keeping skin clean and dry  - Evaluate need for skin moisturizer/barrier cream  - Collaborate with interdisciplinary team  - Patient/family teaching  - Consider wound care consult    Assess:  - Review Fabricio scale daily  - Clean and moisturize skin   - Inspect skin when repositioning, toileting, and assisting with ADLS  - Assess under medical devices  - Assess extremities for adequate  circulation and sensation     Bed Management:  - Have minimal linens on bed & keep smooth, unwrinkled  - Change linens as needed when moist or perspiring  - Toileting:  - Offer bedside commode  - Assess for incontinence   - Use incontinent care products after each incontinent episode    Activity:  - Mobilize patient 3 times a day  - Encourage activity and walks on unit  - Encourage or provide ROM exercises   - Turn and reposition patient every 2 Hours  - Use appropriate equipment to lift or move patient in bed    Skin Care:  - Avoid use of baby powder, tape, friction and shearing, hot water or constrictive clothing  - Relieve pressure over bony prominences   - Do not massage red bony areas    Next Steps:  - Teach patient strategies to minimize risks   - Consider consults to  interdisciplinary teams  6/30/2025 1103 by Margaret Winslow RN  Outcome: Progressing  6/30/2025 1101 by Margaret Winslow RN  Outcome: Progressing     Problem: Nutrition/Hydration-ADULT  Goal: Nutrient/Hydration intake appropriate for improving, restoring or maintaining nutritional needs  Description: Monitor and assess patient's nutrition/hydration status for malnutrition. Collaborate with interdisciplinary team and initiate plan and interventions as ordered.  Monitor patient's weight and dietary intake as ordered or per policy. Utilize nutrition screening tool and intervene as necessary. Determine patient's food preferences and provide high-protein, high-caloric foods as appropriate.     INTERVENTIONS:  - Monitor oral intake, urinary output, labs, and treatment plans  - Assess nutrition and hydration status and recommend course of action  - Evaluate amount of meals eaten  - Assist patient with eating if necessary   - Allow adequate time for meals  - Recommend/ encourage appropriate diets, oral nutritional supplements, and vitamin/mineral supplements  - Order, calculate, and assess calorie counts as needed  - Recommend, monitor, and adjust tube  feedings and TPN/PPN based on assessed needs  - Assess need for intravenous fluids  - Provide specific nutrition/hydration education as appropriate  - Include patient/family/caregiver in decisions related to nutrition  6/30/2025 1103 by Margaret Winslow RN  Outcome: Progressing  6/30/2025 1101 by Margaret Winslow RN  Outcome: Progressing     Problem: SAFETY,RESTRAINT: NV/NON-SELF DESTRUCTIVE BEHAVIOR  Goal: Remains free of harm/injury (restraint for non violent/non self-detsructive behavior)  Description: INTERVENTIONS:  - Instruct patient/family regarding restraint use   - Assess and monitor physiologic and psychological status   - Provide interventions and comfort measures to meet assessed patient needs   - Identify and implement measures to help patient regain control  - Assess readiness for release of restraint   Outcome: Completed  Goal: Returns to optimal restraint-free functioning  Description: INTERVENTIONS:  - Assess the patient's behavior and symptoms that indicate continued need for restraint  - Identify and implement measures to help patient regain control  - Assess readiness for release of restraint   Outcome: Completed     Problem: GASTROINTESTINAL - ADULT  Goal: Maintains or returns to baseline bowel function  Description: INTERVENTIONS:  - Assess bowel function  - Encourage oral fluids to ensure adequate hydration  - Administer IV fluids if ordered to ensure adequate hydration  - Administer ordered medications as needed  - Encourage mobilization and activity  - Consider nutritional services referral to assist patient with adequate nutrition and appropriate food choices  Outcome: Progressing     Problem: INFECTION - ADULT  Goal: Absence or prevention of progression during hospitalization  Description: INTERVENTIONS:  - Assess and monitor for signs and symptoms of infection  - Monitor lab/diagnostic results  - Monitor all insertion sites, i.e. indwelling lines, tubes, and drains  - Monitor endotracheal if  appropriate and nasal secretions for changes in amount and color  - [place] appropriate cooling/warming therapies per order  - Administer medications as ordered  - Instruct and encourage patient and family to use good hand hygiene technique  - Identify and instruct in appropriate isolation precautions for identified infection/condition  Outcome: Progressing     Problem: SAFETY ADULT  Goal: Patient will remain free of falls  Description: INTERVENTIONS:  - Educate patient/family on patient safety including physical limitations  - Instruct patient to call for assistance with activity   - Consider consulting OT/PT to assist with strengthening/mobility based on AM PAC & -HLM score  - Consult OT/PT to assist with strengthening/mobility   - Keep Call bell within reach  - Keep bed low and locked with side rails adjusted as appropriate  - Keep care items and personal belongings within reach  - Initiate and maintain comfort rounds  - Make Fall Risk Sign visible to staff  - Offer Toileting every 2 Hours, in advance of need  - Initiate/Maintain bed alarm  - Obtain necessary fall risk management equipment  - Apply yellow socks and bracelet for high fall risk patients  - Consider moving patient to room near nurses station  6/30/2025 1103 by Margaret Winslow RN  Outcome: Progressing  6/30/2025 1101 by Margaret Winslow RN  Outcome: Progressing     Problem: Knowledge Deficit  Goal: Patient/family/caregiver demonstrates understanding of disease process, treatment plan, medications, and discharge instructions  Description: Complete learning assessment and assess knowledge base.  Interventions:  - Provide teaching at level of understanding  - Provide teaching via preferred learning methods  Outcome: Progressing

## 2025-06-30 NOTE — OCCUPATIONAL THERAPY NOTE
Occupational Therapy Treatment Note        Patient Name: Sona Valenzuela  Today's Date: 6/30/2025 06/30/25 1448   OT Last Visit   OT Visit Date 06/30/25   Note Type   Note Type Treatment for insurance authorization   Pain Assessment   Pain Assessment Tool FLACC   Pain Rating: FLACC (Rest) - Face 0   Pain Rating: FLACC (Rest) - Legs 0   Pain Rating: FLACC (Rest) - Activity 0   Pain Rating: FLACC (Rest) - Cry 0   Pain Rating: FLACC (Rest) - Consolability 0   Score: FLACC (Rest) 0   Pain Rating: FLACC (Activity) - Face 0   Pain Rating: FLACC (Activity) - Legs 0   Pain Rating: FLACC (Activity) - Activity 0   Pain Rating: FLACC (Activity) - Cry 0   Pain Rating: FLACC (Activity) - Consolability 0   Score: FLACC (Activity) 0   ADL   Where Assessed Edge of bed   Eating Assistance 1  Total Assistance   Grooming Assistance 2  Maximal Assistance   Grooming Deficit Setup;Supervision/safety;Increased time to complete   UB Bathing Assistance 2  Maximal Assistance   UB Bathing Deficit Setup;Steadying;Supervision/safety;Increased time to complete   LB Bathing Assistance 1  Total Assistance   UB Dressing Assistance 2  Maximal Assistance   UB Dressing Deficit Setup;Increased time to complete   LB Dressing Assistance 1  Total Assistance   Toileting Assistance  1  Total Assistance   Bed Mobility   Supine to Sit 3  Moderate assistance   Additional items Assist x 2;Increased time required;Verbal cues;LE management;HOB elevated   Transfers   Sit to Stand 2  Maximal assistance  (mechanical lift)   Additional items Assist x 2;Increased time required;Verbal cues;Armrests   Stand to Sit 2  Maximal assistance  (mechanical lift)   Additional items Assist x 2;Increased time required;Verbal cues   Functional Mobility   Functional Mobility 2  Maximal assistance   Additional Comments assist of 2 with Jacy Haney   Additional items   (Mechanical Conveyance- Jacy crispin)   Cognition   Overall Cognitive Status Impaired    Arousal/Participation Alert;Responsive;Cooperative   Attention Attends with cues to redirect   Orientation Level Oriented to person;Disoriented to place;Disoriented to time;Disoriented to situation   Memory Decreased recall of biographical information;Decreased short term memory   Following Commands Follows one step commands with increased time or repetition   Activity Tolerance   Activity Tolerance Patient limited by fatigue   Assessment   Assessment Patient participated in Skilled OT session this date with interventions consisting of ADL re training with the use of correct body mechnaics, Energy Conservation techniques, deep breathing technique, safety awareness and fall prevention techniques,  therapeutic activities to: increase activity tolerance, increase dynamic sit/ stand balance during functional activity , and increase OOB/ sitting tolerance . Patient agreeable to OT treatment session, upon arrival patient was found supine in bed and in no apparent distress.  In comparison to previous session, patient with improvements in activity engagement. Patient required max assist for UB ADLs, total assist fro LB ADLs. Patient requiring verbal cues for correct technique, verbal cues for pacing thru activity steps, cognitive assistance to anticipate next step, and frequent rest periods. Patient continues to be functioning below baseline level, occupational performance remains limited secondary to factors listed above and increased risk for falls and injury.   From OT standpoint, recommendation at time of d/c would be level 2.   Patient to benefit from continued Occupational Therapy treatment while in the hospital to address deficits as defined above and maximize level of functional independence with ADLs and functional mobility.   Plan   Treatment Interventions ADL retraining;Patient/family training;Cognitive reorientation;UE strengthening/ROM;Functional transfer training;Equipment evaluation/education;Compensatory  technique education;Continued evaluation;Energy conservation;Activityengagement   Goal Expiration Date 07/14/25   OT Frequency 3-5x/wk   Discharge Recommendation   Rehab Resource Intensity Level, OT II (Moderate Resource Intensity)   AM-PAC Daily Activity Inpatient   Lower Body Dressing 1   Bathing 1   Toileting 1   Upper Body Dressing 2   Grooming 2   Eating 2   Daily Activity Raw Score 9   AM-PAC Applied Cognition Inpatient   Following a Speech/Presentation 2   Understanding Ordinary Conversation 2   Taking Medications 1   Remembering Where Things Are Placed or Put Away 1   Remembering List of 4-5 Errands 1   Taking Care of Complicated Tasks 1   Applied Cognition Raw Score 8   Applied Cognition Standardized Score 19.32

## 2025-06-30 NOTE — PROGRESS NOTES
[facility] Note - Geriatric Medicine   Name: Sona Valenzuela 89 y.o. female I MRN: [id number]  Unit/Bed#: -01 I Date of Admission: 6/19/2025   Date of Service: 6/30/2025 I Hospital Day: 11    Assessment & Plan  Vascular dementia with behavioral disturbance (HCC)  History of prior memory issues and cognitive impairment   Presented to hospital for increased confusion, combativeness  Patient has lived with her niece and nephew for the past 30 years  Dependent with ADLs/IADLs    Previously followed with [facility]   Last seen on 09/29/23  Appears patient has occurences of sundowning, recently managed by PCP   PCP speaking with family of trial of increase dose of seroquel vs adding melatonin, family had opted to trial of melatonin, started on 06/13   Increased Melatonin to 6 mg HS in addition to increasing Seroquel to 50 mg at bedtime  Has history of hallucinations    Most recent TSH on 06/19/25 noted to be 3.797  Most recent vitamin B12 level on 01/24/23 noted to be 407  MRI brain on 03/13/25 revealed mild microangiopathic change  Patient scored 4/30 on most recent MOCA on 12/13/21  Keep physically, mentally, and socially active   PTA medication: Seroquel 25 mg po daily at bedtime   Increased to 50 mg at bedtime during current hospitalization, however modified dose to 25 mg twice daily   Was decreased to Depakote 250 mg every 12 hours on 6/20   Continues to tolerate PO medication per chart review   LFT's previously stable  Continue Gabapentin 100 mg twice daily, modified Seroquel 25 mg twice daily, continue Depakote 250 mg every 12 hours, Zyprexa 5 mg PO every 8 hours PRN for agitation  EKG 6/23 QTc 455    At risk for delirium secondary to age, cognitive decline, hospitalization, insomnia, immobility  Provide frequent redirection, reorientation, distraction techniques  Avoid deliriogenic medications such as tramadol, benzodiazepines, anticholinergics,  Benadryl  Treat pain, See geriatric pain  protocol  Monitor for constipation and urinary retention  Encourage early and frequent moblization, OOB  Encourage Hydration/ Nutrition  Implement sleep hygiene, limit night time interuptions, group activities  Avoid physical restraints as able  Use chemical restraint only when other efforts have failed, recommend zyprexa 2.5mg IM q8h prn  Monitor Qtc, if greater than 500 do not use antipsychotic medication  If QTc greater than 460, monitor and replete and deficiency of  K and Mg, recheck EKG    Recurrent major depressive disorder, in remission (HCC)  Patient has a history of anxiety/depression   Mood appears stable on exam today   Continue supportive care   PTA medication: mirtazapine 7.5 mg daily at bedtime  Acute kidney injury superimposed on stage 3b chronic kidney disease ([facility])  Lab Results   Component Value Date    EGFR 34 06/30/2025    EGFR 30 06/29/2025    EGFR 29 06/29/2025    CREATININE 1.27 06/30/2025    CREATININE 1.40 (H) 06/29/2025    CREATININE 1.47 (H) 06/29/2025     Baseline creatinine: 1.3-1.4  Continue to monitor kidney function  Monitor I/O's  Encourage PO hydration   Avoid hypotension  Avoid nephrotoxins, IV contrast  Received gentle IV fluids overnight  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)  Wt Readings from Last 3 Encounters:   06/19/25 63.5 kg (139 lb 15.9 oz)   06/13/25 63.5 kg (140 lb)   04/03/25 61.7 kg (136 lb)     Weight stable  Euvolemic  Does not appear short of breath, no leg swelling  Family noted to PCP on 06/13 of intermittent shortness of breath with exertion   ECHO on 03/06/25 showing EF 55%  Chest XR pending  PTA medication: furosemide 20 mg BID    Ambulatory dysfunction  Recently was having home health/physical therapy, re-ordered by PCP  Assess patient frequently for physical needs, encourage use of assistant devices as needed and directed by PT/OT  Identify cognitive and physical deficits and behaviors that affect risk of falls  Consider  moving patient closer to nursing station to monitor more closely for impulsive behavior which may increase risk of falls  [place] fall and safety precautions   Educate patient/family on patient safety including physical limitations and importance of using call bell for assistance   Modify environment to reduce risk of injury including disconnecting from pole when not in use, ensuring adequate lighting in room and restroom, ensuring that path to restroom is clear and free of trip hazards  PT/OT consulted to assist with strengthening/mobility and assist with discharge planning to appropriate level of care  Out of bed as tolerated  Family interested in PT/OT services  Goals of care, counseling/discussion  DNR/DNI  Palliative Care consulted, family not interested in hospice at this time, expressed interest in PT/OT services  Palliative care encounter  Palliative Care consulted, having goals of care discussion with family    Primary insomnia  First line is behavioral therapy   Avoid sedative hypnotics including benzodiazepines and benadryl  Encourage staying awake during the day   Encourage daytime activities and morning exercise   Decrease or eliminate daytime naps   Avoid caffeine especially during late afternoon and evening hours  Establish a nighttime routine  Implement sleep hygiene and limit nighttime interruptions  Continue melatonin 6 mg daily at bedtime and mirtazapine 7.5 mg daily at bedtime for sleep   Frailty syndrome in geriatric patient  Clinical Frail Scale: 6- Moderately Frail  Need help with all outside activities  Need help with stairs and bathing  May need assistance with dressing  Resides with family who assist with ADLs/IADLs  Appears to have good appetite  Albumin level on 06/20/25 was 3.3  Continue nutritional supplementation  Continue to optimize co-morbidities   Fall precautions     Uziel Magallanes is seen today for follow up. Upon exam today, she is laying in bed, resting. No events  overnight after review with staff. She Is calm and cooperative.     Objective :  Temp:  [97.4 °F (36.3 °C)-97.6 °F (36.4 °C)] 97.6 °F (36.4 °C)  HR:  [82-97] 85  BP: (117-118)/(64-66) 118/66  Resp:  [17] 17  SpO2:  [84 %-98 %] 96 %  O2 Device: Nasal cannula  Nasal Cannula O2 Flow Rate (L/min):  [1 L/min-2 L/min] 1 L/min    Physical Exam  Vitals and nursing note reviewed.   Constitutional:       Comments: Frail appearing     Cardiovascular:      Rate and Rhythm: Normal rate and regular rhythm.      Pulses: Normal pulses.      Heart sounds: Normal heart sounds.   Pulmonary:      Effort: Pulmonary effort is normal. No respiratory distress.      Breath sounds: Normal breath sounds.   Abdominal:      General: Abdomen is flat. Bowel sounds are normal.      Palpations: Abdomen is soft.     Musculoskeletal:      Right lower leg: No edema.      Left lower leg: No edema.     Skin:     General: Skin is warm and dry.      Capillary Refill: Capillary refill takes less than 2 seconds.     Neurological:      Mental Status: She is alert. She is disoriented.      Motor: Weakness present.      Gait: Gait abnormal.      Comments: ~AOx1   Psychiatric:         Mood and Affect: Mood normal.           Lab Results: I have reviewed the following results:CBC/BMP:   .     06/30/25  0808   WBC 6.18   HGB 8.8*   HCT 30.7*      SODIUM 143   K 4.8   *   CO2 30   BUN 33*   CREATININE 1.27   GLUC 93        Imaging Results Review: No pertinent imaging studies reviewed.  Other Study Results Review: No additional pertinent studies reviewed.    Therapies:   Basic Mobility Inpatient Raw Score: 6  -[facility] Goal: 2: Bed activities/Dependent transfer  -[facility] Achieved: 5: Stand (1 or more minutes)  PT: Following  OT: Following  ST: Following    VTE Prophylaxis: VTE covered by:  heparin (porcine), Subcutaneous, 5,000 Units at 06/30/25 1346     and Sequential compression device (Venodyne)     Code Status: Level 3 - DNAR and DNI  Advance Directive  and Living Will:      Power of :    POLST:      Family and Social Support: Niece, Xi; Nephew, Juancarlos      Goals of Care: SNF    Administrative Statements   I have spent a total time of 50 minutes in caring for this patient on the day of the visit/encounter including Diagnostic results, Prognosis, and Documenting in the medical record.

## 2025-06-30 NOTE — ASSESSMENT & PLAN NOTE
Lab Results   Component Value Date    EGFR 34 06/30/2025    EGFR 30 06/29/2025    EGFR 29 06/29/2025    CREATININE 1.27 06/30/2025    CREATININE 1.40 (H) 06/29/2025    CREATININE 1.47 (H) 06/29/2025     Baseline creatinine: 1.3-1.4  Continue to monitor kidney function  Monitor I/O's  Encourage PO hydration   Avoid hypotension  Avoid nephrotoxins, IV contrast  Received gentle IV fluids overnight

## 2025-06-30 NOTE — DISCHARGE SUPPORT
Rec'd vm from [facility] stated that updated MD note is needed.P: [phone]    progress note  from 6/29 uploaded to the portal.    Juan Manuel notified Zunilda ROLLINS

## 2025-07-01 LAB
ANION GAP SERPL CALCULATED.3IONS-SCNC: 2 MMOL/L (ref 4–13)
ANION GAP SERPL CALCULATED.3IONS-SCNC: 3 MMOL/L (ref 4–13)
BUN SERPL-MCNC: 23 MG/DL (ref 5–25)
BUN SERPL-MCNC: 26 MG/DL (ref 5–25)
CALCIUM SERPL-MCNC: 8.7 MG/DL (ref 8.4–10.2)
CALCIUM SERPL-MCNC: 8.9 MG/DL (ref 8.4–10.2)
CHLORIDE SERPL-SCNC: 108 MMOL/L (ref 96–108)
CHLORIDE SERPL-SCNC: 108 MMOL/L (ref 96–108)
CO2 SERPL-SCNC: 32 MMOL/L (ref 21–32)
CO2 SERPL-SCNC: 34 MMOL/L (ref 21–32)
CREAT SERPL-MCNC: 1.04 MG/DL (ref 0.6–1.3)
CREAT SERPL-MCNC: 1.19 MG/DL (ref 0.6–1.3)
GFR SERPL CREATININE-BSD FRML MDRD: 37 ML/MIN/1.73SQ M
GFR SERPL CREATININE-BSD FRML MDRD: 44 ML/MIN/1.73SQ M
GLUCOSE SERPL-MCNC: 104 MG/DL (ref 65–140)
GLUCOSE SERPL-MCNC: 99 MG/DL (ref 65–140)
POTASSIUM SERPL-SCNC: 4.4 MMOL/L (ref 3.5–5.3)
POTASSIUM SERPL-SCNC: 4.6 MMOL/L (ref 3.5–5.3)
SODIUM SERPL-SCNC: 143 MMOL/L (ref 135–147)
SODIUM SERPL-SCNC: 144 MMOL/L (ref 135–147)

## 2025-07-01 PROCEDURE — 99232 SBSQ HOSP IP/OBS MODERATE 35: CPT

## 2025-07-01 PROCEDURE — 99233 SBSQ HOSP IP/OBS HIGH 50: CPT | Performed by: INTERNAL MEDICINE

## 2025-07-01 PROCEDURE — 80048 BASIC METABOLIC PNL TOTAL CA: CPT | Performed by: STUDENT IN AN ORGANIZED HEALTH CARE EDUCATION/TRAINING PROGRAM

## 2025-07-01 RX ORDER — QUETIAPINE FUMARATE 25 MG/1
25 TABLET, FILM COATED ORAL
Status: DISCONTINUED | OUTPATIENT
Start: 2025-07-01 | End: 2025-07-03 | Stop reason: HOSPADM

## 2025-07-01 RX ORDER — QUETIAPINE FUMARATE 25 MG/1
12.5 TABLET, FILM COATED ORAL DAILY
Status: DISCONTINUED | OUTPATIENT
Start: 2025-07-01 | End: 2025-07-01

## 2025-07-01 RX ORDER — QUETIAPINE FUMARATE 25 MG/1
12.5 TABLET, FILM COATED ORAL DAILY
Status: DISCONTINUED | OUTPATIENT
Start: 2025-07-02 | End: 2025-07-03 | Stop reason: HOSPADM

## 2025-07-01 RX ADMIN — MIRTAZAPINE 7.5 MG: 15 TABLET, FILM COATED ORAL at 21:01

## 2025-07-01 RX ADMIN — DIVALPROEX SODIUM 125 MG: 125 CAPSULE, COATED PELLETS ORAL at 08:27

## 2025-07-01 RX ADMIN — GUAIFENESIN AND DEXTROMETHORPHAN 10 ML: 100; 10 SYRUP ORAL at 08:27

## 2025-07-01 RX ADMIN — HEPARIN SODIUM 5000 UNITS: 5000 INJECTION INTRAVENOUS; SUBCUTANEOUS at 05:57

## 2025-07-01 RX ADMIN — QUETIAPINE FUMARATE 25 MG: 25 TABLET ORAL at 08:28

## 2025-07-01 RX ADMIN — Medication 6 MG: at 21:02

## 2025-07-01 RX ADMIN — HEPARIN SODIUM 5000 UNITS: 5000 INJECTION INTRAVENOUS; SUBCUTANEOUS at 13:50

## 2025-07-01 RX ADMIN — GABAPENTIN 100 MG: 100 CAPSULE ORAL at 08:28

## 2025-07-01 RX ADMIN — DIVALPROEX SODIUM 125 MG: 125 CAPSULE, COATED PELLETS ORAL at 21:01

## 2025-07-01 RX ADMIN — GABAPENTIN 100 MG: 100 CAPSULE ORAL at 17:44

## 2025-07-01 RX ADMIN — SODIUM ZIRCONIUM CYCLOSILICATE 10 G: 10 POWDER, FOR SUSPENSION ORAL at 08:29

## 2025-07-01 RX ADMIN — QUETIAPINE FUMARATE 25 MG: 25 TABLET ORAL at 21:01

## 2025-07-01 RX ADMIN — HEPARIN SODIUM 5000 UNITS: 5000 INJECTION INTRAVENOUS; SUBCUTANEOUS at 21:02

## 2025-07-01 NOTE — ASSESSMENT & PLAN NOTE
Recently was having home health/physical therapy, re-ordered by PCP  Assess patient frequently for physical needs, encourage use of assistant devices as needed and directed by PT/OT  Identify cognitive and physical deficits and behaviors that affect risk of falls  Consider moving patient closer to nursing station to monitor more closely for impulsive behavior which may increase risk of falls  [place] fall and safety precautions   Educate patient/family on patient safety including physical limitations and importance of using call bell for assistance   Modify environment to reduce risk of injury including disconnecting from pole when not in use, ensuring adequate lighting in room and restroom, ensuring that path to restroom is clear and free of trip hazards  PT/OT consulted to assist with strengthening/mobility and assist with discharge planning to appropriate level of care  Out of bed as tolerated  Family interested in PT/OT services

## 2025-07-01 NOTE — PROGRESS NOTES
Progress Note - Hospitalist   Name: Sona Valenzuela 89 y.o. female I MRN: [id number]  Unit/Bed#: -[facility] I Date of Admission: 6/19/2025   Date of Service: 7/1/2025 I Hospital Day: 12    Assessment & Plan  Vascular dementia with behavioral disturbance (HCC)  Per niece, reported to have increased bouts of confusion, combativeness more freuqently over the past few weeks  Recently was treated for UTI in May and completed nitrofurantoin regimen at that time; prior MDRO on urine clx  Urine culture noted E. coli ESBL.  Completed 5 days of IV ertapenem.  Patient has been having waxing waning mentation at times.  As of this morning patient slightly sleepier however easily aroused.  No RN reports of agitation or aggressive behavior.  Plan:  Continue Depakote 125 mg twice daily  Continue Seroquel, decreased a.m. dose of Seroquel to 12.5 mg and continue 25 mg at bedtime.  Melatonin 6 mg nightly.  Remeron 7.5 mg nightly.  Continue p.o. Zyprexa 5 mg every 8 hours as needed .  Speech recommended dysphagia 1 purée diet with thin liquids  Currently patient is off of  mittens, off of one-to-one.  Pending insurance Auth and rehab placement.  Per case management patient will require peer to peer.  Awaiting the call from the insurance.    Acute kidney injury superimposed on stage 3b chronic kidney disease (HCC)  Lab Results   Component Value Date    EGFR 37 07/01/2025    EGFR 37 06/30/2025    EGFR 34 06/30/2025    CREATININE 1.19 07/01/2025    CREATININE 1.19 06/30/2025    CREATININE 1.27 06/30/2025     Baseline creatinine is 1.2-1.4  K 4.8  Current is 1.27  Now off of IVF  Monitor BMP in AM    Recurrent major depressive disorder, in remission (HCC)  Continue home regimen Seroquel and mirtazapine  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)  Wt Readings from Last 3 Encounters:   06/19/25 63.5 kg (139 lb 15.9 oz)   06/13/25 63.5 kg (140 lb)   04/03/25 61.7 kg (136 lb)     Lab Results   Component Value Date      (H) 06/23/2025     (H) 03/04/2025    LVEF 55 03/06/2025     Appears euvolemic.  Hold home dose of p.o. Lasix.  Ambulatory dysfunction  PT/OT Eval and treat  Trend Mobility Scores  Encourage appropriate mobility to achieve and improve upon HLM Goals as noted  Primary insomnia  Mirtazapine  Goals of care, counseling/discussion  Palliative consulted  Frailty syndrome in geriatric patient  Appreciate geriatrics    VTE Pharmacologic Prophylaxis: VTE Score: 4 Moderate Risk (Score 3-4) - Pharmacological DVT Prophylaxis Ordered: heparin.    Mobility:   Basic Mobility Inpatient Raw Score: 6  JH-HLM Goal: 2: Bed activities/Dependent transfer  JH-HLM Achieved: 2: Bed activities/Dependent transfer  JH-HLM Goal achieved. Continue to encourage appropriate mobility.    Patient Centered Rounds: I performed bedside rounds with nursing staff today.   Discussions with Specialists or Other Care Team Provider: Discussed with case management.    Education and Discussions with Family / Patient: Updated  (niece) via phone.    Current Length of Stay: 12 day(s)  Current Patient Status: Inpatient   Certification Statement: The patient will continue to require additional inpatient hospital stay due to pending placement  Discharge Plan: Anticipate discharge later today or tomorrow to rehab facility.    Code Status: Level 3 - DNAR and DNI    Subjective     Lethargic on exam.  Easly arousable.    Objective :  Temp:  [97.7 °F (36.5 °C)] 97.7 °F (36.5 °C)  HR:  [80-83] 83  BP: (116-120)/(66) 120/66  Resp:  [16-20] 20  SpO2:  [96 %-97 %] 96 %  O2 Device: Nasal cannula  Nasal Cannula O2 Flow Rate (L/min):  [1 L/min] 1 L/min    Body mass index is 26.45 kg/m².     Input and Output Summary (last 24 hours):     Intake/Output Summary (Last 24 hours) at 7/1/2025 1451  Last data filed at 7/1/2025 0856  Gross per 24 hour   Intake 240 ml   Output --   Net 240 ml       Physical Exam  Vitals and nursing note reviewed.    Constitutional:       General: She is sleeping. She is not in acute distress.     Appearance: She is underweight.   HENT:      Head: Normocephalic and atraumatic.     Eyes:      Conjunctiva/sclera: Conjunctivae normal.       Cardiovascular:      Rate and Rhythm: Normal rate and regular rhythm.      Heart sounds: No murmur heard.  Pulmonary:      Effort: Pulmonary effort is normal. No respiratory distress.      Breath sounds: Normal breath sounds.   Abdominal:      Palpations: Abdomen is soft.      Tenderness: There is no abdominal tenderness.     Musculoskeletal:         General: No swelling.      Cervical back: Neck supple.      Right lower leg: No edema.      Left lower leg: No edema.     Skin:     General: Skin is warm and dry.      Capillary Refill: Capillary refill takes less than 2 seconds.     Neurological:      Mental Status: Mental status is at baseline. She is disoriented.     Psychiatric:         Mood and Affect: Mood normal.           Lines/Drains:              Lab Results: I have reviewed the following results:   Results from last 7 days   Lab Units 06/30/25  0808   WBC Thousand/uL 6.18   HEMOGLOBIN g/dL 8.8*   HEMATOCRIT % 30.7*   PLATELETS Thousands/uL 216     Results from last 7 days   Lab Units 07/01/25  0758 06/29/25  2058 06/29/25  0633   SODIUM mmol/L 144   < > 142   POTASSIUM mmol/L 4.6   < > 5.5*   CHLORIDE mmol/L 108   < > 108   CO2 mmol/L 34*   < > 29   BUN mg/dL 26*   < > 36*   CREATININE mg/dL 1.19   < > 1.47*   ANION GAP mmol/L 2*   < > 5   CALCIUM mg/dL 8.9   < > 9.1   ALBUMIN g/dL  --   --  3.3*   TOTAL BILIRUBIN mg/dL  --   --  0.89   ALK PHOS U/L  --   --  107*   ALT U/L  --   --  19   AST U/L  --   --  25   GLUCOSE RANDOM mg/dL 104   < > 85    < > = values in this interval not displayed.                       Recent Cultures (last 7 days):         Imaging Results Review: I reviewed radiology reports from this admission including: chest xray.  Other Study Results Review: No  additional pertinent studies reviewed.    Last 24 Hours Medication List:     Current Facility-Administered Medications:     acetaminophen (TYLENOL) tablet 650 mg, Q8H PRN    dextromethorphan-guaiFENesin (ROBITUSSIN DM) oral syrup 10 mL, Q4H PRN    [DISCONTINUED] valproate (DEPACON) 125 mg in sodium chloride 0.9 % 50 mL IVPB, Q12H HESHAM **OR** divalproex sodium (DEPAKOTE SPRINKLE) capsule 125 mg, Q12H HESHAM    docusate sodium (COLACE) capsule 100 mg, BID    [Held by provider] furosemide (LASIX) tablet 20 mg, BID    gabapentin (NEURONTIN) capsule 100 mg, BID    heparin (porcine) subcutaneous injection 5,000 Units, Q8H HESHAM **AND** [COMPLETED] Platelet count, Once    melatonin tablet 6 mg, HS    mirtazapine (REMERON) tablet 7.5 mg, HS    OLANZapine (ZyPREXA ZYDIS) dispersible tablet 5 mg, Q8H PRN    ondansetron (ZOFRAN) injection 4 mg, Q6H PRN    [START ON 7/2/2025] QUEtiapine (SEROquel) tablet 12.5 mg, Daily    QUEtiapine (SEROquel) tablet 25 mg, HS    simethicone (MYLICON) chewable tablet 80 mg, 4x Daily PRN    Sodium Zirconium Cyclosilicate (Lokelma) 10 g, Daily    Administrative Statements   Today, Patient Was Seen By: Sil Guerra MD      **Please Note: This note may have been constructed using a voice recognition system.**

## 2025-07-01 NOTE — ASSESSMENT & PLAN NOTE
Lab Results   Component Value Date    EGFR 37 07/01/2025    EGFR 37 06/30/2025    EGFR 34 06/30/2025    CREATININE 1.19 07/01/2025    CREATININE 1.19 06/30/2025    CREATININE 1.27 06/30/2025     Baseline creatinine is 1.2-1.4  K 4.8  Current is 1.27  Now off of IVF  Monitor BMP in AM

## 2025-07-01 NOTE — DISCHARGE SUPPORT
Case Management Assessment & Discharge Planning Note    Patient name Sona Valenzuela  Location /-01 MRN [id number]  : 11/3/1924 Date 2025       Current Admission Date: 2025  Current Admission Diagnosis:Vascular dementia with behavioral disturbance (HCC)   Patient Active Problem List    Diagnosis Date Noted    Goals of care, counseling/discussion 2025    Palliative care encounter 2025    Frailty syndrome in geriatric patient 2025    Primary malignant neoplasm of bladder (HCC) 2025    Iron deficiency anemia 03/10/2025    Seizure-like activity ([facility]) 2025    Elevated troponin 2025    Congestion of throat 2025    Vascular dementia with behavioral disturbance ([facility])     Ambulatory dysfunction 2024    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, psychotic disturbance, mood disturbance, or anxiety ([facility]) 2024    Chronic pain of left wrist 10/28/2024    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 ([facility])     Acute on chronic HFpEF with severe pulmonary hypertension ([facility]) 2024    Primary hypertension 2024    Paroxysmal A-fib ([facility]) 2024    Compression fracture of lumbar spine, non-traumatic ([facility]) 2024    Mild protein-calorie malnutrition ([facility]) 2024    Vascular dementia, uncomplicated ([facility]) 2023    Does mobilize using walker 2023    Chronic congestive heart failure ([facility]) 2022    Chronic atrial fibrillation ([facility]) 10/11/2022    Recurrent major depressive disorder, in remission ([facility]) 2022    Acute kidney injury superimposed on stage 3b chronic kidney disease ([facility]) 2022    Vitamin D deficiency 2020    Primary insomnia 2019    Degeneration of lumbar intervertebral disc 2019    Lumbar radiculopathy 2019    Arthropathy of lumbar facet joint 2019    Restless legs syndrome 2019      LOS (days): 12  Geometric Mean LOS (GMLOS)  (days): 3  Days to GMLOS:-8.7   Facility Auth Adverse Decision  DC Support Center has received: P2P offered prior to determination  For Level of Care: SNF  Insurance: [employer]  Name/Phone # of Rep who called in information: Jessica  Reason for Adverse Decision: Does not meet criteria  Reference Number: [id number]  Facility [facility]: [facility]  Peer to Peer?: Offered  Peer to Peer Phone #: [phone]  P2P Deadline: 7/2 5:00 pm  For P2P Completion:: CM to task P2P to attending to complete if desired   notified: Kenyetta TORRES     Updates to authorization status will be noted in chart.    Please reach out to CM for updates on any clinical information.

## 2025-07-01 NOTE — ASSESSMENT & PLAN NOTE
Lab Results   Component Value Date    EGFR 37 07/01/2025    EGFR 37 06/30/2025    EGFR 34 06/30/2025    CREATININE 1.19 07/01/2025    CREATININE 1.19 06/30/2025    CREATININE 1.27 06/30/2025     Baseline creatinine: 1.3-1.4  Continue to monitor kidney function  Monitor I/O's  Encourage PO hydration   Avoid hypotension  Avoid nephrotoxins, IV contrast  Received gentle IV fluids overnight

## 2025-07-01 NOTE — PROGRESS NOTES
Progress Note - Geriatric Medicine   Name: Sona Valenzuela 89 y.o. female I MRN: [id number]  Unit/Bed#: -01 I Date of Admission: 6/19/2025   Date of Service: 7/1/2025 I Hospital Day: 12    Assessment & Plan  Vascular dementia with behavioral disturbance (HCC)  History of prior memory issues and cognitive impairment   Presented to hospital for increased confusion, combativeness  Patient has lived with her niece and nephew for the past 30 years  Dependent with ADLs/IADLs    Previously followed with [facility]   Last seen on 09/29/23  Appears patient has occurences of sundowning, recently managed by PCP   PCP speaking with family of trial of increase dose of seroquel vs adding melatonin, family had opted to trial of melatonin, started on 06/13   Increased Melatonin to 6 mg HS in addition to increasing Seroquel to 50 mg at bedtime  Has history of hallucinations    Most recent TSH on 06/19/25 noted to be 3.797  Most recent vitamin B12 level on 01/24/23 noted to be 407  MRI brain on 03/13/25 revealed mild microangiopathic change  Patient scored 4/30 on most recent MOCA on 12/13/21  Keep physically, mentally, and socially active   PTA medication: Seroquel 25 mg po daily at bedtime   Increased to 50 mg at bedtime during current hospitalization, however modified dose to 25 mg twice daily   Was decreased to Depakote 250 mg every 12 hours on 6/20   Continues to tolerate PO medication per chart review   LFT's previously stable  Continue Gabapentin 100 mg twice daily, modified Seroquel 25 mg twice daily, continue Depakote 250 mg every 12 hours, Zyprexa 5 mg PO every 8 hours PRN for agitation  EKG 6/23 QTc 455    At risk for delirium secondary to age, cognitive decline, hospitalization, insomnia, immobility  Provide frequent redirection, reorientation, distraction techniques  Avoid deliriogenic medications such as tramadol, benzodiazepines, anticholinergics,  Benadryl  Treat pain, See geriatric pain  protocol  Monitor for constipation and urinary retention   Noted bowel movement per chart review: 6/29  Encourage early and frequent moblization, OOB  Encourage Hydration/ Nutrition  Implement sleep hygiene, limit night time interuptions, group activities  Avoid physical restraints as able  Use chemical restraint only when other efforts have failed, recommend zyprexa 2.5mg IM q8h prn  Monitor Qtc, if greater than 500 do not use antipsychotic medication  If QTc greater than 460, monitor and replete and deficiency of  K and Mg, recheck EKG    Recurrent major depressive disorder, in remission (HCC)  Patient has a history of anxiety/depression   Mood appears stable on exam today   Continue supportive care   PTA medication: mirtazapine 7.5 mg daily at bedtime  Acute kidney injury superimposed on stage 3b chronic kidney disease ([facility])  Lab Results   Component Value Date    EGFR 37 07/01/2025    EGFR 37 06/30/2025    EGFR 34 06/30/2025    CREATININE 1.19 07/01/2025    CREATININE 1.19 06/30/2025    CREATININE 1.27 06/30/2025     Baseline creatinine: 1.3-1.4  Continue to monitor kidney function  Monitor I/O's  Encourage PO hydration   Avoid hypotension  Avoid nephrotoxins, IV contrast  Received gentle IV fluids overnight  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)  Wt Readings from Last 3 Encounters:   06/19/25 63.5 kg (139 lb 15.9 oz)   06/13/25 63.5 kg (140 lb)   04/03/25 61.7 kg (136 lb)     Weight stable  Euvolemic  Does not appear short of breath, no leg swelling  Family noted to PCP on 06/13 of intermittent shortness of breath with exertion   ECHO on 03/06/25 showing EF 55%  Chest XR pending  PTA medication: furosemide 20 mg BID    Ambulatory dysfunction  Recently was having home health/physical therapy, re-ordered by PCP  Assess patient frequently for physical needs, encourage use of assistant devices as needed and directed by PT/OT  Identify cognitive and physical deficits and behaviors  that affect risk of falls  Consider moving patient closer to nursing station to monitor more closely for impulsive behavior which may increase risk of falls  [place] fall and safety precautions   Educate patient/family on patient safety including physical limitations and importance of using call bell for assistance   Modify environment to reduce risk of injury including disconnecting from pole when not in use, ensuring adequate lighting in room and restroom, ensuring that path to restroom is clear and free of trip hazards  PT/OT consulted to assist with strengthening/mobility and assist with discharge planning to appropriate level of care  Out of bed as tolerated  Family interested in PT/OT services  Goals of care, counseling/discussion  DNR/DNI  Palliative Care consulted, family not interested in hospice at this time, expressed interest in PT/OT services  Palliative care encounter  Palliative Care consulted, having goals of care discussion with family    Primary insomnia  First line is behavioral therapy   Avoid sedative hypnotics including benzodiazepines and benadryl  Encourage staying awake during the day   Encourage daytime activities and morning exercise   Decrease or eliminate daytime naps   Avoid caffeine especially during late afternoon and evening hours  Establish a nighttime routine  Implement sleep hygiene and limit nighttime interruptions  Continue melatonin 6 mg daily at bedtime and mirtazapine 7.5 mg daily at bedtime for sleep   Frailty syndrome in geriatric patient  Clinical Frail Scale: 6- Moderately Frail  Need help with all outside activities  Need help with stairs and bathing  May need assistance with dressing  Resides with family who assist with ADLs/IADLs  Appears to have good appetite  Albumin level on 06/20/25 was 3.3  Continue nutritional supplementation  Continue to optimize co-morbidities   Fall precautions     Uziel Magallanes is seen today for follow up. Upon exam, she is resting,  awakes to voice. She is calm and cooperative. No acute complaints at this time. No noted acute events overnight per chart review.     Objective :  Temp:  [97.7 °F (36.5 °C)] 97.7 °F (36.5 °C)  HR:  [80-83] 83  BP: (116-120)/(66) 120/66  Resp:  [16-20] 20  SpO2:  [84 %-97 %] 96 %  O2 Device: Nasal cannula  Nasal Cannula O2 Flow Rate (L/min):  [1 L/min-2 L/min] 1 L/min    Physical Exam  Vitals and nursing note reviewed.   Constitutional:       Comments: Frail appearing     Cardiovascular:      Rate and Rhythm: Normal rate and regular rhythm.      Pulses: Normal pulses.      Heart sounds: Normal heart sounds.   Pulmonary:      Effort: Pulmonary effort is normal. No respiratory distress.      Breath sounds: Normal breath sounds.   Abdominal:      General: Abdomen is flat. Bowel sounds are normal.      Palpations: Abdomen is soft.     Musculoskeletal:      Right lower leg: No edema.      Left lower leg: No edema.     Skin:     General: Skin is warm and dry.      Capillary Refill: Capillary refill takes less than 2 seconds.     Neurological:      Mental Status: She is alert. Mental status is at baseline. She is disoriented.      Motor: Weakness (BLE) present.      Gait: Gait abnormal (use of AD).     Psychiatric:         Mood and Affect: Mood normal.         Behavior: Behavior normal.           Lab Results: I have reviewed the following results:CBC/BMP:   .     07/01/25  0758   SODIUM 144   K 4.6      CO2 34*   BUN 26*   CREATININE 1.19   GLUC 104      Imaging Results Review: No pertinent imaging studies reviewed.  Other Study Results Review: No additional pertinent studies reviewed.    Therapies:   Basic Mobility Inpatient Raw Score: 6  -[facility] Goal: 2: Bed activities/Dependent transfer  -[facility] Achieved: 2: Bed activities/Dependent transfer  PT: Following  OT: Following    VTE Prophylaxis: VTE covered by:  heparin (porcine), Subcutaneous, 5,000 Units at 07/01/25 0557    and Sequential compression device (Venodyne)      Code Status: Level 3 - DNAR and DNI  Advance Directive and Living Will:      Power of :    POLST:      Family and Social Support: [place] and [place]; Nephew, Juancarlos      Goals of Care: STR placement, Level II Rehab Resource     Administrative Statements   I have spent a total time of 35 minutes in caring for this patient on the day of the visit/encounter including Prognosis, Risk factor reductions, Impressions, Counseling / Coordination of care, Documenting in the medical record, and Reviewing/placing orders in the medical record (including tests, medications, and/or procedures).

## 2025-07-01 NOTE — ASSESSMENT & PLAN NOTE
Per niece, reported to have increased bouts of confusion, combativeness more freuqently over the past few weeks  Recently was treated for UTI in May and completed nitrofurantoin regimen at that time; prior MDRO on urine clx  Urine culture noted E. coli ESBL.  Completed 5 days of IV ertapenem.  Patient has been having waxing waning mentation at times.  As of this morning patient slightly sleepier however easily aroused.  No RN reports of agitation or aggressive behavior.  Plan:  Continue Depakote 125 mg twice daily  Continue Seroquel, decreased a.m. dose of Seroquel to 12.5 mg and continue 25 mg at bedtime.  Melatonin 6 mg nightly.  Remeron 7.5 mg nightly.  Continue p.o. Zyprexa 5 mg every 8 hours as needed .  Speech recommended dysphagia 1 purée diet with thin liquids  Currently patient is off of  mittens, off of one-to-one.  Pending insurance Auth and rehab placement.  Per case management patient will require peer to peer.  Awaiting the call from the insurance.

## 2025-07-01 NOTE — PLAN OF CARE
Problem: Potential for Falls  Goal: Patient will remain free of falls  Description: INTERVENTIONS:  - Educate patient/family on patient safety including physical limitations  - Instruct patient to call for assistance with activity   - Consider consulting OT/PT to assist with strengthening/mobility based on AM PAC & -HLM score  - Consult OT/PT to assist with strengthening/mobility   - Keep Call bell within reach  - Keep bed low and locked with side rails adjusted as appropriate  - Keep care items and personal belongings within reach  - Initiate and maintain comfort rounds  - Make Fall Risk Sign visible to staff  - Offer Toileting every 2 Hours, in advance of need  - Initiate/Maintain alarm  - Obtain necessary fall risk management equipment  - Apply yellow socks and bracelet for high fall risk patients  - Consider moving patient to room near nurses station  Outcome: Progressing     Problem: Prexisting or High Potential for Compromised Skin Integrity  Goal: Skin integrity is maintained or improved  Description: INTERVENTIONS:  - Identify patients at risk for skin breakdown  - Assess and monitor skin integrity including under and around medical devices   - Assess and monitor nutrition and hydration status  - Monitor labs  - Assess for incontinence   - Turn and reposition patient  - Assist with mobility/ambulation  - Relieve pressure over bob prominences   - Avoid friction and shearing  - Provide appropriate hygiene as needed including keeping skin clean and dry  - Evaluate need for skin moisturizer/barrier cream  - Collaborate with interdisciplinary team  - Patient/family teaching  - Consider wound care consult    Assess:  - Review Fabricio scale daily  - Clean and moisturize skin   - Inspect skin when repositioning, toileting, and assisting with ADLS  - Assess under medical devices   - Assess extremities for adequate circulation and sensation     Bed Management:  - Have minimal linens on bed & keep smooth,  unwrinkled  - Change linens as needed when moist or perspiring  - Avoid sitting or lying in one position  while in bed?Keep HOB at 30 degrees   - Toileting:  - Offer bedside commode  - Assess for incontinence   - Use incontinent care products after each incontinent episode     Activity:  - Mobilize patient  times a day  - Encourage activity and walks on unit  - Encourage or provide ROM exercises   - Turn and reposition patient   - Use appropriate equipment to lift or move patient in bed  - Instruct/ Assist with weight shifting when out of bed in chair  - Consider limitation of chair time intervals    Skin Care:  - Avoid use of baby powder, tape, friction and shearing, hot water or constrictive clothing  - Relieve pressure over bony prominences   - Do not massage red bony areas    Next Steps:  - Teach patient strategies to minimize risks  - Consider consults to  interdisciplinary teams  Outcome: Progressing     Problem: Nutrition/Hydration-ADULT  Goal: Nutrient/Hydration intake appropriate for improving, restoring or maintaining nutritional needs  Description: Monitor and assess patient's nutrition/hydration status for malnutrition. Collaborate with interdisciplinary team and initiate plan and interventions as ordered.  Monitor patient's weight and dietary intake as ordered or per policy. Utilize nutrition screening tool and intervene as necessary. Determine patient's food preferences and provide high-protein, high-caloric foods as appropriate.     INTERVENTIONS:  - Monitor oral intake, urinary output, labs, and treatment plans  - Assess nutrition and hydration status and recommend course of action  - Evaluate amount of meals eaten  - Assist patient with eating if necessary   - Allow adequate time for meals  - Recommend/ encourage appropriate diets, oral nutritional supplements, and vitamin/mineral supplements  - Order, calculate, and assess calorie counts as needed  - Recommend, monitor, and adjust tube feedings and  TPN/PPN based on assessed needs  - Assess need for intravenous fluids  - Provide specific nutrition/hydration education as appropriate  - Include patient/family/caregiver in decisions related to nutrition  Outcome: Progressing     Problem: GASTROINTESTINAL - ADULT  Goal: Maintains or returns to baseline bowel function  Description: INTERVENTIONS:  - Assess bowel function  - Encourage oral fluids to ensure adequate hydration  - Administer IV fluids if ordered to ensure adequate hydration  - Administer ordered medications as needed  - Encourage mobilization and activity  - Consider nutritional services referral to assist patient with adequate nutrition and appropriate food choices  Outcome: Progressing     Problem: INFECTION - ADULT  Goal: Absence or prevention of progression during hospitalization  Description: INTERVENTIONS:  - Assess and monitor for signs and symptoms of infection  - Monitor lab/diagnostic results  - Monitor all insertion sites, i.e. indwelling lines, tubes, and drains  - Monitor endotracheal if appropriate and nasal secretions for changes in amount and color  - [place] appropriate cooling/warming therapies per order  - Administer medications as ordered  - Instruct and encourage patient and family to use good hand hygiene technique  - Identify and instruct in appropriate isolation precautions for identified infection/condition  Outcome: Progressing     Problem: SAFETY ADULT  Goal: Patient will remain free of falls  Description: INTERVENTIONS:  - Educate patient/family on patient safety including physical limitations  - Instruct patient to call for assistance with activity   - Consider consulting OT/PT to assist with strengthening/mobility based on AM PAC & JH-HLM score  - Consult OT/PT to assist with strengthening/mobility   - Keep Call bell within reach  - Keep bed low and locked with side rails adjusted as appropriate  - Keep care items and personal belongings within reach  - Initiate and  maintain comfort rounds  - Make Fall Risk Sign visible to staff  - Offer Toileting ever 2 Hours, in advance of need  - Initiate/Maintain alarm  - Obtain necessary fall risk management equipment  - Apply yellow socks and bracelet for high fall risk patients  - Consider moving patient to room near nurses station  Outcome: Progressing     Problem: Knowledge Deficit  Goal: Patient/family/caregiver demonstrates understanding of disease process, treatment plan, medications, and discharge instructions  Description: Complete learning assessment and assess knowledge base.  Interventions:  - Provide teaching at level of understanding  - Provide teaching via preferred learning methods  Outcome: Progressing

## 2025-07-01 NOTE — PLAN OF CARE
Problem: Potential for Falls  Goal: Patient will remain free of falls  Description: INTERVENTIONS:  - Educate patient/family on patient safety including physical limitations  - Instruct patient to call for assistance with activity   - Consider consulting OT/PT to assist with strengthening/mobility based on AM PAC & -HLM score  - Consult OT/PT to assist with strengthening/mobility   - Keep Call bell within reach  - Keep bed low and locked with side rails adjusted as appropriate  - Keep care items and personal belongings within reach  - Initiate and maintain comfort rounds  - Make Fall Risk Sign visible to staff  - Offer Toileting every 2 Hours, in advance of need  - Initiate/Maintain bed/chair alarm  - Obtain necessary fall risk management equipment  - Apply yellow socks and bracelet for high fall risk patients  - Consider moving patient to room near nurses station  Outcome: Progressing     Problem: Prexisting or High Potential for Compromised Skin Integrity  Goal: Skin integrity is maintained or improved  Description: INTERVENTIONS:  - Identify patients at risk for skin breakdown  - Assess and monitor skin integrity including under and around medical devices   - Assess and monitor nutrition and hydration status  - Monitor labs  - Assess for incontinence   - Turn and reposition patient  - Assist with mobility/ambulation  - Relieve pressure over bob prominences   - Avoid friction and shearing  - Provide appropriate hygiene as needed including keeping skin clean and dry  - Evaluate need for skin moisturizer/barrier cream  - Collaborate with interdisciplinary team  - Patient/family teaching  - Consider wound care consult    Assess:  - Review Fabricio scale daily  - Clean and moisturize skin   - Inspect skin when repositioning, toileting, and assisting with ADLS  - Assess under medical devices   - Assess extremities for adequate circulation and sensation     Bed Management:  - Have minimal linens on bed & keep  smooth, unwrinkled  - Change linens as needed when moist or perspiring  - Toileting:  - Offer bedside commode  - Assess for incontinence   - Use incontinent care products after each incontinent episode     Activity:  - Mobilize patient 3 times a day  - Encourage activity and walks on unit  - Encourage or provide ROM exercises   - Turn and reposition patient every 2 Hours  - Use appropriate equipment to lift or move patient in bed  - Instruct/ Assist with weight shifting when out of bed in chair  - Consider limitation of chair time     Skin Care:  - Avoid use of baby powder, tape, friction and shearing, hot water or constrictive clothing  - Relieve pressure over bony prominences  - Do not massage red bony areas    Next Steps:  - Teach patient strategies to minimize risks  - Consider consults to  interdisciplinary teams   Outcome: Progressing     Problem: Nutrition/Hydration-ADULT  Goal: Nutrient/Hydration intake appropriate for improving, restoring or maintaining nutritional needs  Description: Monitor and assess patient's nutrition/hydration status for malnutrition. Collaborate with interdisciplinary team and initiate plan and interventions as ordered.  Monitor patient's weight and dietary intake as ordered or per policy. Utilize nutrition screening tool and intervene as necessary. Determine patient's food preferences and provide high-protein, high-caloric foods as appropriate.     INTERVENTIONS:  - Monitor oral intake, urinary output, labs, and treatment plans  - Assess nutrition and hydration status and recommend course of action  - Evaluate amount of meals eaten  - Assist patient with eating if necessary   - Allow adequate time for meals  - Recommend/ encourage appropriate diets, oral nutritional supplements, and vitamin/mineral supplements  - Order, calculate, and assess calorie counts as needed  - Recommend, monitor, and adjust tube feedings and TPN/PPN based on assessed needs  - Assess need for intravenous  fluids  - Provide specific nutrition/hydration education as appropriate  - Include patient/family/caregiver in decisions related to nutrition  Outcome: Progressing     Problem: GASTROINTESTINAL - ADULT  Goal: Maintains or returns to baseline bowel function  Description: INTERVENTIONS:  - Assess bowel function  - Encourage oral fluids to ensure adequate hydration  - Administer IV fluids if ordered to ensure adequate hydration  - Administer ordered medications as needed  - Encourage mobilization and activity  - Consider nutritional services referral to assist patient with adequate nutrition and appropriate food choices  Outcome: Progressing     Problem: INFECTION - ADULT  Goal: Absence or prevention of progression during hospitalization  Description: INTERVENTIONS:  - Assess and monitor for signs and symptoms of infection  - Monitor lab/diagnostic results  - Monitor all insertion sites, i.e. indwelling lines, tubes, and drains  - Monitor endotracheal if appropriate and nasal secretions for changes in amount and color  - [place] appropriate cooling/warming therapies per order  - Administer medications as ordered  - Instruct and encourage patient and family to use good hand hygiene technique  - Identify and instruct in appropriate isolation precautions for identified infection/condition  Outcome: Progressing     Problem: SAFETY ADULT  Goal: Patient will remain free of falls  Description: INTERVENTIONS:  - Educate patient/family on patient safety including physical limitations  - Instruct patient to call for assistance with activity   - Consider consulting OT/PT to assist with strengthening/mobility based on AM PAC & JH-HLM score  - Consult OT/PT to assist with strengthening/mobility   - Keep Call bell within reach  - Keep bed low and locked with side rails adjusted as appropriate  - Keep care items and personal belongings within reach  - Initiate and maintain comfort rounds  - Make Fall Risk Sign visible to staff  -  Offer Toileting every 2 Hours, in advance of need  - Initiate/Maintain bed/chair alarm  - Obtain necessary fall risk management equipment  - Apply yellow socks and bracelet for high fall risk patients  - Consider moving patient to room near nurses station  Outcome: Progressing     Problem: Knowledge Deficit  Goal: Patient/family/caregiver demonstrates understanding of disease process, treatment plan, medications, and discharge instructions  Description: Complete learning assessment and assess knowledge base.  Interventions:  - Provide teaching at level of understanding  - Provide teaching via preferred learning methods  Outcome: Progressing

## 2025-07-01 NOTE — ASSESSMENT & PLAN NOTE
History of prior memory issues and cognitive impairment   Presented to hospital for increased confusion, combativeness  Patient has lived with her niece and nephew for the past 30 years  Dependent with ADLs/IADLs    Previously followed with [facility]   Last seen on 09/29/23  Appears patient has occurences of sundowning, recently managed by PCP   PCP speaking with family of trial of increase dose of seroquel vs adding melatonin, family had opted to trial of melatonin, started on 06/13   Increased Melatonin to 6 mg HS in addition to increasing Seroquel to 50 mg at bedtime  Has history of hallucinations    Most recent TSH on 06/19/25 noted to be 3.797  Most recent vitamin B12 level on 01/24/23 noted to be 407  MRI brain on 03/13/25 revealed mild microangiopathic change  Patient scored 4/30 on most recent MOCA on 12/13/21  Keep physically, mentally, and socially active   PTA medication: Seroquel 25 mg po daily at bedtime   Increased to 50 mg at bedtime during current hospitalization, however modified dose to 25 mg twice daily   Was decreased to Depakote 250 mg every 12 hours on 6/20   Continues to tolerate PO medication per chart review   LFT's previously stable  Continue Gabapentin 100 mg twice daily, modified Seroquel 25 mg twice daily, continue Depakote 250 mg every 12 hours, Zyprexa 5 mg PO every 8 hours PRN for agitation  EKG 6/23 QTc 455    At risk for delirium secondary to age, cognitive decline, hospitalization, insomnia, immobility  Provide frequent redirection, reorientation, distraction techniques  Avoid deliriogenic medications such as tramadol, benzodiazepines, anticholinergics,  Benadryl  Treat pain, See geriatric pain protocol  Monitor for constipation and urinary retention   Noted bowel movement per chart review: 6/29  Encourage early and frequent moblization, OOB  Encourage Hydration/ Nutrition  Implement sleep hygiene, limit night time interuptions, group activities  Avoid physical  restraints as able  Use chemical restraint only when other efforts have failed, recommend zyprexa 2.5mg IM q8h prn  Monitor Qtc, if greater than 500 do not use antipsychotic medication  If QTc greater than 460, monitor and replete and deficiency of  K and Mg, recheck EKG

## 2025-07-02 PROBLEM — N18.32 ACUTE KIDNEY INJURY SUPERIMPOSED ON STAGE 3B CHRONIC KIDNEY DISEASE (HCC): Status: RESOLVED | Noted: 2022-08-23 | Resolved: 2025-07-02

## 2025-07-02 PROBLEM — N17.9 ACUTE KIDNEY INJURY SUPERIMPOSED ON STAGE 3B CHRONIC KIDNEY DISEASE (HCC): Status: RESOLVED | Noted: 2022-08-23 | Resolved: 2025-07-02

## 2025-07-02 LAB
ANION GAP SERPL CALCULATED.3IONS-SCNC: 4 MMOL/L (ref 4–13)
ANION GAP SERPL CALCULATED.3IONS-SCNC: 5 MMOL/L (ref 4–13)
BUN SERPL-MCNC: 20 MG/DL (ref 5–25)
BUN SERPL-MCNC: 21 MG/DL (ref 5–25)
CALCIUM SERPL-MCNC: 8.8 MG/DL (ref 8.4–10.2)
CALCIUM SERPL-MCNC: 8.9 MG/DL (ref 8.4–10.2)
CHLORIDE SERPL-SCNC: 107 MMOL/L (ref 96–108)
CHLORIDE SERPL-SCNC: 109 MMOL/L (ref 96–108)
CO2 SERPL-SCNC: 30 MMOL/L (ref 21–32)
CO2 SERPL-SCNC: 32 MMOL/L (ref 21–32)
CREAT SERPL-MCNC: 1.02 MG/DL (ref 0.6–1.3)
CREAT SERPL-MCNC: 1.05 MG/DL (ref 0.6–1.3)
GFR SERPL CREATININE-BSD FRML MDRD: 43 ML/MIN/1.73SQ M
GFR SERPL CREATININE-BSD FRML MDRD: 45 ML/MIN/1.73SQ M
GLUCOSE SERPL-MCNC: 119 MG/DL (ref 65–140)
GLUCOSE SERPL-MCNC: 94 MG/DL (ref 65–140)
POTASSIUM SERPL-SCNC: 4.2 MMOL/L (ref 3.5–5.3)
POTASSIUM SERPL-SCNC: 4.3 MMOL/L (ref 3.5–5.3)
SODIUM SERPL-SCNC: 142 MMOL/L (ref 135–147)
SODIUM SERPL-SCNC: 145 MMOL/L (ref 135–147)

## 2025-07-02 PROCEDURE — 80048 BASIC METABOLIC PNL TOTAL CA: CPT | Performed by: STUDENT IN AN ORGANIZED HEALTH CARE EDUCATION/TRAINING PROGRAM

## 2025-07-02 PROCEDURE — 99233 SBSQ HOSP IP/OBS HIGH 50: CPT | Performed by: INTERNAL MEDICINE

## 2025-07-02 PROCEDURE — 99232 SBSQ HOSP IP/OBS MODERATE 35: CPT

## 2025-07-02 RX ADMIN — HEPARIN SODIUM 5000 UNITS: 5000 INJECTION INTRAVENOUS; SUBCUTANEOUS at 15:15

## 2025-07-02 RX ADMIN — Medication 6 MG: at 21:18

## 2025-07-02 RX ADMIN — GABAPENTIN 100 MG: 100 CAPSULE ORAL at 10:06

## 2025-07-02 RX ADMIN — QUETIAPINE FUMARATE 25 MG: 25 TABLET ORAL at 21:26

## 2025-07-02 RX ADMIN — SODIUM ZIRCONIUM CYCLOSILICATE 10 G: 10 POWDER, FOR SUSPENSION ORAL at 10:24

## 2025-07-02 RX ADMIN — DIVALPROEX SODIUM 125 MG: 125 CAPSULE, COATED PELLETS ORAL at 10:06

## 2025-07-02 RX ADMIN — DOCUSATE SODIUM 100 MG: 100 CAPSULE, LIQUID FILLED ORAL at 10:06

## 2025-07-02 RX ADMIN — MIRTAZAPINE 7.5 MG: 15 TABLET, FILM COATED ORAL at 21:18

## 2025-07-02 RX ADMIN — GABAPENTIN 100 MG: 100 CAPSULE ORAL at 17:28

## 2025-07-02 RX ADMIN — DOCUSATE SODIUM 100 MG: 100 CAPSULE, LIQUID FILLED ORAL at 17:28

## 2025-07-02 RX ADMIN — HEPARIN SODIUM 5000 UNITS: 5000 INJECTION INTRAVENOUS; SUBCUTANEOUS at 05:28

## 2025-07-02 RX ADMIN — HEPARIN SODIUM 5000 UNITS: 5000 INJECTION INTRAVENOUS; SUBCUTANEOUS at 21:26

## 2025-07-02 RX ADMIN — GUAIFENESIN AND DEXTROMETHORPHAN 10 ML: 100; 10 SYRUP ORAL at 01:56

## 2025-07-02 RX ADMIN — QUETIAPINE FUMARATE 12.5 MG: 25 TABLET ORAL at 10:33

## 2025-07-02 RX ADMIN — DIVALPROEX SODIUM 125 MG: 125 CAPSULE, COATED PELLETS ORAL at 21:20

## 2025-07-02 NOTE — ASSESSMENT & PLAN NOTE
Patient has a history of anxiety/depression   Mood appears stable on exam today   Continue supportive care   Currently on Depakote, mirtazapine and Seroquel

## 2025-07-02 NOTE — CASE MANAGEMENT
Case Management Discharge Planning Note    Patient name Sona Valenzuela  Location /-01 MRN [id number]  : 11/3/1924 Date 2025       Current Admission Date: 2025  Current Admission Diagnosis:Vascular dementia with behavioral disturbance (HCC)   Patient Active Problem List    Diagnosis Date Noted    Goals of care, counseling/discussion 2025    Palliative care encounter 2025    Frailty syndrome in geriatric patient 2025    Primary malignant neoplasm of bladder ([facility]) 2025    Iron deficiency anemia 03/10/2025    Seizure-like activity ([facility]) 2025    Elevated troponin 2025    Congestion of throat 2025    Vascular dementia with behavioral disturbance ([facility])     Ambulatory dysfunction 2024    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, psychotic disturbance, mood disturbance, or anxiety ([facility]) 2024    Chronic pain of left wrist 10/28/2024    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 ([facility])     Acute on chronic HFpEF with severe pulmonary hypertension ([facility]) 2024    Primary hypertension 2024    Paroxysmal A-fib ([facility]) 2024    Compression fracture of lumbar spine, non-traumatic ([facility]) 2024    Mild protein-calorie malnutrition ([facility]) 2024    Vascular dementia, uncomplicated ([facility]) 2023    Does mobilize using walker 2023    Chronic congestive heart failure ([facility]) 2022    Chronic atrial fibrillation ([facility]) 10/11/2022    Recurrent major depressive disorder, in remission ([facility]) 2022    Acute kidney injury superimposed on stage 3b chronic kidney disease ([facility]) 2022    Vitamin D deficiency 2020    Primary insomnia 2019    Degeneration of lumbar intervertebral disc 2019    Lumbar radiculopathy 2019    Arthropathy of lumbar facet joint 2019    Restless legs syndrome 2019      LOS (days): 13  Geometric Mean LOS (GMLOS) (days): 3  Days  to GMLOS:-9.7     OBJECTIVE:  Risk of Unplanned Readmission Score: 29.49         Current admission status: Inpatient   Preferred Pharmacy:   [facility] #[id number] - [place] - [address]  [address]  [place] [address]  Phone: [phone] Fax: [phone]    Primary Care Provider: FARZANA Stanley    Primary Insurance: [employer]  Secondary Insurance: [facility]    DISCHARGE DETAILS:                                          Other Referral/Resources/Interventions Provided:  Referral Comments: Sent update to [place] via [employer];  waiting for response.         Treatment Team Recommendation: Home with Home Health Care, Short Term Rehab, SNF  Expected Discharge Disposition: Skilled Nursing Facility  Additional Discharge Dispositions: Short Term Rehab, Skilled Nursing Facility  Transport at Discharge : Other (Comment) (TBD)

## 2025-07-02 NOTE — ASSESSMENT & PLAN NOTE
Recently was having home health/physical therapy, re-ordered by PCP  Assess patient frequently for physical needs, encourage use of assistant devices as needed and directed by PT/OT  Identify cognitive and physical deficits and behaviors that affect risk of falls  Consider moving patient closer to nursing station to monitor more closely for impulsive behavior which may increase risk of falls  Tad fall and safety precautions   Educate patient/family on patient safety including physical limitations and importance of using call bell for assistance   Modify environment to reduce risk of injury including disconnecting from pole when not in use, ensuring adequate lighting in room and restroom, ensuring that path to restroom is clear and free of trip hazards  PT/OT consulted to assist with strengthening/mobility and assist with discharge planning to appropriate level of care  Out of bed as tolerated  Family interested in PT/OT services

## 2025-07-02 NOTE — CASE MANAGEMENT
Case Management Discharge Planning Note    Patient name Sona Valenzuela  Location /-01 MRN [id number]  : 11/3/1924 Date 2025       Current Admission Date: 2025  Current Admission Diagnosis:Vascular dementia with behavioral disturbance (HCC)   Patient Active Problem List    Diagnosis Date Noted    Goals of care, counseling/discussion 2025    Palliative care encounter 2025    Frailty syndrome in geriatric patient 2025    Primary malignant neoplasm of bladder ([facility]) 2025    Iron deficiency anemia 03/10/2025    Seizure-like activity ([facility]) 2025    Elevated troponin 2025    Congestion of throat 2025    Vascular dementia with behavioral disturbance ([facility])     Ambulatory dysfunction 2024    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, psychotic disturbance, mood disturbance, or anxiety ([facility]) 2024    Chronic pain of left wrist 10/28/2024    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 ([facility])     Acute on chronic HFpEF with severe pulmonary hypertension ([facility]) 2024    Primary hypertension 2024    Paroxysmal A-fib ([facility]) 2024    Compression fracture of lumbar spine, non-traumatic ([facility]) 2024    Mild protein-calorie malnutrition ([facility]) 2024    Vascular dementia, uncomplicated ([facility]) 2023    Does mobilize using walker 2023    Chronic congestive heart failure ([facility]) 2022    Chronic atrial fibrillation ([facility]) 10/11/2022    Recurrent major depressive disorder, in remission ([facility]) 2022    Acute kidney injury superimposed on stage 3b chronic kidney disease ([facility]) 2022    Vitamin D deficiency 2020    Primary insomnia 2019    Degeneration of lumbar intervertebral disc 2019    Lumbar radiculopathy 2019    Arthropathy of lumbar facet joint 2019    Restless legs syndrome 2019      LOS (days): 13  Geometric Mean LOS (GMLOS) (days): 3  Days  to GMLOS:-9.6     OBJECTIVE:  Risk of Unplanned Readmission Score: 29.49         Current admission status: Inpatient   Preferred Pharmacy:   [id number] - [place] - [address]  [address]  [place] [address]  Phone: [phone] Fax: [phone]    Primary Care Provider: FARZANA Stanley    Primary Insurance: "[employer]  Secondary Insurance: [facility]    DISCHARGE DETAILS:                                          Other Referral/Resources/Interventions Provided:  Referral Comments: Late Note for 7/1:  Received intent to deny from D/C Support.  Dr. Guerra made aware and P2P info sent to her via secure chat.  Deadline for P2P is 7/2 at 1700.  Awaiting outcome of P2P.         Treatment Team Recommendation: Home with Home Health Care, Short Term Rehab, SNF  Expected Discharge Disposition: Short Term Rehab  Additional Discharge Dispositions: Skilled Nursing Facility, Short Term Rehab  Transport at Discharge : Other (Comment) (TBD)

## 2025-07-02 NOTE — CASE MANAGEMENT
Case Management Discharge Planning Note    Patient name Sona Valenzuela  Location /-01 MRN [id number]  : 11/3/1924 Date 2025       Current Admission Date: 2025  Current Admission Diagnosis:Vascular dementia with behavioral disturbance (HCC)   Patient Active Problem List    Diagnosis Date Noted    Goals of care, counseling/discussion 2025    Palliative care encounter 2025    Frailty syndrome in geriatric patient 2025    Primary malignant neoplasm of bladder (HCC) 2025    Iron deficiency anemia 03/10/2025    Seizure-like activity (formerly [facility]) 2025    Elevated troponin 2025    Congestion of throat 2025    Vascular dementia with behavioral disturbance (formerly [facility])     Ambulatory dysfunction 2024    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, psychotic disturbance, mood disturbance, or anxiety (formerly [facility]) 2024    Chronic pain of left wrist 10/28/2024    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 (formerly [facility])     Acute on chronic HFpEF with severe pulmonary hypertension (formerly [facility]) 2024    Primary hypertension 2024    Paroxysmal A-fib (formerly [facility]) 2024    Compression fracture of lumbar spine, non-traumatic (formerly [facility]) 2024    Mild protein-calorie malnutrition (formerly [facility]) 2024    Vascular dementia, uncomplicated (formerly [facility]) 2023    Does mobilize using walker 2023    Chronic congestive heart failure (formerly [facility]) 2022    Chronic atrial fibrillation (formerly [facility]) 10/11/2022    Recurrent major depressive disorder, in remission (formerly [facility]) 2022    Vitamin D deficiency 2020    Primary insomnia 2019    Degeneration of lumbar intervertebral disc 2019    Lumbar radiculopathy 2019    Arthropathy of lumbar facet joint 2019    Restless legs syndrome 2019      LOS (days): 13  Geometric Mean LOS (GMLOS) (days): 3  Days to GMLOS:-10     OBJECTIVE:  Risk of Unplanned Readmission Score: 29.62         Current  admission status: Inpatient   Preferred Pharmacy:   [id number] - [place] - [address]  [address]  [place] [address]  Phone: [phone] Fax: [phone]    Primary Care Provider: FARZANA Stanley    Primary Insurance: [employer]  REP  Secondary Insurance: [facility] access hospital    DISCHARGE DETAILS:                                          Other Referral/Resources/Interventions Provided:  Referral Comments: Dr. Guerra successful in overturning denial from [facility] for pt's STR/SNF placement.  [place] will accept pt tomorrow in afternoon.  Dr. Guerra asked to order COVID testing.  Tx team sent update via secure chat.  CM will continue to follow throughout hospital stay.         Treatment Team Recommendation: Home with Home Health Care, Short Term Rehab, SNF  Expected Discharge Disposition: Skilled Nursing Facility  Additional Discharge Dispositions: Short Term Rehab, Skilled Nursing Facility  Transport at Discharge : Other (Comment) (TBD)

## 2025-07-02 NOTE — ASSESSMENT & PLAN NOTE
Lab Results   Component Value Date    EGFR 43 07/02/2025    EGFR 44 07/01/2025    EGFR 37 07/01/2025    CREATININE 1.05 07/02/2025    CREATININE 1.04 07/01/2025    CREATININE 1.19 07/01/2025     Baseline creatinine: 1.3-1.4  Continue to monitor kidney function  Monitor I/O's  Encourage PO hydration   Avoid hypotension  Avoid nephrotoxins, IV contrast  Received gentle IV fluids overnight

## 2025-07-02 NOTE — PLAN OF CARE
Problem: Potential for Falls  Goal: Patient will remain free of falls  Description: INTERVENTIONS:  - Educate patient/family on patient safety including physical limitations  - Instruct patient to call for assistance with activity   - Consider consulting OT/PT to assist with strengthening/mobility based on AM PAC & -HLM score  - Consult OT/PT to assist with strengthening/mobility   - Keep Call bell within reach  - Keep bed low and locked with side rails adjusted as appropriate  - Keep care items and personal belongings within reach  - Initiate and maintain comfort rounds  - Make Fall Risk Sign visible to staff  - Offer Toileting every 2 Hours, in advance of need  - Initiate/Maintain alarm  - Obtain necessary fall risk management equipment  - Apply yellow socks and bracelet for high fall risk patients  - Consider moving patient to room near nurses station  Outcome: Progressing     Problem: Prexisting or High Potential for Compromised Skin Integrity  Goal: Skin integrity is maintained or improved  Description: INTERVENTIONS:  - Identify patients at risk for skin breakdown  - Assess and monitor skin integrity including under and around medical devices   - Assess and monitor nutrition and hydration status  - Monitor labs  - Assess for incontinence   - Turn and reposition patient  - Assist with mobility/ambulation  - Relieve pressure over bob prominences   - Avoid friction and shearing  - Provide appropriate hygiene as needed including keeping skin clean and dry  - Evaluate need for skin moisturizer/barrier cream  - Collaborate with interdisciplinary team  - Patient/family teaching  - Consider wound care consult    Assess:  - Review Fabricio scale daily  - Clean and moisturize skin   - Inspect skin when repositioning, toileting, and assisting with ADLS  - Assess under medical devices   - Assess extremities for adequate circulation and sensation     Bed Management:  - Have minimal linens on bed & keep smooth,  unwrinkled  - Change linens as needed when moist or perspiring  - Avoid sitting or lying in one position  while in bed?Keep HOB at 30 degrees   - Toileting:  - Offer bedside commode  - Assess for incontinence   - Use incontinent care products after each incontinent episode     Activity:  - Mobilize patient 3 times a day  - Encourage activity and walks on unit  - Encourage or provide ROM exercises   - Turn and reposition patient every 2 Hours  - Use appropriate equipment to lift or move patient in bed  - Instruct/ Assist with weight shifting every 22 when out of bed in chair  - Consider limitation of chair time ` intervals    Skin Care:  - Avoid use of baby powder, tape, friction and shearing, hot water or constrictive clothing  - Relieve pressure over bony prominences   - Do not massage red bony areas    Next Steps:  - Teach patient strategies to minimize risks   - Consider consults to  interdisciplinary teams   Outcome: Progressing     Problem: Nutrition/Hydration-ADULT  Goal: Nutrient/Hydration intake appropriate for improving, restoring or maintaining nutritional needs  Description: Monitor and assess patient's nutrition/hydration status for malnutrition. Collaborate with interdisciplinary team and initiate plan and interventions as ordered.  Monitor patient's weight and dietary intake as ordered or per policy. Utilize nutrition screening tool and intervene as necessary. Determine patient's food preferences and provide high-protein, high-caloric foods as appropriate.     INTERVENTIONS:  - Monitor oral intake, urinary output, labs, and treatment plans  - Assess nutrition and hydration status and recommend course of action  - Evaluate amount of meals eaten  - Assist patient with eating if necessary   - Allow adequate time for meals  - Recommend/ encourage appropriate diets, oral nutritional supplements, and vitamin/mineral supplements  - Order, calculate, and assess calorie counts as needed  - Recommend, monitor,  and adjust tube feedings and TPN/PPN based on assessed needs  - Assess need for intravenous fluids  - Provide specific nutrition/hydration education as appropriate  - Include patient/family/caregiver in decisions related to nutrition  Outcome: Progressing     Problem: GASTROINTESTINAL - ADULT  Goal: Maintains or returns to baseline bowel function  Description: INTERVENTIONS:  - Assess bowel function  - Encourage oral fluids to ensure adequate hydration  - Administer IV fluids if ordered to ensure adequate hydration  - Administer ordered medications as needed  - Encourage mobilization and activity  - Consider nutritional services referral to assist patient with adequate nutrition and appropriate food choices  Outcome: Progressing     Problem: INFECTION - ADULT  Goal: Absence or prevention of progression during hospitalization  Description: INTERVENTIONS:  - Assess and monitor for signs and symptoms of infection  - Monitor lab/diagnostic results  - Monitor all insertion sites, i.e. indwelling lines, tubes, and drains  - Monitor endotracheal if appropriate and nasal secretions for changes in amount and color  - Sinclair appropriate cooling/warming therapies per order  - Administer medications as ordered  - Instruct and encourage patient and family to use good hand hygiene technique  - Identify and instruct in appropriate isolation precautions for identified infection/condition  Outcome: Progressing     Problem: SAFETY ADULT  Goal: Patient will remain free of falls  Description: INTERVENTIONS:  - Educate patient/family on patient safety including physical limitations  - Instruct patient to call for assistance with activity   - Consider consulting OT/PT to assist with strengthening/mobility based on AM PAC & JH-HLM score  - Consult OT/PT to assist with strengthening/mobility   - Keep Call bell within reach  - Keep bed low and locked with side rails adjusted as appropriate  - Keep care items and personal belongings  within reach  - Initiate and maintain comfort rounds  - Make Fall Risk Sign visible to staff  - Offer Toileting every  Hours, in advance of need  - Initiate/Maintain alarm  - Obtain necessary fall risk management equipment  - Apply yellow socks and bracelet for high fall risk patients  - Consider moving patient to room near nurses station  Outcome: Progressing     Problem: Knowledge Deficit  Goal: Patient/family/caregiver demonstrates understanding of disease process, treatment plan, medications, and discharge instructions  Description: Complete learning assessment and assess knowledge base.  Interventions:  - Provide teaching at level of understanding  - Provide teaching via preferred learning methods  Outcome: Progressing

## 2025-07-02 NOTE — ASSESSMENT & PLAN NOTE
History of prior memory issues and cognitive impairment   Presented to hospital for increased confusion, combativeness  Patient has lived with her niece and nephew for the past 30 years  Dependent with ADLs/IADLs  Has history of hallucinations  Previously followed with [facility], last seen on 09/29/23  Most recent TSH on 06/19/25 noted to be 3.797  Most recent vitamin B12 level on 01/24/23 noted to be 407  MRI brain on 03/13/25 revealed mild microangiopathic change  Patient scored 4/30 on most recent MOCA on 12/13/21  Encourage physical, mental and social activity  Currently on Depakote 125 mg twice daily, gabapentin 100 mg twice daily, Seroquel 12.5 mg in the morning and 25 mg at bedtime, Zyprexa 5 mg p.o. every 8 hours as needed for agitation  EKG 6/23 QTc 455  Delirium precautions

## 2025-07-02 NOTE — PROGRESS NOTES
"Progress Note - Hospitalist   Name: Sona Valenzuela 89 y.o. female I MRN: [id number]  Unit/Bed#: [facility] I Date of Admission: 6/19/2025   Date of Service: 7/2/2025 I Hospital Day: 13    Assessment & Plan  Vascular dementia with behavioral disturbance (HCC)  Per niece, reported to have increased bouts of confusion, combativeness more freuqently over the past few weeks  Recently was treated for UTI in May and completed nitrofurantoin regimen at that time; prior MDRO on urine clx  Urine culture noted E. coli ESBL.  Completed 5 days of IV ertapenem.  Patient has been having waxing waning mentation at times.  As of this morning patient slightly sleepier however easily aroused.  No RN reports of agitation or aggressive behavior.  Plan:  Continue Depakote 125 mg twice daily  Continue Seroquel, decreased a.m. dose of Seroquel to 12.5 mg and continue 25 mg at bedtime.  Melatonin 6 mg nightly.  Remeron 7.5 mg nightly.  Continue p.o. Zyprexa 5 mg every 8 hours as needed .  Speech recommended dysphagia 1 purée diet with thin liquids  Currently patient is off of  mittens, off of one-to-one.    On today's assessment patient awake and alert however disoriented.  Patient was having breakfast.  Patient did not follow commands however when asking how she is doing she said \"I am good\"  Pending insurance Auth and rehab placement.  [id number] successfully completed.  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  Hold home dose of p.o. Lasix.  Ambulatory dysfunction  PT/OT Eval and treat  Trend Mobility Scores  Encourage appropriate mobility to achieve and improve upon HLM Goals as noted  Primary insomnia  Mirtazapine  Goals of care, counseling/discussion  Palliative consulted  Frailty syndrome in geriatric patient  Appreciate geriatrics    VTE Pharmacologic Prophylaxis: VTE Score: 4 Moderate Risk (Score 3-4) - Pharmacological DVT Prophylaxis Ordered: heparin.    Mobility:   Basic Mobility Inpatient Raw Score: 6  JH-HLM Goal: 2: Bed activities/Dependent transfer  JH-HLM Achieved: 2: Bed activities/Dependent transfer  JH-HLM Goal achieved. Continue to encourage appropriate mobility.    Patient Centered Rounds: I performed bedside rounds with nursing staff today.   Discussions with Specialists or Other Care Team Provider: Discussed with case management    Education and Discussions with Family / Patient: Attempted to update  (niece) via phone. Left voicemail.     Current Length of Stay: 13 day(s)  Current Patient Status: Inpatient   Certification Statement: The patient will continue to require additional inpatient hospital stay due to pending placement  Discharge Plan: Anticipate discharge later today or tomorrow to rehab facility.    Code Status: Level 3 - DNAR and DNI    Subjective   Patient awake and alert on exam.  Does not interact much however was having breakfast.    Objective :  Temp:  [96.6 °F (35.9 °C)-97.6 °F (36.4 °C)] 96.7 °F (35.9 °C)  HR:  [] 106  BP: ()/(62-66) 96/62  Resp:  [19] 19  SpO2:  [93 %-97 %] 93 %  O2 Device: Nasal cannula  Nasal Cannula O2 Flow Rate (L/min):  [1 L/min] 1 L/min    Body  mass index is 26.45 kg/m².     Input and Output Summary (last 24 hours):     Intake/Output Summary (Last 24 hours) at 7/2/2025 1504  Last data filed at 7/2/2025 0847  Gross per 24 hour   Intake 120 ml   Output 1 ml   Net 119 ml       Physical Exam  Vitals and nursing note reviewed.   Constitutional:       General: She is not in acute distress.     Appearance: She is well-developed.   HENT:      Head: Normocephalic and atraumatic.     Eyes:      Conjunctiva/sclera: Conjunctivae normal.       Cardiovascular:      Rate and Rhythm: Normal rate and regular rhythm.      Heart sounds: No murmur heard.  Pulmonary:      Effort: Pulmonary effort is normal. No respiratory distress.      Breath sounds: Normal breath sounds.   Abdominal:      Palpations: Abdomen is soft.      Tenderness: There is no abdominal tenderness.     Musculoskeletal:         General: No swelling.      Cervical back: Neck supple.      Right lower leg: No edema.      Left lower leg: No edema.     Skin:     General: Skin is warm and dry.      Capillary Refill: Capillary refill takes less than 2 seconds.     Neurological:      Mental Status: She is alert. Mental status is at baseline. She is disoriented.     Psychiatric:         Mood and Affect: Mood normal.           Lines/Drains:              Lab Results: I have reviewed the following results:   Results from last 7 days   Lab Units 06/30/25  0808   WBC Thousand/uL 6.18   HEMOGLOBIN g/dL 8.8*   HEMATOCRIT % 30.7*   PLATELETS Thousands/uL 216     Results from last 7 days   Lab Units 07/02/25  0825 06/29/25  2058 06/29/25  0633   SODIUM mmol/L 145   < > 142   POTASSIUM mmol/L 4.3   < > 5.5*   CHLORIDE mmol/L 109*   < > 108   CO2 mmol/L 32   < > 29   BUN mg/dL 20   < > 36*   CREATININE mg/dL 1.05   < > 1.47*   ANION GAP mmol/L 4   < > 5   CALCIUM mg/dL 8.9   < > 9.1   ALBUMIN g/dL  --   --  3.3*   TOTAL BILIRUBIN mg/dL  --   --  0.89   ALK PHOS U/L  --   --  107*   ALT U/L  --   --  19   AST U/L  --   --  25    GLUCOSE RANDOM mg/dL 94   < > 85    < > = values in this interval not displayed.                       Recent Cultures (last 7 days):         Imaging Results Review: No pertinent imaging studies reviewed.  Other Study Results Review: No additional pertinent studies reviewed.    Last 24 Hours Medication List:     Current Facility-Administered Medications:     acetaminophen (TYLENOL) tablet 650 mg, Q8H PRN    dextromethorphan-guaiFENesin (ROBITUSSIN DM) oral syrup 10 mL, Q4H PRN    [DISCONTINUED] valproate (DEPACON) 125 mg in sodium chloride 0.9 % 50 mL IVPB, Q12H HESHAM **OR** divalproex sodium (DEPAKOTE SPRINKLE) capsule 125 mg, Q12H HESHAM    docusate sodium (COLACE) capsule 100 mg, BID    [Held by provider] furosemide (LASIX) tablet 20 mg, BID    gabapentin (NEURONTIN) capsule 100 mg, BID    heparin (porcine) subcutaneous injection 5,000 Units, Q8H HESHAM **AND** [COMPLETED] Platelet count, Once    melatonin tablet 6 mg, HS    mirtazapine (REMERON) tablet 7.5 mg, HS    OLANZapine (ZyPREXA ZYDIS) dispersible tablet 5 mg, Q8H PRN    ondansetron (ZOFRAN) injection 4 mg, Q6H PRN    QUEtiapine (SEROquel) tablet 12.5 mg, Daily    QUEtiapine (SEROquel) tablet 25 mg, HS    simethicone (MYLICON) chewable tablet 80 mg, 4x Daily PRN    Sodium Zirconium Cyclosilicate (Lokelma) 10 g, Daily    Administrative Statements   Today, Patient Was Seen By: Sil Guerra MD      **Please Note: This note may have been constructed using a voice recognition system.**

## 2025-07-02 NOTE — ASSESSMENT & PLAN NOTE
Multifactorial including advanced age, dementia and comorbidities  Patient is dependent with ADLs and IADLs  Continue supportive care  Encourage physical activity  Nutrition supplements as needed  Physical therapy to maximize safety

## 2025-07-02 NOTE — PROGRESS NOTES
Progress Note - Geriatric Medicine   Name: Sona Valenzuela 89 y.o. female I MRN: [id number]  Unit/Bed#: -01 I Date of Admission: 6/19/2025   Date of Service: 7/2/2025 I Hospital Day: 13     Assessment & Plan  Vascular dementia with behavioral disturbance (HCC)  History of prior memory issues and cognitive impairment   Presented to hospital for increased confusion, combativeness  Patient has lived with her niece and nephew for the past 30 years  Dependent with ADLs/IADLs  Has history of hallucinations  Previously followed with [facility], last seen on 09/29/23  Most recent TSH on 06/19/25 noted to be 3.797  Most recent vitamin B12 level on 01/24/23 noted to be 407  MRI brain on 03/13/25 revealed mild microangiopathic change  Patient scored 4/30 on most recent MOCA on 12/13/21  Encourage physical, mental and social activity  Currently on Depakote 125 mg twice daily, gabapentin 100 mg twice daily, Seroquel 12.5 mg in the morning and 25 mg at bedtime, Zyprexa 5 mg p.o. every 8 hours as needed for agitation  EKG 6/23 QTc 455  Delirium precautions    Recurrent major depressive disorder, in remission (HCC)  Patient has a history of anxiety/depression   Mood appears stable on exam today   Continue supportive care   Currently on Depakote, mirtazapine and Seroquel  Acute kidney injury superimposed on stage 3b chronic kidney disease (HCC)  Lab Results   Component Value Date    EGFR 43 07/02/2025    EGFR 44 07/01/2025    EGFR 37 07/01/2025    CREATININE 1.05 07/02/2025    CREATININE 1.04 07/01/2025    CREATININE 1.19 07/01/2025     Baseline creatinine: 1.3-1.4  Continue to monitor kidney function  Monitor I/O's  Encourage PO hydration   Avoid hypotension  Avoid nephrotoxins, IV contrast  Received gentle IV fluids overnight  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)  Wt Readings from Last 3 Encounters:   06/19/25 63.5 kg (139 lb 15.9 oz)   06/13/25 63.5 kg (140 lb)   04/03/25  61.7 kg (136 lb)     Weight stable  Euvolemic  Does not appear short of breath, no leg swelling  Family noted to PCP on 06/13 of intermittent shortness of breath with exertion   ECHO on 03/06/25 showing EF 55%  Chest XR pending  PTA medication: furosemide 20 mg BID  Ambulatory dysfunction  Recently was having home health/physical therapy, re-ordered by PCP  Assess patient frequently for physical needs, encourage use of assistant devices as needed and directed by PT/OT  Identify cognitive and physical deficits and behaviors that affect risk of falls  Consider moving patient closer to nursing station to monitor more closely for impulsive behavior which may increase risk of falls  [place] fall and safety precautions   Educate patient/family on patient safety including physical limitations and importance of using call bell for assistance   Modify environment to reduce risk of injury including disconnecting from pole when not in use, ensuring adequate lighting in room and restroom, ensuring that path to restroom is clear and free of trip hazards  PT/OT consulted to assist with strengthening/mobility and assist with discharge planning to appropriate level of care  Out of bed as tolerated  Family interested in PT/OT services  Goals of care, counseling/discussion  DNR/DNI  Palliative Care consulted, family not interested in hospice at this time, expressed interest in PT/OT services  Palliative care encounter  Palliative Care consulted, having goals of care discussion with family    Primary insomnia  Currently on mirtazapine and melatonin  Sleep hygiene  Frailty syndrome in geriatric patient  Multifactorial including advanced age, dementia and comorbidities  Patient is dependent with ADLs and IADLs  Continue supportive care  Encourage physical activity  Nutrition supplements as needed  Physical therapy to maximize safety    Subjective:   Patient is up in bed comfortably, frail.  She is awake and oriented to self.  No new  "complaints and no acute events overnight.    Objective:     Vitals: Blood pressure 96/62, pulse (!) 106, temperature (!) 96.7 °F (35.9 °C), temperature source Axillary, resp. rate 19, height 5' 1\" (1.549 m), weight 63.5 kg (139 lb 15.9 oz), SpO2 93%, not currently breastfeeding.,Body mass index is 26.45 kg/m².    Intake/Output Summary (Last 24 hours) at 7/2/2025 1054  Last data filed at 7/2/2025 0847  Gross per 24 hour   Intake 120 ml   Output 1 ml   Net 119 ml     Physical Exam:   Vitals and nursing note reviewed  General : NAD  HEENT : MMM   Heart : Normal rate, no murmur rub or gallop  Lungs : CTA no wheezes, rales or rhonchi  Abdomen : Soft, NT/ND, BS auscultated in all 4 quads.   Ext :  no edema  Skin : Warm, dry, age appropriate turgor and mobility  Neuro : Nonfocal  Psych : Alert and O x 1    Current Medications: Reviewed    Invasive Devices       Peripheral Intravenous Line  Duration             Peripheral IV 06/29/25 Dorsal (posterior);Left Hand 2 days                  Lab, Imaging and other studies:   I have personally reviewed lab results from 7/2/2025 including  BMP: , K4.3, BUN 20, creatinine 1.05 and GFR 43  "

## 2025-07-02 NOTE — ASSESSMENT & PLAN NOTE
Lab Results   Component Value Date    EGFR 43 07/02/2025    EGFR 44 07/01/2025    EGFR 37 07/01/2025    CREATININE 1.05 07/02/2025    CREATININE 1.04 07/01/2025    CREATININE 1.19 07/01/2025     Baseline creatinine is 1.2-1.4  K 4.8  Current is 1.27  Now off of IVF  Monitor BMP in AM

## 2025-07-02 NOTE — DISCHARGE SUPPORT
Case Management Assessment & Discharge Planning Note    Patient name Sona Valenzuela  Location /-01 MRN [id number]  : 11/3/1924 Date 2025       Current Admission Date: 2025  Current Admission Diagnosis:Vascular dementia with behavioral disturbance (HCC)   Patient Active Problem List    Diagnosis Date Noted    Goals of care, counseling/discussion 2025    Palliative care encounter 2025    Frailty syndrome in geriatric patient 2025    Primary malignant neoplasm of bladder (HCC) 2025    Iron deficiency anemia 03/10/2025    Seizure-like activity (Formerly [facility]) 2025    Elevated troponin 2025    Congestion of throat 2025    Vascular dementia with behavioral disturbance (Formerly [facility])     Ambulatory dysfunction 2024    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, psychotic disturbance, mood disturbance, or anxiety (Formerly [facility]) 2024    Chronic pain of left wrist 10/28/2024    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 (Formerly [facility])     Acute on chronic HFpEF with severe pulmonary hypertension ([facility]ly [facility]) 2024    Primary hypertension 2024    Paroxysmal A-fib ([facility]ly [facility]) 2024    Compression fracture of lumbar spine, non-traumatic (Formerly [facility]) 2024    Mild protein-calorie malnutrition ([facility]ly [facility]) 2024    Vascular dementia, uncomplicated ([facility]ly [facility]) 2023    Does mobilize using walker 2023    Chronic congestive heart failure ([facility]ly [facility]) 2022    Chronic atrial fibrillation ([facility]ly [facility]) 10/11/2022    Recurrent major depressive disorder, in remission ([facility]ly [facility]) 2022    Vitamin D deficiency 2020    Primary insomnia 2019    Degeneration of lumbar intervertebral disc 2019    Lumbar radiculopathy 2019    Arthropathy of lumbar facet joint 2019    Restless legs syndrome 2019      LOS (days): 13  Geometric Mean LOS (GMLOS) (days): 3  Days to GMLOS:-9.9   Facility Authorization Approved  [facility]  received approved auth for: SNF  Insurance: [employer]  Determination made after peer to peer was completed?: Not applicable  Authorization Obtained Via: Portal  Facility Name: [facility]  Approved Authorization Number: [id number]  Start of Care Date: 07/02/25  Next Review Date: 07/08/25  Continued Stay Care Coordinator Name: Nani BARCLAY  Continued Stay Care Coordinator Phone #: [phone] x [phone]  Submit Next Review to: F: [phone]   notified: Kenyetta TORRES     Updates to authorization status will be noted in chart.    Please reach out to CM for updates on any clinical information.

## 2025-07-02 NOTE — ASSESSMENT & PLAN NOTE
"Per niece, reported to have increased bouts of confusion, combativeness more freuqently over the past few weeks  Recently was treated for UTI in May and completed nitrofurantoin regimen at that time; prior MDRO on urine clx  Urine culture noted E. coli ESBL.  Completed 5 days of IV ertapenem.  Patient has been having waxing waning mentation at times.  As of this morning patient slightly sleepier however easily aroused.  No RN reports of agitation or aggressive behavior.  Plan:  Continue Depakote 125 mg twice daily  Continue Seroquel, decreased a.m. dose of Seroquel to 12.5 mg and continue 25 mg at bedtime.  Melatonin 6 mg nightly.  Remeron 7.5 mg nightly.  Continue p.o. Zyprexa 5 mg every 8 hours as needed .  Speech recommended dysphagia 1 purée diet with thin liquids  Currently patient is off of  mittens, off of one-to-one.    On today's assessment patient awake and alert however disoriented.  Patient was having breakfast.  Patient did not follow commands however when asking how she is doing she said \"I am good\"  Pending insurance Auth and rehab placement.  Gpxf-we-dtth successfully completed.  Further arrangements per case management    "

## 2025-07-03 VITALS
OXYGEN SATURATION: 100 % | BODY MASS INDEX: 26.43 KG/M2 | WEIGHT: 139.99 LBS | DIASTOLIC BLOOD PRESSURE: 73 MMHG | HEIGHT: 61 IN | RESPIRATION RATE: 19 BRPM | HEART RATE: 82 BPM | SYSTOLIC BLOOD PRESSURE: 127 MMHG | TEMPERATURE: 97.5 F

## 2025-07-03 PROCEDURE — 92526 ORAL FUNCTION THERAPY: CPT

## 2025-07-03 PROCEDURE — 87811 SARS-COV-2 COVID19 W/OPTIC: CPT

## 2025-07-03 PROCEDURE — 87804 INFLUENZA ASSAY W/OPTIC: CPT

## 2025-07-03 PROCEDURE — 99239 HOSP IP/OBS DSCHRG MGMT >30: CPT

## 2025-07-03 PROCEDURE — 99233 SBSQ HOSP IP/OBS HIGH 50: CPT | Performed by: INTERNAL MEDICINE

## 2025-07-03 RX ORDER — QUETIAPINE FUMARATE 25 MG/1
12.5 TABLET, FILM COATED ORAL DAILY
Start: 2025-07-04

## 2025-07-03 RX ORDER — DIVALPROEX SODIUM 125 MG/1
125 CAPSULE, COATED PELLETS ORAL EVERY 12 HOURS SCHEDULED
Start: 2025-07-03

## 2025-07-03 RX ORDER — OLANZAPINE 5 MG/1
5 TABLET, ORALLY DISINTEGRATING ORAL EVERY 8 HOURS PRN
Start: 2025-07-03

## 2025-07-03 RX ORDER — BISACODYL 10 MG
10 SUPPOSITORY, RECTAL RECTAL DAILY PRN
Start: 2025-07-03

## 2025-07-03 RX ORDER — DOCUSATE SODIUM 100 MG/1
100 CAPSULE, LIQUID FILLED ORAL 2 TIMES DAILY
Start: 2025-07-03

## 2025-07-03 RX ORDER — BISACODYL 10 MG
10 SUPPOSITORY, RECTAL RECTAL DAILY
Status: DISCONTINUED | OUTPATIENT
Start: 2025-07-03 | End: 2025-07-03 | Stop reason: HOSPADM

## 2025-07-03 RX ORDER — GABAPENTIN 100 MG/1
100 CAPSULE ORAL 2 TIMES DAILY
Start: 2025-07-03 | End: 2025-07-03

## 2025-07-03 RX ORDER — GABAPENTIN 100 MG/1
100 CAPSULE ORAL
Start: 2025-07-03

## 2025-07-03 RX ADMIN — DOCUSATE SODIUM 100 MG: 100 CAPSULE, LIQUID FILLED ORAL at 08:53

## 2025-07-03 RX ADMIN — GABAPENTIN 100 MG: 100 CAPSULE ORAL at 08:46

## 2025-07-03 RX ADMIN — BISACODYL 10 MG: 10 SUPPOSITORY RECTAL at 11:24

## 2025-07-03 RX ADMIN — DIVALPROEX SODIUM 125 MG: 125 CAPSULE, COATED PELLETS ORAL at 08:52

## 2025-07-03 RX ADMIN — QUETIAPINE FUMARATE 12.5 MG: 25 TABLET ORAL at 08:46

## 2025-07-03 RX ADMIN — SODIUM ZIRCONIUM CYCLOSILICATE 10 G: 10 POWDER, FOR SUSPENSION ORAL at 08:50

## 2025-07-03 RX ADMIN — HEPARIN SODIUM 5000 UNITS: 5000 INJECTION INTRAVENOUS; SUBCUTANEOUS at 07:23

## 2025-07-03 NOTE — ASSESSMENT & PLAN NOTE
"Per niece, reported to have increased bouts of confusion, combativeness more freuqently over the past few weeks  Recently was treated for UTI in May and completed nitrofurantoin regimen at that time; prior MDRO on urine clx  Urine culture noted E. coli ESBL.  Completed 5 days of IV ertapenem.  Patient has been having waxing waning mentation at times.  As of this morning patient slightly sleepier however easily aroused.  No RN reports of agitation or aggressive behavior.  Plan:  Continue Depakote 125 mg twice daily  Continue Seroquel, decreased a.m. dose of Seroquel to 12.5 mg and continue 25 mg at bedtime.  Melatonin 6 mg nightly.  Remeron 7.5 mg nightly.  Will decrease gabapentin to 100 mg at bedtime.  Continue further tapering until completely off.  Continue p.o. Zyprexa 5 mg every 8 hours as needed .  Speech recommended dysphagia 1 purée diet with thin liquids  Currently patient is off of  mittens, off of one-to-one.    On today's assessment patient awake and alert however disoriented.   Patient did not follow commands however when asking how she is doing she said \"I am good\"  Mffq-qh-ighp successfully completed.  Accepted at [place].  [employer] none for transportation today at 2 PM    "

## 2025-07-03 NOTE — ASSESSMENT & PLAN NOTE
Recently was having home health/physical therapy, re-ordered by PCP  Assess patient frequently for physical needs, encourage use of assistant devices as needed and directed by PT/OT  Identify cognitive and physical deficits and behaviors that affect risk of falls  Consider moving patient closer to nursing station to monitor more closely for impulsive behavior which may increase risk of falls  [place] fall and safety precautions   Educate patient/family on patient safety including physical limitations and importance of using call bell for assistance   Modify environment to reduce risk of injury including disconnecting from pole when not in use, ensuring adequate lighting in room and restroom, ensuring that path to restroom is clear and free of trip hazards  PT/OT consulted to assist with strengthening/mobility and assist with discharge planning to appropriate level of care  Out of bed as tolerated  Family interested in PT/OT services

## 2025-07-03 NOTE — DISCHARGE SUMMARY
"Discharge Summary - Hospitalist   Name: Sona Valenzuela 89 y.o. female I MRN: [id number]  Unit/Bed#: [facility] I Date of Admission: 6/19/2025   Date of Service: 7/3/2025 I Hospital Day: 14     Assessment & Plan  Vascular dementia with behavioral disturbance (HCC)  Per niece, reported to have increased bouts of confusion, combativeness more freuqently over the past few weeks  Recently was treated for UTI in May and completed nitrofurantoin regimen at that time; prior MDRO on urine clx  Urine culture noted E. coli ESBL.  Completed 5 days of IV ertapenem.  Patient has been having waxing waning mentation at times.  As of this morning patient slightly sleepier however easily aroused.  No RN reports of agitation or aggressive behavior.  Plan:  Continue Depakote 125 mg twice daily  Continue Seroquel, decreased a.m. dose of Seroquel to 12.5 mg and continue 25 mg at bedtime.  Melatonin 6 mg nightly.  Remeron 7.5 mg nightly.  Will decrease gabapentin to 100 mg at bedtime.  Continue further tapering until completely off.  Continue p.o. Zyprexa 5 mg every 8 hours as needed .  Speech recommended dysphagia 1 purée diet with thin liquids  Currently patient is off of  mittens, off of one-to-one.    On today's assessment patient awake and alert however disoriented.   Patient did not follow commands however when asking how she is doing she said \"I am good\"  Uvsh-he-olax successfully completed.  Accepted at [place].  [employer] none for transportation today at 2 PM    Recurrent major depressive disorder, in remission (HCC)  Continue home regimen Seroquel and mirtazapine  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)  Wt Readings from Last 3 Encounters:   06/19/25 63.5 kg (139 lb 15.9 oz)   06/13/25 63.5 kg (140 lb)   04/03/25 61.7 kg (136 lb)     Lab Results   Component Value Date     (H) 06/23/2025     (H) 03/04/2025    LVEF 55 03/06/2025     Appears euvolemic.  Hold home dose of p.o. " Lasix.  Ambulatory dysfunction  PT/OT Eval and treat  Trend Mobility Scores  Encourage appropriate mobility to achieve and improve upon HLM Goals as noted  Primary insomnia  Mirtazapine  Goals of care, counseling/discussion  Palliative consulted  Frailty syndrome in geriatric patient  Appreciate geriatrics     Medical Problems       Resolved Problems  Date Reviewed: 6/13/2025          Resolved    Acute kidney injury superimposed on stage 3b chronic kidney disease (HCC) 7/2/2025     Resolved by  Sil Guerra MD        Discharging Physician / Practitioner: Sli Guerra MD  PCP: FARZANA Stanley  Admission Date:   Admission Orders (From admission, onward)       Ordered        06/19/25 1820  INPATIENT ADMISSION  Once                          Discharge Date: 07/03/25    Next Steps for Physician/AP Assuming Care:      Test Results Pending at Discharge (will require follow up):  none    Medication Changes for Discharge & Rationale:   Seroquel 12.5 in a.m. and 25 mg in the p.m., Depakote 125 mg every 12 hours  See after visit summary for reconciled discharge medications provided to patient and/or family.     Consultations During Hospital Stay:  Geriatric team    Procedures Performed:   none    Significant Findings / Test Results:   XR chest portable   Final Result by Nando Burrows MD (06/25 1207)      Stable left basilar opacity. Pneumonia not excluded.            Workstation performed: [id number]         XR chest portable   Final Result by Pérez Sarmiento DO (06/23 1007)      1.  Persistent left basilar opacification which is similar to radiograph of 3/8/2025 and favored to represent atelectasis. Please note pneumonia is not excluded in the appropriate clinical context.                  Workstation performed: [id number]           Results Reviewed       Procedure Component Value Units Date/Time    UA w Reflex to Microscopic w Reflex to Culture [[id number]]  (Abnormal) Collected: 06/19/25 9115     Lab Status: Final result Specimen: Urine, Clean Catch Updated: 06/20/25 0001     Color, UA Light Yellow     Clarity, UA Clear     Specific Gravity, UA 1.013     pH, UA 6.0     Leukocytes, UA Large     Nitrite, UA Negative     Protein, UA Negative mg/dl      Glucose, UA Negative mg/dl      Ketones, UA Negative mg/dl      Urobilinogen, UA <2.0 mg/dl      Bilirubin, UA Negative     Occult Blood, UA Negative    TSH, 3rd generation with Free T4 reflex [[id number]]  (Normal) Collected: 06/19/25 1624    Lab Status: Final result Specimen: Blood from Arm, Left Updated: 06/19/25 1830     TSH 3RD GENERATION 3.797 uIU/mL     Comprehensive metabolic panel [[id number]]  (Abnormal) Collected: 06/19/25 1624    Lab Status: Final result Specimen: Blood from Arm, Left Updated: 06/19/25 1807     Sodium 139 mmol/L      Potassium 4.3 mmol/L      Chloride 104 mmol/L      CO2 25 mmol/L      ANION GAP 10 mmol/L      BUN 39 mg/dL      Creatinine 1.41 mg/dL      Glucose 119 mg/dL      Calcium 9.2 mg/dL      AST 36 U/L      ALT 26 U/L      Alkaline Phosphatase 129 U/L      Total Protein 7.1 g/dL      Albumin 3.6 g/dL      Total Bilirubin 1.29 mg/dL      eGFR 30 ml/min/1.73sq m     Narrative:      National Kidney Disease Foundation guidelines for Chronic Kidney Disease (CKD):     Stage 1 with normal or high GFR (GFR > 90 mL/min/1.73 square meters)    Stage 2 Mild CKD (GFR = 60-89 mL/min/1.73 square meters)    Stage 3A Moderate CKD (GFR = 45-59 mL/min/1.73 square meters)    Stage 3B Moderate CKD (GFR = 30-44 mL/min/1.73 square meters)    Stage 4 Severe CKD (GFR = 15-29 mL/min/1.73 square meters)    Stage 5 End Stage CKD (GFR <15 mL/min/1.73 square meters)  Note: GFR calculation is accurate only with a steady state creatinine    Ammonia [[id number]]  (Normal) Collected: 06/19/25 1624    Lab Status: Final result Specimen: Blood from Arm, Left Updated: 06/19/25 1645     Ammonia 27 umol/L     CBC and differential [[id number]]  (Abnormal) Collected:  06/19/25 1624    Lab Status: Final result Specimen: Blood from Arm, Left Updated: 06/19/25 1632     WBC 5.70 Thousand/uL      RBC 4.21 Million/uL      Hemoglobin 9.4 g/dL      Hematocrit 31.6 %      MCV 75 fL      MCH 22.3 pg      MCHC 29.7 g/dL      RDW 20.1 %      MPV 10.4 fL      Platelets 241 Thousands/uL      nRBC 0 /100 WBCs      Segmented % 67 %      Immature Grans % 0 %      Lymphocytes % 13 %      Monocytes % 17 %      Eosinophils Relative 2 %      Basophils Relative 1 %      Absolute Neutrophils 3.79 Thousands/µL      Absolute Immature Grans 0.01 Thousand/uL      Absolute Lymphocytes 0.75 Thousands/µL      Absolute Monocytes 0.99 Thousand/µL      Eosinophils Absolute 0.12 Thousand/µL      Basophils Absolute 0.04 Thousands/µL              Incidental Findings:   none       Hospital Course:   Sona Valenzuela is a 89 y.o. female patient who originally presented to the hospital on 6/19/2025 due to behavioral changes in the setting of vascular dementia.  Patient very combative and confused.  Patient was treated with IV antibiotics for a possible UTI with IV ertapenem.  Completed course of 5 days.  Geriatric team also evaluated the patient and recommended Seroquel, Depakote, gabapentin.  The doses were adjusted based on patient's behavior.  Was recommended Zyprexa as needed.  All restraints including virtual observation however patient was subsequently weaned off all the restrains and patient was stable for being discharged to the facility.  Accepted facility was [facility].          Please see above list of diagnoses and related plan for additional information.     Discharge Day Visit / Exam:   Subjective: Fully awake and alert however disoriented which is patient's baseline.  No behavioral changes overnight.  Vitals: Blood Pressure: 127/73 (07/03/25 0721)  Pulse: 82 (07/03/25 0721)  Temperature: 97.5 °F (36.4 °C) (07/03/25 0721)  Temp Source: Axillary (07/03/25 0721)  Respirations: 19 (07/03/25  "0721)  Height: 5' 1\" (154.9 cm) (06/1935)  Weight - Scale: 63.5 kg (139 lb 15.9 oz) (06/1935)  SpO2: 100 % (07/03/25 0721)  Physical Exam  Vitals and nursing note reviewed.   Constitutional:       General: She is not in acute distress.     Appearance: She is well-developed. She is not ill-appearing.   HENT:      Head: Normocephalic and atraumatic.     Eyes:      Conjunctiva/sclera: Conjunctivae normal.       Cardiovascular:      Rate and Rhythm: Normal rate. Rhythm irregular.      Heart sounds: Murmur heard.   Pulmonary:      Effort: Pulmonary effort is normal. No respiratory distress.      Breath sounds: Normal breath sounds. No wheezing or rales.   Abdominal:      Palpations: Abdomen is soft.      Tenderness: There is no abdominal tenderness.     Musculoskeletal:         General: No swelling.      Cervical back: Neck supple.      Right lower leg: No edema.      Left lower leg: No edema.     Skin:     General: Skin is warm and dry.      Capillary Refill: Capillary refill takes less than 2 seconds.     Neurological:      Mental Status: She is alert. Mental status is at baseline.     Psychiatric:         Mood and Affect: Mood normal.          Discussion with Family: Attempted to call patient's nieces over the phone for the past 2 days and left voicemail.  The nurse is reporting that the nieces have been visiting daily however did not have any questions for the provider.     Discharge instructions/Information to patient and family:   See after visit summary for information provided to patient and family.      Provisions for Follow-Up Care:  See after visit summary for information related to follow-up care and any pertinent home health orders.      Mobility at time of Discharge:   Basic Mobility Inpatient Raw Score: 6  JH-HLM Goal: 2: Bed activities/Dependent transfer  JH-HLM Achieved: 2: Bed activities/Dependent transfer  HLM Goal achieved. Continue to encourage appropriate mobility.     Disposition: "   Assisted Living Facility at [place]    Planned Readmission: no    Administrative Statements   Discharge Statement:  I have spent a total time of 45 minutes in caring for this patient on the day of the visit/encounter. >30 minutes of time was spent on: Diagnostic results, Prognosis, Risks and benefits of tx options, Instructions for management, Patient and family education, Importance of tx compliance, Risk factor reductions, Impressions, Counseling / Coordination of care, Documenting in the medical record, Reviewing / ordering tests, medicine, procedures  , and Communicating with other healthcare professionals .    **Please Note: This note may have been constructed using a voice recognition system**

## 2025-07-03 NOTE — ASSESSMENT & PLAN NOTE
Health Maintenance Due   Topic Date Due   • Shingles Vaccine (2 of 3) 03/12/2014   • Medicare Advantage- Medicare Wellness Visit  01/01/2023       Patient is due for topics as listed above but is not proceeding with Immunization(s) Shingles and MWV (Medicare Wellness Visit) at this time.        Mirtazapine

## 2025-07-03 NOTE — PROGRESS NOTES
Progress Note - Geriatric Medicine   Name: Sona Valenzuela 89 y.o. female I MRN: [id number]  Unit/Bed#: -01 I Date of Admission: 6/19/2025   Date of Service: 7/3/2025 I Hospital Day: 14    Assessment & Plan  Vascular dementia with behavioral disturbance (HCC)  History of prior memory issues and cognitive impairment   Presented to hospital for increased confusion, combativeness  Patient has lived with her niece and nephew for the past 30 years  Dependent with ADLs/IADLs  Has history of hallucinations  Previously followed with [facility], last seen on 09/29/23  Most recent TSH on 06/19/25 noted to be 3.797  Most recent vitamin B12 level on 01/24/23 noted to be 407  MRI brain on 03/13/25 revealed mild microangiopathic change  Patient scored 4/30 on most recent MOCA on 12/13/21  Encourage physical, mental and social activity  Currently on Depakote 125 mg twice daily, gabapentin 100 mg twice daily, Seroquel 12.5 mg in the morning and 25 mg at bedtime, Zyprexa 5 mg p.o. every 8 hours as needed for agitation   Can recommend weaning off medication as able, would recommend decreasing Gabapentin to HS and continuing to wean off  EKG 6/23 QTc 455  Delirium precautions    Recurrent major depressive disorder, in remission (HCC)  Patient has a history of anxiety/depression   Mood appears stable on exam today   Continue supportive care   Currently on Depakote, mirtazapine and Seroquel  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)  Wt Readings from Last 3 Encounters:   06/19/25 63.5 kg (139 lb 15.9 oz)   06/13/25 63.5 kg (140 lb)   04/03/25 61.7 kg (136 lb)     Weight stable  Euvolemic  Does not appear short of breath, no leg swelling  Family noted to PCP on 06/13 of intermittent shortness of breath with exertion   ECHO on 03/06/25 showing EF 55%  Chest XR pending  PTA medication: furosemide 20 mg BID  Ambulatory dysfunction  Recently was having home health/physical therapy, re-ordered  by PCP  Assess patient frequently for physical needs, encourage use of assistant devices as needed and directed by PT/OT  Identify cognitive and physical deficits and behaviors that affect risk of falls  Consider moving patient closer to nursing station to monitor more closely for impulsive behavior which may increase risk of falls  [place] fall and safety precautions   Educate patient/family on patient safety including physical limitations and importance of using call bell for assistance   Modify environment to reduce risk of injury including disconnecting from pole when not in use, ensuring adequate lighting in room and restroom, ensuring that path to restroom is clear and free of trip hazards  PT/OT consulted to assist with strengthening/mobility and assist with discharge planning to appropriate level of care  Out of bed as tolerated  Family interested in PT/OT services  Goals of care, counseling/discussion  DNR/DNI  Palliative Care consulted, family not interested in hospice at this time, expressed interest in PT/OT services  Palliative care encounter  Palliative Care consulted, having goals of care discussion with family    Primary insomnia  Currently on mirtazapine and melatonin  Sleep hygiene  Frailty syndrome in geriatric patient  Multifactorial including advanced age, dementia and comorbidities  Patient is dependent with ADLs and IADLs  Continue supportive care  Encourage physical activity  Nutrition supplements as needed  Physical therapy to maximize safety    Uziel Magallanes is seen today for follow up. Upon exam, she is resting, awakes to voice. She is calm and cooperative. No acute complaints at this time. No noted acute events overnight per chart review.     Objective :  Temp:  [97.5 °F (36.4 °C)-98 °F (36.7 °C)] 97.5 °F (36.4 °C)  HR:  [80-98] 82  BP: (117-131)/(61-76) 127/73  Resp:  [16-20] 19  SpO2:  [90 %-100 %] 100 %  O2 Device: Nasal cannula  Nasal Cannula O2 Flow Rate (L/min):  [2 L/min] 2  L/min    Physical Exam  Vitals and nursing note reviewed.   Constitutional:       Comments: Frail appearing     Cardiovascular:      Rate and Rhythm: Normal rate and regular rhythm.      Pulses: Normal pulses.      Heart sounds: Normal heart sounds.   Pulmonary:      Effort: Pulmonary effort is normal. No respiratory distress.      Breath sounds: Normal breath sounds.   Abdominal:      General: Abdomen is flat. Bowel sounds are normal.      Palpations: Abdomen is soft.     Musculoskeletal:      Right lower leg: No edema.      Left lower leg: No edema.     Skin:     General: Skin is warm and dry.      Capillary Refill: Capillary refill takes less than 2 seconds.     Neurological:      Mental Status: She is alert. Mental status is at baseline. She is disoriented.      Motor: Weakness (BLE) present.      Gait: Gait abnormal (use of AD).     Psychiatric:         Mood and Affect: Mood normal.         Behavior: Behavior normal.           Lab Results: I have reviewed the following results:CBC/BMP:   .     07/02/25 2014   SODIUM 142   K 4.2      CO2 30   BUN 21   CREATININE 1.02   GLUC 119      Imaging Results Review: No pertinent imaging studies reviewed.  Other Study Results Review: No additional pertinent studies reviewed.    Therapies:   Basic Mobility Inpatient Raw Score: 6  -[facility] Goal: 2: Bed activities/Dependent transfer  -[facility] Achieved: 2: Bed activities/Dependent transfer  PT: Following  OT: Following    VTE Prophylaxis: VTE covered by:  heparin (porcine), Subcutaneous, 5,000 Units at 07/03/25 0723     and Sequential compression device (Venodyne)     Code Status: Level 3 - DNAR and DNI  Advance Directive and Living Will:      Power of :    POLST:      Family and Social Support: [place] and [place]; Nephew, Juancarlos      Goals of Care: STR placement, Level II Rehab Resource     Administrative Statements   I have spent a total time of 35 minutes in caring for this patient on the day of the  visit/encounter including Prognosis, Risk factor reductions, Impressions, Counseling / Coordination of care, Documenting in the medical record, and Reviewing/placing orders in the medical record (including tests, medications, and/or procedures).

## 2025-07-03 NOTE — DISCHARGE INSTR - AVS FIRST PAGE
Please continue medications such as Seroquel 12.5 mg in the a.m. and 25 mg in the p.m. for dementia with behavioral issues, consider using Zyprexa 5 mg as needed for agitation.    Continue Depakote 125 mg for dementia and behavioral issues.  Will decrease gabapentin to 100 mg at bedtime.  \  Consider tapering gabapentin and psychiatric medication as tolerated  During this hospital stay Lasix was stopped given soft blood pressure.  Consider restarting Lasix if concern for volume overload.  Continue Lokelma for hyperkalemia.

## 2025-07-03 NOTE — ASSESSMENT & PLAN NOTE
History of prior memory issues and cognitive impairment   Presented to hospital for increased confusion, combativeness  Patient has lived with her niece and nephew for the past 30 years  Dependent with ADLs/IADLs  Has history of hallucinations  Previously followed with [facility], last seen on 09/29/23  Most recent TSH on 06/19/25 noted to be 3.797  Most recent vitamin B12 level on 01/24/23 noted to be 407  MRI brain on 03/13/25 revealed mild microangiopathic change  Patient scored 4/30 on most recent MOCA on 12/13/21  Encourage physical, mental and social activity  Currently on Depakote 125 mg twice daily, gabapentin 100 mg twice daily, Seroquel 12.5 mg in the morning and 25 mg at bedtime, Zyprexa 5 mg p.o. every 8 hours as needed for agitation   Can recommend weaning off medication as able, would recommend decreasing Gabapentin to HS and continuing to wean off  EKG 6/23 QTc 455  Delirium precautions

## 2025-07-03 NOTE — SPEECH THERAPY NOTE
Speech Language/Pathology     Speech/Language Pathology Progress Note     Patient Name: Sona Valenzuela    Today's Date: 7/3/2025     Problem List  Principal Problem:    Vascular dementia with behavioral disturbance (HCC)  Active Problems:    Primary insomnia    Recurrent major depressive disorder, in remission (HCC)    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)    Ambulatory dysfunction    Goals of care, counseling/discussion    Palliative care encounter    Frailty syndrome in geriatric patient      Subjective:  Patient received sleeping, wakes to voice and light touch.  Trials limited by lethargy.  RN reports no concerns w/ oral intake.     Previous/current diet: puree/thin     Objective:  The following consistencies were tested puree solids, thin liquids   Patient presents with weak bolus containment, manipulation and control.  Functional for small quantities.  Transfer prolonged.  Minimal oral retention.    No overt s/s of aspiration or distress. Again, intake limited by lethargy.      Assessment:  Pt appears to be at baseline swallow function, currently managing least restrictive diet with full feeding assist.       Plan:  Puree/thin  Meds crushed  1:1 full feed  Only feed when awake and alert  No further ST follow-up       Cathie Oliveira MS, CCC-SLP  Speech-Language Pathologist  [place] #[id number]  [place] #[id number]    28

## 2025-07-03 NOTE — PLAN OF CARE
Problem: Potential for Falls  Goal: Patient will remain free of falls  Description: INTERVENTIONS:  - Educate patient/family on patient safety including physical limitations  - Instruct patient to call for assistance with activity   - Consider consulting OT/PT to assist with strengthening/mobility based on AM PAC & JH-HLM score  - Consult OT/PT to assist with strengthening/mobility   - Keep Call bell within reach  - Keep bed low and locked with side rails adjusted as appropriate  - Keep care items and personal belongings within reach  - Initiate and maintain comfort rounds  - Make Fall Risk Sign visible to staff  - Offer Toileting every 2 Hours, in advance of need  - Initiate/Maintain 24/7 alarm  - Obtain necessary fall risk management equipment: bed alarm  - Apply yellow socks and bracelet for high fall risk patients  - Consider moving patient to room near nurses station  Outcome: Progressing     Problem: GASTROINTESTINAL - ADULT  Goal: Maintains or returns to baseline bowel function  Description: INTERVENTIONS:  - Assess bowel function  - Encourage oral fluids to ensure adequate hydration  - Administer IV fluids if ordered to ensure adequate hydration  - Administer ordered medications as needed  - Encourage mobilization and activity  - Consider nutritional services referral to assist patient with adequate nutrition and appropriate food choices  Outcome: Progressing     Problem: INFECTION - ADULT  Goal: Absence or prevention of progression during hospitalization  Description: INTERVENTIONS:  - Assess and monitor for signs and symptoms of infection  - Monitor lab/diagnostic results  - Monitor all insertion sites, i.e. indwelling lines, tubes, and drains  - Monitor endotracheal if appropriate and nasal secretions for changes in amount and color  - [place] appropriate cooling/warming therapies per order  - Administer medications as ordered  - Instruct and encourage patient and family to use good hand hygiene  technique  - Identify and instruct in appropriate isolation precautions for identified infection/condition  Outcome: Progressing

## 2025-07-07 ENCOUNTER — TELEPHONE (OUTPATIENT)
Dept: PALLIATIVE MEDICINE | Facility: CLINIC | Age: OVER 89
End: 2025-07-07

## 2025-07-07 ENCOUNTER — TELEPHONE (OUTPATIENT)
Dept: FAMILY MEDICINE CLINIC | Facility: CLINIC | Age: OVER 89
End: 2025-07-07

## 2025-07-07 ENCOUNTER — TRANSITIONAL CARE MANAGEMENT (OUTPATIENT)
Dept: FAMILY MEDICINE CLINIC | Facility: CLINIC | Age: OVER 89
End: 2025-07-07

## 2025-08-02 PROBLEM — R65.10 SIRS (SYSTEMIC INFLAMMATORY RESPONSE SYNDROME) (HCC): Status: ACTIVE | Noted: 2025-01-01

## 2025-08-03 PROBLEM — E87.20 LACTIC ACID INCREASED: Status: ACTIVE | Noted: 2025-01-01

## 2025-08-03 PROBLEM — R17 TOTAL BILIRUBIN, ELEVATED: Status: ACTIVE | Noted: 2025-01-01

## 2025-08-03 PROBLEM — E87.8 NARROW ANION GAP: Status: ACTIVE | Noted: 2025-01-01

## 2025-08-03 PROBLEM — E87.29 HIGH ANION GAP METABOLIC ACIDOSIS: Status: ACTIVE | Noted: 2025-01-01

## 2025-08-03 PROBLEM — M79.89 LEG SWELLING: Status: ACTIVE | Noted: 2025-01-01

## 2025-08-04 PROBLEM — J96.01 ACUTE HYPOXIC RESPIRATORY FAILURE (HCC): Status: ACTIVE | Noted: 2025-01-01

## 2025-08-05 PROBLEM — Z51.5 COMFORT MEASURES ONLY STATUS: Status: ACTIVE | Noted: 2025-01-01

## 2025-08-07 PROBLEM — E87.29 HIGH ANION GAP METABOLIC ACIDOSIS: Status: RESOLVED | Noted: 2025-01-01 | Resolved: 2025-08-07
